# Patient Record
Sex: MALE | Race: BLACK OR AFRICAN AMERICAN | NOT HISPANIC OR LATINO | Employment: OTHER | URBAN - METROPOLITAN AREA
[De-identification: names, ages, dates, MRNs, and addresses within clinical notes are randomized per-mention and may not be internally consistent; named-entity substitution may affect disease eponyms.]

---

## 2021-03-13 ENCOUNTER — IMMUNIZATION (OUTPATIENT)
Dept: VACCINE CLINIC | Facility: HOSPITAL | Age: 51
End: 2021-03-13

## 2021-03-13 PROCEDURE — 91301 HC SARSCO02 VAC 100MCG/0.5ML IM: CPT | Performed by: INTERNAL MEDICINE

## 2021-03-13 PROCEDURE — 0011A: CPT | Performed by: INTERNAL MEDICINE

## 2021-04-10 ENCOUNTER — IMMUNIZATION (OUTPATIENT)
Dept: VACCINE CLINIC | Facility: HOSPITAL | Age: 51
End: 2021-04-10

## 2021-04-10 PROCEDURE — 0012A: CPT | Performed by: NURSE PRACTITIONER

## 2021-04-10 PROCEDURE — 91301 HC SARSCO02 VAC 100MCG/0.5ML IM: CPT | Performed by: NURSE PRACTITIONER

## 2022-09-27 ENCOUNTER — APPOINTMENT (OUTPATIENT)
Dept: CT IMAGING | Facility: HOSPITAL | Age: 52
End: 2022-09-27

## 2022-09-27 ENCOUNTER — HOSPITAL ENCOUNTER (EMERGENCY)
Facility: HOSPITAL | Age: 52
Discharge: HOME | End: 2022-09-27
Attending: EMERGENCY MEDICINE | Admitting: EMERGENCY MEDICINE

## 2022-09-27 VITALS
RESPIRATION RATE: 16 BRPM | HEART RATE: 70 BPM | WEIGHT: 215.39 LBS | HEIGHT: 72 IN | DIASTOLIC BLOOD PRESSURE: 104 MMHG | OXYGEN SATURATION: 98 % | SYSTOLIC BLOOD PRESSURE: 185 MMHG | BODY MASS INDEX: 29.17 KG/M2 | TEMPERATURE: 97.5 F

## 2022-09-27 DIAGNOSIS — I16.0 HYPERTENSIVE URGENCY: ICD-10-CM

## 2022-09-27 DIAGNOSIS — G45.9 TIA (TRANSIENT ISCHEMIC ATTACK): Primary | ICD-10-CM

## 2022-09-27 DIAGNOSIS — Z91.14 NONCOMPLIANCE WITH MEDICATIONS: ICD-10-CM

## 2022-09-27 LAB
ALBUMIN SERPL-MCNC: 4 G/DL (ref 3.5–5.2)
ALBUMIN/GLOB SERPL: 1.5 G/DL
ALP SERPL-CCNC: 68 U/L (ref 39–117)
ALT SERPL W P-5'-P-CCNC: 27 U/L (ref 1–41)
ANION GAP SERPL CALCULATED.3IONS-SCNC: 8.6 MMOL/L (ref 5–15)
APTT PPP: 28.6 SECONDS (ref 22.7–35.4)
AST SERPL-CCNC: 14 U/L (ref 1–40)
BASOPHILS # BLD AUTO: 0.04 10*3/MM3 (ref 0–0.2)
BASOPHILS NFR BLD AUTO: 0.6 % (ref 0–1.5)
BILIRUB SERPL-MCNC: <0.2 MG/DL (ref 0–1.2)
BUN SERPL-MCNC: 11 MG/DL (ref 6–20)
BUN/CREAT SERPL: 9.3 (ref 7–25)
CALCIUM SPEC-SCNC: 9.2 MG/DL (ref 8.6–10.5)
CHLORIDE SERPL-SCNC: 103 MMOL/L (ref 98–107)
CO2 SERPL-SCNC: 23.4 MMOL/L (ref 22–29)
CREAT SERPL-MCNC: 1.18 MG/DL (ref 0.76–1.27)
DEPRECATED RDW RBC AUTO: 47.9 FL (ref 37–54)
EGFRCR SERPLBLD CKD-EPI 2021: 74.7 ML/MIN/1.73
EOSINOPHIL # BLD AUTO: 0.14 10*3/MM3 (ref 0–0.4)
EOSINOPHIL NFR BLD AUTO: 2.1 % (ref 0.3–6.2)
ERYTHROCYTE [DISTWIDTH] IN BLOOD BY AUTOMATED COUNT: 14.4 % (ref 12.3–15.4)
GLOBULIN UR ELPH-MCNC: 2.7 GM/DL
GLUCOSE BLDC GLUCOMTR-MCNC: 210 MG/DL (ref 70–130)
GLUCOSE SERPL-MCNC: 231 MG/DL (ref 65–99)
HCT VFR BLD AUTO: 47.3 % (ref 37.5–51)
HGB BLD-MCNC: 15.4 G/DL (ref 13–17.7)
IMM GRANULOCYTES # BLD AUTO: 0.02 10*3/MM3 (ref 0–0.05)
IMM GRANULOCYTES NFR BLD AUTO: 0.3 % (ref 0–0.5)
INR PPP: 0.99 (ref 0.9–1.1)
LYMPHOCYTES # BLD AUTO: 3.38 10*3/MM3 (ref 0.7–3.1)
LYMPHOCYTES NFR BLD AUTO: 51.2 % (ref 19.6–45.3)
MCH RBC QN AUTO: 29.5 PG (ref 26.6–33)
MCHC RBC AUTO-ENTMCNC: 32.6 G/DL (ref 31.5–35.7)
MCV RBC AUTO: 90.6 FL (ref 79–97)
MONOCYTES # BLD AUTO: 0.56 10*3/MM3 (ref 0.1–0.9)
MONOCYTES NFR BLD AUTO: 8.5 % (ref 5–12)
NEUTROPHILS NFR BLD AUTO: 2.46 10*3/MM3 (ref 1.7–7)
NEUTROPHILS NFR BLD AUTO: 37.3 % (ref 42.7–76)
NRBC BLD AUTO-RTO: 0 /100 WBC (ref 0–0.2)
PLATELET # BLD AUTO: 144 10*3/MM3 (ref 140–450)
PMV BLD AUTO: 11.9 FL (ref 6–12)
POTASSIUM SERPL-SCNC: 4 MMOL/L (ref 3.5–5.2)
PROT SERPL-MCNC: 6.7 G/DL (ref 6–8.5)
PROTHROMBIN TIME: 13 SECONDS (ref 11.7–14.2)
RBC # BLD AUTO: 5.22 10*6/MM3 (ref 4.14–5.8)
SODIUM SERPL-SCNC: 135 MMOL/L (ref 136–145)
TROPONIN T SERPL-MCNC: <0.01 NG/ML (ref 0–0.03)
WBC NRBC COR # BLD: 6.6 10*3/MM3 (ref 3.4–10.8)

## 2022-09-27 PROCEDURE — 96376 TX/PRO/DX INJ SAME DRUG ADON: CPT

## 2022-09-27 PROCEDURE — 84484 ASSAY OF TROPONIN QUANT: CPT | Performed by: EMERGENCY MEDICINE

## 2022-09-27 PROCEDURE — 85025 COMPLETE CBC W/AUTO DIFF WBC: CPT | Performed by: EMERGENCY MEDICINE

## 2022-09-27 PROCEDURE — 93010 ELECTROCARDIOGRAM REPORT: CPT | Performed by: INTERNAL MEDICINE

## 2022-09-27 PROCEDURE — 99284 EMERGENCY DEPT VISIT MOD MDM: CPT

## 2022-09-27 PROCEDURE — 70450 CT HEAD/BRAIN W/O DYE: CPT

## 2022-09-27 PROCEDURE — 82962 GLUCOSE BLOOD TEST: CPT

## 2022-09-27 PROCEDURE — 70498 CT ANGIOGRAPHY NECK: CPT

## 2022-09-27 PROCEDURE — 0 IOPAMIDOL PER 1 ML: Performed by: EMERGENCY MEDICINE

## 2022-09-27 PROCEDURE — 99284 EMERGENCY DEPT VISIT MOD MDM: CPT | Performed by: PSYCHIATRY & NEUROLOGY

## 2022-09-27 PROCEDURE — 85730 THROMBOPLASTIN TIME PARTIAL: CPT | Performed by: EMERGENCY MEDICINE

## 2022-09-27 PROCEDURE — 93005 ELECTROCARDIOGRAM TRACING: CPT | Performed by: EMERGENCY MEDICINE

## 2022-09-27 PROCEDURE — 80053 COMPREHEN METABOLIC PANEL: CPT | Performed by: EMERGENCY MEDICINE

## 2022-09-27 PROCEDURE — 96374 THER/PROPH/DIAG INJ IV PUSH: CPT

## 2022-09-27 PROCEDURE — 85610 PROTHROMBIN TIME: CPT | Performed by: EMERGENCY MEDICINE

## 2022-09-27 PROCEDURE — 70496 CT ANGIOGRAPHY HEAD: CPT

## 2022-09-27 RX ORDER — LABETALOL HYDROCHLORIDE 5 MG/ML
20 INJECTION, SOLUTION INTRAVENOUS ONCE
Status: COMPLETED | OUTPATIENT
Start: 2022-09-27 | End: 2022-09-27

## 2022-09-27 RX ORDER — SODIUM CHLORIDE 0.9 % (FLUSH) 0.9 %
10 SYRINGE (ML) INJECTION AS NEEDED
Status: DISCONTINUED | OUTPATIENT
Start: 2022-09-27 | End: 2022-09-27 | Stop reason: HOSPADM

## 2022-09-27 RX ADMIN — LABETALOL HYDROCHLORIDE 20 MG: 5 INJECTION, SOLUTION INTRAVENOUS at 15:57

## 2022-09-27 RX ADMIN — LABETALOL HYDROCHLORIDE 20 MG: 5 INJECTION, SOLUTION INTRAVENOUS at 14:21

## 2022-09-27 RX ADMIN — IOPAMIDOL 95 ML: 755 INJECTION, SOLUTION INTRAVENOUS at 16:25

## 2022-09-28 LAB — QT INTERVAL: 388 MS

## 2022-10-10 ENCOUNTER — HOSPITAL ENCOUNTER (OUTPATIENT)
Facility: HOSPITAL | Age: 52
LOS: 1 days | Discharge: HOME | End: 2022-10-11
Attending: EMERGENCY MEDICINE | Admitting: HOSPITALIST

## 2022-10-10 ENCOUNTER — APPOINTMENT (OUTPATIENT)
Dept: MRI IMAGING | Facility: HOSPITAL | Age: 52
End: 2022-10-10

## 2022-10-10 ENCOUNTER — APPOINTMENT (OUTPATIENT)
Dept: CT IMAGING | Facility: HOSPITAL | Age: 52
End: 2022-10-10

## 2022-10-10 DIAGNOSIS — I63.81 LACUNAR INFARCTION: ICD-10-CM

## 2022-10-10 DIAGNOSIS — Z79.01 ANTICOAGULATED BY ANTICOAGULATION TREATMENT: ICD-10-CM

## 2022-10-10 DIAGNOSIS — R55 SYNCOPE, UNSPECIFIED SYNCOPE TYPE: Primary | ICD-10-CM

## 2022-10-10 LAB
ALBUMIN SERPL-MCNC: 3.8 G/DL (ref 3.5–5.2)
ALBUMIN/GLOB SERPL: 1.5 G/DL
ALP SERPL-CCNC: 60 U/L (ref 39–117)
ALT SERPL W P-5'-P-CCNC: 41 U/L (ref 1–41)
ANION GAP SERPL CALCULATED.3IONS-SCNC: 9.3 MMOL/L (ref 5–15)
AST SERPL-CCNC: 22 U/L (ref 1–40)
BASOPHILS # BLD AUTO: 0.04 10*3/MM3 (ref 0–0.2)
BASOPHILS NFR BLD AUTO: 0.6 % (ref 0–1.5)
BILIRUB SERPL-MCNC: 0.2 MG/DL (ref 0–1.2)
BUN SERPL-MCNC: 11 MG/DL (ref 6–20)
BUN/CREAT SERPL: 11.2 (ref 7–25)
CALCIUM SPEC-SCNC: 8.9 MG/DL (ref 8.6–10.5)
CHLORIDE SERPL-SCNC: 105 MMOL/L (ref 98–107)
CO2 SERPL-SCNC: 25.7 MMOL/L (ref 22–29)
CREAT SERPL-MCNC: 0.98 MG/DL (ref 0.76–1.27)
DEPRECATED RDW RBC AUTO: 43.4 FL (ref 37–54)
EGFRCR SERPLBLD CKD-EPI 2021: 93.4 ML/MIN/1.73
EOSINOPHIL # BLD AUTO: 0.12 10*3/MM3 (ref 0–0.4)
EOSINOPHIL NFR BLD AUTO: 1.8 % (ref 0.3–6.2)
ERYTHROCYTE [DISTWIDTH] IN BLOOD BY AUTOMATED COUNT: 14.1 % (ref 12.3–15.4)
GLOBULIN UR ELPH-MCNC: 2.6 GM/DL
GLUCOSE BLDC GLUCOMTR-MCNC: 108 MG/DL (ref 70–130)
GLUCOSE BLDC GLUCOMTR-MCNC: 203 MG/DL (ref 70–130)
GLUCOSE SERPL-MCNC: 96 MG/DL (ref 65–99)
HCT VFR BLD AUTO: 41 % (ref 37.5–51)
HGB BLD-MCNC: 14.2 G/DL (ref 13–17.7)
IMM GRANULOCYTES # BLD AUTO: 0.01 10*3/MM3 (ref 0–0.05)
IMM GRANULOCYTES NFR BLD AUTO: 0.2 % (ref 0–0.5)
INR PPP: 1.94 (ref 0.9–1.1)
INR PPP: 1.95 (ref 0.9–1.1)
LYMPHOCYTES # BLD AUTO: 3.22 10*3/MM3 (ref 0.7–3.1)
LYMPHOCYTES NFR BLD AUTO: 49 % (ref 19.6–45.3)
MCH RBC QN AUTO: 29.5 PG (ref 26.6–33)
MCHC RBC AUTO-ENTMCNC: 34.6 G/DL (ref 31.5–35.7)
MCV RBC AUTO: 85.1 FL (ref 79–97)
MONOCYTES # BLD AUTO: 0.6 10*3/MM3 (ref 0.1–0.9)
MONOCYTES NFR BLD AUTO: 9.1 % (ref 5–12)
NEUTROPHILS NFR BLD AUTO: 2.58 10*3/MM3 (ref 1.7–7)
NEUTROPHILS NFR BLD AUTO: 39.3 % (ref 42.7–76)
NRBC BLD AUTO-RTO: 0 /100 WBC (ref 0–0.2)
PLATELET # BLD AUTO: 167 10*3/MM3 (ref 140–450)
PMV BLD AUTO: 10.8 FL (ref 6–12)
POTASSIUM SERPL-SCNC: 3.6 MMOL/L (ref 3.5–5.2)
PROT SERPL-MCNC: 6.4 G/DL (ref 6–8.5)
PROTHROMBIN TIME: 22.4 SECONDS (ref 11.7–14.2)
PROTHROMBIN TIME: 22.5 SECONDS (ref 11.7–14.2)
QT INTERVAL: 377 MS
RBC # BLD AUTO: 4.82 10*6/MM3 (ref 4.14–5.8)
SODIUM SERPL-SCNC: 140 MMOL/L (ref 136–145)
TROPONIN T SERPL-MCNC: <0.01 NG/ML (ref 0–0.03)
WBC NRBC COR # BLD: 6.57 10*3/MM3 (ref 3.4–10.8)

## 2022-10-10 PROCEDURE — G0378 HOSPITAL OBSERVATION PER HR: HCPCS

## 2022-10-10 PROCEDURE — 70450 CT HEAD/BRAIN W/O DYE: CPT

## 2022-10-10 PROCEDURE — 85610 PROTHROMBIN TIME: CPT | Performed by: EMERGENCY MEDICINE

## 2022-10-10 PROCEDURE — 93010 ELECTROCARDIOGRAM REPORT: CPT | Performed by: INTERNAL MEDICINE

## 2022-10-10 PROCEDURE — 70553 MRI BRAIN STEM W/O & W/DYE: CPT

## 2022-10-10 PROCEDURE — 84484 ASSAY OF TROPONIN QUANT: CPT | Performed by: EMERGENCY MEDICINE

## 2022-10-10 PROCEDURE — 99284 EMERGENCY DEPT VISIT MOD MDM: CPT

## 2022-10-10 PROCEDURE — 0 GADOBENATE DIMEGLUMINE 529 MG/ML SOLUTION: Performed by: HOSPITALIST

## 2022-10-10 PROCEDURE — 82962 GLUCOSE BLOOD TEST: CPT

## 2022-10-10 PROCEDURE — 63710000001 INSULIN LISPRO (HUMAN) PER 5 UNITS: Performed by: HOSPITALIST

## 2022-10-10 PROCEDURE — 85610 PROTHROMBIN TIME: CPT | Performed by: HOSPITALIST

## 2022-10-10 PROCEDURE — 25010000002 SODIUM CHLORIDE 0.9 % WITH KCL 20 MEQ 20-0.9 MEQ/L-% SOLUTION: Performed by: HOSPITALIST

## 2022-10-10 PROCEDURE — A9577 INJ MULTIHANCE: HCPCS | Performed by: HOSPITALIST

## 2022-10-10 PROCEDURE — 80053 COMPREHEN METABOLIC PANEL: CPT | Performed by: EMERGENCY MEDICINE

## 2022-10-10 PROCEDURE — 93005 ELECTROCARDIOGRAM TRACING: CPT | Performed by: EMERGENCY MEDICINE

## 2022-10-10 PROCEDURE — 85025 COMPLETE CBC W/AUTO DIFF WBC: CPT | Performed by: EMERGENCY MEDICINE

## 2022-10-10 RX ORDER — ASPIRIN 81 MG/1
81 TABLET, CHEWABLE ORAL DAILY
Status: DISCONTINUED | OUTPATIENT
Start: 2022-10-11 | End: 2022-10-11 | Stop reason: HOSPADM

## 2022-10-10 RX ORDER — SODIUM CHLORIDE AND POTASSIUM CHLORIDE 150; 900 MG/100ML; MG/100ML
75 INJECTION, SOLUTION INTRAVENOUS CONTINUOUS
Status: DISCONTINUED | OUTPATIENT
Start: 2022-10-10 | End: 2022-10-11

## 2022-10-10 RX ORDER — FUROSEMIDE 20 MG/1
TABLET ORAL
COMMUNITY
Start: 2022-06-29 | End: 2022-10-11 | Stop reason: HOSPADM

## 2022-10-10 RX ORDER — ERGOCALCIFEROL 1.25 MG/1
1 CAPSULE ORAL WEEKLY
COMMUNITY
Start: 2022-05-25

## 2022-10-10 RX ORDER — HYDROCODONE BITARTRATE AND ACETAMINOPHEN 10; 325 MG/1; MG/1
1 TABLET ORAL
COMMUNITY
Start: 2022-08-19

## 2022-10-10 RX ORDER — AMLODIPINE BESYLATE 5 MG/1
5 TABLET ORAL
Status: DISCONTINUED | OUTPATIENT
Start: 2022-10-10 | End: 2022-10-10

## 2022-10-10 RX ORDER — ROSUVASTATIN CALCIUM 20 MG/1
20 TABLET, COATED ORAL DAILY
COMMUNITY
Start: 2022-09-07 | End: 2022-10-11 | Stop reason: HOSPADM

## 2022-10-10 RX ORDER — PANTOPRAZOLE SODIUM 40 MG/1
TABLET, DELAYED RELEASE ORAL
COMMUNITY
Start: 2022-06-19

## 2022-10-10 RX ORDER — WARFARIN SODIUM 10 MG/1
TABLET ORAL
COMMUNITY
Start: 2022-08-31

## 2022-10-10 RX ORDER — INSULIN LISPRO 100 [IU]/ML
5 INJECTION, SOLUTION INTRAVENOUS; SUBCUTANEOUS
Status: DISCONTINUED | OUTPATIENT
Start: 2022-10-11 | End: 2022-10-11

## 2022-10-10 RX ORDER — PANTOPRAZOLE SODIUM 40 MG/1
40 TABLET, DELAYED RELEASE ORAL
Status: DISCONTINUED | OUTPATIENT
Start: 2022-10-11 | End: 2022-10-11 | Stop reason: HOSPADM

## 2022-10-10 RX ORDER — INSULIN LISPRO 100 [IU]/ML
0-7 INJECTION, SOLUTION INTRAVENOUS; SUBCUTANEOUS
Status: DISCONTINUED | OUTPATIENT
Start: 2022-10-10 | End: 2022-10-10

## 2022-10-10 RX ORDER — AMLODIPINE BESYLATE 10 MG/1
10 TABLET ORAL
Status: DISCONTINUED | OUTPATIENT
Start: 2022-10-11 | End: 2022-10-11 | Stop reason: HOSPADM

## 2022-10-10 RX ORDER — CARVEDILOL 6.25 MG/1
6.25 TABLET ORAL 2 TIMES DAILY WITH MEALS
Status: DISCONTINUED | OUTPATIENT
Start: 2022-10-10 | End: 2022-10-11 | Stop reason: HOSPADM

## 2022-10-10 RX ORDER — ATORVASTATIN CALCIUM 20 MG/1
40 TABLET, FILM COATED ORAL NIGHTLY
Status: DISCONTINUED | OUTPATIENT
Start: 2022-10-10 | End: 2022-10-10

## 2022-10-10 RX ORDER — WARFARIN SODIUM 5 MG/1
15 TABLET ORAL ONCE
Status: COMPLETED | OUTPATIENT
Start: 2022-10-10 | End: 2022-10-10

## 2022-10-10 RX ORDER — SODIUM CHLORIDE 0.9 % (FLUSH) 0.9 %
10 SYRINGE (ML) INJECTION AS NEEDED
Status: DISCONTINUED | OUTPATIENT
Start: 2022-10-10 | End: 2022-10-11 | Stop reason: HOSPADM

## 2022-10-10 RX ORDER — ASPIRIN 81 MG/1
81 TABLET, CHEWABLE ORAL DAILY
COMMUNITY
Start: 2022-06-20

## 2022-10-10 RX ORDER — AMLODIPINE BESYLATE 2.5 MG/1
TABLET ORAL
COMMUNITY
Start: 2022-06-19 | End: 2022-10-11 | Stop reason: HOSPADM

## 2022-10-10 RX ORDER — INSULIN LISPRO 100 [IU]/ML
0-7 INJECTION, SOLUTION INTRAVENOUS; SUBCUTANEOUS
Status: DISCONTINUED | OUTPATIENT
Start: 2022-10-10 | End: 2022-10-11 | Stop reason: HOSPADM

## 2022-10-10 RX ORDER — DAPAGLIFLOZIN 10 MG/1
TABLET, FILM COATED ORAL
COMMUNITY
Start: 2022-06-29

## 2022-10-10 RX ORDER — LOSARTAN POTASSIUM 25 MG/1
25 TABLET ORAL DAILY
Status: DISCONTINUED | OUTPATIENT
Start: 2022-10-11 | End: 2022-10-11 | Stop reason: HOSPADM

## 2022-10-10 RX ORDER — ATORVASTATIN CALCIUM 80 MG/1
80 TABLET, FILM COATED ORAL NIGHTLY
Status: DISCONTINUED | OUTPATIENT
Start: 2022-10-10 | End: 2022-10-11 | Stop reason: HOSPADM

## 2022-10-10 RX ORDER — ROSUVASTATIN CALCIUM 20 MG/1
20 TABLET, COATED ORAL DAILY
Status: DISCONTINUED | OUTPATIENT
Start: 2022-10-10 | End: 2022-10-10

## 2022-10-10 RX ORDER — LOSARTAN POTASSIUM 25 MG/1
25 TABLET ORAL DAILY
COMMUNITY
Start: 2022-07-29

## 2022-10-10 RX ORDER — CARVEDILOL 6.25 MG/1
6.25 TABLET ORAL
COMMUNITY
Start: 2022-09-08

## 2022-10-10 RX ADMIN — ATORVASTATIN CALCIUM 80 MG: 80 TABLET, FILM COATED ORAL at 23:33

## 2022-10-10 RX ADMIN — POTASSIUM CHLORIDE AND SODIUM CHLORIDE 75 ML/HR: 900; 150 INJECTION, SOLUTION INTRAVENOUS at 18:54

## 2022-10-10 RX ADMIN — CARVEDILOL 6.25 MG: 6.25 TABLET, FILM COATED ORAL at 23:33

## 2022-10-10 RX ADMIN — INSULIN LISPRO 3 UNITS: 100 INJECTION, SOLUTION INTRAVENOUS; SUBCUTANEOUS at 21:16

## 2022-10-10 RX ADMIN — GADOBENATE DIMEGLUMINE 20 ML: 529 INJECTION, SOLUTION INTRAVENOUS at 22:31

## 2022-10-10 RX ADMIN — WARFARIN SODIUM 15 MG: 5 TABLET ORAL at 23:33

## 2022-10-10 NOTE — PROGRESS NOTES
[facility] Clinical Pharmacy Services: Warfarin Dosing/Monitoring Consult    Cecilio Puckett is a 52 y.o. male, estimated creatinine clearance is 108 mL/min (by C-G formula based on SCr of 0.98 mg/dL). weighing 97.7 kg (215 lb 6.2 oz).    Results from last 7 days   Lab Units 10/10/22  1444   INR  1.95*   HEMOGLOBIN g/dL 14.2   HEMATOCRIT % 41.0   PLATELETS 10*3/mm3 167     Prior to admission anticoagulation: warfarin 15mg Mon, Wed, Fri; 10mg all other days    Hospital Anticoagulation:  Consulting provider: Dr. Levi  Start date: 10/10  Indication: A Fib - requiring full anticoagulation  Target INR: 2 - 3  Expected duration: tbd   Bridge Therapy: No      Potential food or drug interactions: none at this time    Education complete?/Date: No; plan for follow up TBD    Assessment/Plan:  Dose: 15mg x once tonight; will reassess after morning labs tomorrow  Monitor for any signs or symptoms of bleeding  Follow up daily INRs and dose adjustments    Pharmacy will continue to follow until discharge or discontinuation of warfarin.     Cindy Ott [facility]  Clinical Pharmacist

## 2022-10-10 NOTE — ED NOTES
Nursing report ED to floor  Cecilio Puckett  52 y.o.  male    HPI :   Chief Complaint   Patient presents with    Syncope       Admitting doctor:   Osbaldo Levi MD    Admitting diagnosis:   The primary encounter diagnosis was Syncope, unspecified syncope type. Diagnoses of Lacunar infarction (HCC) and Anticoagulated by anticoagulation treatment were also pertinent to this visit.    Code status:   Current Code Status       Date Active Code Status Order ID Comments User Context       Not on file            Allergies:   Patient has no known allergies.    Isolation:   No active isolations    Intake and Output  No intake or output data in the 24 hours ending 10/10/22 1603    Weight:   There were no vitals filed for this visit.    Most recent vitals:   Vitals:    10/10/22 1334 10/10/22 1358 10/10/22 1446 10/10/22 1501   BP:  133/85 128/82 114/89   Pulse: 96 72 68 73   Resp: 16  16 16   Temp: 97.8 °F (36.6 °C)      TempSrc: Tympanic      SpO2: 97% 98% 98% 99%       Active LDAs/IV Access:   Lines, Drains & Airways       Active LDAs       Name Placement date Placement time Site Days    Peripheral IV 10/10/22 1444 Right Antecubital 10/10/22  1444  Antecubital  less than 1                    Labs (abnormal labs have a star):   Labs Reviewed   PROTIME-INR - Abnormal; Notable for the following components:       Result Value    Protime 22.5 (*)     INR 1.95 (*)     All other components within normal limits   CBC WITH AUTO DIFFERENTIAL - Abnormal; Notable for the following components:    Neutrophil % 39.3 (*)     Lymphocyte % 49.0 (*)     Lymphocytes, Absolute 3.22 (*)     All other components within normal limits   TROPONIN (IN-HOUSE) - Normal    Narrative:     Troponin T Reference Range:  <= 0.03 ng/mL-   Negative for AMI  >0.03 ng/mL-     Abnormal for myocardial necrosis.  Clinicians would have to utilize clinical acumen, EKG, Troponin and serial changes to determine if it is an Acute Myocardial Infarction or myocardial injury due to  an underlying chronic condition.       Results may be falsely decreased if patient taking Biotin.     COMPREHENSIVE METABOLIC PANEL    Narrative:     GFR Normal >60  Chronic Kidney Disease <60  Kidney Failure <15     CBC AND DIFFERENTIAL    Narrative:     The following orders were created for panel order CBC & Differential.  Procedure                               Abnormality         Status                     ---------                               -----------         ------                     CBC Auto Differential[[id number]]        Abnormal            Final result                 Please view results for these tests on the individual orders.       EKG:   ECG 12 Lead   Preliminary Result   HEART RATE= 70  bpm   RR Interval= 857  ms   NM Interval= 169  ms   P Horizontal Axis= -10  deg   P Front Axis= 18  deg   QRSD Interval= 98  ms   QT Interval= 377  ms   QRS Axis= -16  deg   T Wave Axis= -38  deg   - ABNORMAL ECG -   Sinus rhythm   Probable left atrial enlargement   Left ventricular hypertrophy   Lateral infarct, age indeterminate   Anterior ST elevation, probably due to LVH   Electronically Signed By:    Date and Time of Study: 2022-10-10 14:00:26          Meds given in ED:   Medications   sodium chloride 0.9 % flush 10 mL (has no administration in time range)       Imaging results:  CT Head Without Contrast    Result Date: 10/10/2022  There is no evidence for acute traumatic intracranial pathology. Old lacunar disease is appreciated within the right caudate body, right lateral thalamus, and left lentiform nucleus which was seen on the prior head CT dated 09/27/2022. Additionally, there is a 9 mm hypodensity within the anterior aspect of the posterior limb of the right internal capsule which was not seen on the prior head CT and where there may be an acute to subacute lacune. Further characterization with MR imaging could be obtained, as clinically indicated.  These findings and recommendations were discussed  with Dr. Blanchard on 10/10/2022 at approximately 2:57 PM.  Radiation dose reduction techniques were utilized, including automated exposure control and exposure modulation based on body size.         Ambulatory status:   Ad yash    Social issues:   Social History     Socioeconomic History    Marital status: Single       NIH Stroke Scale:         Lara Rausch RN  10/10/22 16:03 EDT

## 2022-10-10 NOTE — ED TRIAGE NOTES
Pt passed out in bathroom last night - he hit his head.  He is on warfarin    Patient was placed in face mask during first look triage.  Patient was wearing a face mask throughout encounter.  I wore personal protective equipment throughout the encounter.  Hand hygiene was performed before and after patient encounter.

## 2022-10-10 NOTE — H&P
"History and physical    Primary care physician  Dr. CADENA    Chief complaint  Syncopal episode    History of present illness  52-year-old -American male with history of diabetes hypertension hyperlipidemia coronary artery disease and peripheral artery disease presented to [facility] emergency room after the syncopal episode when he was heading towards the bathroom and try to urinate.  Patient fell down and he does not recall after that.  Patient denies any chest pain shortness of breath dizziness lightheadedness prior to the fall.  Patient denies any loss of control of bowel bladder or tongue biting.  Patient evaluated in ER admitted for management.  Patient has no fever chills cough congestion night sweats weight loss or weight gain.    PAST MEDICAL HISTORY   Diabetes mellitus  Hypertension  Hyperlipidemia  Depression  Coronary artery disease  Peripheral artery disease      PAST SURGICAL HISTORY     History reviewed. No pertinent surgical history.     FAMILY HISTORY  History reviewed. No pertinent family history.     SOCIAL HISTORY                Socioeconomic History   • Marital status: Single         ALLERGIES  Patient has no known allergies.  Home medications reviewed    REVIEW OF SYSTEMS  Constitutional: Negative for fever.   HENT: Negative for sore throat.    Eyes: Negative.    Respiratory: Negative.  Negative for cough.    Cardiovascular: Negative.  Negative for chest pain.   Gastrointestinal: Negative.    Genitourinary: Negative.  Negative for dysuria.   Musculoskeletal: Negative.  Negative for back pain.   Skin: Negative.  Negative for rash.   Neurological: Positive for syncope, weakness and headaches.   All other systems reviewed and are negative.    PHYSICAL EXAM   Blood pressure 148/97, pulse 79, temperature 97.9 °F (36.6 °C), temperature source Oral, resp. rate 16, height 182.9 cm (72.01\"), weight 97.7 kg (215 lb 6.2 oz), SpO2 97 %.    GENERAL: Alert male no obvious distress.    HEENT:  " Unremarkable  NECK:  Supple  CV: regular rhythm, regular rate-no murmur  RESPIRATORY: normal effort, clear to auscultation bilaterally  ABDOMEN: soft, nontender nondistended bowel sounds positive  MUSCULOSKELETAL: Spine-atraumatic  NEURO:  No focal deficit  SKIN: warm, dry     LAB RESULTS  Lab Results (last 24 hours)     Procedure Component Value Units Date/Time    POC Glucose Once [[id number]]  (Normal) Collected: 10/10/22 1720    Specimen: Blood Updated: 10/10/22 1721     Glucose 108 mg/dL      Comment: Meter: [id number] : [id number] Giadyllan FINN       Protime-INR [[id number]]  (Abnormal) Collected: 10/10/22 1444    Specimen: Blood from Arm, Right Updated: 10/10/22 1519     Protime 22.5 Seconds      INR 1.95    Comprehensive Metabolic Panel [[id number]] Collected: 10/10/22 1444    Specimen: Blood from Arm, Right Updated: 10/10/22 1518     Glucose 96 mg/dL      BUN 11 mg/dL      Creatinine 0.98 mg/dL      Sodium 140 mmol/L      Potassium 3.6 mmol/L      Chloride 105 mmol/L      CO2 25.7 mmol/L      Calcium 8.9 mg/dL      Total Protein 6.4 g/dL      Albumin 3.80 g/dL      ALT (SGPT) 41 U/L      AST (SGOT) 22 U/L      Alkaline Phosphatase 60 U/L      Total Bilirubin 0.2 mg/dL      Globulin 2.6 gm/dL      A/G Ratio 1.5 g/dL      BUN/Creatinine Ratio 11.2     Anion Gap 9.3 mmol/L      eGFR 93.4 mL/min/1.73      Comment: National Kidney Foundation and American Society of Nephrology (ASN) Task Force recommended calculation based on the Chronic Kidney Disease Epidemiology Collaboration (CKD-EPI) equation refit without adjustment for race.       Narrative:      GFR Normal >60  Chronic Kidney Disease <60  Kidney Failure <15      Troponin [[id number]]  (Normal) Collected: 10/10/22 1444    Specimen: Blood from Arm, Right Updated: 10/10/22 1518     Troponin T <0.010 ng/mL     Narrative:      Troponin T Reference Range:  <= 0.03 ng/mL-   Negative for AMI  >0.03 ng/mL-     Abnormal for myocardial necrosis.  Clinicians  would have to utilize clinical acumen, EKG, Troponin and serial changes to determine if it is an Acute Myocardial Infarction or myocardial injury due to an underlying chronic condition.       Results may be falsely decreased if patient taking Biotin.      CBC & Differential [[id number]]  (Abnormal) Collected: 10/10/22 1444    Specimen: Blood from Arm, Right Updated: 10/10/22 1457    Narrative:      The following orders were created for panel order CBC & Differential.  Procedure                               Abnormality         Status                     ---------                               -----------         ------                     CBC Auto Differential[[id number]]        Abnormal            Final result                 Please view results for these tests on the individual orders.    CBC Auto Differential [[id number]]  (Abnormal) Collected: 10/10/22 1444    Specimen: Blood from Arm, Right Updated: 10/10/22 1457     WBC 6.57 10*3/mm3      RBC 4.82 10*6/mm3      Hemoglobin 14.2 g/dL      Hematocrit 41.0 %      MCV 85.1 fL      MCH 29.5 pg      MCHC 34.6 g/dL      RDW 14.1 %      RDW-SD 43.4 fl      MPV 10.8 fL      Platelets 167 10*3/mm3      Neutrophil % 39.3 %      Lymphocyte % 49.0 %      Monocyte % 9.1 %      Eosinophil % 1.8 %      Basophil % 0.6 %      Immature Grans % 0.2 %      Neutrophils, Absolute 2.58 10*3/mm3      Lymphocytes, Absolute 3.22 10*3/mm3      Monocytes, Absolute 0.60 10*3/mm3      Eosinophils, Absolute 0.12 10*3/mm3      Basophils, Absolute 0.04 10*3/mm3      Immature Grans, Absolute 0.01 10*3/mm3      nRBC 0.0 /100 WBC         Imaging Results (Last 24 Hours)     Procedure Component Value Units Date/Time    CT Head Without Contrast [[id number]] Collected: 10/10/22 1512     Updated: 10/10/22 1621    Narrative:      CT HEAD WITHOUT CONTRAST     CLINICAL HISTORY: Head trauma with headache.     TECHNIQUE: CT scan of the head was obtained with 3 mm axial soft tissue  and 2 mm axial bone algorithm  algorithm images. No intravenous contrast  was administered. Sagittal and coronal reconstructions were obtained.     COMPARISON: Head CT dated 09/27/2022.     FINDINGS:       There is no evidence for a calvarial fracture or an acute extra-axial  hemorrhage. Old lacunar disease is seen within the lateral aspect of the  right thalamus, the left lentiform nucleus, and the body of the right  caudate. Additionally, there is a 9 mm hypodensity within the posterior  limb of the right internal capsule which was not seen on the prior head  CT and could represent an acute to subacute lacune. This could be  further characterized with MR imaging, if clinically indicated. The  ventricles, sulci, and cisterns are age appropriate. The gray-white  matter differentiation is within normal limits. The posterior fossa  structures are unremarkable.     Incidental note is made of moderate size mucous retention cyst within  the left maxillary sinus. Mucosal thickening is incidentally appreciated  within the ethmoid air cells.       Impression:         There is no evidence for acute traumatic intracranial pathology. Old  lacunar disease is appreciated within the right caudate body, right  lateral thalamus, and left lentiform nucleus which was seen on the prior  head CT dated 09/27/2022. Additionally, there is a 9 mm hypodensity  within the anterior aspect of the posterior limb of the right internal  capsule which was not seen on the prior head CT and where there may be  an acute to subacute lacune. Further characterization with MR imaging  could be obtained, as clinically indicated.     These findings and recommendations were discussed with Dr. Blanchard on  10/10/2022 at approximately 2:57 PM.     Radiation dose reduction techniques were utilized, including automated  exposure control and exposure modulation based on body size.     This report was finalized on 10/10/2022 4:18 PM by Dr. Sergio Mcwilliams M.D.              ECG 12 Lead  Component    Ref Range & Units 14:00 13 d ago   QT Interval   ms 377 P  388    Resulting Agency  ECG  ECG             HEART RATE= 70  bpm  RR Interval= 857  ms  UT Interval= 169  ms  P Horizontal Axis= -10  deg  P Front Axis= 18  deg  QRSD Interval= 98  ms  QT Interval= 377  ms  QRS Axis= -16  deg  T Wave Axis= -38  deg  - ABNORMAL ECG -  Sinus rhythm  Probable left atrial enlargement  Left ventricular hypertrophy  Lateral infarct, age indeterminate  Anterior ST elevation, probably due to LVH             Current Facility-Administered Medications:   •  amLODIPine (NORVASC) tablet 10 mg, 10 mg, Oral, Q24H, Osbaldo Levi MD  •  aspirin chewable tablet 81 mg, 81 mg, Oral, Daily, Osbaldo Levi MD  •  carvedilol (COREG) tablet 6.25 mg, 6.25 mg, Oral, BID With Meals, Osbadlo Levi MD  •  empagliflozin (JARDIANCE) tablet 25 mg, 25 mg, Oral, Daily, Osbaldo Levi MD  •  influenza vac split quad (FLUZONE,FLUARIX,AFLURIA,FLULAVAL) injection 0.5 mL, 0.5 mL, Intramuscular, During Hospitalization, Osbaldo Levi MD  •  Insulin Glargine (LANTUS SOLOSTAR) injection pen solution pen-injector 45 Units, 45 Units, Subcutaneous, Daily, Osbaldo Levi MD  •  losartan (COZAAR) tablet 25 mg, 25 mg, Oral, Daily, Osbaldo Levi MD  •  pantoprazole (PROTONIX) EC tablet 40 mg, 40 mg, Oral, BID AC, Osbaldo Levi MD  •  Pharmacy to dose warfarin, , Does not apply, Continuous PRN, Osbaldo Levi MD  •  rosuvastatin (CRESTOR) tablet 20 mg, 20 mg, Oral, Daily, Osbaldo Levi MD  •  sertraline (ZOLOFT) tablet 50 mg, 50 mg, Oral, Daily, Osbaldo Levi MD  •  [COMPLETED] Insert peripheral IV, , , Once **AND** sodium chloride 0.9 % flush 10 mL, 10 mL, Intravenous, PRN, [place], Bear MONTE MD  •  warfarin (COUMADIN) tablet 5 mg, 5 mg, Oral, Daily, Osbaldo Levi MD     ASSESSMENT  Syncopal episode questionable etiology  Old lacunar infarct on anticoagulation  Diabetes mellitus  Hypertension  Hyperlipidemia  Coronary artery disease  Peripheral artery disease  Tobacco  abuse  Gastroesophageal reflux disease    PLAN  Admit  Aspirin Lipitor  IVF  Stroke work-up with MRI of brain 2D echo and bilateral carotid Doppler  Neurology consult  Adjust home medications  Stress ulcer DVT prophylaxis  Supportive care  Patient is full code  Discussed with nursing staff and family  Follow closely further recommendation according to hospital course    CHYNA NOLAN MD

## 2022-10-10 NOTE — ED PROVIDER NOTES
" EMERGENCY DEPARTMENT ENCOUNTER    Room Number:  33/33  Date of encounter:  10/10/2022  PCP: Arias Lehman APRN  Historian: Patient, wife at bedside    I used full protective equipment while examining this patient.  This includes face mask, gloves and protective eyewear.  I washed my hands before entering the room and immediately upon leaving the room      HPI:  Chief Complaint: Syncope  A complete HPI/ROS/PMH/PSH/SH/FH are unobtainable due to: None    Context: Cecilio Puckett is a 52 y.o. male who presents to the ED c/o syncope.  Patient states that he was heading towards the bathroom last night to urinate when he became lightheaded and passed out.  He is unsure how long he was out.  He states he did hit his head.  He complains of mild headache.  He states that he has been feeling generalized weakness ongoing for several weeks.  He thinks he might be \"dehydrated\".  Patient states he has been compliant with his medications including diabetes medicines and Coumadin.  Denies chest pain or trouble breathing.  Patient does continue to smoke less than 1 pack/day.  Primary care provider is Dr. Ojeda.    MEDICAL RECORD REVIEW  I reviewed prior medical records note the patient was seen in our ED just less than 2 weeks ago.  He has a history of hypertension diabetes, coronary artery disease and peripheral vascular disease.  Patient was seen with possible TIA and ultimately refused admission despite advice to do otherwise.    PAST MEDICAL HISTORY  Active Ambulatory Problems     Diagnosis Date Noted   • No Active Ambulatory Problems     Resolved Ambulatory Problems     Diagnosis Date Noted   • No Resolved Ambulatory Problems     No Additional Past Medical History         PAST SURGICAL HISTORY  History reviewed. No pertinent surgical history.      FAMILY HISTORY  History reviewed. No pertinent family history.      SOCIAL HISTORY  Social History     Socioeconomic History   • Marital status: Single         ALLERGIES  Patient " has no known allergies.       REVIEW OF SYSTEMS  Review of Systems   Constitutional: Negative for fever.   HENT: Negative for sore throat.    Eyes: Negative.    Respiratory: Negative.  Negative for cough.    Cardiovascular: Negative.  Negative for chest pain.   Gastrointestinal: Negative.    Genitourinary: Negative.  Negative for dysuria.   Musculoskeletal: Negative.  Negative for back pain.   Skin: Negative.  Negative for rash.   Neurological: Positive for syncope, weakness (Mild generalized weakness) and headaches.   All other systems reviewed and are negative.          PHYSICAL EXAM    I have reviewed the triage vital signs and nursing notes.    ED Triage Vitals [10/10/22 1334]   Temp Heart Rate Resp BP SpO2   97.8 °F (36.6 °C) 96 16 -- 97 %      Temp src Heart Rate Source Patient Position BP Location FiO2 (%)   Tympanic Monitor -- -- --       Physical Exam  GENERAL: Alert male no obvious distress.  Triage vitals reviewed and are fairly unremarkable  HENT: nares patent, mild left occipital tenderness to palpation without noted swelling  EYES: no scleral icterus  CV: regular rhythm, regular rate-no murmur  RESPIRATORY: normal effort, clear to auscultation bilaterally-O2 sats upper 90s on room air  ABDOMEN: soft, nontender to palpation-no masses  MUSCULOSKELETAL: Spine-atraumatic  Upper extremities-atraumatic  Lower mdbzmqtgyyq-qdgrzfdegd-xz segment swelling or tenderness to palpation  NEURO: Strength sensation and coordination are grossly intact.  Speech and mentation are unremarkable  SKIN: warm, dry      LAB RESULTS  Recent Results (from the past 24 hour(s))   ECG 12 Lead    Collection Time: 10/10/22  2:00 PM   Result Value Ref Range    QT Interval 377 ms   Comprehensive Metabolic Panel    Collection Time: 10/10/22  2:44 PM    Specimen: Arm, Right; Blood   Result Value Ref Range    Glucose 96 65 - 99 mg/dL    BUN 11 6 - 20 mg/dL    Creatinine 0.98 0.76 - 1.27 mg/dL    Sodium 140 136 - 145 mmol/L    Potassium 3.6  3.5 - 5.2 mmol/L    Chloride 105 98 - 107 mmol/L    CO2 25.7 22.0 - 29.0 mmol/L    Calcium 8.9 8.6 - 10.5 mg/dL    Total Protein 6.4 6.0 - 8.5 g/dL    Albumin 3.80 3.50 - 5.20 g/dL    ALT (SGPT) 41 1 - 41 U/L    AST (SGOT) 22 1 - 40 U/L    Alkaline Phosphatase 60 39 - 117 U/L    Total Bilirubin 0.2 0.0 - 1.2 mg/dL    Globulin 2.6 gm/dL    A/G Ratio 1.5 g/dL    BUN/Creatinine Ratio 11.2 7.0 - 25.0    Anion Gap 9.3 5.0 - 15.0 mmol/L    eGFR 93.4 >60.0 mL/min/1.73   Protime-INR    Collection Time: 10/10/22  2:44 PM    Specimen: Arm, Right; Blood   Result Value Ref Range    Protime 22.5 (H) 11.7 - 14.2 Seconds    INR 1.95 (H) 0.90 - 1.10   Troponin    Collection Time: 10/10/22  2:44 PM    Specimen: Arm, Right; Blood   Result Value Ref Range    Troponin T <0.010 0.000 - 0.030 ng/mL   CBC Auto Differential    Collection Time: 10/10/22  2:44 PM    Specimen: Arm, Right; Blood   Result Value Ref Range    WBC 6.57 3.40 - 10.80 10*3/mm3    RBC 4.82 4.14 - 5.80 10*6/mm3    Hemoglobin 14.2 13.0 - 17.7 g/dL    Hematocrit 41.0 37.5 - 51.0 %    MCV 85.1 79.0 - 97.0 fL    MCH 29.5 26.6 - 33.0 pg    MCHC 34.6 31.5 - 35.7 g/dL    RDW 14.1 12.3 - 15.4 %    RDW-SD 43.4 37.0 - 54.0 fl    MPV 10.8 6.0 - 12.0 fL    Platelets 167 140 - 450 10*3/mm3    Neutrophil % 39.3 (L) 42.7 - 76.0 %    Lymphocyte % 49.0 (H) 19.6 - 45.3 %    Monocyte % 9.1 5.0 - 12.0 %    Eosinophil % 1.8 0.3 - 6.2 %    Basophil % 0.6 0.0 - 1.5 %    Immature Grans % 0.2 0.0 - 0.5 %    Neutrophils, Absolute 2.58 1.70 - 7.00 10*3/mm3    Lymphocytes, Absolute 3.22 (H) 0.70 - 3.10 10*3/mm3    Monocytes, Absolute 0.60 0.10 - 0.90 10*3/mm3    Eosinophils, Absolute 0.12 0.00 - 0.40 10*3/mm3    Basophils, Absolute 0.04 0.00 - 0.20 10*3/mm3    Immature Grans, Absolute 0.01 0.00 - 0.05 10*3/mm3    nRBC 0.0 0.0 - 0.2 /100 WBC       Ordered the above labs and independently reviewed the results.      RADIOLOGY  CT Head Without Contrast    Result Date: 10/10/2022  CT HEAD WITHOUT  CONTRAST  CLINICAL HISTORY: Head trauma with headache.  TECHNIQUE: CT scan of the head was obtained with 3 mm axial soft tissue and 2 mm axial bone algorithm algorithm images. No intravenous contrast was administered. Sagittal and coronal reconstructions were obtained.  COMPARISON: Head CT dated 09/27/2022.  FINDINGS:  There is no evidence for a calvarial fracture or an acute extra-axial hemorrhage. Old lacunar disease is seen within the lateral aspect of the right thalamus, the left lentiform nucleus, and the body of the right caudate. Additionally, there is a 9 mm hypodensity within the posterior limb of the right internal capsule which was not seen on the prior head CT and could represent an acute to subacute lacune. This could be further characterized with MR imaging, if clinically indicated. The ventricles, sulci, and cisterns are age appropriate. The gray-white matter differentiation is within normal limits. The posterior fossa structures are unremarkable.  Incidental note is made of moderate size mucous retention cyst within the left maxillary sinus. Mucosal thickening is incidentally appreciated within the ethmoid air cells.      There is no evidence for acute traumatic intracranial pathology. Old lacunar disease is appreciated within the right caudate body, right lateral thalamus, and left lentiform nucleus which was seen on the prior head CT dated 09/27/2022. Additionally, there is a 9 mm hypodensity within the anterior aspect of the posterior limb of the right internal capsule which was not seen on the prior head CT and where there may be an acute to subacute lacune. Further characterization with MR imaging could be obtained, as clinically indicated.  These findings and recommendations were discussed with Dr. Blanchard on 10/10/2022 at approximately 2:57 PM.  Radiation dose reduction techniques were utilized, including automated exposure control and exposure modulation based on body size.          I vickie  the above noted radiological studies. Reviewed by me and discussed with radiologist.  See dictation for official radiology interpretation.      PROCEDURES  Procedures      MEDICATIONS GIVEN IN ER    Medications   sodium chloride 0.9 % flush 10 mL (has no administration in time range)         PROGRESS, DATA ANALYSIS, CONSULTS, AND MEDICAL DECISION MAKING    All labs have been independently reviewed by me.  All radiology studies have been reviewed by me and discussed with radiologist dictating the report.   EKG's independently viewed and interpreted by me.  Discussion below represents my analysis of pertinent findings related to patient's condition, differential diagnosis, treatment plan and final disposition.      ED Course as of 10/10/22 1529   Mon Oct 10, 2022   [age] [id number] male with history of diabetes, coronary artery disease and peripheral vascular disease presents after syncopal spell last night.  He states that he hit his head and has mild headache.  Exam and vitals are fairly unremarkable.  Will obtain CT scan of the head rule out acute traumatic injury in a patient unknown Coumadin.  Will work-up because of syncope.  Could include the following:  Dehydration  Anemia  Cardiac dysrhythmia  Vasovagal spell [DB]   1438 EKG          EKG time: 1400  Rhythm/Rate: Sinus 70  P waves and NE: Left atrial enlargement, normal NE interval  QRS, axis: Left axis deviation, LVH  ST and T waves: Anterior ST elevation and T wave inversion    Interpreted Contemporaneously by me, independently viewed  Not significantly changed compared to prior 9/2022   [DB]   1501 I discussed CT scan of the head with Dr. Mcwilliams.  He reports that there is a new hypodensity that could represent right lacunar infarct that was not noticed on scan from last ED visit in late September.  Given that he has had a likely subacute infarct and syncopal episode we will go ahead and admit for further work-up of subacute infarct and syncope.   Patient's primary care provider is Dr. Devi will contact Dr. Levi of the [facility] for admission. [DB]   3861 I discussed treatment and evaluation this patient with Dr. Osbaldo Levi who will admit on behalf of [facility]. [DB]      ED Course User Index  [DB] Bear Blanchard MD       AS OF 15:29 EDT VITALS:    BP - 114/89  HR - 73  TEMP - 97.8 °F (36.6 °C) (Tympanic)  O2 SATS - 99%      DIAGNOSIS  Final diagnoses:   Syncope, unspecified syncope type   Lacunar infarction (HCC)   Anticoagulated by anticoagulation treatment         DISPOSITION  Admission         Bear Blanchard MD  10/10/22 3545

## 2022-10-11 ENCOUNTER — APPOINTMENT (OUTPATIENT)
Dept: CARDIOLOGY | Facility: HOSPITAL | Age: 52
End: 2022-10-11

## 2022-10-11 VITALS
WEIGHT: 214.95 LBS | HEART RATE: 66 BPM | BODY MASS INDEX: 29.11 KG/M2 | SYSTOLIC BLOOD PRESSURE: 117 MMHG | OXYGEN SATURATION: 100 % | TEMPERATURE: 98.1 F | RESPIRATION RATE: 16 BRPM | DIASTOLIC BLOOD PRESSURE: 80 MMHG | HEIGHT: 72 IN

## 2022-10-11 LAB
ALBUMIN SERPL-MCNC: 4 G/DL (ref 3.5–5.2)
ALBUMIN/GLOB SERPL: 1.4 G/DL
ALP SERPL-CCNC: 69 U/L (ref 39–117)
ALT SERPL W P-5'-P-CCNC: 35 U/L (ref 1–41)
ANION GAP SERPL CALCULATED.3IONS-SCNC: 9.1 MMOL/L (ref 5–15)
AORTIC ARCH: 2.8 CM
ASCENDING AORTA: 2.9 CM
AST SERPL-CCNC: 22 U/L (ref 1–40)
BASOPHILS # BLD AUTO: 0.08 10*3/MM3 (ref 0–0.2)
BASOPHILS NFR BLD AUTO: 1.1 % (ref 0–1.5)
BH CV ECHO LEFT VENTRICLE GLOBAL LONGITUDINAL STRAIN: -10.3 %
BH CV ECHO MEAS - ACS: 2.13 CM
BH CV ECHO MEAS - AO MAX PG: 3.9 MMHG
BH CV ECHO MEAS - AO MEAN PG: 2.3 MMHG
BH CV ECHO MEAS - AO ROOT DIAM: 3.3 CM
BH CV ECHO MEAS - AO V2 MAX: 98.4 CM/SEC
BH CV ECHO MEAS - AO V2 VTI: 22.1 CM
BH CV ECHO MEAS - AVA(I,D): 2.7 CM2
BH CV ECHO MEAS - EDV(CUBED): 154.5 ML
BH CV ECHO MEAS - EDV(MOD-SP2): 177 ML
BH CV ECHO MEAS - EDV(MOD-SP4): 192 ML
BH CV ECHO MEAS - EF(MOD-BP): 49.8 %
BH CV ECHO MEAS - EF(MOD-SP2): 50.8 %
BH CV ECHO MEAS - EF(MOD-SP4): 44.8 %
BH CV ECHO MEAS - ESV(CUBED): 76.3 ML
BH CV ECHO MEAS - ESV(MOD-SP2): 87 ML
BH CV ECHO MEAS - ESV(MOD-SP4): 106 ML
BH CV ECHO MEAS - FS: 21 %
BH CV ECHO MEAS - IVS/LVPW: 1.46 CM
BH CV ECHO MEAS - IVSD: 1.53 CM
BH CV ECHO MEAS - LAT PEAK E' VEL: 5.8 CM/SEC
BH CV ECHO MEAS - LV DIASTOLIC VOL/BSA (35-75): 87.3 CM2
BH CV ECHO MEAS - LV MASS(C)D: 288.6 GRAMS
BH CV ECHO MEAS - LV MAX PG: 2.22 MMHG
BH CV ECHO MEAS - LV MEAN PG: 1.33 MMHG
BH CV ECHO MEAS - LV SYSTOLIC VOL/BSA (12-30): 48.2 CM2
BH CV ECHO MEAS - LV V1 MAX: 74.4 CM/SEC
BH CV ECHO MEAS - LV V1 VTI: 16.7 CM
BH CV ECHO MEAS - LVIDD: 5.4 CM
BH CV ECHO MEAS - LVIDS: 4.2 CM
BH CV ECHO MEAS - LVOT AREA: 3.6 CM2
BH CV ECHO MEAS - LVOT DIAM: 2.13 CM
BH CV ECHO MEAS - LVPWD: 1.05 CM
BH CV ECHO MEAS - MED PEAK E' VEL: 5.8 CM/SEC
BH CV ECHO MEAS - MV A DUR: 0.13 SEC
BH CV ECHO MEAS - MV A MAX VEL: 30 CM/SEC
BH CV ECHO MEAS - MV DEC SLOPE: 413 CM/SEC2
BH CV ECHO MEAS - MV DEC TIME: 0.22 MSEC
BH CV ECHO MEAS - MV E MAX VEL: 95.5 CM/SEC
BH CV ECHO MEAS - MV E/A: 3.2
BH CV ECHO MEAS - MV MAX PG: 4.9 MMHG
BH CV ECHO MEAS - MV MEAN PG: 2.02 MMHG
BH CV ECHO MEAS - MV P1/2T: 80.4 MSEC
BH CV ECHO MEAS - MV V2 VTI: 36.9 CM
BH CV ECHO MEAS - MVA(P1/2T): 2.7 CM2
BH CV ECHO MEAS - MVA(VTI): 1.61 CM2
BH CV ECHO MEAS - PA ACC TIME: 0.15 SEC
BH CV ECHO MEAS - PA PR(ACCEL): 9.3 MMHG
BH CV ECHO MEAS - PA V2 MAX: 83.3 CM/SEC
BH CV ECHO MEAS - PULM A REVS DUR: 0.18 SEC
BH CV ECHO MEAS - PULM A REVS VEL: 26.6 CM/SEC
BH CV ECHO MEAS - PULM DIAS VEL: 53.6 CM/SEC
BH CV ECHO MEAS - PULM S/D: 0.84
BH CV ECHO MEAS - PULM SYS VEL: 45 CM/SEC
BH CV ECHO MEAS - QP/QS: 0.66
BH CV ECHO MEAS - RAP SYSTOLE: 3 MMHG
BH CV ECHO MEAS - RV MAX PG: 1.42 MMHG
BH CV ECHO MEAS - RV V1 MAX: 59.6 CM/SEC
BH CV ECHO MEAS - RV V1 VTI: 12.3 CM
BH CV ECHO MEAS - RVOT DIAM: 2.02 CM
BH CV ECHO MEAS - SI(MOD-SP2): 40.9 ML/M2
BH CV ECHO MEAS - SI(MOD-SP4): 39.1 ML/M2
BH CV ECHO MEAS - SUP REN AO DIAM: 2.7 CM
BH CV ECHO MEAS - SV(LVOT): 59.4 ML
BH CV ECHO MEAS - SV(MOD-SP2): 90 ML
BH CV ECHO MEAS - SV(MOD-SP4): 86 ML
BH CV ECHO MEAS - SV(RVOT): 39.5 ML
BH CV ECHO MEAS - TAPSE (>1.6): 2.19 CM
BH CV ECHO MEASUREMENTS AVERAGE E/E' RATIO: 16.47
BH CV XLRA - RV BASE: 3.4 CM
BH CV XLRA - RV LENGTH: 8.4 CM
BH CV XLRA - RV MID: 2.8 CM
BH CV XLRA - TDI S': 9.8 CM/SEC
BH CV XLRA MEAS LEFT DIST CCA EDV: -17.7 CM/SEC
BH CV XLRA MEAS LEFT DIST CCA PSV: -47.5 CM/SEC
BH CV XLRA MEAS LEFT DIST ICA EDV: -18.7 CM/SEC
BH CV XLRA MEAS LEFT DIST ICA PSV: -52.8 CM/SEC
BH CV XLRA MEAS LEFT ICA/CCA RATIO: 1.15
BH CV XLRA MEAS LEFT MID ICA EDV: -19.4 CM/SEC
BH CV XLRA MEAS LEFT MID ICA PSV: -48.8 CM/SEC
BH CV XLRA MEAS LEFT PROX CCA EDV: 19.9 CM/SEC
BH CV XLRA MEAS LEFT PROX CCA PSV: 74.5 CM/SEC
BH CV XLRA MEAS LEFT PROX ECA EDV: -22.3 CM/SEC
BH CV XLRA MEAS LEFT PROX ECA PSV: -92.6 CM/SEC
BH CV XLRA MEAS LEFT PROX ICA EDV: -20.8 CM/SEC
BH CV XLRA MEAS LEFT PROX ICA PSV: -54.6 CM/SEC
BH CV XLRA MEAS LEFT PROX SCLA PSV: 94.4 CM/SEC
BH CV XLRA MEAS LEFT VERTEBRAL A EDV: -14.9 CM/SEC
BH CV XLRA MEAS LEFT VERTEBRAL A PSV: -47.1 CM/SEC
BH CV XLRA MEAS RIGHT DIST CCA EDV: -18.5 CM/SEC
BH CV XLRA MEAS RIGHT DIST CCA PSV: -79 CM/SEC
BH CV XLRA MEAS RIGHT DIST ICA EDV: -19.7 CM/SEC
BH CV XLRA MEAS RIGHT DIST ICA PSV: -46.7 CM/SEC
BH CV XLRA MEAS RIGHT ICA/CCA RATIO: 0.59
BH CV XLRA MEAS RIGHT MID ICA EDV: -21.6 CM/SEC
BH CV XLRA MEAS RIGHT MID ICA PSV: -44 CM/SEC
BH CV XLRA MEAS RIGHT PROX CCA EDV: -13.8 CM/SEC
BH CV XLRA MEAS RIGHT PROX CCA PSV: -62.9 CM/SEC
BH CV XLRA MEAS RIGHT PROX ECA EDV: -11 CM/SEC
BH CV XLRA MEAS RIGHT PROX ECA PSV: -55.8 CM/SEC
BH CV XLRA MEAS RIGHT PROX ICA EDV: -12.6 CM/SEC
BH CV XLRA MEAS RIGHT PROX ICA PSV: -44.5 CM/SEC
BH CV XLRA MEAS RIGHT PROX SCLA PSV: 56.6 CM/SEC
BH CV XLRA MEAS RIGHT VERTEBRAL A EDV: 11.9 CM/SEC
BH CV XLRA MEAS RIGHT VERTEBRAL A PSV: 36.4 CM/SEC
BILIRUB SERPL-MCNC: 0.2 MG/DL (ref 0–1.2)
BUN SERPL-MCNC: 9 MG/DL (ref 6–20)
BUN/CREAT SERPL: 10.2 (ref 7–25)
CALCIUM SPEC-SCNC: 8.9 MG/DL (ref 8.6–10.5)
CHLORIDE SERPL-SCNC: 105 MMOL/L (ref 98–107)
CHOLEST SERPL-MCNC: 150 MG/DL (ref 0–200)
CO2 SERPL-SCNC: 21.9 MMOL/L (ref 22–29)
CREAT SERPL-MCNC: 0.88 MG/DL (ref 0.76–1.27)
DEPRECATED RDW RBC AUTO: 44.8 FL (ref 37–54)
EGFRCR SERPLBLD CKD-EPI 2021: 104.1 ML/MIN/1.73
EOSINOPHIL # BLD AUTO: 0.17 10*3/MM3 (ref 0–0.4)
EOSINOPHIL NFR BLD AUTO: 2.3 % (ref 0.3–6.2)
ERYTHROCYTE [DISTWIDTH] IN BLOOD BY AUTOMATED COUNT: 14 % (ref 12.3–15.4)
GLOBULIN UR ELPH-MCNC: 2.9 GM/DL
GLUCOSE BLDC GLUCOMTR-MCNC: 295 MG/DL (ref 70–130)
GLUCOSE SERPL-MCNC: 189 MG/DL (ref 65–99)
HBA1C MFR BLD: 10.1 % (ref 4.8–5.6)
HCT VFR BLD AUTO: 45.4 % (ref 37.5–51)
HDLC SERPL-MCNC: 36 MG/DL (ref 40–60)
HGB BLD-MCNC: 15.4 G/DL (ref 13–17.7)
IMM GRANULOCYTES # BLD AUTO: 0.02 10*3/MM3 (ref 0–0.05)
IMM GRANULOCYTES NFR BLD AUTO: 0.3 % (ref 0–0.5)
INR PPP: 2.31 (ref 0.9–1.1)
LDLC SERPL CALC-MCNC: 93 MG/DL (ref 0–100)
LDLC/HDLC SERPL: 2.51 {RATIO}
LEFT ARM BP: NORMAL MMHG
LEFT ATRIUM VOLUME INDEX: 30.5 ML/M2
LV EF 2D ECHO EST: 48 %
LYMPHOCYTES # BLD AUTO: 3.84 10*3/MM3 (ref 0.7–3.1)
LYMPHOCYTES NFR BLD AUTO: 52.3 % (ref 19.6–45.3)
MAXIMAL PREDICTED HEART RATE: 169 BPM
MAXIMAL PREDICTED HEART RATE: 169 BPM
MCH RBC QN AUTO: 29.4 PG (ref 26.6–33)
MCHC RBC AUTO-ENTMCNC: 33.9 G/DL (ref 31.5–35.7)
MCV RBC AUTO: 86.8 FL (ref 79–97)
MONOCYTES # BLD AUTO: 0.67 10*3/MM3 (ref 0.1–0.9)
MONOCYTES NFR BLD AUTO: 9.1 % (ref 5–12)
NEUTROPHILS NFR BLD AUTO: 2.56 10*3/MM3 (ref 1.7–7)
NEUTROPHILS NFR BLD AUTO: 34.9 % (ref 42.7–76)
NRBC BLD AUTO-RTO: 0 /100 WBC (ref 0–0.2)
PLATELET # BLD AUTO: 172 10*3/MM3 (ref 140–450)
PMV BLD AUTO: 11.3 FL (ref 6–12)
POTASSIUM SERPL-SCNC: 4.2 MMOL/L (ref 3.5–5.2)
PROT SERPL-MCNC: 6.9 G/DL (ref 6–8.5)
PROTHROMBIN TIME: 25.7 SECONDS (ref 11.7–14.2)
RBC # BLD AUTO: 5.23 10*6/MM3 (ref 4.14–5.8)
RIGHT ARM BP: NORMAL MMHG
SINUS: 2.9 CM
SODIUM SERPL-SCNC: 136 MMOL/L (ref 136–145)
STJ: 2.6 CM
STRESS TARGET HR: 144 BPM
STRESS TARGET HR: 144 BPM
TRIGL SERPL-MCNC: 118 MG/DL (ref 0–150)
TSH SERPL DL<=0.05 MIU/L-ACNC: 1.97 UIU/ML (ref 0.27–4.2)
VLDLC SERPL-MCNC: 21 MG/DL (ref 5–40)
WBC NRBC COR # BLD: 7.34 10*3/MM3 (ref 3.4–10.8)

## 2022-10-11 PROCEDURE — 82962 GLUCOSE BLOOD TEST: CPT

## 2022-10-11 PROCEDURE — G0378 HOSPITAL OBSERVATION PER HR: HCPCS

## 2022-10-11 PROCEDURE — 97165 OT EVAL LOW COMPLEX 30 MIN: CPT

## 2022-10-11 PROCEDURE — 85610 PROTHROMBIN TIME: CPT | Performed by: HOSPITALIST

## 2022-10-11 PROCEDURE — 97162 PT EVAL MOD COMPLEX 30 MIN: CPT

## 2022-10-11 PROCEDURE — 93306 TTE W/DOPPLER COMPLETE: CPT

## 2022-10-11 PROCEDURE — 93356 MYOCRD STRAIN IMG SPCKL TRCK: CPT

## 2022-10-11 PROCEDURE — 25010000002 PERFLUTREN (DEFINITY) 8.476 MG IN SODIUM CHLORIDE (PF) 0.9 % 10 ML INJECTION: Performed by: HOSPITALIST

## 2022-10-11 PROCEDURE — 80053 COMPREHEN METABOLIC PANEL: CPT | Performed by: HOSPITALIST

## 2022-10-11 PROCEDURE — 83036 HEMOGLOBIN GLYCOSYLATED A1C: CPT | Performed by: HOSPITALIST

## 2022-10-11 PROCEDURE — 93356 MYOCRD STRAIN IMG SPCKL TRCK: CPT | Performed by: INTERNAL MEDICINE

## 2022-10-11 PROCEDURE — 93880 EXTRACRANIAL BILAT STUDY: CPT

## 2022-10-11 PROCEDURE — 84443 ASSAY THYROID STIM HORMONE: CPT | Performed by: HOSPITALIST

## 2022-10-11 PROCEDURE — 97530 THERAPEUTIC ACTIVITIES: CPT

## 2022-10-11 PROCEDURE — 80061 LIPID PANEL: CPT | Performed by: HOSPITALIST

## 2022-10-11 PROCEDURE — 99214 OFFICE O/P EST MOD 30 MIN: CPT | Performed by: PSYCHIATRY & NEUROLOGY

## 2022-10-11 PROCEDURE — 63710000001 INSULIN LISPRO (HUMAN) PER 5 UNITS: Performed by: HOSPITALIST

## 2022-10-11 PROCEDURE — 93306 TTE W/DOPPLER COMPLETE: CPT | Performed by: INTERNAL MEDICINE

## 2022-10-11 PROCEDURE — 85025 COMPLETE CBC W/AUTO DIFF WBC: CPT | Performed by: HOSPITALIST

## 2022-10-11 RX ORDER — AMLODIPINE BESYLATE 10 MG/1
10 TABLET ORAL
Qty: 30 TABLET | Refills: 0 | Status: SHIPPED | OUTPATIENT
Start: 2022-10-12 | End: 2022-11-11

## 2022-10-11 RX ORDER — INSULIN LISPRO 100 [IU]/ML
8 INJECTION, SOLUTION INTRAVENOUS; SUBCUTANEOUS
Qty: 100 ML | Refills: 0 | Status: SHIPPED | OUTPATIENT
Start: 2022-10-11 | End: 2022-11-10

## 2022-10-11 RX ORDER — ATORVASTATIN CALCIUM 80 MG/1
80 TABLET, FILM COATED ORAL NIGHTLY
Qty: 30 TABLET | Refills: 0 | Status: SHIPPED | OUTPATIENT
Start: 2022-10-11 | End: 2022-11-10

## 2022-10-11 RX ORDER — INSULIN LISPRO 100 [IU]/ML
8 INJECTION, SOLUTION INTRAVENOUS; SUBCUTANEOUS
Status: DISCONTINUED | OUTPATIENT
Start: 2022-10-11 | End: 2022-10-11 | Stop reason: HOSPADM

## 2022-10-11 RX ORDER — WARFARIN SODIUM 10 MG/1
10 TABLET ORAL ONCE
Status: DISCONTINUED | OUTPATIENT
Start: 2022-10-11 | End: 2022-10-11 | Stop reason: HOSPADM

## 2022-10-11 RX ADMIN — ASPIRIN 81 MG: 81 TABLET, CHEWABLE ORAL at 09:57

## 2022-10-11 RX ADMIN — PERFLUTREN 2 ML: 6.52 INJECTION, SUSPENSION INTRAVENOUS at 09:11

## 2022-10-11 RX ADMIN — CARVEDILOL 6.25 MG: 6.25 TABLET, FILM COATED ORAL at 09:57

## 2022-10-11 RX ADMIN — INSULIN LISPRO 3 UNITS: 100 INJECTION, SOLUTION INTRAVENOUS; SUBCUTANEOUS at 09:58

## 2022-10-11 RX ADMIN — PANTOPRAZOLE SODIUM 40 MG: 40 TABLET, DELAYED RELEASE ORAL at 06:39

## 2022-10-11 RX ADMIN — INSULIN LISPRO 4 UNITS: 100 INJECTION, SOLUTION INTRAVENOUS; SUBCUTANEOUS at 13:28

## 2022-10-11 RX ADMIN — INSULIN LISPRO 5 UNITS: 100 INJECTION, SOLUTION INTRAVENOUS; SUBCUTANEOUS at 13:28

## 2022-10-11 RX ADMIN — INSULIN LISPRO 5 UNITS: 100 INJECTION, SOLUTION INTRAVENOUS; SUBCUTANEOUS at 09:58

## 2022-10-11 RX ADMIN — EMPAGLIFLOZIN 25 MG: 25 TABLET, FILM COATED ORAL at 09:58

## 2022-10-11 RX ADMIN — INSULIN GLARGINE-YFGN 25 UNITS: 100 INJECTION, SOLUTION SUBCUTANEOUS at 06:29

## 2022-10-11 RX ADMIN — LOSARTAN POTASSIUM 25 MG: 25 TABLET, FILM COATED ORAL at 09:57

## 2022-10-11 RX ADMIN — AMLODIPINE BESYLATE 10 MG: 10 TABLET ORAL at 09:57

## 2022-10-11 RX ADMIN — SERTRALINE HYDROCHLORIDE 50 MG: 50 TABLET, FILM COATED ORAL at 09:57

## 2022-10-11 RX ADMIN — Medication 10 ML: at 09:58

## 2022-10-11 NOTE — PROGRESS NOTES
Discharge Planning Assessment  [facility]     Patient Name: Cecilio Puckett  MRN: [id number]  Today's Date: 10/11/2022    Admit Date: 10/10/2022    Plan: Home with HH vs outpatient therapy   Discharge Needs Assessment     Row Name 10/11/22 1607       Living Environment    People in Home alone    Current Living Arrangements home    Primary Care Provided by self    Provides Primary Care For no one    Family Caregiver if Needed parent(s)    Family Caregiver Names mother Linda    Quality of Family Relationships helpful;involved;supportive    Able to Return to Prior Arrangements yes       Resource/Environmental Concerns    Resource/Environmental Concerns none       Transition Planning    Patient/Family Anticipates Transition to home with help/services    Patient/Family Anticipated Services at Transition home health care;outpatient care    Transportation Anticipated family or friend will provide       Discharge Needs Assessment    Equipment Currently Used at Home cane, straight    Concerns to be Addressed discharge planning    Outpatient/Agency/Support Group Needs homecare agency;outpatient therapy    Discharge Facility/Level of Care Needs home with home health;outpatient therapy    Provided Post Acute Provider List? Yes    Post Acute Provider List Home Health;Outpatient Therapy    Discharge Coordination/Progress HH vs outpatient therapy               Discharge Plan     Row Name 10/11/22 1608       Plan    Plan Home with HH vs outpatient therapy    Patient/Family in Agreement with Plan yes    Plan Comments CCP met with pt at bedside. [facility] notice signed. Pt resides alone, uses cane as needed, and denies past HH/SNF. Pt has been to outpatient PT at an agency in [place] (cannot recall name). Pt agreeable to HH referrals to evaluate eligibility (pending in EPIC). Pt can also pursue outpatient PT if ineligible for HH. Pt denies additional needs. Lucila Bennett LCSW              Continued Care and Services - Admitted Since  10/10/2022     Home Medical Care     Service Provider Request Status Selected Services Address Phone Fax Patient Preferred     [facility] Pending - Request Sent N/A [address] [address] [phone] --              Expected Discharge Date and Time     Expected Discharge Date Expected Discharge Time    Oct 11, 2022          Demographic Summary     Row Name 10/11/22 1606       General Information    Admission Type observation    Arrived From home    Required Notices Provided Observation Status Notice    Referral Source admission list    Reason for Consult discharge planning    Preferred Language English               Functional Status     Row Name 10/11/22 1606       Functional Status    Usual Activity Tolerance excellent    Current Activity Tolerance good       Functional Status, IADL    Medications independent    Meal Preparation independent    Housekeeping independent    Laundry independent    Shopping independent               Psychosocial    No documentation.                Abuse/Neglect    No documentation.                Legal    No documentation.                Substance Abuse    No documentation.                Patient Forms    No documentation.                   Esther Bennett LCSW

## 2022-10-11 NOTE — THERAPY EVALUATION
"Patient Name: Cecilio Puckett  : 1970    MRN: [id number]                              Today's Date: 10/11/2022       Admit Date: 10/10/2022    Visit Dx:     ICD-10-CM ICD-9-CM   1. Syncope, unspecified syncope type  R55 780.2   2. Lacunar infarction (HCC)  I63.81 434.91   3. Anticoagulated by anticoagulation treatment  Z79.01 V58.61     Patient Active Problem List   Diagnosis   • Syncope, unspecified syncope type     History reviewed. No pertinent past medical history.  History reviewed. No pertinent surgical history.   General Information     Row Name 10/11/22 1120          Physical Therapy Time and Intention    Document Type discharge evaluation/summary  -MG     Mode of Treatment physical therapy  -MG     Row Name 10/11/22 [facility]          General Information    Patient Profile Reviewed yes  -MG     Prior Level of Function independent:;all household mobility;community mobility;ADL's;transfer;gait;bed mobility  Independent at baseline. Uses STC \"sometimes\". No additional AD/DME.  -MG     Existing Precautions/Restrictions no known precautions/restrictions  -MG     Barriers to Rehab none identified  -MG     Row Name 10/11/22 1120          Living Environment    People in Home alone  -MG     Row Name 10/11/22 1120          Home Main Entrance    Number of Stairs, Main Entrance four;five  4-5  -MG     Stair Railings, Main Entrance railings safe and in good condition;railings on both sides of stairs  -MG     Row Name 10/11/22 1120          Stairs Within Home, Primary    Stairs, Within Home, Primary Full flight to upstairs.  -MG     Number of Stairs, Within Home, Primary twelve  -MG     Row Name 10/11/22 112          Cognition    Orientation Status (Cognition) oriented x 4  -MG     Row Name 10/11/22 112          Safety Issues, Functional Mobility    Impairments Affecting Function (Mobility) strength  -MG     Comment, Safety Issues/Impairments (Mobility) Non-skid socks and gait belt donned.  -MG           User Key  (r) = " "Recorded By, (t) = Taken By, (c) = Cosigned By    Initials Name Provider Type    MG Yudy Ngo PT Physical Therapist               Mobility     Row Name 10/11/22 1122          Bed Mobility    Bed Mobility bed mobility (all) activities  -MG     All Activities, [place] (Bed Mobility) modified independence  -MG     Assistive Device (Bed Mobility) head of bed elevated  -MG     Comment, (Bed Mobility) HOB slighlty elevated. Good static/dynamic sitting balance.  -MG     Row Name 10/11/22 1122          Bed-Chair Transfer    Bed-Chair [place] (Transfers) not tested  -MG     Row Name 10/11/22 1122          Sit-Stand Transfer    Sit-Stand [place] (Transfers) standby assist  -MG     Comment, (Sit-Stand Transfer) No AD. Good static standing balance. Denies dizziness.  -MG     Row Name 10/11/22 1122          Gait/Stairs (Locomotion)    [place] Level (Gait) contact guard;standby assist  -MG     Assistive Device (Gait) other (see comments)  No AD  -MG     Distance in Feet (Gait) 200  -MG     Left Sided Gait Deviations heel strike decreased;decreased knee extension  -MG     [place] Level (Stairs) not tested  -MG     Comment, (Gait/Stairs) Pt amb ~200' without use of AD and with CGA progressing to SBA. Pt speaking with MD while walking, good dual tasking. Pt stated prior to amb that his LLE is his \"bad leg\" and has been \"forever\". Noted pt developing increased limp in LLE as he began to fatigue. No overt LOB, unsteadiness or dizziness. He stated his gait pattern is his normal.  -MG           User Key  (r) = Recorded By, (t) = Taken By, (c) = Cosigned By    Initials Name Provider Type    MG Yudy Ngo PT Physical Therapist               Obj/Interventions     Row Name 10/11/22 1125          Range of Motion Comprehensive    General Range of Motion bilateral lower extremity ROM WFL  -MG     Row Name 10/11/22 1125          Strength Comprehensive (MMT)    Comment, General Manual Muscle Testing (MMT) " "Assessment RLE grossly 4+/5, LLE grossly 4/5  -MG     Row Name 10/11/22 1125          Balance    Static Sitting Balance independent  -MG     Dynamic Sitting Balance independent  -MG     Position, Sitting Balance unsupported;sitting edge of bed  -MG     Static Standing Balance standby assist  -MG     Dynamic Standing Balance standby assist  -MG     Position/Device Used, Standing Balance unsupported  -MG     Comment, Balance No unsteadiness noted with ambulation. No overt LOB.  -MG     Row Name 10/11/22 1125          Sensory Assessment (Somatosensory)    Sensory Assessment (Somatosensory) LE sensation intact  -MG           User Key  (r) = Recorded By, (t) = Taken By, (c) = Cosigned By    Initials Name Provider Type    MG Yudy Ngo, PT Physical Therapist               Goals/Plan    No documentation.                Clinical Impression     Row Name 10/11/22 1126          Pain    Pretreatment Pain Rating 0/10 - no pain  -MG     Posttreatment Pain Rating 0/10 - no pain  -MG     Pain Intervention(s) Ambulation/increased activity;Repositioned;Rest  -MG     Row Name 10/11/22 1126          Plan of Care Review    Plan of Care Reviewed With patient  -MG     Outcome Evaluation Pt is a 51 y/o M who presented to [facility] after syncopal episode at home with hitting his head on the bathtub. Pt reporting he was here two weeks ago for CVA, but was able to return home at [place]. Pt reports being independent at home only needing to use STC \"sometimes\". Pt currently Mod-I for bed mobility, SBA for transfers and ambulation. Pt was able to ambulate 200' without use of AD and demoing good dual tasking by speaking with MD during the walk. Noted pt had increased limp in LLE as he began to fatigue. Pt stating at end of session the gait pattern noted is his \"normal\" and his LLE \"has been his bad leg forever\". Denied dizziness throughout session. As pt is at his baseline with plans to return home at [place], PT will sign-off. Rec either OP or HH PT to " further address LLE deficits; pt was agreeable to  PT.  -MG     Row Name 10/11/22 1126          Therapy Assessment/Plan (PT)    Criteria for Skilled Interventions Met (PT) no;no problems identified which require skilled intervention  -MG     Therapy Frequency (PT) evaluation only  -MG     Row Name 10/11/22 1126          Vital Signs    O2 Delivery Pre Treatment room air  -MG     O2 Delivery Intra Treatment room air  -MG     O2 Delivery Post Treatment room air  -MG     Pre Patient Position Supine  -MG     Intra Patient Position Standing  -MG     Post Patient Position Sitting  -MG     Row Name 10/11/22 1126          Positioning and Restraints    Pre-Treatment Position in bed  -MG     Post Treatment Position bed  -MG     In Bed sitting EOB;call light within reach;encouraged to call for assist;with other staff  MD present in room.  -MG           User Key  (r) = Recorded By, (t) = Taken By, (c) = Cosigned By    Initials Name Provider Type    MG Yudy Ngo, PT Physical Therapist               Outcome Measures     Row Name 10/11/22 1133          How much help from another person do you currently need...    Turning from your back to your side while in flat bed without using bedrails? 4  -MG     Moving from lying on back to sitting on the side of a flat bed without bedrails? 4  -MG     Moving to and from a bed to a chair (including a wheelchair)? 4  -MG     Standing up from a chair using your arms (e.g., wheelchair, bedside chair)? 4  -MG     Climbing 3-5 steps with a railing? 4  -MG     To walk in hospital room? 4  -MG     AM-PAC 6 Clicks Score (PT) 24  -MG     Highest level of mobility 8 --> Walked 250 feet or more  -MG     Row Name 10/11/22 1116          Modified [place] Scale    Modified [place] Scale 0 - No Symptoms at all.  -MW     Row Name 10/11/22 1133 10/11/22 1116       Functional Assessment    Outcome Measure Options AM-PAC 6 Clicks Basic Mobility (PT)  -MG AM-PAC 6 Clicks Daily Activity (OT);Modified [place]   "-          User Key  (r) = Recorded By, (t) = Taken By, (c) = Cosigned By    Initials Name Provider Type    MG Yudy Ngo, PT Physical Therapist    Rosaura Shipley OT Occupational Therapist                             Physical Therapy Education     Title: PT OT SLP Therapies (Done)     Topic: Physical Therapy (Done)     Point: Mobility training (Done)     Learning Progress Summary           Patient Acceptance, E, VU by  at 10/11/2022 1134                   Point: Home exercise program (Done)     Learning Progress Summary           Patient Acceptance, E, VU by  at 10/11/2022 1134                   Point: Body mechanics (Done)     Learning Progress Summary           Patient Acceptance, E, VU by  at 10/11/2022 1134                   Point: Precautions (Done)     Learning Progress Summary           Patient Acceptance, E, VU by  at 10/11/2022 1134                               User Key     Initials Effective Dates Name Provider Type [facility] 05/24/22 -  Yudy Ngo, PT Physical Therapist PT              PT Recommendation and Plan     Plan of Care Reviewed With: patient  Outcome Evaluation: Pt is a 51 y/o M who presented to [facility] after syncopal episode at home with hitting his head on the bathtub. Pt reporting he was here two weeks ago for CVA, but was able to return home at [place]. Pt reports being independent at home only needing to use STC \"sometimes\". Pt currently Mod-I for bed mobility, SBA for transfers and ambulation. Pt was able to ambulate 200' without use of AD and demoing good dual tasking by speaking with MD during the walk. Noted pt had increased limp in LLE as he began to fatigue. Pt stating at end of session the gait pattern noted is his \"normal\" and his LLE \"has been his bad leg forever\". Denied dizziness throughout session. As pt is at his baseline with plans to return home at [place], PT will sign-off. Rec either OP or HH PT to further address LLE deficits; pt was agreeable to  " PT.     Time Calculation:    PT Charges     Row Name 10/11/22 1134             Time Calculation    Start Time 0937  -MG      Stop Time 0951  -MG      Time Calculation (min) 14 min  -MG      PT Received On 10/11/22  -MG         Time Calculation- PT    Total Timed Code Minutes- PT 13 minute(s)  -MG            User Key  (r) = Recorded By, (t) = Taken By, (c) = Cosigned By    Initials Name Provider Type    MG Yudy Ngo, PT Physical Therapist              Therapy Charges for Today     Code Description Service Date Service Provider Modifiers Qty    43225116294 HC PT EVAL MOD COMPLEXITY 2 10/11/2022 Yudy Ngo, PT GP 1    02427462211 HC PT THERAPEUTIC ACT EA 15 MIN 10/11/2022 Yudy Ngo, PT GP 1          PT G-Codes  Outcome Measure Options: AM-PAC 6 Clicks Basic Mobility (PT)  AM-PAC 6 Clicks Score (PT): 24  AM-PAC 6 Clicks Score (OT): 24  Modified [place] Scale: 0 - No Symptoms at all.    Yudy Ngo, CLAUDE  10/11/2022

## 2022-10-11 NOTE — PROGRESS NOTES
"Nutrition Services    Patient Name:  Cecilio Puckett  YOB: 1970  MRN: [id number]  Admit Date:  10/10/2022      Comment: Visited pt at bedside who had barely eaten breakfast (25%). He reported to the ED provider that he has been having generalized weakness for several weeks and feels like he may be dehydrated. He is compliant with his diabetes meds and Coumadin. Pt has stable wt. Will continue to follow       CLINICAL NUTRITION ASSESSMENT      Reason for Assessment MST score 2+     Diagnosis/Problem   Syncope, unspecified type while walking to the bathroom and fell and hit his head. Regular smoker (1 pack/day)   Hx of DM2 and on Coumadin    Medical/Surgical History History reviewed. No pertinent past medical history.    History reviewed. No pertinent surgical history.     Encounter Information        Nutrition/Diet History:  Visited pt at bedside who had barely eaten breakfast (25%). He reported to the ED provider that he has been having generalized weakness for several weeks and feels like he may be dehydrated. He is compliant with his diabetes meds and Coumadin. Pt has stable wt. Will continue to follow    Food Preferences:    Supplements:    Factors Affecting Intake: Other: JYOTI      Anthropometrics        Current Height  Current Weight  BMI kg/m2 Height: 183 cm (72.05\")  Weight: 97.5 kg (214 lb 15.2 oz) (10/11/22 0908)  Body mass index is 29.11 kg/m².       Admission Weight 215# (97.7 kg)    Ideal Body Weight (IBW) 171# (77.7 kg)    Usual Body Weight (UBW) 215-220#    Weight Change/Trend        Weight History Wt Readings from Last 30 Encounters:   10/11/22 0908 97.5 kg (214 lb 15.2 oz)   10/10/22 1720 97.7 kg (215 lb 6.2 oz)   10/10/22 1623 97.7 kg (215 lb 6.2 oz)   10/10/22 1920 97.5 kg (215 lb)   09/27/22 1405 97.7 kg (215 lb 6.2 oz)                 Tests/Procedures        Tests/Procedures CT scan, MRI     Labs       Pertinent Labs Reviewed    Results from last 7 days   Lab Units 10/11/22  1032 " 10/10/22  1444   SODIUM mmol/L 136 140   POTASSIUM mmol/L 4.2 3.6   CHLORIDE mmol/L 105 105   CO2 mmol/L 21.9* 25.7   BUN mg/dL 9 11   CREATININE mg/dL 0.88 0.98   CALCIUM mg/dL 8.9 8.9   BILIRUBIN mg/dL 0.2 0.2   ALK PHOS U/L 69 60   ALT (SGPT) U/L 35 41   AST (SGOT) U/L 22 22   GLUCOSE mg/dL 189* 96     Results from last 7 days   Lab Units 10/11/22  1032   HEMOGLOBIN g/dL 15.4   HEMATOCRIT % 45.4   WBC 10*3/mm3 7.34   TRIGLYCERIDES mg/dL 118     Results from last 7 days   Lab Units 10/11/22  1032 10/10/22  1914 10/10/22  1444   INR  2.31* 1.94* 1.95*   PLATELETS 10*3/mm3 172  --  167     No results found for: COVID19  Lab Results   Component Value Date    HGBA1C 10.3 (H) 10/20/2021          Medications           Scheduled Medications amLODIPine, 10 mg, Oral, Q24H  aspirin, 81 mg, Oral, Daily  atorvastatin, 80 mg, Oral, Nightly  carvedilol, 6.25 mg, Oral, BID With Meals  Qqrugeb-Bssmg-Szqhvtmi-TenofAF, 1 tablet, Oral, Daily  empagliflozin, 25 mg, Oral, Daily  insulin glargine, 25 Units, Subcutaneous, QAM  insulin lispro, 0-7 Units, Subcutaneous, 4x Daily With Meals & Nightly  insulin lispro, 5 Units, Subcutaneous, TID With Meals  losartan, 25 mg, Oral, Daily  pantoprazole, 40 mg, Oral, BID AC  sertraline, 50 mg, Oral, Daily       Infusions Pharmacy to dose warfarin,   sodium chloride 0.9 % with KCl 20 mEq, 75 mL/hr, Last Rate: 75 mL/hr (10/10/22 1854)       PRN Medications •  influenza vaccine  •  Pharmacy to dose warfarin  •  [COMPLETED] Insert peripheral IV **AND** sodium chloride     Physical Findings        Physical Appearance overweight, somnolent     NFPE Not applicable   --  Edema  no edema   Gastrointestinal last bowel movement:10/9   Tubes/Drains none   Oral/Mouth Cavity other: JYOTI    Skin skin intact   --  Current Nutrition Orders & Evaluation of Intake       Oral Nutrition     Food Allergies NKFA   Current PO Diet Diet Regular   Supplement n/a   PO Evaluation     Trending % PO Intake 25% breakfast         --  PES STATEMENT / NUTRITION DIAGNOSIS      Nutrition Dx Problem  Problem: Predicted Suboptimal Intake  Etiology: Factors Affecting Nutrition  Signs/Symptoms: PO intake    Comment:    --  NUTRITION INTERVENTION      Intervention Goal(s) Nutrition support treatment, Reduce/improve symptoms, Increase intake and PO intake goal %: 75         RD Intervention/Action Follow Tx Progress and Care plan reviewed         Prescription/Orders:       PO Diet Regular       Supplements       Enteral Nutrition       Parenteral Nutrition    New Prescription Ordered?    --      Monitor/Evaluation Per protocol   Education Will instruct as appropriate   --    RD to follow per protocol.      Electronically signed by:  Alice De La Garza RD  10/11/22 12:34 EDT

## 2022-10-11 NOTE — DISCHARGE SUMMARY
Discharge summary    Date of admission 10/10/2022  Date of discharge 10/11/2022    Final diagnosis  Acute versus subacute right internal capsule infarct  Old right caudate body and thalamic lacunar infarct  Insulin-dependent diabetes mellitus  Hypertension  Hyperlipidemia  Coronary artery disease  Peripheral artery disease  Tobacco abuse  Gastroesophageal reflux disease    Discharge medications    Current Facility-Administered Medications:   •  amLODIPine (NORVASC) tablet 10 mg, 10 mg, Oral, Q24H, Osbaldo Levi MD, 10 mg at 10/11/22 0957  •  aspirin chewable tablet 81 mg, 81 mg, Oral, Daily, Osbaldo Levi MD, 81 mg at 10/11/22 0957  •  atorvastatin (LIPITOR) tablet 80 mg, 80 mg, Oral, Nightly, Osbaldo Levi MD, 80 mg at 10/10/22 2333  •  carvedilol (COREG) tablet 6.25 mg, 6.25 mg, Oral, BID With Meals, Osbaldo Levi MD, 6.25 mg at 10/11/22 0957  •  Opkgpbq-Zzjkq-Tdulabrq-TenofAF (GENVOYA) 410-169-676-10 MG per tablet 1 tablet, 1 tablet, Oral, Daily, Osbaldo Levi MD  •  empagliflozin (JARDIANCE) tablet 25 mg, 25 mg, Oral, Daily, Osbaldo Levi MD, 25 mg at 10/11/22 0958  •  influenza vac split quad (FLUZONE,FLUARIX,AFLURIA,FLULAVAL) injection 0.5 mL, 0.5 mL, Intramuscular, During Hospitalization, Osbaldo Levi MD  •  insulin glargine (LANTUS, SEMGLEE) injection 25 Units, 25 Units, Subcutaneous, QAM, Osbaldo Levi MD, 25 Units at 10/11/22 0629  •  insulin lispro (ADMELOG) injection 0-7 Units, 0-7 Units, Subcutaneous, 4x Daily With Meals & Nightly, Osbaldo Levi MD, 4 Units at 10/11/22 1328  •  insulin lispro (ADMELOG) injection 8 Units, 8 Units, Subcutaneous, TID With Meals, Osbaldo Levi MD  •  losartan (COZAAR) tablet 25 mg, 25 mg, Oral, Daily, Osbaldo Levi MD, 25 mg at 10/11/22 0957  •  pantoprazole (PROTONIX) EC tablet 40 mg, 40 mg, Oral, BID AC, Osbaldo Levi MD, 40 mg at 10/11/22 0639  •  Pharmacy to dose warfarin, , Does not apply, Continuous PRN, Osbaldo Levi MD  •  sertraline (ZOLOFT) tablet 50 mg, 50 mg,  Oral, Daily, Chyna Levi MD, 50 mg at 10/11/22 0957  •  [COMPLETED] Insert peripheral IV, , , Once **AND** sodium chloride 0.9 % flush 10 mL, 10 mL, Intravenous, PRN, LoBear MD, 10 mL at 10/11/22 0958  •  warfarin (COUMADIN) tablet 10 mg, 10 mg, Oral, Once, hCyna Levi MD     Consults obtained  Neurology    Procedures  None    Hospital course  32-year-old -American male with history of insulin-dependent diabetes mellitus hypertension hyperlipidemia coronary artery disease and peripheral artery disease on anticoagulation admitted through emergency room with syncopal episode.  Patient evaluated in ER admitted for management.  Patient remain on aspirin Coumadin and Lipitor and received IV fluid and further evaluated with neurology as he found to have old lacunar infarct in right thalamus and MRI of the brain confirmed old right thalamic lacunar infarct and he also found to have acute versus subacute right internal capsule infarct.  Patient remained fully alert oriented with no focal weakness and wants to go home and neurology recommend aspirin Coumadin with INR between 2 and 3 and Lipitor 80 mg daily and advised to stop smoking.  Patient tolerating diet walking all over and clear for discharge.    Discharge diet regular    Activity as tolerated    Medication as above    Follow-up with prime doctor in 1 week and follow with neurology per their instruction and take medication as directed.    CHYNA LEVI MD

## 2022-10-11 NOTE — THERAPY DISCHARGE NOTE
Acute Care - Occupational Therapy Discharge  [facility]    Patient Name: Cecilio Puckett  : 1970    MRN: [id number]                              Today's Date: 10/11/2022       Admit Date: 10/10/2022    Visit Dx:     ICD-10-CM ICD-9-CM   1. Syncope, unspecified syncope type  R55 780.2   2. Lacunar infarction (HCC)  I63.81 434.91   3. Anticoagulated by anticoagulation treatment  Z79.01 V58.61     Patient Active Problem List   Diagnosis   • Syncope, unspecified syncope type     History reviewed. No pertinent past medical history.  History reviewed. No pertinent surgical history.   General Information     Row Name 10/11/22 1110          OT Time and Intention    Document Type discharge evaluation/summary  -     Mode of Treatment occupational therapy  -     Row Name 10/11/22 1110          General Information    Patient Profile Reviewed yes  -MW     Prior Level of Function independent:  reports occassional cane use for mobility  -MW     Existing Precautions/Restrictions no known precautions/restrictions  -MW     Barriers to Rehab none identified  -MW     Row Name 10/11/22 1110          Living Environment    People in Home alone  -MW     Row Name 10/11/22 1110          Home Main Entrance    Number of Stairs, Main Entrance four  -MW     Row Name 10/11/22 1110          Stairs Within Home, Primary    Stairs, Within Home, Primary full flight upstairs to  bed/bath  -MW     Row Name 10/11/22 1110          Cognition    Orientation Status (Cognition) oriented x 4  -MW           User Key  (r) = Recorded By, (t) = Taken By, (c) = Cosigned By    Initials Name Provider Type    MW Rosaura Sierra OT Occupational Therapist               Mobility/ADL's     Row Name 10/11/22 1111          Bed Mobility    Bed Mobility bed mobility (all) activities  -     All Activities, [place] (Bed Mobility) modified independence  -     Row Name 10/11/22 1111          Transfers    Transfers sit-stand transfer;stand-sit transfer  -      Row Name 10/11/22 1111          Sit-Stand Transfer    Sit-Stand [place] (Transfers) standby assist  -MW     Comment, (Sit-Stand Transfer) no AD  -MW     Row Name 10/11/22 1111          Stand-Sit Transfer    Stand-Sit [place] (Transfers) standby assist  -MW     Comment, (Stand-Sit Transfer) no AD  -MW     Row Name 10/11/22 1111          Functional Mobility    Functional Mobility- Ind. Level standby assist;contact guard assist  -MW     Functional Mobility- Device --  no AD  -MW     Functional Mobility- Comment pt demo func mob ~200 feet with SBA/CGA with no Ad and no LOBs noted  -MW     [facility] Name 10/11/22 1111          Activities of Daily Living    BADL Assessment/Intervention lower body dressing  -[facility] Name 10/11/22 1111          Lower Body Dressing Assessment/Training    [place] Level (Lower Body Dressing) lower body dressing skills;set up  -MW     Position (Lower Body Dressing) long sitting  -MW           User Key  (r) = Recorded By, (t) = Taken By, (c) = Cosigned By    Initials Name Provider Type    Rosaura Shipley OT Occupational Therapist               Obj/Interventions     [facility] Name 10/11/22 1112          Sensory Assessment (Somatosensory)    Sensory Assessment (Somatosensory) UE sensation intact  -[facility] Name 10/11/22 1112          Vision Assessment/Intervention    Visual Impairment/Limitations WFL  -[facility] Name 10/11/22 1112          Range of Motion Comprehensive    General Range of Motion bilateral upper extremity ROM WNL  -[facility] Name 10/11/22 1112          Strength Comprehensive (MMT)    General Manual Muscle Testing (MMT) Assessment no strength deficits identified  -MW     Row Name 10/11/22 1112          Balance    Balance Assessment sitting static balance;sitting dynamic balance;sit to stand dynamic balance;standing dynamic balance;standing static balance  -MW     Static Sitting Balance independent  -MW     Dynamic Sitting Balance independent  -MW     Position,  "Sitting Balance sitting edge of bed  -MW     Static Standing Balance standby assist  -MW     Dynamic Standing Balance contact guard  -MW     Position/Device Used, Standing Balance unsupported  -MW     Comment, Balance no overt LOBs  -MW           User Key  (r) = Recorded By, (t) = Taken By, (c) = Cosigned By    Initials Name Provider Type    MW Rosaura Sierra, OT Occupational Therapist               Goals/Plan     Row Name 10/11/22 1116          Transfer Goal 1 (OT)    Activity/Assistive Device (Transfer Goal 1, OT) sit-to-stand/stand-to-sit  -MW     [place] Level/Cues Needed (Transfer Goal 1, OT) standby assist  -MW     Time Frame (Transfer Goal 1, OT) 1 day;short term goal (STG)  -MW     Progress/Outcome (Transfer Goal 1, OT) goal met  -MW           User Key  (r) = Recorded By, (t) = Taken By, (c) = Cosigned By    Initials Name Provider Type     Rosaura Sirera OT Occupational Therapist               Clinical Impression     Row Name 10/11/22 1112          Pain Assessment    Pretreatment Pain Rating 0/10 - no pain  -MW     Posttreatment Pain Rating 0/10 - no pain  -MW     Row Name 10/11/22 1112          Plan of Care Review    Plan of Care Reviewed With patient  -MW     Progress no change  -MW     Outcome Evaluation Pt is a 53 yo male admitted for syncope episode at home, hitting his head with the fall. Pt reports he was here 2 weeks ago for CVA and returned home following hospital stay. Pt seen this date for OT eval, A&O4, reports (I0 at baseline with ADLs and use of cane occassionally. Pt reports he feels weak however at his baseline with mobility and ADL task completion. Pt completed LBD with s/up, bed mob with mod (I), STS with SBa and SBA/CGA with no AD for mobility with no LOBs noted. Pt reports his LLE \"has been his bad leg forever.\" Pt at his baseline and plans home at d/c. OT to s/o and pt agreeable.  -MW     Row Name 10/11/22 1112          Therapy Assessment/Plan (OT)    Criteria for Skilled " Therapeutic Interventions Met (OT) no problems identified which require skilled intervention  -MW     Row Name 10/11/22 1112          Therapy Plan Review/Discharge Plan (OT)    Anticipated Discharge Disposition (OT) home  -     Row Name 10/11/22 1112          Vital Signs    O2 Delivery Pre Treatment room air  -MW     Pre Patient Position Supine  -MW     Intra Patient Position Standing  -MW     Post Patient Position Sitting  -MW     Row Name 10/11/22 1112          Positioning and Restraints    Pre-Treatment Position in bed  -MW     Post Treatment Position bed  -MW     In Bed sitting EOB;with other staff  -           User Key  (r) = Recorded By, (t) = Taken By, (c) = Cosigned By    Initials Name Provider Type    Rosaura Shipley OT Occupational Therapist               Outcome Measures     Row Name 10/11/22 1116          How much help from another is currently needed...    Putting on and taking off regular lower body clothing? 4  -MW     Bathing (including washing, rinsing, and drying) 4  -MW     Toileting (which includes using toilet bed pan or urinal) 4  -MW     Putting on and taking off regular upper body clothing 4  -MW     Taking care of personal grooming (such as brushing teeth) 4  -MW     Eating meals 4  -MW     AM-PAC 6 Clicks Score (OT) 24  -MW     Row Name 10/11/22 1116          Modified [place] Scale    Modified [place] Scale 0 - No Symptoms at all.  -MW     Row Name 10/11/22 1116          Functional Assessment    Outcome Measure Options AM-PAC 6 Clicks Daily Activity (OT);Modified [place]  -MW           User Key  (r) = Recorded By, (t) = Taken By, (c) = Cosigned By    Initials Name Provider Type    Rosaura Shipley OT Occupational Therapist              Occupational Therapy Education     Title: PT OT SLP Therapies (Done)     Topic: Occupational Therapy (Done)     Point: ADL training (Done)     Description:   Instruct learner(s) on proper safety adaptation and remediation techniques during self  "care or transfers.   Instruct in proper use of assistive devices.              Learning Progress Summary           Patient Acceptance, E, VU by  at 10/11/2022 1116    Comment: role of OT                   Point: Home exercise program (Done)     Description:   Instruct learner(s) on appropriate technique for monitoring, assisting and/or progressing therapeutic exercises/activities.              Learning Progress Summary           Patient Acceptance, E, VU by  at 10/11/2022 1116    Comment: role of OT                   Point: Precautions (Done)     Description:   Instruct learner(s) on prescribed precautions during self-care and functional transfers.              Learning Progress Summary           Patient Acceptance, E, VU by  at 10/11/2022 1116    Comment: role of OT                   Point: Body mechanics (Done)     Description:   Instruct learner(s) on proper positioning and spine alignment during self-care, functional mobility activities and/or exercises.              Learning Progress Summary           Patient Acceptance, E, VU by  at 10/11/2022 1116    Comment: role of OT                               User Key     Initials Effective Dates Name Provider Type Discipline     08/20/21 -  Rosaura Sierra OT Occupational Therapist OT              OT Recommendation and Plan     Plan of Care Review  Plan of Care Reviewed With: patient  Progress: no change  Outcome Evaluation: Pt is a 53 yo male admitted for syncope episode at home, hitting his head with the fall. Pt reports he was here 2 weeks ago for CVA and returned home following hospital stay. Pt seen this date for OT eval, A&O4, reports (I0 at baseline with ADLs and use of cane occassionally. Pt reports he feels weak however at his baseline with mobility and ADL task completion. Pt completed LBD with s/up, bed mob with mod (I), STS with SBa and SBA/CGA with no AD for mobility with no LOBs noted. Pt reports his LLE \"has been his bad leg forever.\" Pt at " "his baseline and plans home at d/c. OT to s/o and pt agreeable.  Plan of Care Reviewed With: patient  Outcome Evaluation: Pt is a 53 yo male admitted for syncope episode at home, hitting his head with the fall. Pt reports he was here 2 weeks ago for CVA and returned home following hospital stay. Pt seen this date for OT eval, A&O4, reports (I0 at baseline with ADLs and use of cane occassionally. Pt reports he feels weak however at his baseline with mobility and ADL task completion. Pt completed LBD with s/up, bed mob with mod (I), STS with SBa and SBA/CGA with no AD for mobility with no LOBs noted. Pt reports his LLE \"has been his bad leg forever.\" Pt at his baseline and plans home at d/c. OT to s/o and pt agreeable.     Time Calculation:    Time Calculation- OT     Row Name 10/11/22 1116             Time Calculation- OT    OT Start Time 0937  -MW      OT Stop Time 0949  -MW      OT Time Calculation (min) 12 min  -MW      OT Received On 10/11/22  -MW         Untimed Charges    OT Eval/Re-eval Minutes 12  -MW         Total Minutes    Untimed Charges Total Minutes 12  -MW       Total Minutes 12  -MW            User Key  (r) = Recorded By, (t) = Taken By, (c) = Cosigned By    Initials Name Provider Type    Rosaura Shipley OT Occupational Therapist              Therapy Charges for Today     Code Description Service Date Service Provider Modifiers Qty    90982776708 HC OT EVAL LOW COMPLEXITY 2 10/11/2022 Rosaura Sierra OT GO 1             OT Discharge Summary  Anticipated Discharge Disposition (OT): home    Rosaura Sierra OT  10/11/2022    "

## 2022-10-11 NOTE — DISCHARGE PLACEMENT REQUEST
"Anaid Oewns (52 y.o. Male)     Date of Birth   1970    Social Security Number       Address   [address] [place] [address]    Home Phone   [phone]    MRN   [id number]       [facility]    Marital Status   Single                            Admission Date   10/10/22    Admission Type   Emergency    Admitting Provider   Osbaldo Levi MD    Attending Provider   Osbaldo Levi MD    Department, Room/Bed   [address], P588/1       Discharge Date       Discharge Disposition   Home or Self Care    Discharge Destination                               Attending Provider: Osbaldo Levi MD    Allergies: No Known Allergies    Isolation: None   Infection: None   Code Status: CPR    Ht: 183 cm (72.05\")   Wt: 97.5 kg (214 lb 15.2 oz)    Admission Cmt: None   Principal Problem: Syncope, unspecified syncope type [R55]                 Active Insurance as of 10/10/2022     Primary Coverage     Payor Plan Insurance Group Employer/Plan Group    AETNA MEDICARE REPLACEMENT AETNA MEDICARE REPLACEMENT [id number]     Payor Plan Address Payor Plan Phone Number Payor Plan Fax Number Effective Dates    [address] [phone]  1/1/2022 - None Entered    [facility] [id number]       Subscriber Name Subscriber Birth Date Member ID       ANAID OWENS 1970 [id number]                 Emergency Contacts      (Rel.) Home Phone Work Phone Mobile Phone    Linda Owens (Mother) -- -- [phone]          "

## 2022-10-11 NOTE — PLAN OF CARE
"Goal Outcome Evaluation:  Plan of Care Reviewed With: patient           Outcome Evaluation: Pt is a 51 y/o M who presented to [facility] after syncopal episode at home with hitting his head on the bathtub. Pt reporting he was here two weeks ago for CVA, but was able to return home at [place]. Pt reports being independent at home only needing to use STC \"sometimes\". Pt currently Mod-I for bed mobility, SBA for transfers and ambulation. Pt was able to ambulate 200' without use of AD and demoing good dual tasking by speaking with MD during the walk. Noted pt had increased limp in LLE as he began to fatigue. Pt stating at end of session the gait pattern noted is his \"normal\" and his LLE \"has been his bad leg forever\". Denied dizziness throughout session. As pt is at his baseline with plans to return home at [place], PT will sign-off. Rec either OP or HH PT to further address LLE deficits; pt was agreeable to  PT.    Patient was intermittently wearing a face mask during this therapy encounter. Therapist used appropriate personal protective equipment including eye protection, mask, and gloves.  Mask used was standard procedure mask. Appropriate PPE was worn during the entire therapy session. Hand hygiene was completed before and after therapy session. Patient is not in enhanced droplet precautions.    "

## 2022-10-11 NOTE — PROGRESS NOTES
"Daily progress note    Primary care physician  Dr. CADENA    Chief complaint  Doing much better with no new complaints and wants to go home.    History of present illness  52-year-old -American male with history of diabetes hypertension hyperlipidemia coronary artery disease and peripheral artery disease presented to [facility] emergency room after the syncopal episode when he was heading towards the bathroom and try to urinate.  Patient fell down and he does not recall after that.  Patient denies any chest pain shortness of breath dizziness lightheadedness prior to the fall.  Patient denies any loss of control of bowel bladder or tongue biting.  Patient evaluated in ER admitted for management.  Patient has no fever chills cough congestion night sweats weight loss or weight gain.    REVIEW OF SYSTEMS  Unremarkable    PHYSICAL EXAM   Blood pressure 117/80, pulse 66, temperature 98.1 °F (36.7 °C), temperature source Oral, resp. rate 16, height 183 cm (72.05\"), weight 97.5 kg (214 lb 15.2 oz), SpO2 100 %.    GENERAL: Alert male no obvious distress.    HEENT:  Unremarkable  NECK:  Supple  CV: regular rhythm, regular rate-no murmur  RESPIRATORY: normal effort, clear to auscultation bilaterally  ABDOMEN: soft, nontender nondistended bowel sounds positive  MUSCULOSKELETAL: Spine-atraumatic  NEURO:  No focal deficit  SKIN: warm, dry     LAB RESULTS  Lab Results (last 24 hours)     Procedure Component Value Units Date/Time    TSH [[id number]]  (Normal) Collected: 10/11/22 1032    Specimen: Blood Updated: 10/11/22 1121     TSH 1.970 uIU/mL     Comprehensive Metabolic Panel [[id number]]  (Abnormal) Collected: 10/11/22 1032    Specimen: Blood Updated: 10/11/22 1121     Glucose 189 mg/dL      BUN 9 mg/dL      Creatinine 0.88 mg/dL      Sodium 136 mmol/L      Potassium 4.2 mmol/L      Chloride 105 mmol/L      CO2 21.9 mmol/L      Calcium 8.9 mg/dL      Total Protein 6.9 g/dL      Albumin 4.00 g/dL      ALT (SGPT) 35 " U/L      AST (SGOT) 22 U/L      Alkaline Phosphatase 69 U/L      Total Bilirubin 0.2 mg/dL      Globulin 2.9 gm/dL      A/G Ratio 1.4 g/dL      BUN/Creatinine Ratio 10.2     Anion Gap 9.1 mmol/L      eGFR 104.1 mL/min/1.73      Comment: National Kidney Foundation and American Society of Nephrology (ASN) Task Force recommended calculation based on the Chronic Kidney Disease Epidemiology Collaboration (CKD-EPI) equation refit without adjustment for race.       Narrative:      GFR Normal >60  Chronic Kidney Disease <60  Kidney Failure <15      Lipid Panel [[id number]]  (Abnormal) Collected: 10/11/22 1032    Specimen: Blood Updated: 10/11/22 1116     Total Cholesterol 150 mg/dL      Triglycerides 118 mg/dL      HDL Cholesterol 36 mg/dL      LDL Cholesterol  93 mg/dL      VLDL Cholesterol 21 mg/dL      LDL/HDL Ratio 2.51    Narrative:      Cholesterol Reference Ranges  (U.S. Department of Health and Human Services ATP III Classifications)    Desirable          <200 mg/dL  Borderline High    200-239 mg/dL  High Risk          >240 mg/dL      Triglyceride Reference Ranges  (U.S. Department of Health and Human Services ATP III Classifications)    Normal           <150 mg/dL  Borderline High  150-199 mg/dL  High             200-499 mg/dL  Very High        >500 mg/dL    HDL Reference Ranges  (U.S. Department of Health and Human Services ATP III Classifications)    Low     <40 mg/dl (major risk factor for CHD)  High    >60 mg/dl ('negative' risk factor for CHD)        LDL Reference Ranges  (U.S. Department of Health and Human Services ATP III Classifications)    Optimal          <100 mg/dL  Near Optimal     100-129 mg/dL  Borderline High  130-159 mg/dL  High             160-189 mg/dL  Very High        >189 mg/dL    POC Glucose Once [[id number]]  (Abnormal) Collected: 10/11/22 1114    Specimen: Blood Updated: 10/11/22 1116     Glucose 295 mg/dL      Comment: Meter: [id number] : [id number] Becca VO       Protime-INR  [[id number]]  (Abnormal) Collected: 10/11/22 1032    Specimen: Blood Updated: 10/11/22 1107     Protime 25.7 Seconds      INR 2.31    CBC & Differential [[id number]]  (Abnormal) Collected: 10/11/22 1032    Specimen: Blood Updated: 10/11/22 1055    Narrative:      The following orders were created for panel order CBC & Differential.  Procedure                               Abnormality         Status                     ---------                               -----------         ------                     CBC Auto Differential[[id number]]        Abnormal            Final result                 Please view results for these tests on the individual orders.    CBC Auto Differential [[id number]]  (Abnormal) Collected: 10/11/22 1032    Specimen: Blood Updated: 10/11/22 1055     WBC 7.34 10*3/mm3      RBC 5.23 10*6/mm3      Hemoglobin 15.4 g/dL      Hematocrit 45.4 %      MCV 86.8 fL      MCH 29.4 pg      MCHC 33.9 g/dL      RDW 14.0 %      RDW-SD 44.8 fl      MPV 11.3 fL      Platelets 172 10*3/mm3      Neutrophil % 34.9 %      Lymphocyte % 52.3 %      Monocyte % 9.1 %      Eosinophil % 2.3 %      Basophil % 1.1 %      Immature Grans % 0.3 %      Neutrophils, Absolute 2.56 10*3/mm3      Lymphocytes, Absolute 3.84 10*3/mm3      Monocytes, Absolute 0.67 10*3/mm3      Eosinophils, Absolute 0.17 10*3/mm3      Basophils, Absolute 0.08 10*3/mm3      Immature Grans, Absolute 0.02 10*3/mm3      nRBC 0.0 /100 WBC     Hemoglobin A1c [[id number]] Collected: 10/11/22 1032    Specimen: Blood Updated: 10/11/22 1049    POC Glucose Once [[id number]]  (Abnormal) Collected: 10/10/22 2028    Specimen: Blood Updated: 10/10/22 2028     Glucose 203 mg/dL      Comment: Meter: [id number] : [id number] Gerber Escobar CNA       Protime-INR [[id number]]  (Abnormal) Collected: 10/10/22 1914    Specimen: Blood Updated: 10/10/22 1950     Protime 22.4 Seconds      INR 1.94    POC Glucose Once [[id number]]  (Normal) Collected: 10/10/22 1703    Specimen: Blood  Updated: 10/10/22 1721     Glucose 108 mg/dL      Comment: Meter: [id number] : [id number] Trina FINN           Imaging Results (Last 24 Hours)     Procedure Component Value Units Date/Time    MRI Brain With & Without Contrast [[id number]] Collected: 10/10/22 2334     Updated: 10/11/22 0139    Addenda:        ADDENDUM:  10 11 22 01:38 Call Doctor Regarding Stroke, called Nurse Mary on   10 11 01:37 (-04:00)      Signed: 10/10/22 2333 by [place] MD    Narrative:      CR  Patient: ANAID OWENS  Time Out: 23:33  Exam(s): MRI HEAD W WO Contrast     EXAM:    MR Head Without and With Intravenous Contrast    CLINICAL HISTORY:     Reason for exam: Transient ischemic attack (TIA).    TECHNIQUE:    Magnetic resonance images of the head brain without and with   intravenous contrast in multiple planes.    COMPARISON:    CT brain 10 10 2022.    FINDINGS:    Brain: Small acute-subacute nonhemorrhagic infarct of the posterior   limb of the right internal capsule.  Surrounding edema and ring   enhancement postcontrast.   Old right caudate body infarct.  Small old   right thalamic lacunar infarct.  No acute hemorrhage or abnormal extra-  axial fluid collection.    Ventricles:  No midline shift.  No ventriculomegaly.    Bones joints:  Unremarkable.    Sinuses:  Unremarkable as visualized.  No acute sinusitis.    Mastoid air cells:  Unremarkable as visualized.  No mastoid effusion.    Orbits:  Unremarkable as visualized.    IMPRESSION:         Small acute versus subacute nonhemorrhagic infarct of the posterior   limb of the right internal capsule.  Surrounding edema and ring   enhancement suggesting subacute age.    Small old right caudate body and thalamic lacunar infarcts.      Impression:            Communications:     Call Doctor Stroke    Electronically signed by [place] M.D. on 10-10-22 at 2333    CT Head Without Contrast [[id number]] Collected: 10/10/22 1512     Updated: 10/10/22 1621     Narrative:      CT HEAD WITHOUT CONTRAST     CLINICAL HISTORY: Head trauma with headache.     TECHNIQUE: CT scan of the head was obtained with 3 mm axial soft tissue  and 2 mm axial bone algorithm algorithm images. No intravenous contrast  was administered. Sagittal and coronal reconstructions were obtained.     COMPARISON: Head CT dated 09/27/2022.     FINDINGS:       There is no evidence for a calvarial fracture or an acute extra-axial  hemorrhage. Old lacunar disease is seen within the lateral aspect of the  right thalamus, the left lentiform nucleus, and the body of the right  caudate. Additionally, there is a 9 mm hypodensity within the posterior  limb of the right internal capsule which was not seen on the prior head  CT and could represent an acute to subacute lacune. This could be  further characterized with MR imaging, if clinically indicated. The  ventricles, sulci, and cisterns are age appropriate. The gray-white  matter differentiation is within normal limits. The posterior fossa  structures are unremarkable.     Incidental note is made of moderate size mucous retention cyst within  the left maxillary sinus. Mucosal thickening is incidentally appreciated  within the ethmoid air cells.       Impression:         There is no evidence for acute traumatic intracranial pathology. Old  lacunar disease is appreciated within the right caudate body, right  lateral thalamus, and left lentiform nucleus which was seen on the prior  head CT dated 09/27/2022. Additionally, there is a 9 mm hypodensity  within the anterior aspect of the posterior limb of the right internal  capsule which was not seen on the prior head CT and where there may be  an acute to subacute lacune. Further characterization with MR imaging  could be obtained, as clinically indicated.     These findings and recommendations were discussed with Dr. Blanchard on  10/10/2022 at approximately 2:57 PM.     Radiation dose reduction techniques were utilized,  including automated  exposure control and exposure modulation based on body size.     This report was finalized on 10/10/2022 4:18 PM by Dr. Sergio Mcwilliams M.D.              ECG 12 Lead  Component   Ref Range & Units 14:00 13 d ago   QT Interval   ms 377 P  388    Resulting Agency  ECG  ECG             HEART RATE= 70  bpm  RR Interval= 857  ms  ME Interval= 169  ms  P Horizontal Axis= -10  deg  P Front Axis= 18  deg  QRSD Interval= 98  ms  QT Interval= 377  ms  QRS Axis= -16  deg  T Wave Axis= -38  deg  - ABNORMAL ECG -  Sinus rhythm  Probable left atrial enlargement  Left ventricular hypertrophy  Lateral infarct, age indeterminate  Anterior ST elevation, probably due to LVH             Current Facility-Administered Medications:   •  amLODIPine (NORVASC) tablet 10 mg, 10 mg, Oral, Q24H, Osbaldo Levi MD, 10 mg at 10/11/22 0957  •  aspirin chewable tablet 81 mg, 81 mg, Oral, Daily, Osbaldo Levi MD, 81 mg at 10/11/22 0957  •  atorvastatin (LIPITOR) tablet 80 mg, 80 mg, Oral, Nightly, Osbaldo Levi MD, 80 mg at 10/10/22 2333  •  carvedilol (COREG) tablet 6.25 mg, 6.25 mg, Oral, BID With Meals, Osbaldo Levi MD, 6.25 mg at 10/11/22 0957  •  Wjgkkzg-Ltvjl-Bojatdhh-TenofAF (GENVOYA) 010-651-379-10 MG per tablet 1 tablet, 1 tablet, Oral, Daily, Osbaldo Levi MD  •  empagliflozin (JARDIANCE) tablet 25 mg, 25 mg, Oral, Daily, Osbaldo Levi MD, 25 mg at 10/11/22 0958  •  influenza vac split quad (FLUZONE,FLUARIX,AFLURIA,FLULAVAL) injection 0.5 mL, 0.5 mL, Intramuscular, During Hospitalization, Osbaldo Levi MD  •  insulin glargine (LANTUS, SEMGLEE) injection 25 Units, 25 Units, Subcutaneous, QAM, Osbaldo Levi MD, 25 Units at 10/11/22 0629  •  insulin lispro (ADMELOG) injection 0-7 Units, 0-7 Units, Subcutaneous, 4x Daily With Meals & Nightly, Osbaldo Levi MD, 4 Units at 10/11/22 1328  •  insulin lispro (ADMELOG) injection 5 Units, 5 Units, Subcutaneous, TID With Meals, Osbaldo Levi MD, 5 Units at 10/11/22 1328  •   losartan (COZAAR) tablet 25 mg, 25 mg, Oral, Daily, Chyna Levi MD, 25 mg at 10/11/22 0957  •  pantoprazole (PROTONIX) EC tablet 40 mg, 40 mg, Oral, BID AC, Chyna Levi MD, 40 mg at 10/11/22 0639  •  Pharmacy to dose warfarin, , Does not apply, Continuous PRN, Chyna Levi MD  •  sertraline (ZOLOFT) tablet 50 mg, 50 mg, Oral, Daily, Chyna Levi MD, 50 mg at 10/11/22 0957  •  [COMPLETED] Insert peripheral IV, , , Once **AND** sodium chloride 0.9 % flush 10 mL, 10 mL, Intravenous, PRN, LoBear MD, 10 mL at 10/11/22 0958  •  sodium chloride 0.9 % with KCl 20 mEq/L infusion, 75 mL/hr, Intravenous, Continuous, Chyna Levi MD, Last Rate: 75 mL/hr at 10/10/22 1854, 75 mL/hr at 10/10/22 1854  •  warfarin (COUMADIN) tablet 10 mg, 10 mg, Oral, Once, Chyna Levi MD     ASSESSMENT  Acute versus subacute right internal capsule infarct  Old right caudate body and thalamic lacunar infarct  Diabetes mellitus  Hypertension  Hyperlipidemia  Coronary artery disease  Peripheral artery disease  Tobacco abuse  Gastroesophageal reflux disease    PLAN  Discharge home  Discharge summary dictated    CHYNA LEVI MD

## 2022-10-11 NOTE — CONSULTS
Stroke Consult Note    Patient Name: Cecilio Puckett   MRN: [id number]  Age: 52 y.o.  Sex: male  : 1970    Primary Care Physician: Arias Lehman APRN  Referring Physician:  Osbaldo Levi MD    Handedness: Right  Race: Black    Chief Complaint/Reason for Consultation: Syncope    Subjective .  HPI: 52-year-old right-handed black male with known diagnosis of hypertension, diabetes, hyperlipidemia, coronary artery disease status post 2 stents, peripheral arterial disease status post bypass surgery for the lower extremities, multiple DVTs and PE, on Coumadin, noncompliant with medications, smoking who comes in with episode of syncope, unsure how long, and generalized weakness.  He was recently here in the hospital few weeks ago, when he had slurred speech and significantly elevated blood pressures.  Patient accepts that he does not take his medications regularly, and misses his medications several times.  He wants to go home.    Last Known Normal Date/Time: Unknown    Review of Systems   Constitutional: Positive for fatigue.   Neurological:        Episode of syncope   All other systems reviewed and are negative.     History reviewed. No pertinent past medical history.  History reviewed. No pertinent surgical history.  History reviewed. No pertinent family history.  Social History     Socioeconomic History   • Marital status: Single   Tobacco Use   • Smoking status: Every Day     Packs/day: 0.25     Types: Cigarettes     Start date: 1994     No Known Allergies  Prior to Admission medications    Not on File             Objective     Temp:  [97.6 °F (36.4 °C)-98.1 °F (36.7 °C)] 98.1 °F (36.7 °C)  Heart Rate:  [61-96] 66  Resp:  [16] 16  BP: (114-156)/(76-97) 117/80     Neurological Exam  Mental Status  Awake, alert and oriented to person, place and time.  No dysarthria.  Language is fluent with no aphasia. Attention and concentration are normal. Fund of knowledge is appropriate for level of  education.    Cranial Nerves  CN II: Visual fields full to confrontation.  CN III, IV, VI: Extraocular movements intact bilaterally. Normal lids and orbits bilaterally. Pupils equal round and reactive to light bilaterally.  CN V: Facial sensation is normal.  CN VII: Full and symmetric facial movement.  CN VIII: Equal hearing bilaterally.  CN IX, X: Palate elevates symmetrically  CN XI: Shoulder shrug strength is normal.  CN XII: Tongue midline without atrophy or fasciculations.    Motor  Normal muscle bulk throughout. No fasciculations present. Strength is 5/5 throughout all four extremities.    Sensory  Light touch is normal in upper and lower extremities.     Reflexes  Deep tendon reflexes are 2+ and symmetric in all four extremities with downgoing toes bilaterally.    Coordination  No dysmetria.    Gait  Normal      Physical Exam  Vitals and nursing note reviewed.   Constitutional:       Appearance: Normal appearance.   HENT:      Head: Normocephalic and atraumatic.   Eyes:      Conjunctiva/sclera: Conjunctivae normal.      Pupils: Pupils are equal, round, and reactive to light.   Cardiovascular:      Rate and Rhythm: Normal rate and regular rhythm.   Pulmonary:      Effort: Pulmonary effort is normal. No respiratory distress.   Musculoskeletal:      Cervical back: Normal range of motion and neck supple.   Neurological:      Mental Status: He is alert.   Psychiatric:         Mood and Affect: Mood normal.         Behavior: Behavior normal.         Acute Stroke Data    IV Thrombolytic (TPA/Tenecteplase) Inclusion / Exclusion Criteria    Time: 13:21 EDT  Person Administering Scale: Jurgen Jose MD    Inclusion Criteria  [x]   18 years of age or greater   []   Onset of symptoms < 4.5 hours before beginning treatment (stroke onset = time patient was last seen well or without symptoms).   []   Diagnosis of acute ischemic stroke causing measurable disabling deficit (Complete Hemianopia, Any Aphasia, Visual or Sensory  Extinction, Any weakness limiting sustained effort against gravity)   []   Any remaining deficit considered potentially disabling in view of patient and practitioner   Exclusion criteria (Do not proceed with Alteplase if any are checked under exclusion criteria)  [x]   Onset unknown or GREATER than 4.5 hours   []   ICH on CT/MRI   []   CT demonstrates hypodensity representing acute or subacute infarct   []   Significant head trauma or prior stroke in the previous 3 months   []   Symptoms suggestive of subarachnoid hemorrhage   []   History of un-ruptured intracranial aneurysm GREATER than 10 mm   []   Recent intracranial or intraspinal surgery within the last 3 months   []   Arterial puncture at a non-compressible site in the previous 7 days   []   Active internal bleeding   []   Acute bleeding tendency   []   Platelet count LESS than 100,000 for known hematological diseases such as leukemia, thrombocytopenia or chronic cirrhosis   []   Current use of anticoagulant with INR GREATER than 1.7 or PT GREATER than 15 seconds, aPTT GREATER than 40 seconds   []   Heparin received within 48 hours, resulting in abnormally elevated aPTT GREATER than upper limit of normal   []   Current use of direct thrombin inhibitors or direct factor Xa inhibitors in the past 48 hours   []   Elevated blood pressure refractory to treatment (systolic GREATER than 185 mm/Hg or diastolic  GREATER than 110 mm/Hg   []   Suspected infective endocarditis and aortic arch dissection   []   Current use of therapeutic treatment dose of low-molecular-weight heparin (LMWH) within the previous 24 hours   []   Structural GI malignancy or bleed   Relative exclusion for all patients  [x]   Only minor nondisabling symptoms   []   Pregnancy   []   Seizure at onset with postictal residual neurological impairments   []   Major surgery or previous trauma within past 14 days   []   History of previous spontaneous ICH, intracranial neoplasm, or AV malformation   []    Postpartum (within previous 14 days)   []   Recent GI or urinary tract hemorrhage (within previous 21 days)   []   Recent acute MI (within previous 3 months)   []   History of unruptured intracranial aneurysm LESS than 10 mm   []   History of ruptured intracranial aneurysm   []   Blood glucose LESS than 50 mg/dL (2.7 mmol/L)   []   Dural puncture within the last 7 days   []   Known GREATER than 10 cerebral microbleeds   Additional exclusions for patients with symptoms onset between 3 and 4.5 hours.  []   Age > 80.   []   On any anticoagulants regardless of INR  >>> Warfarin (Coumadin), Heparin, Enoxaparin (Lovenox), fondaparinux (Arixtra), bivalirudin (Angiomax), Argatroban, dabigatran (Pradaxa), rivaroxaban (Xarelto), or apixaban (Eliquis)   []   Severe stroke (NIHSS > 25).   []   History of BOTH diabetes and previous ischemic stroke.   []   The risks and benefits have been discussed with the patient or family related to the administration of IV alteplase for stroke symptoms.   []   I have discussed and reviewed the patient's case and imaging with the attending prior to IV Thrombolytic (TPA/Tenecteplase).    Time Thrombolytic administered       Hospital Meds:  Scheduled- amLODIPine, 10 mg, Oral, Q24H  aspirin, 81 mg, Oral, Daily  atorvastatin, 80 mg, Oral, Nightly  carvedilol, 6.25 mg, Oral, BID With Meals  Rbewbmj-Twxqo-Khwfpokf-TenofAF, 1 tablet, Oral, Daily  empagliflozin, 25 mg, Oral, Daily  insulin glargine, 25 Units, Subcutaneous, QAM  insulin lispro, 0-7 Units, Subcutaneous, 4x Daily With Meals & Nightly  insulin lispro, 5 Units, Subcutaneous, TID With Meals  losartan, 25 mg, Oral, Daily  pantoprazole, 40 mg, Oral, BID AC  sertraline, 50 mg, Oral, Daily  warfarin, 10 mg, Oral, Once      Infusions- Pharmacy to dose warfarin,   sodium chloride 0.9 % with KCl 20 mEq, 75 mL/hr, Last Rate: 75 mL/hr (10/10/22 4356)       PRNs- •  influenza vaccine  •  Pharmacy to dose warfarin  •  [COMPLETED] Insert peripheral  IV **AND** sodium chloride    Functional Status Prior to Current Stroke/[place] Score: 0    NIH Stroke Scale  Time: 13:21 EDT  Person Administering Scale: Jurgen Jose MD    1a  Level of consciousness: 0=alert; keenly responsive   1b. LOC questions:  0=Performs both tasks correctly   1c. LOC commands: 0=Performs both tasks correctly   2.  Best Gaze: 0=normal   3.  Visual: 0=No visual loss   4. Facial Palsy: 0=Normal symmetric movement   5a.  Motor left arm: 0=No drift, limb holds 90 (or 45) degrees for full 10 seconds   5b.  Motor right arm: 0=No drift, limb holds 90 (or 45) degrees for full 10 seconds   6a. motor left le=No drift, limb holds 90 (or 45) degrees for full 10 seconds   6b  Motor right le=No drift, limb holds 90 (or 45) degrees for full 10 seconds   7. Limb Ataxia: 0=Absent   8.  Sensory: 0=Normal; no sensory loss   9. Best Language:  0=No aphasia, normal   10. Dysarthria: 0=Normal   11. Extinction and Inattention: 0=No abnormality    Total:   0       Results Reviewed:  I have personally reviewed current lab, radiology, and data   CT head shows no acute to subacute right internal capsular stroke, no hemorrhage  MRI brain shows a subacute right internal capsular stroke, minimal white matter disease, no hemorrhage  CT angiogram of head and neck from last month shows no significant stenosis or occlusion  A1c 10.3, LDL 93, total cholesterol 150 and triglyceride 118            Assessment/Plan:      1. Subacute lacunar stroke in the right internal capsular region.  The stroke would likely happen last month, rather than a new event.  Recommend continuing him on warfarin and aspirin 81 mg, along with full dose of statins.  Encourage compliance with medications.  Follow-up on 2D echocardiogram.  2. Essential hypertension.  Normal blood pressure goals for him.  No need for permissive hypertension.  Recommend him to check blood pressure on a daily basis.  3. Diabetes mellitus type 2 in obese.  His A1c  is 10.3.  Goal of less than 7.  Maintain normoglycemia.  Encourage compliance with medications.  4. Mixed hyperlipidemia.  Relatively well controlled.  Continue full dose of statins.  5. History of CAD and peripheral arterial disease.  Continue aggressive vascular risk factor control.  6. Chronic anticoagulation.  Patient is on warfarin for his history of DVT and PE.  He wants to continue warfarin rather than NOACs.  Close follow-up with PCP.  7. Smoking.  Patient smokes excessively, and I have encouraged him to quit smoking.  8. Noncompliance with medications.  Encouraged compliance.    Case was discussed with patient,nursing and the primary physician. Thank you for the consult.  Okay to discharge him home with outpatient PCP follow-up closely, to control his vascular risk factors aggressively.    Part of the note was copied and pasted, although reviewed thoroughly for any changes and correction.        Jurgen Jose MD  October 11, 2022  14:32 EDT

## 2022-10-11 NOTE — PROGRESS NOTES
[facility] Clinical Pharmacy Services: Warfarin Dosing/Monitoring Consult    Cecilio Puckett is a 52 y.o. male, estimated creatinine clearance is 120.2 mL/min (by C-G formula based on SCr of 0.88 mg/dL). weighing 97.5 kg (214 lb 15.2 oz).    Results from last 7 days   Lab Units 10/11/22  1032 10/10/22  1914 10/10/22  1444   INR  2.31* 1.94* 1.95*   HEMOGLOBIN g/dL 15.4  --  14.2   HEMATOCRIT % 45.4  --  41.0   PLATELETS 10*3/mm3 172  --  167     Prior to admission anticoagulation: warfarin 15mg Mon, Wed, Fri; 10mg all other days    Hospital Anticoagulation:  Consulting provider: Dr. Levi  Start date: 10/10  Indication: A Fib - requiring full anticoagulation  Target INR: 2 - 3  Expected duration: tbd   Bridge Therapy: No      Potential food or drug interactions: none at this time    Education complete?/Date: No; plan for follow up TBD    Assessment/Plan:  Dose: INR therapeutic at 2.3 (Goal 2-3). Plan to continue patient's home regimen with warfarin 10 mg this evening.  Monitor for any signs or symptoms of bleeding  Follow up daily INRs and dose adjustments    Pharmacy will continue to follow until discharge or discontinuation of warfarin.     Liane Marina [facility]  Clinical Pharmacist

## 2022-10-11 NOTE — PLAN OF CARE
"Goal Outcome Evaluation:  Plan of Care Reviewed With: patient        Progress: no change  Outcome Evaluation: Pt is a 51 yo male admitted for syncope episode at home, hitting his head with the fall. Pt reports he was here 2 weeks ago for CVA and returned home following hospital stay. Pt seen this date for OT eval, A&O4, reports (I0 at baseline with ADLs and use of cane occassionally. Pt reports he feels weak however at his baseline with mobility and ADL task completion. Pt completed LBD with s/up, bed mob with mod (I), STS with SBa and SBA/CGA with no AD for mobility with no LOBs noted. Pt reports his LLE \"has been his bad leg forever.\" Pt at his baseline and plans home at d/c. OT to s/o and pt agreeable.  "

## 2022-10-14 NOTE — PROGRESS NOTES
Case Management Discharge Note      Final Note: Pt discharged home, no accepting HH agency, pt to pursue outpatient therapies if desired.    Provided Post Acute Provider List?: Yes  Post Acute Provider List: Home Health, Outpatient Therapy    Selected Continued Care - Discharged on 10/11/2022 Admission date: 10/10/2022 - Discharge disposition: Home or Self Care    Destination    No services have been selected for the patient.              Durable Medical Equipment    No services have been selected for the patient.              Dialysis/Infusion    No services have been selected for the patient.              Home Medical Care    No services have been selected for the patient.              Therapy    No services have been selected for the patient.              Community Resources    No services have been selected for the patient.              Community & DME    No services have been selected for the patient.                  Transportation Services  Private: Car    Final Discharge Disposition Code: 01 - home or self-care

## 2023-09-26 ENCOUNTER — APPOINTMENT (OUTPATIENT)
Dept: CARDIOLOGY | Facility: HOSPITAL | Age: 53
DRG: 064 | End: 2023-09-26
Payer: MEDICARE

## 2023-09-26 ENCOUNTER — HOSPITAL ENCOUNTER (OUTPATIENT)
Facility: HOSPITAL | Age: 53
Setting detail: OBSERVATION
LOS: 1 days | Discharge: HOME | DRG: 064 | End: 2023-09-27
Attending: EMERGENCY MEDICINE | Admitting: INTERNAL MEDICINE
Payer: MEDICARE

## 2023-09-26 DIAGNOSIS — I63.9 ACUTE CVA (CEREBROVASCULAR ACCIDENT): ICD-10-CM

## 2023-09-26 DIAGNOSIS — Z78.9 FAILURE OF OUTPATIENT TREATMENT: ICD-10-CM

## 2023-09-26 DIAGNOSIS — G45.9 TIA (TRANSIENT ISCHEMIC ATTACK): ICD-10-CM

## 2023-09-26 DIAGNOSIS — I82.402 LEG DVT (DEEP VENOUS THROMBOEMBOLISM), ACUTE, LEFT: Primary | ICD-10-CM

## 2023-09-26 PROBLEM — I82.409 ACUTE DVT (DEEP VENOUS THROMBOSIS): Status: ACTIVE | Noted: 2023-09-26

## 2023-09-26 LAB
ALBUMIN SERPL-MCNC: 3.9 G/DL (ref 3.5–5.2)
ALBUMIN/GLOB SERPL: 1.2 G/DL
ALP SERPL-CCNC: 76 U/L (ref 39–117)
ALT SERPL W P-5'-P-CCNC: 15 U/L (ref 1–41)
ANION GAP SERPL CALCULATED.3IONS-SCNC: 11.8 MMOL/L (ref 5–15)
APTT PPP: 37.2 SECONDS (ref 22.7–35.4)
AST SERPL-CCNC: 12 U/L (ref 1–40)
BASOPHILS # BLD AUTO: 0.05 10*3/MM3 (ref 0–0.2)
BASOPHILS NFR BLD AUTO: 0.8 % (ref 0–1.5)
BH CV LOW VAS LEFT PERONEAL VESSEL: 1
BH CV LOW VAS LEFT POPLITEAL SPONT: 1
BH CV LOW VAS LEFT POSTERIOR TIBIAL VESSEL: 1
BH CV LOWER VASCULAR LEFT COMMON FEMORAL AUGMENT: NORMAL
BH CV LOWER VASCULAR LEFT COMMON FEMORAL COMPETENT: NORMAL
BH CV LOWER VASCULAR LEFT COMMON FEMORAL COMPRESS: NORMAL
BH CV LOWER VASCULAR LEFT COMMON FEMORAL PHASIC: NORMAL
BH CV LOWER VASCULAR LEFT COMMON FEMORAL SPONT: NORMAL
BH CV LOWER VASCULAR LEFT DISTAL FEMORAL COMPRESS: NORMAL
BH CV LOWER VASCULAR LEFT GASTRONEMIUS COMPRESS: NORMAL
BH CV LOWER VASCULAR LEFT GREATER SAPH AK COMPRESS: NORMAL
BH CV LOWER VASCULAR LEFT GREATER SAPH BK COMPRESS: NORMAL
BH CV LOWER VASCULAR LEFT LESSER SAPH COMPRESS: NORMAL
BH CV LOWER VASCULAR LEFT MID FEMORAL AUGMENT: NORMAL
BH CV LOWER VASCULAR LEFT MID FEMORAL COMPETENT: NORMAL
BH CV LOWER VASCULAR LEFT MID FEMORAL COMPRESS: NORMAL
BH CV LOWER VASCULAR LEFT MID FEMORAL PHASIC: NORMAL
BH CV LOWER VASCULAR LEFT MID FEMORAL SPONT: NORMAL
BH CV LOWER VASCULAR LEFT PERONEAL COMPRESS: NORMAL
BH CV LOWER VASCULAR LEFT PERONEAL THROMBUS: NORMAL
BH CV LOWER VASCULAR LEFT POPLITEAL AUGMENT: NORMAL
BH CV LOWER VASCULAR LEFT POPLITEAL COMPRESS: NORMAL
BH CV LOWER VASCULAR LEFT POPLITEAL PHASIC: NORMAL
BH CV LOWER VASCULAR LEFT POPLITEAL SPONT: NORMAL
BH CV LOWER VASCULAR LEFT POPLITEAL THROMBUS: NORMAL
BH CV LOWER VASCULAR LEFT POSTERIOR TIBIAL COMPRESS: NORMAL
BH CV LOWER VASCULAR LEFT POSTERIOR TIBIAL THROMBUS: NORMAL
BH CV LOWER VASCULAR LEFT PROFUNDA FEMORAL COMPRESS: NORMAL
BH CV LOWER VASCULAR LEFT PROXIMAL FEMORAL COMPRESS: NORMAL
BH CV LOWER VASCULAR LEFT SAPHENOFEMORAL JUNCTION COMPRESS: NORMAL
BH CV LOWER VASCULAR RIGHT COMMON FEMORAL AUGMENT: NORMAL
BH CV LOWER VASCULAR RIGHT COMMON FEMORAL COMPETENT: NORMAL
BH CV LOWER VASCULAR RIGHT COMMON FEMORAL COMPRESS: NORMAL
BH CV LOWER VASCULAR RIGHT COMMON FEMORAL PHASIC: NORMAL
BH CV LOWER VASCULAR RIGHT COMMON FEMORAL SPONT: NORMAL
BH CV VAS PRELIMINARY FINDINGS SCRIPTING: 1
BILIRUB SERPL-MCNC: <0.2 MG/DL (ref 0–1.2)
BUN SERPL-MCNC: 10 MG/DL (ref 6–20)
BUN/CREAT SERPL: 10.8 (ref 7–25)
CALCIUM SPEC-SCNC: 9.3 MG/DL (ref 8.6–10.5)
CHLORIDE SERPL-SCNC: 102 MMOL/L (ref 98–107)
CO2 SERPL-SCNC: 25.2 MMOL/L (ref 22–29)
CREAT SERPL-MCNC: 0.93 MG/DL (ref 0.76–1.27)
DEPRECATED RDW RBC AUTO: 43.1 FL (ref 37–54)
EGFRCR SERPLBLD CKD-EPI 2021: 98.8 ML/MIN/1.73
EOSINOPHIL # BLD AUTO: 0.09 10*3/MM3 (ref 0–0.4)
EOSINOPHIL NFR BLD AUTO: 1.4 % (ref 0.3–6.2)
ERYTHROCYTE [DISTWIDTH] IN BLOOD BY AUTOMATED COUNT: 13.7 % (ref 12.3–15.4)
GLOBULIN UR ELPH-MCNC: 3.3 GM/DL
GLUCOSE BLDC GLUCOMTR-MCNC: 287 MG/DL (ref 70–130)
GLUCOSE SERPL-MCNC: 288 MG/DL (ref 65–99)
HCT VFR BLD AUTO: 44.8 % (ref 37.5–51)
HGB BLD-MCNC: 14.9 G/DL (ref 13–17.7)
IMM GRANULOCYTES # BLD AUTO: 0.01 10*3/MM3 (ref 0–0.05)
IMM GRANULOCYTES NFR BLD AUTO: 0.2 % (ref 0–0.5)
INR PPP: 2.73 (ref 0.9–1.1)
LYMPHOCYTES # BLD AUTO: 3.32 10*3/MM3 (ref 0.7–3.1)
LYMPHOCYTES NFR BLD AUTO: 50.7 % (ref 19.6–45.3)
MCH RBC QN AUTO: 28.8 PG (ref 26.6–33)
MCHC RBC AUTO-ENTMCNC: 33.3 G/DL (ref 31.5–35.7)
MCV RBC AUTO: 86.5 FL (ref 79–97)
MONOCYTES # BLD AUTO: 0.51 10*3/MM3 (ref 0.1–0.9)
MONOCYTES NFR BLD AUTO: 7.8 % (ref 5–12)
NEUTROPHILS NFR BLD AUTO: 2.57 10*3/MM3 (ref 1.7–7)
NEUTROPHILS NFR BLD AUTO: 39.1 % (ref 42.7–76)
NRBC BLD AUTO-RTO: 0.2 /100 WBC (ref 0–0.2)
PLATELET # BLD AUTO: 167 10*3/MM3 (ref 140–450)
PMV BLD AUTO: 11.2 FL (ref 6–12)
POTASSIUM SERPL-SCNC: 3.6 MMOL/L (ref 3.5–5.2)
PROT SERPL-MCNC: 7.2 G/DL (ref 6–8.5)
PROTHROMBIN TIME: 29.5 SECONDS (ref 11.7–14.2)
RBC # BLD AUTO: 5.18 10*6/MM3 (ref 4.14–5.8)
SODIUM SERPL-SCNC: 139 MMOL/L (ref 136–145)
WBC NRBC COR # BLD: 6.55 10*3/MM3 (ref 3.4–10.8)

## 2023-09-26 PROCEDURE — 25010000002 KETOROLAC TROMETHAMINE PER 15 MG: Performed by: PHYSICIAN ASSISTANT

## 2023-09-26 PROCEDURE — 85730 THROMBOPLASTIN TIME PARTIAL: CPT | Performed by: PHYSICIAN ASSISTANT

## 2023-09-26 PROCEDURE — 93971 EXTREMITY STUDY: CPT

## 2023-09-26 PROCEDURE — 85610 PROTHROMBIN TIME: CPT | Performed by: PHYSICIAN ASSISTANT

## 2023-09-26 PROCEDURE — 25010000002 HEPARIN (PORCINE) 25000-0.45 UT/250ML-% SOLUTION: Performed by: PHYSICIAN ASSISTANT

## 2023-09-26 PROCEDURE — G0378 HOSPITAL OBSERVATION PER HR: HCPCS

## 2023-09-26 PROCEDURE — 82948 REAGENT STRIP/BLOOD GLUCOSE: CPT

## 2023-09-26 PROCEDURE — 96365 THER/PROPH/DIAG IV INF INIT: CPT

## 2023-09-26 PROCEDURE — 85025 COMPLETE CBC W/AUTO DIFF WBC: CPT | Performed by: PHYSICIAN ASSISTANT

## 2023-09-26 PROCEDURE — 99284 EMERGENCY DEPT VISIT MOD MDM: CPT

## 2023-09-26 PROCEDURE — 80053 COMPREHEN METABOLIC PANEL: CPT | Performed by: PHYSICIAN ASSISTANT

## 2023-09-26 PROCEDURE — 63710000001 INSULIN LISPRO (HUMAN) PER 5 UNITS: Performed by: INTERNAL MEDICINE

## 2023-09-26 PROCEDURE — 96375 TX/PRO/DX INJ NEW DRUG ADDON: CPT

## 2023-09-26 PROCEDURE — 96366 THER/PROPH/DIAG IV INF ADDON: CPT

## 2023-09-26 PROCEDURE — 96376 TX/PRO/DX INJ SAME DRUG ADON: CPT

## 2023-09-26 PROCEDURE — 25010000002 HEPARIN (PORCINE) PER 1000 UNITS: Performed by: PHYSICIAN ASSISTANT

## 2023-09-26 RX ORDER — BISACODYL 10 MG
10 SUPPOSITORY, RECTAL RECTAL DAILY PRN
Status: DISCONTINUED | OUTPATIENT
Start: 2023-09-26 | End: 2023-09-27 | Stop reason: HOSPADM

## 2023-09-26 RX ORDER — ONDANSETRON 2 MG/ML
4 INJECTION INTRAMUSCULAR; INTRAVENOUS EVERY 6 HOURS PRN
Status: DISCONTINUED | OUTPATIENT
Start: 2023-09-26 | End: 2023-09-27 | Stop reason: HOSPADM

## 2023-09-26 RX ORDER — HEPARIN SODIUM 5000 [USP'U]/ML
80 INJECTION, SOLUTION INTRAVENOUS; SUBCUTANEOUS ONCE
Status: COMPLETED | OUTPATIENT
Start: 2023-09-26 | End: 2023-09-26

## 2023-09-26 RX ORDER — ACETAMINOPHEN 160 MG/5ML
650 SOLUTION ORAL EVERY 4 HOURS PRN
Status: DISCONTINUED | OUTPATIENT
Start: 2023-09-26 | End: 2023-09-27 | Stop reason: HOSPADM

## 2023-09-26 RX ORDER — POLYETHYLENE GLYCOL 3350 17 G/17G
17 POWDER, FOR SOLUTION ORAL DAILY PRN
Status: DISCONTINUED | OUTPATIENT
Start: 2023-09-26 | End: 2023-09-27 | Stop reason: HOSPADM

## 2023-09-26 RX ORDER — DEXTROSE MONOHYDRATE 25 G/50ML
25 INJECTION, SOLUTION INTRAVENOUS
Status: DISCONTINUED | OUTPATIENT
Start: 2023-09-26 | End: 2023-09-27 | Stop reason: HOSPADM

## 2023-09-26 RX ORDER — AMOXICILLIN 250 MG
2 CAPSULE ORAL 2 TIMES DAILY
Status: DISCONTINUED | OUTPATIENT
Start: 2023-09-26 | End: 2023-09-27 | Stop reason: HOSPADM

## 2023-09-26 RX ORDER — CARVEDILOL 6.25 MG/1
6.25 TABLET ORAL ONCE
Status: DISCONTINUED | OUTPATIENT
Start: 2023-09-26 | End: 2023-09-26

## 2023-09-26 RX ORDER — SODIUM CHLORIDE 0.9 % (FLUSH) 0.9 %
10 SYRINGE (ML) INJECTION AS NEEDED
Status: DISCONTINUED | OUTPATIENT
Start: 2023-09-26 | End: 2023-09-27 | Stop reason: HOSPADM

## 2023-09-26 RX ORDER — KETOROLAC TROMETHAMINE 15 MG/ML
15 INJECTION, SOLUTION INTRAMUSCULAR; INTRAVENOUS ONCE
Status: COMPLETED | OUTPATIENT
Start: 2023-09-26 | End: 2023-09-26

## 2023-09-26 RX ORDER — AMLODIPINE BESYLATE 5 MG/1
10 TABLET ORAL ONCE
Status: COMPLETED | OUTPATIENT
Start: 2023-09-26 | End: 2023-09-26

## 2023-09-26 RX ORDER — ACETAMINOPHEN 325 MG/1
650 TABLET ORAL EVERY 4 HOURS PRN
Status: DISCONTINUED | OUTPATIENT
Start: 2023-09-26 | End: 2023-09-27 | Stop reason: HOSPADM

## 2023-09-26 RX ORDER — BISACODYL 5 MG/1
5 TABLET, DELAYED RELEASE ORAL DAILY PRN
Status: DISCONTINUED | OUTPATIENT
Start: 2023-09-26 | End: 2023-09-27 | Stop reason: HOSPADM

## 2023-09-26 RX ORDER — LOSARTAN POTASSIUM 25 MG/1
25 TABLET ORAL ONCE
Status: COMPLETED | OUTPATIENT
Start: 2023-09-26 | End: 2023-09-26

## 2023-09-26 RX ORDER — HEPARIN SODIUM 5000 [USP'U]/ML
40-80 INJECTION, SOLUTION INTRAVENOUS; SUBCUTANEOUS EVERY 6 HOURS PRN
Status: DISCONTINUED | OUTPATIENT
Start: 2023-09-26 | End: 2023-09-27

## 2023-09-26 RX ORDER — AMLODIPINE BESYLATE 10 MG/1
10 TABLET ORAL DAILY
COMMUNITY

## 2023-09-26 RX ORDER — IBUPROFEN 600 MG/1
1 TABLET ORAL
Status: DISCONTINUED | OUTPATIENT
Start: 2023-09-26 | End: 2023-09-27 | Stop reason: HOSPADM

## 2023-09-26 RX ORDER — HEPARIN SODIUM 10000 [USP'U]/100ML
18 INJECTION, SOLUTION INTRAVENOUS
Status: DISCONTINUED | OUTPATIENT
Start: 2023-09-26 | End: 2023-09-27

## 2023-09-26 RX ORDER — INSULIN LISPRO 100 [IU]/ML
2-9 INJECTION, SOLUTION INTRAVENOUS; SUBCUTANEOUS
Status: DISCONTINUED | OUTPATIENT
Start: 2023-09-26 | End: 2023-09-27 | Stop reason: HOSPADM

## 2023-09-26 RX ORDER — NICOTINE POLACRILEX 4 MG
15 LOZENGE BUCCAL
Status: DISCONTINUED | OUTPATIENT
Start: 2023-09-26 | End: 2023-09-27 | Stop reason: HOSPADM

## 2023-09-26 RX ORDER — ACETAMINOPHEN 650 MG/1
650 SUPPOSITORY RECTAL EVERY 4 HOURS PRN
Status: DISCONTINUED | OUTPATIENT
Start: 2023-09-26 | End: 2023-09-27 | Stop reason: HOSPADM

## 2023-09-26 RX ADMIN — KETOROLAC TROMETHAMINE 15 MG: 15 INJECTION, SOLUTION INTRAMUSCULAR; INTRAVENOUS at 16:29

## 2023-09-26 RX ADMIN — INSULIN LISPRO 6 UNITS: 100 INJECTION, SOLUTION INTRAVENOUS; SUBCUTANEOUS at 22:04

## 2023-09-26 RX ADMIN — HEPARIN SODIUM 7900 UNITS: 5000 INJECTION, SOLUTION INTRAVENOUS; SUBCUTANEOUS at 17:56

## 2023-09-26 RX ADMIN — LOSARTAN POTASSIUM 25 MG: 25 TABLET, FILM COATED ORAL at 20:11

## 2023-09-26 RX ADMIN — HEPARIN SODIUM 18 UNITS/KG/HR: 10000 INJECTION, SOLUTION INTRAVENOUS at 17:59

## 2023-09-26 RX ADMIN — AMLODIPINE BESYLATE 10 MG: 5 TABLET ORAL at 20:11

## 2023-09-26 NOTE — Clinical Note
Level of Care: Med/Surg [1]   Diagnosis: Acute DVT (deep venous thrombosis) [227140]   Admitting Physician: NURIS LUND [[id number]]   Attending Physician: NURIS LUND [[id number]]   Certification: I Certify That Inpatient Hospital Services Are Medically Necessary For Greater Than 2 Midnights

## 2023-09-26 NOTE — ED PROVIDER NOTES
EMERGENCY DEPARTMENT ENCOUNTER    Room Number:  S01/01  Date of encounter:  9/26/2023  PCP: Arias Lehman APRN  Historian: Patient  Full history not obtainable due to: None    HPI:  Chief Complaint: Leg swelling    Context: Cecilio Puckett is a 53 y.o. male with a PMH significant for insulin dependent diabetes, HTN, HIV, lymphoma, CAD who presents to the ED c/o atraumatic left leg swelling below the knee.  He started noticing swelling yesterday morning with some associated discomfort that have been constant since onset.  Denies chest pain, shortness of breath.  Similar symptoms in the past with previous blood clots.  Reports poor compliance with his Coumadin.      MEDICAL RECORD REVIEW:    Upon review of the medical record it appears the patient was evaluated in the office with infectious disease for HIV infection.  The patient had a normal troponin on 10/10/2022 and POC glucose on that date.    PAST MEDICAL HISTORY    Active Ambulatory Problems     Diagnosis Date Noted    Syncope, unspecified syncope type 10/10/2022     Resolved Ambulatory Problems     Diagnosis Date Noted    No Resolved Ambulatory Problems     No Additional Past Medical History         PAST SURGICAL HISTORY  No past surgical history on file.      FAMILY HISTORY  No family history on file.      SOCIAL HISTORY  Social History     Socioeconomic History    Marital status: Single   Tobacco Use    Smoking status: Every Day     Packs/day: 0.25     Types: Cigarettes     Start date: 01/1994         ALLERGIES  Patient has no known allergies.        REVIEW OF SYSTEMS    All systems reviewed and marked as negative except as listed in HPI     PHYSICAL EXAM    I have reviewed the triage vital signs and nursing notes.    ED Triage Vitals [09/26/23 1408]   Temp Heart Rate Resp BP SpO2   98.1 °F (36.7 °C) 113 16 -- 96 %      Temp src Heart Rate Source Patient Position BP Location FiO2 (%)   Tympanic Monitor -- -- --       Physical Exam  Constitutional:        General: He is not in acute distress.     Appearance: He is well-developed.   HENT:      Head: Normocephalic and atraumatic.   Eyes:      General: No scleral icterus.     Conjunctiva/sclera: Conjunctivae normal.   Neck:      Trachea: No tracheal deviation.   Cardiovascular:      Rate and Rhythm: Normal rate and regular rhythm.      Pulses:           Dorsalis pedis pulses are 2+ on the right side and 2+ on the left side.   Pulmonary:      Effort: Pulmonary effort is normal.      Breath sounds: Normal breath sounds.   Abdominal:      Palpations: Abdomen is soft.      Tenderness: There is no abdominal tenderness. There is no guarding.   Musculoskeletal:         General: No deformity.      Cervical back: Normal range of motion.      Left lower le+   Lymphadenopathy:      Cervical: No cervical adenopathy.   Skin:     General: Skin is warm and dry.   Neurological:      Mental Status: He is alert and oriented to person, place, and time.   Psychiatric:         Behavior: Behavior normal.       Vital signs and nursing notes reviewed.            LAB RESULTS  Recent Results (from the past 24 hour(s))   Comprehensive Metabolic Panel    Collection Time: 23  3:30 PM    Specimen: Blood   Result Value Ref Range    Glucose 288 (H) 65 - 99 mg/dL    BUN 10 6 - 20 mg/dL    Creatinine 0.93 0.76 - 1.27 mg/dL    Sodium 139 136 - 145 mmol/L    Potassium 3.6 3.5 - 5.2 mmol/L    Chloride 102 98 - 107 mmol/L    CO2 25.2 22.0 - 29.0 mmol/L    Calcium 9.3 8.6 - 10.5 mg/dL    Total Protein 7.2 6.0 - 8.5 g/dL    Albumin 3.9 3.5 - 5.2 g/dL    ALT (SGPT) 15 1 - 41 U/L    AST (SGOT) 12 1 - 40 U/L    Alkaline Phosphatase 76 39 - 117 U/L    Total Bilirubin <0.2 0.0 - 1.2 mg/dL    Globulin 3.3 gm/dL    A/G Ratio 1.2 g/dL    BUN/Creatinine Ratio 10.8 7.0 - 25.0    Anion Gap 11.8 5.0 - 15.0 mmol/L    eGFR 98.8 >60.0 mL/min/1.73   Protime-INR    Collection Time: 23  3:30 PM    Specimen: Blood   Result Value Ref Range    Protime 29.5 (H)  11.7 - 14.2 Seconds    INR 2.73 (H) 0.90 - 1.10   aPTT    Collection Time: 09/26/23  3:30 PM    Specimen: Blood   Result Value Ref Range    PTT 37.2 (H) 22.7 - 35.4 seconds   CBC Auto Differential    Collection Time: 09/26/23  3:30 PM    Specimen: Blood   Result Value Ref Range    WBC 6.55 3.40 - 10.80 10*3/mm3    RBC 5.18 4.14 - 5.80 10*6/mm3    Hemoglobin 14.9 13.0 - 17.7 g/dL    Hematocrit 44.8 37.5 - 51.0 %    MCV 86.5 79.0 - 97.0 fL    MCH 28.8 26.6 - 33.0 pg    MCHC 33.3 31.5 - 35.7 g/dL    RDW 13.7 12.3 - 15.4 %    RDW-SD 43.1 37.0 - 54.0 fl    MPV 11.2 6.0 - 12.0 fL    Platelets 167 140 - 450 10*3/mm3    Neutrophil % 39.1 (L) 42.7 - 76.0 %    Lymphocyte % 50.7 (H) 19.6 - 45.3 %    Monocyte % 7.8 5.0 - 12.0 %    Eosinophil % 1.4 0.3 - 6.2 %    Basophil % 0.8 0.0 - 1.5 %    Immature Grans % 0.2 0.0 - 0.5 %    Neutrophils, Absolute 2.57 1.70 - 7.00 10*3/mm3    Lymphocytes, Absolute 3.32 (H) 0.70 - 3.10 10*3/mm3    Monocytes, Absolute 0.51 0.10 - 0.90 10*3/mm3    Eosinophils, Absolute 0.09 0.00 - 0.40 10*3/mm3    Basophils, Absolute 0.05 0.00 - 0.20 10*3/mm3    Immature Grans, Absolute 0.01 0.00 - 0.05 10*3/mm3    nRBC 0.2 0.0 - 0.2 /100 WBC   Duplex Venous Lower Extremity - LEFT    Collection Time: 09/26/23  5:02 PM   Result Value Ref Range    BH CV VAS PRELIMINARY FINDINGS SCRIPTING 1.0        Ordered the above labs and independently reviewed the results.        RADIOLOGY  No Radiology Exams Resulted Within Past 24 Hours    I ordered the above noted radiological studies. Independently reviewed by me and discussed with radiologist.  See dictation above for official radiology interpretation.      PROCEDURES    Procedures        MEDICATIONS GIVEN IN ER    Medications   sodium chloride 0.9 % flush 10 mL (has no administration in time range)   heparin (porcine) 5000 UNIT/ML injection 7,900 Units (has no administration in time range)   heparin 48235 units/250 mL (100 units/mL) in 0.45 % NaCl infusion (has no  administration in time range)   heparin (porcine) 5000 UNIT/ML injection 4,000-7,900 Units (has no administration in time range)   ketorolac (TORADOL) injection 15 mg (15 mg Intravenous Given 9/26/23 1629)         PROGRESS, DATA ANALYSIS, CONSULTS, AND MEDICAL DECISION MAKING    All labs have been independently interpreted by me.  All radiology studies have been interpreted by me.  Discussion below represents my analysis of pertinent findings related to patient's condition, differential diagnosis, treatment plan and final disposition.    Patient presentation and evaluation consistent with acute DVT to the left leg with failure of outpatient therapy.  The recommendation from hematology is to admit to the hospital for heparinization and further management.  All questions answered.      - Chronic or social conditions impacting care: None      DIFFERENTIAL DIAGNOSIS INCLUDE BUT NOT LIMITED TO:     DVT, SVT, cellulitis      Orders placed during this visit:  Orders Placed This Encounter   Procedures    Comprehensive Metabolic Panel    Protime-INR    aPTT    CBC Auto Differential    aPTT    aPTT    Adjust Heparin Rate Based on aPTT Using Nomogram    Verify All Anticoagulant Orders Are Discontinued Upon Initiation of Heparin Protocol (eg Enoxaparin, Fondaparinux, Apixaban, Dabigatran, Edoxaban, or Rivaroxaban)    RN To Release aPTT Order 6 Hours After Heparin Bolus & 6 Hours After Any Heparin Rate Change    Hematology and Oncology (on-call MD unless specified)    Insert Peripheral IV    Inpatient Admission    CBC & Differential    CBC & Differential         ED Course as of 09/26/23 1742   Tue Sep 26, 2023   1619 INR(!): 2.73 [DC]   1705 Preliminary result from the ultrasound technologist is positive for acute DVT to the the left popliteal vein and no involvement of clot more proximally. [DC]   8527 I discussed the case with MD Brooke with Hem/Onc at this time regarding the patient.  I discussed work-up, results,  concerns.  I discussed the consulting provider's desire for initiating heparin therapy and admitted to the hospital for further evaluation.   [DC]      ED Course User Index  [DC] Bear Rojas PA       AS OF 17:42 EDT VITALS:    BP - (!) 162/105  HR - 79  TEMP - 98.1 °F (36.7 °C) (Tympanic)  02 SATS - 97%      1741 I rechecked the patient.  I discussed the patient's labs, radiology findings (including all incidental findings), diagnosis, and plan for admission. The patient understands and agrees with the plan.      DIAGNOSIS  Final diagnoses:   Leg DVT (deep venous thromboembolism), acute, left   Failure of outpatient treatment         DISPOSITION  Admit    Pt masked in first look. I wore a surgical mask throughout my encounters with the pt. I performed hand hygiene on entry into the pt room and upon exit.     Dictated utilizing Dragon dictation     Note Disclaimer: At [facility], we believe that sharing information builds trust and better relationships. You are receiving this note because you recently visited [facility]. It is possible you will see health information before a provider has talked with you about it. This kind of information can be easy to misunderstand. To help you fully understand what it means for your health, we urge you to discuss this note with your provider.      Bear Rojas PA  09/26/23 3619

## 2023-09-26 NOTE — H&P
HISTORY AND PHYSICAL   [facility]        Date of Admission: 2023  Patient Identification:  Name: Cecilio Puckett  Age: 53 y.o.  Sex: male  :  1970  MRN: [id number]                     Primary Care Physician: Arias Lehman APRN    Chief Complaint:  53 year old gentleman who presented to the emergency room with pain and swelling of his left leg which started yesterday; he denies fever or chills; no injury; he has a history of dvt and is on coumadin    History of Present Illness:   As above    Past Medical History:  Hiv  Hypertension  Diabetes  Nhl  cad  Past Surgical History:  No past surgical history on file.   Home Meds:  reviewed      Allergies:  No Known Allergies  Immunizations:  Immunization History   Administered Date(s) Administered    COVID-19 (MODERNA) 1st,2nd,3rd Dose Monovalent 2021, 04/10/2021, 2022    COVID-19 (PFIZER) BIVALENT 12+YRS 2022     Social History:   Social History     Social History Narrative    Not on file     Social History     Socioeconomic History    Marital status: Single   Tobacco Use    Smoking status: Every Day     Packs/day: 0.25     Types: Cigarettes     Start date: 1994       Family History:  No family history on file.     Review of Systems  See history of present illness and past medical history.  Patient denies headache, dizziness, syncope, falls, trauma, change in vision, change in hearing, change in taste, changes in weight, changes in appetite, focal weakness, numbness, or paresthesia.  Patient denies chest pain, palpitations, dyspnea, orthopnea, PND, cough, sinus pressure, rhinorrhea, epistaxis, hemoptysis, nausea, vomiting,hematemesis, diarrhea, constipation or hematochezia.  Denies cold or heat intolerance, polydipsia, polyuria, polyphagia. Denies hematuria, pyuria, dysuria, hesitancy, frequency or urgency. Denies consumption of raw and under cooked meats foods or change in water source.  Denies fever, chills, sweats,  "night sweats.   e.    Objective:  T Max 24 hrs: Temp (24hrs), Av.1 °F (36.7 °C), Min:98.1 °F (36.7 °C), Max:98.1 °F (36.7 °C)    Vitals Ranges:   Temp:  [98.1 °F (36.7 °C)] 98.1 °F (36.7 °C)  Heart Rate:  [] 74  Resp:  [16] 16  BP: (162-190)/(105-113) 183/113      Exam:  BP (!) 183/113   Pulse 74   Temp 98.1 °F (36.7 °C) (Tympanic)   Resp 16   Ht 185.4 cm (73\")   Wt 98.9 kg (218 lb)   SpO2 97%   BMI 28.76 kg/m²     General Appearance:    Alert, cooperative, no distress, appears stated age   Head:    Normocephalic, without obvious abnormality, atraumatic   Eyes:    PERRL, conjunctivae/corneas clear, EOM's intact, both eyes   Ears:    Normal external ear canals, both ears   Nose:   Nares normal, septum midline, mucosa normal, no drainage    or sinus tenderness   Throat:   Lips, mucosa, and tongue normal   Neck:   Supple, symmetrical, trachea midline, no adenopathy;     thyroid:  no enlargement/tenderness/nodules; no carotid    bruit or JVD   Back:     Symmetric, no curvature, ROM normal, no CVA tenderness   Lungs:     Decreased breath sounds bilaterally, respirations unlabored   Chest Wall:    No tenderness or deformity    Heart:    Regular rate and rhythm, S1 and S2 normal, no murmur, rub   or gallop   Abdomen:     Soft, nontender, bowel sounds active all four quadrants,     no masses, no hepatomegaly, no splenomegaly   Extremities:   Extremities normal, atraumatic,  edema left leg                       .    Data Review:  Labs in chart were reviewed.  WBC   Date Value Ref Range Status   2023 6.55 3.40 - 10.80 10*3/mm3 Final     Hemoglobin   Date Value Ref Range Status   2023 14.9 13.0 - 17.7 g/dL Final     Hematocrit   Date Value Ref Range Status   2023 44.8 37.5 - 51.0 % Final     Platelets   Date Value Ref Range Status   2023 167 140 - 450 10*3/mm3 Final     Sodium   Date Value Ref Range Status   2023 139 136 - 145 mmol/L Final     Potassium   Date Value Ref Range " Status   09/26/2023 3.6 3.5 - 5.2 mmol/L Final     Chloride   Date Value Ref Range Status   09/26/2023 102 98 - 107 mmol/L Final     CO2   Date Value Ref Range Status   09/26/2023 25.2 22.0 - 29.0 mmol/L Final     BUN   Date Value Ref Range Status   09/26/2023 10 6 - 20 mg/dL Final     Creatinine   Date Value Ref Range Status   09/26/2023 0.93 0.76 - 1.27 mg/dL Final     Glucose   Date Value Ref Range Status   09/26/2023 288 (H) 65 - 99 mg/dL Final     Calcium   Date Value Ref Range Status   09/26/2023 9.3 8.6 - 10.5 mg/dL Final                Imaging Results (All)       None              Assessment:  Active Hospital Problems    Diagnosis  POA    **Acute DVT (deep venous thrombosis) [I82.409]  Yes      Resolved Hospital Problems   No resolved problems to display.   Hiv  Cad  Diabetes  Nhl      Plan:  Heparin has been started  Hematology to see  His inr was 2.73  Accu checks, insulin  Jasiel murphy and ed provider    Sade Prasad MD  9/26/2023  19:29 EDT

## 2023-09-26 NOTE — ED PROVIDER NOTES
MD ATTESTATION NOTE    The LAURA and I have discussed this patient's history, physical exam, and treatment plan.  I have reviewed the documentation and personally had a face to face interaction with the patient. I affirm the documentation and agree with the treatment and plan.  The attached note describes my personal findings.      I provided a substantive portion of the care of the patient.  I personally performed the physical exam in its entirety, and below are my findings.  For this patient encounter, the patient wore surgical mask, I wore full protective PPE including N95 and eye protection.      Brief HPI: Patient presents for evaluation of left leg pain.  Patient has had DVT in the past.  Patient is on warfarin.  States he has not been completely compliant with his warfarin.  Patient states has had no chest pain pressure tightness.    PHYSICAL EXAM  ED Triage Vitals   Temp Heart Rate Resp BP SpO2   09/26/23 1408 09/26/23 1408 09/26/23 1408 09/26/23 1446 09/26/23 1408   98.1 °F (36.7 °C) 113 16 (!) 169/110 96 %      Temp src Heart Rate Source Patient Position BP Location FiO2 (%)   09/26/23 1408 09/26/23 1408 -- -- --   Tympanic Monitor            GENERAL: no acute distress  HENT: nares patent  EYES: no scleral icterus  CV: regular rhythm, normal rate  RESPIRATORY: normal effort  ABDOMEN: soft  MUSCULOSKELETAL: no deformity.  Mild tenderness left leg  NEURO: alert, moves all extremities, follows commands  PSYCH:  calm, cooperative  SKIN: warm, dry    Vital signs and nursing notes reviewed.        Plan: Doppler left leg       Adama Mckeon MD  09/26/23 2120

## 2023-09-26 NOTE — ED NOTES
"Nursing report ED to floor  Cecilio Puckett  53 y.o.  male    HPI :   Chief Complaint   Patient presents with    Leg Pain     Left leg pain and some swelling        Admitting doctor:   Sade Prasad MD    Admitting diagnosis:   The primary encounter diagnosis was Leg DVT (deep venous thromboembolism), acute, left. A diagnosis of Failure of outpatient treatment was also pertinent to this visit.    Code status:   Current Code Status       Date Active Code Status Order ID Comments User Context       9/26/2023 1806 CPR (Attempt to Resuscitate) [id number]  Sade Prasad MD ED        Question Answer    Code Status (Patient has no pulse and is not breathing) CPR (Attempt to Resuscitate)    Medical Interventions (Patient has pulse or is breathing) Full Support                    Allergies:   Patient has no known allergies.    Isolation:   No active isolations    Intake and Output  No intake or output data in the 24 hours ending 09/26/23 1904    Weight:       09/26/23  1445   Weight: 98.9 kg (218 lb)       Most recent vitals:   Vitals:    09/26/23 1445 09/26/23 1446 09/26/23 1633 09/26/23 1830   BP:  (!) 169/110 (!) 162/105 (!) 190/113   Pulse:   79 74   Resp:   16 16   Temp:       TempSrc:       SpO2:   97% 97%   Weight: 98.9 kg (218 lb)      Height: 185.4 cm (73\")          Active LDAs/IV Access:   Lines, Drains & Airways       Active LDAs       Name Placement date Placement time Site Days    Peripheral IV 09/26/23 1529 Right Antecubital 09/26/23  1529  Antecubital  less than 1                    Labs (abnormal labs have a star):   Labs Reviewed   COMPREHENSIVE METABOLIC PANEL - Abnormal; Notable for the following components:       Result Value    Glucose 288 (*)     All other components within normal limits    Narrative:     GFR Normal >60  Chronic Kidney Disease <60  Kidney Failure <15     PROTIME-INR - Abnormal; Notable for the following components:    Protime 29.5 (*)     INR 2.73 (*)     All other components " within normal limits   APTT - Abnormal; Notable for the following components:    PTT 37.2 (*)     All other components within normal limits   CBC WITH AUTO DIFFERENTIAL - Abnormal; Notable for the following components:    Neutrophil % 39.1 (*)     Lymphocyte % 50.7 (*)     Lymphocytes, Absolute 3.32 (*)     All other components within normal limits   CBC AND DIFFERENTIAL    Narrative:     The following orders were created for panel order CBC & Differential.  Procedure                               Abnormality         Status                     ---------                               -----------         ------                     CBC Auto Differential[[id number]]        Abnormal            Final result                 Please view results for these tests on the individual orders.       EKG:   No orders to display       Meds given in ED:   Medications   sodium chloride 0.9 % flush 10 mL (has no administration in time range)   heparin 33291 units/250 mL (100 units/mL) in 0.45 % NaCl infusion (18 Units/kg/hr × 98.9 kg Intravenous New Bag 9/26/23 1299)   heparin (porcine) 5000 UNIT/ML injection 4,000-7,900 Units (has no administration in time range)   acetaminophen (TYLENOL) tablet 650 mg (has no administration in time range)     Or   acetaminophen (TYLENOL) 160 MG/5ML oral solution 650 mg (has no administration in time range)     Or   acetaminophen (TYLENOL) suppository 650 mg (has no administration in time range)   sennosides-docusate (PERICOLACE) 8.6-50 MG per tablet 2 tablet (has no administration in time range)     And   polyethylene glycol (MIRALAX) packet 17 g (has no administration in time range)     And   bisacodyl (DULCOLAX) EC tablet 5 mg (has no administration in time range)     And   bisacodyl (DULCOLAX) suppository 10 mg (has no administration in time range)   ondansetron (ZOFRAN) injection 4 mg (has no administration in time range)   ketorolac (TORADOL) injection 15 mg (15 mg Intravenous Given 9/26/23 8479)    heparin (porcine) 5000 UNIT/ML injection 7,900 Units (7,900 Units Intravenous Given 9/26/23 3115)       Imaging results:  No radiology results for the last day    Ambulatory status:   - up at [facility]     Social issues:   Social History     Socioeconomic History    Marital status: Single   Tobacco Use    Smoking status: Every Day     Packs/day: 0.25     Types: Cigarettes     Start date: 01/1994       NIH Stroke Scale:       Octavia Collado RN  09/26/23 19:04 EDT

## 2023-09-27 VITALS
DIASTOLIC BLOOD PRESSURE: 80 MMHG | WEIGHT: 217.37 LBS | RESPIRATION RATE: 18 BRPM | HEART RATE: 75 BPM | OXYGEN SATURATION: 90 % | BODY MASS INDEX: 28.81 KG/M2 | SYSTOLIC BLOOD PRESSURE: 126 MMHG | HEIGHT: 73 IN | TEMPERATURE: 97.8 F

## 2023-09-27 DIAGNOSIS — I82.4Y9 ACUTE DEEP VEIN THROMBOSIS (DVT) OF PROXIMAL VEIN OF LOWER EXTREMITY, UNSPECIFIED LATERALITY: Primary | ICD-10-CM

## 2023-09-27 PROBLEM — I82.402 LEG DVT (DEEP VENOUS THROMBOEMBOLISM), ACUTE, LEFT: Status: ACTIVE | Noted: 2023-09-27

## 2023-09-27 PROBLEM — C85.90 NHL (NON-HODGKIN'S LYMPHOMA): Status: ACTIVE | Noted: 2023-09-27

## 2023-09-27 PROBLEM — B20 HIV (HUMAN IMMUNODEFICIENCY VIRUS INFECTION): Status: ACTIVE | Noted: 2023-09-27

## 2023-09-27 PROBLEM — E11.9 DM (DIABETES MELLITUS), TYPE 2: Status: ACTIVE | Noted: 2023-09-27

## 2023-09-27 PROBLEM — I10 HTN (HYPERTENSION): Status: ACTIVE | Noted: 2023-09-27

## 2023-09-27 PROBLEM — Z21 HIV (HUMAN IMMUNODEFICIENCY VIRUS INFECTION): Status: ACTIVE | Noted: 2023-09-27

## 2023-09-27 LAB
ANION GAP SERPL CALCULATED.3IONS-SCNC: 11.8 MMOL/L (ref 5–15)
APTT PPP: 108.6 SECONDS (ref 22.7–35.4)
APTT PPP: 39.4 SECONDS (ref 22.7–35.4)
APTT PPP: >200 SECONDS (ref 22.7–35.4)
BUN SERPL-MCNC: 10 MG/DL (ref 6–20)
BUN/CREAT SERPL: 12.5 (ref 7–25)
CALCIUM SPEC-SCNC: 8.7 MG/DL (ref 8.6–10.5)
CHLORIDE SERPL-SCNC: 103 MMOL/L (ref 98–107)
CO2 SERPL-SCNC: 24.2 MMOL/L (ref 22–29)
CREAT SERPL-MCNC: 0.8 MG/DL (ref 0.76–1.27)
DEPRECATED RDW RBC AUTO: 45.2 FL (ref 37–54)
EGFRCR SERPLBLD CKD-EPI 2021: 106.5 ML/MIN/1.73
ERYTHROCYTE [DISTWIDTH] IN BLOOD BY AUTOMATED COUNT: 14.1 % (ref 12.3–15.4)
GLUCOSE BLDC GLUCOMTR-MCNC: 193 MG/DL (ref 70–130)
GLUCOSE BLDC GLUCOMTR-MCNC: 220 MG/DL (ref 70–130)
GLUCOSE BLDC GLUCOMTR-MCNC: 98 MG/DL (ref 70–130)
GLUCOSE SERPL-MCNC: 228 MG/DL (ref 65–99)
HCT VFR BLD AUTO: 43.6 % (ref 37.5–51)
HGB BLD-MCNC: 14.7 G/DL (ref 13–17.7)
LYMPHOCYTES # BLD MANUAL: 2.79 10*3/MM3 (ref 0.7–3.1)
LYMPHOCYTES NFR BLD MANUAL: 6.7 % (ref 5–12)
MAGNESIUM SERPL-MCNC: 1.7 MG/DL (ref 1.6–2.6)
MCH RBC QN AUTO: 29.3 PG (ref 26.6–33)
MCHC RBC AUTO-ENTMCNC: 33.7 G/DL (ref 31.5–35.7)
MCV RBC AUTO: 86.9 FL (ref 79–97)
MONOCYTES # BLD: 0.45 10*3/MM3 (ref 0.1–0.9)
NEUTROPHILS # BLD AUTO: 3.47 10*3/MM3 (ref 1.7–7)
NEUTROPHILS NFR BLD MANUAL: 51.7 % (ref 42.7–76)
NRBC BLD AUTO-RTO: 0 /100 WBC (ref 0–0.2)
PLAT MORPH BLD: NORMAL
PLATELET # BLD AUTO: 162 10*3/MM3 (ref 140–450)
PMV BLD AUTO: 11.6 FL (ref 6–12)
POTASSIUM SERPL-SCNC: 3.2 MMOL/L (ref 3.5–5.2)
RBC # BLD AUTO: 5.02 10*6/MM3 (ref 4.14–5.8)
RBC MORPH BLD: NORMAL
SMUDGE CELLS BLD QL SMEAR: NORMAL
SODIUM SERPL-SCNC: 139 MMOL/L (ref 136–145)
VARIANT LYMPHS NFR BLD MANUAL: 1.1 % (ref 0–5)
VARIANT LYMPHS NFR BLD MANUAL: 40.4 % (ref 19.6–45.3)
WBC NRBC COR # BLD: 6.72 10*3/MM3 (ref 3.4–10.8)

## 2023-09-27 PROCEDURE — 99204 OFFICE O/P NEW MOD 45 MIN: CPT | Performed by: INTERNAL MEDICINE

## 2023-09-27 PROCEDURE — 63710000001 INSULIN LISPRO (HUMAN) PER 5 UNITS: Performed by: INTERNAL MEDICINE

## 2023-09-27 PROCEDURE — 85730 THROMBOPLASTIN TIME PARTIAL: CPT | Performed by: INTERNAL MEDICINE

## 2023-09-27 PROCEDURE — 83735 ASSAY OF MAGNESIUM: CPT | Performed by: HOSPITALIST

## 2023-09-27 PROCEDURE — 82948 REAGENT STRIP/BLOOD GLUCOSE: CPT

## 2023-09-27 PROCEDURE — G0378 HOSPITAL OBSERVATION PER HR: HCPCS

## 2023-09-27 PROCEDURE — 85025 COMPLETE CBC W/AUTO DIFF WBC: CPT | Performed by: PHYSICIAN ASSISTANT

## 2023-09-27 PROCEDURE — 96366 THER/PROPH/DIAG IV INF ADDON: CPT

## 2023-09-27 PROCEDURE — 25010000002 HEPARIN (PORCINE) 25000-0.45 UT/250ML-% SOLUTION: Performed by: PHYSICIAN ASSISTANT

## 2023-09-27 PROCEDURE — 85007 BL SMEAR W/DIFF WBC COUNT: CPT | Performed by: PHYSICIAN ASSISTANT

## 2023-09-27 PROCEDURE — 80048 BASIC METABOLIC PNL TOTAL CA: CPT | Performed by: INTERNAL MEDICINE

## 2023-09-27 PROCEDURE — 85730 THROMBOPLASTIN TIME PARTIAL: CPT | Performed by: HOSPITALIST

## 2023-09-27 RX ORDER — LANCETS 30 GAUGE
EACH MISCELLANEOUS
Qty: 100 EACH | Refills: 0 | Status: SHIPPED | OUTPATIENT
Start: 2023-09-27

## 2023-09-27 RX ORDER — CARVEDILOL 6.25 MG/1
6.25 TABLET ORAL 2 TIMES DAILY WITH MEALS
Status: DISCONTINUED | OUTPATIENT
Start: 2023-09-27 | End: 2023-09-27 | Stop reason: HOSPADM

## 2023-09-27 RX ORDER — INSULIN GLARGINE 100 [IU]/ML
25 INJECTION, SOLUTION SUBCUTANEOUS NIGHTLY
Status: DISCONTINUED | OUTPATIENT
Start: 2023-09-27 | End: 2023-09-27 | Stop reason: HOSPADM

## 2023-09-27 RX ORDER — HYDROCODONE BITARTRATE AND ACETAMINOPHEN 10; 325 MG/1; MG/1
1 TABLET ORAL EVERY 6 HOURS PRN
Status: DISCONTINUED | OUTPATIENT
Start: 2023-09-27 | End: 2023-09-27 | Stop reason: HOSPADM

## 2023-09-27 RX ORDER — ASPIRIN 81 MG/1
81 TABLET, CHEWABLE ORAL DAILY
Status: DISCONTINUED | OUTPATIENT
Start: 2023-09-27 | End: 2023-09-27 | Stop reason: HOSPADM

## 2023-09-27 RX ORDER — INSULIN LISPRO 100 [IU]/ML
8 INJECTION, SOLUTION INTRAVENOUS; SUBCUTANEOUS
Status: DISCONTINUED | OUTPATIENT
Start: 2023-09-27 | End: 2023-09-27 | Stop reason: HOSPADM

## 2023-09-27 RX ORDER — AMLODIPINE BESYLATE 10 MG/1
10 TABLET ORAL DAILY
Status: DISCONTINUED | OUTPATIENT
Start: 2023-09-27 | End: 2023-09-27 | Stop reason: HOSPADM

## 2023-09-27 RX ORDER — BLOOD-GLUCOSE METER
KIT MISCELLANEOUS
Qty: 1 EACH | Refills: 0 | Status: SHIPPED | OUTPATIENT
Start: 2023-09-27

## 2023-09-27 RX ORDER — LOSARTAN POTASSIUM 25 MG/1
25 TABLET ORAL DAILY
Status: DISCONTINUED | OUTPATIENT
Start: 2023-09-27 | End: 2023-09-27 | Stop reason: HOSPADM

## 2023-09-27 RX ORDER — PANTOPRAZOLE SODIUM 40 MG/1
40 TABLET, DELAYED RELEASE ORAL
Status: DISCONTINUED | OUTPATIENT
Start: 2023-09-27 | End: 2023-09-27 | Stop reason: HOSPADM

## 2023-09-27 RX ORDER — POTASSIUM CHLORIDE 750 MG/1
40 TABLET, FILM COATED, EXTENDED RELEASE ORAL EVERY 4 HOURS PRN
Status: DISCONTINUED | OUTPATIENT
Start: 2023-09-27 | End: 2023-09-27 | Stop reason: HOSPADM

## 2023-09-27 RX ADMIN — POTASSIUM CHLORIDE 40 MEQ: 750 TABLET, EXTENDED RELEASE ORAL at 18:09

## 2023-09-27 RX ADMIN — INSULIN LISPRO 8 UNITS: 100 INJECTION, SOLUTION INTRAVENOUS; SUBCUTANEOUS at 09:56

## 2023-09-27 RX ADMIN — CARVEDILOL 6.25 MG: 6.25 TABLET, FILM COATED ORAL at 09:55

## 2023-09-27 RX ADMIN — INSULIN LISPRO 4 UNITS: 100 INJECTION, SOLUTION INTRAVENOUS; SUBCUTANEOUS at 09:56

## 2023-09-27 RX ADMIN — CARVEDILOL 6.25 MG: 6.25 TABLET, FILM COATED ORAL at 17:18

## 2023-09-27 RX ADMIN — SENNOSIDES AND DOCUSATE SODIUM 2 TABLET: 50; 8.6 TABLET ORAL at 09:55

## 2023-09-27 RX ADMIN — ELVITEGRAVIR, COBICISTAT, EMTRICITABINE, AND TENOFOVIR ALAFENAMIDE 1 TABLET: 150; 150; 200; 10 TABLET ORAL at 09:55

## 2023-09-27 RX ADMIN — INSULIN LISPRO 2 UNITS: 100 INJECTION, SOLUTION INTRAVENOUS; SUBCUTANEOUS at 12:27

## 2023-09-27 RX ADMIN — INSULIN LISPRO 8 UNITS: 100 INJECTION, SOLUTION INTRAVENOUS; SUBCUTANEOUS at 17:18

## 2023-09-27 RX ADMIN — HEPARIN SODIUM 13 UNITS/KG/HR: 10000 INJECTION, SOLUTION INTRAVENOUS at 10:17

## 2023-09-27 RX ADMIN — POTASSIUM CHLORIDE 40 MEQ: 750 TABLET, EXTENDED RELEASE ORAL at 14:49

## 2023-09-27 RX ADMIN — SERTRALINE 50 MG: 50 TABLET, FILM COATED ORAL at 09:56

## 2023-09-27 RX ADMIN — AMLODIPINE BESYLATE 10 MG: 10 TABLET ORAL at 09:55

## 2023-09-27 RX ADMIN — INSULIN LISPRO 8 UNITS: 100 INJECTION, SOLUTION INTRAVENOUS; SUBCUTANEOUS at 12:28

## 2023-09-27 RX ADMIN — LOSARTAN POTASSIUM 25 MG: 25 TABLET, FILM COATED ORAL at 09:55

## 2023-09-27 RX ADMIN — PANTOPRAZOLE SODIUM 40 MG: 40 TABLET, DELAYED RELEASE ORAL at 09:55

## 2023-09-27 RX ADMIN — ASPIRIN 81 MG: 81 TABLET, CHEWABLE ORAL at 09:56

## 2023-09-27 NOTE — NURSING NOTE
Consult for hematology/oncology recalled, office had no record of consult being called yesterday.

## 2023-09-27 NOTE — CONSULTS
[facility] INITIAL INPATIENT CONSULTATION NOTE    REASON FOR CONSULTATION:    DVT with a therapeutic INR    HISTORY OF PRESENT ILLNESS:  Cecilio Puckett is a 53 y.o. male who we are asked to see today in consultation for DVT    The patient has a past medical history of HIV managed by Dr. Laith Robison, stroke, coronary artery disease, pulmonary embolism, left lower extremity DVT, left lower extremity peripheral vascular disease status post arterial stenting.  It appears he had an aortobifemoral bypass on 3/14/2014.    He is on chronic warfarin therapy as managed by primary care.  He states he has never seen hematology.  He states that his INR values have been fluctuating widely recently due to changes in his diet.  He states that his INR recently within the past 1 to 2 weeks was low at 1.0.  We do not have this data.  He states his warfarin dosing was adjusted.    The patient presented with worsening left lower extremity pain over the past 1 to 2 weeks.   Left lower extremity venous duplex on 7/26/2023 showed acute left lower extremity DVT in the popliteal, posterior tibial, and peroneal veins.  He was started on a heparin drip.  His INR was 2.73 yesterday.    Today he states that his left leg is feeling much better.  He denies any shortness of breath or chest pain.    Past Medical History:   Diagnosis Date    COPD (chronic obstructive pulmonary disease)     Coronary artery disease     Diabetes mellitus     DVT (deep venous thrombosis)     Elevated cholesterol     GERD (gastroesophageal reflux disease)     History of transfusion     HIV (human immunodeficiency virus infection)     Hypertension     Non Hodgkin's lymphoma     Pulmonary embolism     Stroke        Past Surgical History:   Procedure Laterality Date    CARDIAC CATHETERIZATION      CORONARY ANGIOPLASTY WITH STENT PLACEMENT      FEMORAL ARTERY - FEMORAL ARTERY BYPASS GRAFT         SOCIAL HISTORY:   reports that he has been smoking cigarettes. He  "started smoking about 29 years ago. He has been smoking an average of .25 packs per day. He does not have any smokeless tobacco history on file. He reports current alcohol use of about 4.0 standard drinks per week. He reports that he does not currently use drugs.    FAMILY HISTORY:  family history is not on file.    ALLERGIES:  No Known Allergies    MEDICATIONS:  As listed in the electronic medical record.    Review of Systems   Musculoskeletal:  Positive for myalgias (Left leg pain, improved).   All other systems reviewed and are negative.    Vitals:    09/26/23 1924 09/26/23 2128 09/27/23 0519 09/27/23 0932   BP: (!) 183/113 152/86 165/97 150/91   BP Location:  Left arm Left arm Left arm   Patient Position:  Lying Lying Lying   Pulse:  82 74 81   Resp:  16 16 18   Temp:  98.4 °F (36.9 °C) 98.5 °F (36.9 °C) 98 °F (36.7 °C)   TempSrc:  Oral Oral Oral   SpO2:  98% 96% 92%   Weight:  98.6 kg (217 lb 6 oz)     Height:  185.4 cm (73\")         Physical Exam  Vitals and nursing note reviewed.   Constitutional:       General: He is not in acute distress.     Appearance: He is well-developed.   HENT:      Head: Normocephalic and atraumatic. Hair is normal.   Eyes:      General: Lids are normal.      Conjunctiva/sclera: Conjunctivae normal.      Pupils: Pupils are equal.   Neck:      Thyroid: No thyroid mass or thyromegaly.      Vascular: No JVD.   Cardiovascular:      Rate and Rhythm: Normal rate and regular rhythm.      Heart sounds: No murmur heard.    No friction rub. No gallop.   Pulmonary:      Effort: Pulmonary effort is normal. No respiratory distress.      Breath sounds: Normal breath sounds. No wheezing or rales.   Abdominal:      General: There is no distension.      Palpations: Abdomen is soft. There is no hepatomegaly, splenomegaly or mass.      Tenderness: There is no abdominal tenderness. There is no guarding.   Musculoskeletal:         General: No tenderness or deformity. Normal range of motion.      Cervical " back: Neck supple.   Lymphadenopathy:      Cervical: No cervical adenopathy.      Upper Body:      Right upper body: No supraclavicular adenopathy.      Left upper body: No supraclavicular adenopathy.   Skin:     General: Skin is warm and dry.      Findings: No rash.   Neurological:      Mental Status: He is alert and oriented to person, place, and time.   Psychiatric:         Behavior: Behavior normal.         Thought Content: Thought content normal.         Judgment: Judgment normal.       DIAGNOSTIC DATA:  Results from last 7 days   Lab Units 09/27/23  0448   WBC 10*3/mm3 6.72   HEMOGLOBIN g/dL 14.7   HEMATOCRIT % 43.6   PLATELETS 10*3/mm3 162     Lab Results   Component Value Date    NEUTROABS 3.47 09/27/2023     Results from last 7 days   Lab Units 09/27/23  0448   SODIUM mmol/L 139   POTASSIUM mmol/L 3.2*   CHLORIDE mmol/L 103   CO2 mmol/L 24.2   BUN mg/dL 10   CREATININE mg/dL 0.80   GLUCOSE mg/dL 228*   CALCIUM mg/dL 8.7     Results from last 7 days   Lab Units 09/27/23  0853 09/27/23  0006 09/26/23  1530   INR   --   --  2.73*   APTT seconds 108.6* >200.0* 37.2*           IMAGING:  Duplex Venous Lower Extremity - LEFT (09/26/2023 17:02)     ASSESSMENT:  This is a 53 y.o. male with:    *HIV  Managed at [place] by Dr. Laith Robison    *The patient has a past medical history of HIV managed by Dr. Laith Robison, stroke, coronary artery disease, pulmonary embolism, left lower extremity DVT, left lower extremity peripheral vascular disease status post arterial stenting.  It appears he had an aortobifemoral bypass on 3/14/2014.  On chronic therapy with warfarin  He states INR values recently have been fluctuating widely and his INR was very low over the past 1 to 2 weeks at close to 1  It is unclear if he ever had a hypercoagulable evaluation    *Acute left lower extremity DVT by venous duplex on 9/26/2023  Left lower extremity venous duplex on 9/26/2023 with acute DVT in the popliteal, posterior tibial, and peroneal  veins  INR therapeutic at 2.73 but he states that his INR was recently subtherapeutic  Started on a heparin drip with improvement in left lower extremity pain    RECOMMENDATIONS/PLAN:   I suspect that his DVT occurred when his INR was subtherapeutic within the past couple of weeks.  His INR is now therapeutic.  Symptoms are better on a heparin drip overnight.  He desires discharge which is fine.  I will arrange follow-up with my anticoagulation clinic next week and we will start managing his anticoagulation with warfarin.    Given his history, I do not believe that a DOAC is appropriate.  I will see him back in a few weeks for follow-up as well    Discussed with the patient.  Discussed with Dr. Hayes.     Arthur Salazar MD

## 2023-09-27 NOTE — NURSING NOTE
"Diabetes Education  Assessment/Teaching    Patient Name:  Cecilio Puckett  YOB: 1970  MRN: [id number]  Admit Date:  9/26/2023      Assessment Date:  9/27/2023  Flowsheet Row Most Recent Value   General Information     Referral From: Other -order set. Meet with 54 y/o at bedside to assess needs for DM ed/DM supplies for dc. Explain role of DM educator to pt.    Height 185.4 cm (73\")   Weight 98.6 kg (217 lb 6 oz)   Weight Method Bed scale   Diabetes History    What type of diabetes do you have? Type 2   Education Preferences    Barriers to Learning -- no learning barriers evident at this time.   Assessment Topics    Healthy Coping - Assessment Competent   DM Goals    Contact Plan Follow-up medical care            Flowsheet Row Most Recent Value   DM Education Needs    Meter Needs meter. (Pt reports he is using his mother's BG meter.)   Healthy Coping Appropriate   Discharge Plan Follow-up with PCP   Teaching Method Discussion   Patient Response Verbalized understanding        Other Comments: Addendum. DM educator entered order set for pt to get own meter/supply rx at dc. Provider needs to sign. Thank you.  Electronically signed by:  Maria Ines Gaytan RN, BSN, [facility]  09/27/23 10:52 EDT  "

## 2023-09-27 NOTE — DISCHARGE SUMMARY
[facility]    Date of Discharge:  9/27/2023    PCP: Arias Lehman APRN    Discharge Diagnosis:   Active Hospital Problems    Diagnosis  POA    **Acute DVT (deep venous thrombosis) [I82.409]  Yes    NHL (non-Hodgkin's lymphoma) [C85.90]  Unknown    HIV (human immunodeficiency virus infection) [B20]  Unknown    DM (diabetes mellitus), type 2 [E11.9]  Unknown    HTN (hypertension) [I10]  Unknown      Resolved Hospital Problems   No resolved problems to display.          Consults       Date and Time Order Name Status Description    9/26/2023  5:16 PM Hematology and Oncology (on-call MD unless specified)            Hospital Course  53 y.o. male with a history of hypertension, diabetes, NHL, CAD, HIV, previous DVT on warfarin admitted with pain and swelling in his left leg. Ultrasound showed an acute left lower extremity DVT popliteal, posterior tibial, and peroneal. He was initially put on heparin. He has been compliant with his warfarin but he states INR has not been consistent with some lows (as low as 1). Hematology felt it was from some dietary indiscretions. They felt like he could do better being followed in their anticoagulation clinic and then he could continue warfarin. INR today is therapeutic. Patient very much would like to go home today.    Results from last 7 days   Lab Units 09/26/23  1530   INR  2.73*        I discussed the patient's findings and my recommendations with patient and nursing staff and Dr. Salazar.    Temp:  [98 °F (36.7 °C)-98.5 °F (36.9 °C)] 98 °F (36.7 °C)  Heart Rate:  [] 81  Resp:  [16-18] 18  BP: (150-190)/() 150/91  Body mass index is 28.68 kg/m².    Physical Exam  Constitutional:       General: He is not in acute distress.     Appearance: He is not toxic-appearing.   Cardiovascular:      Rate and Rhythm: Normal rate and regular rhythm.   Pulmonary:      Effort: Pulmonary effort is normal. No respiratory  distress.   Abdominal:      Palpations: Abdomen is soft.      Tenderness: There is no abdominal tenderness.   Musculoskeletal:      Left lower leg: Edema (mild) present.   Skin:     General: Skin is warm and dry.   Neurological:      General: No focal deficit present.      Mental Status: He is oriented to person, place, and time.     Disposition: Home or Self Care       Discharge Medications        New Medications        Instructions Start Date   glucose blood test strip   Use to test blood glucose up to four times daily as needed. Formulary Compliance Approval. Diagnosis: Type 2 Diabetes - Insulin Dependent      glucose monitor monitoring kit   Use to test blood glucose up to four times daily as needed. Formulary Compliance Approval. Diagnosis: Type 2 Diabetes - Insulin Dependent      Lancets misc   Use to test blood glucose up to four times daily as needed. Formulary Compliance Approval. Diagnosis: Type 2 Diabetes - Insulin Dependent             Continue These Medications        Instructions Start Date   amLODIPine 10 MG tablet  Commonly known as: NORVASC   10 mg, Oral, Daily      aspirin 81 MG chewable tablet   81 mg, Oral, Daily      carvedilol 6.25 MG tablet  Commonly known as: COREG   6.25 mg, Oral      Kayccpl-Raemk-Auyvqeuw-TenofAF [id number] MG per tablet  Commonly known as: GENVOYA   1 tablet, Oral, Daily      ergocalciferol 1.25 MG (23931 UT) capsule  Commonly known as: ERGOCALCIFEROL   1 capsule, Oral, Weekly      Farxiga 10 MG tablet  Generic drug: dapagliflozin Propanediol   No dose, route, or frequency recorded.      HYDROcodone-acetaminophen  MG per tablet  Commonly known as: NORCO   1 tablet, Oral      Insulin Glargine 100 UNIT/ML injection pen  Commonly known as: LANTUS SOLOSTAR   25 Units, Subcutaneous, Nightly      insulin lispro 100 UNIT/ML injection  Commonly known as: HUMALOG/ADMELOG   8 Units, Subcutaneous, 3 Times Daily With Meals      losartan 25 MG tablet  Commonly known as:  "COZAAR   25 mg, Oral, Daily      pantoprazole 40 MG EC tablet  Commonly known as: PROTONIX   No dose, route, or frequency recorded.      sertraline 50 MG tablet  Commonly known as: ZOLOFT   50 mg, Oral, Daily      warfarin 10 MG tablet  Commonly known as: COUMADIN   TAKE one AND one half TABLET BY MOUTH ON MONDAY, WEDNESDAY, FRIDAY. TAKE ONE TABLET BY MOUTH ALL other DAYS.              Diet Instructions       Diet: Regular/House Diet, Diabetic Diets; Consistent Carbohydrate      Discharge Diet:  Regular/House Diet  Diabetic Diets       Diabetic Diet: Consistent Carbohydrate    Texture: Regular Texture (IDDSI 7)    Fluid Consistency: Thin (IDDSI 0)           Activity Instructions       Activity as Tolerated             Additional Instructions for the Follow-ups that You Need to Schedule       Ambulatory Referral to Anti Coag Clinic   As directed       Select To DEPT SPEC to filter TO DEPT       Send INR reminders to the \"clinical pool\" of the TO DEPT     (ie:  [facility] DSM CLINIC  or [facility] CLINICAL POOL)     Call MD for problems / concerns.   As directed             Follow-up Information       Arias Lehman APRN Follow up in 1 week(s).    Specialty: Family Medicine  Why: post hospital follow up  Contact information:  [address]  [place] [id number] [phone]               [facility] Follow up.    Specialty: Pharmacy  Contact information:  [address] [place] [address]  [phone]             Arthur Salazar MD Follow up.    Specialties: Hematology and Oncology, Oncology, Hematology  Why: already has appt  Contact information:  [address]  [place] [id number] [phone]                            Future Appointments   Date Time Provider Department Center   10/25/2023  1:50 PM LAB CHAIR [address] [facility]  [facility]   10/25/2023  2:00 PM COAG CHAIR [facility]   10/25/2023  2:20 PM " Arthur Salazar MD [facility] SUMAN Hayes MD  [facility]  09/27/23    Discharge time spent greater than 30 minutes.

## 2023-09-27 NOTE — CASE MANAGEMENT/SOCIAL WORK
Discharge Planning Assessment  [facility]     Patient Name: Cecilio Puckett  MRN: [id number]  Today's Date: 9/27/2023    Admit Date: 9/26/2023    Plan: Home with family support.   Discharge Needs Assessment       Row Name 09/27/23 1214       Living Environment    People in Home alone    Current Living Arrangements home    Primary Care Provided by self    Provides Primary Care For no one    Family Caregiver if Needed parent(s)    Quality of Family Relationships helpful    Able to Return to Prior Arrangements yes       Resource/Environmental Concerns    Transportation Concerns none       Transition Planning    Patient/Family Anticipates Transition to home    Patient/Family Anticipated Services at Transition     Transportation Anticipated car, drives self       Discharge Needs Assessment    Readmission Within the Last 30 Days no previous admission in last 30 days    Equipment Currently Used at Home cane, straight    Provided Post Acute Provider List? N/A    N/A Provider List Comment declined.                   Discharge Plan       Row Name 09/27/23 1215       Plan    Plan Home with family support.    Patient/Family in Agreement with Plan yes    Plan Comments Spoke with the patient, verified current information and explained the role of the CCP. Concha said he lives alone and has family support. He's IADL and has a cane that he uses as needed. He has no history with  and cannot remember the  agency he's worked with in the past. Patient plans to d/c home with family support. CCP will follow.                  Continued Care and Services - Admitted Since 9/26/2023    Coordination has not been started for this encounter.          Demographic Summary       Row Name 09/27/23 1132       General Information    Admission Type observation    Reason for Consult discharge planning    Preferred Language English       Contact Information    Permission Granted to Share Info With ;family/designee                    Functional Status       Row Name 09/27/23 1213       Functional Status    Usual Activity Tolerance good       Functional Status, IADL    Medications independent    Meal Preparation assistive equipment    Housekeeping assistive equipment    Laundry assistive equipment    Shopping assistive equipment       Mental Status Summary    Recent Changes in Mental Status/Cognitive Functioning no changes                   Psychosocial       Row Name 09/27/23 1214       Intellectual Performance WDL    Level of Consciousness Alert       Coping/Stress    Patient Personal Strengths able to adapt    Sources of Support parent(s)    Reaction to Health Status accepting    Understanding of Condition and Treatment adequate understanding of medical condition;adequate understanding of treatment       Developmental Stage (Eriksson's)    Developmental Stage Stage 7 (35-65 years/Middle Adulthood) Generativity vs. Stagnation                    Gillian CRAWFORD, RN

## 2023-09-28 NOTE — CASE MANAGEMENT/SOCIAL WORK
Case Management Discharge Note      Final Note: dc'd home    Provided Post Acute Provider List?: N/A  N/A Provider List Comment: declined.    Selected Continued Care - Discharged on 9/27/2023 Admission date: 9/26/2023 - Discharge disposition: Home or Self Care      Destination    No services have been selected for the patient.                Durable Medical Equipment    No services have been selected for the patient.                Dialysis/Infusion    No services have been selected for the patient.                Home Medical Care    No services have been selected for the patient.                Therapy    No services have been selected for the patient.                Community Resources    No services have been selected for the patient.                Community & DME    No services have been selected for the patient.                    Transportation Services  Private: Car    Final Discharge Disposition Code: 01 - home or self-care

## 2023-09-29 ENCOUNTER — APPOINTMENT (OUTPATIENT)
Dept: MRI IMAGING | Facility: HOSPITAL | Age: 53
DRG: 064 | End: 2023-09-29
Payer: MEDICARE

## 2023-09-29 ENCOUNTER — HOSPITAL ENCOUNTER (INPATIENT)
Facility: HOSPITAL | Age: 53
LOS: 3 days | Discharge: HOME | DRG: 064 | End: 2023-10-03
Attending: EMERGENCY MEDICINE | Admitting: HOSPITALIST
Payer: MEDICARE

## 2023-09-29 ENCOUNTER — APPOINTMENT (OUTPATIENT)
Dept: GENERAL RADIOLOGY | Facility: HOSPITAL | Age: 53
DRG: 064 | End: 2023-09-29
Payer: MEDICARE

## 2023-09-29 ENCOUNTER — APPOINTMENT (OUTPATIENT)
Dept: CT IMAGING | Facility: HOSPITAL | Age: 53
DRG: 064 | End: 2023-09-29
Payer: MEDICARE

## 2023-09-29 DIAGNOSIS — R29.810 FACIAL DROOP: Primary | ICD-10-CM

## 2023-09-29 DIAGNOSIS — R47.81 SLURRED SPEECH: ICD-10-CM

## 2023-09-29 PROBLEM — G45.9 TIA (TRANSIENT ISCHEMIC ATTACK): Status: ACTIVE | Noted: 2023-09-29

## 2023-09-29 LAB
ALBUMIN SERPL-MCNC: 3.7 G/DL (ref 3.5–5.2)
ALBUMIN/GLOB SERPL: 1.1 G/DL
ALP SERPL-CCNC: 74 U/L (ref 39–117)
ALT SERPL W P-5'-P-CCNC: 32 U/L (ref 1–41)
ANION GAP SERPL CALCULATED.3IONS-SCNC: 11.1 MMOL/L (ref 5–15)
APTT PPP: 35.1 SECONDS (ref 22.7–35.4)
AST SERPL-CCNC: 28 U/L (ref 1–40)
BACTERIA UR QL AUTO: NORMAL /HPF
BASOPHILS # BLD AUTO: 0.07 10*3/MM3 (ref 0–0.2)
BASOPHILS NFR BLD AUTO: 0.9 % (ref 0–1.5)
BILIRUB SERPL-MCNC: <0.2 MG/DL (ref 0–1.2)
BILIRUB UR QL STRIP: NEGATIVE
BUN SERPL-MCNC: 8 MG/DL (ref 6–20)
BUN/CREAT SERPL: 8.2 (ref 7–25)
CALCIUM SPEC-SCNC: 8.9 MG/DL (ref 8.6–10.5)
CHLORIDE SERPL-SCNC: 99 MMOL/L (ref 98–107)
CHOLEST SERPL-MCNC: 192 MG/DL (ref 0–200)
CLARITY UR: CLEAR
CO2 SERPL-SCNC: 24.9 MMOL/L (ref 22–29)
COLOR UR: YELLOW
CREAT SERPL-MCNC: 0.97 MG/DL (ref 0.76–1.27)
DEPRECATED RDW RBC AUTO: 44.1 FL (ref 37–54)
EGFRCR SERPLBLD CKD-EPI 2021: 93.9 ML/MIN/1.73
EOSINOPHIL # BLD AUTO: 0.06 10*3/MM3 (ref 0–0.4)
EOSINOPHIL NFR BLD AUTO: 0.8 % (ref 0.3–6.2)
ERYTHROCYTE [DISTWIDTH] IN BLOOD BY AUTOMATED COUNT: 13.7 % (ref 12.3–15.4)
GLOBULIN UR ELPH-MCNC: 3.4 GM/DL
GLUCOSE BLDC GLUCOMTR-MCNC: 236 MG/DL (ref 70–130)
GLUCOSE SERPL-MCNC: 338 MG/DL (ref 65–99)
GLUCOSE UR STRIP-MCNC: ABNORMAL MG/DL
HBA1C MFR BLD: 11.4 % (ref 4.8–5.6)
HCT VFR BLD AUTO: 45.1 % (ref 37.5–51)
HDLC SERPL-MCNC: 39 MG/DL (ref 40–60)
HGB BLD-MCNC: 14.9 G/DL (ref 13–17.7)
HGB UR QL STRIP.AUTO: ABNORMAL
HYALINE CASTS UR QL AUTO: NORMAL /LPF
IMM GRANULOCYTES # BLD AUTO: 0.02 10*3/MM3 (ref 0–0.05)
IMM GRANULOCYTES NFR BLD AUTO: 0.3 % (ref 0–0.5)
INR PPP: 2.14 (ref 0.9–1.1)
INR PPP: 2.65 (ref 0.9–1.1)
KETONES UR QL STRIP: NEGATIVE
LDLC SERPL CALC-MCNC: 106 MG/DL (ref 0–100)
LDLC/HDLC SERPL: 2.52 {RATIO}
LEUKOCYTE ESTERASE UR QL STRIP.AUTO: NEGATIVE
LYMPHOCYTES # BLD AUTO: 2.79 10*3/MM3 (ref 0.7–3.1)
LYMPHOCYTES NFR BLD AUTO: 35.6 % (ref 19.6–45.3)
MCH RBC QN AUTO: 29.1 PG (ref 26.6–33)
MCHC RBC AUTO-ENTMCNC: 33 G/DL (ref 31.5–35.7)
MCV RBC AUTO: 88.1 FL (ref 79–97)
MONOCYTES # BLD AUTO: 0.54 10*3/MM3 (ref 0.1–0.9)
MONOCYTES NFR BLD AUTO: 6.9 % (ref 5–12)
NEUTROPHILS NFR BLD AUTO: 4.36 10*3/MM3 (ref 1.7–7)
NEUTROPHILS NFR BLD AUTO: 55.5 % (ref 42.7–76)
NITRITE UR QL STRIP: NEGATIVE
NRBC BLD AUTO-RTO: 0 /100 WBC (ref 0–0.2)
PH UR STRIP.AUTO: <=5 [PH] (ref 5–8)
PLATELET # BLD AUTO: 210 10*3/MM3 (ref 140–450)
PMV BLD AUTO: 11 FL (ref 6–12)
POTASSIUM SERPL-SCNC: 3.8 MMOL/L (ref 3.5–5.2)
PROT SERPL-MCNC: 7.1 G/DL (ref 6–8.5)
PROT UR QL STRIP: ABNORMAL
PROTHROMBIN TIME: 24.3 SECONDS (ref 11.7–14.2)
PROTHROMBIN TIME: 28.8 SECONDS (ref 11.7–14.2)
RBC # BLD AUTO: 5.12 10*6/MM3 (ref 4.14–5.8)
RBC # UR STRIP: NORMAL /HPF
REF LAB TEST METHOD: NORMAL
SODIUM SERPL-SCNC: 135 MMOL/L (ref 136–145)
SP GR UR STRIP: >=1.03 (ref 1–1.03)
SQUAMOUS #/AREA URNS HPF: NORMAL /HPF
TRIGL SERPL-MCNC: 273 MG/DL (ref 0–150)
UROBILINOGEN UR QL STRIP: ABNORMAL
VLDLC SERPL-MCNC: 47 MG/DL (ref 5–40)
WBC # UR STRIP: NORMAL /HPF
WBC NRBC COR # BLD: 7.84 10*3/MM3 (ref 3.4–10.8)

## 2023-09-29 PROCEDURE — 85025 COMPLETE CBC W/AUTO DIFF WBC: CPT | Performed by: EMERGENCY MEDICINE

## 2023-09-29 PROCEDURE — G0378 HOSPITAL OBSERVATION PER HR: HCPCS

## 2023-09-29 PROCEDURE — 80053 COMPREHEN METABOLIC PANEL: CPT | Performed by: EMERGENCY MEDICINE

## 2023-09-29 PROCEDURE — 63710000001 INSULIN GLARGINE PER 5 UNITS

## 2023-09-29 PROCEDURE — 85730 THROMBOPLASTIN TIME PARTIAL: CPT | Performed by: NURSE PRACTITIONER

## 2023-09-29 PROCEDURE — 83036 HEMOGLOBIN GLYCOSYLATED A1C: CPT | Performed by: NURSE PRACTITIONER

## 2023-09-29 PROCEDURE — 70551 MRI BRAIN STEM W/O DYE: CPT

## 2023-09-29 PROCEDURE — 85610 PROTHROMBIN TIME: CPT | Performed by: NURSE PRACTITIONER

## 2023-09-29 PROCEDURE — 85610 PROTHROMBIN TIME: CPT | Performed by: EMERGENCY MEDICINE

## 2023-09-29 PROCEDURE — 71045 X-RAY EXAM CHEST 1 VIEW: CPT

## 2023-09-29 PROCEDURE — 81001 URINALYSIS AUTO W/SCOPE: CPT | Performed by: EMERGENCY MEDICINE

## 2023-09-29 PROCEDURE — 80061 LIPID PANEL: CPT | Performed by: NURSE PRACTITIONER

## 2023-09-29 PROCEDURE — 70450 CT HEAD/BRAIN W/O DYE: CPT

## 2023-09-29 PROCEDURE — 36415 COLL VENOUS BLD VENIPUNCTURE: CPT

## 2023-09-29 PROCEDURE — 93010 ELECTROCARDIOGRAM REPORT: CPT | Performed by: INTERNAL MEDICINE

## 2023-09-29 PROCEDURE — 99285 EMERGENCY DEPT VISIT HI MDM: CPT

## 2023-09-29 PROCEDURE — 82948 REAGENT STRIP/BLOOD GLUCOSE: CPT

## 2023-09-29 PROCEDURE — 63710000001 INSULIN LISPRO (HUMAN) PER 5 UNITS

## 2023-09-29 PROCEDURE — 93005 ELECTROCARDIOGRAM TRACING: CPT | Performed by: EMERGENCY MEDICINE

## 2023-09-29 RX ORDER — AMLODIPINE BESYLATE 10 MG/1
10 TABLET ORAL DAILY
Status: DISCONTINUED | OUTPATIENT
Start: 2023-09-30 | End: 2023-10-03 | Stop reason: HOSPADM

## 2023-09-29 RX ORDER — SODIUM CHLORIDE 9 MG/ML
40 INJECTION, SOLUTION INTRAVENOUS AS NEEDED
Status: DISCONTINUED | OUTPATIENT
Start: 2023-09-29 | End: 2023-10-03 | Stop reason: HOSPADM

## 2023-09-29 RX ORDER — SODIUM CHLORIDE 0.9 % (FLUSH) 0.9 %
10 SYRINGE (ML) INJECTION EVERY 12 HOURS SCHEDULED
Status: DISCONTINUED | OUTPATIENT
Start: 2023-09-29 | End: 2023-09-30

## 2023-09-29 RX ORDER — DEXTROSE MONOHYDRATE 25 G/50ML
25 INJECTION, SOLUTION INTRAVENOUS
Status: DISCONTINUED | OUTPATIENT
Start: 2023-09-29 | End: 2023-09-30

## 2023-09-29 RX ORDER — ATORVASTATIN CALCIUM 20 MG/1
40 TABLET, FILM COATED ORAL NIGHTLY
Status: DISCONTINUED | OUTPATIENT
Start: 2023-09-29 | End: 2023-09-30

## 2023-09-29 RX ORDER — SODIUM CHLORIDE 0.9 % (FLUSH) 0.9 %
10 SYRINGE (ML) INJECTION AS NEEDED
Status: DISCONTINUED | OUTPATIENT
Start: 2023-09-29 | End: 2023-10-03 | Stop reason: HOSPADM

## 2023-09-29 RX ORDER — ASPIRIN 325 MG
325 TABLET ORAL DAILY
Status: DISCONTINUED | OUTPATIENT
Start: 2023-09-29 | End: 2023-10-03 | Stop reason: HOSPADM

## 2023-09-29 RX ORDER — LOSARTAN POTASSIUM 25 MG/1
25 TABLET ORAL DAILY
Status: DISCONTINUED | OUTPATIENT
Start: 2023-09-30 | End: 2023-09-30

## 2023-09-29 RX ORDER — WARFARIN SODIUM 10 MG/1
10 TABLET ORAL
Status: DISCONTINUED | OUTPATIENT
Start: 2023-09-30 | End: 2023-09-30

## 2023-09-29 RX ORDER — NICOTINE POLACRILEX 4 MG
15 LOZENGE BUCCAL
Status: DISCONTINUED | OUTPATIENT
Start: 2023-09-29 | End: 2023-09-30

## 2023-09-29 RX ORDER — INSULIN LISPRO 100 [IU]/ML
2-9 INJECTION, SOLUTION INTRAVENOUS; SUBCUTANEOUS
Status: DISCONTINUED | OUTPATIENT
Start: 2023-09-29 | End: 2023-10-03 | Stop reason: HOSPADM

## 2023-09-29 RX ORDER — CARVEDILOL 6.25 MG/1
6.25 TABLET ORAL EVERY 12 HOURS SCHEDULED
Status: DISCONTINUED | OUTPATIENT
Start: 2023-09-29 | End: 2023-10-03 | Stop reason: HOSPADM

## 2023-09-29 RX ORDER — WARFARIN SODIUM 7.5 MG/1
15 TABLET ORAL
Status: DISCONTINUED | OUTPATIENT
Start: 2023-10-02 | End: 2023-09-30

## 2023-09-29 RX ORDER — ONDANSETRON 2 MG/ML
4 INJECTION INTRAMUSCULAR; INTRAVENOUS EVERY 6 HOURS PRN
Status: DISCONTINUED | OUTPATIENT
Start: 2023-09-29 | End: 2023-10-03 | Stop reason: HOSPADM

## 2023-09-29 RX ORDER — ASPIRIN 300 MG/1
300 SUPPOSITORY RECTAL DAILY
Status: DISCONTINUED | OUTPATIENT
Start: 2023-09-29 | End: 2023-09-30

## 2023-09-29 RX ORDER — PANTOPRAZOLE SODIUM 40 MG/1
40 TABLET, DELAYED RELEASE ORAL
Status: DISCONTINUED | OUTPATIENT
Start: 2023-09-30 | End: 2023-10-03 | Stop reason: HOSPADM

## 2023-09-29 RX ORDER — IBUPROFEN 600 MG/1
1 TABLET ORAL
Status: DISCONTINUED | OUTPATIENT
Start: 2023-09-29 | End: 2023-09-30

## 2023-09-29 RX ADMIN — Medication 10 ML: at 23:00

## 2023-09-29 RX ADMIN — ATORVASTATIN CALCIUM 40 MG: 20 TABLET, FILM COATED ORAL at 21:56

## 2023-09-29 RX ADMIN — INSULIN LISPRO 4 UNITS: 100 INJECTION, SOLUTION INTRAVENOUS; SUBCUTANEOUS at 21:56

## 2023-09-29 RX ADMIN — INSULIN GLARGINE 25 UNITS: 100 INJECTION, SOLUTION SUBCUTANEOUS at 21:57

## 2023-09-29 RX ADMIN — ASPIRIN 325 MG: 325 TABLET ORAL at 21:56

## 2023-09-29 RX ADMIN — CARVEDILOL 6.25 MG: 6.25 TABLET, FILM COATED ORAL at 21:56

## 2023-09-29 NOTE — ED PROVIDER NOTES
EMERGENCY DEPARTMENT ENCOUNTER    Room Number:  14/14  PCP: Arias Lehman APRN      HPI:  Chief Complaint: Slurred speech  A complete HPI/ROS/PMH/PSH/SH/FH are unobtainable due to: None  Context: Cecilio Puckett is a 53 y.o. male who presents to the ED c/o slurred speech.  He states that he developed slurred speech yesterday around 4 PM.  He states that if still feels slurred.  States he has had a prior stroke that has caused him to have baseline left arm weakness where he has difficulty using his left hand to open up a can, for example.  He denies having any pain.        PAST MEDICAL HISTORY  Active Ambulatory Problems     Diagnosis Date Noted    Syncope, unspecified syncope type 10/10/2022    Acute DVT (deep venous thrombosis) 09/26/2023    Leg DVT (deep venous thromboembolism), acute, left 09/27/2023    NHL (non-Hodgkin's lymphoma) 09/27/2023    HIV (human immunodeficiency virus infection) 09/27/2023    DM (diabetes mellitus), type 2 09/27/2023    HTN (hypertension) 09/27/2023     Resolved Ambulatory Problems     Diagnosis Date Noted    No Resolved Ambulatory Problems     Past Medical History:   Diagnosis Date    COPD (chronic obstructive pulmonary disease)     Coronary artery disease     Diabetes mellitus     DVT (deep venous thrombosis)     Elevated cholesterol     GERD (gastroesophageal reflux disease)     H/O Cancer     H/O Ischemia of extremity     History of MI (myocardial infarction) 2019    History of snoring     History of transfusion     Hypertension     Non Hodgkin's lymphoma     PAD (peripheral artery disease)     Pulmonary embolism     Stroke          PAST SURGICAL HISTORY  Past Surgical History:   Procedure Laterality Date    CARDIAC CATHETERIZATION      CORONARY ANGIOPLASTY WITH STENT PLACEMENT      FEMORAL ARTERY - FEMORAL ARTERY BYPASS GRAFT      PORTACATH PLACEMENT           FAMILY HISTORY  Family History   Problem Relation Age of Onset    Hypertension Mother     Diabetes Mother     Stroke  Mother     Hypertension Maternal Grandmother          SOCIAL HISTORY  Social History     Socioeconomic History    Marital status: Single   Tobacco Use    Smoking status: Every Day     Packs/day: 0.25     Types: Cigarettes     Start date: 01/1994   Vaping Use    Vaping Use: Never used   Substance and Sexual Activity    Alcohol use: Yes     Alcohol/week: 4.0 standard drinks     Types: 4 Cans of beer per week    Drug use: Not Currently    Sexual activity: Defer         ALLERGIES  Patient has no known allergies.        REVIEW OF SYSTEMS  Review of Systems     All systems reviewed and negative except for those discussed in HPI.       PHYSICAL EXAM  ED Triage Vitals   Temp Heart Rate Resp BP SpO2   09/29/23 1515 09/29/23 1515 09/29/23 1515 09/29/23 1535 09/29/23 1515   97.8 °F (36.6 °C) 116 16 136/88 96 %      Temp src Heart Rate Source Patient Position BP Location FiO2 (%)   -- 09/29/23 1515 -- -- --    Monitor          Physical Exam      GENERAL: no acute distress  HENT: nares patent  EYES: no scleral icterus  CV: regular rhythm, normal rate  RESPIRATORY: normal effort, clear to auscultation bilaterally  ABDOMEN: soft  MUSCULOSKELETAL: no deformity  NEURO:   Recent and remote memory functions are normal. The patient is attentive with normal concentration. Language is fluent. Speech is clear. The speech is non-dysarthric. Fund of knowledge is normal.   Left facial droop (he states he normally doesn't have a facial droop)  Eyes close shut strongly bilaterally.  Symmetric eyebrow raise bilaterally.  EOMI, PERRL  CN II-XII grossly normal otherwise.  5/5 strength to extremities.  No pronator drift.  Intact FNF.  No meningismus.  NIHSS 2  PSYCH:  calm, cooperative  SKIN: warm, dry    Vital signs and nursing notes reviewed.          LAB RESULTS  Recent Results (from the past 24 hour(s))   ECG 12 Lead Other; fatigue    Collection Time: 09/29/23  4:33 PM   Result Value Ref Range    QT Interval 366 ms    QTC Interval 438 ms    Protime-INR    Collection Time: 09/29/23  4:40 PM    Specimen: Blood   Result Value Ref Range    Protime 24.3 (H) 11.7 - 14.2 Seconds    INR 2.14 (H) 0.90 - 1.10   CBC Auto Differential    Collection Time: 09/29/23  4:40 PM    Specimen: Blood   Result Value Ref Range    WBC 7.84 3.40 - 10.80 10*3/mm3    RBC 5.12 4.14 - 5.80 10*6/mm3    Hemoglobin 14.9 13.0 - 17.7 g/dL    Hematocrit 45.1 37.5 - 51.0 %    MCV 88.1 79.0 - 97.0 fL    MCH 29.1 26.6 - 33.0 pg    MCHC 33.0 31.5 - 35.7 g/dL    RDW 13.7 12.3 - 15.4 %    RDW-SD 44.1 37.0 - 54.0 fl    MPV 11.0 6.0 - 12.0 fL    Platelets 210 140 - 450 10*3/mm3    Neutrophil % 55.5 42.7 - 76.0 %    Lymphocyte % 35.6 19.6 - 45.3 %    Monocyte % 6.9 5.0 - 12.0 %    Eosinophil % 0.8 0.3 - 6.2 %    Basophil % 0.9 0.0 - 1.5 %    Immature Grans % 0.3 0.0 - 0.5 %    Neutrophils, Absolute 4.36 1.70 - 7.00 10*3/mm3    Lymphocytes, Absolute 2.79 0.70 - 3.10 10*3/mm3    Monocytes, Absolute 0.54 0.10 - 0.90 10*3/mm3    Eosinophils, Absolute 0.06 0.00 - 0.40 10*3/mm3    Basophils, Absolute 0.07 0.00 - 0.20 10*3/mm3    Immature Grans, Absolute 0.02 0.00 - 0.05 10*3/mm3    nRBC 0.0 0.0 - 0.2 /100 WBC   Comprehensive Metabolic Panel    Collection Time: 09/29/23  5:28 PM    Specimen: Blood   Result Value Ref Range    Glucose 338 (H) 65 - 99 mg/dL    BUN 8 6 - 20 mg/dL    Creatinine 0.97 0.76 - 1.27 mg/dL    Sodium 135 (L) 136 - 145 mmol/L    Potassium 3.8 3.5 - 5.2 mmol/L    Chloride 99 98 - 107 mmol/L    CO2 24.9 22.0 - 29.0 mmol/L    Calcium 8.9 8.6 - 10.5 mg/dL    Total Protein 7.1 6.0 - 8.5 g/dL    Albumin 3.7 3.5 - 5.2 g/dL    ALT (SGPT) 32 1 - 41 U/L    AST (SGOT) 28 1 - 40 U/L    Alkaline Phosphatase 74 39 - 117 U/L    Total Bilirubin <0.2 0.0 - 1.2 mg/dL    Globulin 3.4 gm/dL    A/G Ratio 1.1 g/dL    BUN/Creatinine Ratio 8.2 7.0 - 25.0    Anion Gap 11.1 5.0 - 15.0 mmol/L    eGFR 93.9 >60.0 mL/min/1.73   Urinalysis With Microscopic If Indicated (No Culture) - Urine, Clean Catch     Collection Time: 09/29/23  5:29 PM    Specimen: Urine, Clean Catch   Result Value Ref Range    Color, UA Yellow Yellow, Straw    Appearance, UA Clear Clear    pH, UA <=5.0 5.0 - 8.0    Specific Gravity, UA >=1.030 1.005 - 1.030    Glucose, UA >=1000 mg/dL (3+) (A) Negative    Ketones, UA Negative Negative    Bilirubin, UA Negative Negative    Blood, UA Trace (A) Negative    Protein,  mg/dL (2+) (A) Negative    Leuk Esterase, UA Negative Negative    Nitrite, UA Negative Negative    Urobilinogen, UA 0.2 E.U./dL 0.2 - 1.0 E.U./dL   Urinalysis, Microscopic Only - Urine, Clean Catch    Collection Time: 09/29/23  5:29 PM    Specimen: Urine, Clean Catch   Result Value Ref Range    RBC, UA 0-2 None Seen, 0-2 /HPF    WBC, UA 0-2 None Seen, 0-2 /HPF    Bacteria, UA None Seen None Seen /HPF    Squamous Epithelial Cells, UA 0-2 None Seen, 0-2 /HPF    Hyaline Casts, UA 0-2 None Seen /LPF    Methodology Automated Microscopy        Ordered the above labs and reviewed the results.        RADIOLOGY  CT Head Without Contrast    Result Date: 9/29/2023  CT HEAD WITHOUT CONTRAST  CLINICAL HISTORY: Slurred speech and left facial droop  TECHNIQUE: CT scan of the head was obtained with 3 mm axial soft tissue algorithm images. No intravenous contrast was administered. Sagittal and coronal reconstructions were obtained.  COMPARISON: CT head 10/10/2022 and 9/27/2022  FINDINGS:  No intracranial hemorrhage.  No midline shift or mass effect. Unchanged bilateral chronic lacunar infarcts. No CT evidence of acute territorial infarction.  No hydrocephalus.  Clear visualized portions of the paranasal sinuses and mastoid air cells.        No acute intracranial abnormality.     Radiation dose reduction techniques were utilized, including automated exposure control and exposure modulation based on body size.  This report was finalized on 9/29/2023 5:20 PM by Dr. Reginald Diaz M.D.      XR Chest 1 View    Result Date: 9/29/2023  XR CHEST 1 VW-   HISTORY:    Fatigue, high blood pressure, history of diabetes.  COMPARISON: None.  FINDINGS:  2 views of the chest were obtained.  Support Devices:  None. Cardiac Silhouette/Mediastinum/Kelley:  The cardiac, mediastinal, and hilar contours are within normal limits. There is calcific atherosclerosis. Lungs/Pleural Spaces:  The lungs and pleural spaces are clear. Chest Wall/Diaphragm/Upper Abdomen: The visualized osseous structures are age-appropriate.   CONCLUSION(S):   1.  No focal consolidation or effusion.  This report was finalized on 9/29/2023 5:23 PM by Dr. Susanna Torre M.D.       Ordered the above noted radiological studies. Reviewed by me in PACS.          PROCEDURES  Critical Care  Performed by: Severo Nice II, MD  Authorized by: Severo Nice II, MD     Critical care provider statement:     Critical care time (minutes):  32    Critical care was necessary to treat or prevent imminent or life-threatening deterioration of the following conditions:  CNS failure or compromise    Critical care was time spent personally by me on the following activities:  Ordering and review of laboratory studies, ordering and review of radiographic studies, pulse oximetry, re-evaluation of patient's condition, discussions with consultants, examination of patient, obtaining history from patient or surrogate and review of old charts          MEDICATIONS GIVEN IN ER  Medications   sodium chloride 0.9 % flush 10 mL (has no administration in time range)         MEDICAL DECISION MAKING, PROGRESS, and CONSULTS    Discussion below represents my analysis of pertinent findings related to patient's condition, differential diagnosis, treatment plan and final disposition.      Orders placed during this visit:  Orders Placed This Encounter   Procedures    Critical Care    XR Chest 1 View    CT Head Without Contrast    Comprehensive Metabolic Panel    Protime-INR    Urinalysis With Microscopic If Indicated (No Culture) - Urine,  Clean Catch    CBC Auto Differential    Urinalysis, Microscopic Only - Urine, Clean Catch    Monitor Blood Pressure    Pulse Oximetry, Continuous    ECG 12 Lead Other; fatigue    Insert Peripheral IV    CBC & Differential             Differential diagnosis:    Stroke, TIA        Independent interpretation of labs, radiology studies, and discussions with consultants:  ED Course as of 09/29/23 1906   Fri Sep 29, 2023   1627 On medical chart review, reviewed most recent discharge summary from 2 days ago.  Ultrasound at that time showed a left lower extremity DVT of the popliteal, posterior tibial, and peroneal veins.  Hematology felt it was due to some dietary indiscretions.  He was therapeutic at 2.7 at the time of discharge. [TD]   1632 I discussed the case with Dr. Floyd Washburn, stroke neurology.  He recommends obtaining a routine head CT.  Patient is not a candidate for TNK as his symptoms occurred 24 hours ago and is likely therapeutic on his INR already given that it was 2.7 just 2 days ago. [TD]   1729 CT head is acutely negative. [TD]   1729 INR(!): 2.14 [TD]   1729 EKG independently interpreted by myself.  Time 4:33 PM.  Sinus rhythm.  Heart rate 86.  Normal axis.  Prolonged R wave progression.  LVH. [TD]   1836 I discussed the case with MARIANNE Marie with observation medicine.  She will admit. [TD]      ED Course User Index  [TD] Severo Nice II, MD             DIAGNOSIS  Final diagnoses:   Facial droop   Slurred speech         DISPOSITION  Admit      Latest Documented Vital Signs:  As of 19:06 EDT  BP- (!) 165/108 HR- 80 Temp- 97.8 °F (36.6 °C) O2 sat- 98%      --    Please note that portions of this were completed with a voice recognition program.       Note Disclaimer: At [facility], we believe that sharing information builds trust and better relationships. You are receiving this note because you are receiving care at [facility] or recently visited. It is possible you will see [facility]  information before a provider has talked with you about it. This kind of information can be easy to misunderstand. To help you fully understand what it means for your health, we urge you to discuss this note with your provider.         Severo Nice II, MD  09/29/23 4791

## 2023-09-29 NOTE — H&P
[facility]   HISTORY AND PHYSICAL    Patient Name: Cecilio Puckett  : 1970  MRN: [id number]  Primary Care Physician:  Arias Lehman APRN  Date of admission: 2023    Subjective   Subjective     Chief Complaint:   Chief Complaint   Patient presents with    Weakness - Generalized         HPI:    Cecilio Puckett is a pleasant afebrile ambulatory 53 y.o. black male with a past medical history of HIV, CVA, diabetes, and hypertension.     He presents to the Emergency Department at [facility] with complaint of slurred speech. He has been admitted to the ED Observation Unit for further testing and evaluation.     He states last evening he developed slurred speech and attributed it to feeling tired. He denies any vision changes. States in  he had similar episode and was diagnosed with a stroke. He reports compliance with his blood pressure medications. No dizziness or focal weakness.     Review of Systems   All systems were reviewed and negative except for: slurred speech    Personal History     Past Medical History:   Diagnosis Date    COPD (chronic obstructive pulmonary disease)     Coronary artery disease     Diabetes mellitus     DVT (deep venous thrombosis)     Elevated cholesterol     GERD (gastroesophageal reflux disease)     H/O Cancer     right arm, in throat, chest and stomach, in remission    H/O Ischemia of extremity     History of MI (myocardial infarction) 2019    History of snoring     History of transfusion     HIV (human immunodeficiency virus infection)     Hypertension     Non Hodgkin's lymphoma     PAD (peripheral artery disease)     Pulmonary embolism     Stroke        Past Surgical History:   Procedure Laterality Date    CARDIAC CATHETERIZATION      CORONARY ANGIOPLASTY WITH STENT PLACEMENT      FEMORAL ARTERY - FEMORAL ARTERY BYPASS GRAFT      PORTACATH PLACEMENT         Family History: family history includes Diabetes in his mother; Hypertension in his maternal grandmother  and mother; Stroke in his mother. Otherwise pertinent FHx was reviewed and not pertinent to current issue.    Social History:  reports that he has been smoking cigarettes. He started smoking about 29 years ago. He has been smoking an average of .25 packs per day. He does not have any smokeless tobacco history on file. He reports current alcohol use of about 4.0 standard drinks per week. He reports that he does not currently use drugs.    Home Medications:  Qhajufb-Vqsqv-Byrnwarl-TenofAF, HYDROcodone-acetaminophen, Insulin Glargine, Lancets, amLODIPine, aspirin, carvedilol, dapagliflozin Propanediol, ergocalciferol, glucose blood, glucose monitor, insulin lispro, losartan, pantoprazole, sertraline, and warfarin    Allergies:  No Known Allergies    Objective   Objective     Vitals:   Temp:  [97.8 °F (36.6 °C)] 97.8 °F (36.6 °C)  Heart Rate:  [] 80  Resp:  [16] 16  BP: (136-154)/(88-99) 154/99  Physical Exam    Constitutional: Awake, alert   Eyes: PERRLA, sclerae anicteric, no conjunctival injection   HENT: NCAT, mucous membranes moist   Neck: Supple, no thyromegaly, no lymphadenopathy, trachea midline   Respiratory: Clear to auscultation bilaterally, nonlabored respirations    Cardiovascular: RRR, no murmurs, rubs, or gallops, palpable pedal pulses bilaterally   Gastrointestinal: Positive bowel sounds, soft, nontender, nondistended   Musculoskeletal: No bilateral ankle edema, no clubbing or cyanosis to extremities   Psychiatric: Appropriate affect, cooperative   Neurologic: Oriented x 3, strength symmetric in all extremities, slight left sided facial droop present otherwise cranial nerves grossly intact, speech clear   Skin: No rashes     Result Review    Result Review:  I have personally reviewed the results from the time of this admission to 9/29/2023 17:49 EDT and agree with these findings:  [x]  Laboratory list / accordion  []  Microbiology  [x]  Radiology  [x]  EKG/Telemetry   []  Cardiology/Vascular   []   Pathology  [x]  Old records  []  Other:      Assessment & Plan   Assessment / Plan     Brief Patient Summary:  Cecilio Puckett is a 53 y.o. male who was admitted to the observation unit for further evaluation and treatment of his slurred speech.    Active Hospital Problems:  There are no active hospital problems to display for this patient.    Plan:     Slurred speech  TIA workup  Aspirin/statin  Lipid panel/A1c  Consult to neurology   MRI brain-Area of acute infarction involving the right corona radiata and basal  ganglia. No evidence of hemorrhagic transformation.    Diabetes  -Accu-Cheks AC  -Adult subcutaneous insulin management-moderate dose  -Hemoglobin A1c -11.4    Hypertension  -Monitor blood pressure  -Continue losartan, Coreg, and amlodipine    DVT prophylaxis:  Medical and mechanical DVT prophylaxis orders are present.    CODE STATUS:       Admission Status:  I believe this patient meets observation status.    Electronically signed by MARIANNE Marie, 09/29/23, 5:49 PM EDT.        75 minutes has been spent by [facility] providers in the care of this patient while under observation status      I have worn appropriate PPE during this patient encounter, sanitized my hands both with entering and exiting patient's room.

## 2023-09-30 ENCOUNTER — APPOINTMENT (OUTPATIENT)
Dept: CARDIOLOGY | Facility: HOSPITAL | Age: 53
DRG: 064 | End: 2023-09-30
Payer: MEDICARE

## 2023-09-30 ENCOUNTER — APPOINTMENT (OUTPATIENT)
Dept: CT IMAGING | Facility: HOSPITAL | Age: 53
DRG: 064 | End: 2023-09-30
Payer: MEDICARE

## 2023-09-30 PROBLEM — I63.9 ACUTE CVA (CEREBROVASCULAR ACCIDENT): Status: ACTIVE | Noted: 2023-09-30

## 2023-09-30 LAB
ALBUMIN SERPL-MCNC: 3.4 G/DL (ref 3.5–5.2)
ALBUMIN/GLOB SERPL: 1 G/DL
ALP SERPL-CCNC: 63 U/L (ref 39–117)
ALT SERPL W P-5'-P-CCNC: 28 U/L (ref 1–41)
ANION GAP SERPL CALCULATED.3IONS-SCNC: 7.4 MMOL/L (ref 5–15)
AORTIC DIMENSIONLESS INDEX: 0.9 (DI)
APTT PPP: 36.7 SECONDS (ref 22.7–35.4)
ASCENDING AORTA: 3.3 CM
AST SERPL-CCNC: 19 U/L (ref 1–40)
BH CV ECHO LEFT VENTRICLE GLOBAL LONGITUDINAL STRAIN: -11 %
BH CV ECHO MEAS - ACS: 1.95 CM
BH CV ECHO MEAS - AO MAX PG: 4.7 MMHG
BH CV ECHO MEAS - AO MEAN PG: 2.5 MMHG
BH CV ECHO MEAS - AO ROOT DIAM: 3.3 CM
BH CV ECHO MEAS - AO V2 MAX: 108.6 CM/SEC
BH CV ECHO MEAS - AO V2 VTI: 21.4 CM
BH CV ECHO MEAS - AVA(I,D): 2.9 CM2
BH CV ECHO MEAS - EDV(CUBED): 143 ML
BH CV ECHO MEAS - EDV(MOD-SP2): 120 ML
BH CV ECHO MEAS - EDV(MOD-SP4): 122 ML
BH CV ECHO MEAS - EF(MOD-BP): 48.1 %
BH CV ECHO MEAS - EF(MOD-SP2): 50 %
BH CV ECHO MEAS - EF(MOD-SP4): 47.5 %
BH CV ECHO MEAS - ESV(CUBED): 67.4 ML
BH CV ECHO MEAS - ESV(MOD-SP2): 60 ML
BH CV ECHO MEAS - ESV(MOD-SP4): 64 ML
BH CV ECHO MEAS - FS: 22.2 %
BH CV ECHO MEAS - IVS/LVPW: 1.12 CM
BH CV ECHO MEAS - IVSD: 1.39 CM
BH CV ECHO MEAS - LAT PEAK E' VEL: 6 CM/SEC
BH CV ECHO MEAS - LV MASS(C)D: 286.7 GRAMS
BH CV ECHO MEAS - LV MAX PG: 3.5 MMHG
BH CV ECHO MEAS - LV MEAN PG: 1.45 MMHG
BH CV ECHO MEAS - LV V1 MAX: 93.8 CM/SEC
BH CV ECHO MEAS - LV V1 VTI: 18.9 CM
BH CV ECHO MEAS - LVIDD: 5.2 CM
BH CV ECHO MEAS - LVIDS: 4.1 CM
BH CV ECHO MEAS - LVOT AREA: 3.3 CM2
BH CV ECHO MEAS - LVOT DIAM: 2.04 CM
BH CV ECHO MEAS - LVPWD: 1.24 CM
BH CV ECHO MEAS - MED PEAK E' VEL: 6.3 CM/SEC
BH CV ECHO MEAS - MR MAX PG: 23.3 MMHG
BH CV ECHO MEAS - MR MAX VEL: 241.3 CM/SEC
BH CV ECHO MEAS - MV A DUR: 0.15 SEC
BH CV ECHO MEAS - MV A MAX VEL: 99.4 CM/SEC
BH CV ECHO MEAS - MV DEC SLOPE: 599.7 CM/SEC2
BH CV ECHO MEAS - MV DEC TIME: 0.15 SEC
BH CV ECHO MEAS - MV E MAX VEL: 76.7 CM/SEC
BH CV ECHO MEAS - MV E/A: 0.77
BH CV ECHO MEAS - MV MAX PG: 5.1 MMHG
BH CV ECHO MEAS - MV MEAN PG: 1.6 MMHG
BH CV ECHO MEAS - MV P1/2T: 56.2 MSEC
BH CV ECHO MEAS - MV V2 VTI: 25.8 CM
BH CV ECHO MEAS - MVA(P1/2T): 3.9 CM2
BH CV ECHO MEAS - MVA(VTI): 2.39 CM2
BH CV ECHO MEAS - PA ACC TIME: 0.11 SEC
BH CV ECHO MEAS - PA V2 MAX: 73.7 CM/SEC
BH CV ECHO MEAS - PULM A REVS DUR: 0.11 SEC
BH CV ECHO MEAS - PULM A REVS VEL: 25.7 CM/SEC
BH CV ECHO MEAS - PULM DIAS VEL: 32.6 CM/SEC
BH CV ECHO MEAS - PULM S/D: 0.66
BH CV ECHO MEAS - PULM SYS VEL: 21.6 CM/SEC
BH CV ECHO MEAS - QP/QS: 1.09
BH CV ECHO MEAS - RAP SYSTOLE: 8 MMHG
BH CV ECHO MEAS - RV MAX PG: 1.34 MMHG
BH CV ECHO MEAS - RV V1 MAX: 57.8 CM/SEC
BH CV ECHO MEAS - RV V1 VTI: 14.2 CM
BH CV ECHO MEAS - RVOT DIAM: 2.45 CM
BH CV ECHO MEAS - RVSP: 13 MMHG
BH CV ECHO MEAS - SV(LVOT): 61.7 ML
BH CV ECHO MEAS - SV(MOD-SP2): 60 ML
BH CV ECHO MEAS - SV(MOD-SP4): 58 ML
BH CV ECHO MEAS - SV(RVOT): 67 ML
BH CV ECHO MEAS - TR MAX PG: 5.4 MMHG
BH CV ECHO MEAS - TR MAX VEL: 116.2 CM/SEC
BH CV ECHO MEASUREMENTS AVERAGE E/E' RATIO: 12.47
BH CV ECHO SHUNT ASSESSMENT PERFORMED (HIDDEN SCRIPTING): 1
BH CV XLRA - RV BASE: 3.5 CM
BH CV XLRA - RV LENGTH: 7.8 CM
BH CV XLRA - RV MID: 3.6 CM
BH CV XLRA - TDI S': 11.1 CM/SEC
BH CV XLRA MEAS LEFT DIST CCA EDV: -12.5 CM/SEC
BH CV XLRA MEAS LEFT DIST CCA PSV: -47.8 CM/SEC
BH CV XLRA MEAS LEFT DIST ICA EDV: -20.3 CM/SEC
BH CV XLRA MEAS LEFT DIST ICA PSV: -59.3 CM/SEC
BH CV XLRA MEAS LEFT ICA/CCA RATIO: 1.53
BH CV XLRA MEAS LEFT MID ICA EDV: -29.2 CM/SEC
BH CV XLRA MEAS LEFT MID ICA PSV: -73.3 CM/SEC
BH CV XLRA MEAS LEFT PROX CCA EDV: 14.3 CM/SEC
BH CV XLRA MEAS LEFT PROX CCA PSV: 66.5 CM/SEC
BH CV XLRA MEAS LEFT PROX ECA EDV: -36.1 CM/SEC
BH CV XLRA MEAS LEFT PROX ECA PSV: -117.6 CM/SEC
BH CV XLRA MEAS LEFT PROX ICA EDV: -24.2 CM/SEC
BH CV XLRA MEAS LEFT PROX ICA PSV: -56.5 CM/SEC
BH CV XLRA MEAS LEFT PROX SCLA PSV: 117 CM/SEC
BH CV XLRA MEAS LEFT VERTEBRAL A EDV: -17.6 CM/SEC
BH CV XLRA MEAS LEFT VERTEBRAL A PSV: -64.8 CM/SEC
BH CV XLRA MEAS RIGHT DIST CCA EDV: -17.7 CM/SEC
BH CV XLRA MEAS RIGHT DIST CCA PSV: -60.9 CM/SEC
BH CV XLRA MEAS RIGHT DIST ICA EDV: -21.2 CM/SEC
BH CV XLRA MEAS RIGHT DIST ICA PSV: -58.3 CM/SEC
BH CV XLRA MEAS RIGHT ICA/CCA RATIO: 0.96
BH CV XLRA MEAS RIGHT MID ICA EDV: -18 CM/SEC
BH CV XLRA MEAS RIGHT MID ICA PSV: -56.7 CM/SEC
BH CV XLRA MEAS RIGHT PROX CCA EDV: 20.3 CM/SEC
BH CV XLRA MEAS RIGHT PROX CCA PSV: 81.8 CM/SEC
BH CV XLRA MEAS RIGHT PROX ECA EDV: -10.8 CM/SEC
BH CV XLRA MEAS RIGHT PROX ECA PSV: -59.5 CM/SEC
BH CV XLRA MEAS RIGHT PROX ICA EDV: -20.4 CM/SEC
BH CV XLRA MEAS RIGHT PROX ICA PSV: -49.4 CM/SEC
BH CV XLRA MEAS RIGHT PROX SCLA PSV: 86.2 CM/SEC
BH CV XLRA MEAS RIGHT VERTEBRAL A EDV: -9.7 CM/SEC
BH CV XLRA MEAS RIGHT VERTEBRAL A PSV: -22.3 CM/SEC
BILIRUB SERPL-MCNC: <0.2 MG/DL (ref 0–1.2)
BUN SERPL-MCNC: 9 MG/DL (ref 6–20)
BUN/CREAT SERPL: 13 (ref 7–25)
CALCIUM SPEC-SCNC: 9 MG/DL (ref 8.6–10.5)
CHLORIDE SERPL-SCNC: 104 MMOL/L (ref 98–107)
CO2 SERPL-SCNC: 25.6 MMOL/L (ref 22–29)
CREAT SERPL-MCNC: 0.69 MG/DL (ref 0.76–1.27)
DEPRECATED RDW RBC AUTO: 44.6 FL (ref 37–54)
EGFRCR SERPLBLD CKD-EPI 2021: 111.3 ML/MIN/1.73
ERYTHROCYTE [DISTWIDTH] IN BLOOD BY AUTOMATED COUNT: 14 % (ref 12.3–15.4)
GLOBULIN UR ELPH-MCNC: 3.3 GM/DL
GLUCOSE BLDC GLUCOMTR-MCNC: 201 MG/DL (ref 70–130)
GLUCOSE BLDC GLUCOMTR-MCNC: 206 MG/DL (ref 70–130)
GLUCOSE BLDC GLUCOMTR-MCNC: 209 MG/DL (ref 70–130)
GLUCOSE BLDC GLUCOMTR-MCNC: 212 MG/DL (ref 70–130)
GLUCOSE BLDC GLUCOMTR-MCNC: 223 MG/DL (ref 70–130)
GLUCOSE BLDC GLUCOMTR-MCNC: 281 MG/DL (ref 70–130)
GLUCOSE SERPL-MCNC: 228 MG/DL (ref 65–99)
HCT VFR BLD AUTO: 41.8 % (ref 37.5–51)
HGB BLD-MCNC: 14.2 G/DL (ref 13–17.7)
LEFT ATRIUM VOLUME INDEX: 35.6 ML/M2
MCH RBC QN AUTO: 29.4 PG (ref 26.6–33)
MCHC RBC AUTO-ENTMCNC: 34 G/DL (ref 31.5–35.7)
MCV RBC AUTO: 86.5 FL (ref 79–97)
NT-PROBNP SERPL-MCNC: 393 PG/ML (ref 0–900)
PLATELET # BLD AUTO: 208 10*3/MM3 (ref 140–450)
PMV BLD AUTO: 11.4 FL (ref 6–12)
POTASSIUM SERPL-SCNC: 3.6 MMOL/L (ref 3.5–5.2)
PROT SERPL-MCNC: 6.7 G/DL (ref 6–8.5)
RBC # BLD AUTO: 4.83 10*6/MM3 (ref 4.14–5.8)
SINUS: 2.9 CM
SODIUM SERPL-SCNC: 137 MMOL/L (ref 136–145)
STJ: 2.24 CM
WBC NRBC COR # BLD: 7.27 10*3/MM3 (ref 3.4–10.8)

## 2023-09-30 PROCEDURE — 83880 ASSAY OF NATRIURETIC PEPTIDE: CPT | Performed by: HOSPITALIST

## 2023-09-30 PROCEDURE — 70496 CT ANGIOGRAPHY HEAD: CPT

## 2023-09-30 PROCEDURE — 63710000001 INSULIN LISPRO (HUMAN) PER 5 UNITS

## 2023-09-30 PROCEDURE — 92610 EVALUATE SWALLOWING FUNCTION: CPT

## 2023-09-30 PROCEDURE — 25510000001 PERFLUTREN (DEFINITY) 8.476 MG IN SODIUM CHLORIDE (PF) 0.9 % 10 ML INJECTION: Performed by: NURSE PRACTITIONER

## 2023-09-30 PROCEDURE — 92523 SPEECH SOUND LANG COMPREHEN: CPT

## 2023-09-30 PROCEDURE — 93356 MYOCRD STRAIN IMG SPCKL TRCK: CPT | Performed by: INTERNAL MEDICINE

## 2023-09-30 PROCEDURE — 71275 CT ANGIOGRAPHY CHEST: CPT

## 2023-09-30 PROCEDURE — 99223 1ST HOSP IP/OBS HIGH 75: CPT | Performed by: PSYCHIATRY & NEUROLOGY

## 2023-09-30 PROCEDURE — 63710000001 INSULIN GLARGINE PER 5 UNITS: Performed by: HOSPITALIST

## 2023-09-30 PROCEDURE — 80053 COMPREHEN METABOLIC PANEL: CPT | Performed by: NURSE PRACTITIONER

## 2023-09-30 PROCEDURE — 93356 MYOCRD STRAIN IMG SPCKL TRCK: CPT

## 2023-09-30 PROCEDURE — 63710000001 INSULIN LISPRO (HUMAN) PER 5 UNITS: Performed by: HOSPITALIST

## 2023-09-30 PROCEDURE — 82948 REAGENT STRIP/BLOOD GLUCOSE: CPT

## 2023-09-30 PROCEDURE — 25010000002 HEPARIN (PORCINE) 25000-0.45 UT/250ML-% SOLUTION: Performed by: HOSPITALIST

## 2023-09-30 PROCEDURE — 25510000001 IOPAMIDOL PER 1 ML: Performed by: EMERGENCY MEDICINE

## 2023-09-30 PROCEDURE — 70498 CT ANGIOGRAPHY NECK: CPT

## 2023-09-30 PROCEDURE — 93306 TTE W/DOPPLER COMPLETE: CPT

## 2023-09-30 PROCEDURE — 25510000001 IOPAMIDOL PER 1 ML: Performed by: HOSPITALIST

## 2023-09-30 PROCEDURE — 85027 COMPLETE CBC AUTOMATED: CPT | Performed by: NURSE PRACTITIONER

## 2023-09-30 PROCEDURE — 85730 THROMBOPLASTIN TIME PARTIAL: CPT | Performed by: HOSPITALIST

## 2023-09-30 PROCEDURE — 93306 TTE W/DOPPLER COMPLETE: CPT | Performed by: INTERNAL MEDICINE

## 2023-09-30 PROCEDURE — 25010000002 HEPARIN (PORCINE) PER 1000 UNITS: Performed by: HOSPITALIST

## 2023-09-30 PROCEDURE — 93880 EXTRACRANIAL BILAT STUDY: CPT

## 2023-09-30 RX ORDER — LOSARTAN POTASSIUM 50 MG/1
50 TABLET ORAL DAILY
Status: DISCONTINUED | OUTPATIENT
Start: 2023-10-01 | End: 2023-10-02

## 2023-09-30 RX ORDER — ATORVASTATIN CALCIUM 80 MG/1
80 TABLET, FILM COATED ORAL NIGHTLY
Status: DISCONTINUED | OUTPATIENT
Start: 2023-09-30 | End: 2023-09-30 | Stop reason: DRUGHIGH

## 2023-09-30 RX ORDER — HYDRALAZINE HYDROCHLORIDE 20 MG/ML
10 INJECTION INTRAMUSCULAR; INTRAVENOUS EVERY 6 HOURS PRN
Status: DISCONTINUED | OUTPATIENT
Start: 2023-09-30 | End: 2023-10-01

## 2023-09-30 RX ORDER — WARFARIN SODIUM 10 MG/1
10 TABLET ORAL
Status: DISCONTINUED | OUTPATIENT
Start: 2023-10-01 | End: 2023-09-30

## 2023-09-30 RX ORDER — WARFARIN SODIUM 10 MG/1
10 TABLET ORAL
Status: DISCONTINUED | OUTPATIENT
Start: 2023-09-30 | End: 2023-09-30

## 2023-09-30 RX ORDER — ATORVASTATIN CALCIUM 20 MG/1
40 TABLET, FILM COATED ORAL NIGHTLY
Status: DISCONTINUED | OUTPATIENT
Start: 2023-09-30 | End: 2023-10-03 | Stop reason: HOSPADM

## 2023-09-30 RX ORDER — HEPARIN SODIUM 10000 [USP'U]/100ML
12 INJECTION, SOLUTION INTRAVENOUS
Status: DISCONTINUED | OUTPATIENT
Start: 2023-09-30 | End: 2023-10-01

## 2023-09-30 RX ORDER — NICOTINE 21 MG/24HR
1 PATCH, TRANSDERMAL 24 HOURS TRANSDERMAL
Status: DISCONTINUED | OUTPATIENT
Start: 2023-09-30 | End: 2023-10-03 | Stop reason: HOSPADM

## 2023-09-30 RX ORDER — INSULIN LISPRO 100 [IU]/ML
5 INJECTION, SOLUTION INTRAVENOUS; SUBCUTANEOUS
Status: DISCONTINUED | OUTPATIENT
Start: 2023-09-30 | End: 2023-10-03 | Stop reason: HOSPADM

## 2023-09-30 RX ORDER — LOSARTAN POTASSIUM 25 MG/1
25 TABLET ORAL ONCE
Status: DISCONTINUED | OUTPATIENT
Start: 2023-09-30 | End: 2023-09-30

## 2023-09-30 RX ORDER — HEPARIN SODIUM 5000 [USP'U]/ML
30-60 INJECTION, SOLUTION INTRAVENOUS; SUBCUTANEOUS EVERY 6 HOURS PRN
Status: DISCONTINUED | OUTPATIENT
Start: 2023-09-30 | End: 2023-10-01

## 2023-09-30 RX ADMIN — INSULIN LISPRO 4 UNITS: 100 INJECTION, SOLUTION INTRAVENOUS; SUBCUTANEOUS at 18:28

## 2023-09-30 RX ADMIN — HEPARIN SODIUM 12 UNITS/KG/HR: 10000 INJECTION, SOLUTION INTRAVENOUS at 16:36

## 2023-09-30 RX ADMIN — SERTRALINE 50 MG: 50 TABLET, FILM COATED ORAL at 20:32

## 2023-09-30 RX ADMIN — ELVITEGRAVIR, COBICISTAT, EMTRICITABINE, AND TENOFOVIR ALAFENAMIDE 1 TABLET: 150; 150; 200; 10 TABLET ORAL at 18:28

## 2023-09-30 RX ADMIN — CARVEDILOL 6.25 MG: 6.25 TABLET, FILM COATED ORAL at 20:32

## 2023-09-30 RX ADMIN — EMPAGLIFLOZIN 25 MG: 25 TABLET, FILM COATED ORAL at 18:28

## 2023-09-30 RX ADMIN — INSULIN LISPRO 4 UNITS: 100 INJECTION, SOLUTION INTRAVENOUS; SUBCUTANEOUS at 14:26

## 2023-09-30 RX ADMIN — NICOTINE 1 PATCH: 21 PATCH, EXTENDED RELEASE TRANSDERMAL at 20:32

## 2023-09-30 RX ADMIN — SODIUM CHLORIDE 500 ML: 9 INJECTION, SOLUTION INTRAVENOUS at 12:33

## 2023-09-30 RX ADMIN — ASPIRIN 325 MG: 325 TABLET ORAL at 15:38

## 2023-09-30 RX ADMIN — PERFLUTREN 3 ML: 6.52 INJECTION, SUSPENSION INTRAVENOUS at 08:26

## 2023-09-30 RX ADMIN — PANTOPRAZOLE SODIUM 40 MG: 40 TABLET, DELAYED RELEASE ORAL at 05:11

## 2023-09-30 RX ADMIN — AMLODIPINE BESYLATE 10 MG: 10 TABLET ORAL at 15:38

## 2023-09-30 RX ADMIN — ATORVASTATIN CALCIUM 40 MG: 20 TABLET, FILM COATED ORAL at 20:32

## 2023-09-30 RX ADMIN — HEPARIN SODIUM 5880 UNITS: 5000 INJECTION, SOLUTION INTRAVENOUS; SUBCUTANEOUS at 23:47

## 2023-09-30 RX ADMIN — INSULIN LISPRO 4 UNITS: 100 INJECTION, SOLUTION INTRAVENOUS; SUBCUTANEOUS at 20:39

## 2023-09-30 RX ADMIN — IOPAMIDOL 85 ML: 755 INJECTION, SOLUTION INTRAVENOUS at 13:12

## 2023-09-30 RX ADMIN — IOPAMIDOL 95 ML: 755 INJECTION, SOLUTION INTRAVENOUS at 11:08

## 2023-09-30 RX ADMIN — CARVEDILOL 6.25 MG: 6.25 TABLET, FILM COATED ORAL at 14:26

## 2023-09-30 RX ADMIN — HEPARIN SODIUM 15 UNITS/KG/HR: 10000 INJECTION, SOLUTION INTRAVENOUS at 23:43

## 2023-09-30 RX ADMIN — Medication 10 ML: at 09:57

## 2023-09-30 RX ADMIN — INSULIN LISPRO 5 UNITS: 100 INJECTION, SOLUTION INTRAVENOUS; SUBCUTANEOUS at 18:28

## 2023-09-30 RX ADMIN — INSULIN GLARGINE 15 UNITS: 100 INJECTION, SOLUTION SUBCUTANEOUS at 20:32

## 2023-09-30 NOTE — PROGRESS NOTES
MD Attestation Note    I supervised care provided by the midlevel provider.    The LAURA and I have discussed this patient's history, physical exam, and treatment plan. I have reviewed the documentation and personally had a face to face interaction with the patient  I affirm the documentation and agree with the treatment and plan.     I provided a substantive portion of the care of the patient.  I personally performed the physical exam in its entirety, and below are my findings.        Subjective  Pt is a 53 y.o. male admitted from Emergency Department for evaluation and treatment of episode of slurred speech that began yesterday around 4 PM.  Patient with history of prior stroke and is anticoagulated on Coumadin.  Unfortunately does continue to smoke.  Other chronic conditions include diabetes, hypertension and HIV.    Physical Exam  GENERAL: Alert and in NAD, Vitals reviewed-patient mildly hypertensive with diastolics averaging in the 90s  HENT: nares patent  EYES: no scleral icterus  CV: regular rhythm, regular rate-no murmur  RESPIRATORY: normal effort, clear to auscultation bilaterally  ABDOMEN: soft  MUSCULOSKELETAL: no deformity  NEURO: Strength-mild left-sided weakness which is chronic per patient.  Sensation and coordination are grossly intact.  Speech-mild dysarthria without obvious aphasia.  Mentation intact and normal  SKIN: warm, dry    Assessment/Plan  I discussed tx and evaluation of this patient with BAYLEE Bran.  Unfortunately MRI brain does show small acute infarct.  Patient is therapeutic on Coumadin with INR of 2.6.  He does remain mildly hypertensive.  Awaiting neurology consultation and recommendations.

## 2023-09-30 NOTE — CONSULTS
"Neurology Consult Note    Consult Date: 9/30/2023    Referring MD: Dr. Blanchard    Reason for Consult I have been asked to see the patient in neurological consultation to render advice and opinion regarding stroke    Cecilio Puckett is a 53 y.o. male with history of HIV on Salazar with last CD4 count greater than 1000, hyperlipidemia, hypertension, prior PE on long-term anticoagulation with warfarin.  We saw him about a year ago for a right subcortical stroke.  CTA at that time was negative.  He was started on antiplatelets.  He reports that intermittently he will have some left-sided weakness and he typically ambulates with a cane.  3 days ago he developed worsening dysarthria and he presented to the hospital for the symptoms.  MRI revealed a new small right subcortical stroke in a very similar area.  Patient feels his symptoms have been stable since admission.    Of note he has untreated sleep apnea and smokes Black and milds.    Past Medical History:   Diagnosis Date    COPD (chronic obstructive pulmonary disease)     Coronary artery disease     Diabetes mellitus     DVT (deep venous thrombosis)     Elevated cholesterol     GERD (gastroesophageal reflux disease)     H/O Cancer     right arm, in throat, chest and stomach, in remission    H/O Ischemia of extremity     History of MI (myocardial infarction) 2019    History of snoring     History of transfusion     HIV (human immunodeficiency virus infection)     Hypertension     Non Hodgkin's lymphoma     PAD (peripheral artery disease)     Pulmonary embolism     Stroke        Exam  BP (!) 184/98 (BP Location: Left arm, Patient Position: Lying)   Pulse 72   Temp 98.2 °F (36.8 °C) (Oral)   Resp 17   Ht 185 cm (72.84\")   Wt 98 kg (216 lb 0.8 oz)   SpO2 100%   BMI 28.63 kg/m²   Gen: NAD, vitals reviewed  MS: oriented x3, recent/remote memory intact, normal attention/concentration, language intact, no neglect.  CN: visual acuity grossly normal, PERRL, EOMI, left facial " droop, moderate dysarthria  Motor: 4+/5 left upper and lower extremity    DATA:    Lab Results   Component Value Date    GLUCOSE 228 (H) 09/30/2023    CALCIUM 9.0 09/30/2023     09/30/2023    K 3.6 09/30/2023    CO2 25.6 09/30/2023     09/30/2023    BUN 9 09/30/2023    CREATININE 0.69 (L) 09/30/2023    BCR 13.0 09/30/2023    ANIONGAP 7.4 09/30/2023     Lab Results   Component Value Date    WBC 7.27 09/30/2023    HGB 14.2 09/30/2023    HCT 41.8 09/30/2023    MCV 86.5 09/30/2023     09/30/2023       Lab review: CBC, BMP unremarkable, most recent CD4 count reviewed through care everywhere    Imaging review: I personally reviewed his MRI of the brain performed on admission which shows a subacute right subcortical infarct with a larger area of T2 hyperintensity in that area.  I reviewed his prior MRI of the brain with without contrast from 1 year ago which showed a stroke in a very similar area with associated scalloped contrast-enhancement suggestive of subacute stroke.  Follow-up CTA head and neck ordered    Diagnoses:  Stroke, right subcortical due to small vessel disease, recurrent  HIV on Salazar, compliant  Essential hypertension  Tobacco use  Obstructive sleep apnea, untreated    Pre-stroke MRS: 1  NIHSS: 4    Comment: 53-year-old man with prior right subcortical stroke now with recurrent stroke essentially in the same area.  This raises the question of underlying M1 atherosclerosis or a more localized area of atherosclerosis in these particular lenticulostriate vessels.  Most likely this is related to overall poor risk factor control    PLAN:   Continue aspirin, warfarin  Check CTA head and neck  Stop smoking  Needs to wear CPAP  Cautious normalization of blood pressure    Potential discharge later today depending on CTA results.

## 2023-09-30 NOTE — PROGRESS NOTES
ED OBSERVATION PROGRESS/DISCHARGE SUMMARY    Date of Admission: 9/29/2023   LOS: 0 days   PCP: Arias Lehman APRN    Final Diagnosis acute CVA      Subjective     Hospital Outcome:     Pleasant afebrile ambulatory 53-year-old black male admitted to the ED observation unit for slurred speech.  Had similar presentation in September 2022 when he was diagnosed with acute stroke.    Patient's antihypertensives were held this morning as he had a coughing spell, he failed his swallow study yesterday and is awaiting speech therapy today.    He was seen and evaluated by Dr. Machado with the neurology service who thinks the patient could potentially go home following CTA head neck.     I have discussed CTA neck findings with Dr. Mayfield he reports concern for acute pulmonary embolism and recommends a CTA of patient's chest.  Patient recently diagnosed with a DVT and is therapeutic on warfarin.  CTA chest pending.    Given that patient failed his swallow screen, speech therapy has not been able to see him today, he will need further blood pressure management and further evaluation of this possible pulmonary embolism I have discussed case with Dr. Levi on-call for [facility] team who has graciously accepted to admit patient to a telemetry inpatient bed.    ROS:  General: no fevers, chills  Respiratory: no cough, dyspnea  Cardiovascular: no chest pain, palpitations  Abdomen: No abdominal pain, nausea, vomiting, or diarrhea  Neurologic: No focal weakness    Objective   Physical Exam:  I have reviewed the vital signs.  Temp:  [97.8 °F (36.6 °C)-98.5 °F (36.9 °C)] 98.4 °F (36.9 °C)  Heart Rate:  [] 69  Resp:  [16] 16  BP: (136-166)/() 161/95  General Appearance:    Alert, cooperative, no distress  Head:    Normocephalic, atraumatic  Eyes:    Sclerae anicteric  Neck:   Supple, no mass  Lungs: Clear to auscultation bilaterally, respirations unlabored  Heart: Regular rate and rhythm, S1 and S2 normal, no murmur, rub or  gallop  Abdomen:  Soft, non-tender, bowel sounds active, nondistended  Extremities: No clubbing, cyanosis, or edema to lower extremities  Pulses:  2+ and symmetric in distal lower extremities  Skin: No rashes   Neurologic: Oriented x3, Normal strength to extremities    Results Review:    I have reviewed the labs, radiology results and diagnostic studies.    Results from last 7 days   Lab Units 09/30/23  0440   WBC 10*3/mm3 7.27   HEMOGLOBIN g/dL 14.2   HEMATOCRIT % 41.8   PLATELETS 10*3/mm3 208     Results from last 7 days   Lab Units 09/30/23  0557 09/29/23  1728 09/27/23  0448 09/26/23  1530   SODIUM mmol/L 137 135* 139 139   POTASSIUM mmol/L 3.6 3.8 3.2* 3.6   CHLORIDE mmol/L 104 99 103 102   CO2 mmol/L 25.6 24.9 24.2 25.2   BUN mg/dL 9 8 10 10   CREATININE mg/dL 0.69* 0.97 0.80 0.93   CALCIUM mg/dL 9.0 8.9 8.7 9.3   BILIRUBIN mg/dL <0.2 <0.2  --  <0.2   ALK PHOS U/L 63 74  --  76   ALT (SGPT) U/L 28 32  --  15   AST (SGOT) U/L 19 28  --  12   GLUCOSE mg/dL 228* 338* 228* 288*     Imaging Results (Last 24 Hours)       Procedure Component Value Units Date/Time    MRI Brain Without Contrast [[id number]] Collected: 09/29/23 2254     Updated: 09/29/23 2303    Narrative:      BRAIN MRI WITHOUT CONTRAST     HISTORY: Stroke, follow up; R29.810-Facial weakness; R47.81-Slurred  speech     COMPARISON: September 29, 2023.     FINDINGS:  Multiplanar images of the head were obtained without  gadolinium. There is an area of restricted diffusion which is noted  within the right corona radiata, and extending into the right basal  ganglia. Maximum size is 1.3 x 0.8 cm. No additional areas of restricted  diffusion are noted. There is no midline shift or mass effect.  Ventricles are normal in size. There is no evidence of hemorrhagic  transformation. There is some mild periventricular and deep white matter  microangiopathic change. Intracranial flow voids appear intact. Old  lacunar infarct is suspected within the right thalamus,  with another  suspected within the left basal ganglia. There is a left mastoid  effusion. Mucous retention cysts are noted within the left maxillary  sinus. There is some mucosal thickening within the ethmoid sinuses.       Impression:      1. Area of acute infarction involving the right corona radiata and basal  ganglia. No evidence of hemorrhagic transformation.        This report was finalized on 9/29/2023 11:00 PM by Dr. Isa Armendariz M.D.       XR Chest 1 View [[id number]] Collected: 09/29/23 1722     Updated: 09/29/23 1726    Narrative:      XR CHEST 1 VW-     HISTORY:    Fatigue, high blood pressure, history of diabetes.      COMPARISON: None.     FINDINGS:    2 views of the chest were obtained.     Support Devices:  None.  Cardiac Silhouette/Mediastinum/Kelley:  The cardiac, mediastinal, and  hilar contours are within normal limits. There is calcific  atherosclerosis.  Lungs/Pleural Spaces:  The lungs and pleural spaces are clear.  Chest Wall/Diaphragm/Upper Abdomen: The visualized osseous structures  are age-appropriate.        CONCLUSION(S):       1.  No focal consolidation or effusion.     This report was finalized on 9/29/2023 5:23 PM by Dr. Susanna Torre M.D.       CT Head Without Contrast [[id number]] Collected: 09/29/23 1711     Updated: 09/29/23 1723    Narrative:      CT HEAD WITHOUT CONTRAST     CLINICAL HISTORY: Slurred speech and left facial droop     TECHNIQUE: CT scan of the head was obtained with 3 mm axial soft tissue  algorithm images. No intravenous contrast was administered. Sagittal and  coronal reconstructions were obtained.     COMPARISON: CT head 10/10/2022 and 9/27/2022     FINDINGS:  No intracranial hemorrhage.  No midline shift or mass effect.  Unchanged bilateral chronic lacunar infarcts. No CT evidence of acute  territorial infarction.  No hydrocephalus.  Clear visualized portions of  the paranasal sinuses and mastoid air cells.             Impression:      No acute intracranial  abnormality.              Radiation dose reduction techniques were utilized, including automated  exposure control and exposure modulation based on body size.     This report was finalized on 9/29/2023 5:20 PM by Dr. Reginald Diaz M.D.               I have reviewed the medications.  ---------------------------------------------------------------------------------------------  Assessment & Plan   Assessment/Problem List    TIA (transient ischemic attack)      Plan:    Acute CVA  CTA h/n negative for acute thrombosis or dissection great vessels  Consult to neurology   MRI brain-Area of acute infarction involving the right corona radiata and basal  ganglia. No evidence of hemorrhagic transformation.  Patient has failed swallow screen, speech therapy consult pending     Diabetes  Accu-Cheks   Adult subcutaneous insulin management-moderate dose  Hemoglobin A1c -11.4     Hypertension  Monitor blood pressure  Continue losartan, Coreg, and amlodipine    History of DVT  Continue warfarin  Pharmacy to dose    Incidental imaging findings  CTA neck radiologist appreciates concern for acute pulmonary embolism  CTA chest pending      Disposition: Admitted to Dr. Gurmeet cobb to a telemetry inpatient bed      This note will serve as a transfer and progress note.    Marycarmen Bran, APRN 09/30/23 08:38 EDT    I have worn appropriate PPE during this patient encounter, sanitized my hands both with entering and exiting patient's room.      55 minutes has been spent by [facility] providers in the care of this patient while under observation status

## 2023-09-30 NOTE — PLAN OF CARE
Goal Outcome Evaluation:  Plan of Care Reviewed With: patient           Outcome Evaluation: functional expressie/receptive language abilities. However, Pt presents with moderate dysarthria marked by imprecise consonants and increased rate of speech.  Pt would benefit from speech treatment for dysarthria at the next level of care. Speech to follow as appropriate to ensure diet tolerance and speech tx

## 2023-09-30 NOTE — PROGRESS NOTES
[facility] Clinical Pharmacy Services: Warfarin Dosing/Monitoring Consult    Cecilio Puckett is a 53 y.o. male, estimated creatinine clearance is 153.9 mL/min (A) (by C-G formula based on SCr of 0.69 mg/dL (L)). weighing 98 kg (216 lb 0.8 oz).    Results from last 7 days   Lab Units 09/30/23  0440 09/29/23  2257 09/29/23  1640 09/27/23  1602 09/27/23  0853 09/27/23  0448 09/27/23  0006 09/26/23  1530   INR   --  2.65* 2.14*  --   --   --   --  2.73*   APTT seconds  --   --  35.1 39.4* 108.6*  --  >200.0* 37.2*   HEMOGLOBIN g/dL 14.2  --  14.9  --   --  14.7  --  14.9   HEMATOCRIT % 41.8  --  45.1  --   --  43.6  --  44.8   PLATELETS 10*3/mm3 208  --  210  --   --  162  --  167     Prior to admission anticoagulation: Per medication reconciliation, patient's most recent warfarin regimen is 15 mg on Mon/Wed/Fri and 10 mg all other days of the week (85 mg/week).    Hospital Anticoagulation:  Consulting provider: MARIANNE Cee  Start date: 9/29/23  Indication: Acute DVT in popliteal, posterial tibial and peroneal veins, recurrent stroke  Target INR: 2 - 3  Expected duration: TBD  Bridge Therapy: No      Potential food or drug interactions: No new interactions in the hospital    Education complete?/Date: No; plan for follow up TBD    INR Assessment:  Date INR Dose   9/29 2.14 15 mg PTA   9/30 2.65      Assessment/Plan:  INR is currently therapeutic at 2.65. There are no new interactions in the hospital. Will continue home dose at this time.   Monitor for any signs or symptoms of bleeding.  Follow up daily INRs and dose adjustments.    Pharmacy will continue to follow until discharge or discontinuation of warfarin.     Stephany Parsons, Pharm.D., [facility]   Clinical Pharmacist     ADDENDUM: Dr. Levi is stopping warfarin due to failure and starting a heparin drip.

## 2023-09-30 NOTE — PROGRESS NOTES
[facility] Clinical Pharmacy Services: Warfarin Dosing/Monitoring Consult    Cecilio Puckett is a 53 y.o. male, estimated creatinine clearance is 110.3 mL/min (by C-G formula based on SCr of 0.97 mg/dL). weighing 98.9 kg (218 lb).    Results from last 7 days   Lab Units 09/29/23  1640 09/27/23  1602 09/27/23  0853 09/27/23  0448 09/27/23  0006 09/26/23  1530   INR  2.14*  --   --   --   --  2.73*   APTT seconds 35.1 39.4* 108.6*  --  >200.0* 37.2*   HEMOGLOBIN g/dL 14.9  --   --  14.7  --  14.9   HEMATOCRIT % 45.1  --   --  43.6  --  44.8   PLATELETS 10*3/mm3 210  --   --  162  --  167     Prior to admission anticoagulation: Warfarin 10 mg nightly except 15 mg on Mon, Wed and Fri    Hospital Anticoagulation:  Consulting provider: MARIANNE Cee  Start date: 9/29  Indication: DVT/PE (active thrombosis)  Target INR: 2 - 3  Expected duration:    Bridge Therapy: No      Potential food or drug interactions:     Education complete?/Date: No; plan for follow up TBD    Assessment/Plan:  Dose: INR at goal.  Patient already took warfarin today prior to admission.  Will continue home regimen starting tomorrow.  Monitor for any signs or symptoms of bleeding  Follow up daily INRs and dose adjustments    Pharmacy will continue to follow until discharge or discontinuation of warfarin.     Caleb Polo III, [facility]  Clinical Pharmacist

## 2023-09-30 NOTE — PLAN OF CARE
Goal Outcome Evaluation:    Outcome Evaluation: Attempted to see pt for BSE this date. Per RN, pt is CECIL[place] in [place]. Speech to follow up later this date as time permits.

## 2023-09-30 NOTE — PLAN OF CARE
Problem: Adult Inpatient Plan of Care  Goal: Plan of Care Review  Outcome: Ongoing, Progressing  Flowsheets (Taken 9/29/2023 2051)  Progress: improving  Plan of Care Reviewed With: patient  Outcome Evaluation: pt. a&Ox4, pt is here for stroke rule out. MRI to be done tonight. NIH at a 1. for left arm weakness. will continue to monitor.  Goal: Patient-Specific Goal (Individualized)  Outcome: Ongoing, Progressing  Goal: Absence of Hospital-Acquired Illness or Injury  Outcome: Ongoing, Progressing  Goal: Optimal Comfort and Wellbeing  Outcome: Ongoing, Progressing  Goal: Readiness for Transition of Care  Outcome: Ongoing, Progressing  Intervention: Mutually Develop Transition Plan  Recent Flowsheet Documentation  Taken 9/29/2023 2016 by Cassidy Osuna, RN  Transportation Anticipated: family or friend will provide  Patient/Family Anticipated Services at Transition: none  Patient/Family Anticipates Transition to: home  Taken 9/29/2023 2010 by Cassidy Osuna, RN  Equipment Currently Used at Home: cane, straight     Problem: Adjustment to Illness (Stroke, Ischemic/Transient Ischemic Attack)  Goal: Optimal Coping  Outcome: Ongoing, Progressing     Problem: Bowel Elimination Impaired (Stroke, Ischemic/Transient Ischemic Attack)  Goal: Effective Bowel Elimination  Outcome: Ongoing, Progressing     Problem: Cerebral Tissue Perfusion (Stroke, Ischemic/Transient Ischemic Attack)  Goal: Optimal Cerebral Tissue Perfusion  Outcome: Ongoing, Progressing     Problem: Cognitive Impairment (Stroke, Ischemic/Transient Ischemic Attack)  Goal: Optimal Cognitive Function  Outcome: Ongoing, Progressing     Problem: Communication Impairment (Stroke, Ischemic/Transient Ischemic Attack)  Goal: Improved Communication Skills  Outcome: Ongoing, Progressing     Problem: Functional Ability Impaired (Stroke, Ischemic/Transient Ischemic Attack)  Goal: Optimal Functional Ability  Outcome: Ongoing, Progressing     Problem: Respiratory Compromise (Stroke,  Ischemic/Transient Ischemic Attack)  Goal: Effective Oxygenation and Ventilation  Outcome: Ongoing, Progressing     Problem: Sensorimotor Impairment (Stroke, Ischemic/Transient Ischemic Attack)  Goal: Improved Sensorimotor Function  Outcome: Ongoing, Progressing     Problem: Swallowing Impairment (Stroke, Ischemic/Transient Ischemic Attack)  Goal: Optimal Eating and Swallowing without Aspiration  Outcome: Ongoing, Progressing     Problem: Urinary Elimination Impaired (Stroke, Ischemic/Transient Ischemic Attack)  Goal: Effective Urinary Elimination  Outcome: Ongoing, Progressing   Goal Outcome Evaluation:  Plan of Care Reviewed With: patient        Progress: improving  Outcome Evaluation: pt. a&Ox4, pt is here for stroke rule out. MRI to be done tonight. NIH at a 1. for left arm weakness. will continue to monitor.

## 2023-09-30 NOTE — CONSULTS
Internal medicine consult    Referring physician  Dr. Lisbet Chavez    Chief complaint  Slurred speech    Reason for consult  Follow medical problems and take over the care    History of present illness  53-year-old -American male with history of diabetes mellitus hypertension HIV non-Hodgkin lymphoma coronary artery disease and DVT on Coumadin and was recently discharged from the hospital with acute on chronic DVT admitted to the observation unit with slurred speech and left arm weakness.  Patient remained in the observation unit and further evaluated by neurology and found to have right CVA and also found to have acute pulm embolism admitted to the hospital as he has therapeutic INR.  I am asked to follow patient for medical problems and take over the care.  At the time of interview he is fully alert oriented and give me a detailed history.    PAST MEDICAL HISTORY   Chronic obstructive pulmonary disease      Coronary artery disease      Diabetes mellitus      DVT (deep venous thrombosis)      Elevated cholesterol      Gastroesophageal reflux disease      H/O Cancer      H/O Ischemia of extremity      History of MI (myocardial infarction) 2019    History of snoring      History of transfusion      Hypertension      Non Hodgkin's lymphoma      Peripheral artery disease      Pulmonary embolism      Stroke        PAST SURGICAL HISTORY              Procedure Laterality Date    CARDIAC CATHETERIZATION        CORONARY ANGIOPLASTY WITH STENT PLACEMENT        FEMORAL ARTERY - FEMORAL ARTERY BYPASS GRAFT        PORTACATH PLACEMENT             FAMILY HISTORY           Problem Relation Age of Onset    Hypertension Mother      Diabetes Mother      Stroke Mother      Hypertension Maternal Grandmother        SOCIAL HISTORY                 Socioeconomic History    Marital status: Single   Tobacco Use    Smoking status: Every Day       Packs/day: 0.25       Types: Cigarettes       Start date: 01/1994   Vaping Use    Vaping  "Use: Never used   Substance and Sexual Activity    Alcohol use: Yes       Alcohol/week: 4.0 standard drinks       Types: 4 Cans of beer per week    Drug use: Not Currently    Sexual activity: Defer         ALLERGIES  Patient has no known allergies.  Home medications reviewed     REVIEW OF SYSTEMS  All systems reviewed and negative except for those discussed in HPI.      PHYSICAL EXAM  Blood pressure 169/98, pulse 76, temperature 98.2 °F (36.8 °C), temperature source Oral, resp. rate 16, height 185 cm (72.84\"), weight 98 kg (216 lb 0.8 oz), SpO2 99 %.    GENERAL: Awake and alert no acute distress  HEENT: Unremarkable  NECK: Supple  CV: regular rhythm, normal rate  RESPIRATORY: normal effort, moving air bilaterally  ABDOMEN: soft nontender nondistended bowel sounds positive  MUSCULOSKELETAL: no deformity  NEURO: Awake and alert follow commands with no speech problem and moves all 4 extremities  SKIN: warm, dry     LAB RESULTS  Lab Results (last 24 hours)       Procedure Component Value Units Date/Time    POC Glucose Once [[id number]]  (Abnormal) Collected: 09/30/23 1133    Specimen: Blood Updated: 09/30/23 1135     Glucose 201 mg/dL     POC Glucose Once [[id number]]  (Abnormal) Collected: 09/30/23 0922    Specimen: Blood Updated: 09/30/23 0924     Glucose 206 mg/dL     POC Glucose Once [[id number]]  (Abnormal) Collected: 09/30/23 0720    Specimen: Blood Updated: 09/30/23 0723     Glucose 212 mg/dL     Comprehensive Metabolic Panel [[id number]]  (Abnormal) Collected: 09/30/23 0557    Specimen: Blood from Arm, Right Updated: 09/30/23 0628     Glucose 228 mg/dL      BUN 9 mg/dL      Creatinine 0.69 mg/dL      Sodium 137 mmol/L      Potassium 3.6 mmol/L      Chloride 104 mmol/L      CO2 25.6 mmol/L      Calcium 9.0 mg/dL      Total Protein 6.7 g/dL      Albumin 3.4 g/dL      ALT (SGPT) 28 U/L      AST (SGOT) 19 U/L      Alkaline Phosphatase 63 U/L      Total Bilirubin <0.2 mg/dL      Globulin 3.3 gm/dL      A/G Ratio 1.0 g/dL "      BUN/Creatinine Ratio 13.0     Anion Gap 7.4 mmol/L      eGFR 111.3 mL/min/1.73     Narrative:      GFR Normal >60  Chronic Kidney Disease <60  Kidney Failure <15      CBC (No Diff) [[id number]]  (Normal) Collected: 09/30/23 0440    Specimen: Blood from Hand, Right Updated: 09/30/23 0515     WBC 7.27 10*3/mm3      RBC 4.83 10*6/mm3      Hemoglobin 14.2 g/dL      Hematocrit 41.8 %      MCV 86.5 fL      MCH 29.4 pg      MCHC 34.0 g/dL      RDW 14.0 %      RDW-SD 44.6 fl      MPV 11.4 fL      Platelets 208 10*3/mm3     POC Glucose Once [[id number]]  (Abnormal) Collected: 09/30/23 0025    Specimen: Blood Updated: 09/30/23 0031     Glucose 281 mg/dL     Protime-INR [[id number]]  (Abnormal) Collected: 09/29/23 2257    Specimen: Blood Updated: 09/29/23 2328     Protime 28.8 Seconds      INR 2.65    POC Glucose Once [[id number]]  (Abnormal) Collected: 09/29/23 2104    Specimen: Blood Updated: 09/29/23 2106     Glucose 236 mg/dL     Lipid Panel [[id number]]  (Abnormal) Collected: 09/29/23 1728    Specimen: Blood Updated: 09/29/23 1950     Total Cholesterol 192 mg/dL      Triglycerides 273 mg/dL      HDL Cholesterol 39 mg/dL      LDL Cholesterol  106 mg/dL      VLDL Cholesterol 47 mg/dL      LDL/HDL Ratio 2.52    Narrative:      Cholesterol Reference Ranges  (U.S. Department of Health and Human Services ATP III Classifications)    Desirable          <200 mg/dL  Borderline High    200-239 mg/dL  High Risk          >240 mg/dL      Triglyceride Reference Ranges  (U.S. Department of Health and Human Services ATP III Classifications)    Normal           <150 mg/dL  Borderline High  150-199 mg/dL  High             200-499 mg/dL  Very High        >500 mg/dL    HDL Reference Ranges  (U.S. Department of Health and Human Services ATP III Classifications)    Low     <40 mg/dl (major risk factor for CHD)  High    >60 mg/dl ('negative' risk factor for CHD)        LDL Reference Ranges  (U.S. Department of Health and Human Services ATP III  Classifications)    Optimal          <100 mg/dL  Near Optimal     100-129 mg/dL  Borderline High  130-159 mg/dL  High             160-189 mg/dL  Very High        >189 mg/dL    Hemoglobin A1c [[id number]]  (Abnormal) Collected: 09/29/23 1640    Specimen: Blood Updated: 09/29/23 1937     Hemoglobin A1C 11.40 %     Narrative:      Hemoglobin A1C Ranges:    Increased Risk for Diabetes  5.7% to 6.4%  Diabetes                     >= 6.5%  Diabetic Goal                < 7.0%    aPTT [[id number]]  (Normal) Collected: 09/29/23 1640    Specimen: Blood Updated: 09/29/23 1923     PTT 35.1 seconds     Comprehensive Metabolic Panel [[id number]]  (Abnormal) Collected: 09/29/23 1728    Specimen: Blood Updated: 09/29/23 1858     Glucose 338 mg/dL      BUN 8 mg/dL      Creatinine 0.97 mg/dL      Sodium 135 mmol/L      Potassium 3.8 mmol/L      Comment: Slight hemolysis detected by analyzer. Results may be affected.        Chloride 99 mmol/L      CO2 24.9 mmol/L      Calcium 8.9 mg/dL      Total Protein 7.1 g/dL      Albumin 3.7 g/dL      ALT (SGPT) 32 U/L      AST (SGOT) 28 U/L      Comment: Slight hemolysis detected by analyzer. Results may be affected.        Alkaline Phosphatase 74 U/L      Total Bilirubin <0.2 mg/dL      Globulin 3.4 gm/dL      A/G Ratio 1.1 g/dL      BUN/Creatinine Ratio 8.2     Anion Gap 11.1 mmol/L      eGFR 93.9 mL/min/1.73     Narrative:      GFR Normal >60  Chronic Kidney Disease <60  Kidney Failure <15      Urinalysis, Microscopic Only - Urine, Clean Catch [[id number]] Collected: 09/29/23 1729    Specimen: Urine, Clean Catch Updated: 09/29/23 1820     RBC, UA 0-2 /HPF      WBC, UA 0-2 /HPF      Bacteria, UA None Seen /HPF      Squamous Epithelial Cells, UA 0-2 /HPF      Hyaline Casts, UA 0-2 /LPF      Methodology Automated Microscopy    Urinalysis With Microscopic If Indicated (No Culture) - Urine, Clean Catch [[id number]]  (Abnormal) Collected: 09/29/23 1729    Specimen: Urine, Clean Catch Updated: 09/29/23  1820     Color, UA Yellow     Appearance, UA Clear     pH, UA <=5.0     Specific Gravity, UA >=1.030     Glucose, UA >=1000 mg/dL (3+)     Ketones, UA Negative     Bilirubin, UA Negative     Blood, UA Trace     Protein,  mg/dL (2+)     Leuk Esterase, UA Negative     Nitrite, UA Negative     Urobilinogen, UA 0.2 E.U./dL    Protime-INR [[id number]]  (Abnormal) Collected: 09/29/23 1640    Specimen: Blood Updated: 09/29/23 1659     Protime 24.3 Seconds      INR 2.14    CBC & Differential [[id number]]  (Normal) Collected: 09/29/23 1640    Specimen: Blood Updated: 09/29/23 1650    Narrative:      The following orders were created for panel order CBC & Differential.  Procedure                               Abnormality         Status                     ---------                               -----------         ------                     CBC Auto Differential[[id number]]        Normal              Final result                 Please view results for these tests on the individual orders.    CBC Auto Differential [[id number]]  (Normal) Collected: 09/29/23 1640    Specimen: Blood Updated: 09/29/23 1650     WBC 7.84 10*3/mm3      RBC 5.12 10*6/mm3      Hemoglobin 14.9 g/dL      Hematocrit 45.1 %      MCV 88.1 fL      MCH 29.1 pg      MCHC 33.0 g/dL      RDW 13.7 %      RDW-SD 44.1 fl      MPV 11.0 fL      Platelets 210 10*3/mm3      Neutrophil % 55.5 %      Lymphocyte % 35.6 %      Monocyte % 6.9 %      Eosinophil % 0.8 %      Basophil % 0.9 %      Immature Grans % 0.3 %      Neutrophils, Absolute 4.36 10*3/mm3      Lymphocytes, Absolute 2.79 10*3/mm3      Monocytes, Absolute 0.54 10*3/mm3      Eosinophils, Absolute 0.06 10*3/mm3      Basophils, Absolute 0.07 10*3/mm3      Immature Grans, Absolute 0.02 10*3/mm3      nRBC 0.0 /100 WBC           Imaging Results (Last 24 Hours)       Procedure Component Value Units Date/Time    CT Angiogram Carotids [[id number]] Collected: 09/30/23 1211     Updated: 09/30/23 1512    Narrative:       CT ANGIOGRAM HEAD AND NECK WITH IV CONTRAST     HISTORY: 53-year-old male with acute infarction involving the right  corona radiata and basal ganglia demonstrated on MRI brain yesterday.      TECHNIQUE: CT angiogram head and neck includes axial imaging with  intravenous contrast and data reconstructed in coronal and sagittal  planes. AI analysis of LVO was utilized. 3-dimensional volume rendering  performed.      COMPARISON: CT angiogram head and neck 09/27/2022, MRI brain 10/10/2022,  CT head 09/29/2023, MRI brain 09/29/2023.     FINDINGS: On the most superior images there appear to be filling defects  within the distal right main pulmonary artery as well as within the  right upper lobe and left upper lobe segmental pulmonary arteries.  Recommend further evaluation with CT angiogram chest.     Atherosclerotic calcifications are present involving the aortic arch and  great vessel origins. Both common carotid arteries are patent though  there is mural thrombus involving both common carotid arteries and  internal carotid arteries. There is calcified plaque at the origins of  both internal carotid arteries which exhibit wall irregularity. There is  no evidence for NASCET stenosis. The distal cervical, petrous, internal  carotid arteries are patent. There is partially calcified plaque  involving cavernous segments of both internal carotid arteries with  moderate stenoses. Supracavernous internal carotid arteries, both middle  cerebral arteries, anterior cerebral arteries are patent.     The left vertebral artery arises from the left subclavian artery just  distal to its origin. Evaluation of the proximal right vertebral artery  is limited by small size as well as streak artifact related to contrast  within collateral veins along the right neck base. Left vertebral artery  is larger than the right. Both mid and distal cervical arteries exhibit  flow. There is flow within the basilar artery and both posterior  cerebral  arteries.     Noncontrasted head CT demonstrates low density within the right corona  radiata extending to the superior right lentiform nucleus similar to  areas of restricted diffusion demonstrated on yesterday's brain MRI.  There are also chronic infarctions involving the right caudate body and  right thalamus that were initially demonstrated on previous brain MRI  10/10/2022. There is no evidence for hemorrhagic transformation. Post  contrast-enhanced head CT demonstrates no abnormal intracranial  enhancement.       Impression:      1. There appear to be bilateral pulmonary thromboembolic disease with  embolus within the distal right main pulmonary artery and upper lobe  segmental branches. Recommend further evaluation with CT angiogram  chest.  2. Atherosclerotic disease with noncalcified plaques involving both  common carotid and internal carotid arteries with mild narrowing though  without NASCET stenosis.  3. Small acute infarctions involving the right corona radiata and right  basal ganglia without evidence for hemorrhagic transformation.     Findings discussed with MARIANNE Marie in the emergency department on  09/30/2023 at 11:55 a.m.      Radiation dose reduction techniques were utilized, including automated  exposure control and exposure modulation based on body size.        This report was finalized on 9/30/2023 3:09 PM by Dr. Jesus Mayfield M.D.       CT Angiogram Head [[id number]] Collected: 09/30/23 1211     Updated: 09/30/23 1512    Narrative:      CT ANGIOGRAM HEAD AND NECK WITH IV CONTRAST     HISTORY: 53-year-old male with acute infarction involving the right  corona radiata and basal ganglia demonstrated on MRI brain yesterday.      TECHNIQUE: CT angiogram head and neck includes axial imaging with  intravenous contrast and data reconstructed in coronal and sagittal  planes. AI analysis of LVO was utilized. 3-dimensional volume rendering  performed.      COMPARISON: CT angiogram head and  neck 09/27/2022, MRI brain 10/10/2022,  CT head 09/29/2023, MRI brain 09/29/2023.     FINDINGS: On the most superior images there appear to be filling defects  within the distal right main pulmonary artery as well as within the  right upper lobe and left upper lobe segmental pulmonary arteries.  Recommend further evaluation with CT angiogram chest.     Atherosclerotic calcifications are present involving the aortic arch and  great vessel origins. Both common carotid arteries are patent though  there is mural thrombus involving both common carotid arteries and  internal carotid arteries. There is calcified plaque at the origins of  both internal carotid arteries which exhibit wall irregularity. There is  no evidence for NASCET stenosis. The distal cervical, petrous, internal  carotid arteries are patent. There is partially calcified plaque  involving cavernous segments of both internal carotid arteries with  moderate stenoses. Supracavernous internal carotid arteries, both middle  cerebral arteries, anterior cerebral arteries are patent.     The left vertebral artery arises from the left subclavian artery just  distal to its origin. Evaluation of the proximal right vertebral artery  is limited by small size as well as streak artifact related to contrast  within collateral veins along the right neck base. Left vertebral artery  is larger than the right. Both mid and distal cervical arteries exhibit  flow. There is flow within the basilar artery and both posterior  cerebral arteries.     Noncontrasted head CT demonstrates low density within the right corona  radiata extending to the superior right lentiform nucleus similar to  areas of restricted diffusion demonstrated on yesterday's brain MRI.  There are also chronic infarctions involving the right caudate body and  right thalamus that were initially demonstrated on previous brain MRI  10/10/2022. There is no evidence for hemorrhagic transformation.  Post  contrast-enhanced head CT demonstrates no abnormal intracranial  enhancement.       Impression:      1. There appear to be bilateral pulmonary thromboembolic disease with  embolus within the distal right main pulmonary artery and upper lobe  segmental branches. Recommend further evaluation with CT angiogram  chest.  2. Atherosclerotic disease with noncalcified plaques involving both  common carotid and internal carotid arteries with mild narrowing though  without NASCET stenosis.  3. Small acute infarctions involving the right corona radiata and right  basal ganglia without evidence for hemorrhagic transformation.     Findings discussed with MARIANNE Marie in the emergency department on  09/30/2023 at 11:55 a.m.      Radiation dose reduction techniques were utilized, including automated  exposure control and exposure modulation based on body size.        This report was finalized on 9/30/2023 3:09 PM by Dr. Jesus Mayfield M.D.       CT Angiogram Chest [[id number]] Collected: 09/30/23 1336     Updated: 09/30/23 1342    Narrative:      EXAM: CT ANGIOGRAM CHEST-     HISTORY: Incidental pulmonary embolism on neck angiogram.     TECHNIQUE: Radiation dose reduction techniques were utilized, including  automated exposure control and exposure modulation based on body size.   3 mm images were obtained through the chest after the administration of  IV contrast.     COMPARISON: CT head neck angiogram 9/30/2023        FINDINGS: Mildly dilated main pulmonary artery (33 mm). Acute pulmonary  emboli throughout all 5 lobes of the lungs, right greater than left.  Most proximal embolus is within the interlobar artery. No CT evidence of  right heart strain.     Heart is normal in size. No pericardial fluid. Nondilated thoracic  aorta. No mediastinal/hilar lymphadenopathy. Nondilated esophagus.     Trachea and main bronchi are patent. Diffuse mild bronchial wall  thickening. No bronchiectasis. No pleural effusion.              Impression:      1. Acute pulmonary emboli throughout all 5 lobes of the lungs, right  greater than left. Most proximal embolus is within the interlobar  artery.  2. Mildly dilated main pulmonary artery (33 mm). No CT evidence of right  heart strain.  3. Diffuse mild bronchitis.     Above findings were discussed with Marycarmen LOPES at 1:38 p.m. on  9/30/2023.     This report was finalized on 9/30/2023 1:38 PM by Dr. Reginald Diaz M.D.       MRI Brain Without Contrast [[id number]] Collected: 09/29/23 2254     Updated: 09/29/23 2303    Narrative:      BRAIN MRI WITHOUT CONTRAST     HISTORY: Stroke, follow up; R29.810-Facial weakness; R47.81-Slurred  speech     COMPARISON: September 29, 2023.     FINDINGS:  Multiplanar images of the head were obtained without  gadolinium. There is an area of restricted diffusion which is noted  within the right corona radiata, and extending into the right basal  ganglia. Maximum size is 1.3 x 0.8 cm. No additional areas of restricted  diffusion are noted. There is no midline shift or mass effect.  Ventricles are normal in size. There is no evidence of hemorrhagic  transformation. There is some mild periventricular and deep white matter  microangiopathic change. Intracranial flow voids appear intact. Old  lacunar infarct is suspected within the right thalamus, with another  suspected within the left basal ganglia. There is a left mastoid  effusion. Mucous retention cysts are noted within the left maxillary  sinus. There is some mucosal thickening within the ethmoid sinuses.       Impression:      1. Area of acute infarction involving the right corona radiata and basal  ganglia. No evidence of hemorrhagic transformation.        This report was finalized on 9/29/2023 11:00 PM by Dr. Isa Armendariz M.D.       XR Chest 1 View [[id number]] Collected: 09/29/23 1722     Updated: 09/29/23 1726    Narrative:      XR CHEST 1 VW-     HISTORY:    Fatigue, high blood pressure, history of  diabetes.      COMPARISON: None.     FINDINGS:    2 views of the chest were obtained.     Support Devices:  None.  Cardiac Silhouette/Mediastinum/Kelley:  The cardiac, mediastinal, and  hilar contours are within normal limits. There is calcific  atherosclerosis.  Lungs/Pleural Spaces:  The lungs and pleural spaces are clear.  Chest Wall/Diaphragm/Upper Abdomen: The visualized osseous structures  are age-appropriate.        CONCLUSION(S):       1.  No focal consolidation or effusion.     This report was finalized on 9/29/2023 5:23 PM by Dr. Susanna Torre M.D.       CT Head Without Contrast [[id number]] Collected: 09/29/23 1711     Updated: 09/29/23 1723    Narrative:      CT HEAD WITHOUT CONTRAST     CLINICAL HISTORY: Slurred speech and left facial droop     TECHNIQUE: CT scan of the head was obtained with 3 mm axial soft tissue  algorithm images. No intravenous contrast was administered. Sagittal and  coronal reconstructions were obtained.     COMPARISON: CT head 10/10/2022 and 9/27/2022     FINDINGS:  No intracranial hemorrhage.  No midline shift or mass effect.  Unchanged bilateral chronic lacunar infarcts. No CT evidence of acute  territorial infarction.  No hydrocephalus.  Clear visualized portions of  the paranasal sinuses and mastoid air cells.             Impression:      No acute intracranial abnormality.              Radiation dose reduction techniques were utilized, including automated  exposure control and exposure modulation based on body size.     This report was finalized on 9/29/2023 5:20 PM by Dr. Reginald Diaz M.D.             Results  Scan on 9/29/2023 1633 by [facility], [place]: ECG 12-LEAD         Author: -- Service: -- Author Type: --   Filed: Date of Service: Creation Time:   Status: (Other)   HEART RATE= 86  bpm  RR Interval= 698  ms  ME Interval= 159  ms  P Horizontal Axis= -10  deg  P Front Axis= 18  deg  QRSD Interval= 98  ms  QT Interval= 366  ms  QTcB= 438  ms  QRS Axis= -26  deg  T  Wave Axis= 9  deg  - ABNORMAL ECG -  Sinus rhythm  Left ventricular hypertrophy  Lateral infarct, age indeterminate  Anterior ST elevation, probably due to LVH          Current Facility-Administered Medications:     amLODIPine (NORVASC) tablet 10 mg, 10 mg, Oral, Daily, Lisbet Chavez APRN, 10 mg at 09/30/23 1538    aspirin tablet 325 mg, 325 mg, Oral, Daily, 325 mg at 09/30/23 1538 **OR** [DISCONTINUED] aspirin suppository 300 mg, 300 mg, Rectal, Daily, Marycarmen Bran APRN    atorvastatin (LIPITOR) tablet 80 mg, 80 mg, Oral, Nightly, Osbaldo Levi MD    carvedilol (COREG) tablet 6.25 mg, 6.25 mg, Oral, Q12H, Lisbet Chavez APRN, 6.25 mg at 09/30/23 1426    Vgdchao-Ftafb-Ybssbzcx-TenofAF (GENVOYA) [phone] MG per tablet 1 tablet, 1 tablet, Oral, Daily, Lisbet Chavez APRN    empagliflozin (JARDIANCE) tablet 25 mg, 25 mg, Oral, Daily, Lisbet Chavez APRN    heparin (porcine) 5000 UNIT/ML injection 2,900-5,900 Units, 30-60 Units/kg, Intravenous, Q6H PRN, Osbaldo Levi MD    heparin 62236 units/250 mL (100 units/mL) in 0.45 % NaCl infusion, 12 Units/kg/hr, Intravenous, Titrated, Osbaldo Levi MD    hydrALAZINE (APRESOLINE) injection 10 mg, 10 mg, Intravenous, Q6H PRN, Marycarmen Bran APRN    insulin glargine (LANTUS, SEMGLEE) injection 15 Units, 15 Units, Subcutaneous, Nightly, Osbaldo Levi MD    insulin lispro (HUMALOG/ADMELOG) injection 2-9 Units, 2-9 Units, Subcutaneous, 4x Daily AC & at Bedtime, Lisbet Chavez APRN, 4 Units at 09/30/23 1426    insulin lispro (HUMALOG/ADMELOG) injection 5 Units, 5 Units, Subcutaneous, TID With Meals, Osbaldo Levi MD    [START ON 10/1/2023] losartan (COZAAR) tablet 50 mg, 50 mg, Oral, Daily, Osbaldo Levi MD    ondansetron (ZOFRAN) injection 4 mg, 4 mg, Intravenous, Q6H PRN, Marycarmen Bran, MARIANNE    pantoprazole (PROTONIX) EC tablet 40 mg, 40 mg, Oral, Q AM, Lisbet Chavez, APRN, 40 mg at 09/30/23 0511    sertraline (ZOLOFT) tablet 50 mg, 50 mg, Oral, Nightly, Osbaldo Levi,  MD    [COMPLETED] Insert Peripheral IV, , , Once **AND** sodium chloride 0.9 % flush 10 mL, 10 mL, Intravenous, PRN, Severo Nice II, MD    sodium chloride 0.9 % flush 10 mL, 10 mL, Intravenous, PRN, Shant, Marycarmen, APRN    sodium chloride 0.9 % infusion 40 mL, 40 mL, Intravenous, PRN, Hertiffanie, Marycarmen, APRN       ASSESSMENT  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  Acute right pulmonary embolism  History of left leg DVT  Diabetes mellitus  Hypertension  Hyperlipidemia  Non-Hodgkin lymphoma  HIV  Tobacco abuse  Depression  Coronary artery disease    PLAN  Agree with current care  Aspirin Lipitor  Start heparin and hold Coumadin  Hematology consult  Neurology to follow patient  Adjust home medications  Stress ulcer DVT prophylaxis  Supportive care  PT OT  Patient is full code  Discussed with nursing staff  Follow closely and further recommendation current hospital course    CHYNA NOLAN MD

## 2023-09-30 NOTE — CONSULTS
"Nutrition Services    Patient Name:  Cecilio Puckett  YOB: 1970  MRN: [id number]  Admit Date:  9/29/2023    Assessment Date:  09/30/23    Summary: Nutrition consult per nurse admission screen.  Admitted with weakness and slurred speech.  TIA.      Weight stable per chart weight history.  No PO intake available at this time per chart review.      RD to follow along as needed.    CLINICAL NUTRITION ASSESSMENT      Reason for Assessment Nurse Admission Screen, Nurse or Nurse Practitioner Consult     Diagnosis/Problem   TIA   Medical/Surgical History Past Medical History:   Diagnosis Date    COPD (chronic obstructive pulmonary disease)     Coronary artery disease     Diabetes mellitus     DVT (deep venous thrombosis)     Elevated cholesterol     GERD (gastroesophageal reflux disease)     H/O Cancer     right arm, in throat, chest and stomach, in remission    H/O Ischemia of extremity     History of MI (myocardial infarction) 2019    History of snoring     History of transfusion     HIV (human immunodeficiency virus infection)     Hypertension     Non Hodgkin's lymphoma     PAD (peripheral artery disease)     Pulmonary embolism     Stroke        Past Surgical History:   Procedure Laterality Date    CARDIAC CATHETERIZATION      CORONARY ANGIOPLASTY WITH STENT PLACEMENT      FEMORAL ARTERY - FEMORAL ARTERY BYPASS GRAFT      PORTACATH PLACEMENT          Anthropometrics        Current Height  Current Weight  BMI kg/m2 Height: 185 cm (72.84\")  Weight: 98 kg (216 lb 0.8 oz) (09/30/23 0826)  Body mass index is 28.63 kg/m².   Adjusted BMI (if applicable)    BMI Category Overweight (25 - 29.9)   Ideal Body Weight (IBW) 183 lb (83 kg)   Usual Body Weight (UBW) 215 lb (9/2022)   Weight Trend Stable   Weight History Wt Readings from Last 30 Encounters:   09/30/23 0826 98 kg (216 lb 0.8 oz)   09/29/23 2008 98.9 kg (218 lb)   09/29/23 1939 96.6 kg (213 lb)   09/29/23 1536 98.9 kg (218 lb)   09/26/23 2128 98.6 kg (217 lb 6 " oz)   09/26/23 1445 98.9 kg (218 lb)   10/11/22 0908 97.5 kg (214 lb 15.2 oz)   10/10/22 1720 97.7 kg (215 lb 6.2 oz)   10/10/22 1623 97.7 kg (215 lb 6.2 oz)   10/10/22 1920 97.5 kg (215 lb)   09/27/22 1405 97.7 kg (215 lb 6.2 oz)      --  Labs       Pertinent Labs    Results from last 7 days   Lab Units 09/30/23  0557 09/29/23  1728 09/27/23  0448 09/26/23  1530   SODIUM mmol/L 137 135* 139 139   POTASSIUM mmol/L 3.6 3.8 3.2* 3.6   CHLORIDE mmol/L 104 99 103 102   CO2 mmol/L 25.6 24.9 24.2 25.2   BUN mg/dL 9 8 10 10   CREATININE mg/dL 0.69* 0.97 0.80 0.93   CALCIUM mg/dL 9.0 8.9 8.7 9.3   BILIRUBIN mg/dL <0.2 <0.2  --  <0.2   ALK PHOS U/L 63 74  --  76   ALT (SGPT) U/L 28 32  --  15   AST (SGOT) U/L 19 28  --  12   GLUCOSE mg/dL 228* 338* 228* 288*     Results from last 7 days   Lab Units 09/30/23  0557 09/30/23  0440 09/29/23 1728 09/29/23  1640 09/27/23 0448   MAGNESIUM mg/dL  --   --   --   --  1.7   HEMOGLOBIN g/dL  --  14.2  --    < > 14.7   HEMATOCRIT %  --  41.8  --    < > 43.6   WBC 10*3/mm3  --  7.27  --    < > 6.72   TRIGLYCERIDES mg/dL  --   --  273*  --   --    ALBUMIN g/dL 3.4*  --  3.7  --   --     < > = values in this interval not displayed.     Results from last 7 days   Lab Units 09/30/23  0440 09/29/23  2257 09/29/23  1640 09/27/23  1602 09/27/23  0853 09/27/23  0448 09/27/23  0006 09/26/23  1530   INR   --  2.65* 2.14*  --   --   --   --  2.73*   APTT seconds  --   --  35.1 39.4* 108.6*  --  >200.0* 37.2*   PLATELETS 10*3/mm3 208  --  210  --   --  162  --  167     No results found for: COVID19  Lab Results   Component Value Date    HGBA1C 11.40 (H) 09/29/2023          Medications           Scheduled Medications amLODIPine, 10 mg, Oral, Daily  aspirin, 325 mg, Oral, Daily   Or  aspirin, 300 mg, Rectal, Daily  atorvastatin, 40 mg, Oral, Nightly  carvedilol, 6.25 mg, Oral, Q12H  Ysfycdw-Fvdbm-Asiapvqj-TenofAF, 1 tablet, Oral, Daily  empagliflozin, 25 mg, Oral, Daily  insulin glargine, 25 Units,  Subcutaneous, Nightly  insulin lispro, 2-9 Units, Subcutaneous, 4x Daily AC & at Bedtime  losartan, 25 mg, Oral, Daily  pantoprazole, 40 mg, Oral, Q AM  sertraline, 50 mg, Oral, Daily  sodium chloride, 10 mL, Intravenous, Q12H  warfarin, 10 mg, Oral, Once per day on Sun Tue Thu Sat  [START ON 10/2/2023] warfarin, 15 mg, Oral, Once per day on Mon Wed Fri       Infusions Pharmacy to dose warfarin,        PRN Medications   dextrose    dextrose    glucagon (human recombinant)    ondansetron    Pharmacy to dose warfarin    [COMPLETED] Insert Peripheral IV **AND** sodium chloride    sodium chloride    sodium chloride     Physical Findings          General Findings alert, oriented, overweight, room air   Oral/Mouth Cavity tooth or teeth missing   Edema  no edema   Gastrointestinal normoactive, last bowel movement: 9/29   Skin  bruising   Tubes/Drains/Lines none   NFPE Not indicated at this time   --  Current Nutrition Orders & Evaluation of Intake       Oral Nutrition     Food Allergies NKFA   Current PO Diet Diet: Diabetic Diets; Consistent Carbohydrate; Texture: Regular Texture (IDDSI 7); Fluid Consistency: Thin (IDDSI 0)   Supplement n/a   PO Evaluation     % PO Intake No intake available     Factors Affecting Intake: No factors at this time   --  PES STATEMENT / NUTRITION DIAGNOSIS      Nutrition Dx Problem  Problem: Nutrition Appropriate for Condition at this Time  Etiology: Medical Diagnosis - TIA    Signs/Symptoms: Report/Observation     NUTRITION INTERVENTION / PLAN OF CARE      Intervention Goal(s) Maintain nutrition status, Reduce/improve symptoms, Disease management/therapy, Establish PO intake, Tolerate PO , and No significant weight loss         RD Intervention/Action Continue to monitor and Care plan reviewed   --      Prescription/Orders:       PO Diet       Supplements       Enteral Nutrition       Parenteral Nutrition    New Prescription Ordered? No changes at this time   --      Monitor/Evaluation Per  protocol, PO intake, Pertinent labs, Weight, Symptoms   Discharge Plan/Needs Pending clinical course   --    RD to follow per protocol.      Electronically signed by:  Yamileth Fontaine RD  09/30/23 09:36 EDT

## 2023-09-30 NOTE — NURSING NOTE
Attending has not seen patient yet, neuro notified about ct angio results, will defer to attending at this time

## 2023-09-30 NOTE — THERAPY EVALUATION
Acute Care - Speech Language Pathology Initial Evaluation  [facility]     Patient Name: Cecilio Puckett  : 1970  MRN: [id number]  Today's Date: 2023               Admit Date: 2023     Visit Dx:    ICD-10-CM ICD-9-CM   1. Facial droop  R29.810 781.94   2. Slurred speech  R47.81 784.59     Patient Active Problem List   Diagnosis    Syncope, unspecified syncope type    Acute DVT (deep venous thrombosis)    Leg DVT (deep venous thromboembolism), acute, left    NHL (non-Hodgkin's lymphoma)    HIV (human immunodeficiency virus infection)    DM (diabetes mellitus), type 2    HTN (hypertension)    TIA (transient ischemic attack)    Acute CVA (cerebrovascular accident)     Past Medical History:   Diagnosis Date    COPD (chronic obstructive pulmonary disease)     Coronary artery disease     Diabetes mellitus     DVT (deep venous thrombosis)     Elevated cholesterol     GERD (gastroesophageal reflux disease)     H/O Cancer     right arm, in throat, chest and stomach, in remission    H/O Ischemia of extremity     History of MI (myocardial infarction) 2019    History of snoring     History of transfusion     HIV (human immunodeficiency virus infection)     Hypertension     Non Hodgkin's lymphoma     PAD (peripheral artery disease)     Pulmonary embolism     Stroke      Past Surgical History:   Procedure Laterality Date    CARDIAC CATHETERIZATION      CORONARY ANGIOPLASTY WITH STENT PLACEMENT      FEMORAL ARTERY - FEMORAL ARTERY BYPASS GRAFT      PORTACATH PLACEMENT         SLP Recommendation and Plan              Swallow Criteria for Skilled Therapeutic Interventions Met: demonstrates skilled criteria (23)  Anticipated Discharge Disposition (SLP): unknown (23)     Therapy Frequency (Swallow): PRN (23)  Predicted Duration Therapy Intervention (Days): until discharge (23)  Oral Care Recommendations: Oral Care BID/PRN (23)     Daily Summary of Progress (SLP):  progress toward functional goals as expected (09/30/23 1400)           Treatment Assessment (SLP): continued (09/30/23 1400)     Plan for Continued Treatment (SLP): continue treatment per plan of care (09/30/23 1400)  Plan of Care Reviewed With: patient (09/30/23 1437)  Outcome Evaluation: functional expressie/receptive language abilities. However, Pt presents with moderate dysarthria marked by imprecise consonants and increased rate of speech.  Pt would benefit from speech treatment for dysarthria at the next level of care (09/30/23 1437)      SLP EVALUATION (last 72 hours)       SLP SLC Evaluation       Row Name 09/30/23 1400       General Information    Pertinent History Of Current Problem Cecilio Puckett is a pleasant afebrile ambulatory 53 y.o. black male with a past medical history of HIV, CVA, diabetes, and hypertension.  -HF    Precautions/Limitations, Vision WFL;for purposes of eval  -HF    Precautions/Limitations, Hearing WFL;for purposes of eval  -HF    Plans/Goals Discussed with patient  RN  -HF    Barriers to Rehab none identified  -HF       Comprehension Assessment/Intervention    Comprehension Assessment/Intervention Auditory Comprehension  -HF       Auditory Comprehension Assessment/Intervention    Auditory Comprehension (Communication) WFL  -HF    Able to Identify Objects/Pictures (Communication) WFL;pictures of common objects;familiar objects  -HF    Answers Questions (Communication) yes/no;wh questions;personal;simple;complex;WFL  -HF    Able to Follow Commands (Communication) WFL  -HF    Auditory Comprehension Communication, Comment Pt follows all commands and answers all open ended questions appropriately.  -HF       Expression Assessment/Intervention    Expression Assessment/Intervention verbal expression  -HF       Verbal Expression Assessment/Intervention    Verbal Expression WFL  -HF    Automatic Speech (Communication) response to greeting;WFL  -HF    Repetition words;phrases;sentences;WFL  -HF  "   Phrase Completion WFL  -HF    Responsive Naming simple;WFL  -HF    Confrontational Naming WFL  -HF    Spontaneous/Functional Words WFL  -HF    Sentence Formulation WFL  -HF    Conversational Discourse/Fluency WFL  -HF    Verbal Expression, Comment No instances of anomia or paraphasias consistent with aphasia.  -HF       Motor Speech Assessment/Intervention    Motor Speech Function moderate impairment;unfamiliar listener  -HF    Characteristics Consistent with Dysarthria increased rate;decreased articulation  -HF    Characteristics Consistent with Apraxia distortions  -HF    Initiation of Phonation (Communication) WFL  -HF    Automatic Speech (Communication) WFL  -HF    Verbal Repetition (Communication) words of increasing length;phrases;sentences;WFL  -HF    Phase Completion WFL  -HF    Speech intelligibility 70%  approx 70% intelligible in conversation  -HF    Motor Speech, Comment Pt presents with moderate dysarthria marked by imprecise consonants and increased rate of speech. Abnormal AMR and SMRs. Adequate breath support as evidenced by good sustained \"ah\". Pt able to repeat sentences up to 10 words in length with distortions. Pt would benefit from speech treatment for dysarthria at the next level of care  -       Communication Treatment Objective and Progress Goals (SLP)    SLC LTGs Patient will demonstrate functional speech skills for return to discharge environment  -HF    Motor Speech/Dysarthria Treatment Objectives Motor Speech/Dysarthria Treatment Objectives (Group)  -HF       Motor Speech/Dysarthria Treatment Objectives    Articulation Selection articulation, SLP goal 1  -       Articulation Goal 1 (SLP)    Improve Articulation Goal 1 (SLP) of specific sounds in phrases;by over-articulating at phrase level;90%;with minimal cues (75-90%)  -    Time Frame (Articulation Goal 1, SLP) by discharge  -              User Key  (r) = Recorded By, (t) = Taken By, (c) = Cosigned By      Initials Name " Effective Dates    Nela Chambers SLP 08/01/23 -                        EDUCATION  The patient has been educated in the following areas:     Communication Impairment Dysphagia (Swallowing Impairment).           SLP GOALS       Row Name 09/30/23 1400       (LTG) Patient will demonstrate functional swallow for    Diet Texture (Demonstrate functional swallow) regular textures  -HF    Liquid viscosity (Demonstrate functional swallow) thin liquids  -HF    [place] (Demonstrate functional swallow) independently (over 90% accuracy)  -HF    Time Frame (Demonstrate functional swallow) by discharge  -HF       Articulation Goal 1 (SLP)    Improve Articulation Goal 1 (SLP) of specific sounds in phrases;by over-articulating at phrase level;90%;with minimal cues (75-90%)  -HF    Time Frame (Articulation Goal 1, SLP) by discharge  -HF              User Key  (r) = Recorded By, (t) = Taken By, (c) = Cosigned By      Initials Name Provider Type    Nela Chambers SLP Speech and Language Pathologist                            Time Calculation:      Time Calculation- SLP       Row Name 09/30/23 1438 09/30/23 1325          Time Calculation- SLP    SLP Start Time 1300  -HF --     SLP Received On 09/30/23  -HF 09/30/23  -HF        Untimed Charges    SLP Eval/Re-eval  ST Eval Speech Sound Production - 11298;ST Eval Oral Pharyng Swallow - 50205  -HF --               User Key  (r) = Recorded By, (t) = Taken By, (c) = Cosigned By      Initials Name Provider Type    Nela Chambers SLP Speech and Language Pathologist                    Therapy Charges for Today       Code Description Service Date Service Provider Modifiers Qty    51284118407 HC ST EVAL ORAL PHARYNG SWALLOW 3 9/30/2023 Nela Meadows SLP GN 1    92150430088 HC ST EVAL SPEECH AND PROD W LANG  4 9/30/2023 Nela Meadows SLP GN 1                       SYLVIE Simon  9/30/2023   and Acute Care - Speech Language Pathology   Swallow Initial Evaluation [facility]      Patient Name: Cecilio Puckett  : 1970  MRN: [id number]  Today's Date: 2023               Admit Date: 2023    Visit Dx:     ICD-10-CM ICD-9-CM   1. Facial droop  R29.810 781.94   2. Slurred speech  R47.81 784.59     Patient Active Problem List   Diagnosis    Syncope, unspecified syncope type    Acute DVT (deep venous thrombosis)    Leg DVT (deep venous thromboembolism), acute, left    NHL (non-Hodgkin's lymphoma)    HIV (human immunodeficiency virus infection)    DM (diabetes mellitus), type 2    HTN (hypertension)    TIA (transient ischemic attack)    Acute CVA (cerebrovascular accident)     Past Medical History:   Diagnosis Date    COPD (chronic obstructive pulmonary disease)     Coronary artery disease     Diabetes mellitus     DVT (deep venous thrombosis)     Elevated cholesterol     GERD (gastroesophageal reflux disease)     H/O Cancer     right arm, in throat, chest and stomach, in remission    H/O Ischemia of extremity     History of MI (myocardial infarction) 2019    History of snoring     History of transfusion     HIV (human immunodeficiency virus infection)     Hypertension     Non Hodgkin's lymphoma     PAD (peripheral artery disease)     Pulmonary embolism     Stroke      Past Surgical History:   Procedure Laterality Date    CARDIAC CATHETERIZATION      CORONARY ANGIOPLASTY WITH STENT PLACEMENT      FEMORAL ARTERY - FEMORAL ARTERY BYPASS GRAFT      PORTACATH PLACEMENT         SLP Recommendation and Plan  SLP Swallowing Diagnosis: swallow WFL/no suspected pharyngeal impairment (23 1400)  SLP Diet Recommendation: regular textures, thin liquids (23)  Recommended Precautions and Strategies: upright posture during/after eating, small bites of food and sips of liquid, general aspiration precautions (23)  SLP Rec. for Method of Medication Administration: meds whole, as tolerated (23)     Monitor for Signs of Aspiration: yes, notify SLP if any concerns  (09/30/23 1400)     Swallow Criteria for Skilled Therapeutic Interventions Met: demonstrates skilled criteria (09/30/23 1400)  Anticipated Discharge Disposition (SLP): unknown (09/30/23 1400)  Rehab Potential/Prognosis, Swallowing: good, to achieve stated therapy goals (09/30/23 1400)  Therapy Frequency (Swallow): PRN (09/30/23 1400)  Predicted Duration Therapy Intervention (Days): until discharge (09/30/23 1400)  Oral Care Recommendations: Oral Care BID/PRN (09/30/23 1400)        Daily Summary of Progress (SLP): progress toward functional goals as expected (09/30/23 1400)               Treatment Assessment (SLP): continued (09/30/23 1400)     Plan for Continued Treatment (SLP): continue treatment per plan of care (09/30/23 1400)       Oral Care Recommendations: Oral Care BID/PRN (09/30/23 1400)    Plan of Care Reviewed With: patient  Outcome Evaluation: functional expressie/receptive language abilities. However, Pt presents with moderate dysarthria marked by imprecise consonants and increased rate of speech.  Pt would benefit from speech treatment for dysarthria at the next level of care      SWALLOW EVALUATION (last 72 hours)       SLP Adult Swallow Evaluation       Row Name 09/30/23 1400       Rehab Evaluation    Document Type evaluation  -HF    Subjective Information no complaints  -HF    Patient Observations alert;cooperative;agree to therapy  -HF    Patient Effort good  -HF       General Information    Patient Profile Reviewed yes  -HF    Current Method of Nutrition NPO  -HF    Prior Level of Function-Swallowing no diet consistency restrictions  -HF       Pain    Additional Documentation Pain Scale: FACES Pre/Post-Treatment (Group)  -HF       Pain Scale: FACES Pre/Post-Treatment    Pain: FACES Scale, Pretreatment 0-->no hurt  -HF       Oral Motor Structure and Function    Dentition Assessment natural, present and adequate  -HF    Secretion Management WNL/WFL  -HF       Oral Musculature and Cranial Nerve  Assessment    Oral Motor General Assessment oral labial or buccal impairment  reduced L labial and facial ROM  -HF       General Eating/Swallowing Observations    Respiratory Support Currently in Use room air  -HF    Eating/Swallowing Skills self-fed  -HF    Positioning During Eating upright 90 degree;upright in bed  -HF       Clinical Swallow Eval    Clinical Swallow Evaluation Summary Pt seen at bedside this date He self fed without difficulty approx 4 oz of water by straw, 1 whole container of applesauce and peaches, and 2 amador crackers. Timel and functional mastication, no oral residue. Oral transit and swallow initiation suspect WFL. Subjectively functional laryngeal elevation per palpation. No overt s/s of aspiration at any time. Recommend regular diet, thin liquids, meds whole as able. general aspiration precautions. RN and pt verbalized understanding.  -HF       SLP Evaluation Clinical Impression    SLP Swallowing Diagnosis swallow WFL/no suspected pharyngeal impairment  -HF    Functional Impact no impact on function  -HF    Rehab Potential/Prognosis, Swallowing good, to achieve stated therapy goals  -HF    Swallow Criteria for Skilled Therapeutic Interventions Met demonstrates skilled criteria  -HF       SLP Treatment Clinical Impressions    Treatment Assessment (SLP) continued  -HF    Daily Summary of Progress (SLP) progress toward functional goals as expected  -HF    Plan for Continued Treatment (SLP) continue treatment per plan of care  -HF    Care Plan Review evaluation/treatment results reviewed  -HF       Recommendations    Therapy Frequency (Swallow) PRN  -HF    Predicted Duration Therapy Intervention (Days) until discharge  -HF    SLP Diet Recommendation regular textures;thin liquids  -HF    Recommended Precautions and Strategies upright posture during/after eating;small bites of food and sips of liquid;general aspiration precautions  -HF    Oral Care Recommendations Oral Care BID/PRN  -HF    SLP  Rec. for Method of Medication Administration meds whole;as tolerated  -HF    Monitor for Signs of Aspiration yes;notify SLP if any concerns  -HF    Anticipated Discharge Disposition (SLP) unknown  -HF       Swallow Goals (SLP)    Swallow LTGs Patient will demonstrate functional swallow for  -HF    Swallow STGs diet tolerance goal selection (SLP)  -HF    Diet Tolerance Goal Selection (SLP) Patient will tolerate trials of  -HF       (LTG) Patient will demonstrate functional swallow for    Diet Texture (Demonstrate functional swallow) regular textures  -HF    Liquid viscosity (Demonstrate functional swallow) thin liquids  -HF    [place] (Demonstrate functional swallow) independently (over 90% accuracy)  -HF    Time Frame (Demonstrate functional swallow) by discharge  -HF              User Key  (r) = Recorded By, (t) = Taken By, (c) = Cosigned By      Initials Name Effective Dates    Nela Chambers SLP 08/01/23 -                     EDUCATION  The patient has been educated in the following areas:   Communication Impairment Dysphagia (Swallowing Impairment).        SLP GOALS       Row Name 09/30/23 1400       (LTG) Patient will demonstrate functional swallow for    Diet Texture (Demonstrate functional swallow) regular textures  -HF    Liquid viscosity (Demonstrate functional swallow) thin liquids  -HF    [place] (Demonstrate functional swallow) independently (over 90% accuracy)  -HF    Time Frame (Demonstrate functional swallow) by discharge  -HF       Articulation Goal 1 (SLP)    Improve Articulation Goal 1 (SLP) of specific sounds in phrases;by over-articulating at phrase level;90%;with minimal cues (75-90%)  -HF    Time Frame (Articulation Goal 1, SLP) by discharge  -HF              User Key  (r) = Recorded By, (t) = Taken By, (c) = Cosigned By      Initials Name Provider Type    Nela Chambers SLP Speech and Language Pathologist                       Time Calculation:    Time Calculation- SLP        Row Name 09/30/23 1438 09/30/23 1325          Time Calculation- SLP    SLP Start Time 1300  -HF --     SLP Received On 09/30/23  -HF 09/30/23  -HF        Untimed Charges    SLP Eval/Re-eval  ST Eval Speech Sound Production - 15089;ST Eval Oral Pharyng Swallow - 65123  -HF --               User Key  (r) = Recorded By, (t) = Taken By, (c) = Cosigned By      Initials Name Provider Type     Nela Meadows SLP Speech and Language Pathologist                    Therapy Charges for Today       Code Description Service Date Service Provider Modifiers Qty    32773678876 HC ST EVAL ORAL PHARYNG SWALLOW 3 9/30/2023 Nela Meadows SLP GN 1    [id number] HC ST EVAL SPEECH AND PROD W LANG  4 9/30/2023 Nela Meadows SLP GN 1                 YSLVIE Simon  9/30/2023

## 2023-10-01 LAB
ALBUMIN SERPL-MCNC: 3.4 G/DL (ref 3.5–5.2)
ALBUMIN/GLOB SERPL: 1.1 G/DL
ALP SERPL-CCNC: 62 U/L (ref 39–117)
ALT SERPL W P-5'-P-CCNC: 28 U/L (ref 1–41)
ANION GAP SERPL CALCULATED.3IONS-SCNC: 11.9 MMOL/L (ref 5–15)
APTT PPP: 37.9 SECONDS (ref 22.7–35.4)
APTT PPP: >200 SECONDS (ref 22.7–35.4)
AST SERPL-CCNC: 20 U/L (ref 1–40)
BASOPHILS # BLD AUTO: 0.05 10*3/MM3 (ref 0–0.2)
BASOPHILS NFR BLD AUTO: 0.7 % (ref 0–1.5)
BILIRUB SERPL-MCNC: <0.2 MG/DL (ref 0–1.2)
BUN SERPL-MCNC: 11 MG/DL (ref 6–20)
BUN/CREAT SERPL: 13.4 (ref 7–25)
CALCIUM SPEC-SCNC: 9.1 MG/DL (ref 8.6–10.5)
CHLORIDE SERPL-SCNC: 101 MMOL/L (ref 98–107)
CHOLEST SERPL-MCNC: 182 MG/DL (ref 0–200)
CO2 SERPL-SCNC: 23.1 MMOL/L (ref 22–29)
CREAT SERPL-MCNC: 0.82 MG/DL (ref 0.76–1.27)
DEPRECATED RDW RBC AUTO: 44.1 FL (ref 37–54)
EGFRCR SERPLBLD CKD-EPI 2021: 105.7 ML/MIN/1.73
EOSINOPHIL # BLD AUTO: 0.08 10*3/MM3 (ref 0–0.4)
EOSINOPHIL NFR BLD AUTO: 1.1 % (ref 0.3–6.2)
ERYTHROCYTE [DISTWIDTH] IN BLOOD BY AUTOMATED COUNT: 14.1 % (ref 12.3–15.4)
GLOBULIN UR ELPH-MCNC: 3.1 GM/DL
GLUCOSE BLDC GLUCOMTR-MCNC: 121 MG/DL (ref 70–130)
GLUCOSE BLDC GLUCOMTR-MCNC: 126 MG/DL (ref 70–130)
GLUCOSE BLDC GLUCOMTR-MCNC: 133 MG/DL (ref 70–130)
GLUCOSE BLDC GLUCOMTR-MCNC: 95 MG/DL (ref 70–130)
GLUCOSE SERPL-MCNC: 135 MG/DL (ref 65–99)
HCT VFR BLD AUTO: 41.3 % (ref 37.5–51)
HDLC SERPL-MCNC: 43 MG/DL (ref 40–60)
HGB BLD-MCNC: 14 G/DL (ref 13–17.7)
LDLC SERPL CALC-MCNC: 124 MG/DL (ref 0–100)
LDLC/HDLC SERPL: 2.85 {RATIO}
LYMPHOCYTES # BLD AUTO: 3.93 10*3/MM3 (ref 0.7–3.1)
LYMPHOCYTES NFR BLD AUTO: 55.7 % (ref 19.6–45.3)
MCH RBC QN AUTO: 29.1 PG (ref 26.6–33)
MCHC RBC AUTO-ENTMCNC: 33.9 G/DL (ref 31.5–35.7)
MCV RBC AUTO: 85.9 FL (ref 79–97)
MONOCYTES # BLD AUTO: 0.57 10*3/MM3 (ref 0.1–0.9)
MONOCYTES NFR BLD AUTO: 8.1 % (ref 5–12)
NEUTROPHILS NFR BLD AUTO: 2.41 10*3/MM3 (ref 1.7–7)
NEUTROPHILS NFR BLD AUTO: 34.3 % (ref 42.7–76)
PLATELET # BLD AUTO: 208 10*3/MM3 (ref 140–450)
PMV BLD AUTO: 11.5 FL (ref 6–12)
POTASSIUM SERPL-SCNC: 3.5 MMOL/L (ref 3.5–5.2)
PROT SERPL-MCNC: 6.5 G/DL (ref 6–8.5)
QT INTERVAL: 366 MS
QTC INTERVAL: 438 MS
RBC # BLD AUTO: 4.81 10*6/MM3 (ref 4.14–5.8)
SODIUM SERPL-SCNC: 136 MMOL/L (ref 136–145)
TRIGL SERPL-MCNC: 83 MG/DL (ref 0–150)
TSH SERPL DL<=0.05 MIU/L-ACNC: 3.29 UIU/ML (ref 0.27–4.2)
VLDLC SERPL-MCNC: 15 MG/DL (ref 5–40)
WBC NRBC COR # BLD: 7.05 10*3/MM3 (ref 3.4–10.8)

## 2023-10-01 PROCEDURE — 80053 COMPREHEN METABOLIC PANEL: CPT | Performed by: HOSPITALIST

## 2023-10-01 PROCEDURE — 97166 OT EVAL MOD COMPLEX 45 MIN: CPT

## 2023-10-01 PROCEDURE — 80061 LIPID PANEL: CPT | Performed by: HOSPITALIST

## 2023-10-01 PROCEDURE — 85730 THROMBOPLASTIN TIME PARTIAL: CPT | Performed by: HOSPITALIST

## 2023-10-01 PROCEDURE — 63710000001 INSULIN LISPRO (HUMAN) PER 5 UNITS: Performed by: HOSPITALIST

## 2023-10-01 PROCEDURE — 82948 REAGENT STRIP/BLOOD GLUCOSE: CPT

## 2023-10-01 PROCEDURE — 85025 COMPLETE CBC W/AUTO DIFF WBC: CPT | Performed by: HOSPITALIST

## 2023-10-01 PROCEDURE — 63710000001 INSULIN GLARGINE PER 5 UNITS: Performed by: HOSPITALIST

## 2023-10-01 PROCEDURE — 25010000002 ENOXAPARIN PER 10 MG: Performed by: INTERNAL MEDICINE

## 2023-10-01 PROCEDURE — 99223 1ST HOSP IP/OBS HIGH 75: CPT | Performed by: INTERNAL MEDICINE

## 2023-10-01 PROCEDURE — 84443 ASSAY THYROID STIM HORMONE: CPT | Performed by: HOSPITALIST

## 2023-10-01 PROCEDURE — 97535 SELF CARE MNGMENT TRAINING: CPT

## 2023-10-01 RX ORDER — ENOXAPARIN SODIUM 100 MG/ML
100 INJECTION SUBCUTANEOUS EVERY 12 HOURS
Status: DISCONTINUED | OUTPATIENT
Start: 2023-10-01 | End: 2023-10-03 | Stop reason: HOSPADM

## 2023-10-01 RX ADMIN — ATORVASTATIN CALCIUM 40 MG: 20 TABLET, FILM COATED ORAL at 21:27

## 2023-10-01 RX ADMIN — ELVITEGRAVIR, COBICISTAT, EMTRICITABINE, AND TENOFOVIR ALAFENAMIDE 1 TABLET: 150; 150; 200; 10 TABLET ORAL at 08:14

## 2023-10-01 RX ADMIN — ENOXAPARIN SODIUM 100 MG: 100 INJECTION SUBCUTANEOUS at 13:20

## 2023-10-01 RX ADMIN — INSULIN GLARGINE 15 UNITS: 100 INJECTION, SOLUTION SUBCUTANEOUS at 21:27

## 2023-10-01 RX ADMIN — LOSARTAN POTASSIUM 50 MG: 50 TABLET, FILM COATED ORAL at 08:15

## 2023-10-01 RX ADMIN — INSULIN LISPRO 5 UNITS: 100 INJECTION, SOLUTION INTRAVENOUS; SUBCUTANEOUS at 17:35

## 2023-10-01 RX ADMIN — NICOTINE 1 PATCH: 21 PATCH, EXTENDED RELEASE TRANSDERMAL at 21:27

## 2023-10-01 RX ADMIN — PANTOPRAZOLE SODIUM 40 MG: 40 TABLET, DELAYED RELEASE ORAL at 06:49

## 2023-10-01 RX ADMIN — SERTRALINE 50 MG: 50 TABLET, FILM COATED ORAL at 21:28

## 2023-10-01 RX ADMIN — AMLODIPINE BESYLATE 10 MG: 10 TABLET ORAL at 08:14

## 2023-10-01 RX ADMIN — INSULIN LISPRO 5 UNITS: 100 INJECTION, SOLUTION INTRAVENOUS; SUBCUTANEOUS at 08:14

## 2023-10-01 RX ADMIN — EMPAGLIFLOZIN 25 MG: 25 TABLET, FILM COATED ORAL at 08:15

## 2023-10-01 RX ADMIN — CARVEDILOL 6.25 MG: 6.25 TABLET, FILM COATED ORAL at 21:27

## 2023-10-01 RX ADMIN — ASPIRIN 325 MG: 325 TABLET ORAL at 08:14

## 2023-10-01 RX ADMIN — CARVEDILOL 6.25 MG: 6.25 TABLET, FILM COATED ORAL at 08:14

## 2023-10-01 NOTE — THERAPY EVALUATION
Acute Care - Occupational Therapy Initial Evaluation  [facility]     Patient Name: Cecilio Puckett  : 1970  MRN: [id number]  Today's Date: 10/1/2023  Onset of Illness/Injury or Date of Surgery: 23  Date of Referral to OT: 23       Admit Date: 2023       ICD-10-CM ICD-9-CM   1. Facial droop  R29.810 781.94   2. Slurred speech  R47.81 784.59     Patient Active Problem List   Diagnosis    Syncope, unspecified syncope type    Acute DVT (deep venous thrombosis)    Leg DVT (deep venous thromboembolism), acute, left    NHL (non-Hodgkin's lymphoma)    HIV (human immunodeficiency virus infection)    DM (diabetes mellitus), type 2    HTN (hypertension)    TIA (transient ischemic attack)    Acute CVA (cerebrovascular accident)     Past Medical History:   Diagnosis Date    COPD (chronic obstructive pulmonary disease)     Coronary artery disease     Diabetes mellitus     DVT (deep venous thrombosis)     Elevated cholesterol     GERD (gastroesophageal reflux disease)     H/O Cancer     right arm, in throat, chest and stomach, in remission    H/O Ischemia of extremity     History of MI (myocardial infarction) 2019    History of snoring     History of transfusion     HIV (human immunodeficiency virus infection)     Hypertension     Non Hodgkin's lymphoma     PAD (peripheral artery disease)     Pulmonary embolism     Stroke      Past Surgical History:   Procedure Laterality Date    CARDIAC CATHETERIZATION      CORONARY ANGIOPLASTY WITH STENT PLACEMENT      FEMORAL ARTERY - FEMORAL ARTERY BYPASS GRAFT      PORTACATH PLACEMENT           OT ASSESSMENT FLOWSHEET (last 12 hours)       OT Evaluation and Treatment       Row Name 10/01/23 0929                   OT Time and Intention    Subjective Information complains of;pain  -CC        Document Type evaluation  -CC        Mode of Treatment occupational therapy  -CC        Total Minutes, Occupational Therapy 38  -CC        Patient Effort good  -CC           General  Information    Patient Profile Reviewed yes  -CC        Onset of Illness/Injury or Date of Surgery 09/29/23  -CC        Patient/Family/Caregiver Comments/Observations Pt presented to ER w slurred speech and increased :L sided weakness  -CC        Prior Level of Function independent:  -CC        Equipment Currently Used at Home cane, straight  -CC           Previous Level of Function/Home Environm    Bathing/Grooming, Premorbid Functional Level independent  -CC        Dressing, Premorbid Functional Level other (see comments)  had difficulty w buttons and clothing fastners  -CC        Eating/Feeding, Premorbid Functional Level independent  -CC        Toileting, Premorbid Functional Level independent  -CC        BADLs, Premorbid Functional Level independent  -CC        IADLs, Premorbid Functional Level independent  -CC        Transfers, Premorbid Functional Level independent  -CC        Household Ambulation, Premorbid Functional Level independent  -CC           Living Environment    Current Living Arrangements home  -CC        People in Home alone  -CC           Home Use of Assistive/Adaptive Equipment    Equipment Currently Used at Home cane, straight  -CC           Pain Assessment    Pretreatment Pain Rating 6/10  -CC        Posttreatment Pain Rating 6/10  -CC        Pain Location - Side/Orientation Left  -CC        Pain Location - hip  -CC           Cognition    Affect/Mental Status (Cognition) WFL  -CC        Orientation Status (Cognition) oriented x 3  -CC        Follows Commands (Cognition) follows one-step commands  -CC           Range of Motion Comprehensive    General Range of Motion bilateral upper extremity ROM WFL  -CC           Strength (Manual Muscle Testing)    Strength (Manual Muscle Testing) left upper extremity  -CC           Strength Comprehensive (MMT)    General Manual Muscle Testing (MMT) Assessment upper extremity strength deficits identified  -CC           Upper Extremity (Manual Muscle Testing)     Upper Extremity: Manual Muscle Testing (MMT) right UE strength is WNL;left shoulder strength deficit;left elbow/forearm strength deficit;left wrist strength deficit  -           Left Shoulder (Manual Muscle Testing)    Left Shoulder Manual Muscle Testing (MMT) flexion  -CC        MMT: Flexion, Left Shoulder flexion  -CC        MMT, Gross Movement: Left Shoulder Flexion (4-/5) good minus  -CC           Left Elbow/Forearm (Manual Muscle Testing)    Left Elbow/Forearm Manual Muscle Testing (MMT) flexion  -CC        MMT: Flexion, Left Elbow flexion  -CC        MMT, Gross Movement: Left Elbow Flexion (4-/5) good minus  -CC           Left Wrist (Manual Muscle Testing)    Left Wrist Manual Muscle Testing (MMT) flexion  -CC        MMT, Gross Movement: Left Wrist Flexion (4-/5) good minus  -CC           Sensory    Additional Documentation Hearing Assessment (Group);Sensory Assessment (Somatosensory) (Group);Vision Assessment/Intervention (Group)  -           Hearing Assessment    Hearing Status WFL  -           Vision Assessment/Intervention    Visual Impairment/Limitations peripheral vision impaired left  -           Sensory Assessment (Somatosensory)    Sensory Assessment (Somatosensory) UE sensation intact  light touch  -           Activities of Daily Living    BADL Assessment/Intervention upper body dressing;grooming;clothing fastener management;lower body dressing;toileting;feeding  -           Upper Body Dressing Assessment/Training    [place] Level (Upper Body Dressing) upper body dressing skills;don;doff;standby assist;minimum assist (75% patient effort)  -        Position (Upper Body Dressing) edge of bed sitting  -        Comment, (Upper Body Dressing) assist w tying  -           Lower Body Dressing Assessment/Training    [place] Level (Lower Body Dressing) lower body dressing skills;doff;don;socks;standby assist  -        Position (Lower Body Dressing) edge of bed sitting  -            Clothes Fastener Management    [place] Level (Clothes Fastener Management) shoelaces;snaps;dependent (less than 25% patient effort)  -        Position (Clothes Fastener Management) edge of bed sitting  -           Grooming Assessment/Training    [place] Level (Grooming) grooming skills;hair care, combing/brushing;oral care regimen;wash face, hands;modified independence  -        Position (Grooming) sink side;supported sitting;supported standing  -           Self-Feeding Assessment/Training    [place] Level (Feeding) feeding skills;prepare tray/open items  -        Position (Self-Feeding) edge of bed sitting  -        Comment, (Feeding) assist to open juice and packages  -           Toileting Assessment/Training    [place] Level (Toileting) adjust/manage clothing;toileting skills  -        Position (Toileting) supported standing  -        Comment, (Toileting) cane  -           Bed Mobility    Bed Mobility rolling left;rolling right;scooting/bridging;supine-sit;sit-supine  -        Rolling Left [place] (Bed Mobility) modified independence  -        Rolling Right [place] (Bed Mobility) modified independence  -        Scooting/Bridging [place] (Bed Mobility) modified independence  -CC        Supine-Sit [place] (Bed Mobility) modified independence  -        Sit-Supine [place] (Bed Mobility) modified independence  -        Assistive Device (Bed Mobility) bed rails  -           Functional Mobility    Functional Mobility- Ind. Level supervision required;contact guard assist  -        Functional Mobility- Device cane, straight  -        Functional Mobility-Distance (Feet) --  to and from   -           Transfer Assessment/Treatment    Transfers sit-stand transfer;stand-sit transfer;toilet transfer;bathtub transfer  -           Sit-Stand Transfer    Sit-Stand [place] (Transfers) modified independence;supervision  -         Assistive Device (Sit-Stand Transfers) cane, straight  -CC           Stand-Sit Transfer    Stand-Sit [place] (Transfers) modified independence;standby assist  -        Assistive Device (Stand-Sit Transfers) cane, straight  -CC           Toilet Transfer    Type (Toilet Transfer) sit-stand;stand-sit  -        [place] Level (Toilet Transfer) standby assist;modified independence  -        Assistive Device (Toilet Transfer) cane, straight  -CC           Bathtub Transfer    Type (Bathtub Transfer) --  simulated  -        [place] Level (Bathtub Transfer) contact guard;standby assist  -        Assistive Device (Bathtub Transfer) shower chair  -        Comment, (Bathtub Transfer) pt provided information on where to purchase shower seat w back., Pt has limited income and needs assist . Recommended Supplies [employer] or [employer].  -           Motor Skills    Motor Skills coordination;functional endurance  -        Coordination fine motor deficit;left;gross motor deficit;minimal impairment  -        Functional Endurance fair+  -           Balance    Balance Assessment sitting static balance;sitting dynamic balance;sit to stand dynamic balance;standing static balance;standing dynamic balance  -        Static Sitting Balance independent  -        Dynamic Sitting Balance modified independence  -        Static Standing Balance standby assist;modified independence  -CC        Dynamic Standing Balance contact guard  -        Balance Interventions standing;supported;minimal challenge;UE activity with balance activity  -           Coping    Observed Emotional State cooperative  -CC           Plan of Care Review    Plan of Care Reviewed With patient  -CC        Outcome Evaluation Pt is 53 year old presenting to ER w increased weakness L side and slurred speech. Pt has hx of previous CVA affecting L side. Pt resides alone in  home with tub.Pt used [facility] in home setting. Pt drives and does own  cooking and cleaning.  Pt indicates he has hip pain that limits him at times at home. Pt states he could not afford outpt therapy after previous stroke and continues to have limitations w coordination affecting ADLs. Pt would benefit from skilled OT services for coordination, education, balance, self care and AE training. Pt agreeable to services and highly motivated. Plan is to D/C home. Would recommend continued servises as appropraite at D/C.  -CC           Positioning and Restraints    Pre-Treatment Position in bed  -CC        Post Treatment Position bed  -CC        In Bed sitting EOB;call light within reach;encouraged to call for assist  -CC           Therapy Assessment/Plan (OT)    Date of Referral to OT 09/29/23  -CC        Patient/Family Therapy Goal Statement (OT) Go home and do better  -CC        Functional Level at Time of Evaluation (OT) Pt is at a Mod I level w functional mobility. Pt has higher level balalnce deficits, impaired coordination and strength on L side.  -CC        OT Diagnosis Small R subcortical stroke  -CC        Rehab Potential (OT) good, to achieve stated therapy goals  -CC        Therapy Frequency (OT) 5 times/wk  -CC        Planned Therapy Interventions (OT) activity tolerance training;BADL retraining;adaptive equipment training;functional balance retraining;ROM/therapeutic exercise;strengthening exercise;patient/caregiver education/training;transfer/mobility retraining  -CC           Evaluation Complexity (OT)    Review Occupational Profile/Medical/Therapy History Complexity expanded/moderate complexity  -CC        Assessment, Occupational Performance/Identification of Deficit Complexity 3-5 performance deficits  -CC        Clinical Decision Making Complexity (OT) detailed assessment/moderate complexity  -CC        Overall Complexity of Evaluation (OT) moderate complexity  -CC           Therapy Plan Review/Discharge Plan (OT)    Therapy Plan Review (OT) evaluation/treatment results  reviewed;care plan/treatment goals reviewed;participants voiced agreement with care plan;participants included;patient  -CC        Equipment Needs Upon Discharge (OT) shower chair  -CC        Anticipated Discharge Disposition (OT) home;home with outpatient therapy services  -CC           OT Goals    Transfer Goal Selection (OT) transfer, OT goal 1  -CC        Dressing Goal Selection (OT) dressing, OT goal 1  -CC        Balance Goal Selection (OT) balance, OT goal 1  -CC        Coordination Goal Selection (OT) coordination, OT goal 1  -CC           Transfer Goal 1 (OT)    Activity/Assistive Device (Transfer Goal 1, OT) tub;toilet  -CC        [place] Level/Cues Needed (Transfer Goal 1, OT) modified independence  -CC        Time Frame (Transfer Goal 1, OT) long term goal (LTG);by discharge  -CC        Progress/Outcome (Transfer Goal 1, OT) new goal  -CC           Dressing Goal 1 (OT)    Activity/Device (Dressing Goal 1, OT) dressing skills, all;button aid  -CC        [place]/Cues Needed (Dressing Goal 1, OT) modified independence  -CC        Time Frame (Dressing Goal 1, OT) long term goal (LTG);by discharge  -CC        Progress/Outcome (Dressing Goal 1, OT) new goal  -CC           Balance Goal 1 (OT)    Activity/Assistive Device (Balance Goal 1, OT) standing, dynamic  -CC        [place] Level/Cues Needed (Balance Goal 1, OT) conditional independence  -CC        Time Frame (Balance Goal 1, OT) long term goal (LTG);by discharge  -CC        Progress/Outcomes (Balance Goal 1, OT) new goal  -CC           Coordination Goal 1 (OT)    Activity/Assistive Device (Coordination Goal 1, OT) FM written ex program  -CC        [place] Level/Cues Needed (Coordination Goal 1, OT) independent  -CC        Time Frame (Coordination Goal 1, OT) long term goal (LTG);by discharge  -CC        Progress/Outcomes (Coordination Goal 1, OT) new goal  -CC           Patient Education Goal (OT)    Activity (Patient Education Goal,  OT) Pt I with HEP and home safety  -CC        [place]/Cues/Accuracy (Memory Goal 2, OT) demonstrates adequately;verbalizes understanding  -CC        Time Frame (Patient Education Goal, OT) long term goal (LTG);by discharge  -CC        Progress/Outcome (Patient Education Goal, OT) new goal  -CC                  User Key  (r) = Recorded By, (t) = Taken By, (c) = Cosigned By      Initials Name Effective Dates    CC Hoda Martinez, OTR 06/16/21 -                            OT Recommendation and Plan  Planned Therapy Interventions (OT): activity tolerance training, BADL retraining, adaptive equipment training, functional balance retraining, ROM/therapeutic exercise, strengthening exercise, patient/caregiver education/training, transfer/mobility retraining  Therapy Frequency (OT): 5 times/wk  Plan of Care Review  Plan of Care Reviewed With: patient  Outcome Evaluation: Pt is 53 year old presenting to ER w increased weakness L side and slurred speech. Pt has hx of previous CVA affecting L side. Pt resides alone in ss home with tub.Pt used STC in home setting. Pt drives and does own cooking and cleaning.  Pt indicates he has hip pain that limits him at times at home. Pt states he could not afford outpt therapy after previous stroke and continues to have limitations w coordination affecting ADLs. Pt would benefit from skilled OT services for coordination, education, balance, self care and AE training. Pt agreeable to services and highly motivated. Plan is to D/C home. Would recommend continued servises as appropraite at D/C.  Plan of Care Reviewed With: patient  Outcome Evaluation: Pt is 53 year old presenting to ER w increased weakness L side and slurred speech. Pt has hx of previous CVA affecting L side. Pt resides alone in ss home with tub.Pt used [employer] in home setting. Pt drives and does own cooking and cleaning.  Pt indicates he has hip pain that limits him at times at home. Pt states he could not afford outpt  therapy after previous stroke and continues to have limitations w coordination affecting ADLs. Pt would benefit from skilled OT services for coordination, education, balance, self care and AE training. Pt agreeable to services and highly motivated. Plan is to D/C home. Would recommend continued servises as appropraite at D/C.        Time Calculation:    Time Calculation- OT       Row Name 10/01/23 0748             Time Calculation- OT    OT Start Time 0748  -CC      OT Stop Time 0826  -CC      OT Time Calculation (min) 38 min  -CC      Total Timed Code Minutes- OT 10 minute(s)  -CC      OT Received On 09/29/23  -CC      OT - Next Appointment 10/02/23  -CC      OT Goal Re-Cert Due Date 10/13/23  -CC         Timed Charges    93061 - OT Self Care/Mgmt Minutes 28  -CC         Total Minutes    Timed Charges Total Minutes 28  -CC       Total Minutes 28  -CC                User Key  (r) = Recorded By, (t) = Taken By, (c) = Cosigned By      Initials Name Provider Type    CC Hoda Martinez OTR Occupational Therapist                  Therapy Charges for Today       Code Description Service Date Service Provider Modifiers Qty    [id number]  OT SELF CARE/MGMT/TRAIN EA 15 MIN 10/1/2023 Hoda Martinez OTR GO 2    [id number]  OT EVAL MOD COMPLEXITY 3 10/1/2023 Hoda Martinez OTR GO 1                 VALDEZ Farmer  10/1/2023

## 2023-10-01 NOTE — CONSULTS
"[facility] GROUP INITIAL INPATIENT CONSULTATION NOTE    REASON FOR CONSULTATION: \"Coumadin failure\"    HISTORY OF PRESENT ILLNESS:  Cecilio Puckett is a 53 y.o. male who we are asked to see today in consultation for \"Coumadin failure.\"    The patient has a past medical history of HIV managed by Dr. Laith Mckeon, stroke, coronary artery disease, pulmonary embolism, left lower extremity DVT, peripheral vascular disease status post aortobifemoral bypass on 3/14/2014.  He has been on chronic warfarin therapy for many many years.  His INR values have been fluctuating widely recently due to an inconsistent diet.    Recent admission with left lower extremity DVT.  His INR was subtherapeutic prior to that according to the patient.  He had a therapeutic INR at presentation.  He was started on heparin at that time with improvement in symptoms and he was discharged with a therapeutic INR on 9/27.    The patient presented with slurred speech for a couple of episodes on 9/29 that lasted into 9/30.  This has resolved.  He has chronic left upper extremity weakness and he has been having some left lower extremity pain as well.    He denies any worsening shortness of breath recently but has been more short of breath with dyspnea on exertion for about 3 to 4 weeks.  Again, this has not changed over the past few days.    MRI brain showed an area of acute infarction involving the right corona radiata and basal ganglia.    Carotid duplex with less than 50% stenosis in the right ICA and left ICA.    CT angiogram of the carotids appeared to show bilateral pulmonary thromboembolic disease with embolism in the distal right pulmonary artery and upper lobe segmental branches with atherosclerotic disease with noncalcified plaques involving both common carotid and internal carotid arteries.  Small acute infarctions involving the right corona radiata and right basal ganglia noted.    CT angiogram of the chest showed acute pulmonary emboli " throughout all 5 lobes of the lungs, right greater than left with a mildly dilated main pulmonary artery and no evidence of right heart strain.  Diffuse mild bronchitis noted.    INR 2.14 and then 2.65    Past Medical History:   Diagnosis Date    COPD (chronic obstructive pulmonary disease)     Coronary artery disease     Diabetes mellitus     DVT (deep venous thrombosis)     Elevated cholesterol     GERD (gastroesophageal reflux disease)     H/O Cancer     right arm, in throat, chest and stomach, in remission    H/O Ischemia of extremity     History of MI (myocardial infarction) 2019    History of snoring     History of transfusion     HIV (human immunodeficiency virus infection)     Hypertension     Non Hodgkin's lymphoma     PAD (peripheral artery disease)     Pulmonary embolism     Stroke        Past Surgical History:   Procedure Laterality Date    CARDIAC CATHETERIZATION      CORONARY ANGIOPLASTY WITH STENT PLACEMENT      FEMORAL ARTERY - FEMORAL ARTERY BYPASS GRAFT      PORTACATH PLACEMENT         SOCIAL HISTORY:   reports that he has been smoking cigars and cigarettes. He started smoking about 29 years ago. He has been smoking an average of 1 pack per day. He does not have any smokeless tobacco history on file. He reports current alcohol use of about 4.0 standard drinks per week. He reports that he does not currently use drugs.    FAMILY HISTORY:  family history includes Diabetes in his mother; Hypertension in his maternal grandmother and mother; Stroke in his mother.    ALLERGIES:  No Known Allergies    MEDICATIONS:  As listed in the electronic medical record.    Review of Systems   Constitutional:  Positive for fatigue.   Musculoskeletal:  Positive for arthralgias and myalgias.   Neurological:  Positive for speech difficulty and weakness.   All other systems reviewed and are negative.    Vitals:    09/30/23 1427 09/30/23 1709 09/30/23 2032 10/01/23 0105   BP: 169/98 140/87 133/83 148/90   BP Location: Left  arm Left arm Left arm Left arm   Patient Position: Sitting Sitting Lying Lying   Pulse: 76 69 73 71   Resp: 16 16 18 18   Temp: 98.2 °F (36.8 °C) 98.4 °F (36.9 °C) 98.2 °F (36.8 °C) 98.4 °F (36.9 °C)   TempSrc: Oral Oral Oral Oral   SpO2: 99% 99% 95% 95%   Weight:       Height:           Physical Exam  Vitals and nursing note reviewed.   Constitutional:       General: He is not in acute distress.     Appearance: He is well-developed.   HENT:      Head: Normocephalic and atraumatic. Hair is normal.   Eyes:      General: Lids are normal.      Conjunctiva/sclera: Conjunctivae normal.      Pupils: Pupils are equal.   Neck:      Thyroid: No thyroid mass or thyromegaly.      Vascular: No JVD.   Cardiovascular:      Rate and Rhythm: Normal rate and regular rhythm.      Heart sounds: No murmur heard.    No friction rub. No gallop.   Pulmonary:      Effort: Pulmonary effort is normal. No respiratory distress.      Breath sounds: Normal breath sounds. No wheezing or rales.   Abdominal:      General: There is no distension.      Palpations: Abdomen is soft. There is no hepatomegaly, splenomegaly or mass.      Tenderness: There is no abdominal tenderness. There is no guarding.   Musculoskeletal:         General: No tenderness or deformity. Normal range of motion.      Cervical back: Neck supple.   Lymphadenopathy:      Cervical: No cervical adenopathy.      Upper Body:      Right upper body: No supraclavicular adenopathy.      Left upper body: No supraclavicular adenopathy.   Skin:     General: Skin is warm and dry.      Findings: No rash.   Neurological:      Mental Status: He is alert and oriented to person, place, and time.   Psychiatric:         Behavior: Behavior normal.         Thought Content: Thought content normal.         Judgment: Judgment normal.       DIAGNOSTIC DATA:  Results from last 7 days   Lab Units 10/01/23  0619   WBC 10*3/mm3 7.05   HEMOGLOBIN g/dL 14.0   HEMATOCRIT % 41.3   PLATELETS 10*3/mm3 208     Lab  Results   Component Value Date    NEUTROABS 2.41 10/01/2023     Results from last 7 days   Lab Units 10/01/23  0619   SODIUM mmol/L 136   POTASSIUM mmol/L 3.5   CHLORIDE mmol/L 101   CO2 mmol/L 23.1   BUN mg/dL 11   CREATININE mg/dL 0.82   GLUCOSE mg/dL 135*   CALCIUM mg/dL 9.1     Results from last 7 days   Lab Units 10/01/23  0619 09/30/23  2250 09/29/23  2257 09/29/23  1640 09/27/23  0006 09/26/23  1530   INR   --   --  2.65* 2.14*  --  2.73*   APTT seconds >200.0* 36.7*  --  35.1   < > 37.2*    < > = values in this interval not displayed.     Results from last 7 days   Lab Units 09/27/23  0448   MAGNESIUM mg/dL 1.7       IMAGING:    MRI Brain Without Contrast (09/29/2023 22:38)  Duplex Carotid Ultrasound CAR (09/30/2023 08:54)  CT Angiogram Carotids (09/30/2023 11:07)  CT Angiogram Head (09/30/2023 11:07)  CT Angiogram Chest (09/30/2023 13:15)    CT angiogram of the carotids appeared to show bilateral pulmonary thromboembolic disease with embolism in the distal right pulmonary artery and upper lobe segmental branches with atherosclerotic disease with noncalcified plaques involving both common carotid and internal carotid arteries.  Small acute infarctions involving the right corona radiata and right basal ganglia noted.    CT angiogram of the chest showed acute pulmonary emboli throughout all 5 lobes of the lungs, right greater than left with a mildly dilated main pulmonary artery and no evidence of right heart strain.  Diffuse mild bronchitis noted.    Images personally reviewed    ASSESSMENT:  This is a 53 y.o. male with:    *HIV  Managed at [facility] by Dr. Laith Robison     *History of pulmonary embolism, left lower extremity DVT, left lower extremity peripheral vascular disease status post arterial stenting.    It appears he had an aortobifemoral bypass on 3/14/2014.  On chronic therapy with warfarin  He states INR values recently have been fluctuating widely and his INR was very low over the past 1 to 2 weeks  at close to 1  It is unclear if he ever had a hypercoagulable evaluation     *Acute left lower extremity DVT by venous duplex on 9/26/2023  Left lower extremity venous duplex on 9/26/2023 with acute DVT in the popliteal, posterior tibial, and peroneal veins  Started on a heparin drip with improvement in left lower extremity pain  Discharged on warfarin with a therapeutic INR    *Newly discovered acute pulmonary embolism  9/30/2023 CT angiogram of the carotids appeared to show bilateral pulmonary thromboembolic disease with embolism in the distal right pulmonary artery and upper lobe segmental branches with atherosclerotic disease with noncalcified plaques involving both common carotid and internal carotid arteries.  Small acute infarctions involving the right corona radiata and right basal ganglia noted.  9/30/2023 CT angiogram of the chest showed acute pulmonary emboli throughout all 5 lobes of the lungs, right greater than left with a mildly dilated main pulmonary artery and no evidence of right heart strain.  Diffuse mild bronchitis noted.       RECOMMENDATIONS/PLAN:   It is unclear if venous thromboembolism occurred while his INR was subtherapeutic or not.  I suspect it did.  Warfarin for him has been difficult to manage given dietary indiscretion.  Overall, I think warfarin is probably a better anticoagulant for him given his history.  Options therefore are continuing warfarin with a goal INR of closer to 3 or switching to apixaban which would be easier and not require so much monitoring.  However, there appears to be a drug interaction with apixaban and the Genvoya that he is on for his HIV.  There also appears to be an interaction with rivaroxaban and Genvoya.  Pradaxa might be an option but this would be less preferred from my standpoint.  Therefore, he may need to stay on warfarin.  For now, transition heparin to Lovenox  He has been started on aspirin per neurology  I will discuss with pharmacy staff  tomorrow and we will continue to follow.    Arthur Salazar MD

## 2023-10-01 NOTE — PLAN OF CARE
Goal Outcome Evaluation:  Plan of Care Reviewed With: patient           Outcome Evaluation: Pt is 53 year old presenting to ER w increased weakness L side and slurred speech. Pt has hx of previous CVA affecting L side. Pt resides alone in ss home with tub.Pt used [facility] in home setting. Pt drives and does own cooking and cleaning.  Pt indicates he has hip pain that limits him at times at home. Pt states he could not afford outpt therapy after previous stroke and continues to have limitations w coordination affecting ADLs. Pt would benefit from skilled OT services for coordination, education, balance, self care and AE training. Pt agreeable to services and highly motivated. Plan is to D/C home. Would recommend continued servises as appropraite at D/C.      Anticipated Discharge Disposition (OT): home, home with outpatient therapy services

## 2023-10-01 NOTE — PROGRESS NOTES
"Daily progress note    Referring physician  Dr. Lisbet Chavez    Subjective  Doing better with no new complaints    History of present illness  53-year-old -American male with history of diabetes mellitus hypertension HIV non-Hodgkin lymphoma coronary artery disease and DVT on Coumadin and was recently discharged from the hospital with acute on chronic DVT admitted to the observation unit with slurred speech and left arm weakness.  Patient remained in the observation unit and further evaluated by neurology and found to have right CVA and also found to have acute pulm embolism admitted to the hospital as he has therapeutic INR.  I am asked to follow patient for medical problems and take over the care.  At the time of interview he is fully alert oriented and give me a detailed history.     REVIEW OF SYSTEMS  All systems reviewed and negative except for those discussed in HPI.      PHYSICAL EXAM  Blood pressure 144/89, pulse 66, temperature 97.7 °F (36.5 °C), temperature source Oral, resp. rate 18, height 185 cm (72.84\"), weight 98 kg (216 lb 0.8 oz), SpO2 98 %.    GENERAL: Awake and alert no acute distress  HEENT: Unremarkable  NECK: Supple  CV: regular rhythm, normal rate  RESPIRATORY: normal effort, moving air bilaterally  ABDOMEN: soft nontender nondistended bowel sounds positive  MUSCULOSKELETAL: no deformity  NEURO: Awake and alert follow commands with no speech problem and moves all 4 extremities  SKIN: warm, dry     LAB RESULTS  Lab Results (last 24 hours)       Procedure Component Value Units Date/Time    POC Glucose Once [[id number]]  (Normal) Collected: 10/01/23 1229    Specimen: Blood Updated: 10/01/23 1230     Glucose 95 mg/dL     POC Glucose Once [[id number]]  (Normal) Collected: 10/01/23 0802    Specimen: Blood Updated: 10/01/23 0826     Glucose 126 mg/dL     aPTT [[id number]]  (Abnormal) Collected: 10/01/23 0619    Specimen: Blood Updated: 10/01/23 0755     PTT >200.0 seconds     TSH [[id number]]  " (Normal) Collected: 10/01/23 0619    Specimen: Blood Updated: 10/01/23 0711     TSH 3.290 uIU/mL     Comprehensive Metabolic Panel [[id number]]  (Abnormal) Collected: 10/01/23 0619    Specimen: Blood Updated: 10/01/23 0704     Glucose 135 mg/dL      BUN 11 mg/dL      Creatinine 0.82 mg/dL      Sodium 136 mmol/L      Potassium 3.5 mmol/L      Chloride 101 mmol/L      CO2 23.1 mmol/L      Calcium 9.1 mg/dL      Total Protein 6.5 g/dL      Albumin 3.4 g/dL      ALT (SGPT) 28 U/L      AST (SGOT) 20 U/L      Alkaline Phosphatase 62 U/L      Total Bilirubin <0.2 mg/dL      Globulin 3.1 gm/dL      A/G Ratio 1.1 g/dL      BUN/Creatinine Ratio 13.4     Anion Gap 11.9 mmol/L      eGFR 105.7 mL/min/1.73     Narrative:      GFR Normal >60  Chronic Kidney Disease <60  Kidney Failure <15      Lipid Panel [[id number]]  (Abnormal) Collected: 10/01/23 0619    Specimen: Blood Updated: 10/01/23 0704     Total Cholesterol 182 mg/dL      Triglycerides 83 mg/dL      HDL Cholesterol 43 mg/dL      LDL Cholesterol  124 mg/dL      VLDL Cholesterol 15 mg/dL      LDL/HDL Ratio 2.85    Narrative:      Cholesterol Reference Ranges  (U.S. Department of Health and Human Services ATP III Classifications)    Desirable          <200 mg/dL  Borderline High    200-239 mg/dL  High Risk          >240 mg/dL      Triglyceride Reference Ranges  (U.S. Department of Health and Human Services ATP III Classifications)    Normal           <150 mg/dL  Borderline High  150-199 mg/dL  High             200-499 mg/dL  Very High        >500 mg/dL    HDL Reference Ranges  (U.S. Department of Health and Human Services ATP III Classifications)    Low     <40 mg/dl (major risk factor for CHD)  High    >60 mg/dl ('negative' risk factor for CHD)        LDL Reference Ranges  (U.S. Department of Health and Human Services ATP III Classifications)    Optimal          <100 mg/dL  Near Optimal     100-129 mg/dL  Borderline High  130-159 mg/dL  High             160-189 mg/dL  Very  High        >189 mg/dL    CBC & Differential [[id number]]  (Abnormal) Collected: 10/01/23 0619    Specimen: Blood Updated: 10/01/23 0649    Narrative:      The following orders were created for panel order CBC & Differential.  Procedure                               Abnormality         Status                     ---------                               -----------         ------                     CBC Auto Differential[[id number]]        Abnormal            Final result                 Please view results for these tests on the individual orders.    CBC Auto Differential [[id number]]  (Abnormal) Collected: 10/01/23 0619    Specimen: Blood Updated: 10/01/23 0649     WBC 7.05 10*3/mm3      RBC 4.81 10*6/mm3      Hemoglobin 14.0 g/dL      Hematocrit 41.3 %      MCV 85.9 fL      MCH 29.1 pg      MCHC 33.9 g/dL      RDW 14.1 %      RDW-SD 44.1 fl      MPV 11.5 fL      Platelets 208 10*3/mm3      Neutrophil % 34.3 %      Lymphocyte % 55.7 %      Monocyte % 8.1 %      Eosinophil % 1.1 %      Basophil % 0.7 %      Neutrophils, Absolute 2.41 10*3/mm3      Lymphocytes, Absolute 3.93 10*3/mm3      Monocytes, Absolute 0.57 10*3/mm3      Eosinophils, Absolute 0.08 10*3/mm3      Basophils, Absolute 0.05 10*3/mm3     aPTT [[id number]]  (Abnormal) Collected: 09/30/23 2250    Specimen: Blood Updated: 09/30/23 2315     PTT 36.7 seconds     POC Glucose Once [[id number]]  (Abnormal) Collected: 09/30/23 2030    Specimen: Blood Updated: 09/30/23 2033     Glucose 223 mg/dL     BNP [[id number]]  (Normal) Collected: 09/30/23 1814    Specimen: Blood from Arm, Right Updated: 09/30/23 1904     proBNP 393.0 pg/mL     Narrative:      This assay is used as an aid in the diagnosis of individuals suspected of having heart failure. It can be used as an aid in the diagnosis of acute decompensated heart failure (ADHF) in patients presenting with signs and symptoms of ADHF to the emergency department (ED). In addition, NT-proBNP of <300 pg/mL indicates ADHF  is not likely.    POC Glucose Once [[id number]]  (Abnormal) Collected: 09/30/23 1824    Specimen: Blood Updated: 09/30/23 1826     Glucose 209 mg/dL           Imaging Results (Last 24 Hours)       Procedure Component Value Units Date/Time    CT Angiogram Carotids [[id number]] Collected: 09/30/23 1211     Updated: 09/30/23 1512    Narrative:      CT ANGIOGRAM HEAD AND NECK WITH IV CONTRAST     HISTORY: 53-year-old male with acute infarction involving the right  corona radiata and basal ganglia demonstrated on MRI brain yesterday.      TECHNIQUE: CT angiogram head and neck includes axial imaging with  intravenous contrast and data reconstructed in coronal and sagittal  planes. AI analysis of LVO was utilized. 3-dimensional volume rendering  performed.      COMPARISON: CT angiogram head and neck 09/27/2022, MRI brain 10/10/2022,  CT head 09/29/2023, MRI brain 09/29/2023.     FINDINGS: On the most superior images there appear to be filling defects  within the distal right main pulmonary artery as well as within the  right upper lobe and left upper lobe segmental pulmonary arteries.  Recommend further evaluation with CT angiogram chest.     Atherosclerotic calcifications are present involving the aortic arch and  great vessel origins. Both common carotid arteries are patent though  there is mural thrombus involving both common carotid arteries and  internal carotid arteries. There is calcified plaque at the origins of  both internal carotid arteries which exhibit wall irregularity. There is  no evidence for NASCET stenosis. The distal cervical, petrous, internal  carotid arteries are patent. There is partially calcified plaque  involving cavernous segments of both internal carotid arteries with  moderate stenoses. Supracavernous internal carotid arteries, both middle  cerebral arteries, anterior cerebral arteries are patent.     The left vertebral artery arises from the left subclavian artery just  distal to its origin.  Evaluation of the proximal right vertebral artery  is limited by small size as well as streak artifact related to contrast  within collateral veins along the right neck base. Left vertebral artery  is larger than the right. Both mid and distal cervical arteries exhibit  flow. There is flow within the basilar artery and both posterior  cerebral arteries.     Noncontrasted head CT demonstrates low density within the right corona  radiata extending to the superior right lentiform nucleus similar to  areas of restricted diffusion demonstrated on yesterday's brain MRI.  There are also chronic infarctions involving the right caudate body and  right thalamus that were initially demonstrated on previous brain MRI  10/10/2022. There is no evidence for hemorrhagic transformation. Post  contrast-enhanced head CT demonstrates no abnormal intracranial  enhancement.       Impression:      1. There appear to be bilateral pulmonary thromboembolic disease with  embolus within the distal right main pulmonary artery and upper lobe  segmental branches. Recommend further evaluation with CT angiogram  chest.  2. Atherosclerotic disease with noncalcified plaques involving both  common carotid and internal carotid arteries with mild narrowing though  without NASCET stenosis.  3. Small acute infarctions involving the right corona radiata and right  basal ganglia without evidence for hemorrhagic transformation.     Findings discussed with MARIANNE Marie in the emergency department on  09/30/2023 at 11:55 a.m.      Radiation dose reduction techniques were utilized, including automated  exposure control and exposure modulation based on body size.        This report was finalized on 9/30/2023 3:09 PM by Dr. Jesus Mayfield M.D.       CT Angiogram Head [[id number]] Collected: 09/30/23 1211     Updated: 09/30/23 1512    Narrative:      CT ANGIOGRAM HEAD AND NECK WITH IV CONTRAST     HISTORY: 53-year-old male with acute infarction involving  the right  corona radiata and basal ganglia demonstrated on MRI brain yesterday.      TECHNIQUE: CT angiogram head and neck includes axial imaging with  intravenous contrast and data reconstructed in coronal and sagittal  planes. AI analysis of LVO was utilized. 3-dimensional volume rendering  performed.      COMPARISON: CT angiogram head and neck 09/27/2022, MRI brain 10/10/2022,  CT head 09/29/2023, MRI brain 09/29/2023.     FINDINGS: On the most superior images there appear to be filling defects  within the distal right main pulmonary artery as well as within the  right upper lobe and left upper lobe segmental pulmonary arteries.  Recommend further evaluation with CT angiogram chest.     Atherosclerotic calcifications are present involving the aortic arch and  great vessel origins. Both common carotid arteries are patent though  there is mural thrombus involving both common carotid arteries and  internal carotid arteries. There is calcified plaque at the origins of  both internal carotid arteries which exhibit wall irregularity. There is  no evidence for NASCET stenosis. The distal cervical, petrous, internal  carotid arteries are patent. There is partially calcified plaque  involving cavernous segments of both internal carotid arteries with  moderate stenoses. Supracavernous internal carotid arteries, both middle  cerebral arteries, anterior cerebral arteries are patent.     The left vertebral artery arises from the left subclavian artery just  distal to its origin. Evaluation of the proximal right vertebral artery  is limited by small size as well as streak artifact related to contrast  within collateral veins along the right neck base. Left vertebral artery  is larger than the right. Both mid and distal cervical arteries exhibit  flow. There is flow within the basilar artery and both posterior  cerebral arteries.     Noncontrasted head CT demonstrates low density within the right corona  radiata extending to  the superior right lentiform nucleus similar to  areas of restricted diffusion demonstrated on yesterday's brain MRI.  There are also chronic infarctions involving the right caudate body and  right thalamus that were initially demonstrated on previous brain MRI  10/10/2022. There is no evidence for hemorrhagic transformation. Post  contrast-enhanced head CT demonstrates no abnormal intracranial  enhancement.       Impression:      1. There appear to be bilateral pulmonary thromboembolic disease with  embolus within the distal right main pulmonary artery and upper lobe  segmental branches. Recommend further evaluation with CT angiogram  chest.  2. Atherosclerotic disease with noncalcified plaques involving both  common carotid and internal carotid arteries with mild narrowing though  without NASCET stenosis.  3. Small acute infarctions involving the right corona radiata and right  basal ganglia without evidence for hemorrhagic transformation.     Findings discussed with MARIANNE Marie in the emergency department on  09/30/2023 at 11:55 a.m.      Radiation dose reduction techniques were utilized, including automated  exposure control and exposure modulation based on body size.        This report was finalized on 9/30/2023 3:09 PM by Dr. Jesus Mayfield M.D.             Results  Scan on 9/29/2023 1633 by [facility] [employer], [place]: ECG 12-LEAD         Author: -- Service: -- Author Type: --   Filed: Date of Service: Creation Time:   Status: (Other)   HEART RATE= 86  bpm  RR Interval= 698  ms  OR Interval= 159  ms  P Horizontal Axis= -10  deg  P Front Axis= 18  deg  QRSD Interval= 98  ms  QT Interval= 366  ms  QTcB= 438  ms  QRS Axis= -26  deg  T Wave Axis= 9  deg  - ABNORMAL ECG -  Sinus rhythm  Left ventricular hypertrophy  Lateral infarct, age indeterminate  Anterior ST elevation, probably due to LVH          Current Facility-Administered Medications:     amLODIPine (NORVASC) tablet 10 mg, 10 mg, Oral, Daily, Scott,  MARIANNE Conner, 10 mg at 10/01/23 0814    aspirin tablet 325 mg, 325 mg, Oral, Daily, 325 mg at 10/01/23 0814 **OR** [DISCONTINUED] aspirin suppository 300 mg, 300 mg, Rectal, Daily, Marycarmen Bran APRN    atorvastatin (LIPITOR) tablet 40 mg, 40 mg, Oral, Nightly, Osbaldo Levi MD, 40 mg at 09/30/23 2032    carvedilol (COREG) tablet 6.25 mg, 6.25 mg, Oral, Q12H, Lisbet Chavez APRN, 6.25 mg at 10/01/23 0814    Sampmbj-Wjtkh-Okgpajtm-TenofAF (GENVOYA) [id number] MG per tablet 1 tablet, 1 tablet, Oral, Daily, Lisbet Chavez APRN, 1 tablet at 10/01/23 0814    empagliflozin (JARDIANCE) tablet 25 mg, 25 mg, Oral, Daily, Lisbet Chavez APRN, 25 mg at 10/01/23 0815    Enoxaparin Sodium (LOVENOX) syringe 100 mg, 100 mg, Subcutaneous, Q12H, Arthur Salazar MD, 100 mg at 10/01/23 1320    hydrALAZINE (APRESOLINE) injection 10 mg, 10 mg, Intravenous, Q6H PRN, Marycarmen Bran APRN    insulin glargine (LANTUS, SEMGLEE) injection 15 Units, 15 Units, Subcutaneous, Nightly, Osbaldo Levi MD, 15 Units at 09/30/23 2032    insulin lispro (HUMALOG/ADMELOG) injection 2-9 Units, 2-9 Units, Subcutaneous, 4x Daily AC & at Bedtime, Lisbet Chavez APRN, 4 Units at 09/30/23 2039    insulin lispro (HUMALOG/ADMELOG) injection 5 Units, 5 Units, Subcutaneous, TID With Meals, Osbaldo Levi MD, 5 Units at 10/01/23 0814    losartan (COZAAR) tablet 50 mg, 50 mg, Oral, Daily, Osbaldo Levi MD, 50 mg at 10/01/23 0815    nicotine (NICODERM CQ) 21 MG/24HR patch 1 patch, 1 patch, Transdermal, Q24H, Osbaldo Levi MD, 1 patch at 09/30/23 2032    ondansetron (ZOFRAN) injection 4 mg, 4 mg, Intravenous, Q6H PRN, Marycarmen Bran APRN    pantoprazole (PROTONIX) EC tablet 40 mg, 40 mg, Oral, Q AM, Lisbet Chavez, APRN, 40 mg at 10/01/23 0649    sertraline (ZOLOFT) tablet 50 mg, 50 mg, Oral, Nightly, Osbaldo Levi MD, 50 mg at 09/30/23 2032    [COMPLETED] Insert Peripheral IV, , , Once **AND** sodium chloride 0.9 % flush 10 mL, 10 mL, Intravenous, PRN, Tex  Severo Manzo II, MD    sodium chloride 0.9 % flush 10 mL, 10 mL, Intravenous, PRN, Marycarmen Bran, APRN    sodium chloride 0.9 % infusion 40 mL, 40 mL, Intravenous, PRN, Marycarmen Bran APRN       ASSESSMENT  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  Acute right pulmonary embolism  History of left leg DVT  Diabetes mellitus  Hypertension  Hyperlipidemia  Non-Hodgkin lymphoma  HIV  Tobacco abuse  Depression  Coronary artery disease    PLAN  CPM  Aspirin Lipitor  Anticoagulation  Hematology consult appreciated  Neurology to follow patient  Adjust home medications  Stress ulcer DVT prophylaxis  Supportive care  PT OT  Discussed with nursing staff  Follow closely and further recommendation current hospital course    CHYNA NOLAN MD    Copied text in this note has been reviewed and is accurate as of 10/01/23

## 2023-10-01 NOTE — PLAN OF CARE
Problem: Adult Inpatient Plan of Care  Goal: Absence of Hospital-Acquired Illness or Injury  Intervention: Prevent and Manage VTE (Venous Thromboembolism) Risk  Recent Flowsheet Documentation  Taken 10/1/2023 0212 by Cristy Lawson RN  Activity Management: activity encouraged  Taken 9/30/2023 2230 by Cristy Lawson RN  Activity Management: activity encouraged  Taken 9/30/2023 2040 by Cristy Lawson RN  Activity Management: up ad yash  VTE Prevention/Management:   patient refused intervention   bilateral   sequential compression devices off     Problem: Adjustment to Illness (Stroke, Ischemic/Transient Ischemic Attack)  Goal: Optimal Coping  Outcome: Ongoing, Progressing     Problem: Adjustment to Illness (Stroke, Ischemic/Transient Ischemic Attack)  Goal: Optimal Coping  Outcome: Ongoing, Progressing     Problem: Adult Inpatient Plan of Care  Goal: Absence of Hospital-Acquired Illness or Injury  Intervention: Prevent and Manage VTE (Venous Thromboembolism) Risk  Recent Flowsheet Documentation  Taken 10/1/2023 0212 by Cristy Lawson RN  Activity Management: activity encouraged  Taken 9/30/2023 2230 by Cristy Lawson RN  Activity Management: activity encouraged  Taken 9/30/2023 2040 by Cristy Lawson RN  Activity Management: up ad yash  VTE Prevention/Management:   patient refused intervention   bilateral   sequential compression devices off   Goal Outcome Evaluation:

## 2023-10-01 NOTE — SIGNIFICANT NOTE
10/01/23 1158   OTHER   Discipline physical therapist   Rehab Time/Intention   Session Not Performed other (see comments)  (Pt up ad yash in room. Reports speech deficits, however no changes to mobility compared to baseline. PT to sign off at this time.)

## 2023-10-02 LAB
ANION GAP SERPL CALCULATED.3IONS-SCNC: -0.3 MMOL/L (ref 5–15)
BASOPHILS # BLD MANUAL: 0.13 10*3/MM3 (ref 0–0.2)
BASOPHILS NFR BLD MANUAL: 2 % (ref 0–1.5)
BUN SERPL-MCNC: 11 MG/DL (ref 6–20)
BUN/CREAT SERPL: 9.7 (ref 7–25)
CALCIUM SPEC-SCNC: 9.5 MG/DL (ref 8.6–10.5)
CHLORIDE SERPL-SCNC: 110 MMOL/L (ref 98–107)
CO2 SERPL-SCNC: 27.3 MMOL/L (ref 22–29)
CREAT SERPL-MCNC: 1.13 MG/DL (ref 0.76–1.27)
DEPRECATED RDW RBC AUTO: 44.6 FL (ref 37–54)
EGFRCR SERPLBLD CKD-EPI 2021: 78.2 ML/MIN/1.73
EOSINOPHIL # BLD MANUAL: 0.07 10*3/MM3 (ref 0–0.4)
EOSINOPHIL NFR BLD MANUAL: 1 % (ref 0.3–6.2)
ERYTHROCYTE [DISTWIDTH] IN BLOOD BY AUTOMATED COUNT: 14.1 % (ref 12.3–15.4)
GLUCOSE BLDC GLUCOMTR-MCNC: 163 MG/DL (ref 70–130)
GLUCOSE BLDC GLUCOMTR-MCNC: 186 MG/DL (ref 70–130)
GLUCOSE BLDC GLUCOMTR-MCNC: 98 MG/DL (ref 70–130)
GLUCOSE SERPL-MCNC: 113 MG/DL (ref 65–99)
HCT VFR BLD AUTO: 44.9 % (ref 37.5–51)
HGB BLD-MCNC: 15.1 G/DL (ref 13–17.7)
INR PPP: 1.25 (ref 0.9–1.1)
INR PPP: 1.45 (ref 0.9–1.1)
LYMPHOCYTES # BLD MANUAL: 3.21 10*3/MM3 (ref 0.7–3.1)
LYMPHOCYTES NFR BLD MANUAL: 10 % (ref 5–12)
MCH RBC QN AUTO: 29.2 PG (ref 26.6–33)
MCHC RBC AUTO-ENTMCNC: 33.6 G/DL (ref 31.5–35.7)
MCV RBC AUTO: 86.7 FL (ref 79–97)
MONOCYTES # BLD: 0.66 10*3/MM3 (ref 0.1–0.9)
NEUTROPHILS # BLD AUTO: 2.49 10*3/MM3 (ref 1.7–7)
NEUTROPHILS NFR BLD MANUAL: 38 % (ref 42.7–76)
NRBC BLD AUTO-RTO: 0 /100 WBC (ref 0–0.2)
PLAT MORPH BLD: NORMAL
PLATELET # BLD AUTO: 241 10*3/MM3 (ref 140–450)
PMV BLD AUTO: 11.5 FL (ref 6–12)
POTASSIUM SERPL-SCNC: 3.5 MMOL/L (ref 3.5–5.2)
PROTHROMBIN TIME: 15.9 SECONDS (ref 11.7–14.2)
PROTHROMBIN TIME: 17.9 SECONDS (ref 11.7–14.2)
RBC # BLD AUTO: 5.18 10*6/MM3 (ref 4.14–5.8)
RBC MORPH BLD: NORMAL
SODIUM SERPL-SCNC: 137 MMOL/L (ref 136–145)
VARIANT LYMPHS NFR BLD MANUAL: 2 % (ref 0–5)
VARIANT LYMPHS NFR BLD MANUAL: 47 % (ref 19.6–45.3)
WBC MORPH BLD: NORMAL
WBC NRBC COR # BLD: 6.55 10*3/MM3 (ref 3.4–10.8)

## 2023-10-02 PROCEDURE — 63710000001 INSULIN GLARGINE PER 5 UNITS: Performed by: HOSPITALIST

## 2023-10-02 PROCEDURE — 99232 SBSQ HOSP IP/OBS MODERATE 35: CPT | Performed by: INTERNAL MEDICINE

## 2023-10-02 PROCEDURE — 25010000002 ENOXAPARIN PER 10 MG: Performed by: INTERNAL MEDICINE

## 2023-10-02 PROCEDURE — 80048 BASIC METABOLIC PNL TOTAL CA: CPT | Performed by: HOSPITALIST

## 2023-10-02 PROCEDURE — 82948 REAGENT STRIP/BLOOD GLUCOSE: CPT

## 2023-10-02 PROCEDURE — 85007 BL SMEAR W/DIFF WBC COUNT: CPT | Performed by: HOSPITALIST

## 2023-10-02 PROCEDURE — 85610 PROTHROMBIN TIME: CPT | Performed by: INTERNAL MEDICINE

## 2023-10-02 PROCEDURE — 85610 PROTHROMBIN TIME: CPT | Performed by: HOSPITALIST

## 2023-10-02 PROCEDURE — 85025 COMPLETE CBC W/AUTO DIFF WBC: CPT | Performed by: HOSPITALIST

## 2023-10-02 PROCEDURE — 63710000001 INSULIN LISPRO (HUMAN) PER 5 UNITS: Performed by: HOSPITALIST

## 2023-10-02 PROCEDURE — 63710000001 INSULIN LISPRO (HUMAN) PER 5 UNITS

## 2023-10-02 RX ORDER — LOSARTAN POTASSIUM 25 MG/1
25 TABLET ORAL DAILY
Status: DISCONTINUED | OUTPATIENT
Start: 2023-10-03 | End: 2023-10-03 | Stop reason: HOSPADM

## 2023-10-02 RX ORDER — WARFARIN SODIUM 10 MG/1
20 TABLET ORAL
Status: DISCONTINUED | OUTPATIENT
Start: 2023-10-02 | End: 2023-10-03 | Stop reason: HOSPADM

## 2023-10-02 RX ADMIN — ENOXAPARIN SODIUM 100 MG: 100 INJECTION SUBCUTANEOUS at 01:35

## 2023-10-02 RX ADMIN — NICOTINE 1 PATCH: 21 PATCH, EXTENDED RELEASE TRANSDERMAL at 20:59

## 2023-10-02 RX ADMIN — INSULIN GLARGINE 15 UNITS: 100 INJECTION, SOLUTION SUBCUTANEOUS at 22:00

## 2023-10-02 RX ADMIN — ATORVASTATIN CALCIUM 40 MG: 20 TABLET, FILM COATED ORAL at 21:00

## 2023-10-02 RX ADMIN — ASPIRIN 325 MG: 325 TABLET ORAL at 08:46

## 2023-10-02 RX ADMIN — INSULIN LISPRO 2 UNITS: 100 INJECTION, SOLUTION INTRAVENOUS; SUBCUTANEOUS at 11:51

## 2023-10-02 RX ADMIN — PANTOPRAZOLE SODIUM 40 MG: 40 TABLET, DELAYED RELEASE ORAL at 06:21

## 2023-10-02 RX ADMIN — AMLODIPINE BESYLATE 10 MG: 10 TABLET ORAL at 08:46

## 2023-10-02 RX ADMIN — CARVEDILOL 6.25 MG: 6.25 TABLET, FILM COATED ORAL at 21:00

## 2023-10-02 RX ADMIN — CARVEDILOL 6.25 MG: 6.25 TABLET, FILM COATED ORAL at 08:46

## 2023-10-02 RX ADMIN — SERTRALINE 50 MG: 50 TABLET, FILM COATED ORAL at 21:00

## 2023-10-02 RX ADMIN — INSULIN LISPRO 5 UNITS: 100 INJECTION, SOLUTION INTRAVENOUS; SUBCUTANEOUS at 11:51

## 2023-10-02 RX ADMIN — ELVITEGRAVIR, COBICISTAT, EMTRICITABINE, AND TENOFOVIR ALAFENAMIDE 1 TABLET: 150; 150; 200; 10 TABLET ORAL at 08:46

## 2023-10-02 RX ADMIN — WARFARIN 20 MG: 10 TABLET ORAL at 19:00

## 2023-10-02 RX ADMIN — LOSARTAN POTASSIUM 50 MG: 50 TABLET, FILM COATED ORAL at 08:46

## 2023-10-02 RX ADMIN — EMPAGLIFLOZIN 25 MG: 25 TABLET, FILM COATED ORAL at 08:46

## 2023-10-02 RX ADMIN — INSULIN LISPRO 5 UNITS: 100 INJECTION, SOLUTION INTRAVENOUS; SUBCUTANEOUS at 17:17

## 2023-10-02 RX ADMIN — INSULIN LISPRO 2 UNITS: 100 INJECTION, SOLUTION INTRAVENOUS; SUBCUTANEOUS at 17:16

## 2023-10-02 RX ADMIN — ENOXAPARIN SODIUM 100 MG: 100 INJECTION SUBCUTANEOUS at 13:59

## 2023-10-02 NOTE — CASE MANAGEMENT/SOCIAL WORK
Discharge Planning Assessment  [facility]     Patient Name: Cecilio Puckett  MRN: [id number]  Today's Date: 10/2/2023    Admit Date: 9/29/2023    Plan: Home   Discharge Needs Assessment       Row Name 10/02/23 1229       Living Environment    People in Home alone    Current Living Arrangements home    Potentially Unsafe Housing Conditions none    Primary Care Provided by self    Provides Primary Care For no one    Family Caregiver if Needed none    Quality of Family Relationships involved;helpful;supportive    Able to Return to Prior Arrangements yes       Resource/Environmental Concerns    Resource/Environmental Concerns none       Transition Planning    Patient/Family Anticipates Transition to home    Patient/Family Anticipated Services at Transition none    Transportation Anticipated family or friend will provide       Discharge Needs Assessment    Equipment Currently Used at Home cane, straight    Concerns to be Addressed denies needs/concerns at this time;no discharge needs identified    Anticipated Changes Related to Illness none    Equipment Needed After Discharge none    Provided Post Acute Provider List? Refused    Refused Provider List Comment Home    Provided Post Acute Provider Quality & Resource List? Refused    Refused Quality and Resource List Comment Home                   Discharge Plan       Row Name 10/02/23 1229       Plan    Plan Home    Patient/Family in Agreement with Plan yes    Plan Comments CCP met with patients at bedside. Introduced self and explained role of CCP. Patient confirmed his information on his face sheet is accurate. Patient is enrolled into [facility]. Patients PCP is Arias Lehman. Patient lives at home alone. Patient has a cane. Patient is independent at home. Patient stated that he has never had HH or been to a SNF. Patient stated that his mother is able to transport him at discharge.                  Continued Care and Services - Admitted Since 9/29/2023    Coordination has not been  started for this encounter.       Expected Discharge Date and Time       Expected Discharge Date Expected Discharge Time    Oct 4, 2023            Demographic Summary       Row Name 10/02/23 1228       General Information    Admission Type inpatient    Arrived From emergency department    Referral Source admission list    Preferred Language English                   Functional Status       Row Name 10/02/23 1228       Functional Status    Usual Activity Tolerance good    Current Activity Tolerance good       Functional Status, IADL    Medications independent    Meal Preparation independent    Housekeeping independent    Laundry independent    Shopping independent       Mental Status Summary    Recent Changes in Mental Status/Cognitive Functioning no changes       Employment/    Employment Status retired                   Psychosocial    No documentation.                  Abuse/Neglect    No documentation.                  Legal    No documentation.                  Substance Abuse    No documentation.                  Patient Forms    No documentation.

## 2023-10-02 NOTE — PAYOR COMM NOTE
"Cecilio Owens (53 y.o. Male)     PLEASE SEE ATTACHED FOR INPT AUTH     REF # [id number]    PLEASE CALL SHANIQUE JOHNSON RN/ DEPT @ [phone]  OR FAX  DEPARTMENT @  [phone]    THANK YOU   SHANIQUE JOHNSON RN  [facility]          Date of Birth   1970    Social Security Number       Address   [address] [place] [address]    Home Phone   [phone]    MRN   [id number]       Orthodox   Congregational    Marital Status   Single                            Admission Date   9/29/23    Admission Type   Emergency    Admitting Provider   Osbaldo Levi MD    Attending Provider   Osbaldo Levi MD    Department, Room/Bed   [facility] [address] [place], P595/1       Discharge Date       Discharge Disposition       Discharge Destination                                 Attending Provider: Osbaldo Levi MD    Allergies: No Known Allergies    Isolation: None   Infection: None   Code Status: CPR    Ht: 185 cm (72.84\")   Wt: 98 kg (216 lb 0.8 oz)    Admission Cmt: None   Principal Problem: TIA (transient ischemic attack) [G45.9]                   Active Insurance as of 9/29/2023       Primary Coverage       Payor Plan Insurance Group Employer/Plan Group    AETNA MEDICARE REPLACEMENT AETNA MEDICARE REPLACEMENT [id number]       Payor Plan Address Payor Plan Phone Number Payor Plan Fax Number Effective Dates    [address] [phone]  1/1/2022 - None Entered    [facility] [id number]         Subscriber Name Subscriber Birth Date Member ID       CECILIO OWENS 1970 [id number]                     Emergency Contacts        (Rel.) Home Phone Work Phone Mobile Phone    Linda Owens (Mother) -- -- [phone]              [place]: NPI [id number]  Tax ID [id number]     History & Physical        Lisbet Chavez APRN at 09/29/23 1749       Attestation signed by Osmel Moore MD at 09/30/23 0921    I have reviewed this documentation and agree.                   [facility]   HISTORY AND " PHYSICAL    Patient Name: Cecilio Puckett  : 1970  MRN: [id number]  Primary Care Physician:  Arias Lehman APRN  Date of admission: 2023    Subjective   Subjective     Chief Complaint:   Chief Complaint   Patient presents with    Weakness - Generalized         HPI:    Cecilio Puckett is a pleasant afebrile ambulatory 53 y.o. black male with a past medical history of HIV, CVA, diabetes, and hypertension.     He presents to the Emergency Department at [facility] with complaint of slurred speech. He has been admitted to the ED Observation Unit for further testing and evaluation.     He states last evening he developed slurred speech and attributed it to feeling tired. He denies any vision changes. States in  he had similar episode and was diagnosed with a stroke. He reports compliance with his blood pressure medications. No dizziness or focal weakness.     Review of Systems   All systems were reviewed and negative except for: slurred speech    Personal History     Past Medical History:   Diagnosis Date    COPD (chronic obstructive pulmonary disease)     Coronary artery disease     Diabetes mellitus     DVT (deep venous thrombosis)     Elevated cholesterol     GERD (gastroesophageal reflux disease)     H/O Cancer     right arm, in throat, chest and stomach, in remission    H/O Ischemia of extremity     History of MI (myocardial infarction) 2019    History of snoring     History of transfusion     HIV (human immunodeficiency virus infection)     Hypertension     Non Hodgkin's lymphoma     PAD (peripheral artery disease)     Pulmonary embolism     Stroke        Past Surgical History:   Procedure Laterality Date    CARDIAC CATHETERIZATION      CORONARY ANGIOPLASTY WITH STENT PLACEMENT      FEMORAL ARTERY - FEMORAL ARTERY BYPASS GRAFT      PORTACATH PLACEMENT         Family History: family history includes Diabetes in his mother; Hypertension in his maternal grandmother and mother; Stroke in his  mother. Otherwise pertinent FHx was reviewed and not pertinent to current issue.    Social History:  reports that he has been smoking cigarettes. He started smoking about 29 years ago. He has been smoking an average of .25 packs per day. He does not have any smokeless tobacco history on file. He reports current alcohol use of about 4.0 standard drinks per week. He reports that he does not currently use drugs.    Home Medications:  Zjcpibh-Gckrz-Kotoxjfz-TenofAF, HYDROcodone-acetaminophen, Insulin Glargine, Lancets, amLODIPine, aspirin, carvedilol, dapagliflozin Propanediol, ergocalciferol, glucose blood, glucose monitor, insulin lispro, losartan, pantoprazole, sertraline, and warfarin    Allergies:  No Known Allergies    Objective   Objective     Vitals:   Temp:  [97.8 °F (36.6 °C)] 97.8 °F (36.6 °C)  Heart Rate:  [] 80  Resp:  [16] 16  BP: (136-154)/(88-99) 154/99  Physical Exam    Constitutional: Awake, alert   Eyes: PERRLA, sclerae anicteric, no conjunctival injection   HENT: NCAT, mucous membranes moist   Neck: Supple, no thyromegaly, no lymphadenopathy, trachea midline   Respiratory: Clear to auscultation bilaterally, nonlabored respirations    Cardiovascular: RRR, no murmurs, rubs, or gallops, palpable pedal pulses bilaterally   Gastrointestinal: Positive bowel sounds, soft, nontender, nondistended   Musculoskeletal: No bilateral ankle edema, no clubbing or cyanosis to extremities   Psychiatric: Appropriate affect, cooperative   Neurologic: Oriented x 3, strength symmetric in all extremities, slight left sided facial droop present otherwise cranial nerves grossly intact, speech clear   Skin: No rashes     Result Review    Result Review:  I have personally reviewed the results from the time of this admission to 9/29/2023 17:49 EDT and agree with these findings:  [x]  Laboratory list / accordion  []  Microbiology  [x]  Radiology  [x]  EKG/Telemetry   []  Cardiology/Vascular   []  Pathology  [x]  Old  records  []  Other:      Assessment & Plan   Assessment / Plan     Brief Patient Summary:  Cecilio Puckett is a 53 y.o. male who was admitted to the observation unit for further evaluation and treatment of his slurred speech.    Active Hospital Problems:  There are no active hospital problems to display for this patient.    Plan:     Slurred speech  TIA workup  Aspirin/statin  Lipid panel/A1c  Consult to neurology   MRI brain-Area of acute infarction involving the right corona radiata and basal  ganglia. No evidence of hemorrhagic transformation.    Diabetes  -Accu-Cheks AC  -Adult subcutaneous insulin management-moderate dose  -Hemoglobin A1c -11.4    Hypertension  -Monitor blood pressure  -Continue losartan, Coreg, and amlodipine    DVT prophylaxis:  Medical and mechanical DVT prophylaxis orders are present.    CODE STATUS:       Admission Status:  I believe this patient meets observation status.    Electronically signed by MARIANNE Marie, 09/29/23, 5:49 PM EDT.        75 minutes has been spent by [facility] providers in the care of this patient while under observation status      I have worn appropriate PPE during this patient encounter, sanitized my hands both with entering and exiting patient's room.             Electronically signed by Osmel Moore MD at 09/30/23 0921          Emergency Department Notes        Severo Nice II, MD at 09/29/23 1805        Procedure Orders    1. Critical Care [[id number]] ordered by Severo Nice II, MD                  EMERGENCY DEPARTMENT ENCOUNTER    Room Number:  14/14  PCP: Arias Lehman APRN      HPI:  Chief Complaint: Slurred speech  A complete HPI/ROS/PMH/PSH/SH/FH are unobtainable due to: None  Context: Cecilio Puckett is a 53 y.o. male who presents to the ED c/o slurred speech.  He states that he developed slurred speech yesterday around 4 PM.  He states that if still feels slurred.  States he has had a prior stroke  that has caused him to have baseline left arm weakness where he has difficulty using his left hand to open up a can, for example.  He denies having any pain.        PAST MEDICAL HISTORY  Active Ambulatory Problems     Diagnosis Date Noted    Syncope, unspecified syncope type 10/10/2022    Acute DVT (deep venous thrombosis) 09/26/2023    Leg DVT (deep venous thromboembolism), acute, left 09/27/2023    NHL (non-Hodgkin's lymphoma) 09/27/2023    HIV (human immunodeficiency virus infection) 09/27/2023    DM (diabetes mellitus), type 2 09/27/2023    HTN (hypertension) 09/27/2023     Resolved Ambulatory Problems     Diagnosis Date Noted    No Resolved Ambulatory Problems     Past Medical History:   Diagnosis Date    COPD (chronic obstructive pulmonary disease)     Coronary artery disease     Diabetes mellitus     DVT (deep venous thrombosis)     Elevated cholesterol     GERD (gastroesophageal reflux disease)     H/O Cancer     H/O Ischemia of extremity     History of MI (myocardial infarction) 2019    History of snoring     History of transfusion     Hypertension     Non Hodgkin's lymphoma     PAD (peripheral artery disease)     Pulmonary embolism     Stroke          PAST SURGICAL HISTORY  Past Surgical History:   Procedure Laterality Date    CARDIAC CATHETERIZATION      CORONARY ANGIOPLASTY WITH STENT PLACEMENT      FEMORAL ARTERY - FEMORAL ARTERY BYPASS GRAFT      PORTACATH PLACEMENT           FAMILY HISTORY  Family History   Problem Relation Age of Onset    Hypertension Mother     Diabetes Mother     Stroke Mother     Hypertension Maternal Grandmother          SOCIAL HISTORY  Social History     Socioeconomic History    Marital status: Single   Tobacco Use    Smoking status: Every Day     Packs/day: 0.25     Types: Cigarettes     Start date: 01/1994   Vaping Use    Vaping Use: Never used   Substance and Sexual Activity    Alcohol use: Yes     Alcohol/week: 4.0 standard drinks     Types: 4 Cans of beer per week    Drug  use: Not Currently    Sexual activity: Defer         ALLERGIES  Patient has no known allergies.        REVIEW OF SYSTEMS  Review of Systems     All systems reviewed and negative except for those discussed in HPI.       PHYSICAL EXAM  ED Triage Vitals   Temp Heart Rate Resp BP SpO2   09/29/23 1515 09/29/23 1515 09/29/23 1515 09/29/23 1535 09/29/23 1515   97.8 °F (36.6 °C) 116 16 136/88 96 %      Temp src Heart Rate Source Patient Position BP Location FiO2 (%)   -- 09/29/23 1515 -- -- --    Monitor          Physical Exam      GENERAL: no acute distress  HENT: nares patent  EYES: no scleral icterus  CV: regular rhythm, normal rate  RESPIRATORY: normal effort, clear to auscultation bilaterally  ABDOMEN: soft  MUSCULOSKELETAL: no deformity  NEURO:   Recent and remote memory functions are normal. The patient is attentive with normal concentration. Language is fluent. Speech is clear. The speech is non-dysarthric. Fund of knowledge is normal.   Left facial droop (he states he normally doesn't have a facial droop)  Eyes close shut strongly bilaterally.  Symmetric eyebrow raise bilaterally.  EOMI, PERRL  CN II-XII grossly normal otherwise.  5/5 strength to extremities.  No pronator drift.  Intact FNF.  No meningismus.  NIHSS 2  PSYCH:  calm, cooperative  SKIN: warm, dry    Vital signs and nursing notes reviewed.          LAB RESULTS  Recent Results (from the past 24 hour(s))   ECG 12 Lead Other; fatigue    Collection Time: 09/29/23  4:33 PM   Result Value Ref Range    QT Interval 366 ms    QTC Interval 438 ms   Protime-INR    Collection Time: 09/29/23  4:40 PM    Specimen: Blood   Result Value Ref Range    Protime 24.3 (H) 11.7 - 14.2 Seconds    INR 2.14 (H) 0.90 - 1.10   CBC Auto Differential    Collection Time: 09/29/23  4:40 PM    Specimen: Blood   Result Value Ref Range    WBC 7.84 3.40 - 10.80 10*3/mm3    RBC 5.12 4.14 - 5.80 10*6/mm3    Hemoglobin 14.9 13.0 - 17.7 g/dL    Hematocrit 45.1 37.5 - 51.0 %    MCV 88.1 79.0  - 97.0 fL    MCH 29.1 26.6 - 33.0 pg    MCHC 33.0 31.5 - 35.7 g/dL    RDW 13.7 12.3 - 15.4 %    RDW-SD 44.1 37.0 - 54.0 fl    MPV 11.0 6.0 - 12.0 fL    Platelets 210 140 - 450 10*3/mm3    Neutrophil % 55.5 42.7 - 76.0 %    Lymphocyte % 35.6 19.6 - 45.3 %    Monocyte % 6.9 5.0 - 12.0 %    Eosinophil % 0.8 0.3 - 6.2 %    Basophil % 0.9 0.0 - 1.5 %    Immature Grans % 0.3 0.0 - 0.5 %    Neutrophils, Absolute 4.36 1.70 - 7.00 10*3/mm3    Lymphocytes, Absolute 2.79 0.70 - 3.10 10*3/mm3    Monocytes, Absolute 0.54 0.10 - 0.90 10*3/mm3    Eosinophils, Absolute 0.06 0.00 - 0.40 10*3/mm3    Basophils, Absolute 0.07 0.00 - 0.20 10*3/mm3    Immature Grans, Absolute 0.02 0.00 - 0.05 10*3/mm3    nRBC 0.0 0.0 - 0.2 /100 WBC   Comprehensive Metabolic Panel    Collection Time: 09/29/23  5:28 PM    Specimen: Blood   Result Value Ref Range    Glucose 338 (H) 65 - 99 mg/dL    BUN 8 6 - 20 mg/dL    Creatinine 0.97 0.76 - 1.27 mg/dL    Sodium 135 (L) 136 - 145 mmol/L    Potassium 3.8 3.5 - 5.2 mmol/L    Chloride 99 98 - 107 mmol/L    CO2 24.9 22.0 - 29.0 mmol/L    Calcium 8.9 8.6 - 10.5 mg/dL    Total Protein 7.1 6.0 - 8.5 g/dL    Albumin 3.7 3.5 - 5.2 g/dL    ALT (SGPT) 32 1 - 41 U/L    AST (SGOT) 28 1 - 40 U/L    Alkaline Phosphatase 74 39 - 117 U/L    Total Bilirubin <0.2 0.0 - 1.2 mg/dL    Globulin 3.4 gm/dL    A/G Ratio 1.1 g/dL    BUN/Creatinine Ratio 8.2 7.0 - 25.0    Anion Gap 11.1 5.0 - 15.0 mmol/L    eGFR 93.9 >60.0 mL/min/1.73   Urinalysis With Microscopic If Indicated (No Culture) - Urine, Clean Catch    Collection Time: 09/29/23  5:29 PM    Specimen: Urine, Clean Catch   Result Value Ref Range    Color, UA Yellow Yellow, Straw    Appearance, UA Clear Clear    pH, UA <=5.0 5.0 - 8.0    Specific Gravity, UA >=1.030 1.005 - 1.030    Glucose, UA >=1000 mg/dL (3+) (A) Negative    Ketones, UA Negative Negative    Bilirubin, UA Negative Negative    Blood, UA Trace (A) Negative    Protein,  mg/dL (2+) (A) Negative    Leuk  Esterase, UA Negative Negative    Nitrite, UA Negative Negative    Urobilinogen, UA 0.2 E.U./dL 0.2 - 1.0 E.U./dL   Urinalysis, Microscopic Only - Urine, Clean Catch    Collection Time: 09/29/23  5:29 PM    Specimen: Urine, Clean Catch   Result Value Ref Range    RBC, UA 0-2 None Seen, 0-2 /HPF    WBC, UA 0-2 None Seen, 0-2 /HPF    Bacteria, UA None Seen None Seen /HPF    Squamous Epithelial Cells, UA 0-2 None Seen, 0-2 /HPF    Hyaline Casts, UA 0-2 None Seen /LPF    Methodology Automated Microscopy        Ordered the above labs and reviewed the results.        RADIOLOGY  CT Head Without Contrast    Result Date: 9/29/2023  CT HEAD WITHOUT CONTRAST  CLINICAL HISTORY: Slurred speech and left facial droop  TECHNIQUE: CT scan of the head was obtained with 3 mm axial soft tissue algorithm images. No intravenous contrast was administered. Sagittal and coronal reconstructions were obtained.  COMPARISON: CT head 10/10/2022 and 9/27/2022  FINDINGS:  No intracranial hemorrhage.  No midline shift or mass effect. Unchanged bilateral chronic lacunar infarcts. No CT evidence of acute territorial infarction.  No hydrocephalus.  Clear visualized portions of the paranasal sinuses and mastoid air cells.        No acute intracranial abnormality.     Radiation dose reduction techniques were utilized, including automated exposure control and exposure modulation based on body size.  This report was finalized on 9/29/2023 5:20 PM by Dr. Reginald Diaz M.D.      XR Chest 1 View    Result Date: 9/29/2023  XR CHEST 1 VW-  HISTORY:    Fatigue, high blood pressure, history of diabetes.  COMPARISON: None.  FINDINGS:  2 views of the chest were obtained.  Support Devices:  None. Cardiac Silhouette/Mediastinum/Kelley:  The cardiac, mediastinal, and hilar contours are within normal limits. There is calcific atherosclerosis. Lungs/Pleural Spaces:  The lungs and pleural spaces are clear. Chest Wall/Diaphragm/Upper Abdomen: The visualized osseous  structures are age-appropriate.   CONCLUSION(S):   1.  No focal consolidation or effusion.  This report was finalized on 9/29/2023 5:23 PM by Dr. Susanna Torre M.D.       Ordered the above noted radiological studies. Reviewed by me in PACS.          PROCEDURES  Critical Care  Performed by: Severo Nice II, MD  Authorized by: Severo Nice II, MD     Critical care provider statement:     Critical care time (minutes):  32    Critical care was necessary to treat or prevent imminent or life-threatening deterioration of the following conditions:  CNS failure or compromise    Critical care was time spent personally by me on the following activities:  Ordering and review of laboratory studies, ordering and review of radiographic studies, pulse oximetry, re-evaluation of patient's condition, discussions with consultants, examination of patient, obtaining history from patient or surrogate and review of old charts          MEDICATIONS GIVEN IN ER  Medications   sodium chloride 0.9 % flush 10 mL (has no administration in time range)         MEDICAL DECISION MAKING, PROGRESS, and CONSULTS    Discussion below represents my analysis of pertinent findings related to patient's condition, differential diagnosis, treatment plan and final disposition.      Orders placed during this visit:  Orders Placed This Encounter   Procedures    Critical Care    XR Chest 1 View    CT Head Without Contrast    Comprehensive Metabolic Panel    Protime-INR    Urinalysis With Microscopic If Indicated (No Culture) - Urine, Clean Catch    CBC Auto Differential    Urinalysis, Microscopic Only - Urine, Clean Catch    Monitor Blood Pressure    Pulse Oximetry, Continuous    ECG 12 Lead Other; fatigue    Insert Peripheral IV    CBC & Differential             Differential diagnosis:    Stroke, TIA        Independent interpretation of labs, radiology studies, and discussions with consultants:  ED Course as of 09/29/23 1906   Fri Sep 29, 2023    1627 On medical chart review, reviewed most recent discharge summary from 2 days ago.  Ultrasound at that time showed a left lower extremity DVT of the popliteal, posterior tibial, and peroneal veins.  Hematology felt it was due to some dietary indiscretions.  He was therapeutic at 2.7 at the time of discharge. [TD]   1632 I discussed the case with Dr. Floyd Washburn, stroke neurology.  He recommends obtaining a routine head CT.  Patient is not a candidate for TNK as his symptoms occurred 24 hours ago and is likely therapeutic on his INR already given that it was 2.7 just 2 days ago. [TD]   1729 CT head is acutely negative. [TD]   1729 INR(!): 2.14 [TD]   1729 EKG independently interpreted by myself.  Time 4:33 PM.  Sinus rhythm.  Heart rate 86.  Normal axis.  Prolonged R wave progression.  LVH. [TD]   1836 I discussed the case with MARIANNE Marie with observation medicine.  She will admit. [TD]      ED Course User Index  [TD] Severo Nice II, MD             DIAGNOSIS  Final diagnoses:   Facial droop   Slurred speech         DISPOSITION  Admit      Latest Documented Vital Signs:  As of 19:06 EDT  BP- (!) 165/108 HR- 80 Temp- 97.8 °F (36.6 °C) O2 sat- 98%      --    Please note that portions of this were completed with a voice recognition program.       Note Disclaimer: At [facility], we believe that sharing information builds trust and better relationships. You are receiving this note because you are receiving care at [facility] or recently visited. It is possible you will see health information before a provider has talked with you about it. This kind of information can be easy to misunderstand. To help you fully understand what it means for your health, we urge you to discuss this note with your provider.         Severo Nice II, MD  09/29/23 1907      Electronically signed by Severo Nice II, MD at 09/29/23 1907       Degonda, Janet, RN at 09/29/23 1514          Bp 167/100 today.   His speech is sluggish since yesterday and he is shaky    Electronically signed by Degonda, Janet, RN at 09/29/23 1514       Medication Administration Report for Cecilio Puckett as of 10/02/23 1125     Legend:    Given Hold Not Given Due Canceled Entry Other Actions    Time Time (Time) Time Time-Action         Discontinued     Completed     Future     MAR Hold     Linked             Medications 10/01/23 10/02/23      amLODIPine (NORVASC) tablet 10 mg  Dose: 10 mg  Freq: Daily Route: PO  Start: 09/30/23 0900   Admin Instructions:   Hold for SBP less than 100, DBP less than 60  Caution: Look alike/sound alike drug alert. Avoid grapefruit juice.    0814-Given            0846-Given               aspirin tablet 325 mg  Dose: 325 mg  Freq: Daily Route: PO  Start: 09/29/23 1924   Admin Instructions:   If patient fails dysphagia, MA option MUST be given.  Do not exceed 4 grams of aspirin in a 24 hr period.    If given for pain, use the following pain scale:   Mild Pain = Pain Score of 1-3, CPOT 1-2  Moderate Pain = Pain Score of 4-6, CPOT 3-4  Severe Pain = Pain Score of 7-10, CPOT 5-8    0814-Given            0846-Given              Or  aspirin suppository 300 mg  Dose: 300 mg  Freq: Daily Route: RE  Start: 09/29/23 1924   End: 09/30/23 1617   Admin Instructions:   If patient fails dysphagia, MA option MUST be given.  Do not exceed 4 grams of aspirin in a 24 hr period.    If given for pain, use the following pain scale:   Mild Pain = Pain Score of 1-3, CPOT 1-2  Moderate Pain = Pain Score of 4-6, CPOT 3-4  Severe Pain = Pain Score of 7-10, CPOT 5-8    0814-Not Given:  See Alt            0846-Not Given:  See Alt               atorvastatin (LIPITOR) tablet 40 mg  Dose: 40 mg  Freq: Nightly Route: PO  Start: 09/30/23 2100   Admin Instructions:   Avoid grapefruit juice.    2127-Given            2100               carvedilol (COREG) tablet 6.25 mg  Dose: 6.25 mg  Freq: Every 12 Hours Scheduled Route: PO  Start: 09/29/23 2115    Admin Instructions:   Hold for SBP less than 100, DBP less than 60, or heart rate less than 50  Give with food.    0814-Given     2127-Given           0846-Given     2100              Otssuab-Xfdme-Rxnduqjg-TenofAF (GENVOYA) [phone] MG per tablet 1 tablet  Dose: 1 tablet  Freq: Daily Route: PO  Start: 09/30/23 0900   Admin Instructions:   Give with food.    0814-Given            0846-Given               empagliflozin (JARDIANCE) tablet 25 mg  Dose: 25 mg  Freq: Daily Route: PO  Start: 09/30/23 0900    0815-Given            0846-Given               Enoxaparin Sodium (LOVENOX) syringe 100 mg  Dose: 100 mg  Freq: Every 12 Hours Route: SC  Indications of Use: DVT/PE (active thrombosis)  Start: 10/01/23 1315   Admin Instructions:   Give subcutaneous in abdomen only. Do not massage site after injection.    1320-Given            0135-Given     1315              insulin glargine (LANTUS, SEMGLEE) injection 15 Units  Dose: 15 Units  Freq: Nightly Route: SC  Start: 09/30/23 2100   Admin Instructions:   Do not hold basal insulin without an order. Consider requesting a dose edit, if needed.       2127-Given            2100               insulin lispro (HUMALOG/ADMELOG) injection 2-9 Units  Dose: 2-9 Units  Freq: 4 Times Daily Before Meals & Nightly Route: SC  Start: 09/29/23 2130   Admin Instructions:   Correction Insulin - Moderate Dose (Total Insulin Dose 40-60 units/day, Average Weight Patient, Patient Taking Oral Hypoglycemic)    Blood Glucose 150-199 mg/dL - 2 units  Blood Glucose 200-249 mg/dL - 4 units  Blood Glucose 250-299 mg/dL - 6 units  Blood Glucose 300-349 mg/dL - 7 units  Blood Glucose 350-400 mg/dL - 8 units  Blood Glucose greater than 400 mg/dL - 9 units & Call Provider   Caution: Look alike/sound alike drug alert    (0809)-Not Given     (1232)-Not Given     (1730)-Not Given     2132-Hold         (0848)-Not Given [C]     1130     1730     2100            insulin lispro (HUMALOG/ADMELOG) injection 5  "Units  Dose: 5 Units  Freq: 3 Times Daily With Meals Route: SC  Start: 09/30/23 1800   Admin Instructions:   \"Solution should be clear. If cloudy, contact pharmacy for a new vial.\"   Caution: Look alike/sound alike drug alert    0814-Given     (1233)-Not Given     1735-Given          (0800)-Not Given     1200     1800             losartan (COZAAR) tablet 50 mg  Dose: 50 mg  Freq: Daily Route: PO  Start: 10/01/23 0900   Admin Instructions:   Hold for SBP less than 100, DBP less than 60    0815-Given            0846-Given               nicotine (NICODERM CQ) 21 MG/24HR patch 1 patch  Dose: 1 patch  Freq: Every 24 Hours Scheduled Route: TD  Start: 09/30/23 2030   Admin Instructions:   Apply to clean, dry, nonhairy area of skin (typically upper arm or shoulder)  Dispose of nicotine replacement therapies and their wrappers in non-hazardous pharmaceutical waste or in regular trash.    0814-Medication Removed     2127-Medication Applied           2029 (Medication Removed)     2030              ondansetron (ZOFRAN) injection 4 mg  Dose: 4 mg  Freq: Every 6 Hours PRN Route: IV  PRN Reasons: Nausea,Vomiting  Start: 09/29/23 1907         pantoprazole (PROTONIX) EC tablet 40 mg  Dose: 40 mg  Freq: Every Early Morning Route: PO  Start: 09/30/23 0600   Admin Instructions:   Do not crush or chew the capsules or tablets. The drug may not work as designed if the capsule or tablet is crushed or chewed. Swallow whole.  Swallow whole; do not crush, split, or chew.    0649-Given            0621-Given               sertraline (ZOLOFT) tablet 50 mg  Dose: 50 mg  Freq: Nightly Route: PO  Start: 09/30/23 2100 2128-Given            2100               sodium chloride 0.9 % flush 10 mL  Dose: 10 mL  Freq: As Needed Route: IV  PRN Reason: Line Care  Start: 09/29/23 1907         sodium chloride 0.9 % flush 10 mL  Dose: 10 mL  Freq: As Needed Route: IV  PRN Reason: Line Care  Start: 09/29/23 1620         sodium chloride 0.9 % infusion 40 " mL  Dose: 40 mL  Freq: As Needed Route: IV  PRN Reason: Line Care  Start: 09/29/23 1907   Admin Instructions:   Following administration of an IV intermittent medication, flush line with 40mL NS at 100mL/hr.        Completed Medications  Medications 10/01/23 10/02/23       iopamidol (ISOVUE-370) 76 % injection 100 mL  Dose: 100 mL  Freq: Once in Imaging Route: IV  Start: 09/30/23 1400   End: 09/30/23 1312         iopamidol (ISOVUE-370) 76 % injection 100 mL  Dose: 100 mL  Freq: Once in Imaging Route: IV  Start: 09/30/23 1200   End: 09/30/23 1108         perflutren (DEFINITY) 8.476 mg in Sodium Chloride (PF) 0.9 % 10 mL injection  Dose: 3 mL  Freq: Once in Imaging Route: IV  Start: 09/30/23 0915   End: 09/30/23 0826   Admin Instructions:   Mix 1.3 mL of mechanically activated Definity with 8.7 mL of normal saline. A total of 3 mL of the resulting Definity solution was administered.         sodium chloride 0.9 % bolus 500 mL  Dose: 500 mL  Freq: Once Route: IV  Start: 09/30/23 1245   End: 09/30/23 1333        Discontinued Medications  Medications 10/01/23 10/02/23       atorvastatin (LIPITOR) tablet 40 mg  Dose: 40 mg  Freq: Nightly Route: PO  Start: 09/29/23 2100   End: 09/30/23 1617   Admin Instructions:   Avoid grapefruit juice.         atorvastatin (LIPITOR) tablet 80 mg  Dose: 80 mg  Freq: Nightly Route: PO  Start: 09/30/23 2100   End: 09/30/23 1654   Admin Instructions:   Avoid grapefruit juice.         dextrose (D50W) (25 g/50 mL) IV injection 25 g  Dose: 25 g  Freq: Every 15 Minutes PRN Route: IV  PRN Reason: Low Blood Sugar  PRN Comment: Blood Sugar Less Than 70  Start: 09/29/23 2037   End: 09/30/23 1618   Admin Instructions:   Blood sugar less than 70; patient has IV access - Unresponsive, NPO or Unable To Safely Swallow         dextrose (GLUTOSE) oral gel 15 g  Dose: 15 g  Freq: Every 15 Minutes PRN Route: PO  PRN Reason: Low Blood Sugar  PRN Comment: Blood sugar less than 70  Start: 09/29/23 2037   End:  09/30/23 1618   Admin Instructions:   BS<70, Patient Alert, Is not NPO, Can safely swallow.         glucagon (GLUCAGEN) injection 1 mg  Dose: 1 mg  Freq: Every 15 Minutes PRN Route: IM  PRN Reason: Low Blood Sugar  PRN Comment: Blood Glucose Less Than 70  Start: 09/29/23 2037   End: 09/30/23 1618   Admin Instructions:   Blood Glucose Less Than 70 - Patient Without IV Access - Unresponsive, NPO or Unable To Safely Swallow  Reconstitute powder for injection by adding 1 mL of -supplied sterile diluent or sterile water for injection to a vial containing 1 mg of the drug, to provide solutions containing 1 mg/mL. Shake vial gently to dissolve.         heparin (porcine) 5000 UNIT/ML injection 2,900-5,900 Units  Dose: 30-60 Units/kg  Weight Dosing Info: 98 kg  Freq: Every 6 Hours PRN Route: IV  PRN Comment: Per Heparin Nomogram  Indications of Use: DVT/PE (active thrombosis),history of DVT/PE  Start: 09/30/23 1529   End: 10/01/23 1216   Admin Instructions:   **Max Dose of 4,000 units** Bolus for Cardiac or Other Not VTE:   For PTT Less Than 54 Administer 60 units/kg Bolus.  For PTT 54 - 63 Administer 30 units/kg Bolus.         heparin 93686 units/250 mL (100 units/mL) in 0.45 % NaCl infusion  Rate: 11.76 mL/hr Dose: 12 Units/kg/hr  Weight Dosing Info: 98 kg  Freq: Titrated Route: IV  Indications of Use: DVT/PE (active thrombosis),history of DVT/PE  Start: 09/30/23 1630   End: 10/01/23 1216   Admin Instructions:   Weight Based Heparin Nomogram Cardiac or Other Not VTE: Heparin Infusion 12 units/kg/hr (initial max of 1,000 units/hr)    PTT Less Than 54 sec:      Bolus 60 units/kg & Increase Dose By 3 units/kg/hr.    PTT 54-63 sec:    Bolus 30 units/kg & Increase Dose By 1 units/kg/hr (If Less Than 50 kg Round Up to 100 Units).  PTT 64-85 sec:    No Bolus, No Dose Change.  PTT  sec:  No Bolus, Decrease Dose By 2 units/kg/hr.  PTT Greater Than 104 sec:    No Bolus. Hold Infusion 1 hour. Decrease Dose By 3  units/kg/hr. Repeat PTT in 6 hours.  If PTT Remains Greater Than 104 Stop Heparin Drip & Contact Provider    0710-Handoff     0813-Hold     0939-Rate Change (DUAL SIGN)     1137-Stopped             hydrALAZINE (APRESOLINE) injection 10 mg  Dose: 10 mg  Freq: Every 6 Hours PRN Route: IV  PRN Reason: High Blood Pressure  PRN Comment: systolic >180  Start: 09/30/23 1147   End: 10/01/23 1356   Admin Instructions:   Hold for SBP less than 100, DBP less than 60  Caution: Look alike/sound alike drug alert         insulin glargine (LANTUS, SEMGLEE) injection 25 Units  Dose: 25 Units  Freq: Nightly Route: SC  Start: 09/29/23 2115   End: 09/30/23 1620   Admin Instructions:   Do not hold basal insulin without an order. Consider requesting a dose edit, if needed.            iopamidol (ISOVUE-370) 76 % injection 100 mL  Dose: 100 mL  Freq: Once in Imaging Route: IV  Start: 09/30/23 1715   End: 09/30/23 1618         iopamidol (ISOVUE-370) 76 % injection 100 mL  Dose: 100 mL  Freq: Once in Imaging Route: IV  Start: 09/30/23 1715   End: 09/30/23 1618         losartan (COZAAR) tablet 25 mg  Dose: 25 mg  Freq: Once Route: PO  Start: 09/30/23 1245   End: 09/30/23 1617   Admin Instructions:   Hold for SBP less than 100, DBP less than 60         losartan (COZAAR) tablet 25 mg  Dose: 25 mg  Freq: Daily Route: PO  Start: 09/30/23 0900   End: 09/30/23 1620   Admin Instructions:   Hold for SBP less than 100, DBP less than 60         perflutren (DEFINITY) 8.476 mg in Sodium Chloride (PF) 0.9 % 10 mL injection  Dose: 3 mL  Freq: Once in Imaging Route: IV  Start: 09/30/23 1715   End: 09/30/23 1618   Admin Instructions:   Mix 1.3 mL of mechanically activated Definity with 8.7 mL of normal saline. A total of 3 mL of the resulting Definity solution was administered.         Pharmacy to dose warfarin  Freq: Continuous PRN Route: XX  PRN Reason: Consult  Indications of Use: DVT/PE (active thrombosis)  Start: 09/29/23 2020   End: 09/30/23 1535    Admin Instructions:     Group 2 ([place]) Hazardous Drug - Reproductive Risk Only - See Handling Guide   Order specific questions:   Target INR 2 - 3           sertraline (ZOLOFT) tablet 50 mg  Dose: 50 mg  Freq: Daily Route: PO  Start: 09/30/23 0900   End: 09/30/23 1620         sodium chloride 0.9 % bolus 500 mL  Dose: 500 mL  Freq: Once Route: IV  Start: 09/30/23 1715   End: 09/30/23 1618         sodium chloride 0.9 % flush 10 mL  Dose: 10 mL  Freq: Every 12 Hours Scheduled Route: IV  Start: 09/29/23 2100   End: 09/30/23 1618         warfarin (COUMADIN) tablet 10 mg  Dose: 10 mg  Freq: Once per day on Sun Tue Thu Sat Route: PO  Indications of Use: DVT/PE (active thrombosis)  Start: 09/30/23 1327   End: 09/30/23 1533   Admin Instructions:   Food-Drug Interaction. Empty wrappers can be disposed of in the trash.  Group 2 ([place]) Hazardous Drug - Reproductive Risk Only - See Handling Guide   Order specific questions:   Target INR 2 - 3           warfarin (COUMADIN) tablet 10 mg  Dose: 10 mg  Freq: Once per day on Sun Tue Thu Sat Route: PO  Indications of Use: DVT/PE (active thrombosis)  Start: 10/01/23 1800   End: 09/30/23 1327   Admin Instructions:   Food-Drug Interaction. Empty wrappers can be disposed of in the trash.  Group 2 ([place]) Hazardous Drug - Reproductive Risk Only - See Handling Guide   Order specific questions:   Target INR 2 - 3           warfarin (COUMADIN) tablet 10 mg  Dose: 10 mg  Freq: Once per day on Sun Tue Thu Sat Route: PO  Indications of Use: DVT/PE (active thrombosis)  Start: 09/30/23 0900   End: 09/30/23 1315   Admin Instructions:   Food-Drug Interaction. Empty wrappers can be disposed of in the trash.  Group 2 ([place]) Hazardous Drug - Reproductive Risk Only - See Handling Guide   Order specific questions:   Target INR 2.5 - 3.5           warfarin (COUMADIN) tablet 15 mg  Dose: 15 mg  Freq: Once per day on Mon Wed Fri Route: PO  Indications of Use: DVT/PE (active thrombosis)  Start: 10/02/23  "1800   End: 09/30/23 1533   Admin Instructions:   Food-Drug Interaction. Empty wrappers can be disposed of in the trash.  Group 2 ([place]) Hazardous Drug - Reproductive Risk Only - See Handling Guide   Order specific questions:   Target INR 2 - 3                         Physician Progress Notes (last 72 hours)        Osbaldo Levi MD at 10/01/23 8836          Daily progress note    Referring physician  Dr. Lisbet Chavez    Subjective  Doing better with no new complaints    History of present illness  53-year-old -American male with history of diabetes mellitus hypertension HIV non-Hodgkin lymphoma coronary artery disease and DVT on Coumadin and was recently discharged from the hospital with acute on chronic DVT admitted to the observation unit with slurred speech and left arm weakness.  Patient remained in the observation unit and further evaluated by neurology and found to have right CVA and also found to have acute pulm embolism admitted to the hospital as he has therapeutic INR.  I am asked to follow patient for medical problems and take over the care.  At the time of interview he is fully alert oriented and give me a detailed history.     REVIEW OF SYSTEMS  All systems reviewed and negative except for those discussed in HPI.      PHYSICAL EXAM  Blood pressure 144/89, pulse 66, temperature 97.7 °F (36.5 °C), temperature source Oral, resp. rate 18, height 185 cm (72.84\"), weight 98 kg (216 lb 0.8 oz), SpO2 98 %.    GENERAL: Awake and alert no acute distress  HEENT: Unremarkable  NECK: Supple  CV: regular rhythm, normal rate  RESPIRATORY: normal effort, moving air bilaterally  ABDOMEN: soft nontender nondistended bowel sounds positive  MUSCULOSKELETAL: no deformity  NEURO: Awake and alert follow commands with no speech problem and moves all 4 extremities  SKIN: warm, dry     LAB RESULTS  Lab Results (last 24 hours)       Procedure Component Value Units Date/Time    POC Glucose Once [[id number]]  (Normal) " Collected: 10/01/23 1229    Specimen: Blood Updated: 10/01/23 1230     Glucose 95 mg/dL     POC Glucose Once [[id number]]  (Normal) Collected: 10/01/23 0802    Specimen: Blood Updated: 10/01/23 0826     Glucose 126 mg/dL     aPTT [[id number]]  (Abnormal) Collected: 10/01/23 0619    Specimen: Blood Updated: 10/01/23 0755     PTT >200.0 seconds     TSH [[id number]]  (Normal) Collected: 10/01/23 0619    Specimen: Blood Updated: 10/01/23 0711     TSH 3.290 uIU/mL     Comprehensive Metabolic Panel [[id number]]  (Abnormal) Collected: 10/01/23 0619    Specimen: Blood Updated: 10/01/23 0704     Glucose 135 mg/dL      BUN 11 mg/dL      Creatinine 0.82 mg/dL      Sodium 136 mmol/L      Potassium 3.5 mmol/L      Chloride 101 mmol/L      CO2 23.1 mmol/L      Calcium 9.1 mg/dL      Total Protein 6.5 g/dL      Albumin 3.4 g/dL      ALT (SGPT) 28 U/L      AST (SGOT) 20 U/L      Alkaline Phosphatase 62 U/L      Total Bilirubin <0.2 mg/dL      Globulin 3.1 gm/dL      A/G Ratio 1.1 g/dL      BUN/Creatinine Ratio 13.4     Anion Gap 11.9 mmol/L      eGFR 105.7 mL/min/1.73     Narrative:      GFR Normal >60  Chronic Kidney Disease <60  Kidney Failure <15      Lipid Panel [[id number]]  (Abnormal) Collected: 10/01/23 0619    Specimen: Blood Updated: 10/01/23 0704     Total Cholesterol 182 mg/dL      Triglycerides 83 mg/dL      HDL Cholesterol 43 mg/dL      LDL Cholesterol  124 mg/dL      VLDL Cholesterol 15 mg/dL      LDL/HDL Ratio 2.85    Narrative:      Cholesterol Reference Ranges  (U.S. Department of Health and Human Services ATP III Classifications)    Desirable          <200 mg/dL  Borderline High    200-239 mg/dL  High Risk          >240 mg/dL      Triglyceride Reference Ranges  (U.S. Department of Health and Human Services ATP III Classifications)    Normal           <150 mg/dL  Borderline High  150-199 mg/dL  High             200-499 mg/dL  Very High        >500 mg/dL    HDL Reference Ranges  (U.S. Department of Health and Human  Services ATP III Classifications)    Low     <40 mg/dl (major risk factor for CHD)  High    >60 mg/dl ('negative' risk factor for CHD)        LDL Reference Ranges  (U.S. Department of Health and Human Services ATP III Classifications)    Optimal          <100 mg/dL  Near Optimal     100-129 mg/dL  Borderline High  130-159 mg/dL  High             160-189 mg/dL  Very High        >189 mg/dL    CBC & Differential [[id number]]  (Abnormal) Collected: 10/01/23 0619    Specimen: Blood Updated: 10/01/23 0649    Narrative:      The following orders were created for panel order CBC & Differential.  Procedure                               Abnormality         Status                     ---------                               -----------         ------                     CBC Auto Differential[[id number]]        Abnormal            Final result                 Please view results for these tests on the individual orders.    CBC Auto Differential [[id number]]  (Abnormal) Collected: 10/01/23 0619    Specimen: Blood Updated: 10/01/23 0649     WBC 7.05 10*3/mm3      RBC 4.81 10*6/mm3      Hemoglobin 14.0 g/dL      Hematocrit 41.3 %      MCV 85.9 fL      MCH 29.1 pg      MCHC 33.9 g/dL      RDW 14.1 %      RDW-SD 44.1 fl      MPV 11.5 fL      Platelets 208 10*3/mm3      Neutrophil % 34.3 %      Lymphocyte % 55.7 %      Monocyte % 8.1 %      Eosinophil % 1.1 %      Basophil % 0.7 %      Neutrophils, Absolute 2.41 10*3/mm3      Lymphocytes, Absolute 3.93 10*3/mm3      Monocytes, Absolute 0.57 10*3/mm3      Eosinophils, Absolute 0.08 10*3/mm3      Basophils, Absolute 0.05 10*3/mm3     aPTT [[id number]]  (Abnormal) Collected: 09/30/23 2250    Specimen: Blood Updated: 09/30/23 2315     PTT 36.7 seconds     POC Glucose Once [[id number]]  (Abnormal) Collected: 09/30/23 2030    Specimen: Blood Updated: 09/30/23 2033     Glucose 223 mg/dL     BNP [[id number]]  (Normal) Collected: 09/30/23 1814    Specimen: Blood from Arm, Right Updated: 09/30/23  1904     proBNP 393.0 pg/mL     Narrative:      This assay is used as an aid in the diagnosis of individuals suspected of having heart failure. It can be used as an aid in the diagnosis of acute decompensated heart failure (ADHF) in patients presenting with signs and symptoms of ADHF to the emergency department (ED). In addition, NT-proBNP of <300 pg/mL indicates ADHF is not likely.    POC Glucose Once [[id number]]  (Abnormal) Collected: 09/30/23 1824    Specimen: Blood Updated: 09/30/23 1826     Glucose 209 mg/dL           Imaging Results (Last 24 Hours)       Procedure Component Value Units Date/Time    CT Angiogram Carotids [[id number]] Collected: 09/30/23 1211     Updated: 09/30/23 1512    Narrative:      CT ANGIOGRAM HEAD AND NECK WITH IV CONTRAST     HISTORY: 53-year-old male with acute infarction involving the right  corona radiata and basal ganglia demonstrated on MRI brain yesterday.      TECHNIQUE: CT angiogram head and neck includes axial imaging with  intravenous contrast and data reconstructed in coronal and sagittal  planes. AI analysis of LVO was utilized. 3-dimensional volume rendering  performed.      COMPARISON: CT angiogram head and neck 09/27/2022, MRI brain 10/10/2022,  CT head 09/29/2023, MRI brain 09/29/2023.     FINDINGS: On the most superior images there appear to be filling defects  within the distal right main pulmonary artery as well as within the  right upper lobe and left upper lobe segmental pulmonary arteries.  Recommend further evaluation with CT angiogram chest.     Atherosclerotic calcifications are present involving the aortic arch and  great vessel origins. Both common carotid arteries are patent though  there is mural thrombus involving both common carotid arteries and  internal carotid arteries. There is calcified plaque at the origins of  both internal carotid arteries which exhibit wall irregularity. There is  no evidence for NASCET stenosis. The distal cervical, petrous,  internal  carotid arteries are patent. There is partially calcified plaque  involving cavernous segments of both internal carotid arteries with  moderate stenoses. Supracavernous internal carotid arteries, both middle  cerebral arteries, anterior cerebral arteries are patent.     The left vertebral artery arises from the left subclavian artery just  distal to its origin. Evaluation of the proximal right vertebral artery  is limited by small size as well as streak artifact related to contrast  within collateral veins along the right neck base. Left vertebral artery  is larger than the right. Both mid and distal cervical arteries exhibit  flow. There is flow within the basilar artery and both posterior  cerebral arteries.     Noncontrasted head CT demonstrates low density within the right corona  radiata extending to the superior right lentiform nucleus similar to  areas of restricted diffusion demonstrated on yesterday's brain MRI.  There are also chronic infarctions involving the right caudate body and  right thalamus that were initially demonstrated on previous brain MRI  10/10/2022. There is no evidence for hemorrhagic transformation. Post  contrast-enhanced head CT demonstrates no abnormal intracranial  enhancement.       Impression:      1. There appear to be bilateral pulmonary thromboembolic disease with  embolus within the distal right main pulmonary artery and upper lobe  segmental branches. Recommend further evaluation with CT angiogram  chest.  2. Atherosclerotic disease with noncalcified plaques involving both  common carotid and internal carotid arteries with mild narrowing though  without NASCET stenosis.  3. Small acute infarctions involving the right corona radiata and right  basal ganglia without evidence for hemorrhagic transformation.     Findings discussed with MARIANNE Marie in the emergency department on  09/30/2023 at 11:55 a.m.      Radiation dose reduction techniques were utilized,  including automated  exposure control and exposure modulation based on body size.        This report was finalized on 9/30/2023 3:09 PM by Dr. Jesus Mayfield M.D.       CT Angiogram Head [[id number]] Collected: 09/30/23 1211     Updated: 09/30/23 1512    Narrative:      CT ANGIOGRAM HEAD AND NECK WITH IV CONTRAST     HISTORY: 53-year-old male with acute infarction involving the right  corona radiata and basal ganglia demonstrated on MRI brain yesterday.      TECHNIQUE: CT angiogram head and neck includes axial imaging with  intravenous contrast and data reconstructed in coronal and sagittal  planes. AI analysis of LVO was utilized. 3-dimensional volume rendering  performed.      COMPARISON: CT angiogram head and neck 09/27/2022, MRI brain 10/10/2022,  CT head 09/29/2023, MRI brain 09/29/2023.     FINDINGS: On the most superior images there appear to be filling defects  within the distal right main pulmonary artery as well as within the  right upper lobe and left upper lobe segmental pulmonary arteries.  Recommend further evaluation with CT angiogram chest.     Atherosclerotic calcifications are present involving the aortic arch and  great vessel origins. Both common carotid arteries are patent though  there is mural thrombus involving both common carotid arteries and  internal carotid arteries. There is calcified plaque at the origins of  both internal carotid arteries which exhibit wall irregularity. There is  no evidence for NASCET stenosis. The distal cervical, petrous, internal  carotid arteries are patent. There is partially calcified plaque  involving cavernous segments of both internal carotid arteries with  moderate stenoses. Supracavernous internal carotid arteries, both middle  cerebral arteries, anterior cerebral arteries are patent.     The left vertebral artery arises from the left subclavian artery just  distal to its origin. Evaluation of the proximal right vertebral artery  is limited by small size  as well as streak artifact related to contrast  within collateral veins along the right neck base. Left vertebral artery  is larger than the right. Both mid and distal cervical arteries exhibit  flow. There is flow within the basilar artery and both posterior  cerebral arteries.     Noncontrasted head CT demonstrates low density within the right corona  radiata extending to the superior right lentiform nucleus similar to  areas of restricted diffusion demonstrated on yesterday's brain MRI.  There are also chronic infarctions involving the right caudate body and  right thalamus that were initially demonstrated on previous brain MRI  10/10/2022. There is no evidence for hemorrhagic transformation. Post  contrast-enhanced head CT demonstrates no abnormal intracranial  enhancement.       Impression:      1. There appear to be bilateral pulmonary thromboembolic disease with  embolus within the distal right main pulmonary artery and upper lobe  segmental branches. Recommend further evaluation with CT angiogram  chest.  2. Atherosclerotic disease with noncalcified plaques involving both  common carotid and internal carotid arteries with mild narrowing though  without NASCET stenosis.  3. Small acute infarctions involving the right corona radiata and right  basal ganglia without evidence for hemorrhagic transformation.     Findings discussed with MARIANNE Marie in the emergency department on  09/30/2023 at 11:55 a.m.      Radiation dose reduction techniques were utilized, including automated  exposure control and exposure modulation based on body size.        This report was finalized on 9/30/2023 3:09 PM by Dr. Jesus Mayfield M.D.             Results  Scan on 9/29/2023 1633 by [facility], [place]: ECG 12-LEAD         Author: -- Service: -- Author Type: --   Filed: Date of Service: Creation Time:   Status: (Other)   HEART RATE= 86  bpm  RR Interval= 698  ms  AK Interval= 159  ms  P Horizontal Axis= -10  deg  P Front  Axis= 18  deg  QRSD Interval= 98  ms  QT Interval= 366  ms  QTcB= 438  ms  QRS Axis= -26  deg  T Wave Axis= 9  deg  - ABNORMAL ECG -  Sinus rhythm  Left ventricular hypertrophy  Lateral infarct, age indeterminate  Anterior ST elevation, probably due to LVH          Current Facility-Administered Medications:     amLODIPine (NORVASC) tablet 10 mg, 10 mg, Oral, Daily, Lisbet Chavez APRN, 10 mg at 10/01/23 0814    aspirin tablet 325 mg, 325 mg, Oral, Daily, 325 mg at 10/01/23 0814 **OR** [DISCONTINUED] aspirin suppository 300 mg, 300 mg, Rectal, Daily, Marycarmen Bran APRN    atorvastatin (LIPITOR) tablet 40 mg, 40 mg, Oral, Nightly, Osbaldo Levi MD, 40 mg at 09/30/23 2032    carvedilol (COREG) tablet 6.25 mg, 6.25 mg, Oral, Q12H, Lisbet Chavez APRN, 6.25 mg at 10/01/23 0814    Uivfnmz-Rgrca-Dzhttxtp-TenofAF (GENVOYA) [phone] MG per tablet 1 tablet, 1 tablet, Oral, Daily, Lisbet Chavez APRN, 1 tablet at 10/01/23 0814    empagliflozin (JARDIANCE) tablet 25 mg, 25 mg, Oral, Daily, Lisbet Chavez APRN, 25 mg at 10/01/23 0815    Enoxaparin Sodium (LOVENOX) syringe 100 mg, 100 mg, Subcutaneous, Q12H, Arthur Salazar MD, 100 mg at 10/01/23 1320    hydrALAZINE (APRESOLINE) injection 10 mg, 10 mg, Intravenous, Q6H PRN, Marycarmen Bran APRN    insulin glargine (LANTUS, SEMGLEE) injection 15 Units, 15 Units, Subcutaneous, Nightly, Osbaldo Levi MD, 15 Units at 09/30/23 2032    insulin lispro (HUMALOG/ADMELOG) injection 2-9 Units, 2-9 Units, Subcutaneous, 4x Daily AC & at Bedtime, Lisbet Chavez APRN, 4 Units at 09/30/23 2039    insulin lispro (HUMALOG/ADMELOG) injection 5 Units, 5 Units, Subcutaneous, TID With Meals, Osbaldo Levi MD, 5 Units at 10/01/23 0814    losartan (COZAAR) tablet 50 mg, 50 mg, Oral, Daily, Osbaldo Levi MD, 50 mg at 10/01/23 0815    nicotine (NICODERM CQ) 21 MG/24HR patch 1 patch, 1 patch, Transdermal, Q24H, Osbaldo Levi MD, 1 patch at 09/30/23 2032    ondansetron (ZOFRAN) injection 4 mg, 4 mg,  Intravenous, Q6H PRN, Marycarmen Bran, APRN    pantoprazole (PROTONIX) EC tablet 40 mg, 40 mg, Oral, Q AM, Lisbet Chavez, APRN, 40 mg at 10/01/23 0649    sertraline (ZOLOFT) tablet 50 mg, 50 mg, Oral, Nightly, Chyna Levi MD, 50 mg at 09/30/23 2032    [COMPLETED] Insert Peripheral IV, , , Once **AND** sodium chloride 0.9 % flush 10 mL, 10 mL, Intravenous, PRN, Severo Nice II, MD    sodium chloride 0.9 % flush 10 mL, 10 mL, Intravenous, PRN, Marycarmen Bran, APRN    sodium chloride 0.9 % infusion 40 mL, 40 mL, Intravenous, PRN, Shant, Marycarmen, APRN       ASSESSMENT  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  Acute right pulmonary embolism  History of left leg DVT  Diabetes mellitus  Hypertension  Hyperlipidemia  Non-Hodgkin lymphoma  HIV  Tobacco abuse  Depression  Coronary artery disease    PLAN  CPM  Aspirin Lipitor  Anticoagulation  Hematology consult appreciated  Neurology to follow patient  Adjust home medications  Stress ulcer DVT prophylaxis  Supportive care  PT OT  Discussed with nursing staff  Follow closely and further recommendation current hospital course    CHYNA LEVI MD    Copied text in this note has been reviewed and is accurate as of 10/01/23             Electronically signed by Chyna Levi MD at 10/01/23 1355       Bear Blanchard MD at 09/30/23 1009          MD Attestation Note    I supervised care provided by the midlevel provider.    The LAURA and I have discussed this patient's history, physical exam, and treatment plan. I have reviewed the documentation and personally had a face to face interaction with the patient  I affirm the documentation and agree with the treatment and plan.     I provided a substantive portion of the care of the patient.  I personally performed the physical exam in its entirety, and below are my findings.        Subjective  Pt is a 53 y.o. male admitted from Emergency Department for evaluation and treatment of episode of slurred speech that began  yesterday around 4 PM.  Patient with history of prior stroke and is anticoagulated on Coumadin.  Unfortunately does continue to smoke.  Other chronic conditions include diabetes, hypertension and HIV.    Physical Exam  GENERAL: Alert and in NAD, Vitals reviewed-patient mildly hypertensive with diastolics averaging in the 90s  HENT: nares patent  EYES: no scleral icterus  CV: regular rhythm, regular rate-no murmur  RESPIRATORY: normal effort, clear to auscultation bilaterally  ABDOMEN: soft  MUSCULOSKELETAL: no deformity  NEURO: Strength-mild left-sided weakness which is chronic per patient.  Sensation and coordination are grossly intact.  Speech-mild dysarthria without obvious aphasia.  Mentation intact and normal  SKIN: warm, dry    Assessment/Plan  I discussed tx and evaluation of this patient with BAYLEE Bran.  Unfortunately MRI brain does show small acute infarct.  Patient is therapeutic on Coumadin with INR of 2.6.  He does remain mildly hypertensive.  Awaiting neurology consultation and recommendations.        Electronically signed by Bear Blanchard MD at 09/30/23 1011       Marycarmen Bran APRN at 09/30/23 0838       Attestation signed by Bear Blanchard MD at 09/30/23 1952    MD Attestation Note    I supervised care provided by the midlevel provider.    The LAURA and I have discussed this patient's history, physical exam, and treatment plan.   I provided a substantive portion of the care of this patient.  I have reviewed the documentation and personally had a face to face interaction with the patient  I affirm the documentation and agree with the treatment and plan.   My personal findings are documented in a separate note.                     ED OBSERVATION PROGRESS/DISCHARGE SUMMARY    Date of Admission: 9/29/2023   LOS: 0 days   PCP: Arias Lehman APRN    Final Diagnosis acute CVA      Subjective     Hospital Outcome:     Pleasant afebrile ambulatory 53-year-old black male admitted to the ED  observation unit for slurred speech.  Had similar presentation in September 2022 when he was diagnosed with acute stroke.    Patient's antihypertensives were held this morning as he had a coughing spell, he failed his swallow study yesterday and is awaiting speech therapy today.    He was seen and evaluated by Dr. Machado with the neurology service who thinks the patient could potentially go home following CTA head neck.     I have discussed CTA neck findings with Dr. Mayfield he reports concern for acute pulmonary embolism and recommends a CTA of patient's chest.  Patient recently diagnosed with a DVT and is therapeutic on warfarin.  CTA chest pending.    Given that patient failed his swallow screen, speech therapy has not been able to see him today, he will need further blood pressure management and further evaluation of this possible pulmonary embolism I have discussed case with Dr. Levi on-call for [facility] team who has graciously accepted to admit patient to a telemetry inpatient bed.    ROS:  General: no fevers, chills  Respiratory: no cough, dyspnea  Cardiovascular: no chest pain, palpitations  Abdomen: No abdominal pain, nausea, vomiting, or diarrhea  Neurologic: No focal weakness    Objective   Physical Exam:  I have reviewed the vital signs.  Temp:  [97.8 °F (36.6 °C)-98.5 °F (36.9 °C)] 98.4 °F (36.9 °C)  Heart Rate:  [] 69  Resp:  [16] 16  BP: (136-166)/() 161/95  General Appearance:    Alert, cooperative, no distress  Head:    Normocephalic, atraumatic  Eyes:    Sclerae anicteric  Neck:   Supple, no mass  Lungs: Clear to auscultation bilaterally, respirations unlabored  Heart: Regular rate and rhythm, S1 and S2 normal, no murmur, rub or gallop  Abdomen:  Soft, non-tender, bowel sounds active, nondistended  Extremities: No clubbing, cyanosis, or edema to lower extremities  Pulses:  2+ and symmetric in distal lower extremities  Skin: No rashes   Neurologic: Oriented x3, Normal strength to  extremities    Results Review:    I have reviewed the labs, radiology results and diagnostic studies.    Results from last 7 days   Lab Units 09/30/23  0440   WBC 10*3/mm3 7.27   HEMOGLOBIN g/dL 14.2   HEMATOCRIT % 41.8   PLATELETS 10*3/mm3 208     Results from last 7 days   Lab Units 09/30/23  0557 09/29/23  1728 09/27/23  0448 09/26/23  1530   SODIUM mmol/L 137 135* 139 139   POTASSIUM mmol/L 3.6 3.8 3.2* 3.6   CHLORIDE mmol/L 104 99 103 102   CO2 mmol/L 25.6 24.9 24.2 25.2   BUN mg/dL 9 8 10 10   CREATININE mg/dL 0.69* 0.97 0.80 0.93   CALCIUM mg/dL 9.0 8.9 8.7 9.3   BILIRUBIN mg/dL <0.2 <0.2  --  <0.2   ALK PHOS U/L 63 74  --  76   ALT (SGPT) U/L 28 32  --  15   AST (SGOT) U/L 19 28  --  12   GLUCOSE mg/dL 228* 338* 228* 288*     Imaging Results (Last 24 Hours)       Procedure Component Value Units Date/Time    MRI Brain Without Contrast [[id number]] Collected: 09/29/23 2254     Updated: 09/29/23 2303    Narrative:      BRAIN MRI WITHOUT CONTRAST     HISTORY: Stroke, follow up; R29.810-Facial weakness; R47.81-Slurred  speech     COMPARISON: September 29, 2023.     FINDINGS:  Multiplanar images of the head were obtained without  gadolinium. There is an area of restricted diffusion which is noted  within the right corona radiata, and extending into the right basal  ganglia. Maximum size is 1.3 x 0.8 cm. No additional areas of restricted  diffusion are noted. There is no midline shift or mass effect.  Ventricles are normal in size. There is no evidence of hemorrhagic  transformation. There is some mild periventricular and deep white matter  microangiopathic change. Intracranial flow voids appear intact. Old  lacunar infarct is suspected within the right thalamus, with another  suspected within the left basal ganglia. There is a left mastoid  effusion. Mucous retention cysts are noted within the left maxillary  sinus. There is some mucosal thickening within the ethmoid sinuses.       Impression:      1. Area of acute  infarction involving the right corona radiata and basal  ganglia. No evidence of hemorrhagic transformation.        This report was finalized on 9/29/2023 11:00 PM by Dr. Isa Armendariz M.D.       XR Chest 1 View [[id number]] Collected: 09/29/23 1722     Updated: 09/29/23 1726    Narrative:      XR CHEST 1 VW-     HISTORY:    Fatigue, high blood pressure, history of diabetes.      COMPARISON: None.     FINDINGS:    2 views of the chest were obtained.     Support Devices:  None.  Cardiac Silhouette/Mediastinum/Kelley:  The cardiac, mediastinal, and  hilar contours are within normal limits. There is calcific  atherosclerosis.  Lungs/Pleural Spaces:  The lungs and pleural spaces are clear.  Chest Wall/Diaphragm/Upper Abdomen: The visualized osseous structures  are age-appropriate.        CONCLUSION(S):       1.  No focal consolidation or effusion.     This report was finalized on 9/29/2023 5:23 PM by Dr. Susanna Torre M.D.       CT Head Without Contrast [[id number]] Collected: 09/29/23 1711     Updated: 09/29/23 1723    Narrative:      CT HEAD WITHOUT CONTRAST     CLINICAL HISTORY: Slurred speech and left facial droop     TECHNIQUE: CT scan of the head was obtained with 3 mm axial soft tissue  algorithm images. No intravenous contrast was administered. Sagittal and  coronal reconstructions were obtained.     COMPARISON: CT head 10/10/2022 and 9/27/2022     FINDINGS:  No intracranial hemorrhage.  No midline shift or mass effect.  Unchanged bilateral chronic lacunar infarcts. No CT evidence of acute  territorial infarction.  No hydrocephalus.  Clear visualized portions of  the paranasal sinuses and mastoid air cells.             Impression:      No acute intracranial abnormality.              Radiation dose reduction techniques were utilized, including automated  exposure control and exposure modulation based on body size.     This report was finalized on 9/29/2023 5:20 PM by Dr. Reginald Diaz M.D.               I  "have reviewed the medications.  ---------------------------------------------------------------------------------------------  Assessment & Plan   Assessment/Problem List    TIA (transient ischemic attack)      Plan:    Acute CVA  CTA h/n negative for acute thrombosis or dissection great vessels  Consult to neurology   MRI brain-Area of acute infarction involving the right corona radiata and basal  ganglia. No evidence of hemorrhagic transformation.  Patient has failed swallow screen, speech therapy consult pending     Diabetes  Accu-Cheks AC  Adult subcutaneous insulin management-moderate dose  Hemoglobin A1c -11.4     Hypertension  Monitor blood pressure  Continue losartan, Coreg, and amlodipine    History of DVT  Continue warfarin  Pharmacy to dose    Incidental imaging findings  CTA neck radiologist appreciates concern for acute pulmonary embolism  CTA chest pending      Disposition: Admitted to Dr. Gurmeet cobb to a telemetry inpatient bed      This note will serve as a transfer and progress note.    Marycarmen Bran, APRN 09/30/23 08:38 EDT    I have worn appropriate PPE during this patient encounter, sanitized my hands both with entering and exiting patient's room.      55 minutes has been spent by [facility] providers in the care of this patient while under observation status              Electronically signed by Bear Blanchard MD at 09/30/23 1952          Consult Notes (last 72 hours)        Arthur Salazar MD at 10/01/23 0831        Consult Orders    1. Hematology & Oncology Inpatient Consult [[id number]] ordered by Osbaldo Levi MD at 09/30/23 1620                 [facility] INITIAL INPATIENT CONSULTATION NOTE    REASON FOR CONSULTATION: \"Coumadin failure\"    HISTORY OF PRESENT ILLNESS:  Cecilio Puckett is a 53 y.o. male who we are asked to see today in consultation for \"Coumadin failure.\"    The patient has a past medical history of HIV managed by Dr. Laith Mckeon, " stroke, coronary artery disease, pulmonary embolism, left lower extremity DVT, peripheral vascular disease status post aortobifemoral bypass on 3/14/2014.  He has been on chronic warfarin therapy for many many years.  His INR values have been fluctuating widely recently due to an inconsistent diet.    Recent admission with left lower extremity DVT.  His INR was subtherapeutic prior to that according to the patient.  He had a therapeutic INR at presentation.  He was started on heparin at that time with improvement in symptoms and he was discharged with a therapeutic INR on 9/27.    The patient presented with slurred speech for a couple of episodes on 9/29 that lasted into 9/30.  This has resolved.  He has chronic left upper extremity weakness and he has been having some left lower extremity pain as well.    He denies any worsening shortness of breath recently but has been more short of breath with dyspnea on exertion for about 3 to 4 weeks.  Again, this has not changed over the past few days.    MRI brain showed an area of acute infarction involving the right corona radiata and basal ganglia.    Carotid duplex with less than 50% stenosis in the right ICA and left ICA.    CT angiogram of the carotids appeared to show bilateral pulmonary thromboembolic disease with embolism in the distal right pulmonary artery and upper lobe segmental branches with atherosclerotic disease with noncalcified plaques involving both common carotid and internal carotid arteries.  Small acute infarctions involving the right corona radiata and right basal ganglia noted.    CT angiogram of the chest showed acute pulmonary emboli throughout all 5 lobes of the lungs, right greater than left with a mildly dilated main pulmonary artery and no evidence of right heart strain.  Diffuse mild bronchitis noted.    INR 2.14 and then 2.65    Past Medical History:   Diagnosis Date    COPD (chronic obstructive pulmonary disease)     Coronary artery disease      Diabetes mellitus     DVT (deep venous thrombosis)     Elevated cholesterol     GERD (gastroesophageal reflux disease)     H/O Cancer     right arm, in throat, chest and stomach, in remission    H/O Ischemia of extremity     History of MI (myocardial infarction) 2019    History of snoring     History of transfusion     HIV (human immunodeficiency virus infection)     Hypertension     Non Hodgkin's lymphoma     PAD (peripheral artery disease)     Pulmonary embolism     Stroke        Past Surgical History:   Procedure Laterality Date    CARDIAC CATHETERIZATION      CORONARY ANGIOPLASTY WITH STENT PLACEMENT      FEMORAL ARTERY - FEMORAL ARTERY BYPASS GRAFT      PORTACATH PLACEMENT         SOCIAL HISTORY:   reports that he has been smoking cigars and cigarettes. He started smoking about 29 years ago. He has been smoking an average of 1 pack per day. He does not have any smokeless tobacco history on file. He reports current alcohol use of about 4.0 standard drinks per week. He reports that he does not currently use drugs.    FAMILY HISTORY:  family history includes Diabetes in his mother; Hypertension in his maternal grandmother and mother; Stroke in his mother.    ALLERGIES:  No Known Allergies    MEDICATIONS:  As listed in the electronic medical record.    Review of Systems   Constitutional:  Positive for fatigue.   Musculoskeletal:  Positive for arthralgias and myalgias.   Neurological:  Positive for speech difficulty and weakness.   All other systems reviewed and are negative.    Vitals:    09/30/23 1427 09/30/23 1709 09/30/23 2032 10/01/23 0105   BP: 169/98 140/87 133/83 148/90   BP Location: Left arm Left arm Left arm Left arm   Patient Position: Sitting Sitting Lying Lying   Pulse: 76 69 73 71   Resp: 16 16 18 18   Temp: 98.2 °F (36.8 °C) 98.4 °F (36.9 °C) 98.2 °F (36.8 °C) 98.4 °F (36.9 °C)   TempSrc: Oral Oral Oral Oral   SpO2: 99% 99% 95% 95%   Weight:       Height:           Physical Exam  Vitals and  nursing note reviewed.   Constitutional:       General: He is not in acute distress.     Appearance: He is well-developed.   HENT:      Head: Normocephalic and atraumatic. Hair is normal.   Eyes:      General: Lids are normal.      Conjunctiva/sclera: Conjunctivae normal.      Pupils: Pupils are equal.   Neck:      Thyroid: No thyroid mass or thyromegaly.      Vascular: No JVD.   Cardiovascular:      Rate and Rhythm: Normal rate and regular rhythm.      Heart sounds: No murmur heard.    No friction rub. No gallop.   Pulmonary:      Effort: Pulmonary effort is normal. No respiratory distress.      Breath sounds: Normal breath sounds. No wheezing or rales.   Abdominal:      General: There is no distension.      Palpations: Abdomen is soft. There is no hepatomegaly, splenomegaly or mass.      Tenderness: There is no abdominal tenderness. There is no guarding.   Musculoskeletal:         General: No tenderness or deformity. Normal range of motion.      Cervical back: Neck supple.   Lymphadenopathy:      Cervical: No cervical adenopathy.      Upper Body:      Right upper body: No supraclavicular adenopathy.      Left upper body: No supraclavicular adenopathy.   Skin:     General: Skin is warm and dry.      Findings: No rash.   Neurological:      Mental Status: He is alert and oriented to person, place, and time.   Psychiatric:         Behavior: Behavior normal.         Thought Content: Thought content normal.         Judgment: Judgment normal.       DIAGNOSTIC DATA:  Results from last 7 days   Lab Units 10/01/23  0619   WBC 10*3/mm3 7.05   HEMOGLOBIN g/dL 14.0   HEMATOCRIT % 41.3   PLATELETS 10*3/mm3 208     Lab Results   Component Value Date    NEUTROABS 2.41 10/01/2023     Results from last 7 days   Lab Units 10/01/23  0619   SODIUM mmol/L 136   POTASSIUM mmol/L 3.5   CHLORIDE mmol/L 101   CO2 mmol/L 23.1   BUN mg/dL 11   CREATININE mg/dL 0.82   GLUCOSE mg/dL 135*   CALCIUM mg/dL 9.1     Results from last 7 days   Lab  Units 10/01/23  0619 09/30/23  2250 09/29/23  2257 09/29/23  1640 09/27/23  0006 09/26/23  1530   INR   --   --  2.65* 2.14*  --  2.73*   APTT seconds >200.0* 36.7*  --  35.1   < > 37.2*    < > = values in this interval not displayed.     Results from last 7 days   Lab Units 09/27/23  0448   MAGNESIUM mg/dL 1.7       IMAGING:    MRI Brain Without Contrast (09/29/2023 22:38)  Duplex Carotid Ultrasound CAR (09/30/2023 08:54)  CT Angiogram Carotids (09/30/2023 11:07)  CT Angiogram Head (09/30/2023 11:07)  CT Angiogram Chest (09/30/2023 13:15)    CT angiogram of the carotids appeared to show bilateral pulmonary thromboembolic disease with embolism in the distal right pulmonary artery and upper lobe segmental branches with atherosclerotic disease with noncalcified plaques involving both common carotid and internal carotid arteries.  Small acute infarctions involving the right corona radiata and right basal ganglia noted.    CT angiogram of the chest showed acute pulmonary emboli throughout all 5 lobes of the lungs, right greater than left with a mildly dilated main pulmonary artery and no evidence of right heart strain.  Diffuse mild bronchitis noted.    Images personally reviewed    ASSESSMENT:  This is a 53 y.o. male with:    *HIV  Managed at [facility] by Dr. Laith Robison     *History of pulmonary embolism, left lower extremity DVT, left lower extremity peripheral vascular disease status post arterial stenting.    It appears he had an aortobifemoral bypass on 3/14/2014.  On chronic therapy with warfarin  He states INR values recently have been fluctuating widely and his INR was very low over the past 1 to 2 weeks at close to 1  It is unclear if he ever had a hypercoagulable evaluation     *Acute left lower extremity DVT by venous duplex on 9/26/2023  Left lower extremity venous duplex on 9/26/2023 with acute DVT in the popliteal, posterior tibial, and peroneal veins  Started on a heparin drip with improvement in left lower  extremity pain  Discharged on warfarin with a therapeutic INR    *Newly discovered acute pulmonary embolism  9/30/2023 CT angiogram of the carotids appeared to show bilateral pulmonary thromboembolic disease with embolism in the distal right pulmonary artery and upper lobe segmental branches with atherosclerotic disease with noncalcified plaques involving both common carotid and internal carotid arteries.  Small acute infarctions involving the right corona radiata and right basal ganglia noted.  9/30/2023 CT angiogram of the chest showed acute pulmonary emboli throughout all 5 lobes of the lungs, right greater than left with a mildly dilated main pulmonary artery and no evidence of right heart strain.  Diffuse mild bronchitis noted.       RECOMMENDATIONS/PLAN:   It is unclear if venous thromboembolism occurred while his INR was subtherapeutic or not.  I suspect it did.  Warfarin for him has been difficult to manage given dietary indiscretion.  Overall, I think warfarin is probably a better anticoagulant for him given his history.  Options therefore are continuing warfarin with a goal INR of closer to 3 or switching to apixaban which would be easier and not require so much monitoring.  However, there appears to be a drug interaction with apixaban and the Genvoya that he is on for his HIV.  There also appears to be an interaction with rivaroxaban and Genvoya.  Pradaxa might be an option but this would be less preferred from my standpoint.  Therefore, he may need to stay on warfarin.  For now, transition heparin to Lovenox  He has been started on aspirin per neurology  I will discuss with pharmacy staff tomorrow and we will continue to follow.    Arthur Salazar MD     Electronically signed by Arthur Salazar MD at 10/01/23 1216       Osbaldo Levi MD at 09/30/23 1607        Consult Orders    1. Inpatient Hospitalist Consult [[id number]] ordered by Marycarmen Bran APRN at 09/30/23 1150                 Internal medicine  consult    Referring physician  Dr. Lisbet Chavez    Chief complaint  Slurred speech    Reason for consult  Follow medical problems and take over the care    History of present illness  53-year-old -American male with history of diabetes mellitus hypertension HIV non-Hodgkin lymphoma coronary artery disease and DVT on Coumadin and was recently discharged from the hospital with acute on chronic DVT admitted to the observation unit with slurred speech and left arm weakness.  Patient remained in the observation unit and further evaluated by neurology and found to have right CVA and also found to have acute pulm embolism admitted to the hospital as he has therapeutic INR.  I am asked to follow patient for medical problems and take over the care.  At the time of interview he is fully alert oriented and give me a detailed history.    PAST MEDICAL HISTORY   Chronic obstructive pulmonary disease      Coronary artery disease      Diabetes mellitus      DVT (deep venous thrombosis)      Elevated cholesterol      Gastroesophageal reflux disease      H/O Cancer      H/O Ischemia of extremity      History of MI (myocardial infarction) 2019    History of snoring      History of transfusion      Hypertension      Non Hodgkin's lymphoma      Peripheral artery disease      Pulmonary embolism      Stroke        PAST SURGICAL HISTORY              Procedure Laterality Date    CARDIAC CATHETERIZATION        CORONARY ANGIOPLASTY WITH STENT PLACEMENT        FEMORAL ARTERY - FEMORAL ARTERY BYPASS GRAFT        PORTACATH PLACEMENT             FAMILY HISTORY           Problem Relation Age of Onset    Hypertension Mother      Diabetes Mother      Stroke Mother      Hypertension Maternal Grandmother        SOCIAL HISTORY                 Socioeconomic History    Marital status: Single   Tobacco Use    Smoking status: Every Day       Packs/day: 0.25       Types: Cigarettes       Start date: 01/1994   Vaping Use    Vaping Use: Never used  "  Substance and Sexual Activity    Alcohol use: Yes       Alcohol/week: 4.0 standard drinks       Types: 4 Cans of beer per week    Drug use: Not Currently    Sexual activity: Defer         ALLERGIES  Patient has no known allergies.  Home medications reviewed     REVIEW OF SYSTEMS  All systems reviewed and negative except for those discussed in HPI.      PHYSICAL EXAM  Blood pressure 169/98, pulse 76, temperature 98.2 °F (36.8 °C), temperature source Oral, resp. rate 16, height 185 cm (72.84\"), weight 98 kg (216 lb 0.8 oz), SpO2 99 %.    GENERAL: Awake and alert no acute distress  HEENT: Unremarkable  NECK: Supple  CV: regular rhythm, normal rate  RESPIRATORY: normal effort, moving air bilaterally  ABDOMEN: soft nontender nondistended bowel sounds positive  MUSCULOSKELETAL: no deformity  NEURO: Awake and alert follow commands with no speech problem and moves all 4 extremities  SKIN: warm, dry     LAB RESULTS  Lab Results (last 24 hours)       Procedure Component Value Units Date/Time    POC Glucose Once [[id number]]  (Abnormal) Collected: 09/30/23 1133    Specimen: Blood Updated: 09/30/23 1135     Glucose 201 mg/dL     POC Glucose Once [[id number]]  (Abnormal) Collected: 09/30/23 0922    Specimen: Blood Updated: 09/30/23 0924     Glucose 206 mg/dL     POC Glucose Once [[id number]]  (Abnormal) Collected: 09/30/23 0720    Specimen: Blood Updated: 09/30/23 0723     Glucose 212 mg/dL     Comprehensive Metabolic Panel [[id number]]  (Abnormal) Collected: 09/30/23 0557    Specimen: Blood from Arm, Right Updated: 09/30/23 0628     Glucose 228 mg/dL      BUN 9 mg/dL      Creatinine 0.69 mg/dL      Sodium 137 mmol/L      Potassium 3.6 mmol/L      Chloride 104 mmol/L      CO2 25.6 mmol/L      Calcium 9.0 mg/dL      Total Protein 6.7 g/dL      Albumin 3.4 g/dL      ALT (SGPT) 28 U/L      AST (SGOT) 19 U/L      Alkaline Phosphatase 63 U/L      Total Bilirubin <0.2 mg/dL      Globulin 3.3 gm/dL      A/G Ratio 1.0 g/dL      " BUN/Creatinine Ratio 13.0     Anion Gap 7.4 mmol/L      eGFR 111.3 mL/min/1.73     Narrative:      GFR Normal >60  Chronic Kidney Disease <60  Kidney Failure <15      CBC (No Diff) [[id number]]  (Normal) Collected: 09/30/23 0440    Specimen: Blood from Hand, Right Updated: 09/30/23 0515     WBC 7.27 10*3/mm3      RBC 4.83 10*6/mm3      Hemoglobin 14.2 g/dL      Hematocrit 41.8 %      MCV 86.5 fL      MCH 29.4 pg      MCHC 34.0 g/dL      RDW 14.0 %      RDW-SD 44.6 fl      MPV 11.4 fL      Platelets 208 10*3/mm3     POC Glucose Once [[id number]]  (Abnormal) Collected: 09/30/23 0025    Specimen: Blood Updated: 09/30/23 0031     Glucose 281 mg/dL     Protime-INR [[id number]]  (Abnormal) Collected: 09/29/23 2257    Specimen: Blood Updated: 09/29/23 2328     Protime 28.8 Seconds      INR 2.65    POC Glucose Once [[id number]]  (Abnormal) Collected: 09/29/23 2104    Specimen: Blood Updated: 09/29/23 2106     Glucose 236 mg/dL     Lipid Panel [[id number]]  (Abnormal) Collected: 09/29/23 1728    Specimen: Blood Updated: 09/29/23 1950     Total Cholesterol 192 mg/dL      Triglycerides 273 mg/dL      HDL Cholesterol 39 mg/dL      LDL Cholesterol  106 mg/dL      VLDL Cholesterol 47 mg/dL      LDL/HDL Ratio 2.52    Narrative:      Cholesterol Reference Ranges  (U.[place]. Department of Health and Human Services ATP III Classifications)    Desirable          <200 mg/dL  Borderline High    200-239 mg/dL  High Risk          >240 mg/dL      Triglyceride Reference Ranges  (U.S. Department of Health and Human Services ATP III Classifications)    Normal           <150 mg/dL  Borderline High  150-199 mg/dL  High             200-499 mg/dL  Very High        >500 mg/dL    HDL Reference Ranges  (U.S. Department of Health and Human Services ATP III Classifications)    Low     <40 mg/dl (major risk factor for CHD)  High    >60 mg/dl ('negative' risk factor for CHD)        LDL Reference Ranges  (U.S. Department of Health and Human Services ATP III  Classifications)    Optimal          <100 mg/dL  Near Optimal     100-129 mg/dL  Borderline High  130-159 mg/dL  High             160-189 mg/dL  Very High        >189 mg/dL    Hemoglobin A1c [[id number]]  (Abnormal) Collected: 09/29/23 1640    Specimen: Blood Updated: 09/29/23 1937     Hemoglobin A1C 11.40 %     Narrative:      Hemoglobin A1C Ranges:    Increased Risk for Diabetes  5.7% to 6.4%  Diabetes                     >= 6.5%  Diabetic Goal                < 7.0%    aPTT [[id number]]  (Normal) Collected: 09/29/23 1640    Specimen: Blood Updated: 09/29/23 1923     PTT 35.1 seconds     Comprehensive Metabolic Panel [[id number]]  (Abnormal) Collected: 09/29/23 1728    Specimen: Blood Updated: 09/29/23 1858     Glucose 338 mg/dL      BUN 8 mg/dL      Creatinine 0.97 mg/dL      Sodium 135 mmol/L      Potassium 3.8 mmol/L      Comment: Slight hemolysis detected by analyzer. Results may be affected.        Chloride 99 mmol/L      CO2 24.9 mmol/L      Calcium 8.9 mg/dL      Total Protein 7.1 g/dL      Albumin 3.7 g/dL      ALT (SGPT) 32 U/L      AST (SGOT) 28 U/L      Comment: Slight hemolysis detected by analyzer. Results may be affected.        Alkaline Phosphatase 74 U/L      Total Bilirubin <0.2 mg/dL      Globulin 3.4 gm/dL      A/G Ratio 1.1 g/dL      BUN/Creatinine Ratio 8.2     Anion Gap 11.1 mmol/L      eGFR 93.9 mL/min/1.73     Narrative:      GFR Normal >60  Chronic Kidney Disease <60  Kidney Failure <15      Urinalysis, Microscopic Only - Urine, Clean Catch [[id number]] Collected: 09/29/23 1729    Specimen: Urine, Clean Catch Updated: 09/29/23 1820     RBC, UA 0-2 /HPF      WBC, UA 0-2 /HPF      Bacteria, UA None Seen /HPF      Squamous Epithelial Cells, UA 0-2 /HPF      Hyaline Casts, UA 0-2 /LPF      Methodology Automated Microscopy    Urinalysis With Microscopic If Indicated (No Culture) - Urine, Clean Catch [[id number]]  (Abnormal) Collected: 09/29/23 1729    Specimen: Urine, Clean Catch Updated: 09/29/23  1820     Color, UA Yellow     Appearance, UA Clear     pH, UA <=5.0     Specific Gravity, UA >=1.030     Glucose, UA >=1000 mg/dL (3+)     Ketones, UA Negative     Bilirubin, UA Negative     Blood, UA Trace     Protein,  mg/dL (2+)     Leuk Esterase, UA Negative     Nitrite, UA Negative     Urobilinogen, UA 0.2 E.U./dL    Protime-INR [[id number]]  (Abnormal) Collected: 09/29/23 1640    Specimen: Blood Updated: 09/29/23 1659     Protime 24.3 Seconds      INR 2.14    CBC & Differential [[id number]]  (Normal) Collected: 09/29/23 1640    Specimen: Blood Updated: 09/29/23 1650    Narrative:      The following orders were created for panel order CBC & Differential.  Procedure                               Abnormality         Status                     ---------                               -----------         ------                     CBC Auto Differential[[id number]]        Normal              Final result                 Please view results for these tests on the individual orders.    CBC Auto Differential [[id number]]  (Normal) Collected: 09/29/23 1640    Specimen: Blood Updated: 09/29/23 1650     WBC 7.84 10*3/mm3      RBC 5.12 10*6/mm3      Hemoglobin 14.9 g/dL      Hematocrit 45.1 %      MCV 88.1 fL      MCH 29.1 pg      MCHC 33.0 g/dL      RDW 13.7 %      RDW-SD 44.1 fl      MPV 11.0 fL      Platelets 210 10*3/mm3      Neutrophil % 55.5 %      Lymphocyte % 35.6 %      Monocyte % 6.9 %      Eosinophil % 0.8 %      Basophil % 0.9 %      Immature Grans % 0.3 %      Neutrophils, Absolute 4.36 10*3/mm3      Lymphocytes, Absolute 2.79 10*3/mm3      Monocytes, Absolute 0.54 10*3/mm3      Eosinophils, Absolute 0.06 10*3/mm3      Basophils, Absolute 0.07 10*3/mm3      Immature Grans, Absolute 0.02 10*3/mm3      nRBC 0.0 /100 WBC           Imaging Results (Last 24 Hours)       Procedure Component Value Units Date/Time    CT Angiogram Carotids [[id number]] Collected: 09/30/23 1211     Updated: 09/30/23 1512    Narrative:       CT ANGIOGRAM HEAD AND NECK WITH IV CONTRAST     HISTORY: 53-year-old male with acute infarction involving the right  corona radiata and basal ganglia demonstrated on MRI brain yesterday.      TECHNIQUE: CT angiogram head and neck includes axial imaging with  intravenous contrast and data reconstructed in coronal and sagittal  planes. AI analysis of LVO was utilized. 3-dimensional volume rendering  performed.      COMPARISON: CT angiogram head and neck 09/27/2022, MRI brain 10/10/2022,  CT head 09/29/2023, MRI brain 09/29/2023.     FINDINGS: On the most superior images there appear to be filling defects  within the distal right main pulmonary artery as well as within the  right upper lobe and left upper lobe segmental pulmonary arteries.  Recommend further evaluation with CT angiogram chest.     Atherosclerotic calcifications are present involving the aortic arch and  great vessel origins. Both common carotid arteries are patent though  there is mural thrombus involving both common carotid arteries and  internal carotid arteries. There is calcified plaque at the origins of  both internal carotid arteries which exhibit wall irregularity. There is  no evidence for NASCET stenosis. The distal cervical, petrous, internal  carotid arteries are patent. There is partially calcified plaque  involving cavernous segments of both internal carotid arteries with  moderate stenoses. Supracavernous internal carotid arteries, both middle  cerebral arteries, anterior cerebral arteries are patent.     The left vertebral artery arises from the left subclavian artery just  distal to its origin. Evaluation of the proximal right vertebral artery  is limited by small size as well as streak artifact related to contrast  within collateral veins along the right neck base. Left vertebral artery  is larger than the right. Both mid and distal cervical arteries exhibit  flow. There is flow within the basilar artery and both posterior  cerebral  arteries.     Noncontrasted head CT demonstrates low density within the right corona  radiata extending to the superior right lentiform nucleus similar to  areas of restricted diffusion demonstrated on yesterday's brain MRI.  There are also chronic infarctions involving the right caudate body and  right thalamus that were initially demonstrated on previous brain MRI  10/10/2022. There is no evidence for hemorrhagic transformation. Post  contrast-enhanced head CT demonstrates no abnormal intracranial  enhancement.       Impression:      1. There appear to be bilateral pulmonary thromboembolic disease with  embolus within the distal right main pulmonary artery and upper lobe  segmental branches. Recommend further evaluation with CT angiogram  chest.  2. Atherosclerotic disease with noncalcified plaques involving both  common carotid and internal carotid arteries with mild narrowing though  without NASCET stenosis.  3. Small acute infarctions involving the right corona radiata and right  basal ganglia without evidence for hemorrhagic transformation.     Findings discussed with MARIANNE Marie in the emergency department on  09/30/2023 at 11:55 a.m.      Radiation dose reduction techniques were utilized, including automated  exposure control and exposure modulation based on body size.        This report was finalized on 9/30/2023 3:09 PM by Dr. Jesus Mayfield M.D.       CT Angiogram Head [[id number]] Collected: 09/30/23 1211     Updated: 09/30/23 1512    Narrative:      CT ANGIOGRAM HEAD AND NECK WITH IV CONTRAST     HISTORY: 53-year-old male with acute infarction involving the right  corona radiata and basal ganglia demonstrated on MRI brain yesterday.      TECHNIQUE: CT angiogram head and neck includes axial imaging with  intravenous contrast and data reconstructed in coronal and sagittal  planes. AI analysis of LVO was utilized. 3-dimensional volume rendering  performed.      COMPARISON: CT angiogram head and  neck 09/27/2022, MRI brain 10/10/2022,  CT head 09/29/2023, MRI brain 09/29/2023.     FINDINGS: On the most superior images there appear to be filling defects  within the distal right main pulmonary artery as well as within the  right upper lobe and left upper lobe segmental pulmonary arteries.  Recommend further evaluation with CT angiogram chest.     Atherosclerotic calcifications are present involving the aortic arch and  great vessel origins. Both common carotid arteries are patent though  there is mural thrombus involving both common carotid arteries and  internal carotid arteries. There is calcified plaque at the origins of  both internal carotid arteries which exhibit wall irregularity. There is  no evidence for NASCET stenosis. The distal cervical, petrous, internal  carotid arteries are patent. There is partially calcified plaque  involving cavernous segments of both internal carotid arteries with  moderate stenoses. Supracavernous internal carotid arteries, both middle  cerebral arteries, anterior cerebral arteries are patent.     The left vertebral artery arises from the left subclavian artery just  distal to its origin. Evaluation of the proximal right vertebral artery  is limited by small size as well as streak artifact related to contrast  within collateral veins along the right neck base. Left vertebral artery  is larger than the right. Both mid and distal cervical arteries exhibit  flow. There is flow within the basilar artery and both posterior  cerebral arteries.     Noncontrasted head CT demonstrates low density within the right corona  radiata extending to the superior right lentiform nucleus similar to  areas of restricted diffusion demonstrated on yesterday's brain MRI.  There are also chronic infarctions involving the right caudate body and  right thalamus that were initially demonstrated on previous brain MRI  10/10/2022. There is no evidence for hemorrhagic transformation.  Post  contrast-enhanced head CT demonstrates no abnormal intracranial  enhancement.       Impression:      1. There appear to be bilateral pulmonary thromboembolic disease with  embolus within the distal right main pulmonary artery and upper lobe  segmental branches. Recommend further evaluation with CT angiogram  chest.  2. Atherosclerotic disease with noncalcified plaques involving both  common carotid and internal carotid arteries with mild narrowing though  without NASCET stenosis.  3. Small acute infarctions involving the right corona radiata and right  basal ganglia without evidence for hemorrhagic transformation.     Findings discussed with MARIANNE Marie in the emergency department on  09/30/2023 at 11:55 a.m.      Radiation dose reduction techniques were utilized, including automated  exposure control and exposure modulation based on body size.        This report was finalized on 9/30/2023 3:09 PM by Dr. Jesus Mayfield M.D.       CT Angiogram Chest [[id number]] Collected: 09/30/23 1336     Updated: 09/30/23 1342    Narrative:      EXAM: CT ANGIOGRAM CHEST-     HISTORY: Incidental pulmonary embolism on neck angiogram.     TECHNIQUE: Radiation dose reduction techniques were utilized, including  automated exposure control and exposure modulation based on body size.   3 mm images were obtained through the chest after the administration of  IV contrast.     COMPARISON: CT head neck angiogram 9/30/2023        FINDINGS: Mildly dilated main pulmonary artery (33 mm). Acute pulmonary  emboli throughout all 5 lobes of the lungs, right greater than left.  Most proximal embolus is within the interlobar artery. No CT evidence of  right heart strain.     Heart is normal in size. No pericardial fluid. Nondilated thoracic  aorta. No mediastinal/hilar lymphadenopathy. Nondilated esophagus.     Trachea and main bronchi are patent. Diffuse mild bronchial wall  thickening. No bronchiectasis. No pleural effusion.              Impression:      1. Acute pulmonary emboli throughout all 5 lobes of the lungs, right  greater than left. Most proximal embolus is within the interlobar  artery.  2. Mildly dilated main pulmonary artery (33 mm). No CT evidence of right  heart strain.  3. Diffuse mild bronchitis.     Above findings were discussed with Marycarmen LOPES at 1:38 p.m. on  9/30/2023.     This report was finalized on 9/30/2023 1:38 PM by Dr. Reginald Diaz M.D.       MRI Brain Without Contrast [[id number]] Collected: 09/29/23 2254     Updated: 09/29/23 2303    Narrative:      BRAIN MRI WITHOUT CONTRAST     HISTORY: Stroke, follow up; R29.810-Facial weakness; R47.81-Slurred  speech     COMPARISON: September 29, 2023.     FINDINGS:  Multiplanar images of the head were obtained without  gadolinium. There is an area of restricted diffusion which is noted  within the right corona radiata, and extending into the right basal  ganglia. Maximum size is 1.3 x 0.8 cm. No additional areas of restricted  diffusion are noted. There is no midline shift or mass effect.  Ventricles are normal in size. There is no evidence of hemorrhagic  transformation. There is some mild periventricular and deep white matter  microangiopathic change. Intracranial flow voids appear intact. Old  lacunar infarct is suspected within the right thalamus, with another  suspected within the left basal ganglia. There is a left mastoid  effusion. Mucous retention cysts are noted within the left maxillary  sinus. There is some mucosal thickening within the ethmoid sinuses.       Impression:      1. Area of acute infarction involving the right corona radiata and basal  ganglia. No evidence of hemorrhagic transformation.        This report was finalized on 9/29/2023 11:00 PM by Dr. Isa Armendariz M.D.       XR Chest 1 View [[id number]] Collected: 09/29/23 1722     Updated: 09/29/23 1726    Narrative:      XR CHEST 1 VW-     HISTORY:    Fatigue, high blood pressure, history of  diabetes.      COMPARISON: None.     FINDINGS:    2 views of the chest were obtained.     Support Devices:  None.  Cardiac Silhouette/Mediastinum/Kelley:  The cardiac, mediastinal, and  hilar contours are within normal limits. There is calcific  atherosclerosis.  Lungs/Pleural Spaces:  The lungs and pleural spaces are clear.  Chest Wall/Diaphragm/Upper Abdomen: The visualized osseous structures  are age-appropriate.        CONCLUSION(S):       1.  No focal consolidation or effusion.     This report was finalized on 9/29/2023 5:23 PM by Dr. Susanna Torre M.D.       CT Head Without Contrast [[id number]] Collected: 09/29/23 1711     Updated: 09/29/23 1723    Narrative:      CT HEAD WITHOUT CONTRAST     CLINICAL HISTORY: Slurred speech and left facial droop     TECHNIQUE: CT scan of the head was obtained with 3 mm axial soft tissue  algorithm images. No intravenous contrast was administered. Sagittal and  coronal reconstructions were obtained.     COMPARISON: CT head 10/10/2022 and 9/27/2022     FINDINGS:  No intracranial hemorrhage.  No midline shift or mass effect.  Unchanged bilateral chronic lacunar infarcts. No CT evidence of acute  territorial infarction.  No hydrocephalus.  Clear visualized portions of  the paranasal sinuses and mastoid air cells.             Impression:      No acute intracranial abnormality.              Radiation dose reduction techniques were utilized, including automated  exposure control and exposure modulation based on body size.     This report was finalized on 9/29/2023 5:20 PM by Dr. Reginald Diaz M.D.             Results  Scan on 9/29/2023 1633 by [facility], [place]: ECG 12-LEAD         Author: -- Service: -- Author Type: --   Filed: Date of Service: Creation Time:   Status: (Other)   HEART RATE= 86  bpm  RR Interval= 698  ms  [place] Interval= 159  ms  P Horizontal Axis= -10  deg  P Front Axis= 18  deg  QRSD Interval= 98  ms  QT Interval= 366  ms  QTcB= 438  ms  QRS Axis= -26  deg  T  Wave Axis= 9  deg  - ABNORMAL ECG -  Sinus rhythm  Left ventricular hypertrophy  Lateral infarct, age indeterminate  Anterior ST elevation, probably due to LVH          Current Facility-Administered Medications:     amLODIPine (NORVASC) tablet 10 mg, 10 mg, Oral, Daily, Lisbet Chavez APRN, 10 mg at 09/30/23 1538    aspirin tablet 325 mg, 325 mg, Oral, Daily, 325 mg at 09/30/23 1538 **OR** [DISCONTINUED] aspirin suppository 300 mg, 300 mg, Rectal, Daily, Marycarmen Bran APRN    atorvastatin (LIPITOR) tablet 80 mg, 80 mg, Oral, Nightly, Osbaldo Levi MD    carvedilol (COREG) tablet 6.25 mg, 6.25 mg, Oral, Q12H, Lisbet Chavez APRN, 6.25 mg at 09/30/23 1426    Lbvmdez-Trnhz-Dfelfzuy-TenofAF (GENVOYA) [phone] MG per tablet 1 tablet, 1 tablet, Oral, Daily, Lisbet Chavez APRN    empagliflozin (JARDIANCE) tablet 25 mg, 25 mg, Oral, Daily, Lisbet Chavez APRN    heparin (porcine) 5000 UNIT/ML injection 2,900-5,900 Units, 30-60 Units/kg, Intravenous, Q6H PRN, Osbaldo Levi MD    heparin 13069 units/250 mL (100 units/mL) in 0.45 % NaCl infusion, 12 Units/kg/hr, Intravenous, Titrated, Osbaldo Levi MD    hydrALAZINE (APRESOLINE) injection 10 mg, 10 mg, Intravenous, Q6H PRN, Marycarmen Bran APRN    insulin glargine (LANTUS, SEMGLEE) injection 15 Units, 15 Units, Subcutaneous, Nightly, Osbaldo Levi MD    insulin lispro (HUMALOG/ADMELOG) injection 2-9 Units, 2-9 Units, Subcutaneous, 4x Daily AC & at Bedtime, Lisbet Chavez APRN, 4 Units at 09/30/23 1426    insulin lispro (HUMALOG/ADMELOG) injection 5 Units, 5 Units, Subcutaneous, TID With Meals, Osbaldo Levi MD    [START ON 10/1/2023] losartan (COZAAR) tablet 50 mg, 50 mg, Oral, Daily, Osbaldo Levi MD    ondansetron (ZOFRAN) injection 4 mg, 4 mg, Intravenous, Q6H PRN, Marycarmen Bran, MARIANNE    pantoprazole (PROTONIX) EC tablet 40 mg, 40 mg, Oral, Q AM, Lisbet Chavez, APRN, 40 mg at 09/30/23 0511    sertraline (ZOLOFT) tablet 50 mg, 50 mg, Oral, Nightly, Osbaldo Levi,  MD    [COMPLETED] Insert Peripheral IV, , , Once **AND** sodium chloride 0.9 % flush 10 mL, 10 mL, Intravenous, PRN, Severo Nice II, MD    sodium chloride 0.9 % flush 10 mL, 10 mL, Intravenous, PRN, Marycarmen Bran APRN    sodium chloride 0.9 % infusion 40 mL, 40 mL, Intravenous, PRN, Marycarmen Bran APRN       ASSESSMENT  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  Acute right pulmonary embolism  History of left leg DVT  Diabetes mellitus  Hypertension  Hyperlipidemia  Non-Hodgkin lymphoma  HIV  Tobacco abuse  Depression  Coronary artery disease    PLAN  Agree with current care  Aspirin Lipitor  Start heparin and hold Coumadin  Hematology consult  Neurology to follow patient  Adjust home medications  Stress ulcer DVT prophylaxis  Supportive care  PT OT  Patient is full code  Discussed with nursing staff  Follow closely and further recommendation current hospital course    CHYNA LEVI MD           Electronically signed by Chyna Levi MD at 09/30/23 1623       Isaac Heaton MD at 09/30/23 1202        Consult Orders    1. Inpatient Neurology Consult Stroke [[id number]] ordered by Marycarmen Bran APRN at 09/29/23 1908                 Neurology Consult Note    Consult Date: 9/30/2023    Referring MD: Dr. Blanchard    Reason for Consult I have been asked to see the patient in neurological consultation to render advice and opinion regarding stroke    Cecilio Puckett is a 53 y.o. male with history of HIV on [place] with last CD4 count greater than 1000, hyperlipidemia, hypertension, prior PE on long-term anticoagulation with warfarin.  We saw him about a year ago for a right subcortical stroke.  CTA at that time was negative.  He was started on antiplatelets.  He reports that intermittently he will have some left-sided weakness and he typically ambulates with a cane.  3 days ago he developed worsening dysarthria and he presented to the hospital for the symptoms.  MRI revealed a new small right subcortical  "stroke in a very similar area.  Patient feels his symptoms have been stable since admission.    Of note he has untreated sleep apnea and smokes Black and milds.    Past Medical History:   Diagnosis Date    COPD (chronic obstructive pulmonary disease)     Coronary artery disease     Diabetes mellitus     DVT (deep venous thrombosis)     Elevated cholesterol     GERD (gastroesophageal reflux disease)     H/O Cancer     right arm, in throat, chest and stomach, in remission    H/O Ischemia of extremity     History of MI (myocardial infarction) 2019    History of snoring     History of transfusion     HIV (human immunodeficiency virus infection)     Hypertension     Non Hodgkin's lymphoma     PAD (peripheral artery disease)     Pulmonary embolism     Stroke        Exam  BP (!) 184/98 (BP Location: Left arm, Patient Position: Lying)   Pulse 72   Temp 98.2 °F (36.8 °C) (Oral)   Resp 17   Ht 185 cm (72.84\")   Wt 98 kg (216 lb 0.8 oz)   SpO2 100%   BMI 28.63 kg/m²   Gen: NAD, vitals reviewed  MS: oriented x3, recent/remote memory intact, normal attention/concentration, language intact, no neglect.  CN: visual acuity grossly normal, PERRL, EOMI, left facial droop, moderate dysarthria  Motor: 4+/5 left upper and lower extremity    DATA:    Lab Results   Component Value Date    GLUCOSE 228 (H) 09/30/2023    CALCIUM 9.0 09/30/2023     09/30/2023    K 3.6 09/30/2023    CO2 25.6 09/30/2023     09/30/2023    BUN 9 09/30/2023    CREATININE 0.69 (L) 09/30/2023    BCR 13.0 09/30/2023    ANIONGAP 7.4 09/30/2023     Lab Results   Component Value Date    WBC 7.27 09/30/2023    HGB 14.2 09/30/2023    HCT 41.8 09/30/2023    MCV 86.5 09/30/2023     09/30/2023       Lab review: CBC, BMP unremarkable, most recent CD4 count reviewed through care everywhere    Imaging review: I personally reviewed his MRI of the brain performed on admission which shows a subacute right subcortical infarct with a larger area of T2 " hyperintensity in that area.  I reviewed his prior MRI of the brain with without contrast from 1 year ago which showed a stroke in a very similar area with associated scalloped contrast-enhancement suggestive of subacute stroke.  Follow-up CTA head and neck ordered    Diagnoses:  Stroke, right subcortical due to small vessel disease, recurrent  HIV on Salazar, compliant  Essential hypertension  Tobacco use  Obstructive sleep apnea, untreated    Pre-stroke MRS: 1  NIHSS: 4    Comment: 53-year-old man with prior right subcortical stroke now with recurrent stroke essentially in the same area.  This raises the question of underlying M1 atherosclerosis or a more localized area of atherosclerosis in these particular lenticulostriate vessels.  Most likely this is related to overall poor risk factor control    PLAN:   Continue aspirin, warfarin  Check CTA head and neck  Stop smoking  Needs to wear CPAP  Cautious normalization of blood pressure    Potential discharge later today depending on CTA results.        Electronically signed by Isaac Heaton MD at 09/30/23 0577

## 2023-10-02 NOTE — PROGRESS NOTES
[facility] Clinical Pharmacy Services: Warfarin Dosing/Monitoring Consult    Cecilio Puckett is a 53 y.o. male, estimated creatinine clearance is 94 mL/min (by C-G formula based on SCr of 1.13 mg/dL). weighing 98 kg (216 lb 0.8 oz).    Results from last 7 days   Lab Units 10/02/23  0554 10/01/23  1518 10/01/23  0619 09/30/23  2250 09/30/23  0440 09/29/23  2257 09/29/23  1640 09/27/23  1602 09/27/23  0853 09/27/23  0448 09/27/23  0006 09/26/23  1530   INR  1.45*  --   --   --   --  2.65* 2.14*  --   --   --   --  2.73*   APTT seconds  --  37.9* >200.0* 36.7*  --   --  35.1 39.4*   < >  --    < > 37.2*   HEMOGLOBIN g/dL 15.1  --  14.0  --  14.2  --  14.9  --   --  14.7  --  14.9   HEMATOCRIT % 44.9  --  41.3  --  41.8  --  45.1  --   --  43.6  --  44.8   PLATELETS 10*3/mm3 241  --  208  --  208  --  210  --   --  162  --  167    < > = values in this interval not displayed.     Prior to admission anticoagulation: warfarin 15 mg on MWF and 10 mg all other days    Hospital Anticoagulation:  Consulting provider: Dr. Arthur Salazar  Start date: 10/2/23  Indication: DVT/PE (active thrombosis)  Target INR: 2 - 3  Expected duration: TBD   Bridge Therapy: Yes  with enoxaparin    Potential food or drug interactions: cobicistat - can increase bleeding   Aspirin - can increase bleeding    Education complete?/Date: No; plan for follow up TBD    Assessment/Plan:  Dose: Patient normally on 15 mg on Monday; INR low at 1.45 today. WIll plan to load with 20 mg of warfarin and then resume home dose dependent on INR tomorrow.    Monitor for any signs or symptoms of bleeding  Follow up daily INRs and dose adjustments    Pharmacy will continue to follow until discharge or discontinuation of warfarin.     Roxana Joya Pharm.D., [facility]  Oncology Pharmacy Specialist  [phone]

## 2023-10-02 NOTE — PROGRESS NOTES
"[facility] INPATIENT PROGRESS NOTE    Length of Stay:  2 days    CHIEF COMPLAINT/REASON FOR VISIT:  \"Coumadin failure\"   DVT, PE, CVA    SUBJECTIVE:  Speech is better. Weakness at baseline. Afebrile. No bleeding on Lovenox.     ROS:  14 systems reviewed with pertinent positives and negatives in the HPI. Reviewed today.    OBJECTIVE:  Vitals:    10/01/23 2105 10/02/23 0605 10/02/23 0844 10/02/23 0846   BP: 151/90 161/92 (!) 168/105 157/88   BP Location: Left arm Left arm Left arm Right arm   Patient Position: Lying Lying Lying Lying   Pulse: 72 69 74 74   Resp: 16 16 16    Temp: 98.6 °F (37 °C) 98.4 °F (36.9 °C) 98.4 °F (36.9 °C)    TempSrc: Oral Oral Oral    SpO2: 97% 98% 97% 98%   Weight:       Height:             PHYSICAL EXAMINATION:   General:  No acute distress, awake, alert and oriented  Skin:  Warm and dry, no visible rash  HEENT:  Normocephalic/atraumatic.   Chest:  Normal respiratory effort  Extremities:  No visible clubbing, cyanosis, or edema  Neuro/psych:  very mild slurred speech at times.  Normal mood and affect.       DIAGNOSTIC DATA:  Results Review:     I reviewed the patient's new clinical results.    Results from last 7 days   Lab Units 10/02/23  0554   WBC 10*3/mm3 6.55   HEMOGLOBIN g/dL 15.1   HEMATOCRIT % 44.9   PLATELETS 10*3/mm3 241     Lab Results   Component Value Date    NEUTROABS 2.49 10/02/2023     Results from last 7 days   Lab Units 10/01/23  0619   SODIUM mmol/L 136   POTASSIUM mmol/L 3.5   CHLORIDE mmol/L 101   CO2 mmol/L 23.1   BUN mg/dL 11   CREATININE mg/dL 0.82   GLUCOSE mg/dL 135*   CALCIUM mg/dL 9.1     Results from last 7 days   Lab Units 10/02/23  0554 10/01/23  1518 10/01/23  0619 09/30/23  2250 09/29/23  2257 09/29/23  1640   INR  1.45*  --   --   --  2.65* 2.14*   APTT seconds  --  37.9* >200.0* 36.7*  --  35.1     Results from last 7 days   Lab Units 09/27/23  0448   MAGNESIUM mg/dL 1.7         IMAGING:    None reviewed    ASSESSMENT:  This is a 53 y.o. male " with:     *HIV  Managed at [facility] by Dr. Laith Robison     *History of pulmonary embolism, left lower extremity DVT, left lower extremity peripheral vascular disease status post arterial stenting.    It appears he had an aortobifemoral bypass on 3/14/2014.  On chronic therapy with warfarin  He states INR values recently have been fluctuating widely and his INR was very low over the past 1 to 2 weeks at close to 1  It is unclear if he ever had a hypercoagulable evaluation     *Acute left lower extremity DVT by venous duplex on 9/26/2023  Left lower extremity venous duplex on 9/26/2023 with acute DVT in the popliteal, posterior tibial, and peroneal veins  Started on a heparin drip with improvement in left lower extremity pain  Discharged on warfarin with a therapeutic INR     *Newly discovered acute pulmonary embolism  9/30/2023 CT angiogram of the carotids appeared to show bilateral pulmonary thromboembolic disease with embolism in the distal right pulmonary artery and upper lobe segmental branches with atherosclerotic disease with noncalcified plaques involving both common carotid and internal carotid arteries.  Small acute infarctions involving the right corona radiata and right basal ganglia noted.  9/30/2023 CT angiogram of the chest showed acute pulmonary emboli throughout all 5 lobes of the lungs, right greater than left with a mildly dilated main pulmonary artery and no evidence of right heart strain.  Diffuse mild bronchitis noted.        RECOMMENDATIONS/PLAN:   It is unclear if venous thromboembolism occurred while his INR was subtherapeutic or not.  I suspect it did.  Warfarin for him has been difficult to manage given dietary indiscretion.  Overall, I think warfarin is probably a better anticoagulant for him given his history. With DOACs, there appears to be a drug interaction with apixaban and rivaroxaban and the Genvoya that he is on for his HIV.  Pradaxa might be an option but this would be less preferred  from my standpoint.  Therefore, he may need to stay on warfarin.  For now, continue Lovenox 1 mg/kg q12h  He has been started on aspirin per neurology  I will discuss with pharmacy staff today and we will continue to follow.    Arthur Salazar MD

## 2023-10-02 NOTE — CONSULTS
"Diabetes Education  Assessment/Teaching    Patient Name:  Cecilio Puckett  YOB: 1970  MRN: [id number]  Admit Date:  9/29/2023      Assessment Date:  10/2/2023  Flowsheet Row Most Recent Value   General Information     Referral From: Other (comment)  [RN consult]   Height 185 cm (72.84\")   Height Method Stated   Weight 98 kg (216 lb 0.8 oz)   Weight Method Bed scale   Diabetes History    What type of diabetes do you have? Type 2   Current DM knowledge fair   Do you test your blood sugar at home? yes   Frequency of checks Once daily after lunch   Meter type Does not recall   Who performs the test? self   Typical readings elevated   Have you had low blood sugar? (<70mg/dl) no   Education Preferences    Barriers to Learning other (comment)  [None noted]   Assessment Topics    Taking Medication - Assessment Needs education   Reducing Risk - Assessment Needs education   Healthy Coping - Assessment Needs education   Monitoring - Assessment Needs education   DM Goals    Taking Medication - Goal 0-7 days from discharge   Reducing Risk - Goal 30-90 days from discharge   Monitoring - Goal 0-7 days from discharge   Contact Plan Follow-up medical care       Flowsheet Row Most Recent Value   DM Education Needs    Meter Needs meter   Frequency of Testing AC meals   Blood Glucose Target Range  mg/dL premeal   Medication Insulin, Actions, Side effects  [Basal bolus concept]   Problem Solving Hyperglycemia, Signs, Symptoms   Reducing Risks A1C testing  [A1c 11.4%]   Healthy Eating Other (comment)  [Encouraged three meals a day]   Healthy Coping Other (comment)  [Pt voiced stress and priority of caring for his adopted child.  Discussed pt needing to increase pt's own self-care.]   Discharge Plan Home Care, Follow-up with PCP   Motivation Engaged   Teaching Method Explanation, Discussion       Other Comments:  Discussed pt's home routine.  Pt only monitors his glucose once daily after eating lunch and taking insulin " once a day at that time.  Pt states takes 50 units of insulin and has been getting samples.   Pt also admits to omission of insulin at times due to caring for his adopted child.    Pt educated on basal bolus concept and when to take glucose readings.      Per [facility] pharmacist, Basaglar ($17.63/month) and Novolog ($30.18/month) would be formulary.  When discussed with pt, pt states he would be willing to pay those amounts.  Pt states he recently received a glucometer from [facility].      Encouraged pt to adhere to regimen prescribed and to increase DM self-care.  Pt verbalizes understanding.        Electronically signed by:  Elli Melendez RN, [facility]  10/02/23 11:19 EDT

## 2023-10-02 NOTE — PLAN OF CARE
Problem: Adult Inpatient Plan of Care  Goal: Absence of Hospital-Acquired Illness or Injury  Intervention: Prevent and Manage VTE (Venous Thromboembolism) Risk  Recent Flowsheet Documentation  Taken 10/2/2023 0232 by Cristy Lawson RN  Activity Management: up ad yash  Taken 10/2/2023 0020 by Cristy Lawson RN  Activity Management: up ad yash  Taken 10/1/2023 2130 by Cristy Lawson RN  VTE Prevention/Management: (adlib, lovenox subq) other (see comments)  Taken 10/1/2023 2010 by Cristy Lawson RN  Activity Management: up ad yash     Problem: Adult Inpatient Plan of Care  Goal: Absence of Hospital-Acquired Illness or Injury  Intervention: Prevent Skin Injury  Recent Flowsheet Documentation  Taken 10/2/2023 0020 by Cristy Lawson RN  Body Position: position changed independently  Taken 10/1/2023 2010 by Cristy Lawson RN  Body Position: position changed independently     Problem: Bowel Elimination Impaired (Stroke, Ischemic/Transient Ischemic Attack)  Goal: Effective Bowel Elimination  10/2/2023 0516 by Cristy Lawson RN  Outcome: Ongoing, Progressing  10/2/2023 0509 by Cristy Lawson RN  Outcome: Ongoing, Progressing     Problem: Urinary Elimination Impaired (Stroke, Ischemic/Transient Ischemic Attack)  Goal: Effective Urinary Elimination  10/2/2023 0516 by Cristy Lawson RN  Outcome: Ongoing, Progressing  10/2/2023 0509 by Cristy Lawson RN  Outcome: Ongoing, Progressing   Goal Outcome Evaluation:

## 2023-10-02 NOTE — PROGRESS NOTES
"Daily progress note    Referring physician  Dr. Lisbet Chavez    Subjective  Doing better with no new complaints and wants to go home and refusing acute or subacute rehab    History of present illness  53-year-old -American male with history of diabetes mellitus hypertension HIV non-Hodgkin lymphoma coronary artery disease and DVT on Coumadin and was recently discharged from the hospital with acute on chronic DVT admitted to the observation unit with slurred speech and left arm weakness.  Patient remained in the observation unit and further evaluated by neurology and found to have right CVA and also found to have acute pulm embolism admitted to the hospital as he has therapeutic INR.  I am asked to follow patient for medical problems and take over the care.  At the time of interview he is fully alert oriented and give me a detailed history.     REVIEW OF SYSTEMS  Unremarkable except weakness     PHYSICAL EXAM  Blood pressure 101/73, pulse 76, temperature 97.5 °F (36.4 °C), temperature source Oral, resp. rate 16, height 185 cm (72.84\"), weight 98 kg (216 lb 0.8 oz), SpO2 96 %.    GENERAL: Awake and alert no acute distress  HEENT: Unremarkable  NECK: Supple  CV: regular rhythm, normal rate  RESPIRATORY: normal effort, moving air bilaterally  ABDOMEN: soft nontender nondistended bowel sounds positive  MUSCULOSKELETAL: no deformity  NEURO: Awake and alert follow commands with no speech problem and moves all 4 extremities  SKIN: warm, dry     LAB RESULTS  Lab Results (last 24 hours)       Procedure Component Value Units Date/Time    Basic Metabolic Panel [[id number]]  (Abnormal) Collected: 10/02/23 0554    Specimen: Blood Updated: 10/02/23 1321     Glucose 113 mg/dL      BUN 11 mg/dL      Creatinine 1.13 mg/dL      Sodium 137 mmol/L      Potassium 3.5 mmol/L      Chloride 110 mmol/L      CO2 27.3 mmol/L      Calcium 9.5 mg/dL      BUN/Creatinine Ratio 9.7     Anion Gap -0.3 mmol/L      eGFR 78.2 mL/min/1.73     " Narrative:      GFR Normal >60  Chronic Kidney Disease <60  Kidney Failure <15      POC Glucose Once [[id number]]  (Abnormal) Collected: 10/02/23 1133    Specimen: Blood Updated: 10/02/23 1134     Glucose 163 mg/dL     Manual Differential [[id number]]  (Abnormal) Collected: 10/02/23 0554    Specimen: Blood Updated: 10/02/23 0853     Neutrophil % 38.0 %      Lymphocyte % 47.0 %      Monocyte % 10.0 %      Eosinophil % 1.0 %      Basophil % 2.0 %      Atypical Lymphocyte % 2.0 %      Neutrophils Absolute 2.49 10*3/mm3      Lymphocytes Absolute 3.21 10*3/mm3      Monocytes Absolute 0.66 10*3/mm3      Eosinophils Absolute 0.07 10*3/mm3      Basophils Absolute 0.13 10*3/mm3      RBC Morphology Normal     WBC Morphology Normal     Platelet Morphology Normal    CBC & Differential [[id number]]  (Normal) Collected: 10/02/23 0554    Specimen: Blood Updated: 10/02/23 0733    Narrative:      The following orders were created for panel order CBC & Differential.  Procedure                               Abnormality         Status                     ---------                               -----------         ------                     CBC Auto Differential[[id number]]        Normal              Final result                 Please view results for these tests on the individual orders.    CBC Auto Differential [[id number]]  (Normal) Collected: 10/02/23 0554    Specimen: Blood Updated: 10/02/23 0733     WBC 6.55 10*3/mm3      RBC 5.18 10*6/mm3      Hemoglobin 15.1 g/dL      Hematocrit 44.9 %      MCV 86.7 fL      MCH 29.2 pg      MCHC 33.6 g/dL      RDW 14.1 %      RDW-SD 44.6 fl      MPV 11.5 fL      Platelets 241 10*3/mm3      nRBC 0.0 /100 WBC     POC Glucose Once [[id number]]  (Normal) Collected: 10/02/23 0723    Specimen: Blood Updated: 10/02/23 0724     Glucose 98 mg/dL     Protime-INR [[id number]]  (Abnormal) Collected: 10/02/23 0554    Specimen: Blood Updated: 10/02/23 0655     Protime 17.9 Seconds      INR 1.45    POC Glucose Once  [[id number]]  (Normal) Collected: 10/01/23 2105    Specimen: Blood Updated: 10/01/23 2107     Glucose 121 mg/dL     POC Glucose Once [[id number]]  (Abnormal) Collected: 10/01/23 1730    Specimen: Blood Updated: 10/01/23 1732     Glucose 133 mg/dL     aPTT [[id number]]  (Abnormal) Collected: 10/01/23 1518    Specimen: Blood Updated: 10/01/23 1557     PTT 37.9 seconds           Imaging Results (Last 24 Hours)       ** No results found for the last 24 hours. **          Results  Scan on 9/29/2023 1633 by [facility], [place]: ECG 12-LEAD         Author: -- Service: -- Author Type: --   Filed: Date of Service: Creation Time:   Status: (Other)   HEART RATE= 86  bpm  RR Interval= 698  ms  OH Interval= 159  ms  P Horizontal Axis= -10  deg  P Front Axis= 18  deg  QRSD Interval= 98  ms  QT Interval= 366  ms  QTcB= 438  ms  QRS Axis= -26  deg  T Wave Axis= 9  deg  - ABNORMAL ECG -  Sinus rhythm  Left ventricular hypertrophy  Lateral infarct, age indeterminate  Anterior ST elevation, probably due to LVH          Current Facility-Administered Medications:     amLODIPine (NORVASC) tablet 10 mg, 10 mg, Oral, Daily, Lisbet Chavez APRN, 10 mg at 10/02/23 0846    aspirin tablet 325 mg, 325 mg, Oral, Daily, 325 mg at 10/02/23 0846 **OR** [DISCONTINUED] aspirin suppository 300 mg, 300 mg, Rectal, Daily, Marycarmen Bran APRN    atorvastatin (LIPITOR) tablet 40 mg, 40 mg, Oral, Nightly, Osbaldo Levi MD, 40 mg at 10/01/23 2127    carvedilol (COREG) tablet 6.25 mg, 6.25 mg, Oral, Q12H, Lisbet Chavez APRN, 6.25 mg at 10/02/23 0846    Jtepedz-Osnlp-Mvkujbuj-TenofAF (GENVOYA) [phone] MG per tablet 1 tablet, 1 tablet, Oral, Daily, Lisbet Chavez APRN, 1 tablet at 10/02/23 0846    empagliflozin (JARDIANCE) tablet 25 mg, 25 mg, Oral, Daily, Lisbet Chavez APRN, 25 mg at 10/02/23 0846    Enoxaparin Sodium (LOVENOX) syringe 100 mg, 100 mg, Subcutaneous, Q12H, Arthur Salazar MD, 100 mg at 10/02/23 1359    insulin glargine (LANTUS, SEMGLEE)  injection 15 Units, 15 Units, Subcutaneous, Nightly, Chyna Levi MD, 15 Units at 10/01/23 2127    insulin lispro (HUMALOG/ADMELOG) injection 2-9 Units, 2-9 Units, Subcutaneous, 4x Daily AC & at Bedtime, Lisbet Chavez APRN, 2 Units at 10/02/23 1151    insulin lispro (HUMALOG/ADMELOG) injection 5 Units, 5 Units, Subcutaneous, TID With Meals, Chyna Levi MD, 5 Units at 10/02/23 1151    losartan (COZAAR) tablet 50 mg, 50 mg, Oral, Daily, Chyna Levi MD, 50 mg at 10/02/23 0846    nicotine (NICODERM CQ) 21 MG/24HR patch 1 patch, 1 patch, Transdermal, Q24H, Chyna Levi MD, 1 patch at 10/01/23 2127    ondansetron (ZOFRAN) injection 4 mg, 4 mg, Intravenous, Q6H PRN, Marycarmen Bran APRN    pantoprazole (PROTONIX) EC tablet 40 mg, 40 mg, Oral, Q AM, Lisbet Chavez APRN, 40 mg at 10/02/23 0621    sertraline (ZOLOFT) tablet 50 mg, 50 mg, Oral, Nightly, Chyna Levi MD, 50 mg at 10/01/23 2128    [COMPLETED] Insert Peripheral IV, , , Once **AND** sodium chloride 0.9 % flush 10 mL, 10 mL, Intravenous, PRN, Severo Nice II, MD    sodium chloride 0.9 % flush 10 mL, 10 mL, Intravenous, PRN, Marycarmen Bran, MARIANNE    sodium chloride 0.9 % infusion 40 mL, 40 mL, Intravenous, PRN, Marycarmen Bran APRN       ASSESSMENT  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  Acute right pulmonary embolism  History of left leg DVT  Diabetes mellitus  Hypertension  Hyperlipidemia  Non-Hodgkin lymphoma  HIV  Tobacco abuse  Depression  Coronary artery disease    PLAN  CPM  Aspirin Lipitor  Continue Lovenox and start Coumadin once okay with hematology  Hematology consult appreciated  Neurology to follow patient  Adjust home medications  Stress ulcer DVT prophylaxis  Supportive care  PT OT  Discussed with nursing staff  Discharge home once INR more than 2 with family support and home health    CHYNA LEVI MD    Copied text in this note has been reviewed and is accurate as of 10/02/23

## 2023-10-03 ENCOUNTER — READMISSION MANAGEMENT (OUTPATIENT)
Dept: CALL CENTER | Facility: HOSPITAL | Age: 53
End: 2023-10-03
Payer: MEDICARE

## 2023-10-03 VITALS
HEIGHT: 73 IN | OXYGEN SATURATION: 95 % | TEMPERATURE: 99.1 F | DIASTOLIC BLOOD PRESSURE: 77 MMHG | SYSTOLIC BLOOD PRESSURE: 120 MMHG | RESPIRATION RATE: 16 BRPM | WEIGHT: 216.05 LBS | BODY MASS INDEX: 28.63 KG/M2 | HEART RATE: 76 BPM

## 2023-10-03 DIAGNOSIS — I82.4Y9 ACUTE DEEP VEIN THROMBOSIS (DVT) OF PROXIMAL VEIN OF LOWER EXTREMITY, UNSPECIFIED LATERALITY: Primary | ICD-10-CM

## 2023-10-03 LAB
GLUCOSE BLDC GLUCOMTR-MCNC: 111 MG/DL (ref 70–130)
GLUCOSE BLDC GLUCOMTR-MCNC: 119 MG/DL (ref 70–130)
GLUCOSE BLDC GLUCOMTR-MCNC: 196 MG/DL (ref 70–130)
GLUCOSE BLDC GLUCOMTR-MCNC: 91 MG/DL (ref 70–130)
INR PPP: 1.23 (ref 0.9–1.1)
PROTHROMBIN TIME: 15.7 SECONDS (ref 11.7–14.2)

## 2023-10-03 PROCEDURE — 63710000001 INSULIN LISPRO (HUMAN) PER 5 UNITS

## 2023-10-03 PROCEDURE — 99232 SBSQ HOSP IP/OBS MODERATE 35: CPT | Performed by: INTERNAL MEDICINE

## 2023-10-03 PROCEDURE — 63710000001 INSULIN LISPRO (HUMAN) PER 5 UNITS: Performed by: HOSPITALIST

## 2023-10-03 PROCEDURE — 85610 PROTHROMBIN TIME: CPT | Performed by: INTERNAL MEDICINE

## 2023-10-03 PROCEDURE — 82948 REAGENT STRIP/BLOOD GLUCOSE: CPT

## 2023-10-03 PROCEDURE — 25010000002 ENOXAPARIN PER 10 MG: Performed by: INTERNAL MEDICINE

## 2023-10-03 RX ORDER — ENOXAPARIN SODIUM 100 MG/ML
100 INJECTION SUBCUTANEOUS EVERY 12 HOURS
Qty: 28 ML | Refills: 0 | Status: SHIPPED | OUTPATIENT
Start: 2023-10-03

## 2023-10-03 RX ORDER — ASPIRIN 325 MG
325 TABLET ORAL DAILY
Qty: 30 TABLET | Refills: 0 | Status: SHIPPED | OUTPATIENT
Start: 2023-10-04 | End: 2023-11-03

## 2023-10-03 RX ORDER — CARVEDILOL 6.25 MG/1
6.25 TABLET ORAL EVERY 12 HOURS SCHEDULED
Qty: 60 TABLET | Refills: 0 | Status: SHIPPED | OUTPATIENT
Start: 2023-10-03 | End: 2023-11-02

## 2023-10-03 RX ORDER — ATORVASTATIN CALCIUM 40 MG/1
40 TABLET, FILM COATED ORAL NIGHTLY
Qty: 30 TABLET | Refills: 0 | Status: SHIPPED | OUTPATIENT
Start: 2023-10-03 | End: 2023-11-02

## 2023-10-03 RX ADMIN — INSULIN LISPRO 5 UNITS: 100 INJECTION, SOLUTION INTRAVENOUS; SUBCUTANEOUS at 16:49

## 2023-10-03 RX ADMIN — INSULIN LISPRO 5 UNITS: 100 INJECTION, SOLUTION INTRAVENOUS; SUBCUTANEOUS at 09:28

## 2023-10-03 RX ADMIN — INSULIN LISPRO 5 UNITS: 100 INJECTION, SOLUTION INTRAVENOUS; SUBCUTANEOUS at 12:12

## 2023-10-03 RX ADMIN — LOSARTAN POTASSIUM 25 MG: 25 TABLET, FILM COATED ORAL at 09:20

## 2023-10-03 RX ADMIN — ENOXAPARIN SODIUM 100 MG: 100 INJECTION SUBCUTANEOUS at 15:59

## 2023-10-03 RX ADMIN — AMLODIPINE BESYLATE 10 MG: 10 TABLET ORAL at 09:20

## 2023-10-03 RX ADMIN — ELVITEGRAVIR, COBICISTAT, EMTRICITABINE, AND TENOFOVIR ALAFENAMIDE 1 TABLET: 150; 150; 200; 10 TABLET ORAL at 09:20

## 2023-10-03 RX ADMIN — INSULIN LISPRO 2 UNITS: 100 INJECTION, SOLUTION INTRAVENOUS; SUBCUTANEOUS at 16:51

## 2023-10-03 RX ADMIN — ENOXAPARIN SODIUM 100 MG: 100 INJECTION SUBCUTANEOUS at 01:15

## 2023-10-03 RX ADMIN — PANTOPRAZOLE SODIUM 40 MG: 40 TABLET, DELAYED RELEASE ORAL at 06:10

## 2023-10-03 RX ADMIN — EMPAGLIFLOZIN 25 MG: 25 TABLET, FILM COATED ORAL at 09:20

## 2023-10-03 RX ADMIN — CARVEDILOL 6.25 MG: 6.25 TABLET, FILM COATED ORAL at 09:20

## 2023-10-03 RX ADMIN — WARFARIN 20 MG: 10 TABLET ORAL at 16:03

## 2023-10-03 RX ADMIN — ASPIRIN 325 MG: 325 TABLET ORAL at 09:20

## 2023-10-03 NOTE — PLAN OF CARE
Goal Outcome Evaluation:  Plan of Care Reviewed With: patient        Progress: improving     No new neuro deficits noted or reported. Patient see by Dr Levi and ok to d/c home. IV and tele pack d/c'd. Discharge meds delivered by [facility] to bed. Warfarin 20mg given prior to discharge per [facility] and pt instructed to resume home dose tomorrow. Patient will be going home with Linda (mother).

## 2023-10-03 NOTE — PROGRESS NOTES
[facility] Clinical Pharmacy Services: Warfarin Dosing/Monitoring Consult    Cecilio Puckett is a 53 y.o. male, estimated creatinine clearance is 94 mL/min (by C-G formula based on SCr of 1.13 mg/dL). weighing 98 kg (216 lb 0.8 oz).    Results from last 7 days   Lab Units 10/03/23  0613 10/02/23  1807 10/02/23  0554 10/01/23  1518 10/01/23  0619 09/30/23  2250 09/30/23  0440 09/29/23  2257 09/29/23  1640 09/27/23  1602 09/27/23  0853 09/27/23 0448   INR  1.23* 1.25* 1.45*  --   --   --   --  2.65* 2.14*  --   --   --    APTT seconds  --   --   --  37.9* >200.0* 36.7*  --   --  35.1 39.4*   < >  --    HEMOGLOBIN g/dL  --   --  15.1  --  14.0  --  14.2  --  14.9  --   --  14.7   HEMATOCRIT %  --   --  44.9  --  41.3  --  41.8  --  45.1  --   --  43.6   PLATELETS 10*3/mm3  --   --  241  --  208  --  208  --  210  --   --  162    < > = values in this interval not displayed.     Prior to admission anticoagulation: warfarin 15 mg on MWF and 10 mg all other days    Hospital Anticoagulation:  Consulting provider: Dr. Arthur Salazar  Start date: 10/2/23  Indication: DVT/PE (active thrombosis)  Target INR: 2 - 3  Expected duration: TBD   Bridge Therapy: Yes  with enoxaparin    Potential food or drug interactions: cobicistat - can increase bleeding   Aspirin - can increase bleeding    Education complete?/Date: Recommend f/u within the next week with outpatient anticoagulation clinic    Assessment/Plan:  Dose: We will give one more dose of 20 mg today for an increased front load pending discharge Patient will resume previous dosing of 15 mg on MWF and 10 mg all other days; INR low at 1.23 today.    Monitor for any signs or symptoms of bleeding  Follow up daily INRs and dose adjustments    Pharmacy will continue to follow until discharge or discontinuation of warfarin.     Ute Massey, Pharmacist Intern

## 2023-10-03 NOTE — DISCHARGE SUMMARY
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  Acute right pulmonary embolism with history of left leg DVT  Insulin-dependent diabetes mellitus  Hypertension  Hyperlipidemia  Non-Hodgkin lymphoma  HIV  Depression  Coronary artery disease    Discharge medications    Current Facility-Administered Medications:     amLODIPine (NORVASC) tablet 10 mg, 10 mg, Oral, Daily, Lisbet Chavez APRN, 10 mg at 10/03/23 0920    aspirin tablet 325 mg, 325 mg, Oral, Daily, 325 mg at 10/03/23 0920 **OR** [DISCONTINUED] aspirin suppository 300 mg, 300 mg, Rectal, Daily, Marycarmen Bran APRN    atorvastatin (LIPITOR) tablet 40 mg, 40 mg, Oral, Nightly, Osbaldo Leiv MD, 40 mg at 10/02/23 2100    carvedilol (COREG) tablet 6.25 mg, 6.25 mg, Oral, Q12H, Lisbet Chavez APRN, 6.25 mg at 10/03/23 0920    Kqvljwj-Npqfy-Ltizvfld-TenofAF (GENVOYA) [phone] MG per tablet 1 tablet, 1 tablet, Oral, Daily, Lisbet Chavez APRN, 1 tablet at 10/03/23 0920    empagliflozin (JARDIANCE) tablet 25 mg, 25 mg, Oral, Daily, Lisbet Chavez APRN, 25 mg at 10/03/23 0920    Enoxaparin Sodium (LOVENOX) syringe 100 mg, 100 mg, Subcutaneous, Q12H, Arthur Salazar MD, 100 mg at 10/03/23 0115    insulin glargine (LANTUS, SEMGLEE) injection 15 Units, 15 Units, Subcutaneous, Nightly, Osbaldo Levi MD, 15 Units at 10/02/23 2200    insulin lispro (HUMALOG/ADMELOG) injection 2-9 Units, 2-9 Units, Subcutaneous, 4x Daily AC & at Bedtime, Lisbet Chaevz APRN, 2 Units at 10/02/23 1716    insulin lispro (HUMALOG/ADMELOG) injection 5 Units, 5 Units, Subcutaneous, TID With Meals, Osbaldo Levi MD, 5 Units at 10/03/23 1212    losartan (COZAAR) tablet 25 mg, 25 mg, Oral, Daily, Osbaldo Levi MD, 25 mg at 10/03/23 0920    nicotine (NICODERM CQ) 21 MG/24HR patch 1 patch, 1 patch, Transdermal, Q24H, Osbaldo Levi MD, 1 patch at 10/02/23 2059    ondansetron (ZOFRAN) injection  4 mg, 4 mg, Intravenous, Q6H PRN, Marycarmen Bran APRN    pantoprazole (PROTONIX) EC tablet 40 mg, 40 mg, Oral, Q AM, Lisbet Chavez, APRN, 40 mg at 10/03/23 0610    Pharmacy to dose warfarin, , Does not apply, Continuous PRN, Arthur Salazar MD    sertraline (ZOLOFT) tablet 50 mg, 50 mg, Oral, Nightly, Osbaldo Levi MD, 50 mg at 10/02/23 2100    [COMPLETED] Insert Peripheral IV, , , Once **AND** sodium chloride 0.9 % flush 10 mL, 10 mL, Intravenous, PRN, Severo Nice II, MD    sodium chloride 0.9 % flush 10 mL, 10 mL, Intravenous, PRN, Marycarmen Bran APRN    sodium chloride 0.9 % infusion 40 mL, 40 mL, Intravenous, PRN, Marycarmen Bran APRCURT    warfarin (COUMADIN) tablet 20 mg, 20 mg, Oral, Daily, Arthur Salazar MD, 20 mg at 10/02/23 1900     Consults obtained  Neurology  Hematology  Diabetic education nurse  Nutrition    Procedures  None    Hospital course  54-year-old -American male with history of HIV insulin-dependent diabetes mellitus hypertension hyperlipidemia non-Hodgkin lymphoma coronary artery disease and DVT who was recently discharged from the hospital after treatment of acute on chronic left lower extremity DVT admitted to emergency room with left arm weakness slurred speech and was admitted to observation unit and found to have right CVA and further work-up revealed that he has acute pulm embolism.  Patient was on Coumadin with subtherapeutic INR and it was thought is probably Coumadin failure and hematology consult obtained and they recommend that Coumadin is the best option for this patient.  Patient remain on Lovenox and Coumadin restarted and he is feeling much better and wants to go home.  Patient refusing to go to subacute rehab and it is clear with all consultant that she can go on Lovenox and continue with Coumadin until INR is more than 2.  Patient will follow with hematology with daily INR.  Patient cleared for discharge.    Discharge diet regular    Activity as tolerated    Medication  as above    Follow-up with prime doctor in 1 week and follow-up with neurology and hematology per the instructions and take medication as directed.    CHYNA NOLAN MD

## 2023-10-03 NOTE — PROGRESS NOTES
"[facility] INPATIENT PROGRESS NOTE    Length of Stay:  3 days    CHIEF COMPLAINT/REASON FOR VISIT:  \"Coumadin failure\"   DVT, PE, CVA    SUBJECTIVE: Feels stable and at baseline.  No bleeding on Lovenox.    ROS:  14 systems reviewed with pertinent positives and negatives in the HPI. Reviewed today.    OBJECTIVE:  Vitals:    10/03/23 0015 10/03/23 0605 10/03/23 0852 10/03/23 1248   BP: 144/78 155/94 131/87 120/77   BP Location: Left arm Left arm Left arm Left arm   Patient Position: Lying Lying Lying Lying   Pulse: 73 85 84 76   Resp: 16 16 16 16   Temp: 97.9 °F (36.6 °C) 97.7 °F (36.5 °C) 97.9 °F (36.6 °C) 99.1 °F (37.3 °C)   TempSrc: Oral Oral Oral Oral   SpO2: 97% 96% 97% 95%   Weight:       Height:             PHYSICAL EXAMINATION:   General:  No acute distress, awake, alert and oriented  Skin:  Warm and dry, no visible rash  HEENT:  Normocephalic/atraumatic.   Chest:  Normal respiratory effort  Extremities:  No visible clubbing, cyanosis, or edema  Neuro/psych:  very mild slurred speech at times, stable.  Normal mood and affect.       DIAGNOSTIC DATA:  Results Review:     I reviewed the patient's new clinical results.    Results from last 7 days   Lab Units 10/02/23  0554   WBC 10*3/mm3 6.55   HEMOGLOBIN g/dL 15.1   HEMATOCRIT % 44.9   PLATELETS 10*3/mm3 241     Lab Results   Component Value Date    NEUTROABS 2.49 10/02/2023     Results from last 7 days   Lab Units 10/02/23  0554   SODIUM mmol/L 137   POTASSIUM mmol/L 3.5   CHLORIDE mmol/L 110*   CO2 mmol/L 27.3   BUN mg/dL 11   CREATININE mg/dL 1.13   GLUCOSE mg/dL 113*   CALCIUM mg/dL 9.5     Results from last 7 days   Lab Units 10/03/23  0613 10/02/23  1807 10/02/23  0554 10/01/23  1518 10/01/23  0619 09/30/23  2250   INR  1.23* 1.25* 1.45*  --   --   --    APTT seconds  --   --   --  37.9* >200.0* 36.7*     Results from last 7 days   Lab Units 09/27/23  0448   MAGNESIUM mg/dL 1.7         IMAGING:    None reviewed    ASSESSMENT:  This is a 53 " y.o. male with:     *HIV  Managed at [place] by Dr. Laith Robison     *History of pulmonary embolism, left lower extremity DVT, left lower extremity peripheral vascular disease status post arterial stenting.    It appears he had an aortobifemoral bypass on 3/14/2014.  On chronic therapy with warfarin  He states INR values recently have been fluctuating widely and his INR was very low over the past 1 to 2 weeks at close to 1  It is unclear if he ever had a hypercoagulable evaluation     *Acute left lower extremity DVT by venous duplex on 9/26/2023  Left lower extremity venous duplex on 9/26/2023 with acute DVT in the popliteal, posterior tibial, and peroneal veins  Started on a heparin drip with improvement in left lower extremity pain  Discharged on warfarin with a therapeutic INR     *Newly discovered acute pulmonary embolism  9/30/2023 CT angiogram of the carotids appeared to show bilateral pulmonary thromboembolic disease with embolism in the distal right pulmonary artery and upper lobe segmental branches with atherosclerotic disease with noncalcified plaques involving both common carotid and internal carotid arteries.  Small acute infarctions involving the right corona radiata and right basal ganglia noted.  9/30/2023 CT angiogram of the chest showed acute pulmonary emboli throughout all 5 lobes of the lungs, right greater than left with a mildly dilated main pulmonary artery and no evidence of right heart strain.  Diffuse mild bronchitis noted.        RECOMMENDATIONS/PLAN:   Continue Lovenox 1 mg/kg every 12 hours and warfarin.  Transition to a DOAC will not be possible due to drug interactions.    He has been started on aspirin per neurology  If we can get him some Lovenox and come up with a warfarin dosing schedule I would be comfortable with discharge with plans for close follow-up in the anticoagulation clinic in my office.    Discussed with Dr. Levi and the pharmacy staff.    Arthur Salazar MD

## 2023-10-03 NOTE — PLAN OF CARE
Goal Outcome Evaluation:      Problem: Adult Inpatient Plan of Care  Goal: Plan of Care Review  Outcome: Ongoing, Progressing  Goal: Patient-Specific Goal (Individualized)  Outcome: Ongoing, Progressing  Goal: Absence of Hospital-Acquired Illness or Injury  Outcome: Ongoing, Progressing  Intervention: Identify and Manage Fall Risk  Recent Flowsheet Documentation  Taken 10/3/2023 0603 by Dilan Bojorquez RN  Safety Promotion/Fall Prevention:   activity supervised   assistive device/personal items within reach   clutter free environment maintained   fall prevention program maintained   lighting adjusted   room organization consistent   safety round/check completed  Taken 10/3/2023 0408 by Dilan Bojorquez RN  Safety Promotion/Fall Prevention:   activity supervised   assistive device/personal items within reach   clutter free environment maintained   fall prevention program maintained   lighting adjusted   room organization consistent   safety round/check completed  Taken 10/3/2023 0210 by Dilan Bojorquez RN  Safety Promotion/Fall Prevention:   activity supervised   assistive device/personal items within reach   clutter free environment maintained   fall prevention program maintained   lighting adjusted   room organization consistent   safety round/check completed  Taken 10/3/2023 0010 by Dilan Bojorquez RN  Safety Promotion/Fall Prevention:   activity supervised   assistive device/personal items within reach   clutter free environment maintained   fall prevention program maintained   lighting adjusted   room organization consistent   safety round/check completed  Taken 10/2/2023 2210 by Dilan Bojorquez RN  Safety Promotion/Fall Prevention:   activity supervised   assistive device/personal items within reach   clutter free environment maintained   fall prevention program maintained   lighting adjusted   room organization consistent   safety round/check completed  Taken 10/2/2023 2005 by Dilan Bojorquez  RN  Safety Promotion/Fall Prevention:   activity supervised   assistive device/personal items within reach   clutter free environment maintained   fall prevention program maintained   lighting adjusted   room organization consistent   safety round/check completed  Intervention: Prevent Skin Injury  Recent Flowsheet Documentation  Taken 10/3/2023 0603 by Dilan Bojorquez RN  Body Position: position changed independently  Taken 10/3/2023 0408 by Dilan Bojorquez RN  Body Position: position changed independently  Taken 10/3/2023 0210 by Dilan Bojorquez RN  Body Position: position changed independently  Taken 10/3/2023 0010 by Dilan Bojorquez RN  Body Position: position changed independently  Taken 10/2/2023 2210 by Dilan Bojorquez RN  Body Position: position changed independently  Taken 10/2/2023 2005 by Dilan Bojorquez RN  Body Position: position changed independently  Skin Protection: adhesive use limited  Intervention: Prevent and Manage VTE (Venous Thromboembolism) Risk  Recent Flowsheet Documentation  Taken 10/2/2023 2005 by Dilan Bojorquez RN  VTE Prevention/Management: (on lovenox SQ) other (see comments)  Range of Motion: active ROM (range of motion) encouraged  Goal: Optimal Comfort and Wellbeing  Outcome: Ongoing, Progressing  Intervention: Provide Person-Centered Care  Recent Flowsheet Documentation  Taken 10/2/2023 2005 by Dilan Bojorquez RN  Trust Relationship/Rapport:   care explained   choices provided   emotional support provided   empathic listening provided   questions answered   questions encouraged   thoughts/feelings acknowledged   reassurance provided  Goal: Readiness for Transition of Care  Outcome: Ongoing, Progressing

## 2023-10-03 NOTE — PROGRESS NOTES
"Daily progress note    Referring physician  Dr. Lisbet Chavez    Subjective  Doing better with no new complaints and wants to go home     History of present illness  53-year-old -American male with history of diabetes mellitus hypertension HIV non-Hodgkin lymphoma coronary artery disease and DVT on Coumadin and was recently discharged from the hospital with acute on chronic DVT admitted to the observation unit with slurred speech and left arm weakness.  Patient remained in the observation unit and further evaluated by neurology and found to have right CVA and also found to have acute pulm embolism admitted to the hospital as he has therapeutic INR.  I am asked to follow patient for medical problems and take over the care.  At the time of interview he is fully alert oriented and give me a detailed history.     REVIEW OF SYSTEMS  Unremarkable except weakness     PHYSICAL EXAM  Blood pressure 120/77, pulse 76, temperature 99.1 °F (37.3 °C), temperature source Oral, resp. rate 16, height 185 cm (72.84\"), weight 98 kg (216 lb 0.8 oz), SpO2 95 %.    GENERAL: Awake and alert no acute distress  HEENT: Unremarkable  NECK: Supple  CV: regular rhythm, normal rate  RESPIRATORY: normal effort, moving air bilaterally  ABDOMEN: soft nontender nondistended bowel sounds positive  MUSCULOSKELETAL: no deformity  NEURO: Awake and alert follow commands with no speech problem and moves all 4 extremities  SKIN: warm, dry     LAB RESULTS  Lab Results (last 24 hours)       Procedure Component Value Units Date/Time    POC Glucose Once [[id number]]  (Normal) Collected: 10/03/23 1128    Specimen: Blood Updated: 10/03/23 1130     Glucose 119 mg/dL     Protime-INR [[id number]]  (Abnormal) Collected: 10/03/23 0613    Specimen: Blood Updated: 10/03/23 0646     Protime 15.7 Seconds      INR 1.23    POC Glucose Once [[id number]]  (Normal) Collected: 10/03/23 0603    Specimen: Blood Updated: 10/03/23 0605     Glucose 91 mg/dL     POC Glucose Once " [[id number]]  (Normal) Collected: 10/03/23 0014    Specimen: Blood Updated: 10/03/23 0016     Glucose 111 mg/dL     Protime-INR [[id number]]  (Abnormal) Collected: 10/02/23 1807    Specimen: Blood Updated: 10/02/23 1843     Protime 15.9 Seconds      INR 1.25    POC Glucose Once [[id number]]  (Abnormal) Collected: 10/02/23 1659    Specimen: Blood Updated: 10/02/23 1701     Glucose 186 mg/dL           Imaging Results (Last 24 Hours)       ** No results found for the last 24 hours. **          Results  Scan on 9/29/2023 1633 by [employer], [place]: ECG 12-LEAD         Author: -- Service: -- Author Type: --   Filed: Date of Service: Creation Time:   Status: (Other)   HEART RATE= 86  bpm  RR Interval= 698  ms  TN Interval= 159  ms  P Horizontal Axis= -10  deg  P Front Axis= 18  deg  QRSD Interval= 98  ms  QT Interval= 366  ms  QTcB= 438  ms  QRS Axis= -26  deg  T Wave Axis= 9  deg  - ABNORMAL ECG -  Sinus rhythm  Left ventricular hypertrophy  Lateral infarct, age indeterminate  Anterior ST elevation, probably due to LVH          Current Facility-Administered Medications:     amLODIPine (NORVASC) tablet 10 mg, 10 mg, Oral, Daily, Lisbet Chavez APRN, 10 mg at 10/03/23 0920    aspirin tablet 325 mg, 325 mg, Oral, Daily, 325 mg at 10/03/23 0920 **OR** [DISCONTINUED] aspirin suppository 300 mg, 300 mg, Rectal, Daily, Marycarmen Bran APRN    atorvastatin (LIPITOR) tablet 40 mg, 40 mg, Oral, Nightly, Osbaldo Levi MD, 40 mg at 10/02/23 2100    carvedilol (COREG) tablet 6.25 mg, 6.25 mg, Oral, Q12H, Lisbet Chavez APRN, 6.25 mg at 10/03/23 0920    Mxllyaj-Urfvj-Zblytxdl-TenofAF (GENVOYA) [phone] MG per tablet 1 tablet, 1 tablet, Oral, Daily, Lisbet Chavez APRN, 1 tablet at 10/03/23 0920    empagliflozin (JARDIANCE) tablet 25 mg, 25 mg, Oral, Daily, Lisbet Chavez APRN, 25 mg at 10/03/23 0920    Enoxaparin Sodium (LOVENOX) syringe 100 mg, 100 mg, Subcutaneous, Q12H, Arthur Salazar MD, 100 mg at 10/03/23 0115    insulin  glargine (LANTUS, SEMGLEE) injection 15 Units, 15 Units, Subcutaneous, Nightly, Osbaldo Levi MD, 15 Units at 10/02/23 2200    insulin lispro (HUMALOG/ADMELOG) injection 2-9 Units, 2-9 Units, Subcutaneous, 4x Daily AC & at Bedtime, Lisbet Chavez APRN, 2 Units at 10/02/23 1716    insulin lispro (HUMALOG/ADMELOG) injection 5 Units, 5 Units, Subcutaneous, TID With Meals, Osbaldo Levi MD, 5 Units at 10/03/23 1212    losartan (COZAAR) tablet 25 mg, 25 mg, Oral, Daily, Osbaldo Levi MD, 25 mg at 10/03/23 0920    nicotine (NICODERM CQ) 21 MG/24HR patch 1 patch, 1 patch, Transdermal, Q24H, Osbaldo Levi MD, 1 patch at 10/02/23 2059    ondansetron (ZOFRAN) injection 4 mg, 4 mg, Intravenous, Q6H PRN, Marycarmen Bran APRN    pantoprazole (PROTONIX) EC tablet 40 mg, 40 mg, Oral, Q AM, Lisbet Chavez APRN, 40 mg at 10/03/23 0610    Pharmacy to dose warfarin, , Does not apply, Continuous PRN, Arthur Salazar MD    sertraline (ZOLOFT) tablet 50 mg, 50 mg, Oral, Nightly, Osbaldo Levi MD, 50 mg at 10/02/23 2100    [COMPLETED] Insert Peripheral IV, , , Once **AND** sodium chloride 0.9 % flush 10 mL, 10 mL, Intravenous, PRN, Severo Nice II, MD    sodium chloride 0.9 % flush 10 mL, 10 mL, Intravenous, PRN, Marycarmen Bran APRN    sodium chloride 0.9 % infusion 40 mL, 40 mL, Intravenous, PRN, Marycarmen Bran APRN    warfarin (COUMADIN) tablet 20 mg, 20 mg, Oral, Daily, Arthur Salazar MD, 20 mg at 10/02/23 1900       ASSESSMENT  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

## 2023-10-03 NOTE — PROGRESS NOTES
Case Management Discharge Note      Final Note: Pt discharging home. [facility] pharmacy confirms cost for Lovenox is $13.95 after insurance is applied. [facility] to deliver dc prescriptions to bedside. Pt's family to transport home. Lucila Bennett LCSW    Provided Post Acute Provider List?: Refused  Refused Provider List Comment: Home  Provided Post Acute Provider Quality & Resource List?: Refused  Refused Quality and Resource List Comment: Home    Selected Continued Care - Admitted Since 9/29/2023       Destination    No services have been selected for the patient.                Durable Medical Equipment    No services have been selected for the patient.                Dialysis/Infusion    No services have been selected for the patient.                Home Medical Care    No services have been selected for the patient.                Therapy    No services have been selected for the patient.                Community Resources    No services have been selected for the patient.                Community & DME    No services have been selected for the patient.                    Transportation Services  Private: Car    Final Discharge Disposition Code: 01 - home or self-care

## 2023-10-04 ENCOUNTER — TELEPHONE (OUTPATIENT)
Dept: PHARMACY | Facility: HOSPITAL | Age: 53
End: 2023-10-04
Payer: MEDICARE

## 2023-10-04 LAB — INR PPP: 2.1

## 2023-10-04 NOTE — TELEPHONE ENCOUNTER
Patient discharged yesteday on enoxaparin bridge with warfarin.  States he is starting enoxaparin tonight (skipped this am as received midday yesterday at discharge) and will continue at 7pm and 7am .  He takes warfarin at night and will take his previous home dosing of 15mg on MWF, 10mg on all other days for now.      He will come for clinic INR on Friday 10/6/23 with lab at 1250, anticoag appt at 1pm.  Instructed him to come to [address] [place] [address], on 5th floor.    He has our phone number for any questions.

## 2023-10-04 NOTE — OUTREACH NOTE
Prep Survey      Flowsheet Row Responses   Congregational facility patient discharged from? [facility]   Is LACE score < 7 ? No   Eligibility Readm Mgmt   Discharge diagnosis TIA   Does the patient have one of the following disease processes/diagnoses(primary or secondary)? Stroke   Does the patient have Home health ordered? No   Is there a DME ordered? No   Prep survey completed? Yes            DAGO NAIDU - Registered Nurse

## 2023-10-04 NOTE — PAYOR COMM NOTE
"Cecilio Owens (53 y.o. Male)     PLEASE SEE ATTACHED FOR DC NOTICE    REF #  [id number]     THANK YOU  SHANIQUE JOHNSON RN/UM DEPT  [facility]   [phone]  [phone]        Date of Birth   1970    Social Security Number       Address   [address] [place] [address]    Home Phone   [phone]    MRN   [id number]       [facility]    Marital Status   Single                            Admission Date   9/29/23    Admission Type   Emergency    Admitting Provider   Osbaldo Levi MD    Attending Provider       Department, Room/Bed   [facility] [address] [facility], P595/1       Discharge Date   10/3/2023    Discharge Disposition   Home or Self Care    Discharge Destination                                 Attending Provider: (none)   Allergies: No Known Allergies    Isolation: None   Infection: None   Code Status: Prior    Ht: 185 cm (72.84\")   Wt: 98 kg (216 lb 0.8 oz)    Admission Cmt: None   Principal Problem: TIA (transient ischemic attack) [G45.9]                   Active Insurance as of 9/29/2023       Primary Coverage       Payor Plan Insurance Group Employer/Plan Group    AETNA MEDICARE REPLACEMENT AETNA MEDICARE REPLACEMENT [id number]       Payor Plan Address Payor Plan Phone Number Payor Plan Fax Number Effective Dates    [address] [phone]  1/1/2022 - None Entered    [facility] [id number]         Subscriber Name Subscriber Birth Date Member ID       CECILIO OWENS 1970 [id number]                     Emergency Contacts        (Rel.) Home Phone Work Phone Mobile Phone    Linda Owens (Mother) -- -- [phone]              [place]: [facility] [id number]  Tax ID [id number]     Discharge Summary        Osbaldo Levi MD at 10/03/23 1519          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  Acute right pulmonary embolism with history of left leg " DVT  Insulin-dependent diabetes mellitus  Hypertension  Hyperlipidemia  Non-Hodgkin lymphoma  HIV  Depression  Coronary artery disease    Discharge medications    Current Facility-Administered Medications:     amLODIPine (NORVASC) tablet 10 mg, 10 mg, Oral, Daily, Lisbet Chavez APRN, 10 mg at 10/03/23 0920    aspirin tablet 325 mg, 325 mg, Oral, Daily, 325 mg at 10/03/23 0920 **OR** [DISCONTINUED] aspirin suppository 300 mg, 300 mg, Rectal, Daily, Marycarmen Bran APRN    atorvastatin (LIPITOR) tablet 40 mg, 40 mg, Oral, Nightly, Osbaldo Levi MD, 40 mg at 10/02/23 2100    carvedilol (COREG) tablet 6.25 mg, 6.25 mg, Oral, Q12H, Lisbet Chavez APRN, 6.25 mg at 10/03/23 0920    Jxsoytr-Cqqvk-Xeolzbqt-TenofAF (GENVOYA) [phone] MG per tablet 1 tablet, 1 tablet, Oral, Daily, Lisbet Chavez APRN, 1 tablet at 10/03/23 0920    empagliflozin (JARDIANCE) tablet 25 mg, 25 mg, Oral, Daily, Lisbet Chavez APRN, 25 mg at 10/03/23 0920    Enoxaparin Sodium (LOVENOX) syringe 100 mg, 100 mg, Subcutaneous, Q12H, Arthur Salazar MD, 100 mg at 10/03/23 0115    insulin glargine (LANTUS, SEMGLEE) injection 15 Units, 15 Units, Subcutaneous, Nightly, Osbaldo Levi MD, 15 Units at 10/02/23 2200    insulin lispro (HUMALOG/ADMELOG) injection 2-9 Units, 2-9 Units, Subcutaneous, 4x Daily AC & at Bedtime, Lisbet Chavez APRN, 2 Units at 10/02/23 1716    insulin lispro (HUMALOG/ADMELOG) injection 5 Units, 5 Units, Subcutaneous, TID With Meals, Osbaldo Levi MD, 5 Units at 10/03/23 1212    losartan (COZAAR) tablet 25 mg, 25 mg, Oral, Daily, Osbaldo Levi MD, 25 mg at 10/03/23 0920    nicotine (NICODERM CQ) 21 MG/24HR patch 1 patch, 1 patch, Transdermal, Q24H, Osbaldo Levi MD, 1 patch at 10/02/23 2059    ondansetron (ZOFRAN) injection 4 mg, 4 mg, Intravenous, Q6H PRN, Marycarmen Bran APRN    pantoprazole (PROTONIX) EC tablet 40 mg, 40 mg, Oral, Q AM, Lisbet Chavez APRN, 40 mg at 10/03/23 0610    Pharmacy to dose warfarin, , Does not apply,  Continuous PRN, Arthur Salazar MD    sertraline (ZOLOFT) tablet 50 mg, 50 mg, Oral, Nightly, Chyna Levi MD, 50 mg at 10/02/23 2100    [COMPLETED] Insert Peripheral IV, , , Once **AND** sodium chloride 0.9 % flush 10 mL, 10 mL, Intravenous, PRN, Severo Nice II, MD    sodium chloride 0.9 % flush 10 mL, 10 mL, Intravenous, PRN, Marycarmen Bran, MARIANNE    sodium chloride 0.9 % infusion 40 mL, 40 mL, Intravenous, PRN, Marycarmen Bran, APRN    warfarin (COUMADIN) tablet 20 mg, 20 mg, Oral, Daily, Arthur Salazar MD, 20 mg at 10/02/23 1900     Consults obtained  Neurology  Hematology  Diabetic education nurse  Nutrition    Procedures  None    Hospital course  54-year-old -American male with history of HIV insulin-dependent diabetes mellitus hypertension hyperlipidemia non-Hodgkin lymphoma coronary artery disease and DVT who was recently discharged from the hospital after treatment of acute on chronic left lower extremity DVT admitted to emergency room with left arm weakness slurred speech and was admitted to observation unit and found to have right CVA and further work-up revealed that he has acute pulm embolism.  Patient was on Coumadin with subtherapeutic INR and it was thought is probably Coumadin failure and hematology consult obtained and they recommend that Coumadin is the best option for this patient.  Patient remain on Lovenox and Coumadin restarted and he is feeling much better and wants to go home.  Patient refusing to go to subacute rehab and it is clear with all consultant that she can go on Lovenox and continue with Coumadin until INR is more than 2.  Patient will follow with hematology with daily INR.  Patient cleared for discharge.    Discharge diet regular    Activity as tolerated    Medication as above    Follow-up with prime doctor in 1 week and follow-up with neurology and hematology per the instructions and take medication as directed.    CHYNA LEVI MD    Electronically signed by Chyna Levi,  MD at 10/03/23 1528

## 2023-10-06 ENCOUNTER — ANTICOAGULATION VISIT (OUTPATIENT)
Dept: ONCOLOGY | Facility: HOSPITAL | Age: 53
End: 2023-10-06
Payer: MEDICARE

## 2023-10-06 ENCOUNTER — LAB (OUTPATIENT)
Dept: LAB | Facility: HOSPITAL | Age: 53
End: 2023-10-06
Payer: MEDICARE

## 2023-10-06 DIAGNOSIS — G45.9 TIA (TRANSIENT ISCHEMIC ATTACK): ICD-10-CM

## 2023-10-06 DIAGNOSIS — I63.9 ACUTE CVA (CEREBROVASCULAR ACCIDENT): ICD-10-CM

## 2023-10-06 DIAGNOSIS — I82.402 LEG DVT (DEEP VENOUS THROMBOEMBOLISM), ACUTE, LEFT: Primary | ICD-10-CM

## 2023-10-06 DIAGNOSIS — I82.4Y9 ACUTE DEEP VEIN THROMBOSIS (DVT) OF PROXIMAL VEIN OF LOWER EXTREMITY, UNSPECIFIED LATERALITY: ICD-10-CM

## 2023-10-06 LAB
BASOPHILS # BLD AUTO: 0.12 10*3/MM3 (ref 0–0.2)
BASOPHILS NFR BLD AUTO: 1.4 % (ref 0–1.5)
DEPRECATED RDW RBC AUTO: 43.1 FL (ref 37–54)
EOSINOPHIL # BLD AUTO: 0.12 10*3/MM3 (ref 0–0.4)
EOSINOPHIL NFR BLD AUTO: 1.4 % (ref 0.3–6.2)
ERYTHROCYTE [DISTWIDTH] IN BLOOD BY AUTOMATED COUNT: 13.6 % (ref 12.3–15.4)
HCT VFR BLD AUTO: 44.3 % (ref 37.5–51)
HGB BLD-MCNC: 15.5 G/DL (ref 13–17.7)
IMM GRANULOCYTES # BLD AUTO: 0.36 10*3/MM3 (ref 0–0.05)
IMM GRANULOCYTES NFR BLD AUTO: 4.1 % (ref 0–0.5)
INR PPP: 3.6 (ref 0.9–1.1)
LYMPHOCYTES # BLD AUTO: 4.65 10*3/MM3 (ref 0.7–3.1)
LYMPHOCYTES NFR BLD AUTO: 52.7 % (ref 19.6–45.3)
MCH RBC QN AUTO: 30.2 PG (ref 26.6–33)
MCHC RBC AUTO-ENTMCNC: 35 G/DL (ref 31.5–35.7)
MCV RBC AUTO: 86.4 FL (ref 79–97)
MONOCYTES # BLD AUTO: 0.49 10*3/MM3 (ref 0.1–0.9)
MONOCYTES NFR BLD AUTO: 5.6 % (ref 5–12)
NEUTROPHILS NFR BLD AUTO: 3.08 10*3/MM3 (ref 1.7–7)
NEUTROPHILS NFR BLD AUTO: 34.8 % (ref 42.7–76)
NRBC BLD AUTO-RTO: 0 /100 WBC (ref 0–0.2)
PLATELET # BLD AUTO: 231 10*3/MM3 (ref 140–450)
PMV BLD AUTO: 11.4 FL (ref 6–12)
PROTHROMBIN TIME: 43.3 SECONDS (ref 11–13.5)
RBC # BLD AUTO: 5.13 10*6/MM3 (ref 4.14–5.8)
WBC NRBC COR # BLD: 8.82 10*3/MM3 (ref 3.4–10.8)

## 2023-10-06 PROCEDURE — 85610 PROTHROMBIN TIME: CPT

## 2023-10-06 PROCEDURE — 36415 COLL VENOUS BLD VENIPUNCTURE: CPT

## 2023-10-06 PROCEDURE — G0463 HOSPITAL OUTPT CLINIC VISIT: HCPCS

## 2023-10-06 PROCEDURE — 85025 COMPLETE CBC W/AUTO DIFF WBC: CPT

## 2023-10-06 NOTE — PROGRESS NOTES
Anticoagulation Clinic Progress Note    Patient's visit was held in office today.    Anticoagulation Summary  As of 10/6/2023      INR goal:  2.0-3.0   TTR:  --   INR used for dosin.10 (10/4/2023)   Warfarin maintenance plan:  15 mg (10 mg x 1.5) every Mon, Wed, Fri; 10 mg (10 mg x 1) all other days   Weekly warfarin total:  85 mg   Plan last modified:  Charissa Kent RPH (10/6/2023)   Next INR check:  10/10/2023   Target end date:  Indefinite    Indications    Leg DVT (deep venous thromboembolism)  acute  left [I82.402]  TIA (transient ischemic attack) [G45.9]  Acute CVA (cerebrovascular accident) [I63.9]                 Anticoagulation Episode Summary       INR check location:      Preferred lab:      Send INR reminders to:  [facility] CBC ANTICOAG POOL    Comments:            Anticoagulation Care Providers       Provider Role Specialty Phone number    Arthur Salazar MD  Hematology and Oncology [phone]            Clinic Interview:  Patient Findings     Positives:  Change in diet/appetite, Hospital admission (DVT, CVA, PE)    Negatives:  Signs/symptoms of thrombosis, Signs/symptoms of bleeding,   Laboratory test error suspected, Change in health, Change in alcohol use,   Change in activity, Upcoming invasive procedure, Emergency department   visit, Upcoming dental procedure, Missed doses, Extra doses, Change in   medications, Bruising, Other complaints    Comments:  Has not been eating usual diet, has lost weight in past few   weeks. Acute DVT, PE, CVA past week with 2 hospital admissions      Clinical Outcomes     Negatives:  Major bleeding event, Thromboembolic event,   Anticoagulation-related hospital admission, Anticoagulation-related ED   visit, Anticoagulation-related fatality    Comments:  Has not been eating usual diet, has lost weight in past few   weeks. Acute DVT, PE, CVA past week with 2 hospital admissions        INR History:      Latest Ref Rng & Units 2023    10:57 PM 10/2/2023     5:54  AM 10/2/2023     6:07 PM 10/3/2023     6:13 AM 10/4/2023    12:00 AM 10/6/2023     1:00 PM 10/6/2023     1:18 PM   Anticoagulation Monitoring   INR       2.10    INR Date       10/4/2023    INR Goal       2.0-3.0    Last Week Total       65 mg    Next Week Total       80 mg    Sun       10 mg    Mon       15 mg    Tue - Wed       -    Thu       -    Fri       15 mg    Sat       5 mg (10/7)    Historical INR 0.90 - 1.10 2.65  1.45  1.25  1.23  2.10      3.60        This result is from an external source.       Plan:  1. INR is Supratherapeutic today- see above in Anticoagulation Summary.  Will instruct Cecilio Puckett to take warfarin 5mg only tomorrow, then Continue their usual warfarin regimen of 15mg on MWF, 10mg all other days as he was therapeutic last week.  He will stop enoxaprin injections today-- see above in Anticoagulation Summary.  2. Follow up in 4 days  3. Verbal and written information provided. Patient expresses understanding and has no further questions at this time.    Charissa Kent [facility]

## 2023-10-09 ENCOUNTER — READMISSION MANAGEMENT (OUTPATIENT)
Dept: CALL CENTER | Facility: HOSPITAL | Age: 53
End: 2023-10-09
Payer: MEDICARE

## 2023-10-09 NOTE — OUTREACH NOTE
Stroke Week 1 Survey      Flowsheet Row Responses   [facility] patient discharged from? [place]   Does the patient have one of the following disease processes/diagnoses(primary or secondary)? Stroke   Week 1 attempt successful? Yes   Call start time 1220   Call end time 1233   Discharge diagnosis TIA   Person spoke with today (if not patient) and relationship Patient   Meds reviewed with patient/caregiver? Yes   Is the patient having any side effects they believe may be caused by any medication additions or changes? No   Does the patient have all medications ordered at discharge? Yes   Is the patient taking all medications as directed (includes completed medication regime)? Yes   Medication comments pt states he did not get a new insulin prescription at time of discharge. pt states the Diabetes educator at the hospital told him she would. advised patient to contact PCP to discuss new insulin and obtain a rx from PCP, if she advised.   Comments regarding appointments had blood drawn at Dr. Salazar's office on Friday, 10/6/23   Does the patient have a primary care provider?  Yes   Does the patient have an appointment with their PCP within 7 days of discharge? Yes   Comments regarding PCP saw PCP for f/u on 10/4/23   Has the patient kept scheduled appointments due by today? Yes   Has home health visited the patient within 72 hours of discharge? N/A   Home health comments pt states he would like OT and ST in the home. advised pt to call PCP and get order from PCP. pt states he will call PCP.   Psychosocial issues? No   Does the patient require any assistance with activities of daily living such as eating, bathing, dressing, walking, etc.? Yes   ADL comments pt states he has trouble buttoning his shirt. it took him 30 minutes to button his shirt yesterday.   Does the patient have any residual symptoms from stroke/TIA? Yes   Residual symptoms comments some left arm weakness, he states his speech is not back to  normal for him.   Does the patient understand the diet ordered at discharge? Yes   Did the patient receive a copy of their discharge instructions? Yes   Nursing interventions Reviewed instructions with patient   What is the patient's perception of their health status since discharge? Improving   Nursing interventions Nurse provided patient education   Is the patient/caregiver able to teach back the risk factors for a stroke? High blood pressure-goal below 120/80, Diabetes, Physical inactivity and obesity, History of TIAs, Smoking, High Cholesterol, Carotid or other artery disease, Excessive alcohol intake   Is the patient/caregiver able to teach back signs and symptoms related to disease process for when to call PCP? Yes   Is the patient/caregiver able to teach back signs and symptoms related to disease process for when to call 911? Yes   If the patient is a current smoker, are they able to teach back resources for cessation? Smoking cessation support groups, Smoking cessation classes   Is the patient/caregiver able to teach back the hierarchy of who to call/visit for symptoms/problems? PCP, Specialist, Home health nurse, Urgent Care, ED, 911 Yes   Is the patient able to teach back FAST for Stroke? B alance: Watch for sudden loss of balance, E yes: Check for vision loss, F ace: Look for an uneven smile, A rm: Check if one arm is weak, S peech: Listen for slurred speech, T jennifer: Call 9-1-1 right away   Week 1 call completed? Yes   Is the patient interested in additional calls from an ambulatory ? No   Would this patient benefit from a Referral to [facility] Social Work? No   Wrap up additional comments Pt is checking his blood sugars and blood pressures daily. No concerns at this time. Pt will reach out to PCP for insulin rx and Home health requests.   Call end time 1233            Liz COX - Registered Nurse

## 2023-10-10 ENCOUNTER — ANTICOAGULATION VISIT (OUTPATIENT)
Dept: ONCOLOGY | Facility: HOSPITAL | Age: 53
End: 2023-10-10
Payer: MEDICARE

## 2023-10-10 ENCOUNTER — LAB (OUTPATIENT)
Dept: LAB | Facility: HOSPITAL | Age: 53
End: 2023-10-10
Payer: MEDICARE

## 2023-10-10 DIAGNOSIS — I82.402 LEG DVT (DEEP VENOUS THROMBOEMBOLISM), ACUTE, LEFT: Primary | ICD-10-CM

## 2023-10-10 DIAGNOSIS — I82.402 LEG DVT (DEEP VENOUS THROMBOEMBOLISM), ACUTE, LEFT: ICD-10-CM

## 2023-10-10 DIAGNOSIS — G45.9 TIA (TRANSIENT ISCHEMIC ATTACK): ICD-10-CM

## 2023-10-10 DIAGNOSIS — I63.9 ACUTE CVA (CEREBROVASCULAR ACCIDENT): ICD-10-CM

## 2023-10-10 LAB
INR PPP: 2.1 (ref 0.9–1.1)
PROTHROMBIN TIME: 25.2 SECONDS (ref 11–13.5)

## 2023-10-10 PROCEDURE — 36416 COLLJ CAPILLARY BLOOD SPEC: CPT

## 2023-10-10 PROCEDURE — 85610 PROTHROMBIN TIME: CPT

## 2023-10-10 PROCEDURE — G0463 HOSPITAL OUTPT CLINIC VISIT: HCPCS

## 2023-10-10 NOTE — PROGRESS NOTES
Anticoagulation Clinic Progress Note    Patient's visit was held in office today.    Anticoagulation Summary  As of 10/10/2023      INR goal:  2.0-3.0   TTR:  67.4% (3 d)   INR used for dosin.10 (10/10/2023)   Warfarin maintenance plan:  15 mg (10 mg x 1.5) every Mon, Wed, Fri; 10 mg (10 mg x 1) all other days   Weekly warfarin total:  85 mg   Plan last modified:  Charissa Kent RPH (10/6/2023)   Next INR check:  10/17/2023   Target end date:  Indefinite    Indications    Leg DVT (deep venous thromboembolism)  acute  left [I82.402]  TIA (transient ischemic attack) [G45.9]  Acute CVA (cerebrovascular accident) [I63.9]                 Anticoagulation Episode Summary       INR check location:      Preferred lab:      Send INR reminders to:   [facility] ON[facility] CBC ANTICOAG POOL    Comments:            Anticoagulation Care Providers       Provider Role Specialty Phone number    Arthur Salazar MD  Hematology and Oncology [phone]            Clinic Interview:  Patient Findings     Positives:  Change in health, Bruising    Comments:  Abdominal bruising from enoxaparin injections--look like they   are healing ok.  Has had some hand shaking, dizziness since out of   hospital and will review these symptoms with PCP tomorrow.        INR History:      Latest Ref Rng & Units 10/2/2023     6:07 PM 10/3/2023     6:13 AM 10/4/2023    12:00 AM 10/6/2023     1:00 PM 10/6/2023     1:18 PM 10/10/2023     2:00 PM 10/10/2023     2:13 PM   Anticoagulation Monitoring   INR     2.10  2.10    INR Date     10/4/2023  10/10/2023    INR Goal     2.0-3.0  2.0-3.0    Trend       Same    Last Week Total     65 mg  90 mg    Next Week Total     80 mg  90 mg    Sun     10 mg  10 mg    Mon     15 mg  15 mg    Tue     -  15 mg (10/10)    Wed     -  15 mg    Thu     -  10 mg    Fri     15 mg  15 mg    Sat     5 mg (10/7)  10 mg    Historical INR 0.90 - 1.10 1.25  1.23  2.10      3.60   2.10        This result is from an external source.       Plan:  1.  INR is Therapeutic today- see above in Anticoagulation Summary.  Will instruct Cecilio Puckett to boost warfarin dose today only to 15mg, then Continue their warfarin regimen of 15mg on MWF, 10mg on all other days-- see above in Anticoagulation Summary.  2. Follow up in 1 week  3. Patient declines warfarin refills.  4. Verbal and written information provided. Patient expresses understanding and has no further questions at this time.      Charissa Kent RP

## 2023-10-17 DIAGNOSIS — I82.402 LEG DVT (DEEP VENOUS THROMBOEMBOLISM), ACUTE, LEFT: Primary | ICD-10-CM

## 2023-10-18 ENCOUNTER — READMISSION MANAGEMENT (OUTPATIENT)
Dept: CALL CENTER | Facility: HOSPITAL | Age: 53
End: 2023-10-18
Payer: MEDICARE

## 2023-10-18 NOTE — OUTREACH NOTE
Stroke Week 2 Survey      Flowsheet Row Responses   [facility] patient discharged from? [place]   Does the patient have one of the following disease processes/diagnoses(primary or secondary)? Stroke   Week 2 attempt successful? Yes   Call start time 1242   Call end time 1246   Discharge diagnosis TIA   Meds reviewed with patient/caregiver? Yes   Is the patient taking all medications as directed (includes completed medication regime)? Yes   Does the patient have a primary care provider?  Yes   Does the patient have an appointment with their PCP within 7 days of discharge? Yes   Has the patient kept scheduled appointments due by today? Yes   Has home health visited the patient within 72 hours of discharge? N/A   Psychosocial issues? No   Does the patient require any assistance with activities of daily living such as eating, bathing, dressing, walking, etc.? Yes   ADL comments Uses cane for walking and issues buttoning shirt   Does the patient have any residual symptoms from stroke/TIA? Yes   Does the patient understand the diet ordered at discharge? No   Did the patient receive a copy of their discharge instructions? Yes   What is the patient's perception of their health status since discharge? Improving   Nursing interventions Nurse provided patient education   Is the patient able to teach back FAST for Stroke? B alance: Watch for sudden loss of balance, E yes: Check for vision loss, F ace: Look for an uneven smile, A rm: Check if one arm is weak, S peech: Listen for slurred speech, T jennifer: Call 9-1-1 right away   Is the patient/caregiver able to teach back the hierarchy of who to call/visit for symptoms/problems? PCP, Specialist, Home health nurse, Urgent Care, ED, 911 Yes   Week 2 call completed? Yes   Wrap up additional comments Pt reports he still has on going issues with some tasks. Pt reports PCP is going to order therapy.   Call end time 1246            TAN ROJAS - Registered Nurse

## 2023-10-25 ENCOUNTER — READMISSION MANAGEMENT (OUTPATIENT)
Dept: CALL CENTER | Facility: HOSPITAL | Age: 53
End: 2023-10-25
Payer: MEDICARE

## 2023-10-25 NOTE — OUTREACH NOTE
Stroke Week 3 Survey      Flowsheet Row Responses   [facility] facility patient discharged from? [place]   Does the patient have one of the following disease processes/diagnoses(primary or secondary)? Stroke   Week 3 attempt successful? No   Unsuccessful attempts Attempt 1            Thi Zuniga Registered Nurse

## 2023-10-31 ENCOUNTER — READMISSION MANAGEMENT (OUTPATIENT)
Dept: CALL CENTER | Facility: HOSPITAL | Age: 53
End: 2023-10-31
Payer: MEDICARE

## 2023-10-31 NOTE — OUTREACH NOTE
Stroke Week 3 Survey      Flowsheet Row Responses   [facility] patient discharged from? [place]   Does the patient have one of the following disease processes/diagnoses(primary or secondary)? Stroke   Week 3 attempt successful? Yes   Call start time 0858   Call end time 0902   Discharge diagnosis TIA   Meds reviewed with patient/caregiver? Yes   Is the patient taking all medications as directed (includes completed medication regime)? Yes   Does the patient have a primary care provider?  Yes   Does the patient have an appointment with their PCP within 7 days of discharge? Yes   Has the patient kept scheduled appointments due by today? Yes   Has home health visited the patient within 72 hours of discharge? N/A   Psychosocial issues? No   Does the patient require any assistance with activities of daily living such as eating, bathing, dressing, walking, etc.? Yes   ADL comments hard to walk at times. uses   Does the patient have any residual symptoms from stroke/TIA? Yes   Does the patient understand the diet ordered at discharge? No   Did the patient receive a copy of their discharge instructions? Yes   Nursing interventions Reviewed instructions with patient   What is the patient's perception of their health status since discharge? Improving   Nursing interventions Nurse provided patient education   Is the patient able to teach back FAST for Stroke? B alance: Watch for sudden loss of balance, E yes: Check for vision loss, F ace: Look for an uneven smile, A rm: Check if one arm is weak, S peech: Listen for slurred speech, T jennifer: Call 9-1-1 right away   Is the patient/caregiver able to teach back the risk factors for a stroke? High blood pressure-goal below 120/80, High Cholesterol, History of TIAs, History of Afib   Is the patient/caregiver able to teach back the hierarchy of who to call/visit for symptoms/problems? PCP, Specialist, Home health nurse, Urgent Care, ED, 911 Yes            TAN Giordano  Nurse

## 2023-11-02 ENCOUNTER — TELEPHONE (OUTPATIENT)
Dept: CALL CENTER | Facility: HOSPITAL | Age: 53
End: 2023-11-02
Payer: MEDICARE
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
## 2023-12-05 ENCOUNTER — TELEPHONE (OUTPATIENT)
Dept: PHARMACY | Facility: HOSPITAL | Age: 53
End: 2023-12-05
Payer: MEDICARE

## 2023-12-05 NOTE — TELEPHONE ENCOUNTER
Contacted patient regarding overdue INR monitoring and warfarin regimen, as patient was due to have INR checked on 10/17/23, 10/25/23, 11/3/23, and 11/28/23, however has canceled or not-shown for these appointments. Most recent INR was checked on 10/10/23.    Patient was scheduled at preferred time for INR check and anticoagulation visit on 12/8/23. Patient was provided with Anticoagulation Clinic contact information [phone], should he have any questions or concerns.

## 2023-12-15 ENCOUNTER — TELEPHONE (OUTPATIENT)
Dept: PHARMACY | Facility: HOSPITAL | Age: 53
End: 2023-12-15
Payer: MEDICARE

## 2023-12-15 NOTE — TELEPHONE ENCOUNTER
Attempted to contact patient regarding overdue INR monitoring and warfarin regimen, as patient was due to have INR checked on 10/17/23, 10/25/23, 11/3/23, 11/28/23, and 12/5/23. Patient was scheduled for INR check and anticoagulation visit on 12/19/23 per patient preference.

## 2023-12-22 ENCOUNTER — LAB (OUTPATIENT)
Dept: LAB | Facility: HOSPITAL | Age: 53
End: 2023-12-22
Payer: MEDICARE

## 2023-12-22 ENCOUNTER — ANTICOAGULATION VISIT (OUTPATIENT)
Dept: ONCOLOGY | Facility: HOSPITAL | Age: 53
End: 2023-12-22
Payer: MEDICARE

## 2023-12-22 DIAGNOSIS — I82.402 LEG DVT (DEEP VENOUS THROMBOEMBOLISM), ACUTE, LEFT: Primary | ICD-10-CM

## 2023-12-22 DIAGNOSIS — G45.9 TIA (TRANSIENT ISCHEMIC ATTACK): ICD-10-CM

## 2023-12-22 DIAGNOSIS — I63.9 ACUTE CVA (CEREBROVASCULAR ACCIDENT): ICD-10-CM

## 2023-12-22 DIAGNOSIS — I82.402 LEG DVT (DEEP VENOUS THROMBOEMBOLISM), ACUTE, LEFT: ICD-10-CM

## 2023-12-22 LAB
INR PPP: 1.1 (ref 0.9–1.1)
PROTHROMBIN TIME: 13.8 SECONDS (ref 11–13.5)

## 2023-12-22 PROCEDURE — 85610 PROTHROMBIN TIME: CPT

## 2023-12-22 PROCEDURE — 36416 COLLJ CAPILLARY BLOOD SPEC: CPT

## 2023-12-22 NOTE — PATIENT INSTRUCTIONS
Please contact the Anticoagulation Clinic if you have any questions or concerns; [phone].?

## 2023-12-22 NOTE — PROGRESS NOTES
Anticoagulation Clinic Progress Note    Patient's visit was held in office today.    Anticoagulation Summary  As of 2023      INR goal:  2.0-3.0   TTR:  12.7% (2.5 mo)   INR used for dosin.10 (2023)   Warfarin maintenance plan:  15 mg (10 mg x 1.5) every Mon, Wed, Fri; 10 mg (10 mg x 1) all other days   Weekly warfarin total:  85 mg   No change documented:  Susanna Clement RPH   Plan last modified:  Charissa Kent RPH (10/6/2023)   Next INR check:  2023   Target end date:  Indefinite    Indications    Leg DVT (deep venous thromboembolism)  acute  left [I82.402]  TIA (transient ischemic attack) [G45.9]  Acute CVA (cerebrovascular accident) [I63.9]                 Anticoagulation Episode Summary       INR check location:      Preferred lab:      Send INR reminders to:   [facility] CBC ANTICOAG POOL    Comments:            Anticoagulation Care Providers       Provider Role Specialty Phone number    Arthur Salazar MD  Hematology and Oncology [phone]            Clinic Interview:  Patient Findings     Positives:  Missed doses (Patient reports he hasn't been taking his   warfarin d/t increased stress.)    Negatives:  Signs/symptoms of thrombosis, Signs/symptoms of bleeding,   Laboratory test error suspected, Change in health, Change in alcohol use,   Change in activity, Upcoming invasive procedure, Emergency department   visit, Upcoming dental procedure, Extra doses, Change in medications,   Change in diet/appetite, Hospital admission, Bruising, Other complaints      Clinical Outcomes     Negatives:  Major bleeding event, Thromboembolic event,   Anticoagulation-related hospital admission, Anticoagulation-related ED   visit, Anticoagulation-related fatality        INR History:      Latest Ref Rng & Units 10/4/2023    12:00 AM 10/6/2023     1:00 PM 10/6/2023     1:18 PM 10/10/2023     2:00 PM 10/10/2023     2:13 PM 2023     1:15 PM 2023     1:30 PM   Anticoagulation Monitoring   INR   2.10   2.10  1.10    INR Date   10/4/2023  10/10/2023  12/22/2023    INR Goal   2.0-3.0  2.0-3.0  2.0-3.0    Trend     Same  Same    Last Week Total   65 mg  90 mg  15 mg    Next Week Total   80 mg  90 mg  85 mg    Sun   10 mg  10 mg  10 mg    Mon   15 mg  15 mg  15 mg    Tue   -  15 mg (10/10)  10 mg    Wed   -  15 mg  -    Thu   -  10 mg  -    Fri   15 mg  15 mg  15 mg    Sat   5 mg (10/7)  10 mg  10 mg    Historical INR 0.90 - 1.10 2.10      3.60   2.10   1.10        This result is from an external source.       Plan:  1. INR is Subtherapeutic today as patient reports he has not taken warfarin in over a week d/t increased stress. Educated patient on importance of adherence to warfarin and INR monitoring. Counseled patient on signs/symptoms of bleeding/thromboembolic events, appropriate monitoring, and when to seek medical attention.   2. Will instruct Cecilio Puckett to adhere to their warfarin regimen of 15 mg Monday/Wednesday/Friday and 10 mg all other days - see above in Anticoagulation Summary.  3. Follow up on Wednesday for close monitoring in setting of suboptimal adherence and subtherapeutic INR.   4. Patient declines warfarin refills.  5. Verbal and written information provided. Patient expresses understanding and has no further questions at this time.    Susanna Clement [facility]

## 2024-01-22 ENCOUNTER — ANTICOAGULATION VISIT (OUTPATIENT)
Dept: ONCOLOGY | Facility: HOSPITAL | Age: 54
End: 2024-01-22
Payer: MEDICARE

## 2024-01-22 ENCOUNTER — APPOINTMENT (OUTPATIENT)
Dept: OTHER | Facility: HOSPITAL | Age: 54
End: 2024-01-22
Payer: MEDICARE

## 2024-01-22 ENCOUNTER — LAB (OUTPATIENT)
Dept: LAB | Facility: HOSPITAL | Age: 54
End: 2024-01-22
Payer: MEDICARE

## 2024-01-22 DIAGNOSIS — G45.9 TIA (TRANSIENT ISCHEMIC ATTACK): ICD-10-CM

## 2024-01-22 DIAGNOSIS — I82.402 LEG DVT (DEEP VENOUS THROMBOEMBOLISM), ACUTE, LEFT: Primary | ICD-10-CM

## 2024-01-22 DIAGNOSIS — I63.9 ACUTE CVA (CEREBROVASCULAR ACCIDENT): ICD-10-CM

## 2024-01-22 DIAGNOSIS — I82.402 LEG DVT (DEEP VENOUS THROMBOEMBOLISM), ACUTE, LEFT: ICD-10-CM

## 2024-01-22 LAB
INR PPP: 1.2 (ref 0.9–1.1)
PROTHROMBIN TIME: 14.4 SECONDS (ref 11–13.5)

## 2024-01-22 PROCEDURE — G0463 HOSPITAL OUTPT CLINIC VISIT: HCPCS

## 2024-01-22 PROCEDURE — 85610 PROTHROMBIN TIME: CPT

## 2024-01-22 PROCEDURE — 36416 COLLJ CAPILLARY BLOOD SPEC: CPT

## 2024-01-22 NOTE — PROGRESS NOTES
Anticoagulation Clinic Progress Note    Patient's visit was held in office today.    Anticoagulation Summary  As of 2024      INR goal:  2.0-3.0   TTR:  9.0% (3.6 mo)   INR used for dosin.20 (2024)   Warfarin maintenance plan:  10 mg (10 mg x 1) every day   Weekly warfarin total:  70 mg   Plan last modified:  Charissa Kent RPH (2024)   Next INR check:  2024   Target end date:  Indefinite    Indications    Leg DVT (deep venous thromboembolism)  acute  left [I82.402]  TIA (transient ischemic attack) [G45.9]  Acute CVA (cerebrovascular accident) [I63.9]                 Anticoagulation Episode Summary       INR check location:      Preferred lab:      Send INR reminders to:   [facility] CBC ANTICOAG POOL    Comments:            Anticoagulation Care Providers       Provider Role Specialty Phone number    Arthur Salazar MD  Hematology and Oncology [phone]            Clinic Interview:  Patient Findings     Positives:  Change in health, Missed doses    Negatives:  Signs/symptoms of thrombosis, Signs/symptoms of bleeding,   Laboratory test error suspected, Change in alcohol use, Change in   activity, Upcoming invasive procedure, Emergency department visit,   Upcoming dental procedure, Extra doses, Change in medications, Change in   diet/appetite, Hospital admission, Bruising, Other complaints    Comments:  States INR over 5 at PCP last week---so held doses and ate   [facility]. Restarted warfarin at 10mg daily yesterday.  Amoxicillin for sinus   infection this week.      Clinical Outcomes     Negatives:  Major bleeding event, Thromboembolic event,   Anticoagulation-related hospital admission, Anticoagulation-related ED   visit, Anticoagulation-related fatality    Comments:  States INR over 5 at PCP last week---so held doses and ate   [facility]. Restarted warfarin at 10mg daily yesterday.  Amoxicillin for sinus   infection this week.        INR History:      Latest Ref Rng & Units 10/6/2023     1:18 PM  10/10/2023     2:00 PM 10/10/2023     2:13 PM 12/22/2023     1:15 PM 12/22/2023     1:30 PM 1/22/2024    12:43 PM 1/22/2024    12:45 PM   Anticoagulation Monitoring   INR   2.10  1.10   1.20   INR Date   10/10/2023  12/22/2023   1/22/2024   INR Goal   2.0-3.0  2.0-3.0   2.0-3.0   Trend   Same  Same   Down   Last Week Total   90 mg  15 mg   25 mg   Next Week Total   90 mg  85 mg   70 mg   Sun   10 mg  10 mg   10 mg   Mon   15 mg  15 mg   10 mg   Tue   15 mg (10/10)  10 mg   10 mg   Wed   15 mg  -   10 mg   Thu   10 mg  -   10 mg   Fri   15 mg  15 mg   10 mg   Sat   10 mg  10 mg   10 mg   Historical INR 0.90 - 1.10 3.60   2.10   1.10  1.20         Plan:  1. INR is Subtherapeutic today but he had been holding warfarin so just restarted at lower dose yesterday=- see above in Anticoagulation Summary.  Will instruct Cecilio Puckett to continue the reduced warfarin regimen of 10mg daily-- see above in Anticoagulation Summary.  2. Follow up in 1 week  3. Patient declines warfarin refills.  4. Verbal and written information provided. Patient expresses understanding and has no further questions at this time.    Charissa Kent [facility]

## 2024-01-31 ENCOUNTER — TELEPHONE (OUTPATIENT)
Dept: PHARMACY | Facility: HOSPITAL | Age: 54
End: 2024-01-31
Payer: MEDICARE

## 2024-01-31 NOTE — TELEPHONE ENCOUNTER
Contacted patient with regards to missed anticoagulation clinic appointment on 1/29. Patient has been sick w/ flu and started on a codeine cough syrup and prednisone. Patient states that MARIANNE Johnson at [facility] has been managing his anticoagulation and that is is difficult for him to get to our location. I attempted to contacted [facility] for a return call to our clinic at [phone] to confirm that Mr. Puckett anticoagulation needs are being managed at their office

## 2024-03-06 ENCOUNTER — TELEPHONE (OUTPATIENT)
Dept: OTHER | Facility: HOSPITAL | Age: 54
End: 2024-03-06
Payer: MEDICARE

## 2024-03-06 NOTE — TELEPHONE ENCOUNTER
Attempted to contact patient regarding overdue INR monitoring and warfarin regimen. Unable to leave voicemail for patient to return call to Anticoagulation Clinic [phone] when available.     Per previous telephone encounter, patient stated MARIANNE Johnson at [facility] has been managing his anticoagulation. Attempted to contact Dr. Ojeda's office [phone] as this is patient's most recent provider visit in Care Everywhere. Left voicemail requesting a return call to our clinic to assess if Mr. Puckett anticoagulation is being monitored/managed at their office

## 2024-03-22 ENCOUNTER — TELEPHONE (OUTPATIENT)
Dept: PHARMACY | Facility: HOSPITAL | Age: 54
End: 2024-03-22
Payer: MEDICARE

## 2024-03-22 PROBLEM — I82.402 LEG DVT (DEEP VENOUS THROMBOEMBOLISM), ACUTE, LEFT: Status: RESOLVED | Noted: 2023-09-27 | Resolved: 2024-03-22

## 2024-03-22 PROBLEM — G45.9 TIA (TRANSIENT ISCHEMIC ATTACK): Status: RESOLVED | Noted: 2023-09-29 | Resolved: 2024-03-22

## 2024-03-22 PROBLEM — I63.9 ACUTE CVA (CEREBROVASCULAR ACCIDENT): Status: RESOLVED | Noted: 2023-09-30 | Resolved: 2024-03-22

## 2024-03-22 NOTE — TELEPHONE ENCOUNTER
Called [facility] today to confirm that this patient's warfarin dosing and INR monitoring is being managed by their clinic. This information was confirmed by Bere Lehman's extension. Since this information has been confirmed, we will close this patient's anticoagulation episode.

## 2024-05-14 ENCOUNTER — APPOINTMENT (OUTPATIENT)
Dept: CT IMAGING | Facility: HOSPITAL | Age: 54
End: 2024-05-14
Payer: MEDICARE

## 2024-05-14 ENCOUNTER — HOSPITAL ENCOUNTER (INPATIENT)
Facility: HOSPITAL | Age: 54
LOS: 7 days | Discharge: HOME | End: 2024-05-22
Attending: EMERGENCY MEDICINE | Admitting: INTERNAL MEDICINE
Payer: MEDICARE

## 2024-05-14 ENCOUNTER — APPOINTMENT (OUTPATIENT)
Dept: GENERAL RADIOLOGY | Facility: HOSPITAL | Age: 54
End: 2024-05-14
Payer: MEDICARE

## 2024-05-14 DIAGNOSIS — I63.9 ACUTE CVA (CEREBROVASCULAR ACCIDENT): ICD-10-CM

## 2024-05-14 DIAGNOSIS — R42 DIZZINESS: ICD-10-CM

## 2024-05-14 DIAGNOSIS — Z86.73 HISTORY OF CVA (CEREBROVASCULAR ACCIDENT): ICD-10-CM

## 2024-05-14 DIAGNOSIS — R47.9 DIFFICULTY WITH SPEECH: Primary | ICD-10-CM

## 2024-05-14 DIAGNOSIS — I10 HYPERTENSION NOT AT GOAL: ICD-10-CM

## 2024-05-14 PROBLEM — R47.81 SLURRED SPEECH: Status: ACTIVE | Noted: 2024-05-14

## 2024-05-14 LAB
ALBUMIN SERPL-MCNC: 3.6 G/DL (ref 3.5–5.2)
ALBUMIN/GLOB SERPL: 1.2 G/DL
ALP SERPL-CCNC: 67 U/L (ref 39–117)
ALT SERPL W P-5'-P-CCNC: 19 U/L (ref 1–41)
AMPHET+METHAMPHET UR QL: NEGATIVE
ANION GAP SERPL CALCULATED.3IONS-SCNC: 9.4 MMOL/L (ref 5–15)
APTT PPP: 25.3 SECONDS (ref 22.7–35.4)
AST SERPL-CCNC: 11 U/L (ref 1–40)
BACTERIA UR QL AUTO: ABNORMAL /HPF
BARBITURATES UR QL SCN: NEGATIVE
BASOPHILS # BLD MANUAL: 0.11 10*3/MM3 (ref 0–0.2)
BASOPHILS NFR BLD MANUAL: 2 % (ref 0–1.5)
BENZODIAZ UR QL SCN: NEGATIVE
BILIRUB SERPL-MCNC: 0.3 MG/DL (ref 0–1.2)
BILIRUB UR QL STRIP: NEGATIVE
BUN SERPL-MCNC: 8 MG/DL (ref 6–20)
BUN/CREAT SERPL: 7.3 (ref 7–25)
CALCIUM SPEC-SCNC: 9 MG/DL (ref 8.6–10.5)
CANNABINOIDS SERPL QL: NEGATIVE
CHLORIDE SERPL-SCNC: 106 MMOL/L (ref 98–107)
CHOLEST SERPL-MCNC: 193 MG/DL (ref 0–200)
CLARITY UR: ABNORMAL
CO2 SERPL-SCNC: 23.6 MMOL/L (ref 22–29)
COCAINE UR QL: NEGATIVE
COLOR UR: ABNORMAL
CREAT SERPL-MCNC: 1.1 MG/DL (ref 0.76–1.27)
DEPRECATED RDW RBC AUTO: 44.9 FL (ref 37–54)
EGFRCR SERPLBLD CKD-EPI 2021: 80.3 ML/MIN/1.73
EOSINOPHIL # BLD MANUAL: 0.06 10*3/MM3 (ref 0–0.4)
EOSINOPHIL NFR BLD MANUAL: 1 % (ref 0.3–6.2)
ERYTHROCYTE [DISTWIDTH] IN BLOOD BY AUTOMATED COUNT: 14.5 % (ref 12.3–15.4)
ETHANOL BLD-MCNC: <10 MG/DL (ref 0–10)
ETHANOL UR QL: <0.01 %
FENTANYL UR-MCNC: NEGATIVE NG/ML
GLOBULIN UR ELPH-MCNC: 3 GM/DL
GLUCOSE BLDC GLUCOMTR-MCNC: 129 MG/DL (ref 70–130)
GLUCOSE SERPL-MCNC: 182 MG/DL (ref 65–99)
GLUCOSE UR STRIP-MCNC: ABNORMAL MG/DL
HBA1C MFR BLD: 10 % (ref 4.8–5.6)
HCT VFR BLD AUTO: 43.7 % (ref 37.5–51)
HDLC SERPL-MCNC: 38 MG/DL (ref 40–60)
HGB BLD-MCNC: 14.4 G/DL (ref 13–17.7)
HGB UR QL STRIP.AUTO: ABNORMAL
HYALINE CASTS UR QL AUTO: ABNORMAL /LPF
INR PPP: 1.05 (ref 0.9–1.1)
KETONES UR QL STRIP: NEGATIVE
LDLC SERPL CALC-MCNC: 136 MG/DL (ref 0–100)
LDLC/HDLC SERPL: 3.53 {RATIO}
LEUKOCYTE ESTERASE UR QL STRIP.AUTO: NEGATIVE
LYMPHOCYTES # BLD MANUAL: 3.47 10*3/MM3 (ref 0.7–3.1)
LYMPHOCYTES NFR BLD MANUAL: 5.1 % (ref 5–12)
MAGNESIUM SERPL-MCNC: 1.7 MG/DL (ref 1.6–2.6)
MCH RBC QN AUTO: 28.1 PG (ref 26.6–33)
MCHC RBC AUTO-ENTMCNC: 33 G/DL (ref 31.5–35.7)
MCV RBC AUTO: 85.4 FL (ref 79–97)
METHADONE UR QL SCN: NEGATIVE
MONOCYTES # BLD: 0.29 10*3/MM3 (ref 0.1–0.9)
NEUTROPHILS # BLD AUTO: 1.74 10*3/MM3 (ref 1.7–7)
NEUTROPHILS NFR BLD MANUAL: 30.6 % (ref 42.7–76)
NITRITE UR QL STRIP: NEGATIVE
NT-PROBNP SERPL-MCNC: 916 PG/ML (ref 0–900)
OPIATES UR QL: NEGATIVE
OXYCODONE UR QL SCN: NEGATIVE
PH UR STRIP.AUTO: 5.5 [PH] (ref 5–8)
PLAT MORPH BLD: NORMAL
PLATELET # BLD AUTO: 176 10*3/MM3 (ref 140–450)
PMV BLD AUTO: 11.1 FL (ref 6–12)
POTASSIUM SERPL-SCNC: 4.1 MMOL/L (ref 3.5–5.2)
PROT SERPL-MCNC: 6.6 G/DL (ref 6–8.5)
PROT UR QL STRIP: ABNORMAL
PROTHROMBIN TIME: 14 SECONDS (ref 11.7–14.2)
RBC # BLD AUTO: 5.12 10*6/MM3 (ref 4.14–5.8)
RBC # UR STRIP: ABNORMAL /HPF
RBC MORPH BLD: NORMAL
REF LAB TEST METHOD: ABNORMAL
SODIUM SERPL-SCNC: 139 MMOL/L (ref 136–145)
SP GR UR STRIP: >1.03 (ref 1–1.03)
SQUAMOUS #/AREA URNS HPF: ABNORMAL /HPF
TRIGL SERPL-MCNC: 104 MG/DL (ref 0–150)
TROPONIN T SERPL HS-MCNC: 26 NG/L
TSH SERPL DL<=0.05 MIU/L-ACNC: 1.28 UIU/ML (ref 0.27–4.2)
UROBILINOGEN UR QL STRIP: ABNORMAL
VARIANT LYMPHS NFR BLD MANUAL: 61.2 % (ref 19.6–45.3)
VLDLC SERPL-MCNC: 19 MG/DL (ref 5–40)
WBC # UR STRIP: ABNORMAL /HPF
WBC MORPH BLD: NORMAL
WBC NRBC COR # BLD AUTO: 5.67 10*3/MM3 (ref 3.4–10.8)

## 2024-05-14 PROCEDURE — 84443 ASSAY THYROID STIM HORMONE: CPT | Performed by: PHYSICIAN ASSISTANT

## 2024-05-14 PROCEDURE — 80307 DRUG TEST PRSMV CHEM ANLYZR: CPT | Performed by: PHYSICIAN ASSISTANT

## 2024-05-14 PROCEDURE — G0378 HOSPITAL OBSERVATION PER HR: HCPCS

## 2024-05-14 PROCEDURE — 85610 PROTHROMBIN TIME: CPT | Performed by: PHYSICIAN ASSISTANT

## 2024-05-14 PROCEDURE — 63710000001 INSULIN GLARGINE PER 5 UNITS

## 2024-05-14 PROCEDURE — 84484 ASSAY OF TROPONIN QUANT: CPT | Performed by: PHYSICIAN ASSISTANT

## 2024-05-14 PROCEDURE — 82077 ASSAY SPEC XCP UR&BREATH IA: CPT | Performed by: PHYSICIAN ASSISTANT

## 2024-05-14 PROCEDURE — 83880 ASSAY OF NATRIURETIC PEPTIDE: CPT | Performed by: PHYSICIAN ASSISTANT

## 2024-05-14 PROCEDURE — 85007 BL SMEAR W/DIFF WBC COUNT: CPT | Performed by: PHYSICIAN ASSISTANT

## 2024-05-14 PROCEDURE — 93010 ELECTROCARDIOGRAM REPORT: CPT | Performed by: INTERNAL MEDICINE

## 2024-05-14 PROCEDURE — 82948 REAGENT STRIP/BLOOD GLUCOSE: CPT

## 2024-05-14 PROCEDURE — 70450 CT HEAD/BRAIN W/O DYE: CPT

## 2024-05-14 PROCEDURE — 25010000002 ENOXAPARIN PER 10 MG

## 2024-05-14 PROCEDURE — 85025 COMPLETE CBC W/AUTO DIFF WBC: CPT | Performed by: PHYSICIAN ASSISTANT

## 2024-05-14 PROCEDURE — 83036 HEMOGLOBIN GLYCOSYLATED A1C: CPT

## 2024-05-14 PROCEDURE — 81001 URINALYSIS AUTO W/SCOPE: CPT | Performed by: PHYSICIAN ASSISTANT

## 2024-05-14 PROCEDURE — 85730 THROMBOPLASTIN TIME PARTIAL: CPT | Performed by: PHYSICIAN ASSISTANT

## 2024-05-14 PROCEDURE — 80061 LIPID PANEL: CPT

## 2024-05-14 PROCEDURE — 25810000003 SODIUM CHLORIDE 0.9 % SOLUTION

## 2024-05-14 PROCEDURE — 83735 ASSAY OF MAGNESIUM: CPT | Performed by: PHYSICIAN ASSISTANT

## 2024-05-14 PROCEDURE — 99285 EMERGENCY DEPT VISIT HI MDM: CPT

## 2024-05-14 PROCEDURE — 71045 X-RAY EXAM CHEST 1 VIEW: CPT

## 2024-05-14 PROCEDURE — 93005 ELECTROCARDIOGRAM TRACING: CPT | Performed by: PHYSICIAN ASSISTANT

## 2024-05-14 PROCEDURE — 80053 COMPREHEN METABOLIC PANEL: CPT | Performed by: PHYSICIAN ASSISTANT

## 2024-05-14 RX ORDER — SODIUM CHLORIDE 0.9 % (FLUSH) 0.9 %
10 SYRINGE (ML) INJECTION AS NEEDED
Status: DISCONTINUED | OUTPATIENT
Start: 2024-05-14 | End: 2024-05-22 | Stop reason: HOSPADM

## 2024-05-14 RX ORDER — ACETAMINOPHEN 325 MG/1
650 TABLET ORAL EVERY 4 HOURS PRN
Status: DISCONTINUED | OUTPATIENT
Start: 2024-05-14 | End: 2024-05-22 | Stop reason: HOSPADM

## 2024-05-14 RX ORDER — DEXTROSE MONOHYDRATE 25 G/50ML
25 INJECTION, SOLUTION INTRAVENOUS
Status: DISCONTINUED | OUTPATIENT
Start: 2024-05-14 | End: 2024-05-14

## 2024-05-14 RX ORDER — NITROGLYCERIN 0.4 MG/1
0.4 TABLET SUBLINGUAL
Status: DISCONTINUED | OUTPATIENT
Start: 2024-05-14 | End: 2024-05-22 | Stop reason: HOSPADM

## 2024-05-14 RX ORDER — DEXTROSE MONOHYDRATE 25 G/50ML
25 INJECTION, SOLUTION INTRAVENOUS
Status: DISCONTINUED | OUTPATIENT
Start: 2024-05-14 | End: 2024-05-22 | Stop reason: HOSPADM

## 2024-05-14 RX ORDER — BISACODYL 5 MG/1
5 TABLET, DELAYED RELEASE ORAL DAILY PRN
Status: DISCONTINUED | OUTPATIENT
Start: 2024-05-14 | End: 2024-05-22 | Stop reason: HOSPADM

## 2024-05-14 RX ORDER — FUROSEMIDE 20 MG/1
1 TABLET ORAL DAILY
COMMUNITY
Start: 2024-02-08

## 2024-05-14 RX ORDER — POLYETHYLENE GLYCOL 3350 17 G/17G
17 POWDER, FOR SOLUTION ORAL DAILY PRN
Status: DISCONTINUED | OUTPATIENT
Start: 2024-05-14 | End: 2024-05-22 | Stop reason: HOSPADM

## 2024-05-14 RX ORDER — ROSUVASTATIN CALCIUM 20 MG/1
1 TABLET, COATED ORAL DAILY
COMMUNITY
Start: 2024-05-03

## 2024-05-14 RX ORDER — SODIUM CHLORIDE 0.9 % (FLUSH) 0.9 %
10 SYRINGE (ML) INJECTION EVERY 12 HOURS SCHEDULED
Status: DISCONTINUED | OUTPATIENT
Start: 2024-05-14 | End: 2024-05-22 | Stop reason: HOSPADM

## 2024-05-14 RX ORDER — PANTOPRAZOLE SODIUM 40 MG/1
40 TABLET, DELAYED RELEASE ORAL
Status: DISCONTINUED | OUTPATIENT
Start: 2024-05-15 | End: 2024-05-22 | Stop reason: HOSPADM

## 2024-05-14 RX ORDER — WARFARIN SODIUM 10 MG/1
10 TABLET ORAL
Status: DISCONTINUED | OUTPATIENT
Start: 2024-05-14 | End: 2024-05-15 | Stop reason: DRUGHIGH

## 2024-05-14 RX ORDER — ASPIRIN 81 MG/1
81 TABLET ORAL DAILY
Status: DISCONTINUED | OUTPATIENT
Start: 2024-05-15 | End: 2024-05-22 | Stop reason: HOSPADM

## 2024-05-14 RX ORDER — NICOTINE POLACRILEX 4 MG
15 LOZENGE BUCCAL
Status: DISCONTINUED | OUTPATIENT
Start: 2024-05-14 | End: 2024-05-14

## 2024-05-14 RX ORDER — HYDROCHLOROTHIAZIDE 12.5 MG/1
12.5 TABLET ORAL DAILY
Status: DISCONTINUED | OUTPATIENT
Start: 2024-05-15 | End: 2024-05-16

## 2024-05-14 RX ORDER — IBUPROFEN 600 MG/1
1 TABLET ORAL
Status: DISCONTINUED | OUTPATIENT
Start: 2024-05-14 | End: 2024-05-22 | Stop reason: HOSPADM

## 2024-05-14 RX ORDER — ENOXAPARIN SODIUM 100 MG/ML
1 INJECTION SUBCUTANEOUS EVERY 12 HOURS
Status: DISCONTINUED | OUTPATIENT
Start: 2024-05-14 | End: 2024-05-18

## 2024-05-14 RX ORDER — FUROSEMIDE 20 MG/1
20 TABLET ORAL DAILY
Status: DISCONTINUED | OUTPATIENT
Start: 2024-05-15 | End: 2024-05-20

## 2024-05-14 RX ORDER — ONDANSETRON 2 MG/ML
4 INJECTION INTRAMUSCULAR; INTRAVENOUS EVERY 6 HOURS PRN
Status: DISCONTINUED | OUTPATIENT
Start: 2024-05-14 | End: 2024-05-22 | Stop reason: HOSPADM

## 2024-05-14 RX ORDER — ONDANSETRON 4 MG/1
4 TABLET, ORALLY DISINTEGRATING ORAL EVERY 6 HOURS PRN
Status: DISCONTINUED | OUTPATIENT
Start: 2024-05-14 | End: 2024-05-22 | Stop reason: HOSPADM

## 2024-05-14 RX ORDER — ASPIRIN 81 MG/1
1 TABLET, FILM COATED ORAL DAILY
COMMUNITY
Start: 2024-02-08

## 2024-05-14 RX ORDER — BISACODYL 10 MG
10 SUPPOSITORY, RECTAL RECTAL DAILY PRN
Status: DISCONTINUED | OUTPATIENT
Start: 2024-05-14 | End: 2024-05-22 | Stop reason: HOSPADM

## 2024-05-14 RX ORDER — ROSUVASTATIN CALCIUM 20 MG/1
20 TABLET, COATED ORAL DAILY
Status: DISCONTINUED | OUTPATIENT
Start: 2024-05-15 | End: 2024-05-22 | Stop reason: HOSPADM

## 2024-05-14 RX ORDER — SODIUM CHLORIDE 9 MG/ML
40 INJECTION, SOLUTION INTRAVENOUS AS NEEDED
Status: DISCONTINUED | OUTPATIENT
Start: 2024-05-14 | End: 2024-05-22 | Stop reason: HOSPADM

## 2024-05-14 RX ORDER — IBUPROFEN 600 MG/1
1 TABLET ORAL
Status: DISCONTINUED | OUTPATIENT
Start: 2024-05-14 | End: 2024-05-14

## 2024-05-14 RX ORDER — SODIUM CHLORIDE 9 MG/ML
50 INJECTION, SOLUTION INTRAVENOUS CONTINUOUS
Status: DISCONTINUED | OUTPATIENT
Start: 2024-05-14 | End: 2024-05-21

## 2024-05-14 RX ORDER — AMOXICILLIN 250 MG
2 CAPSULE ORAL 2 TIMES DAILY PRN
Status: DISCONTINUED | OUTPATIENT
Start: 2024-05-14 | End: 2024-05-22 | Stop reason: HOSPADM

## 2024-05-14 RX ORDER — LOSARTAN POTASSIUM 25 MG/1
25 TABLET ORAL DAILY
Status: DISCONTINUED | OUTPATIENT
Start: 2024-05-15 | End: 2024-05-16

## 2024-05-14 RX ORDER — HYDROCHLOROTHIAZIDE 12.5 MG/1
1 TABLET ORAL DAILY
COMMUNITY
Start: 2024-05-03 | End: 2024-05-22 | Stop reason: HOSPADM

## 2024-05-14 RX ORDER — GLIPIZIDE 10 MG/1
1 TABLET ORAL EVERY 12 HOURS SCHEDULED
COMMUNITY
Start: 2024-02-08

## 2024-05-14 RX ORDER — INSULIN LISPRO 100 [IU]/ML
3-14 INJECTION, SOLUTION INTRAVENOUS; SUBCUTANEOUS
Status: DISCONTINUED | OUTPATIENT
Start: 2024-05-14 | End: 2024-05-14

## 2024-05-14 RX ORDER — AMLODIPINE BESYLATE 10 MG/1
10 TABLET ORAL DAILY
Status: DISCONTINUED | OUTPATIENT
Start: 2024-05-15 | End: 2024-05-22 | Stop reason: HOSPADM

## 2024-05-14 RX ORDER — CARVEDILOL 6.25 MG/1
6.25 TABLET ORAL EVERY 12 HOURS SCHEDULED
Status: DISCONTINUED | OUTPATIENT
Start: 2024-05-14 | End: 2024-05-17

## 2024-05-14 RX ORDER — GLIPIZIDE 10 MG/1
10 TABLET ORAL EVERY 12 HOURS SCHEDULED
Status: DISCONTINUED | OUTPATIENT
Start: 2024-05-14 | End: 2024-05-20

## 2024-05-14 RX ORDER — NICOTINE POLACRILEX 4 MG
15 LOZENGE BUCCAL
Status: DISCONTINUED | OUTPATIENT
Start: 2024-05-14 | End: 2024-05-22 | Stop reason: HOSPADM

## 2024-05-14 RX ADMIN — ENOXAPARIN SODIUM 100 MG: 100 INJECTION SUBCUTANEOUS at 23:25

## 2024-05-14 RX ADMIN — Medication 10 ML: at 23:33

## 2024-05-14 RX ADMIN — INSULIN GLARGINE 25 UNITS: 100 INJECTION, SOLUTION SUBCUTANEOUS at 23:28

## 2024-05-14 RX ADMIN — SODIUM CHLORIDE 75 ML/HR: 9 INJECTION, SOLUTION INTRAVENOUS at 23:37

## 2024-05-14 NOTE — ED PROVIDER NOTES
MD ATTESTATION NOTE    SHARED VISIT: This visit was performed by BOTH a physician and an APC. The substantive portion of the medical decision making was performed by this attesting physician who made or approved the management plan and takes responsibility for patient management. All studies in the APC note (if performed) were independently interpreted by me.     The LAURA and I have discussed this patient's history, physical exam, and treatment plan.  I have reviewed the documentation and affirm the documentation and agree with the treatment and plan.  The attached note describes my personal findings.      Independent Historians: Patient    A complete HPI/ROS/PMH/PSH/SH/FH are unobtainable due to: None    Chronic or social conditions impacting patient care (social determinants of health): None    Cecilio Puckett is a 54 y.o. male with history of stroke and TIA as well as non-Hodgkin's lymphoma, diabetes, hypertension, and HIV who presents to the ED c/o acute episodes of dizziness and speech difficulty.  Patient is on warfarin for his history of DVT.  Patient's dizziness is not described as a lightheadedness but more of a unsteadiness or off-balance feeling.  Patient has not had any syncopal episodes.  Patient denies any chest pain or shortness of breath.  He also denies headache, falls, head injury.        On exam:  GENERAL: Cooperative and conversant male, alert, no acute distress  SKIN: Warm, dry  HENT: Normocephalic, atraumatic  RESPIRATORY: Relaxed breathing  MUSCULOSKELETAL: no deformity  NEURO: alert, face symmetric, moves all extremities, follows commands        Labs  Recent Results (from the past 24 hour(s))   Comprehensive Metabolic Panel    Collection Time: 05/14/24  6:32 PM    Specimen: Blood   Result Value Ref Range    Glucose 182 (H) 65 - 99 mg/dL    BUN 8 6 - 20 mg/dL    Creatinine 1.10 0.76 - 1.27 mg/dL    Sodium 139 136 - 145 mmol/L    Potassium 4.1 3.5 - 5.2 mmol/L    Chloride 106 98 - 107 mmol/L    CO2  23.6 22.0 - 29.0 mmol/L    Calcium 9.0 8.6 - 10.5 mg/dL    Total Protein 6.6 6.0 - 8.5 g/dL    Albumin 3.6 3.5 - 5.2 g/dL    ALT (SGPT) 19 1 - 41 U/L    AST (SGOT) 11 1 - 40 U/L    Alkaline Phosphatase 67 39 - 117 U/L    Total Bilirubin 0.3 0.0 - 1.2 mg/dL    Globulin 3.0 gm/dL    A/G Ratio 1.2 g/dL    BUN/Creatinine Ratio 7.3 7.0 - 25.0    Anion Gap 9.4 5.0 - 15.0 mmol/L    eGFR 80.3 >60.0 mL/min/1.73   Protime-INR    Collection Time: 05/14/24  6:32 PM    Specimen: Blood   Result Value Ref Range    Protime 14.0 11.7 - 14.2 Seconds    INR 1.05 0.90 - 1.10   aPTT    Collection Time: 05/14/24  6:32 PM    Specimen: Blood   Result Value Ref Range    PTT 25.3 22.7 - 35.4 seconds   Single High Sensitivity Troponin T    Collection Time: 05/14/24  6:32 PM    Specimen: Blood   Result Value Ref Range    HS Troponin T 26 (H) <22 ng/L   BNP    Collection Time: 05/14/24  6:32 PM    Specimen: Blood   Result Value Ref Range    proBNP 916.0 (H) 0.0 - 900.0 pg/mL   Ethanol    Collection Time: 05/14/24  6:32 PM    Specimen: Blood   Result Value Ref Range    Ethanol <10 0 - 10 mg/dL    Ethanol % <0.010 %   Magnesium    Collection Time: 05/14/24  6:32 PM    Specimen: Blood   Result Value Ref Range    Magnesium 1.7 1.6 - 2.6 mg/dL   TSH    Collection Time: 05/14/24  6:32 PM    Specimen: Blood   Result Value Ref Range    TSH 1.280 0.270 - 4.200 uIU/mL   CBC Auto Differential    Collection Time: 05/14/24  6:32 PM    Specimen: Blood   Result Value Ref Range    WBC 5.67 3.40 - 10.80 10*3/mm3    RBC 5.12 4.14 - 5.80 10*6/mm3    Hemoglobin 14.4 13.0 - 17.7 g/dL    Hematocrit 43.7 37.5 - 51.0 %    MCV 85.4 79.0 - 97.0 fL    MCH 28.1 26.6 - 33.0 pg    MCHC 33.0 31.5 - 35.7 g/dL    RDW 14.5 12.3 - 15.4 %    RDW-SD 44.9 37.0 - 54.0 fl    MPV 11.1 6.0 - 12.0 fL    Platelets 176 140 - 450 10*3/mm3   Manual Differential    Collection Time: 05/14/24  6:32 PM    Specimen: Blood   Result Value Ref Range    Neutrophil % 30.6 (L) 42.7 - 76.0 %     Lymphocyte % 61.2 (H) 19.6 - 45.3 %    Monocyte % 5.1 5.0 - 12.0 %    Eosinophil % 1.0 0.3 - 6.2 %    Basophil % 2.0 (H) 0.0 - 1.5 %    Neutrophils Absolute 1.74 1.70 - 7.00 10*3/mm3    Lymphocytes Absolute 3.47 (H) 0.70 - 3.10 10*3/mm3    Monocytes Absolute 0.29 0.10 - 0.90 10*3/mm3    Eosinophils Absolute 0.06 0.00 - 0.40 10*3/mm3    Basophils Absolute 0.11 0.00 - 0.20 10*3/mm3    RBC Morphology Normal Normal    WBC Morphology Normal Normal    Platelet Morphology Normal Normal   Lipid Panel    Collection Time: 05/14/24  6:32 PM    Specimen: Blood   Result Value Ref Range    Total Cholesterol 193 0 - 200 mg/dL    Triglycerides 104 0 - 150 mg/dL    HDL Cholesterol 38 (L) 40 - 60 mg/dL    LDL Cholesterol  136 (H) 0 - 100 mg/dL    VLDL Cholesterol 19 5 - 40 mg/dL    LDL/HDL Ratio 3.53    Hemoglobin A1c    Collection Time: 05/14/24  6:32 PM    Specimen: Blood   Result Value Ref Range    Hemoglobin A1C 10.00 (H) 4.80 - 5.60 %   ECG 12 Lead Stroke Evaluation    Collection Time: 05/14/24  6:32 PM   Result Value Ref Range    QT Interval 379 ms    QTC Interval 446 ms   Urinalysis With Microscopic If Indicated (No Culture) - Urine, Clean Catch    Collection Time: 05/14/24  8:01 PM    Specimen: Urine, Clean Catch   Result Value Ref Range    Color, UA Dark Yellow (A) Yellow, Straw    Appearance, UA Cloudy (A) Clear    pH, UA 5.5 5.0 - 8.0    Specific Gravity, UA >1.030 (H) 1.005 - 1.030    Glucose,  mg/dL (1+) (A) Negative    Ketones, UA Negative Negative    Bilirubin, UA Negative Negative    Blood, UA Small (1+) (A) Negative    Protein, UA >=300 mg/dL (3+) (A) Negative    Leuk Esterase, UA Negative Negative    Nitrite, UA Negative Negative    Urobilinogen, UA 1.0 E.U./dL 0.2 - 1.0 E.U./dL   Urine Drug Screen - Urine, Clean Catch    Collection Time: 05/14/24  8:01 PM    Specimen: Urine, Clean Catch   Result Value Ref Range    Amphet/Methamphet, Screen Negative Negative    Barbiturates Screen, Urine Negative Negative     Benzodiazepine Screen, Urine Negative Negative    Cocaine Screen, Urine Negative Negative    Opiate Screen Negative Negative    THC, Screen, Urine Negative Negative    Methadone Screen, Urine Negative Negative    Oxycodone Screen, Urine Negative Negative    Fentanyl, Urine Negative Negative   Urinalysis, Microscopic Only - Urine, Clean Catch    Collection Time: 05/14/24  8:01 PM    Specimen: Urine, Clean Catch   Result Value Ref Range    RBC, UA 3-5 (A) None Seen, 0-2 /HPF    WBC, UA 11-20 (A) None Seen, 0-2 /HPF    Bacteria, UA None Seen None Seen /HPF    Squamous Epithelial Cells, UA 3-6 (A) None Seen, 0-2 /HPF    Hyaline Casts, UA 21-30 None Seen /LPF    Methodology Automated Microscopy    POC Glucose Once    Collection Time: 05/14/24 11:31 PM    Specimen: Blood   Result Value Ref Range    Glucose 129 70 - 130 mg/dL       Radiology  CT Head Without Contrast    Result Date: 5/14/2024  EMERGENCY CT SCAN OF THE HEAD WITHOUT CONTRAST ON 05/14/2024  CLINICAL HISTORY: This is a 54-year-old male patient who feels like he has been slurring words for greater than a month  TECHNIQUE: Spiral CT images were obtained from the base of the skull to the vertex without intravenous contrast. The images were reformatted and are submitted in 3 mm thick axial, sagittal and coronal CT sections with brain algorithm.  COMPARISON: This is correlated to a prior MRI of the brain on 09/29/2023 and 10/10/2022..  FINDINGS: There is a 10 x 7 mm old lacunar infarct extending from the mid right corona radiata region into the superior right putamen that occurred back in September 2023. There is an additional 8 x 5 mm old lacunar infarct in the anterior right putamen, 4 mm old lacunar infarct in the posterior limb of the right internal capsule. There is a moderate size area of infarction involving the posterior inferior medial left parietal lobe extending in the superior left occipital lobe. It measures approximately 4.8 x 3 x 4 cm. It is in the  distribution of parieto-occipital branches left MCA territory and the precise age of this infarct is uncertain but may be subacute in nature. There is a 11 x 6 mm infarct in the posterior lateral left cerebellum and left PICA territory either subacute or chronic. The remainder of the brain parenchyma is normal in attenuation. The ventricles are normal in size. I see no mass effect and no midline shift and no extra-axial fluid collections are identified and there is no evidence of acute intracranial hemorrhage. The calvarium and the skull base are normal in appearance. Paranasal sinuses, mastoid air cells and middle ear cavities are clear.      1. There is a moderate size area of acute/subacute infarction extending from the posterior inferior left parietal lobe into the superior left occipital lobe measuring 4.8 x 3 x 4 cm in size in the distribution of parieto-occipital branch of the left MCA territory. Its precise age is uncertain and I recommend an MRI of the brain to better date it. There is an 11 x 6 mm infarct in the posterior lateral left cerebellum that is new when compared to the MRI of the brain on 09/29/2023 and I suspect this is either subacute or chronic. Otherwise, the head CT is unchanged when compared to MRI of the brain 09/29/2023. There is an 11 x 7 mm old lacunar infarct extending from the mid right corona radiata region into the superior right putamen that occurred back in September of 2023. There are small old lacunar infarcts in the anterior right putamen and posterior limb of the right internal capsule. The remainder of the head CT is within normal limits. The results and recommendations were communicated to Kely Luna by telephone on 05/14/2024 at 8 p.m.  Radiation dose reduction techniques were utilized, including automated exposure control and exposure modulation based on body size.   This report was finalized on 5/14/2024 10:31 PM by Dr. Sebastian Way M.D on Workstation: [id number]       XR Chest 1 View    Result Date: 5/14/2024  XR CHEST 1 VW-  HISTORY: Male who is 54 years-old, dizziness  TECHNIQUE: Frontal view of the chest  COMPARISON: 9/29/2023  FINDINGS: Heart, mediastinum and pulmonary vasculature are unremarkable. No focal pulmonary consolidation, pleural effusion, or pneumothorax. No acute osseous process.      No evidence for acute pulmonary process. Follow-up as clinical indications persist.  This report was finalized on 5/14/2024 7:18 PM by Dr. Junior Weiner M.D on Workstation: [id number]       Medical Decision Making:  ED Course as of 05/15/24 0021   Tue May 14, 2024   1835 EKG ER MD interpretation   Time: 18: 32  Rhythm and rate: Normal sinus rhythm at a rate of 83  Axis: Normal  P waves: Normal for probable left atrial enlargement  QRS complexes: LVH with repolarization changes especially anterior leads  ST segments: no depressions  T waves: Inversions in 2, 3, aVF, V5, V6  Q waves in 1, aVL  Comparison EKG is from September 29, 2023 where patient had similar findings with no significant change [AR]   1942 CT scan of head independently interpreted me as no gross hemorrhage [MP]   1956 I spoke with Lisbet in the observation unit.  Reviewed patient presentation and ED findings.  She agrees to admit patient to an ED observation bed. [MP]      ED Course User Index  [AR] Jenny Thomason MD  [MP] Kely Luna PA-C       MDM: This patient presents with symptoms concerning for CVA versus TIA. Other possibilities on the differential diagnosis list include: dissection, AMI, hypoglycemia or other metabolic derangement such as hepatic/uremic encephalopathy, medication side effect, complex migraine, or post-ictal Marcio´s paralysis. However, presentation most concerning for a TIA given that he is symptom-free currently but has significant history of CVA and TIA in the past.     Procedures:  Procedures        PPE: I followed hospital protocols for proper PPE based on patient  presentation including use of N95 mask for suspected infectious respiratory conditions.  Proper hand hygiene was performed both before and after the patient encounter.          Diagnosis  Final diagnoses:   Difficulty with speech   Dizziness   History of CVA (cerebrovascular accident)   Hypertension not at goal       Note Disclaimer: At [facility], we believe that sharing information builds trust and better relationships. You are receiving this note because you recently visited [facility]. It is possible you will see health information before a provider has talked with you about it. This kind of information can be easy to misunderstand. To help you fully understand what it means for your health, we urge you to discuss this note with your provider.       Jenny Thomason MD  05/16/24 1959

## 2024-05-14 NOTE — H&P
[facility]   HISTORY AND PHYSICAL    Patient Name: Cecilio Puckett  : 1970  MRN: [id number]  Primary Care Physician:  Arias Lehman APRN  Date of admission: 2024    Subjective   Subjective     Chief Complaint:   Chief Complaint   Patient presents with    speech issues    Dizziness         HPI:    Cecilio Puckett is a 54 y.o. male, with a past medical history including, but not limited to, DVT, diabetes, HIV, hypertension, non-Hodgkin's lymphoma, coronary artery disease, presented to the emergency department with a complaint of ongoing speech difficulty and dizziness.  Patient states that he has had worsening difficulty with speech since a stroke in September.  He states he has also had intermittent dizziness since that time, however, he feels that the dizziness is getting more frequent and lasting longer.  He states that he is having trouble walking and that that is the reason he came in.  He admits to being noncompliant with his medications and states that he does not take them most days.  He states that it was recommended for him to go to rehab after his stroke however he states that he went home and has not continued with any therapies.  MRI brain is pending at this time.  Physical therapy, Occupational Therapy, and speech therapy have been consulted to see the patient in the AM.    Review of Systems   All systems were reviewed and negative except for: What was mentioned above in the HPI.    Personal History     Past Medical History:   Diagnosis Date    COPD (chronic obstructive pulmonary disease)     Coronary artery disease     Diabetes mellitus     DVT (deep venous thrombosis)     Elevated cholesterol     GERD (gastroesophageal reflux disease)     H/O Cancer     right arm, in throat, chest and stomach, in remission    H/O Ischemia of extremity     History of MI (myocardial infarction) 2019    History of snoring     History of transfusion     HIV (human immunodeficiency virus infection)      Hypertension     Non Hodgkin's lymphoma     PAD (peripheral artery disease)     Pulmonary embolism     Stroke        Past Surgical History:   Procedure Laterality Date    CARDIAC CATHETERIZATION      CORONARY ANGIOPLASTY WITH STENT PLACEMENT      FEMORAL ARTERY - FEMORAL ARTERY BYPASS GRAFT      PORTACATH PLACEMENT         Family History: family history includes Diabetes in his mother; Hypertension in his maternal grandmother and mother; Stroke in his mother. Otherwise pertinent FHx was reviewed and not pertinent to current issue.    Social History:  reports that he has been smoking cigars and cigarettes. He started smoking about 30 years ago. He has a 30.4 pack-year smoking history. He does not have any smokeless tobacco history on file. He reports current alcohol use of about 4.0 standard drinks of alcohol per week. He reports that he does not currently use drugs.    Home Medications:  Pzrrpzo-Alksx-Wrlhwgma-TenofAF, Enoxaparin Sodium, HYDROcodone-acetaminophen, Insulin Glargine, Lancets, amLODIPine, carvedilol, dapagliflozin Propanediol, ergocalciferol, glucose blood, glucose monitor, insulin lispro, losartan, pantoprazole, sertraline, and warfarin    Allergies:  No Known Allergies    Objective   Objective     Vitals:   Temp:  [97.9 °F (36.6 °C)] 97.9 °F (36.6 °C)  Heart Rate:  [] 75  Resp:  [16] 16  BP: (174)/(112) 174/112  Physical Exam    Constitutional: Awake, alert   Eyes: PERRLA   HENT: NCAT, mucous membranes moist   Neck: Supple   Respiratory: Clear to auscultation bilaterally, nonlabored respirations    Cardiovascular: Regular rate, palpable pedal pulses bilaterally   Gastrointestinal: Positive bowel sounds, soft, nontender, nondistended   Musculoskeletal: No bilateral ankle edema   Psychiatric: Flat affect, cooperative   Neurologic: Oriented x 3, strength symmetric in all extremities, speech clear   Skin: No rashes       Result Review:  I have personally reviewed the results from the time of this  admission to 5/14/2024 19:59 EDT and agree with these findings:  x laboratory list / accordion  Microbiology  x radiology  x EKG/Telemetry   Cardiology/Vascular   Pathology  x old records  Other:    Initial workup in the emergency department showed high-sensitivity troponin of 26, proBNP 916, glucose 182, all of the lab work is at baseline for the patient.  Urine tox screen is negative, ethanol level is less than 10.  Chest x-ray shows no evidence for acute pulmonary process.  There is a moderate size area of acute/subacute infarction extending from the posterior inferior left parietal lobe into the superior left occipital lobe measuring 4.8 x 3 x 4 cm in size in the distribution of  parieto-occipital branch of the left MCA territory. Its precise age is uncertain and I recommend an MRI of the brain to further date it. There is an 11 x 6 mm infarct in the posterior lateral left cerebellum that is new when compared to the MRI of the brain 09/29/2023 and I suspect this is either subacute or chronic. Otherwise the head CT is unchanged when compared to MRI of the brain 09/29/2023. There is an 11 x 7 mm old  lacunar infarct extending from the mid right corona radiata region into the superior right putamen that occurred back in September of 2023. There are small old lacunar infarcts in the anterior right putamen and posterior limb of the right internal capsule.    Assessment & Plan   Assessment / Plan     Brief Patient Summary:  Cecilio Puckett is a 54 y.o. male who was admitted unit for further evaluation and treatment of his intermittent slurred speech.    Active Hospital Problems:  Active Hospital Problems    Diagnosis     **Slurred speech      Plan    Slurred speech  -Neurochecks every 4 hours   -Vital signs every 4 hours   -Cardiac monitoring   -MRI-brain without contrast pending  -CT Head -There is a moderate size area of acute/subacute infarction extending  from the posterior inferior left parietal lobe into the superior  left occipital lobe measuring 4.8 x 3 x 4 cm in size in the distribution of parieto-occipital branch of the left MCA territory. Its precise age is uncertain and I recommend an MRI of the brain to further date it.   -PT to eval and treat  -Speech therapy to eval secondary to failing dysphagia screen  -Lipid panel pending  -Neuro consult       Diabetes  -Accu-Cheks every 6 hours  -Adult subcutaneous insulin management-low dose  -Hemoglobin A1c -pending    -HIV  -Continue home medication for HIV-once cleared by speech therapy    History of CVA\DVT  -Pharmacy to dose Lovenox    DVT prophylaxis:  Mechanical DVT prophylaxis orders are present.        CODE STATUS:    Code Status (Patient has no pulse and is not breathing): CPR (Attempt to Resuscitate)  Medical Interventions (Patient has pulse or is breathing): Full Support    Admission Status:  I believe this patient meets observation status.    78 minutes have been spent by [facility] providers in the care of this patient while under observation status.      Appropriate PPE worn during patient encounter.  Hand hygeine performed before and after seeing the patient.      Electronically signed by MARIANNE Sanchez, 05/14/24, 7:59 PM EDT.

## 2024-05-14 NOTE — ED NOTES
Patient to ER via car from home for light headed and speech issues x a while   Patient reports started after first stroke and is getting worse

## 2024-05-14 NOTE — ED PROVIDER NOTES
EMERGENCY DEPARTMENT ENCOUNTER  Room Number:  28/28  PCP: Arias Lehman APRN  Independent Historians: Patient and Family      HPI:  Chief Complaint: had concerns including speech issues and Dizziness.     A complete HPI/ROS/PMH/PSH/SH/FH are unobtainable due to: None    Chronic or social conditions impacting patient care (Social Determinants of Health): None      Context: Cecilio Puckett is a 54 y.o. male with a medical history of non-Hodgkin's lymphoma, DVT, diabetes, hypertension, hyperlipidemia, GERD, CVA, and HIV who presents to the ED c/o acute speech difficulty and dizziness.  Patient reports he has noticed the symptoms over the last month.  Reports when he stands in the shower sometimes he feels like he is swaying.  Patient has history of TIA/CVA.  He is anticoagulated on warfarin for history of DVT/PE.  He reports there are times that he misses his medications.  Patient denies fall or head injury.  Patient denies vision loss, headache, facial droop, unilateral numbness/weakness, or any other systemic complaints.      Review of prior external notes (non-ED) -and- Review of prior external test results outside of this encounter:  Patient seen in office by infectious disease on 4/5/2024 for HIV.  Reviewed assessment and plan.  Patient to continue Genvoya and will follow-up in office in 4 months.  Reviewed labs collected on 4/5/2024.  CBC with hemoglobin 14.3, CMP with creatinine 0.88.    Prescription drug monitoring program review:     N/A    PAST MEDICAL HISTORY  Active Ambulatory Problems     Diagnosis Date Noted    Syncope, unspecified syncope type 10/10/2022    Acute DVT (deep venous thrombosis) 09/26/2023    NHL (non-Hodgkin's lymphoma) 09/27/2023    HIV (human immunodeficiency virus infection) 09/27/2023    DM (diabetes mellitus), type 2 09/27/2023    HTN (hypertension) 09/27/2023     Resolved Ambulatory Problems     Diagnosis Date Noted    Leg DVT (deep venous thromboembolism), acute, left 09/27/2023     TIA (transient ischemic attack) 09/29/2023    Acute CVA (cerebrovascular accident) 09/30/2023     Past Medical History:   Diagnosis Date    COPD (chronic obstructive pulmonary disease)     Coronary artery disease     Diabetes mellitus     DVT (deep venous thrombosis)     Elevated cholesterol     GERD (gastroesophageal reflux disease)     H/O Cancer     H/O Ischemia of extremity     History of MI (myocardial infarction) 2019    History of snoring     History of transfusion     Hypertension     Non Hodgkin's lymphoma     PAD (peripheral artery disease)     Pulmonary embolism     Stroke          PAST SURGICAL HISTORY  Past Surgical History:   Procedure Laterality Date    CARDIAC CATHETERIZATION      CORONARY ANGIOPLASTY WITH STENT PLACEMENT      FEMORAL ARTERY - FEMORAL ARTERY BYPASS GRAFT      PORTACATH PLACEMENT           FAMILY HISTORY  Family History   Problem Relation Age of Onset    Hypertension Mother     Diabetes Mother     Stroke Mother     Hypertension Maternal Grandmother          SOCIAL HISTORY  Social History     Socioeconomic History    Marital status: Single   Tobacco Use    Smoking status: Every Day     Current packs/day: 1.00     Average packs/day: 1 pack/day for 30.4 years (30.4 ttl pk-yrs)     Types: Cigars, Cigarettes     Start date: 01/1994   Vaping Use    Vaping status: Never Used   Substance and Sexual Activity    Alcohol use: Yes     Alcohol/week: 4.0 standard drinks of alcohol     Types: 4 Cans of beer per week    Drug use: Not Currently    Sexual activity: Defer         ALLERGIES  Patient has no known allergies.      REVIEW OF SYSTEMS  Review of Systems   Constitutional:  Negative for chills and fever.   HENT:  Negative for ear pain and sore throat.    Respiratory:  Negative for cough and shortness of breath.    Cardiovascular:  Negative for chest pain and palpitations.   Gastrointestinal:  Negative for abdominal pain and vomiting.   Genitourinary:  Negative for dysuria and hematuria.    Musculoskeletal:  Negative for arthralgias and joint swelling.   Skin:  Negative for pallor and rash.   Neurological:  Positive for dizziness. Negative for syncope and headaches.   Psychiatric/Behavioral:  Negative for confusion and hallucinations.      Included in HPI  All systems reviewed and negative except for those discussed in HPI.      PHYSICAL EXAM    I have reviewed the triage vital signs and nursing notes.    ED Triage Vitals   Temp Heart Rate Resp BP SpO2   05/14/24 1759 05/14/24 1759 05/14/24 1759 05/14/24 1808 05/14/24 1759   97.9 °F (36.6 °C) 108 16 (!) 174/112 97 %      Temp src Heart Rate Source Patient Position BP Location FiO2 (%)   -- -- -- -- --              Physical Exam  Constitutional:       General: He is not in acute distress.     Appearance: Normal appearance.   HENT:      Head: Normocephalic and atraumatic.      Nose: Nose normal.      Mouth/Throat:      Mouth: Mucous membranes are moist.   Eyes:      Conjunctiva/sclera: Conjunctivae normal.      Pupils: Pupils are equal, round, and reactive to light.   Cardiovascular:      Rate and Rhythm: Normal rate and regular rhythm.      Pulses: Normal pulses.      Heart sounds: Normal heart sounds.      Comments: Distal pulses intact  Pulmonary:      Effort: Pulmonary effort is normal.      Breath sounds: Normal breath sounds.   Abdominal:      General: There is no distension.   Musculoskeletal:         General: Normal range of motion.      Cervical back: Normal range of motion and neck supple.   Skin:     General: Skin is warm.      Capillary Refill: Capillary refill takes less than 2 seconds.   Neurological:      General: No focal deficit present.      Mental Status: He is alert and oriented to person, place, and time.      Comments: CN II through XII intact.  No facial asymmetry.  No aphasia or dysarthria.  No drift of the upper or lower extremities.  Sensation intact to light touch all 4 extremities.  Motor strength 5/5 all 4 extremities    Psychiatric:         Mood and Affect: Mood normal.           LAB RESULTS  Recent Results (from the past 24 hour(s))   Comprehensive Metabolic Panel    Collection Time: 05/14/24  6:32 PM    Specimen: Blood   Result Value Ref Range    Glucose 182 (H) 65 - 99 mg/dL    BUN 8 6 - 20 mg/dL    Creatinine 1.10 0.76 - 1.27 mg/dL    Sodium 139 136 - 145 mmol/L    Potassium 4.1 3.5 - 5.2 mmol/L    Chloride 106 98 - 107 mmol/L    CO2 23.6 22.0 - 29.0 mmol/L    Calcium 9.0 8.6 - 10.5 mg/dL    Total Protein 6.6 6.0 - 8.5 g/dL    Albumin 3.6 3.5 - 5.2 g/dL    ALT (SGPT) 19 1 - 41 U/L    AST (SGOT) 11 1 - 40 U/L    Alkaline Phosphatase 67 39 - 117 U/L    Total Bilirubin 0.3 0.0 - 1.2 mg/dL    Globulin 3.0 gm/dL    A/G Ratio 1.2 g/dL    BUN/Creatinine Ratio 7.3 7.0 - 25.0    Anion Gap 9.4 5.0 - 15.0 mmol/L    eGFR 80.3 >60.0 mL/min/1.73   Protime-INR    Collection Time: 05/14/24  6:32 PM    Specimen: Blood   Result Value Ref Range    Protime 14.0 11.7 - 14.2 Seconds    INR 1.05 0.90 - 1.10   aPTT    Collection Time: 05/14/24  6:32 PM    Specimen: Blood   Result Value Ref Range    PTT 25.3 22.7 - 35.4 seconds   Single High Sensitivity Troponin T    Collection Time: 05/14/24  6:32 PM    Specimen: Blood   Result Value Ref Range    HS Troponin T 26 (H) <22 ng/L   BNP    Collection Time: 05/14/24  6:32 PM    Specimen: Blood   Result Value Ref Range    proBNP 916.0 (H) 0.0 - 900.0 pg/mL   Ethanol    Collection Time: 05/14/24  6:32 PM    Specimen: Blood   Result Value Ref Range    Ethanol <10 0 - 10 mg/dL    Ethanol % <0.010 %   Magnesium    Collection Time: 05/14/24  6:32 PM    Specimen: Blood   Result Value Ref Range    Magnesium 1.7 1.6 - 2.6 mg/dL   TSH    Collection Time: 05/14/24  6:32 PM    Specimen: Blood   Result Value Ref Range    TSH 1.280 0.270 - 4.200 uIU/mL   CBC Auto Differential    Collection Time: 05/14/24  6:32 PM    Specimen: Blood   Result Value Ref Range    WBC 5.67 3.40 - 10.80 10*3/mm3    RBC 5.12 4.14 - 5.80  10*6/mm3    Hemoglobin 14.4 13.0 - 17.7 g/dL    Hematocrit 43.7 37.5 - 51.0 %    MCV 85.4 79.0 - 97.0 fL    MCH 28.1 26.6 - 33.0 pg    MCHC 33.0 31.5 - 35.7 g/dL    RDW 14.5 12.3 - 15.4 %    RDW-SD 44.9 37.0 - 54.0 fl    MPV 11.1 6.0 - 12.0 fL    Platelets 176 140 - 450 10*3/mm3   Manual Differential    Collection Time: 05/14/24  6:32 PM    Specimen: Blood   Result Value Ref Range    Neutrophil % 30.6 (L) 42.7 - 76.0 %    Lymphocyte % 61.2 (H) 19.6 - 45.3 %    Monocyte % 5.1 5.0 - 12.0 %    Eosinophil % 1.0 0.3 - 6.2 %    Basophil % 2.0 (H) 0.0 - 1.5 %    Neutrophils Absolute 1.74 1.70 - 7.00 10*3/mm3    Lymphocytes Absolute 3.47 (H) 0.70 - 3.10 10*3/mm3    Monocytes Absolute 0.29 0.10 - 0.90 10*3/mm3    Eosinophils Absolute 0.06 0.00 - 0.40 10*3/mm3    Basophils Absolute 0.11 0.00 - 0.20 10*3/mm3    RBC Morphology Normal Normal    WBC Morphology Normal Normal    Platelet Morphology Normal Normal   ECG 12 Lead Stroke Evaluation    Collection Time: 05/14/24  6:32 PM   Result Value Ref Range    QT Interval 379 ms    QTC Interval 446 ms         RADIOLOGY  XR Chest 1 View    Result Date: 5/14/2024  XR CHEST 1 VW-  HISTORY: Male who is 54 years-old, dizziness  TECHNIQUE: Frontal view of the chest  COMPARISON: 9/29/2023  FINDINGS: Heart, mediastinum and pulmonary vasculature are unremarkable. No focal pulmonary consolidation, pleural effusion, or pneumothorax. No acute osseous process.      No evidence for acute pulmonary process. Follow-up as clinical indications persist.  This report was finalized on 5/14/2024 7:18 PM by Dr. Junior Weiner M.D on Workstation: [id number]         MEDICATIONS GIVEN IN ER  Medications   sodium chloride 0.9 % flush 10 mL (has no administration in time range)         ORDERS PLACED DURING THIS VISIT:  Orders Placed This Encounter   Procedures    XR Chest 1 View    CT Head Without Contrast    Comprehensive Metabolic Panel    Protime-INR    aPTT    Single High Sensitivity Troponin T    BNP     Urinalysis With Microscopic If Indicated (No Culture) - Urine, Clean Catch    Urine Drug Screen - Urine, Clean Catch    Ethanol    Magnesium    TSH    CBC Auto Differential    Manual Differential    ECG 12 Lead Stroke Evaluation    Insert Peripheral IV    Initiate ED Observation Status    CBC & Differential         OUTPATIENT MEDICATION MANAGEMENT:  Current Facility-Administered Medications Ordered in Epic   Medication Dose Route Frequency Provider Last Rate Last Admin    sodium chloride 0.9 % flush 10 mL  10 mL Intravenous PRN Kely Luna PA-C         Current Outpatient Medications Ordered in Epic   Medication Sig Dispense Refill    amLODIPine (NORVASC) 10 MG tablet Take 1 tablet by mouth Daily.      carvedilol (COREG) 6.25 MG tablet Take 1 tablet by mouth Every 12 (Twelve) Hours for 30 days. 60 tablet 0    dapagliflozin Propanediol (Farxiga) 10 MG tablet       Babjgii-Npojz-Xvgfialh-TenofAF (GENVOYA) 960-741-638-10 MG per tablet Take 1 tablet by mouth Daily.      Enoxaparin Sodium (LOVENOX) 100 MG/ML solution prefilled syringe syringe Inject 1 mL under the skin into the appropriate area as directed Every 12 (Twelve) Hours. Indications: DVT/PE (active thrombosis) 28 mL 0    ergocalciferol (ERGOCALCIFEROL) 1.25 MG (22058 UT) capsule Take 1 capsule by mouth 1 (One) Time Per Week.      glucose blood test strip Use to test blood glucose up to four times daily as needed. Formulary Compliance Approval. Diagnosis: Type 2 Diabetes - Insulin Dependent 100 each 0    glucose monitor monitoring kit Use to test blood glucose up to four times daily as needed. Formulary Compliance Approval. Diagnosis: Type 2 Diabetes - Insulin Dependent 1 each 0    HYDROcodone-acetaminophen (NORCO)  MG per tablet Take 1 tablet by mouth.      Insulin Glargine (LANTUS SOLOSTAR) 100 UNIT/ML injection pen Inject 25 Units under the skin into the appropriate area as directed Every Night. 100 mL 0    insulin lispro (ADMELOG) 100 UNIT/ML  injection Inject 8 Units under the skin into the appropriate area as directed 3 (Three) Times a Day With Meals for 30 days. 100 mL 0    Lancets misc Use to test blood glucose up to four times daily as needed. Formulary Compliance Approval. Diagnosis: Type 2 Diabetes - Insulin Dependent 100 each 0    losartan (COZAAR) 25 MG tablet Take 1 tablet by mouth Daily.      pantoprazole (PROTONIX) 40 MG EC tablet       sertraline (ZOLOFT) 50 MG tablet Take 1 tablet by mouth Daily.      warfarin (COUMADIN) 10 MG tablet TAKE one AND one half TABLET BY MOUTH ON MONDAY, WEDNESDAY, FRIDAY. TAKE ONE TABLET BY MOUTH ALL other DAYS.             PROGRESS, DATA ANALYSIS, CONSULTS, AND MEDICAL DECISION MAKING  All labs have been independently interpreted by me.  All radiology studies have been reviewed by me. All EKG's have been independently viewed and interpreted by me.  Discussion below represents my analysis of pertinent findings related to patient's condition, differential diagnosis, treatment plan and final disposition.    Differential diagnosis includes but is not limited to peripheral vertigo, TIA, CVA, migraine headache.    Clinical Scores:            Total (NIH Stroke Scale): 1      ED Course as of 05/14/24 1957 Tue May 14, 2024   1835 EKG ER MD interpretation   Time: 18: 32  Rhythm and rate: Normal sinus rhythm at a rate of 83  Axis: Normal  P waves: Normal for probable left atrial enlargement  QRS complexes: LVH with repolarization changes especially anterior leads  ST segments: no depressions  T waves: Inversions in 2, 3, aVF, V5, V6  Q waves in 1, aVL  Comparison EKG is from September 29, 2023 where patient had similar findings with no significant change [AR]   1942 CT scan of head independently interpreted me as no gross hemorrhage [MP]   1956 I spoke with Lisbet in the observation unit.  Reviewed patient presentation and ED findings.  She agrees to admit patient to an ED observation bed. [MP]      ED Course User  Index  [AR] Jenny Thomason MD  [MP] Kely Luna PA-C             AS OF 19:57 EDT VITALS:    BP - (!) 174/112  HR - 75  TEMP - 97.9 °F (36.6 °C)  O2 SATS - 100%    COMPLEXITY OF CARE  The patient requires admission.      DIAGNOSIS  Final diagnoses:   Difficulty with speech   Dizziness   History of CVA (cerebrovascular accident)   Hypertension not at goal         DISPOSITION  ED Disposition       ED Disposition   Decision to Admit    Condition   --    Comment   --                Please note that portions of this document were completed with a voice recognition program.    Note Disclaimer: At [facility], we believe that sharing information builds trust and better relationships. You are receiving this note because you recently visited [facility]. It is possible you will see health information before a provider has talked with you about it. This kind of information can be easy to misunderstand. To help you fully understand what it means for your health, we urge you to discuss this note with your provider.         Kely Luna PA-C  05/14/24 1957

## 2024-05-15 ENCOUNTER — APPOINTMENT (OUTPATIENT)
Dept: CT IMAGING | Facility: HOSPITAL | Age: 54
End: 2024-05-15
Payer: MEDICARE

## 2024-05-15 ENCOUNTER — APPOINTMENT (OUTPATIENT)
Dept: MRI IMAGING | Facility: HOSPITAL | Age: 54
End: 2024-05-15
Payer: MEDICARE

## 2024-05-15 ENCOUNTER — APPOINTMENT (OUTPATIENT)
Dept: CARDIOLOGY | Facility: HOSPITAL | Age: 54
End: 2024-05-15
Payer: MEDICARE

## 2024-05-15 PROBLEM — I63.9 ACUTE CVA (CEREBROVASCULAR ACCIDENT): Status: ACTIVE | Noted: 2024-05-15

## 2024-05-15 LAB
ANION GAP SERPL CALCULATED.3IONS-SCNC: 9 MMOL/L (ref 5–15)
AORTIC ARCH: 3.2 CM
ASCENDING AORTA: 3.2 CM
BH CV ECHO LEFT VENTRICLE GLOBAL LONGITUDINAL STRAIN: -14.8 %
BH CV ECHO MEAS - ACS: 2.25 CM
BH CV ECHO MEAS - AO MAX PG: 4.7 MMHG
BH CV ECHO MEAS - AO MEAN PG: 2.3 MMHG
BH CV ECHO MEAS - AO ROOT DIAM: 3.3 CM
BH CV ECHO MEAS - AO V2 MAX: 108.8 CM/SEC
BH CV ECHO MEAS - AO V2 VTI: 22.8 CM
BH CV ECHO MEAS - AVA(I,D): 2.28 CM2
BH CV ECHO MEAS - EDV(CUBED): 139.8 ML
BH CV ECHO MEAS - EDV(MOD-SP2): 129 ML
BH CV ECHO MEAS - EDV(MOD-SP4): 133 ML
BH CV ECHO MEAS - EF(MOD-BP): 36.9 %
BH CV ECHO MEAS - EF(MOD-SP2): 41.1 %
BH CV ECHO MEAS - EF(MOD-SP4): 31.6 %
BH CV ECHO MEAS - ESV(CUBED): 84.1 ML
BH CV ECHO MEAS - ESV(MOD-SP2): 76 ML
BH CV ECHO MEAS - ESV(MOD-SP4): 91 ML
BH CV ECHO MEAS - FS: 15.6 %
BH CV ECHO MEAS - IVS/LVPW: 1.32 CM
BH CV ECHO MEAS - IVSD: 2.03 CM
BH CV ECHO MEAS - LAT PEAK E' VEL: 5.2 CM/SEC
BH CV ECHO MEAS - LV DIASTOLIC VOL/BSA (35-75): 63.1 CM2
BH CV ECHO MEAS - LV MASS(C)D: 444.3 GRAMS
BH CV ECHO MEAS - LV MAX PG: 1.8 MMHG
BH CV ECHO MEAS - LV MEAN PG: 0.9 MMHG
BH CV ECHO MEAS - LV SYSTOLIC VOL/BSA (12-30): 43.1 CM2
BH CV ECHO MEAS - LV V1 MAX: 67.1 CM/SEC
BH CV ECHO MEAS - LV V1 VTI: 13.6 CM
BH CV ECHO MEAS - LVIDD: 5.2 CM
BH CV ECHO MEAS - LVIDS: 4.4 CM
BH CV ECHO MEAS - LVOT AREA: 3.8 CM2
BH CV ECHO MEAS - LVOT DIAM: 2.21 CM
BH CV ECHO MEAS - LVPWD: 1.54 CM
BH CV ECHO MEAS - MED PEAK E' VEL: 4.8 CM/SEC
BH CV ECHO MEAS - MV A DUR: 0.11 SEC
BH CV ECHO MEAS - MV A MAX VEL: 72.8 CM/SEC
BH CV ECHO MEAS - MV DEC SLOPE: 305.8 CM/SEC2
BH CV ECHO MEAS - MV DEC TIME: 0.21 SEC
BH CV ECHO MEAS - MV E MAX VEL: 94.3 CM/SEC
BH CV ECHO MEAS - MV E/A: 1.29
BH CV ECHO MEAS - MV MAX PG: 3.8 MMHG
BH CV ECHO MEAS - MV MEAN PG: 1.56 MMHG
BH CV ECHO MEAS - MV P1/2T: 83.6 MSEC
BH CV ECHO MEAS - MV V2 VTI: 26.7 CM
BH CV ECHO MEAS - MVA(P1/2T): 2.6 CM2
BH CV ECHO MEAS - MVA(VTI): 1.95 CM2
BH CV ECHO MEAS - PA ACC TIME: 0.15 SEC
BH CV ECHO MEAS - PA V2 MAX: 63.9 CM/SEC
BH CV ECHO MEAS - PULM DIAS VEL: 35.6 CM/SEC
BH CV ECHO MEAS - PULM S/D: 1.24
BH CV ECHO MEAS - PULM SYS VEL: 44.2 CM/SEC
BH CV ECHO MEAS - RAP SYSTOLE: 3 MMHG
BH CV ECHO MEAS - RV MAX PG: 1.73 MMHG
BH CV ECHO MEAS - RV V1 MAX: 65.8 CM/SEC
BH CV ECHO MEAS - RV V1 VTI: 15.6 CM
BH CV ECHO MEAS - SV(LVOT): 52 ML
BH CV ECHO MEAS - SV(MOD-SP2): 53 ML
BH CV ECHO MEAS - SV(MOD-SP4): 42 ML
BH CV ECHO MEAS - SVI(LVOT): 24.6 ML/M2
BH CV ECHO MEAS - SVI(MOD-SP2): 25.1 ML/M2
BH CV ECHO MEAS - SVI(MOD-SP4): 19.9 ML/M2
BH CV ECHO MEAS - TAPSE (>1.6): 2.1 CM
BH CV ECHO MEASUREMENTS AVERAGE E/E' RATIO: 18.86
BH CV XLRA - RV BASE: 3.5 CM
BH CV XLRA - RV LENGTH: 8.9 CM
BH CV XLRA - RV MID: 2.9 CM
BH CV XLRA - TDI S': 10.9 CM/SEC
BUN SERPL-MCNC: 8 MG/DL (ref 6–20)
BUN/CREAT SERPL: 9.8 (ref 7–25)
CALCIUM SPEC-SCNC: 8.5 MG/DL (ref 8.6–10.5)
CHLORIDE SERPL-SCNC: 107 MMOL/L (ref 98–107)
CO2 SERPL-SCNC: 22 MMOL/L (ref 22–29)
CREAT SERPL-MCNC: 0.82 MG/DL (ref 0.76–1.27)
DEPRECATED RDW RBC AUTO: 43 FL (ref 37–54)
EGFRCR SERPLBLD CKD-EPI 2021: 105 ML/MIN/1.73
ERYTHROCYTE [DISTWIDTH] IN BLOOD BY AUTOMATED COUNT: 14.2 % (ref 12.3–15.4)
GLUCOSE BLDC GLUCOMTR-MCNC: 142 MG/DL (ref 70–130)
GLUCOSE BLDC GLUCOMTR-MCNC: 147 MG/DL (ref 70–130)
GLUCOSE BLDC GLUCOMTR-MCNC: 163 MG/DL (ref 70–130)
GLUCOSE BLDC GLUCOMTR-MCNC: 80 MG/DL (ref 70–130)
GLUCOSE BLDC GLUCOMTR-MCNC: 87 MG/DL (ref 70–130)
GLUCOSE SERPL-MCNC: 119 MG/DL (ref 65–99)
HCT VFR BLD AUTO: 43 % (ref 37.5–51)
HGB BLD-MCNC: 14.3 G/DL (ref 13–17.7)
INR PPP: 1.13 (ref 0.9–1.1)
LEFT ATRIUM VOLUME INDEX: 41.8 ML/M2
MCH RBC QN AUTO: 28.1 PG (ref 26.6–33)
MCHC RBC AUTO-ENTMCNC: 33.3 G/DL (ref 31.5–35.7)
MCV RBC AUTO: 84.6 FL (ref 79–97)
PLATELET # BLD AUTO: 169 10*3/MM3 (ref 140–450)
PMV BLD AUTO: 10.9 FL (ref 6–12)
POTASSIUM SERPL-SCNC: 3.4 MMOL/L (ref 3.5–5.2)
POTASSIUM SERPL-SCNC: 4.3 MMOL/L (ref 3.5–5.2)
PROTHROMBIN TIME: 14.7 SECONDS (ref 11.7–14.2)
RBC # BLD AUTO: 5.08 10*6/MM3 (ref 4.14–5.8)
SINUS: 2.8 CM
SODIUM SERPL-SCNC: 138 MMOL/L (ref 136–145)
STJ: 2.7 CM
WBC NRBC COR # BLD AUTO: 6.33 10*3/MM3 (ref 3.4–10.8)

## 2024-05-15 PROCEDURE — 85027 COMPLETE CBC AUTOMATED: CPT

## 2024-05-15 PROCEDURE — 25510000001 PERFLUTREN (DEFINITY) 8.476 MG IN SODIUM CHLORIDE (PF) 0.9 % 10 ML INJECTION: Performed by: NURSE PRACTITIONER

## 2024-05-15 PROCEDURE — 70498 CT ANGIOGRAPHY NECK: CPT

## 2024-05-15 PROCEDURE — 93356 MYOCRD STRAIN IMG SPCKL TRCK: CPT

## 2024-05-15 PROCEDURE — 85610 PROTHROMBIN TIME: CPT | Performed by: NURSE PRACTITIONER

## 2024-05-15 PROCEDURE — 80048 BASIC METABOLIC PNL TOTAL CA: CPT

## 2024-05-15 PROCEDURE — 70551 MRI BRAIN STEM W/O DYE: CPT

## 2024-05-15 PROCEDURE — 70496 CT ANGIOGRAPHY HEAD: CPT

## 2024-05-15 PROCEDURE — 92610 EVALUATE SWALLOWING FUNCTION: CPT

## 2024-05-15 PROCEDURE — 25010000002 ENOXAPARIN PER 10 MG

## 2024-05-15 PROCEDURE — 93306 TTE W/DOPPLER COMPLETE: CPT | Performed by: INTERNAL MEDICINE

## 2024-05-15 PROCEDURE — 25510000001 IOPAMIDOL PER 1 ML: Performed by: EMERGENCY MEDICINE

## 2024-05-15 PROCEDURE — 84132 ASSAY OF SERUM POTASSIUM: CPT

## 2024-05-15 PROCEDURE — 82948 REAGENT STRIP/BLOOD GLUCOSE: CPT

## 2024-05-15 PROCEDURE — 63710000001 INSULIN GLARGINE PER 5 UNITS

## 2024-05-15 PROCEDURE — 99222 1ST HOSP IP/OBS MODERATE 55: CPT

## 2024-05-15 PROCEDURE — 93306 TTE W/DOPPLER COMPLETE: CPT

## 2024-05-15 PROCEDURE — 93356 MYOCRD STRAIN IMG SPCKL TRCK: CPT | Performed by: INTERNAL MEDICINE

## 2024-05-15 PROCEDURE — 25010000002 POTASSIUM CHLORIDE 10 MEQ/100ML SOLUTION

## 2024-05-15 RX ORDER — POTASSIUM CHLORIDE 750 MG/1
40 TABLET, FILM COATED, EXTENDED RELEASE ORAL EVERY 4 HOURS
Status: DISCONTINUED | OUTPATIENT
Start: 2024-05-15 | End: 2024-05-15

## 2024-05-15 RX ORDER — POTASSIUM CHLORIDE 7.45 MG/ML
10 INJECTION INTRAVENOUS
Status: DISPENSED | OUTPATIENT
Start: 2024-05-15 | End: 2024-05-15

## 2024-05-15 RX ORDER — WARFARIN SODIUM 7.5 MG/1
15 TABLET ORAL
Status: COMPLETED | OUTPATIENT
Start: 2024-05-15 | End: 2024-05-15

## 2024-05-15 RX ORDER — POTASSIUM CHLORIDE 750 MG/1
20 TABLET, FILM COATED, EXTENDED RELEASE ORAL ONCE
Status: COMPLETED | OUTPATIENT
Start: 2024-05-15 | End: 2024-05-15

## 2024-05-15 RX ADMIN — CARVEDILOL 6.25 MG: 6.25 TABLET, FILM COATED ORAL at 20:42

## 2024-05-15 RX ADMIN — Medication 10 ML: at 08:36

## 2024-05-15 RX ADMIN — ENOXAPARIN SODIUM 100 MG: 100 INJECTION SUBCUTANEOUS at 08:36

## 2024-05-15 RX ADMIN — POTASSIUM CHLORIDE 10 MEQ: 7.46 INJECTION, SOLUTION INTRAVENOUS at 06:46

## 2024-05-15 RX ADMIN — WARFARIN 15 MG: 7.5 TABLET ORAL at 18:51

## 2024-05-15 RX ADMIN — LOSARTAN POTASSIUM 25 MG: 25 TABLET, FILM COATED ORAL at 15:20

## 2024-05-15 RX ADMIN — ROSUVASTATIN CALCIUM 20 MG: 20 TABLET, FILM COATED ORAL at 15:21

## 2024-05-15 RX ADMIN — POTASSIUM CHLORIDE 10 MEQ: 7.46 INJECTION, SOLUTION INTRAVENOUS at 08:35

## 2024-05-15 RX ADMIN — HYDROCHLOROTHIAZIDE 12.5 MG: 12.5 TABLET ORAL at 15:22

## 2024-05-15 RX ADMIN — AMLODIPINE BESYLATE 10 MG: 10 TABLET ORAL at 15:21

## 2024-05-15 RX ADMIN — INSULIN GLARGINE 25 UNITS: 100 INJECTION, SOLUTION SUBCUTANEOUS at 20:43

## 2024-05-15 RX ADMIN — POTASSIUM CHLORIDE 20 MEQ: 750 TABLET, EXTENDED RELEASE ORAL at 15:22

## 2024-05-15 RX ADMIN — FUROSEMIDE 20 MG: 20 TABLET ORAL at 15:20

## 2024-05-15 RX ADMIN — IOPAMIDOL 95 ML: 755 INJECTION, SOLUTION INTRAVENOUS at 08:15

## 2024-05-15 RX ADMIN — PERFLUTREN 3 ML: 6.52 INJECTION, SUSPENSION INTRAVENOUS at 09:49

## 2024-05-15 NOTE — PLAN OF CARE
Goal Outcome Evaluation:      Pt. Admitted for slurred speech. Asymmetry noted when smiling. NIH 1, failed dysphagia. Neuro, PT, and SLP consulted. Strict NPO. CT head and MRI completed. Potassium 3.4, IV potassium order placed. BP has been elevated. Pt. Resting comfortably.

## 2024-05-15 NOTE — CONSULTS
Neurology Consult Note    Consult Date: 5/15/2024    Referring MD: Jenny Thomason MD    Reason for Consult I have been asked to see the patient in neurological consultation to render advice and opinion regarding slurred speech.    Cecilio Puckett is a 54 y.o. male with PMH of COPD, diabetes, HIV, HTN, non-hodgkin's lymphoma, PAD, PE, TIA and stroke presents to the ED with slurred speech and dizziness. Patient states he noticed symptoms over the last month.  He states that his dizziness is more like a lightheadedness or feeling as though he is about to pass out. He denies a room spinning sensation or associated N/V. He admits to associated posterior headache that is pressure headache that is intermittent. He says sometimes ibuprofen helps the headache but not all the time. He denies any provoking factors. He admits to difficulty with vision that started about a month ago and describes it as wavy vision in his eyes and that he can see color. He denies pain with vision loss.  He also states that he has had trouble putting on jackets and with brushing his teeth for about 1 month as well. He is anticoagulated on warfarin for history of DVT/PE but admits to non-compliance wit his medications. He states it was recommended for him to go to rehab after previous stroke but he says he went home and did not continue therapies. Patient denies fall or head injury. Patient denies headache, and unilateral numbness/weakness. BP on arrival 174/112 mmHg, pulse 75, glucose 182. His last CD4 was 1060 on 4/5/2024.     Pertinent neurological history:  Patient seen 10/11/2022 with syncope, generalized weakness and was seen on 9/27/2022 for slurred speech and significantly elevated BP. Patient was non-compliant with coumadin then as well. Patient had subacute lacunar infarct of right internal capsule believed to be within the month. TTE demonstrated EF of 48% with negative saline tests and no LAE or thrombus. It was recommended for him to  continue coumadin and aspirin 81 mg plus statins. He was seen again on 9/30/2023 with worsening dysarthria and intermittent left-sided weakness. MRI revealed new small right subcortical stroke in very similar area. CTA head and neck revealed mild narrowing of bilateral common carotids and ICA's plus acute infarctions of right corona radiata and right basal ganglia without hemorrhagic transformation.    Past Medical/Surgical Hx:  Past Medical History:   Diagnosis Date    COPD (chronic obstructive pulmonary disease)     Coronary artery disease     Diabetes mellitus     DVT (deep venous thrombosis)     Elevated cholesterol     GERD (gastroesophageal reflux disease)     H/O Cancer     right arm, in throat, chest and stomach, in remission    H/O Ischemia of extremity     History of MI (myocardial infarction) 2019    History of snoring     History of transfusion     HIV (human immunodeficiency virus infection)     Hypertension     Non Hodgkin's lymphoma     PAD (peripheral artery disease)     Pulmonary embolism     Stroke      Past Surgical History:   Procedure Laterality Date    CARDIAC CATHETERIZATION      CORONARY ANGIOPLASTY WITH STENT PLACEMENT      FEMORAL ARTERY - FEMORAL ARTERY BYPASS GRAFT      PORTACATH PLACEMENT         Medications On Admission  Medications Prior to Admission   Medication Sig Dispense Refill Last Dose    Adult Aspirin Regimen 81 MG EC tablet Take 1 tablet by mouth Daily.   Past Week    amLODIPine (NORVASC) 10 MG tablet Take 1 tablet by mouth Daily.   Past Month    dapagliflozin Propanediol (Farxiga) 10 MG tablet    Past Month    Jgrbomt-Pidwe-Ewahkfvz-TenofAF (GENVOYA) [id number] MG per tablet Take 1 tablet by mouth Daily.   Past Month    Enoxaparin Sodium (LOVENOX) 100 MG/ML solution prefilled syringe syringe Inject 1 mL under the skin into the appropriate area as directed Every 12 (Twelve) Hours. Indications: DVT/PE (active thrombosis) 28 mL 0 Past Week    ergocalciferol (ERGOCALCIFEROL)  1.25 MG (02135 UT) capsule Take 1 capsule by mouth 1 (One) Time Per Week.   Past Month    glipizide (GLUCOTROL) 10 MG tablet Take 1 tablet by mouth Every 12 (Twelve) Hours.   Past Week    glucose blood test strip Use to test blood glucose up to four times daily as needed. Formulary Compliance Approval. Diagnosis: Type 2 Diabetes - Insulin Dependent 100 each 0 5/13/2024    glucose monitor monitoring kit Use to test blood glucose up to four times daily as needed. Formulary Compliance Approval. Diagnosis: Type 2 Diabetes - Insulin Dependent 1 each 0 5/13/2024    Insulin Glargine (LANTUS SOLOSTAR) 100 UNIT/ML injection pen Inject 25 Units under the skin into the appropriate area as directed Every Night. 100 mL 0 Past Month    Lancets misc Use to test blood glucose up to four times daily as needed. Formulary Compliance Approval. Diagnosis: Type 2 Diabetes - Insulin Dependent 100 each 0 Past Week    losartan (COZAAR) 25 MG tablet Take 1 tablet by mouth Daily.   Past Month    pantoprazole (PROTONIX) 40 MG EC tablet    Past Month    sertraline (ZOLOFT) 50 MG tablet Take 1 tablet by mouth Daily.   5/13/2024    warfarin (COUMADIN) 10 MG tablet TAKE one AND one half TABLET BY MOUTH ON MONDAY, WEDNESDAY, FRIDAY. TAKE ONE TABLET BY MOUTH ALL other DAYS.   5/14/2024    carvedilol (COREG) 6.25 MG tablet Take 1 tablet by mouth Every 12 (Twelve) Hours for 30 days. 60 tablet 0     furosemide (LASIX) 20 MG tablet Take 1 tablet by mouth Daily.   Unknown    hydroCHLOROthiazide 12.5 MG tablet Take 1 tablet by mouth Daily.   Unknown    HYDROcodone-acetaminophen (NORCO)  MG per tablet Take 1 tablet by mouth.   Unknown    insulin lispro (ADMELOG) 100 UNIT/ML injection Inject 8 Units under the skin into the appropriate area as directed 3 (Three) Times a Day With Meals for 30 days. 100 mL 0     rosuvastatin (CRESTOR) 20 MG tablet Take 1 tablet by mouth Daily.   Unknown       Allergies:  No Known Allergies    Social Hx:  Social History  "    Socioeconomic History    Marital status: Single   Tobacco Use    Smoking status: Every Day     Current packs/day: 1.00     Average packs/day: 1 pack/day for 30.4 years (30.4 ttl pk-yrs)     Types: Cigars, Cigarettes     Start date: 01/1994   Vaping Use    Vaping status: Never Used   Substance and Sexual Activity    Alcohol use: Yes     Alcohol/week: 2.0 standard drinks of alcohol     Types: 2 Cans of beer per week     Comment: occassionally    Drug use: Not Currently    Sexual activity: Defer       Family Hx:  Family History   Problem Relation Age of Onset    Hypertension Mother     Diabetes Mother     Stroke Mother     Hypertension Maternal Grandmother        Exam    BP (!) 168/101 (BP Location: Right arm, Patient Position: Lying)   Pulse 63   Temp 98.2 °F (36.8 °C) (Oral)   Resp 16   Ht 175.3 cm (69\")   Wt 95.7 kg (210 lb 14.4 oz)   SpO2 98%   BMI 31.14 kg/m²   gen: NAD, vitals reviewed  MS: oriented to person, year and place but guesses month as March, recent/remote memory intact, normal attention/concentration, language intact, no neglect, normal fund of knowledge  CN: visual acuity grossly normal, visual fields full, PERRL, EOMI, facial sensation equal, no facial droop, hearing symmetric, palate elevates symmetrically, shoulder shrug equal, tongue midline  Motor: 5/5 throughout upper and lower extremities, normal tone  Sensation: intact to vibration and temperature throughout  Reflexes: 2+ throughout upper and lower extremities, downgoing plantars  Coordination: no dysmetria with finger to nose bilaterally    DATA:    Lab Results   Component Value Date    GLUCOSE 119 (H) 05/15/2024    CALCIUM 8.5 (L) 05/15/2024     05/15/2024    K 3.4 (L) 05/15/2024    CO2 22.0 05/15/2024     05/15/2024    BUN 8 05/15/2024    CREATININE 0.82 05/15/2024    EGFRIFAFRI >60 11/29/2022    BCR 9.8 05/15/2024    ANIONGAP 9.0 05/15/2024     Lab Results   Component Value Date    WBC 6.33 05/15/2024    HGB 14.3 " 05/15/2024    HCT 43.0 05/15/2024    MCV 84.6 05/15/2024     05/15/2024     Lab Results   Component Value Date     (H) 05/14/2024     (H) 10/01/2023     (H) 09/29/2023     Lab Results   Component Value Date    HGBA1C 10.00 (H) 05/14/2024     Lab Results   Component Value Date    INR 1.05 05/14/2024    INR 1.20 (H) 01/22/2024    INR 1.10 12/22/2023    PROTIME 14.0 05/14/2024    PROTIME 14.4 (H) 01/22/2024    PROTIME 13.8 (H) 12/22/2023       Lab review:Hemoglobin A1c: 10, LDL: 136,  INR: 1.05, UDS: negative, ethanol: <0.10, CD4: 1060    Imaging review:   CTH head without:  FINDINGS: There is a 10 x 7 mm old lacunar infarct extending from the  mid right corona radiata region into the superior right putamen that  occurred back in September 2023. There is an additional 8 x 5 mm old  lacunar infarct in the anterior right putamen, 4 mm old lacunar infarct  in the posterior limb of the right internal capsule. There is a moderate  size area of infarction involving the posterior inferior medial left  parietal lobe extending in the superior left occipital lobe. It measures  approximately 4.8 x 3 x 4 cm. It is in the distribution of  parieto-occipital branches left MCA territory and the precise age of  this infarct is uncertain but may be subacute in nature. There is a 11 x  6 mm infarct in the posterior lateral left cerebellum and left PICA  territory either subacute or chronic. The remainder of the brain  parenchyma is normal in attenuation. The ventricles are normal in size.  I see no mass effect and no midline shift and no extra-axial fluid  collections are identified and there is no evidence of acute  intracranial hemorrhage. The calvarium and the skull base are normal in  appearance. Paranasal sinuses, mastoid air cells and middle ear cavities  are clear.  IMPRESSION:  1. There is a moderate size area of acute/subacute infarction extending  from the posterior inferior left parietal lobe into  the superior left  occipital lobe measuring 4.8 x 3 x 4 cm in size in the distribution of  parieto-occipital branch of the left MCA territory. Its precise age is  uncertain and I recommend an MRI of the brain to better date it. There  is an 11 x 6 mm infarct in the posterior lateral left cerebellum that is  new when compared to the MRI of the brain on 09/29/2023 and I suspect  this is either subacute or chronic. Otherwise, the head CT is unchanged  when compared to MRI of the brain 09/29/2023. There is an 11 x 7 mm old  lacunar infarct extending from the mid right corona radiata region into  the superior right putamen that occurred back in September of 2023.  There are small old lacunar infarcts in the anterior right putamen and  posterior limb of the right internal capsule. The remainder of the head  CT is within normal limits. The results and recommendations were  communicated to Kely Luna by telephone on 05/14/2024 at 8 p.m.    CTA head and neck:  FINDINGS: Initially, a noncontrasted CT examination of the brain was  performed. An area of decreased attenuation involving the left occipital  lobe posteriorly is appreciated corresponding to the largest of the  acute infarcts noted on the MRI examination from earlier the same day.  It measures approximately 4.5 cm in maximum transverse dimension. The  smaller acute infarcts noted on the MRI are largely occult on the  current CT examination. A small remote infarct involving the left  cerebellar hemisphere centrally is noted. No new area of decreased  attenuation to suggest a new infarction is identified.     A CT angiogram of the neck and head was performed. Multiplanar as well  as 3-dimensional reconstructions were generated.     The great vessels are arranged in a classic configuration. Noncalcified  plaque is appreciated involving the common carotid arteries bilaterally  as well as extending to and involving the internal carotid arteries  proximally where mild  vascular calcification is appreciated. There is 0%  stenosis of the internal carotid arteries using NASCET criteria.  Atherosclerotic disease is more severe involving the cavernous ICA on  the left where there is moderate stenosis and irregularity proximally.  The study is hampered by patient motion but the proximal aspects of the  anterior and middle cerebral arteries appear unremarkable.     The left vertebral artery is larger than that of the right. The left  vertebral artery is of relatively uniform caliber. The right vertebral  artery is small in caliber and there is a severe stenosis involving the  right vertebral artery proximally. The right vertebral artery was  obscured by beam-hardening artifact proximally on the prior examination.  Distally, the right vertebral artery is attenuated in caliber and is  nonopacified from a point just proximal to the dura to the  vertebrobasilar junction. The right vertebral artery was patent  distally. The posterior-inferior cerebellar artery on the right is not  opacified. The basilar artery and the proximal aspects of the posterior  cerebral arteries appear unremarkable. Moderate stenoses involving the  distal P2 segments are appreciated bilaterally.     A standard postcontrast CT examination of the brain showed no evidence  of abnormal enhancement. The right vertebral artery from the dura to the  vertebrobasilar junction is opacified on the postcontrast CT  examination. The V4 segment is irregular and smaller in caliber as  compared to the CT angiogram of 09/30/2023.     IMPRESSION:  The right vertebral artery is attenuated in caliber  distally, particularly the V4 segment and this is new versus 09/30/2023.  The right vertebral artery proximally was obscured by beam-hardening  artifact on the prior CT angiogram of 09/30/2023. On the current  examination there is severe stenosis involving the right vertebral  artery proximally.    MRI brain with and without:  FINDINGS:   Multiplanar images of the head were obtained without  gadolinium. There are multiple areas of restricted diffusion. They're  noted within the left midbrain, the left parietal occipital region, left  centrum semiovale and left frontal cortex. Further scattered areas of  restricted diffusion are noted within the right cerebral hemisphere.  Given bilateral distribution, these may be embolic. The ventricles are  normal in size. There is no midline shift. There is periventricular and  deep white matter microangiopathic change. There is an old left  cerebellar infarct. There are old lacunar infarcts noted within the  right corona radiata, as well as the bilateral basal ganglia. Further  old lacunar infarct is noted within the right thalamus. Intracranial  flow voids appear grossly intact. Mucosal thickening is noted within the  ethmoid sinuses, and mucous retention cysts are noted within the left  maxillary sinus. There is trace fluid within the mastoid air cells  bilaterally, more significant on the left. The old infarct within the  left cerebellar hemisphere is associated with some chronic hemosiderin  deposition. Patient also has some chronic hemosiderin deposition  associated with the old lacunar infarcts within the right corona radiata  and basal ganglia. There does appear to be some petechial hemorrhagic  transformation which is noted within the area of evolving infarction  within the left parieto-occipital region.  IMPRESSION:  1. The patient has multiple bilateral areas of acute infarction within  the cerebral hemispheres, as well as an additional acute infarct within  the left midbrain. This would suggest an embolic source. The single  largest area is noted within the left parieto-occipital region. This  area does appear to be associated with some mild petechial hemorrhagic  transformation.      Diagnoses:  Multifocal acute infarction within bilateral cerebral hemispheres, largest area in left parieto-occipital  lobe: patient with right homonymous hemianopia on exam and describes apraxia which clinically aligns with patients acute infarct. Unclear if his the timing of his stroke symptoms he states are accurate as he states they have been going on about 1 month. Patient did not know what month it was on orientation questions so may not be the most reliable historian.  HTN  Non-Hodgkin's Lymphoma   HIV     NIHSS: 3    PLAN:   Lovenox to coumadin bridge started by primary care team, INR goal per primary/cardiology  Statin  Cautious lowering of BP  Compliance with medications discussed  Stroke Education  VTE prophylaxis  Continuous telemetry monitoring   Ophthalmology outpatient for visual field testing    Recommendations discussed with Dr. Heaton and he is in agreement with plan.

## 2024-05-15 NOTE — PROGRESS NOTES
MD ATTESTATION NOTE    The LAURA and I have discussed this patient's history, physical exam, and treatment plan.  I have reviewed the documentation and personally had a face to face interaction with the patient. I affirm the documentation and agree with the treatment and plan.  The attached note describes my personal findings.      I provided a substantive portion of the care of the patient.  I personally performed the physical exam in its entirety, and below are my findings.       Brief HPI: Patient is observation with intermittent slurred speech for the past month or so.  Patient reports occasional unsteady on his feet.  Patient with prior history of stroke.  Patient anticoagulated on warfarin.    PHYSICAL EXAM  ED Triage Vitals   Temp Heart Rate Resp BP SpO2   05/14/24 1759 05/14/24 1759 05/14/24 1759 05/14/24 1808 05/14/24 1759   97.9 °F (36.6 °C) 108 16 (!) 174/112 97 %      Temp src Heart Rate Source Patient Position BP Location FiO2 (%)   05/14/24 2100 05/14/24 1914 05/14/24 2100 05/14/24 2100 --   Axillary Monitor Lying Right arm          GENERAL: no acute distress  HENT: nares patent  EYES: no scleral icterus  CV: regular rhythm, normal rate  RESPIRATORY: normal effort  ABDOMEN: soft  MUSCULOSKELETAL: no deformity  NEURO: alert, moves all extremities, follows commands  PSYCH:  calm, cooperative  SKIN: warm, dry    Vital signs and nursing notes reviewed.        Plan: Neurology consult.  CT negative.  MRI pending.

## 2024-05-15 NOTE — PLAN OF CARE
Goal Outcome Evaluation:patient is currently admitted to [facility]. He has ns running at 75/hr into a new iv in right hand,22. Pt/inr was sent at 1835 2nd to patient was received by this nurse at greater than 1700. Patient is waitning a room upstairs, imaging confirmed cva. He has a history of cva. Nih was 2 for previous nurse.potassium replacement per nurse given and redraw due at 1930.

## 2024-05-15 NOTE — PROGRESS NOTES
[facility]: Warfarin Dosing/Monitoring Consult    Cecilio Puckett is a 54 y.o. male, estimated creatinine clearance is 118.6 mL/min (by C-G formula based on SCr of 0.82 mg/dL). weighing 95.3 kg (210 lb).    Results from last 7 days   Lab Units 05/15/24  0301 05/14/24  1832   INR   --  1.05   APTT seconds  --  25.3   HEMOGLOBIN g/dL 14.3 14.4   HEMATOCRIT % 43.0 43.7   PLATELETS 10*3/mm3 169 176     Prior to admission anticoagulation: Warfarin 10mg - 1.5 tablets M/W/F and 1 tablet AOD per patient    Hospital Anticoagulation:  Consulting provider: MARIANNE Marie  Start date: 5/15  Indication: history of DVT/PE  Target INR: 2 - 3  Expected duration: indefinite   Bridge Therapy: Yes; with lovenox 100mg (1mg/kg x 95.7kg)    Potential food or drug interactions:   Patient is bridging with lovenox (increased risk for bleed)  No food intake reported on flowsheets, however, patient had half-eaten food on bedside table when I went in to verify dose    Education complete?/Date: No; plan for follow up TBD; Patient follows with [facility] for anticoagulation monitoring.    Assessment/Plan:  Dose: 15mg. INR was 1.05 yesterday. Will resume current home regimen dose tonight.  Nurse to monitor for any signs or symptoms of bleeding.  Follow up daily INRs and dose adjustments.    Pharmacy will continue to follow until discharge or discontinuation of warfarin.     Christy Prince, PharmD  Clinical Pharmacist

## 2024-05-15 NOTE — PROGRESS NOTES
[facility] Attestation Note    I supervised care provided by the midlevel provider.    The LAURA and I have discussed this patient's history, physical exam, and treatment plan. I have reviewed the documentation and personally had a face to face interaction with the patient  I affirm the documentation and agree with the treatment and plan. I provided a substantive portion of the care of this patient.  I personally performed the physical exam, in its entirety.  My personal findings are documented in below:    History:  Patient with history of diabetes, hypertension, HIV and CAD presented for evaluation of slurred speech and dizziness symptoms.  Patient reports noncompliant with medications on a regular basis.      Physical Exam:  General: No acute distress.  Lying in bed comfortably  HENT: NCAT, PERRL, Nares patent.  Eyes: no scleral icterus.  Neck: trachea midline, no ROM limitations.  CV: Pink warm and well-perfused throughout  Respiratory: No distress or increased work of breathing  Abdomen: soft, no focal tenderness or rigidity  Musculoskeletal: no deformity.  Neuro: alert, moves all extremities, follows commands.  Skin: warm, dry.    Assessment and Plan:  Slurred speech and dizziness: MRI brain indicates multiple bilateral areas of acute infarct.  Neurology consulted.  CTA head and neck performed this morning, report pending.    Diabetes: Accu-Cheks.  subcutaneous insulin management

## 2024-05-15 NOTE — PROGRESS NOTES
"[facility]: Enoxaparin Consult    Cecilio Puckett has a pharmacy consult to dose full-dose enoxaparin per Lisbet LOPES's request.     Indication: history of DVT/PE  Home Anticoagulation: Warfarin (currently on hold)     Relevant clinical data and objective history reviewed:  54 y.o. male 175.3 cm (69\") 95.7 kg (210 lb 14.4 oz)   Body mass index is 31.14 kg/m².   Results from last 7 days   Lab Units 05/14/24  1832   PLATELETS 10*3/mm3 176     Estimated Creatinine Clearance: 88.6 mL/min (by C-G formula based on SCr of 1.1 mg/dL).    Assessment/Plan    Will start patient on  100 mg (1mg/kg) subcutaneous every 12 hours, adjusted for renal function. Consult order will be discontinued but pharmacy will continue to follow.     Cindy Ott, formerly [facility]  Clinical Pharmacist    "

## 2024-05-15 NOTE — PLAN OF CARE
Goal Outcome Evaluation:patient originally failed previous nurse bedside dysphagia. Speech re-evaluated patient and allowed patient a diet. See orders. Nih of 2 for previous nurse from left facial droop and reported speech slur. This nurse noted speech has resolved and is clear at this time.

## 2024-05-15 NOTE — THERAPY EVALUATION
Acute Care - Speech Language Pathology   Swallow Initial Evaluation [facility]     Patient Name: Cecilio Puckett  : 1970  MRN: [id number]  Today's Date: 5/15/2024               Admit Date: 2024    Visit Dx:     ICD-10-CM ICD-9-CM   1. Difficulty with speech  R47.9 784.59   2. Dizziness  R42 780.4   3. History of CVA (cerebrovascular accident)  Z86.73 V12.54   4. Hypertension not at goal  I10 401.9     Patient Active Problem List   Diagnosis    Syncope, unspecified syncope type    Acute DVT (deep venous thrombosis)    NHL (non-Hodgkin's lymphoma)    HIV (human immunodeficiency virus infection)    DM (diabetes mellitus), type 2    HTN (hypertension)    Slurred speech     Past Medical History:   Diagnosis Date    COPD (chronic obstructive pulmonary disease)     Coronary artery disease     Diabetes mellitus     DVT (deep venous thrombosis)     Elevated cholesterol     GERD (gastroesophageal reflux disease)     H/O Cancer     right arm, in throat, chest and stomach, in remission    H/O Ischemia of extremity     History of MI (myocardial infarction) 2019    History of snoring     History of transfusion     HIV (human immunodeficiency virus infection)     Hypertension     Non Hodgkin's lymphoma     PAD (peripheral artery disease)     Pulmonary embolism     Stroke      Past Surgical History:   Procedure Laterality Date    CARDIAC CATHETERIZATION      CORONARY ANGIOPLASTY WITH STENT PLACEMENT      FEMORAL ARTERY - FEMORAL ARTERY BYPASS GRAFT      PORTACATH PLACEMENT         SLP Recommendation and Plan  SLP Swallowing Diagnosis: R/O pharyngeal dysphagia (05/15/24 1230)  SLP Diet Recommendation: regular textures, thin liquids (05/15/24 1230)  Recommended Precautions and Strategies: upright posture during/after eating, small bites of food and sips of liquid (05/15/24 1230)  SLP Rec. for Method of Medication Administration: meds whole, with thin liquids, with puree, as tolerated (05/15/24 1230)     Monitor for Signs  of Aspiration: yes, notify SLP if any concerns (05/15/24 1230)  Recommended Diagnostics: SLE/Cog/Motor Speech Evaluation, other (see comments) (pt noted some slowness in his thinking and feels his speech is not at baseline.) (05/15/24 1230)  Swallow Criteria for Skilled Therapeutic Interventions Met: demonstrates skilled criteria (05/15/24 1230)  Anticipated Discharge Disposition (SLP): unknown (05/15/24 1230)  Rehab Potential/Prognosis, Swallowing: good, to achieve stated therapy goals (05/15/24 1230)  Therapy Frequency (Swallow): PRN (05/15/24 1230)  Predicted Duration Therapy Intervention (Days): until discharge (05/15/24 1230)  Oral Care Recommendations: Oral Care BID/PRN (05/15/24 1230)                                        Plan of Care Reviewed With: patient  Outcome Evaluation: Swallow eval completed. Pt's speech was easily understood with no slurring noted. Pt reported he still felt like it was not at baseline. Pt took trials of ice, thin (spoon/cup/straw), pureed, soft, mixed, and regular. Swallow was audible initially, but this resolved as pt continued drinking. A throat clear was noted x1 toward the end of the eval, but pt appeared to clear and showed no other s/s. Laryngeal elevation was adequate with palpation. Mastication was functional. Recommend regular and thin; meds w/ thin or pureed; upright for meals and 30 min after; slow rate; small bites/sips. ST to follow for diet tolerance and need for Speech/Language/Cog eval. Pt is hoping to go home today. If pt is discharged, an outpatient eval can be ordered.      SWALLOW EVALUATION (Last 72 Hours)       SLP Adult Swallow Evaluation       Row Name 05/15/24 1230                   Rehab Evaluation    Document Type evaluation  -AW        Subjective Information no complaints  -AW        Patient Observations alert;cooperative;agree to therapy  -AW        Patient Effort good  -AW        Symptoms Noted During/After Treatment none  -AW           General  Information    Patient Profile Reviewed yes  -AW        Pertinent History Of Current Problem Pt admitted with slurred speech and dizziness. MRI showed multiple B acute infarcts, suspected to be embolic. PMH: CVA (9/23), HIV, non Hodgkin's lymphoma.  -AW        Current Method of Nutrition NPO  -AW        Precautions/Limitations, Vision WFL;for purposes of eval  -AW        Precautions/Limitations, Hearing WFL  -AW        Prior Level of Function-Communication WFL  -AW        Prior Level of Function-Swallowing no diet consistency restrictions  -AW        Plans/Goals Discussed with patient;agreed upon  -AW        Barriers to Rehab none identified  -AW        Patient's Goals for Discharge return home  -AW           Pain    Additional Documentation Pain Scale: Numbers Pre/Post-Treatment (Group)  -AW           Pain Scale: Numbers Pre/Post-Treatment    Pretreatment Pain Rating 0/10 - no pain  -AW        Posttreatment Pain Rating 0/10 - no pain  -AW           Oral Motor Structure and Function    Dentition Assessment natural, present and adequate  -AW        Secretion Management WNL/WFL  -AW        Mucosal Quality moist, healthy  -AW           Oral Musculature and Cranial Nerve Assessment    Oral Labial or Buccal Impairment, Detail, Cranial Nerve VII (Facial): left labial droop  -AW           General Eating/Swallowing Observations    Respiratory Support Currently in Use room air  -AW        Eating/Swallowing Skills self-fed  -AW        Positioning During Eating upright 90 degree  -AW        Utensils Used spoon;cup;straw  -AW        Consistencies Trialed regular textures;soft to chew textures;mixed consistency;pureed;thin liquids  -AW           Clinical Swallow Eval    Clinical Swallow Evaluation Summary Swallow eval completed. Pt's speech was easily understood with no slurring noted. Pt reported he still felt like it was not at baseline. Pt took trials of ice, thin (spoon/cup/straw), pureed, soft, mixed, and regular. Swallow  was audible initially, but this resolved as pt continued drinking. A throat clear was noted x1 toward the end of the eval, but pt appeared to clear and showed no other s/s. Laryngeal elevation was adequate with palpation. Mastication was functional. Recommend regular and thin; meds w/ thin or pureed; upright for meals and 30 min after; slow rate; small bites/sips. ST to follow for diet tolerance and need for Speech/Language/Cog eval. Pt is hoping to go home today. If pt is discharged, an outpatient eval can be ordered.  -AW           SLP Evaluation Clinical Impression    SLP Swallowing Diagnosis R/O pharyngeal dysphagia  -AW        Functional Impact risk of aspiration/pneumonia  -AW        Rehab Potential/Prognosis, Swallowing good, to achieve stated therapy goals  -AW        Swallow Criteria for Skilled Therapeutic Interventions Met demonstrates skilled criteria  -AW           Recommendations    Therapy Frequency (Swallow) PRN  -AW        Predicted Duration Therapy Intervention (Days) until discharge  -AW        SLP Diet Recommendation regular textures;thin liquids  -AW        Recommended Diagnostics SLE/Cog/Motor Speech Evaluation;other (see comments)  pt noted some slowness in his thinking and feels his speech is not at baseline.  -AW        Recommended Precautions and Strategies upright posture during/after eating;small bites of food and sips of liquid  -AW        Oral Care Recommendations Oral Care BID/PRN  -AW        SLP Rec. for Method of Medication Administration meds whole;with thin liquids;with puree;as tolerated  -AW        Monitor for Signs of Aspiration yes;notify SLP if any concerns  -AW        Anticipated Discharge Disposition (SLP) unknown  -AW           (STG) Patient will tolerate trials of    Consistencies Trialed (Tolerate trials) regular textures;thin liquids  -AW        Desired Outcome (Tolerate trials) without signs/symptoms of aspiration  -AW        [place] (Tolerate trials) independently  (over 90% accuracy)  -AW        Time Frame (Tolerate trials) by discharge  -AW                  User Key  (r) = Recorded By, (t) = Taken By, (c) = Cosigned By      Initials Name Effective Dates    Deena Duncan SLP 08/28/23 -                     EDUCATION  The patient has been educated in the following areas:   Dysphagia (Swallowing Impairment) Oral Care/Hydration.        SLP GOALS       Row Name 05/15/24 1230             (STG) Patient will tolerate trials of    Consistencies Trialed (Tolerate trials) regular textures;thin liquids  -AW      Desired Outcome (Tolerate trials) without signs/symptoms of aspiration  -AW      [place] (Tolerate trials) independently (over 90% accuracy)  -AW      Time Frame (Tolerate trials) by discharge  -AW                User Key  (r) = Recorded By, (t) = Taken By, (c) = Cosigned By      Initials Name Provider Type    Deena Duncan SLP Speech and Language Pathologist                         Time Calculation:    Time Calculation- SLP       Row Name 05/15/24 1524             Time Calculation- SLP    SLP Start Time 1230  -AW      SLP Received On 05/15/24  -AW                User Key  (r) = Recorded By, (t) = Taken By, (c) = Cosigned By      Initials Name Provider Type    Deena Duncan SLP Speech and Language Pathologist                    Therapy Charges for Today       Code Description Service Date Service Provider Modifiers Qty    [id number]  ST EVAL ORAL PHARYNG SWALLOW 4 5/15/2024 Deena Waddell SLP GN 1                 SYLVIE Gibson  5/15/2024

## 2024-05-15 NOTE — PROGRESS NOTES
ED OBSERVATION PROGRESS/DISCHARGE SUMMARY    Date of Admission: 5/14/2024   LOS: 0 days   PCP: Arias Lehman APRN    Final Diagnosis acute CVA      Subjective     Hospital Outcome:     Pleasant afebrile 54-year-old gentleman admitted to the ED observation unit for speech changes and ataxia.  MRI of patient's brain obtained overnight showed multiple bilateral areas of acute infarction.  Echocardiogram shows an ejection fraction of 36% with negative saline test.  His last echo performed 9/30/2023 showed EF of 48.1%.  I discussed this with Dr. Machado with the neurology service who recommends patient be admitted to the medicine service so that cardiology can be consulted for consideration of BRIAN.    I have discussed case with Dr. Prasad on-call for [facility] who has graciously accepted to admit patient to the medicine service.        ROS:  General: no fevers, chills  Respiratory: no cough, dyspnea  Cardiovascular: no chest pain, palpitations  Abdomen: No abdominal pain, nausea, vomiting, or diarrhea  Neurologic: No focal weakness    Objective   Physical Exam:  I have reviewed the vital signs.  Temp:  [97.7 °F (36.5 °C)-98.4 °F (36.9 °C)] 97.7 °F (36.5 °C)  Heart Rate:  [] 76  Resp:  [16-18] 16  BP: (168-195)/() 182/98  General Appearance:    Alert, cooperative, no distress  Head:    Normocephalic, atraumatic  Eyes:    Sclerae anicteric  Neck:   Supple, no mass  Lungs: Clear to auscultation bilaterally, respirations unlabored  Heart: Regular rate and rhythm, S1 and S2 normal, no murmur, rub or gallop  Abdomen:  Soft, non-tender, bowel sounds active, obese  Extremities: No clubbing, cyanosis, or edema to lower extremities  Pulses:  2+ and symmetric in distal lower extremities  Skin: No rashes   Neurologic: Oriented x3, Normal strength to extremities    Results Review:    I have reviewed the labs, radiology results and diagnostic studies.    Results from last 7 days   Lab Units 05/15/24  0301   WBC 10*3/mm3  6.33   HEMOGLOBIN g/dL 14.3   HEMATOCRIT % 43.0   PLATELETS 10*3/mm3 169     Results from last 7 days   Lab Units 05/15/24  0301 05/14/24  1832   SODIUM mmol/L 138 139   POTASSIUM mmol/L 3.4* 4.1   CHLORIDE mmol/L 107 106   CO2 mmol/L 22.0 23.6   BUN mg/dL 8 8   CREATININE mg/dL 0.82 1.10   CALCIUM mg/dL 8.5* 9.0   BILIRUBIN mg/dL  --  0.3   ALK PHOS U/L  --  67   ALT (SGPT) U/L  --  19   AST (SGOT) U/L  --  11   GLUCOSE mg/dL 119* 182*     Imaging Results (Last 24 Hours)       Procedure Component Value Units Date/Time    CT Angiogram Head [[id number]] Resulted: 05/15/24 0816     Updated: 05/15/24 0820    CT Angiogram Neck [[id number]] Resulted: 05/15/24 0816     Updated: 05/15/24 0820    MRI Brain Without Contrast [[id number]] Collected: 05/15/24 0142     Updated: 05/15/24 0153    Narrative:      BRAIN MRI WITHOUT CONTRAST     HISTORY: Slurred speech; R47.9-Unspecified speech disturbances;  R42-Dizziness and giddiness; Z86.73-Personal history of transient  ischemic attack (TIA), and cerebral infarction without residual  deficits; I10-Essential (primary) hypertension     COMPARISON: September 29, 2023.     FINDINGS:  Multiplanar images of the head were obtained without  gadolinium. There are multiple areas of restricted diffusion. They're  noted within the left midbrain, the left parietal occipital region, left  centrum semiovale and left frontal cortex. Further scattered areas of  restricted diffusion are noted within the right cerebral hemisphere.  Given bilateral distribution, these may be embolic. The ventricles are  normal in size. There is no midline shift. There is periventricular and  deep white matter microangiopathic change. There is an old left  cerebellar infarct. There are old lacunar infarcts noted within the  right corona radiata, as well as the bilateral basal ganglia. Further  old lacunar infarct is noted within the right thalamus. Intracranial  flow voids appear grossly intact. Mucosal thickening is  noted within the  ethmoid sinuses, and mucous retention cysts are noted within the left  maxillary sinus. There is trace fluid within the mastoid air cells  bilaterally, more significant on the left. The old infarct within the  left cerebellar hemisphere is associated with some chronic hemosiderin  deposition. Patient also has some chronic hemosiderin deposition  associated with the old lacunar infarcts within the right corona radiata  and basal ganglia. There does appear to be some petechial hemorrhagic  transformation which is noted within the area of evolving infarction  within the left parieto-occipital region.          Impression:      1. The patient has multiple bilateral areas of acute infarction within  the cerebral hemispheres, as well as an additional acute infarct within  the left midbrain. This would suggest an embolic source. The single  largest area is noted within the left parieto-occipital region. This  area does appear to be associated with some mild petechial hemorrhagic  transformation.        This report was finalized on 5/15/2024 1:50 AM by Dr. Isa Armendariz M.D on Workstation: [id number]       CT Head Without Contrast [[id number]] Collected: 05/14/24 2025     Updated: 05/14/24 2234    Narrative:      EMERGENCY CT SCAN OF THE HEAD WITHOUT CONTRAST ON 05/14/2024     CLINICAL HISTORY: This is a 54-year-old male patient who feels like he  has been slurring words for greater than a month     TECHNIQUE: Spiral CT images were obtained from the base of the skull to  the vertex without intravenous contrast. The images were reformatted and  are submitted in 3 mm thick axial, sagittal and coronal CT sections with  brain algorithm.     COMPARISON: This is correlated to a prior MRI of the brain on 09/29/2023  and 10/10/2022..     FINDINGS: There is a 10 x 7 mm old lacunar infarct extending from the  mid right corona radiata region into the superior right putamen that  occurred back in September 2023.  There is an additional 8 x 5 mm old  lacunar infarct in the anterior right putamen, 4 mm old lacunar infarct  in the posterior limb of the right internal capsule. There is a moderate  size area of infarction involving the posterior inferior medial left  parietal lobe extending in the superior left occipital lobe. It measures  approximately 4.8 x 3 x 4 cm. It is in the distribution of  parieto-occipital branches left MCA territory and the precise age of  this infarct is uncertain but may be subacute in nature. There is a 11 x  6 mm infarct in the posterior lateral left cerebellum and left PICA  territory either subacute or chronic. The remainder of the brain  parenchyma is normal in attenuation. The ventricles are normal in size.  I see no mass effect and no midline shift and no extra-axial fluid  collections are identified and there is no evidence of acute  intracranial hemorrhage. The calvarium and the skull base are normal in  appearance. Paranasal sinuses, mastoid air cells and middle ear cavities  are clear.       Impression:      1. There is a moderate size area of acute/subacute infarction extending  from the posterior inferior left parietal lobe into the superior left  occipital lobe measuring 4.8 x 3 x 4 cm in size in the distribution of  parieto-occipital branch of the left MCA territory. Its precise age is  uncertain and I recommend an MRI of the brain to better date it. There  is an 11 x 6 mm infarct in the posterior lateral left cerebellum that is  new when compared to the MRI of the brain on 09/29/2023 and I suspect  this is either subacute or chronic. Otherwise, the head CT is unchanged  when compared to MRI of the brain 09/29/2023. There is an 11 x 7 mm old  lacunar infarct extending from the mid right corona radiata region into  the superior right putamen that occurred back in September of 2023.  There are small old lacunar infarcts in the anterior right putamen and  posterior limb of the right  internal capsule. The remainder of the head  CT is within normal limits. The results and recommendations were  communicated to Kely Luna by telephone on 05/14/2024 at 8 p.m.     Radiation dose reduction techniques were utilized, including automated  exposure control and exposure modulation based on body size.        This report was finalized on 5/14/2024 10:31 PM by Dr. Sebastian Way M.D  on Workstation: [id number]       XR Chest 1 View [[id number]] Collected: 05/14/24 1918     Updated: 05/14/24 1921    Narrative:      XR CHEST 1 VW-     HISTORY: Male who is 54 years-old, dizziness     TECHNIQUE: Frontal view of the chest     COMPARISON: 9/29/2023     FINDINGS: Heart, mediastinum and pulmonary vasculature are unremarkable.  No focal pulmonary consolidation, pleural effusion, or pneumothorax. No  acute osseous process.       Impression:      No evidence for acute pulmonary process. Follow-up as  clinical indications persist.     This report was finalized on 5/14/2024 7:18 PM by Dr. Junior Weiner M.D on Workstation: [id number]               I have reviewed the medications.  ---------------------------------------------------------------------------------------------  Assessment & Plan   Assessment/Problem List    Slurred speech      Plan:    Slurred speech  -Neurochecks every 4 hours   -Vital signs every 4 hours   -Cardiac monitoring   -MRI-brain without contrast + acute MRI  -CT Head -There is a moderate size area of acute/subacute infarction extending  from the posterior inferior left parietal lobe into the superior left occipital lobe measuring 4.8 x 3 x 4 cm in size in the distribution of parieto-occipital branch of the left MCA territory. Its precise age is uncertain and I recommend an MRI of the brain to further date it.   -PT to eval and treat  -Speech therapy to eval secondary to failing dysphagia screen  -Lipid panel shows   -Neuro consult, see MD note        Diabetes  -Accu-Cheks every 6  hours  -Adult subcutaneous insulin management-low dose  -Hemoglobin A1c 10.00     -HIV  -Continue home medication for HIV     History of CVA\DVT  -Pharmacy to dose Lovenox    Disposition: admitted to [facility], Dr. Prasad inpatient    This note will serve as a progress and transfer note    Marycarmen Bran, APRN 05/15/24 08:39 EDT    I have worn appropriate PPE during this patient encounter, sanitized my hands both with entering and exiting patient's room.      55 minutes has been spent by [facility] providers in the care of this patient while under observation status

## 2024-05-15 NOTE — PLAN OF CARE
Goal Outcome Evaluation:  Plan of Care Reviewed With: patient           Outcome Evaluation: Swallow eval completed. Pt's speech was easily understood with no slurring noted. Pt reported he still felt like it was not at baseline. Pt took trials of ice, thin (spoon/cup/straw), pureed, soft, mixed, and regular. Swallow was audible initially, but this resolved as pt continued drinking. A throat clear was noted x1 toward the end of the eval, but pt appeared to clear and showed no other s/s. Laryngeal elevation was adequate with palpation. Mastication was functional. Recommend regular and thin; meds w/ thin or pureed; upright for meals and 30 min after; slow rate; small bites/sips. ST to follow for diet tolerance and need for Speech/Language/Cog eval. Pt is hoping to go home today. If pt is discharged, an outpatient eval can be ordered.      Anticipated Discharge Disposition (SLP): unknown          SLP Swallowing Diagnosis: R/O pharyngeal dysphagia (05/15/24 1230)

## 2024-05-16 PROBLEM — I25.5 ISCHEMIC CARDIOMYOPATHY: Status: ACTIVE | Noted: 2024-05-16

## 2024-05-16 PROBLEM — I25.119 CORONARY ARTERY DISEASE INVOLVING NATIVE CORONARY ARTERY OF NATIVE HEART WITH ANGINA PECTORIS: Status: ACTIVE | Noted: 2019-12-01

## 2024-05-16 PROBLEM — Z86.73 HISTORY OF CVA (CEREBROVASCULAR ACCIDENT): Status: ACTIVE | Noted: 2024-05-16

## 2024-05-16 PROBLEM — E11.59 TYPE 2 DIABETES MELLITUS WITH CIRCULATORY DISORDER, WITHOUT LONG-TERM CURRENT USE OF INSULIN: Status: ACTIVE | Noted: 2023-09-27

## 2024-05-16 LAB
GLUCOSE BLDC GLUCOMTR-MCNC: 137 MG/DL (ref 70–130)
GLUCOSE BLDC GLUCOMTR-MCNC: 153 MG/DL (ref 70–130)
GLUCOSE BLDC GLUCOMTR-MCNC: 279 MG/DL (ref 70–130)
GLUCOSE BLDC GLUCOMTR-MCNC: 380 MG/DL (ref 70–130)
GLUCOSE BLDC GLUCOMTR-MCNC: 64 MG/DL (ref 70–130)
GLUCOSE BLDC GLUCOMTR-MCNC: 96 MG/DL (ref 70–130)
INR PPP: 1.16 (ref 0.9–1.1)
PROTHROMBIN TIME: 15.1 SECONDS (ref 11.7–14.2)
QT INTERVAL: 379 MS
QTC INTERVAL: 446 MS

## 2024-05-16 PROCEDURE — 82948 REAGENT STRIP/BLOOD GLUCOSE: CPT

## 2024-05-16 PROCEDURE — 25010000002 ENOXAPARIN PER 10 MG

## 2024-05-16 PROCEDURE — 63710000001 INSULIN GLARGINE PER 5 UNITS

## 2024-05-16 PROCEDURE — 63710000001 INSULIN REGULAR HUMAN PER 5 UNITS

## 2024-05-16 PROCEDURE — 99232 SBSQ HOSP IP/OBS MODERATE 35: CPT

## 2024-05-16 PROCEDURE — 85610 PROTHROMBIN TIME: CPT | Performed by: NURSE PRACTITIONER

## 2024-05-16 PROCEDURE — 99222 1ST HOSP IP/OBS MODERATE 55: CPT | Performed by: INTERNAL MEDICINE

## 2024-05-16 RX ORDER — SPIRONOLACTONE 25 MG/1
25 TABLET ORAL DAILY
Status: DISCONTINUED | OUTPATIENT
Start: 2024-05-16 | End: 2024-05-20

## 2024-05-16 RX ORDER — WARFARIN SODIUM 6 MG/1
12 TABLET ORAL
Status: COMPLETED | OUTPATIENT
Start: 2024-05-16 | End: 2024-05-16

## 2024-05-16 RX ADMIN — CARVEDILOL 6.25 MG: 6.25 TABLET, FILM COATED ORAL at 08:08

## 2024-05-16 RX ADMIN — ENOXAPARIN SODIUM 100 MG: 100 INJECTION SUBCUTANEOUS at 10:41

## 2024-05-16 RX ADMIN — ENOXAPARIN SODIUM 100 MG: 100 INJECTION SUBCUTANEOUS at 01:18

## 2024-05-16 RX ADMIN — FUROSEMIDE 20 MG: 20 TABLET ORAL at 08:08

## 2024-05-16 RX ADMIN — Medication 10 ML: at 21:37

## 2024-05-16 RX ADMIN — INSULIN HUMAN 4 UNITS: 100 INJECTION, SOLUTION PARENTERAL at 23:02

## 2024-05-16 RX ADMIN — PANTOPRAZOLE SODIUM 40 MG: 40 TABLET, DELAYED RELEASE ORAL at 06:02

## 2024-05-16 RX ADMIN — INSULIN GLARGINE 25 UNITS: 100 INJECTION, SOLUTION SUBCUTANEOUS at 21:37

## 2024-05-16 RX ADMIN — ROSUVASTATIN CALCIUM 20 MG: 20 TABLET, FILM COATED ORAL at 08:08

## 2024-05-16 RX ADMIN — WARFARIN 12 MG: 6 TABLET ORAL at 17:27

## 2024-05-16 RX ADMIN — AMLODIPINE BESYLATE 10 MG: 10 TABLET ORAL at 08:08

## 2024-05-16 RX ADMIN — LOSARTAN POTASSIUM 25 MG: 25 TABLET, FILM COATED ORAL at 08:08

## 2024-05-16 RX ADMIN — SPIRONOLACTONE 25 MG: 25 TABLET ORAL at 14:43

## 2024-05-16 RX ADMIN — SACUBITRIL AND VALSARTAN 1 TABLET: 49; 51 TABLET, FILM COATED ORAL at 21:37

## 2024-05-16 RX ADMIN — CARVEDILOL 6.25 MG: 6.25 TABLET, FILM COATED ORAL at 21:37

## 2024-05-16 RX ADMIN — HYDROCHLOROTHIAZIDE 12.5 MG: 12.5 TABLET ORAL at 08:08

## 2024-05-16 RX ADMIN — ENOXAPARIN SODIUM 100 MG: 100 INJECTION SUBCUTANEOUS at 22:39

## 2024-05-16 NOTE — CASE MANAGEMENT/SOCIAL WORK
Discharge Planning Assessment  [facility]     Patient Name: Cecilio Puckett  MRN: [id number]  Today's Date: 5/16/2024    Admit Date: 5/14/2024    Plan: Home with OP speech therapy   Discharge Needs Assessment       Row Name 05/16/24 1650       Living Environment    People in Home child(elmer), dependent    Current Living Arrangements home    Potentially Unsafe Housing Conditions none    Primary Care Provided by self    Provides Primary Care For no one    Family Caregiver if Needed parent(s)    Quality of Family Relationships helpful;involved;supportive    Able to Return to Prior Arrangements yes       Resource/Environmental Concerns    Resource/Environmental Concerns none    Transportation Concerns none       Transition Planning    Patient/Family Anticipates Transition to home    Patient/Family Anticipated Services at Transition none    Transportation Anticipated family or friend will provide       Discharge Needs Assessment    Readmission Within the Last 30 Days no previous admission in last 30 days    Equipment Currently Used at Home cpap    Anticipated Changes Related to Illness none    Equipment Needed After Discharge none                   Discharge Plan       Row Name 05/16/24 0026       Plan    Plan Home with OP speech therapy    Patient/Family in Agreement with Plan yes    Provided Post Acute Provider List? N/A    Provided Post Acute Provider Quality & Resource List? N/A    Plan Comments CCP met with patient at bedside. Introduced self and explained role of CCP. Patient confirmed the information on his face sheet. Patients PCP is Arias Lehman. Patient is enrolled into [facility]. Patient lives at home with his son. Patient is up ad yash and independent at home. PT signed off. Patient plans home at discharge with OP speech therapy. Family possible to transport patient home. CCP following.                  Continued Care and Services - Admitted Since 5/14/2024    No active coordination exists for this encounter.        Expected Discharge Date and Time       Expected Discharge Date Expected Discharge Time    May 16, 2024            Demographic Summary       Row Name 05/16/24 1656       General Information    Admission Type inpatient    Arrived From emergency department    Referral Source admission list    Reason for Consult discharge planning    Preferred Language English                   Functional Status       Row Name 05/16/24 1656       Functional Status    Usual Activity Tolerance good    Current Activity Tolerance good       Functional Status, IADL    Medications independent    Meal Preparation independent    Housekeeping independent    Laundry independent    Shopping independent       Mental Status    General Appearance WDL WDL       Mental Status Summary    Recent Changes in Mental Status/Cognitive Functioning no changes       Employment/    Employment Status retired                   Psychosocial    No documentation.                  Abuse/Neglect    No documentation.                  Legal    No documentation.                  Substance Abuse    No documentation.                  Patient Forms    No documentation.

## 2024-05-16 NOTE — PROGRESS NOTES
"DOS: 2024  NAME: Cecilio Puckett   : 1970  PCP: Arias Lehman APRN  Chief Complaint   Patient presents with    speech issues    Dizziness     Stroke    Subjective: Patient resting in bed and seems a bit depressed today. He is upset about his vision loss and new strokes. He is concerned about help with transportation as he cannot drive with visual field deficit until cleared by ophthalmology. He has son at home he is concerned about and anxious to get back home. Patient with no acute events overnight.     Interval History  History taken from: patient    Objective:  Vital signs: /92 (BP Location: Right arm, Patient Position: Lying)   Pulse 63   Temp 98.1 °F (36.7 °C) (Oral)   Resp 18   Ht 175.3 cm (69\")   Wt 95.3 kg (210 lb)   SpO2 98%   BMI 31.01 kg/m²       Physical Exam:  GENERAL: NAD  MS: A&O to person, place, month and year today, recent/remote memory intact, normal attention/concentration, language intact, no neglect, normal fund of knowledge  CN: visual acuity grossly normal, right homonymous hemianopia, PERRL, EOMI, facial sensation equal, no facial droop, hearing symmetric, palate elevates symmetrically, shoulder shrug equal, tongue midline  Motor: 5/5 throughout upper and lower extremities, normal tone  Sensation: intact to vibration and temperature throughout  Reflexes: 2+ throughout upper and lower extremities, downgoing plantars  Coordination: no dysmetria with finger to nose bilaterally    Results Review:     I reviewed the patient's new clinical results.    Current Medications:  Scheduled Medications:amLODIPine, 10 mg, Oral, Daily  [Held by provider] aspirin, 81 mg, Oral, Daily  carvedilol, 6.25 mg, Oral, Q12H  [Held by provider] Dtgqtzd-Biikt-Uoljoodu-TenofAF, 1 tablet, Oral, Daily  [Held by provider] empagliflozin, 25 mg, Oral, Daily  enoxaparin, 1 mg/kg, Subcutaneous, Q12H  furosemide, 20 mg, Oral, Daily  [Held by provider] glipizide, 10 mg, Oral, " Q12H  hydroCHLOROthiazide, 12.5 mg, Oral, Daily  insulin glargine, 25 Units, Subcutaneous, Nightly  insulin regular, 2-7 Units, Subcutaneous, Q6H  losartan, 25 mg, Oral, Daily  pantoprazole, 40 mg, Oral, Q AM  rosuvastatin, 20 mg, Oral, Daily  [Held by provider] sertraline, 50 mg, Oral, Daily  sodium chloride, 10 mL, Intravenous, Q12H      Infusions: Pharmacy to dose warfarin,   sodium chloride, 75 mL/hr, Last Rate: 75 mL/hr (05/15/24 1530)      PRN Medications:    acetaminophen    senna-docusate sodium **AND** polyethylene glycol **AND** bisacodyl **AND** bisacodyl    Calcium Replacement - Follow Nurse / BPA Driven Protocol    dextrose    dextrose    glucagon (human recombinant)    Magnesium Standard Dose Replacement - Follow Nurse / BPA Driven Protocol    nitroglycerin    ondansetron ODT **OR** ondansetron    Pharmacy to dose warfarin    Phosphorus Replacement - Follow Nurse / BPA Driven Protocol    Potassium Replacement - Follow Nurse / BPA Driven Protocol    [COMPLETED] Insert Peripheral IV **AND** sodium chloride    sodium chloride    sodium chloride    Medications Reviewed:     Laboratory results:  Lab Results   Component Value Date    GLUCOSE 119 (H) 05/15/2024    CALCIUM 8.5 (L) 05/15/2024     05/15/2024    K 4.3 05/15/2024    CO2 22.0 05/15/2024     05/15/2024    BUN 8 05/15/2024    CREATININE 0.82 05/15/2024    EGFRIFAFRI >60 11/29/2022    BCR 9.8 05/15/2024    ANIONGAP 9.0 05/15/2024     Lab Results   Component Value Date    WBC 6.33 05/15/2024    HGB 14.3 05/15/2024    HCT 43.0 05/15/2024    MCV 84.6 05/15/2024     05/15/2024      Results from last 7 days   Lab Units 05/14/24  1832   CHOLESTEROL mg/dL 193     Lab Results   Component Value Date    INR 1.16 (H) 05/16/2024    INR 1.13 (H) 05/15/2024    INR 1.05 05/14/2024    PROTIME 15.1 (H) 05/16/2024    PROTIME 14.7 (H) 05/15/2024    PROTIME 14.0 05/14/2024     Lab Results   Component Value Date    TSH 1.280 05/14/2024     No components  "found for: \"LDLCALC\"  Lab Results   Component Value Date    HGBA1C 10.00 (H) 05/14/2024     No components found for: \"B12\"    Review and interpretation of imaging:  BMP: K: 3.4, Glucose: 119, Calcium 8.5, rest WNL  CBC: WNL  INR: 1.13,     TTE:    Left ventricular systolic function is moderately decreased. Calculated left ventricular EF = 36.9%    Left ventricular wall thickness is consistent with severe concentric hypertrophy.    The following left ventricular wall segments are hypokinetic: mid anterior. The following left ventricular wall segments are akinetic: apical anterior and apex.    Left ventricular diastolic function is consistent with (grade I) impaired relaxation.    The left atrial cavity is moderate to severely dilated.    Left atrial volume is severely increased.    Mild mitral valve regurgitation is present.    Saline test results are negative.    Diagnoses:  Multifocal acute infarction within bilateral cerebral hemispheres, largest area in left parieto-occipital lobe: patient with right homonymous hemianopia on exam and describes apraxia which clinically aligns with patients acute infarct. Unclear if his the timing of his stroke symptoms he states are accurate as he states they have been going on about 1 month. Patient did not know what month it was on orientation questions yesterday so may not be the most reliable historian. VF deficit remains the same today without improvement. TTE done demonstrating LVEF of 36.9%, left atrial cavitiy with moderate to severe dilation, negative saline test and no thrombus present.   HTN  Non-Hodgkin's Lymphoma   HIV   PAM continue compliance with CPAP    Plan:  Statin  Cautious lowering of BP  Compliance with medications discussed  Stroke Education  VTE prophylaxis, lovenox given   Continuous telemetry monitoring   Ophthalmology outpatient for visual field testing  PT/OT   Follow up with neurology outpatient in 3 months.  No further neuro work-up planned at this " time, will sign off, please call with questions.     I have discussed the above with the patient and family.  Paty Sommers PA-C  05/16/24  07:36 EDT        Slurred speech    Acute CVA (cerebrovascular accident)

## 2024-05-16 NOTE — SIGNIFICANT NOTE
05/16/24 0645   OTHER   Discipline physical therapist   Therapy Assessment/Plan (PT)   Criteria for Skilled Interventions Met (PT) no problems identified which require skilled intervention  (order from cva set with primary c/o slurred speech and dizziness.  Nsg doc pt is up adlib/indep and with ampac of 24.  No indication for acute PT.)

## 2024-05-16 NOTE — DISCHARGE INSTRUCTIONS
"-Control risk factors to prevent stroke which means keep BP at or below 130/80, take your statin if able and try to have LDL measurements < 70, stop smoking if you smoke, control your blood sugar, and get regular moderate exercise four times weekly, and avoid prolonged sitting.  Adopt a healthy diet such as the Mediterranean diet which includes vegetables, fruits, whole grains, low-fat dairy, poultry, fish, legumes, nontropical fish oils, and nuts.  Limit sweets, sugary drinks, and red meats.  Reduce or eliminate alcohol intake. Continued compliance with coumadin.  -Follow up with ophthalmology outpatient for visual field testing, not to drive until cleared by ophthalmology  -Stroke Signs and Symptoms: SUDDEN development of  one or more of the following symptoms \"BE FAST\": B (Balance-sudden dizziness or inability to walk), E (Eyes-sudden blurry vision, double vision, visual field cut, loss of vision), F (Facial droop), A(Arm weakness or weakness and/or numbness/tingling on one side of the body including the leg), S (Slurred speech or Speech difficutly), T (Time-time is brain the faster you get here the less likelihood of permanent brain tissue damage and more likely to treat stroke and prevent disability)        -Follow up with PCP in 1-2 weeks, PCP hotline [phone]  "

## 2024-05-16 NOTE — CONSULTS
Patient Name: Cecilio Puckett  :1970  54 y.o.    Date of Admission: 2024  Date of Consultation:  24  Encounter Provider: Shashi Lomas III, MD  Place of Service: [facility] CARDIOLOGY  Referring Provider: Jenny Thomason MD  Patient Care Team:  Arias Lehman APRN as PCP - General (Family Medicine)  Bear Rojas PA as Referring Physician (Emergency Medicine)  Arthur Salazar MD as Consulting Physician (Hematology and Oncology)      Chief complaint: speech issues and dizziness     History of Present Illness:     Cecilio Puckett is a 54 year old pt with a history of non Hodgkin's lymphoma, coronary artery disease, status post PCI, ischemic cardiomyopathy, PE/DVT on warfarin, diabetes, HTN, HLD, GERD, TIA/CVA and HIV.     Patient presented emergency room on May 14, 2024 with complaint of speech difficulty and dizziness.  He been having these intermittently and more noticeable over the last month.  When he would take a shower but sometimes feel like things were swaying.  Did not have a fall, no head injury, no syncope.  He denied any vision loss, facial droop, or unilateral numbness or weakness.    MRI demonstrated multiple bilateral areas of acute infarction consistent with an embolic source.    Patient had an echocardiogram performed that showed moderate LV dysfunction, worse when compared with prior studies.    Patient does note that he has been having some mid precordial chest discomfort at times.  Also been having shortness of breath.  Not much exercise habits at this point.  Does have some shortness of breath with ambulation.  No chest pain here.    ECHO 5/15/24      Left ventricular systolic function is moderately decreased. Calculated left ventricular EF = 36.9%    Left ventricular wall thickness is consistent with severe concentric hypertrophy.    The following left ventricular wall segments are hypokinetic: mid anterior. The following left ventricular  wall segments are akinetic: apical anterior and apex.    Left ventricular diastolic function is consistent with (grade I) impaired relaxation.    The left atrial cavity is moderate to severely dilated.    Left atrial volume is severely increased.    Mild mitral valve regurgitation is present.    Saline test results are negative.    Stress test 6/18/22    Summary    Abnormal pharmacological stress SPECT Myocardial Perfusion Imaging.    There is a medium-large sized moderate-severe severity predominantly fixed    perfusion defect(s) of the inferior wall consistent with infarction.    There is a medium sized moderate severity fixed perfusion defect(s) of the    lateral wall consistent with infarction. This improves a little bit with    prone imaging and tissue attenuation could be playing a role.    No evidence of significant stress induced myocardial ischemia.     CATH 12/1/19    Diagnostic Summary/Impression   1. High-grade stenosis of a large first diagonal is visualized (culprit for ST   segment elevation MI) .   2. Moderate CAD is visualized in the distal circumflex system distal to a   previous stent.   3. Nonobstructive RCA disease is identified.   4. Additional medical issues are noted in the record.   Diagnostic Recommendations   1. PCI to the diagonal is recommended.   2. Further recommendations will be dictated by the results of this procedure.   Interventional Summary   1. Successful percutaneous intervention to the first diagonal was completed   with a 3.5 x 24 mm Synergy drug-eluting stent (post without a 4.5 millimeter   noncompliant balloon).   2. No procedural complications were identified.   Interventional Recommendation   1. Dual antiplatelet therapy has been initiated.   2. Aggressive risk factor modification will be initiated including high   intensity statin therapy.   3. Further recommendations will be dictated by the patient's clinical course         Past Medical History:   Diagnosis Date     COPD (chronic obstructive pulmonary disease)     Coronary artery disease     Diabetes mellitus     DVT (deep venous thrombosis)     Elevated cholesterol     GERD (gastroesophageal reflux disease)     H/O Cancer     right arm, in throat, chest and stomach, in remission    H/O Ischemia of extremity     History of MI (myocardial infarction) 2019    History of snoring     History of transfusion     HIV (human immunodeficiency virus infection)     Hypertension     Non Hodgkin's lymphoma     PAD (peripheral artery disease)     Pulmonary embolism     Stroke        Past Surgical History:   Procedure Laterality Date    CARDIAC CATHETERIZATION      CORONARY ANGIOPLASTY WITH STENT PLACEMENT      FEMORAL ARTERY - FEMORAL ARTERY BYPASS GRAFT      PORTACATH PLACEMENT           Prior to Admission medications    Medication Sig Start Date End Date Taking? Authorizing Provider   Adult Aspirin Regimen 81 MG EC tablet Take 1 tablet by mouth Daily. 2/8/24  Yes Kaylin Mendez MD   amLODIPine (NORVASC) 10 MG tablet Take 1 tablet by mouth Daily.   Yes Kaylin Mendez MD   dapagliflozin Propanediol (Farxiga) 10 MG tablet  6/29/22  Yes Kaylin Mendez MD   Gydpltn-Eygnb-Jknojwim-TenofAF (GENVOYA) [phone] MG per tablet Take 1 tablet by mouth Daily.   Yes Kaylin Mendez MD   Enoxaparin Sodium (LOVENOX) 100 MG/ML solution prefilled syringe syringe Inject 1 mL under the skin into the appropriate area as directed Every 12 (Twelve) Hours. Indications: DVT/PE (active thrombosis) 10/3/23  Yes Arthur Salazar MD   ergocalciferol (ERGOCALCIFEROL) 1.25 MG (43201 UT) capsule Take 1 capsule by mouth 1 (One) Time Per Week. 5/25/22  Yes Kaylin Mendez MD   glipizide (GLUCOTROL) 10 MG tablet Take 1 tablet by mouth Every 12 (Twelve) Hours. 2/8/24  Yes Kaylin Mendez MD   glucose blood test strip Use to test blood glucose up to four times daily as needed. Formulary Compliance Approval. Diagnosis: Type 2 Diabetes -  Insulin Dependent 9/27/23  Yes Marc Hayes MD   glucose monitor monitoring kit Use to test blood glucose up to four times daily as needed. Formulary Compliance Approval. Diagnosis: Type 2 Diabetes - Insulin Dependent 9/27/23  Yes Marc Hayes MD   Insulin Glargine (LANTUS SOLOSTAR) 100 UNIT/ML injection pen Inject 25 Units under the skin into the appropriate area as directed Every Night. 10/11/22  Yes Osbaldo Levi MD   Lancets misc Use to test blood glucose up to four times daily as needed. Formulary Compliance Approval. Diagnosis: Type 2 Diabetes - Insulin Dependent 9/27/23  Yes Marc Hayes MD   losartan (COZAAR) 25 MG tablet Take 1 tablet by mouth Daily. 7/29/22  Yes Kaylin Mendez MD   pantoprazole (PROTONIX) 40 MG EC tablet  6/19/22  Yes Kaylin Mendez MD   sertraline (ZOLOFT) 50 MG tablet Take 1 tablet by mouth Daily. 9/7/22  Yes Kaylin Mendez MD   warfarin (COUMADIN) 10 MG tablet TAKE one AND one half TABLET BY MOUTH ON MONDAY, WEDNESDAY, FRIDAY. TAKE ONE TABLET BY MOUTH ALL other DAYS. 8/31/22  Yes Kaylin Mendez MD   carvedilol (COREG) 6.25 MG tablet Take 1 tablet by mouth Every 12 (Twelve) Hours for 30 days. 10/3/23 11/2/23  Osbaldo Levi MD   furosemide (LASIX) 20 MG tablet Take 1 tablet by mouth Daily. 2/8/24   Kaylin Mendez MD   hydroCHLOROthiazide 12.5 MG tablet Take 1 tablet by mouth Daily. 5/3/24   Kaylin Mendez MD   HYDROcodone-acetaminophen (NORCO)  MG per tablet Take 1 tablet by mouth. 8/19/22   Kaylin Mendez MD   insulin lispro (ADMELOG) 100 UNIT/ML injection Inject 8 Units under the skin into the appropriate area as directed 3 (Three) Times a Day With Meals for 30 days. 10/11/22 11/10/22  Osbaldo Levi MD   rosuvastatin (CRESTOR) 20 MG tablet Take 1 tablet by mouth Daily. 5/3/24   Kaylin Mendez MD       No Known Allergies    Social History     Socioeconomic History    Marital status: Single   Tobacco Use     Smoking status: Every Day     Current packs/day: 1.00     Average packs/day: 1 pack/day for 30.4 years (30.4 ttl pk-yrs)     Types: Cigars, Cigarettes     Start date: 01/1994   Vaping Use    Vaping status: Never Used   Substance and Sexual Activity    Alcohol use: Yes     Alcohol/week: 2.0 standard drinks of alcohol     Types: 2 Cans of beer per week     Comment: occassionally    Drug use: Not Currently    Sexual activity: Defer       Family History   Problem Relation Age of Onset    Hypertension Mother     Diabetes Mother     Stroke Mother     Hypertension Maternal Grandmother        REVIEW OF SYSTEMS:   All systems reviewed.  Pertinent positives identified in HPI.  All other systems are negative.      Objective:     Vitals:    05/15/24 2225 05/16/24 0421 05/16/24 0728 05/16/24 1147   BP: 172/99 177/92 163/92 121/90   BP Location: Left arm Right arm Right arm Right arm   Patient Position: Lying  Lying Sitting   Pulse: 74  63 65   Resp: 18 18 18 16   Temp: 98.2 °F (36.8 °C) 98.2 °F (36.8 °C) 98.1 °F (36.7 °C) 98.2 °F (36.8 °C)   TempSrc: Oral Oral Oral Oral   SpO2:   98% 100%   Weight:       Height:         Body mass index is 31.01 kg/m².    Physical Exam:  General Appearance:    Alert, cooperative, in no acute distress   Head:    Normocephalic, without obvious abnormality   Eyes:            Lids and lashes normal, conjunctivae and sclerae normal, no icterus, no pallor, corneas clear   Ears:    Ears appear intact with no abnormalities noted   Throat:   No oral lesions, oral mucosa moist   Neck:   No adenopathy, supple, trachea midline, no thyromegaly, no carotid bruit, no JVD   Back:     No kyphosis present, no erythema or scars, no tenderness to palpation    Lungs:     Clear to auscultation,respirations regular, even and unlabored    Heart:    Regular rhythm and normal rate, normal S1 and S2, no murmur, no gallop, no rub, no click   Chest Wall:    No abnormalities observed   Abdomen:     Normal bowel sounds, no  masses, no organomegaly, soft        non-tender, non-distended, no guarding   Extremities:   no edema, no cyanosis, no redness   Pulses:  Bilateral carotids brisk   Skin:  Psychiatric:   No bleeding or rash    Alert and oriented, normal mood and affect         Lab Review:     Results from last 7 days   Lab Units 05/15/24  2048 05/15/24  0301 05/14/24  1832   SODIUM mmol/L  --  138 139   POTASSIUM mmol/L 4.3 3.4* 4.1   CHLORIDE mmol/L  --  107 106   CO2 mmol/L  --  22.0 23.6   BUN mg/dL  --  8 8   CREATININE mg/dL  --  0.82 1.10   CALCIUM mg/dL  --  8.5* 9.0   BILIRUBIN mg/dL  --   --  0.3   ALK PHOS U/L  --   --  67   ALT (SGPT) U/L  --   --  19   AST (SGOT) U/L  --   --  11   GLUCOSE mg/dL  --  119* 182*     Results from last 7 days   Lab Units 05/14/24  1832   HSTROP T ng/L 26*     Results from last 7 days   Lab Units 05/15/24  0301   WBC 10*3/mm3 6.33   HEMOGLOBIN g/dL 14.3   HEMATOCRIT % 43.0   PLATELETS 10*3/mm3 169     Results from last 7 days   Lab Units 05/16/24  0607 05/15/24  1827 05/14/24  1832   INR  1.16* 1.13* 1.05   APTT seconds  --   --  25.3     Results from last 7 days   Lab Units 05/14/24  1832   MAGNESIUM mg/dL 1.7     Results from last 7 days   Lab Units 05/14/24  1832   CHOLESTEROL mg/dL 193   TRIGLYCERIDES mg/dL 104   HDL CHOL mg/dL 38*   LDL CHOL mg/dL 136*                               I personally viewed and interpreted the patient's EKG/Telemetry data.      Current Facility-Administered Medications:     acetaminophen (TYLENOL) tablet 650 mg, 650 mg, Oral, Q4H PRN, Lisbet Chavez APRN    amLODIPine (NORVASC) tablet 10 mg, 10 mg, Oral, Daily, Marycarmen Bran APRN, 10 mg at 05/16/24 0808    [Held by provider] aspirin EC tablet 81 mg, 81 mg, Oral, Daily, Lisbet Chavez APRN    sennosides-docusate (PERICOLACE) 8.6-50 MG per tablet 2 tablet, 2 tablet, Oral, BID PRN **AND** polyethylene glycol (MIRALAX) packet 17 g, 17 g, Oral, Daily PRN **AND** bisacodyl (DULCOLAX) EC tablet 5 mg, 5 mg, Oral,  Daily PRN **AND** bisacodyl (DULCOLAX) suppository 10 mg, 10 mg, Rectal, Daily PRN, Lisbet Chavez APRN    Calcium Replacement - Follow Nurse / BPA Driven Protocol, , Does not apply, PRN, Lisbet Chavez APRN    carvedilol (COREG) tablet 6.25 mg, 6.25 mg, Oral, Q12H, Marycarmen Bran APRN, 6.25 mg at 05/16/24 0808    dextrose (D50W) (25 g/50 mL) IV injection 25 g, 25 g, Intravenous, Q15 Min PRN, Lisbet Chavez APRN    dextrose (GLUTOSE) oral gel 15 g, 15 g, Oral, Q15 Min PRN, Lisbet Chavez APRN    [Held by provider] Fgquqlc-Czaut-Shnnaszf-TenofAF (GENVOYA) [phone] MG per tablet 1 tablet, 1 tablet, Oral, Daily, Lisbet Chavez APRN    [Held by provider] empagliflozin (JARDIANCE) tablet 25 mg, 25 mg, Oral, Daily, Lisbet Chavez APRN    Enoxaparin Sodium (LOVENOX) syringe 100 mg, 1 mg/kg, Subcutaneous, Q12H, Lisbet Chavez APRN, 100 mg at 05/16/24 1041    furosemide (LASIX) tablet 20 mg, 20 mg, Oral, Daily, Marycarmen Bran APRN, 20 mg at 05/16/24 0808    [Held by provider] glipizide (GLUCOTROL) tablet 10 mg, 10 mg, Oral, Q12H, Lisbet Chavez APRN    glucagon (GLUCAGEN) injection 1 mg, 1 mg, Intramuscular, Q15 Min PRN, Lisbet Chavez APRN    hydroCHLOROthiazide tablet 12.5 mg, 12.5 mg, Oral, Daily, Marycarmen Bran APRN, 12.5 mg at 05/16/24 0808    insulin glargine (LANTUS, SEMGLEE) injection 25 Units, 25 Units, Subcutaneous, Nightly, Lisbet Chavez APRN, 25 Units at 05/15/24 2043    insulin regular (humuLIN R,novoLIN R) injection 2-7 Units, 2-7 Units, Subcutaneous, Q6H, Lisbet Chavez APRN    losartan (COZAAR) tablet 25 mg, 25 mg, Oral, Daily, Marycarmen Bran APRN, 25 mg at 05/16/24 0808    Magnesium Standard Dose Replacement - Follow Nurse / BPA Driven Protocol, , Does not apply, PRN, Lisbet Chavez APRN    nitroglycerin (NITROSTAT) SL tablet 0.4 mg, 0.4 mg, Sublingual, Q5 Min PRN, Lisbet Chavez APRN    ondansetron ODT (ZOFRAN-ODT) disintegrating tablet 4 mg, 4 mg, Oral, Q6H PRN **OR** ondansetron (ZOFRAN)  injection 4 mg, 4 mg, Intravenous, Q6H PRN, Scott, Lisbet S, APRN    pantoprazole (PROTONIX) EC tablet 40 mg, 40 mg, Oral, Q AM, Herth, Marycarmen, APRN, 40 mg at 05/16/24 0602    Pharmacy to dose warfarin, , Does not apply, Continuous PRN, Herth, Marycarmen, APRN    Phosphorus Replacement - Follow Nurse / BPA Driven Protocol, , Does not apply, PRN, Scott, Lisbet S, APRN    Potassium Replacement - Follow Nurse / BPA Driven Protocol, , Does not apply, PRN, Scott, Lisbet S, APRN    rosuvastatin (CRESTOR) tablet 20 mg, 20 mg, Oral, Daily, Herth, Marycarmen, APRN, 20 mg at 05/16/24 0808    [Held by provider] sertraline (ZOLOFT) tablet 50 mg, 50 mg, Oral, Daily, Scott, Lisbet S, APRN    [COMPLETED] Insert Peripheral IV, , , Once **AND** sodium chloride 0.9 % flush 10 mL, 10 mL, Intravenous, PRN, Kely Luna PA-C    sodium chloride 0.9 % flush 10 mL, 10 mL, Intravenous, Q12H, Scott, Lisbet S, APRN, 10 mL at 05/15/24 0836    sodium chloride 0.9 % flush 10 mL, 10 mL, Intravenous, PRN, Scott, Lisbet S, APRN    sodium chloride 0.9 % infusion 40 mL, 40 mL, Intravenous, PRN, Scott, Lisbet S, APRN    sodium chloride 0.9 % infusion, 75 mL/hr, Intravenous, Continuous, Scott, Lisbet S, APRN, Last Rate: 75 mL/hr at 05/15/24 1530, 75 mL/hr at 05/15/24 1530    Assessment and Plan:       Active Hospital Problems    Diagnosis  POA    **Slurred speech [R47.81]  Yes    Ischemic cardiomyopathy [I25.5]  Yes    Acute CVA (cerebrovascular accident) [I63.9]  Yes    NHL (non-Hodgkin's lymphoma) [C85.90]  Yes    Type 2 diabetes mellitus with circulatory disorder, without long-term current use of insulin [E11.59]  Yes    Coronary artery disease involving native coronary artery of native heart with angina pectoris [I25.119]  Yes    Anticoagulated on Coumadin [Z79.01]  Not Applicable    Arteriosclerosis of coronary artery [I25.10]  Yes      Resolved Hospital Problems   No resolved problems to display.     1.  Acute CVA-multiple bilateral areas, appears to be  embolic.  Since he already has an indication for chronic anticoagulation I am not planning on a transesophageal echocardiogram as there is no evidence it would alter therapy.  2.  Anticoagulated on warfarin-noncompliant, has not been taking, INR 1 on arrival  3.  History of DVT PE  4.  Coronary disease, prior stent placement as outlined above, having episodes of chest discomfort and shortness of breath, we will arrange for pharmacologic nuclear perfusion study and we will arrange for this in the morning.  Aspirin being held at this point, we will want to restart aspirin 81 mg once daily once appropriate from a neurologic standpoint  5.  Ischemic cardiomyopathy, currently on carvedilol and losartan, we will want to uptitrate for guideline directed medical therapy.  Also on amlodipine which would be great to wean off.  Hypertensive on arrival so not can to change the amlodipine today, I will switch his losartan over to Entresto, discontinue HCTZ and replace with spironolactone.    Shashi Lomas III, MD  05/16/24  12:13 EDT

## 2024-05-16 NOTE — CONSULTS
CONSULT NOTE    INTERNAL MEDICINE   [facility]       Patient Identification:  Name: Cecilio Puckett  Age: 54 y.o.  Sex: male  :  1970  MRN: [id number]             Date of Consultation:  05/15/24          Primary Care Physician: Arias Lehman APRN                               Requesting Physician: dr marin  Reason for Consultation: admission/assuming care    Chief Complaint:  54 year old gentleman presented to the emergency room with slurred speech and dizziness; workup revealed an acute cva; echo done showed a decreased ef from prior and cardiology evaluation is planned; he has had a prior cva and has been noncompliant with taking his medications    History of Present Illness:   As above      Past Medical History:  Past Medical History:   Diagnosis Date    COPD (chronic obstructive pulmonary disease)     Coronary artery disease     Diabetes mellitus     DVT (deep venous thrombosis)     Elevated cholesterol     GERD (gastroesophageal reflux disease)     H/O Cancer     right arm, in throat, chest and stomach, in remission    H/O Ischemia of extremity     History of MI (myocardial infarction) 2019    History of snoring     History of transfusion     HIV (human immunodeficiency virus infection)     Hypertension     Non Hodgkin's lymphoma     PAD (peripheral artery disease)     Pulmonary embolism     Stroke      Past Surgical History:  Past Surgical History:   Procedure Laterality Date    CARDIAC CATHETERIZATION      CORONARY ANGIOPLASTY WITH STENT PLACEMENT      FEMORAL ARTERY - FEMORAL ARTERY BYPASS GRAFT      PORTACATH PLACEMENT        Home Meds:  Medications Prior to Admission   Medication Sig Dispense Refill Last Dose    Adult Aspirin Regimen 81 MG EC tablet Take 1 tablet by mouth Daily.   Past Week    amLODIPine (NORVASC) 10 MG tablet Take 1 tablet by mouth Daily.   Past Month    dapagliflozin Propanediol (Farxiga) 10 MG tablet    Past Month    Zxsiilk-Phlwa-Pohkwsps-TenofAF (GENVOYA)  [phone] MG per tablet Take 1 tablet by mouth Daily.   Past Month    Enoxaparin Sodium (LOVENOX) 100 MG/ML solution prefilled syringe syringe Inject 1 mL under the skin into the appropriate area as directed Every 12 (Twelve) Hours. Indications: DVT/PE (active thrombosis) 28 mL 0 Past Week    ergocalciferol (ERGOCALCIFEROL) 1.25 MG (00706 UT) capsule Take 1 capsule by mouth 1 (One) Time Per Week.   Past Month    glipizide (GLUCOTROL) 10 MG tablet Take 1 tablet by mouth Every 12 (Twelve) Hours.   Past Week    glucose blood test strip Use to test blood glucose up to four times daily as needed. Formulary Compliance Approval. Diagnosis: Type 2 Diabetes - Insulin Dependent 100 each 0 5/13/2024    glucose monitor monitoring kit Use to test blood glucose up to four times daily as needed. Formulary Compliance Approval. Diagnosis: Type 2 Diabetes - Insulin Dependent 1 each 0 5/13/2024    Insulin Glargine (LANTUS SOLOSTAR) 100 UNIT/ML injection pen Inject 25 Units under the skin into the appropriate area as directed Every Night. 100 mL 0 Past Month    Lancets misc Use to test blood glucose up to four times daily as needed. Formulary Compliance Approval. Diagnosis: Type 2 Diabetes - Insulin Dependent 100 each 0 Past Week    losartan (COZAAR) 25 MG tablet Take 1 tablet by mouth Daily.   Past Month    pantoprazole (PROTONIX) 40 MG EC tablet    Past Month    sertraline (ZOLOFT) 50 MG tablet Take 1 tablet by mouth Daily.   5/13/2024    warfarin (COUMADIN) 10 MG tablet TAKE one AND one half TABLET BY MOUTH ON MONDAY, WEDNESDAY, FRIDAY. TAKE ONE TABLET BY MOUTH ALL other DAYS.   5/14/2024    carvedilol (COREG) 6.25 MG tablet Take 1 tablet by mouth Every 12 (Twelve) Hours for 30 days. 60 tablet 0     furosemide (LASIX) 20 MG tablet Take 1 tablet by mouth Daily.   Unknown    hydroCHLOROthiazide 12.5 MG tablet Take 1 tablet by mouth Daily.   Unknown    HYDROcodone-acetaminophen (NORCO)  MG per tablet Take 1 tablet by mouth.    Unknown    insulin lispro (ADMELOG) 100 UNIT/ML injection Inject 8 Units under the skin into the appropriate area as directed 3 (Three) Times a Day With Meals for 30 days. 100 mL 0     rosuvastatin (CRESTOR) 20 MG tablet Take 1 tablet by mouth Daily.   Unknown     Current Meds:     Current Facility-Administered Medications:     acetaminophen (TYLENOL) tablet 650 mg, 650 mg, Oral, Q4H PRN, Lisbet Chavez APRN    amLODIPine (NORVASC) tablet 10 mg, 10 mg, Oral, Daily, HerMarycarmen moreno, APRN, 10 mg at 05/15/24 1521    [Held by provider] aspirin EC tablet 81 mg, 81 mg, Oral, Daily, Lisbet Chavez APRN    sennosides-docusate (PERICOLACE) 8.6-50 MG per tablet 2 tablet, 2 tablet, Oral, BID PRN **AND** polyethylene glycol (MIRALAX) packet 17 g, 17 g, Oral, Daily PRN **AND** bisacodyl (DULCOLAX) EC tablet 5 mg, 5 mg, Oral, Daily PRN **AND** bisacodyl (DULCOLAX) suppository 10 mg, 10 mg, Rectal, Daily PRN, Lisbet Chavez APRN    Calcium Replacement - Follow Nurse / BPA Driven Protocol, , Does not apply, PRN, Lisbet Chavez APRN    carvedilol (COREG) tablet 6.25 mg, 6.25 mg, Oral, Q12H, Marycarmen Bran APRN, 6.25 mg at 05/15/24 2042    dextrose (D50W) (25 g/50 mL) IV injection 25 g, 25 g, Intravenous, Q15 Min PRN, Lisbet Chavez APRN    dextrose (GLUTOSE) oral gel 15 g, 15 g, Oral, Q15 Min PRN, Lisbet Chavez APRN    [Held by provider] Oszpugw-Hkard-Cxutzkhw-TenofAF (GENVOYA) [phone] MG per tablet 1 tablet, 1 tablet, Oral, Daily, Lisbet Chavez APRN    [Held by provider] empagliflozin (JARDIANCE) tablet 25 mg, 25 mg, Oral, Daily, Lisbet Chavez APRN    Enoxaparin Sodium (LOVENOX) syringe 100 mg, 1 mg/kg, Subcutaneous, Q12H, Lisbet Chavez APRN, 100 mg at 05/15/24 0836    furosemide (LASIX) tablet 20 mg, 20 mg, Oral, Daily, Marycarmen Bran APRN, 20 mg at 05/15/24 1520    [Held by provider] glipizide (GLUCOTROL) tablet 10 mg, 10 mg, Oral, Q12H, Lisbet Chavez APRN    glucagon (GLUCAGEN) injection 1 mg, 1 mg,  Intramuscular, Q15 Min PRN, Lisbet Chavez APRN    hydroCHLOROthiazide tablet 12.5 mg, 12.5 mg, Oral, Daily, Marycarmen Bran APRN, 12.5 mg at 05/15/24 1522    insulin glargine (LANTUS, SEMGLEE) injection 25 Units, 25 Units, Subcutaneous, Nightly, Lisbet Chavez APRN, 25 Units at 05/15/24 2043    insulin regular (humuLIN R,novoLIN R) injection 2-7 Units, 2-7 Units, Subcutaneous, Q6H, Lisbet Chavez APRN    losartan (COZAAR) tablet 25 mg, 25 mg, Oral, Daily, Marycarmen Bran APRN, 25 mg at 05/15/24 1520    Magnesium Standard Dose Replacement - Follow Nurse / BPA Driven Protocol, , Does not apply, PRN, Lisbet Chavez APRN    nitroglycerin (NITROSTAT) SL tablet 0.4 mg, 0.4 mg, Sublingual, Q5 Min PRN, Lisbet Chavez APRN    ondansetron ODT (ZOFRAN-ODT) disintegrating tablet 4 mg, 4 mg, Oral, Q6H PRN **OR** ondansetron (ZOFRAN) injection 4 mg, 4 mg, Intravenous, Q6H PRN, Lisbet Chavez APRN    pantoprazole (PROTONIX) EC tablet 40 mg, 40 mg, Oral, Q AM, Marycarmen Bran APRN    Pharmacy to dose warfarin, , Does not apply, Continuous PRN, Marycarmen Bran APRN    Phosphorus Replacement - Follow Nurse / BPA Driven Protocol, , Does not apply, PRN, Lisbet Chavez APRN    Potassium Replacement - Follow Nurse / BPA Driven Protocol, , Does not apply, PRN, Lisbet Chavez APRN    rosuvastatin (CRESTOR) tablet 20 mg, 20 mg, Oral, Daily, Marycarmen Bran APRN, 20 mg at 05/15/24 1521    [Held by provider] sertraline (ZOLOFT) tablet 50 mg, 50 mg, Oral, Daily, Lisbet Chavez APRN    [COMPLETED] Insert Peripheral IV, , , Once **AND** sodium chloride 0.9 % flush 10 mL, 10 mL, Intravenous, PRN, Kely Luna PA-C    sodium chloride 0.9 % flush 10 mL, 10 mL, Intravenous, Q12H, Lisbet Chavez, APRN, 10 mL at 05/15/24 0836    sodium chloride 0.9 % flush 10 mL, 10 mL, Intravenous, PRN, Lisbet Chavez S, APRN    sodium chloride 0.9 % infusion 40 mL, 40 mL, Intravenous, PRN, Lisbet Chavez S, APRN    sodium chloride 0.9 % infusion, 75 mL/hr,  "Intravenous, Continuous, Lisbet Chavez S, APRN, Last Rate: 75 mL/hr at 05/15/24 1530, 75 mL/hr at 05/15/24 1530  Allergies:  No Known Allergies  Social History:   Social History     Socioeconomic History    Marital status: Single   Tobacco Use    Smoking status: Every Day     Current packs/day: 1.00     Average packs/day: 1 pack/day for 30.4 years (30.4 ttl pk-yrs)     Types: Cigars, Cigarettes     Start date: 01/1994   Vaping Use    Vaping status: Never Used   Substance and Sexual Activity    Alcohol use: Yes     Alcohol/week: 2.0 standard drinks of alcohol     Types: 2 Cans of beer per week     Comment: occassionally    Drug use: Not Currently    Sexual activity: Defer     Family History:  Family History   Problem Relation Age of Onset    Hypertension Mother     Diabetes Mother     Stroke Mother     Hypertension Maternal Grandmother           Review of Systems  See history of present illness and past medical history.  Patient denies headache, dizziness, syncope, falls, trauma, change in vision, change in hearing, change in taste, changes in weight, changes in appetite, focal weakness,   Patient denies chest pain, palpitations, dyspnea, orthopnea, PND, cough, sinus pressure, rhinorrhea, epistaxis, hemoptysis, nausea, vomiting,hematemesis, diarrhea, constipation or hematochezia. Denies cold or heat intolerance, polydipsia, polyuria, polyphagia. Denies hematuria, pyuria, dysuria, hesitancy, frequency or urgency. Denies consumption of raw and under cooked meats foods or change in water source.      Vitals:   /99 (BP Location: Left arm, Patient Position: Lying)   Pulse 74   Temp 98.2 °F (36.8 °C) (Oral)   Resp 18   Ht 175.3 cm (69\")   Wt 95.3 kg (210 lb)   SpO2 99%   BMI 31.01 kg/m²   I/O: No intake or output data in the 24 hours ending 05/15/24 5377  Exam:  General Appearance:    Alert, cooperative, no distress, appears stated age   Head:    Normocephalic, without obvious abnormality, atraumatic   Eyes:    " PERRL, conjunctivae/corneas clear, EOM's intact, both eyes   Ears:    Normal external ear canals, both ears   Nose:   Nares normal, septum midline, mucosa normal, no drainage    or sinus tenderness   Throat:   Lips, tongue, gums normal; oral mucosa pink and moist   Neck:   Supple, symmetrical, trachea midline, no adenopathy;     thyroid:  no enlargement/tenderness/nodules; no carotid    bruit or JVD   Back:     Symmetric, no curvature, ROM normal, no CVA tenderness   Lungs:     Clear to auscultation bilaterally, respirations unlabored   Chest Wall:    No tenderness or deformity    Heart:    Regular rate and rhythm, S1 and S2 normal, no murmur, rub   or gallop   Abdomen:     Soft, nontender, bowel sounds active all four quadrants,     no masses, no hepatomegaly, no splenomegaly   Extremities:   Extremities normal, atraumatic, no cyanosis or edema                Data Review:  Labs in chart were reviewed.  WBC   Date Value Ref Range Status   05/15/2024 6.33 3.40 - 10.80 10*3/mm3 Final     Hemoglobin   Date Value Ref Range Status   05/15/2024 14.3 13.0 - 17.7 g/dL Final     Hematocrit   Date Value Ref Range Status   05/15/2024 43.0 37.5 - 51.0 % Final     Platelets   Date Value Ref Range Status   05/15/2024 169 140 - 450 10*3/mm3 Final     Sodium   Date Value Ref Range Status   05/15/2024 138 136 - 145 mmol/L Final     Potassium   Date Value Ref Range Status   05/15/2024 4.3 3.5 - 5.2 mmol/L Final     Comment:     Specimen hemolyzed.  Result may be falsely elevated.     Chloride   Date Value Ref Range Status   05/15/2024 107 98 - 107 mmol/L Final     CO2   Date Value Ref Range Status   05/15/2024 22.0 22.0 - 29.0 mmol/L Final     BUN   Date Value Ref Range Status   05/15/2024 8 6 - 20 mg/dL Final     Creatinine   Date Value Ref Range Status   05/15/2024 0.82 0.76 - 1.27 mg/dL Final     Glucose   Date Value Ref Range Status   05/15/2024 119 (H) 65 - 99 mg/dL Final     Calcium   Date Value Ref Range Status   05/15/2024  8.5 (L) 8.6 - 10.5 mg/dL Final     Magnesium   Date Value Ref Range Status   05/14/2024 1.7 1.6 - 2.6 mg/dL Final     Results from last 7 days   Lab Units 05/14/24  1832   TSH uIU/mL 1.280     Results from last 7 days   Lab Units 05/14/24  1832   HEMOGLOBIN A1C % 10.00*       Imaging Results (Last 7 Days)       Procedure Component Value Units Date/Time    CT Angiogram Head [[id number]] Collected: 05/15/24 1047     Updated: 05/15/24 1047    Narrative:      CT ANGIOGRAM NECK AND HEAD WITH CONTRAST     HISTORY: Slurred speech, stroke.     COMPARISON: MRI brain 05/15/2024.     FINDINGS: Initially, a noncontrasted CT examination of the brain was  performed. An area of decreased attenuation involving the left occipital  lobe posteriorly is appreciated corresponding to the largest of the  acute infarcts noted on the MRI examination from earlier the same day.  It measures approximately 4.5 cm in maximum transverse dimension. The  smaller acute infarcts noted on the MRI are largely occult on the  current CT examination. A small remote infarct involving the left  cerebellar hemisphere centrally is noted. No new area of decreased  attenuation to suggest a new infarction is identified.     A CT angiogram of the neck and head was performed. Multiplanar as well  as 3-dimensional reconstructions were generated.     The great vessels are arranged in a classic configuration. Noncalcified  plaque is appreciated involving the common carotid arteries bilaterally  as well as extending to and involving the internal carotid arteries  proximally where mild vascular calcification is appreciated. There is 0%  stenosis of the internal carotid arteries using NASCET criteria.  Atherosclerotic disease is more severe involving the cavernous ICA on  the left where there is moderate stenosis and irregularity proximally.  The study is hampered by patient motion but the proximal aspects of the  anterior and middle cerebral arteries appear  unremarkable.     The left vertebral artery is larger than that of the right. The left  vertebral artery is of relatively uniform caliber. The right vertebral  artery is small in caliber and there is a severe stenosis involving the  right vertebral artery proximally. The right vertebral artery was  obscured by beam-hardening artifact proximally on the prior examination.  Distally, the right vertebral artery is attenuated in caliber and is  nonopacified from a point just proximal to the dura to the  vertebrobasilar junction. The right vertebral artery was patent  distally. The posterior-inferior cerebellar artery on the right is not  opacified. The basilar artery and the proximal aspects of the posterior  cerebral arteries appear unremarkable. Moderate stenoses involving the  distal P2 segments are appreciated bilaterally.     A standard postcontrast CT examination of the brain showed no evidence  of abnormal enhancement. The right vertebral artery from the dura to the  vertebrobasilar junction is opacified on the postcontrast CT  examination. The V4 segment is irregular and smaller in caliber as  compared to the CT angiogram of 09/30/2023.       Impression:      The right vertebral artery is attenuated in caliber  distally, particularly the V4 segment and this is new versus 09/30/2023.  The right vertebral artery proximally was obscured by beam-hardening  artifact on the prior CT angiogram of 09/30/2023. On the current  examination there is severe stenosis involving the right vertebral  artery proximally.     The above information was called to discussed with MARIANNE Marie.     NOTE: This is a preliminary report. The 3-dimensional reconstructions  are not yet available for review.           Radiation dose reduction techniques were utilized, including automated  exposure control and exposure modulation based on body size.       CT Angiogram Neck [[id number]] Collected: 05/15/24 1047     Updated: 05/15/24 1047     Narrative:      CT ANGIOGRAM NECK AND HEAD WITH CONTRAST     HISTORY: Slurred speech, stroke.     COMPARISON: MRI brain 05/15/2024.     FINDINGS: Initially, a noncontrasted CT examination of the brain was  performed. An area of decreased attenuation involving the left occipital  lobe posteriorly is appreciated corresponding to the largest of the  acute infarcts noted on the MRI examination from earlier the same day.  It measures approximately 4.5 cm in maximum transverse dimension. The  smaller acute infarcts noted on the MRI are largely occult on the  current CT examination. A small remote infarct involving the left  cerebellar hemisphere centrally is noted. No new area of decreased  attenuation to suggest a new infarction is identified.     A CT angiogram of the neck and head was performed. Multiplanar as well  as 3-dimensional reconstructions were generated.     The great vessels are arranged in a classic configuration. Noncalcified  plaque is appreciated involving the common carotid arteries bilaterally  as well as extending to and involving the internal carotid arteries  proximally where mild vascular calcification is appreciated. There is 0%  stenosis of the internal carotid arteries using NASCET criteria.  Atherosclerotic disease is more severe involving the cavernous ICA on  the left where there is moderate stenosis and irregularity proximally.  The study is hampered by patient motion but the proximal aspects of the  anterior and middle cerebral arteries appear unremarkable.     The left vertebral artery is larger than that of the right. The left  vertebral artery is of relatively uniform caliber. The right vertebral  artery is small in caliber and there is a severe stenosis involving the  right vertebral artery proximally. The right vertebral artery was  obscured by beam-hardening artifact proximally on the prior examination.  Distally, the right vertebral artery is attenuated in caliber and  is  nonopacified from a point just proximal to the dura to the  vertebrobasilar junction. The right vertebral artery was patent  distally. The posterior-inferior cerebellar artery on the right is not  opacified. The basilar artery and the proximal aspects of the posterior  cerebral arteries appear unremarkable. Moderate stenoses involving the  distal P2 segments are appreciated bilaterally.     A standard postcontrast CT examination of the brain showed no evidence  of abnormal enhancement. The right vertebral artery from the dura to the  vertebrobasilar junction is opacified on the postcontrast CT  examination. The V4 segment is irregular and smaller in caliber as  compared to the CT angiogram of 09/30/2023.       Impression:      The right vertebral artery is attenuated in caliber  distally, particularly the V4 segment and this is new versus 09/30/2023.  The right vertebral artery proximally was obscured by beam-hardening  artifact on the prior CT angiogram of 09/30/2023. On the current  examination there is severe stenosis involving the right vertebral  artery proximally.     The above information was called to discussed with MARIANNE Marie.     NOTE: This is a preliminary report. The 3-dimensional reconstructions  are not yet available for review.           Radiation dose reduction techniques were utilized, including automated  exposure control and exposure modulation based on body size.       MRI Brain Without Contrast [[id number]] Collected: 05/15/24 0142     Updated: 05/15/24 0153    Narrative:      BRAIN MRI WITHOUT CONTRAST     HISTORY: Slurred speech; R47.9-Unspecified speech disturbances;  R42-Dizziness and giddiness; Z86.73-Personal history of transient  ischemic attack (TIA), and cerebral infarction without residual  deficits; I10-Essential (primary) hypertension     COMPARISON: September 29, 2023.     FINDINGS:  Multiplanar images of the head were obtained without  gadolinium. There are multiple areas  of restricted diffusion. They're  noted within the left midbrain, the left parietal occipital region, left  centrum semiovale and left frontal cortex. Further scattered areas of  restricted diffusion are noted within the right cerebral hemisphere.  Given bilateral distribution, these may be embolic. The ventricles are  normal in size. There is no midline shift. There is periventricular and  deep white matter microangiopathic change. There is an old left  cerebellar infarct. There are old lacunar infarcts noted within the  right corona radiata, as well as the bilateral basal ganglia. Further  old lacunar infarct is noted within the right thalamus. Intracranial  flow voids appear grossly intact. Mucosal thickening is noted within the  ethmoid sinuses, and mucous retention cysts are noted within the left  maxillary sinus. There is trace fluid within the mastoid air cells  bilaterally, more significant on the left. The old infarct within the  left cerebellar hemisphere is associated with some chronic hemosiderin  deposition. Patient also has some chronic hemosiderin deposition  associated with the old lacunar infarcts within the right corona radiata  and basal ganglia. There does appear to be some petechial hemorrhagic  transformation which is noted within the area of evolving infarction  within the left parieto-occipital region.          Impression:      1. The patient has multiple bilateral areas of acute infarction within  the cerebral hemispheres, as well as an additional acute infarct within  the left midbrain. This would suggest an embolic source. The single  largest area is noted within the left parieto-occipital region. This  area does appear to be associated with some mild petechial hemorrhagic  transformation.        This report was finalized on 5/15/2024 1:50 AM by Dr. Isa Armendariz M.D on Workstation: [id number]       CT Head Without Contrast [[id number]] Collected: 05/14/24 2025     Updated: 05/14/24  2234    Narrative:      EMERGENCY CT SCAN OF THE HEAD WITHOUT CONTRAST ON 05/14/2024     CLINICAL HISTORY: This is a 54-year-old male patient who feels like he  has been slurring words for greater than a month     TECHNIQUE: Spiral CT images were obtained from the base of the skull to  the vertex without intravenous contrast. The images were reformatted and  are submitted in 3 mm thick axial, sagittal and coronal CT sections with  brain algorithm.     COMPARISON: This is correlated to a prior MRI of the brain on 09/29/2023  and 10/10/2022..     FINDINGS: There is a 10 x 7 mm old lacunar infarct extending from the  mid right corona radiata region into the superior right putamen that  occurred back in September 2023. There is an additional 8 x 5 mm old  lacunar infarct in the anterior right putamen, 4 mm old lacunar infarct  in the posterior limb of the right internal capsule. There is a moderate  size area of infarction involving the posterior inferior medial left  parietal lobe extending in the superior left occipital lobe. It measures  approximately 4.8 x 3 x 4 cm. It is in the distribution of  parieto-occipital branches left MCA territory and the precise age of  this infarct is uncertain but may be subacute in nature. There is a 11 x  6 mm infarct in the posterior lateral left cerebellum and left PICA  territory either subacute or chronic. The remainder of the brain  parenchyma is normal in attenuation. The ventricles are normal in size.  I see no mass effect and no midline shift and no extra-axial fluid  collections are identified and there is no evidence of acute  intracranial hemorrhage. The calvarium and the skull base are normal in  appearance. Paranasal sinuses, mastoid air cells and middle ear cavities  are clear.       Impression:      1. There is a moderate size area of acute/subacute infarction extending  from the posterior inferior left parietal lobe into the superior left  occipital lobe measuring 4.8  x 3 x 4 cm in size in the distribution of  parieto-occipital branch of the left MCA territory. Its precise age is  uncertain and I recommend an MRI of the brain to better date it. There  is an 11 x 6 mm infarct in the posterior lateral left cerebellum that is  new when compared to the MRI of the brain on 09/29/2023 and I suspect  this is either subacute or chronic. Otherwise, the head CT is unchanged  when compared to MRI of the brain 09/29/2023. There is an 11 x 7 mm old  lacunar infarct extending from the mid right corona radiata region into  the superior right putamen that occurred back in September of 2023.  There are small old lacunar infarcts in the anterior right putamen and  posterior limb of the right internal capsule. The remainder of the head  CT is within normal limits. The results and recommendations were  communicated to Ekly Jeremy by telephone on 05/14/2024 at 8 p.m.     Radiation dose reduction techniques were utilized, including automated  exposure control and exposure modulation based on body size.        This report was finalized on 5/14/2024 10:31 PM by Dr. Sebastian Way M.D  on Workstation: [id number]       XR Chest 1 View [[id number]] Collected: 05/14/24 1918     Updated: 05/14/24 1921    Narrative:      XR CHEST 1 VW-     HISTORY: Male who is 54 years-old, dizziness     TECHNIQUE: Frontal view of the chest     COMPARISON: 9/29/2023     FINDINGS: Heart, mediastinum and pulmonary vasculature are unremarkable.  No focal pulmonary consolidation, pleural effusion, or pneumothorax. No  acute osseous process.       Impression:      No evidence for acute pulmonary process. Follow-up as  clinical indications persist.     This report was finalized on 5/14/2024 7:18 PM by Dr. Junior Weiner M.D on Workstation: [id number]             Past Medical History:   Diagnosis Date    COPD (chronic obstructive pulmonary disease)     Coronary artery disease     Diabetes mellitus     DVT (deep venous  thrombosis)     Elevated cholesterol     GERD (gastroesophageal reflux disease)     H/O Cancer     right arm, in throat, chest and stomach, in remission    H/O Ischemia of extremity     History of MI (myocardial infarction) 2019    History of snoring     History of transfusion     HIV (human immunodeficiency virus infection)     Hypertension     Non Hodgkin's lymphoma     PAD (peripheral artery disease)     Pulmonary embolism     Stroke        Assessment:  Active Hospital Problems    Diagnosis  POA    **Slurred speech [R47.81]  Yes    Acute CVA (cerebrovascular accident) [I63.9]  Yes      Resolved Hospital Problems   No resolved problems to display.   Hypertension  Diabetes  Cva  Obesity  Copd  Hiv  Pad  Noncompliance  Tobacco use    Plan:  Cardiology eval planned  Stressed the importance of taking medications as recommended  Dw patient and provider from the observation unit  Trend labs  Notes reviewed    Sade Prasad MD   5/15/2024  23:18 EDT

## 2024-05-16 NOTE — PROGRESS NOTES
"DAILY PROGRESS NOTE  [facility]    Patient Identification:  Name: Cecilio Puckett  Age: 54 y.o.  Sex: male  :  1970  MRN: [id number]         Primary Care Physician: Arias Lehman APRN    Subjective:  Interval History: He still complains about some trouble with slurred speech but he is up ambulating independently without much difficulty and PT signed off.    Objective:    Scheduled Meds:amLODIPine, 10 mg, Oral, Daily  [Held by provider] aspirin, 81 mg, Oral, Daily  carvedilol, 6.25 mg, Oral, Q12H  [Held by provider] Budbdid-Kutbm-Jpkvilaq-TenofAF, 1 tablet, Oral, Daily  [Held by provider] empagliflozin, 25 mg, Oral, Daily  enoxaparin, 1 mg/kg, Subcutaneous, Q12H  furosemide, 20 mg, Oral, Daily  [Held by provider] glipizide, 10 mg, Oral, Q12H  insulin glargine, 25 Units, Subcutaneous, Nightly  insulin regular, 2-7 Units, Subcutaneous, Q6H  pantoprazole, 40 mg, Oral, Q AM  rosuvastatin, 20 mg, Oral, Daily  sacubitril-valsartan, 1 tablet, Oral, Q12H  [Held by provider] sertraline, 50 mg, Oral, Daily  sodium chloride, 10 mL, Intravenous, Q12H  spironolactone, 25 mg, Oral, Daily  warfarin, 12 mg, Oral, Once      Continuous Infusions:Pharmacy to dose warfarin,   sodium chloride, 75 mL/hr, Last Rate: 75 mL/hr (05/15/24 1530)        Vital signs in last 24 hours:  Temp:  [97.8 °F (36.6 °C)-98.2 °F (36.8 °C)] 98.2 °F (36.8 °C)  Heart Rate:  [63-77] 65  Resp:  [16-18] 16  BP: (121-177)/(88-99) 121/90    Intake/Output:  No intake or output data in the 24 hours ending 24 1508    Exam:  /90 (BP Location: Right arm, Patient Position: Sitting)   Pulse 65   Temp 98.2 °F (36.8 °C) (Oral)   Resp 16   Ht 175.3 cm (69\")   Wt 95.3 kg (210 lb)   SpO2 100%   BMI 31.01 kg/m²     General Appearance:    Alert, cooperative, no distress   Head:    Normocephalic, without obvious abnormality, atraumatic   Eyes:       Throat:   Lips, tongue, gums normal   Neck:   Supple, symmetrical, trachea midline, " no JVD   Lungs:     Clear to auscultation bilaterally, respirations unlabored   Chest Wall:    No tenderness or deformity    Heart:    Regular rate and rhythm, S1 and S2 normal, no murmur,no  Rub or gallop   Abdomen:     Soft, nontender, bowel sounds active, no masses, no organomegaly    Extremities:   Extremities normal, atraumatic, no cyanosis or edema   Pulses:      Skin:   Skin is warm and dry,  no rashes or palpable lesions   Neurologic: Some slurring of speech but generally is up ambulating well without any assistance      Lab Results (last 72 hours)       Procedure Component Value Units Date/Time    POC Glucose Once [[id number]]  (Normal) Collected: 05/16/24 1153    Specimen: Blood Updated: 05/16/24 1155     Glucose 96 mg/dL     POC Glucose Once [[id number]]  (Abnormal) Collected: 05/16/24 0735    Specimen: Blood Updated: 05/16/24 0737     Glucose 137 mg/dL     Protime-INR [[id number]]  (Abnormal) Collected: 05/16/24 0607    Specimen: Blood Updated: 05/16/24 0657     Protime 15.1 Seconds      INR 1.16    POC Glucose Once [[id number]]  (Abnormal) Collected: 05/16/24 0548    Specimen: Blood Updated: 05/16/24 0548     Glucose 64 mg/dL     POC Glucose Once [[id number]]  (Abnormal) Collected: 05/15/24 2349    Specimen: Blood Updated: 05/15/24 2350     Glucose 147 mg/dL     Potassium [[id number]]  (Normal) Collected: 05/15/24 2048    Specimen: Blood Updated: 05/15/24 2131     Potassium 4.3 mmol/L      Comment: Specimen hemolyzed.  Result may be falsely elevated.       POC Glucose Once [[id number]]  (Abnormal) Collected: 05/15/24 2024    Specimen: Blood Updated: 05/15/24 2027     Glucose 163 mg/dL     Protime-INR [[id number]]  (Abnormal) Collected: 05/15/24 1827    Specimen: Blood from Arm, Left Updated: 05/15/24 1859     Protime 14.7 Seconds      INR 1.13    POC Glucose Once [[id number]]  (Abnormal) Collected: 05/15/24 1806    Specimen: Blood Updated: 05/15/24 1807     Glucose 142 mg/dL     POC Glucose Once [[id number]]   (Normal) Collected: 05/15/24 1207    Specimen: Blood Updated: 05/15/24 1208     Glucose 80 mg/dL     POC Glucose Once [[id number]]  (Normal) Collected: 05/15/24 0650    Specimen: Blood Updated: 05/15/24 0651     Glucose 87 mg/dL     Basic Metabolic Panel [[id number]]  (Abnormal) Collected: 05/15/24 0301    Specimen: Blood from Arm, Left Updated: 05/15/24 0346     Glucose 119 mg/dL      BUN 8 mg/dL      Creatinine 0.82 mg/dL      Sodium 138 mmol/L      Potassium 3.4 mmol/L      Chloride 107 mmol/L      CO2 22.0 mmol/L      Calcium 8.5 mg/dL      BUN/Creatinine Ratio 9.8     Anion Gap 9.0 mmol/L      eGFR 105.0 mL/min/1.73     Narrative:      GFR Normal >60  Chronic Kidney Disease <60  Kidney Failure <15      CBC (No Diff) [[id number]]  (Normal) Collected: 05/15/24 0301    Specimen: Blood from Arm, Left Updated: 05/15/24 0326     WBC 6.33 10*3/mm3      RBC 5.08 10*6/mm3      Hemoglobin 14.3 g/dL      Hematocrit 43.0 %      MCV 84.6 fL      MCH 28.1 pg      MCHC 33.3 g/dL      RDW 14.2 %      RDW-SD 43.0 fl      MPV 10.9 fL      Platelets 169 10*3/mm3     POC Glucose Once [[id number]]  (Normal) Collected: 05/14/24 2331    Specimen: Blood Updated: 05/14/24 2332     Glucose 129 mg/dL     Hemoglobin A1c [[id number]]  (Abnormal) Collected: 05/14/24 1832    Specimen: Blood Updated: 05/14/24 2157     Hemoglobin A1C 10.00 %     Narrative:      Hemoglobin A1C Ranges:    Increased Risk for Diabetes  5.7% to 6.4%  Diabetes                     >= 6.5%  Diabetic Goal                < 7.0%    Lipid Panel [[id number]]  (Abnormal) Collected: 05/14/24 1832    Specimen: Blood Updated: 05/14/24 2147     Total Cholesterol 193 mg/dL      Triglycerides 104 mg/dL      HDL Cholesterol 38 mg/dL      LDL Cholesterol  136 mg/dL      VLDL Cholesterol 19 mg/dL      LDL/HDL Ratio 3.53    Narrative:      Cholesterol Reference Ranges  (U.S. Department of Health and Human Services ATP III Classifications)    Desirable          <200 mg/dL  Borderline High     200-239 mg/dL  High Risk          >240 mg/dL      Triglyceride Reference Ranges  (U.S. Department of Health and Human Services ATP III Classifications)    Normal           <150 mg/dL  Borderline High  150-199 mg/dL  High             200-499 mg/dL  Very High        >500 mg/dL    HDL Reference Ranges  (U.S. Department of Health and Human Services ATP III Classifications)    Low     <40 mg/dl (major risk factor for CHD)  High    >60 mg/dl ('negative' risk factor for CHD)        LDL Reference Ranges  (U.S. Department of Health and Human Services ATP III Classifications)    Optimal          <100 mg/dL  Near Optimal     100-129 mg/dL  Borderline High  130-159 mg/dL  High             160-189 mg/dL  Very High        >189 mg/dL    Urinalysis, Microscopic Only - Urine, Clean Catch [[id number]]  (Abnormal) Collected: 05/14/24 2001    Specimen: Urine, Clean Catch Updated: 05/14/24 2042     RBC, UA 3-5 /HPF      WBC, UA 11-20 /HPF      Bacteria, UA None Seen /HPF      Squamous Epithelial Cells, UA 3-6 /HPF      Hyaline Casts, UA 21-30 /LPF      Methodology Automated Microscopy    Urine Drug Screen - Urine, Clean Catch [[id number]]  (Normal) Collected: 05/14/24 2001    Specimen: Urine, Clean Catch Updated: 05/14/24 2040     Amphet/Methamphet, Screen Negative     Barbiturates Screen, Urine Negative     Benzodiazepine Screen, Urine Negative     Cocaine Screen, Urine Negative     Opiate Screen Negative     THC, Screen, Urine Negative     Methadone Screen, Urine Negative     Oxycodone Screen, Urine Negative     Fentanyl, Urine Negative    Narrative:      Negative Thresholds Per Drugs Screened:    Amphetamines                 500 ng/ml  Barbiturates                 200 ng/ml  Benzodiazepines              100 ng/ml  Cocaine                      300 ng/ml  Methadone                    300 ng/ml  Opiates                      300 ng/ml  Oxycodone                    100 ng/ml  THC                           50 ng/ml  Fentanyl                        5 ng/ml      The Normal Value for all drugs tested is negative. This report includes final unconfirmed screening results to be used for medical treatment purposes only. Unconfirmed results must not be used for non-medical purposes such as employment or legal testing. Clinical consideration should be applied to any drug of abuse test, particularly when unconfirmed results are used.            Urinalysis With Microscopic If Indicated (No Culture) - Urine, Clean Catch [[id number]]  (Abnormal) Collected: 05/14/24 2001    Specimen: Urine, Clean Catch Updated: 05/14/24 2020     Color, UA Dark Yellow     Appearance, UA Cloudy     pH, UA 5.5     Specific Gravity, UA >1.030     Glucose,  mg/dL (1+)     Ketones, UA Negative     Bilirubin, UA Negative     Blood, UA Small (1+)     Protein, UA >=300 mg/dL (3+)     Leuk Esterase, UA Negative     Nitrite, UA Negative     Urobilinogen, UA 1.0 E.U./dL    Manual Differential [[id number]]  (Abnormal) Collected: 05/14/24 1832    Specimen: Blood Updated: 05/14/24 1953     Neutrophil % 30.6 %      Lymphocyte % 61.2 %      Monocyte % 5.1 %      Eosinophil % 1.0 %      Basophil % 2.0 %      Neutrophils Absolute 1.74 10*3/mm3      Lymphocytes Absolute 3.47 10*3/mm3      Monocytes Absolute 0.29 10*3/mm3      Eosinophils Absolute 0.06 10*3/mm3      Basophils Absolute 0.11 10*3/mm3      RBC Morphology Normal     WBC Morphology Normal     Platelet Morphology Normal    CBC & Differential [[id number]]  (Normal) Collected: 05/14/24 1832    Specimen: Blood Updated: 05/14/24 1953    Narrative:      The following orders were created for panel order CBC & Differential.  Procedure                               Abnormality         Status                     ---------                               -----------         ------                     CBC Auto Differential[[id number]]        Normal              Final result                 Please view results for these tests on the individual orders.     CBC Auto Differential [[id number]]  (Normal) Collected: 05/14/24 1832    Specimen: Blood Updated: 05/14/24 1953     WBC 5.67 10*3/mm3      RBC 5.12 10*6/mm3      Hemoglobin 14.4 g/dL      Hematocrit 43.7 %      MCV 85.4 fL      MCH 28.1 pg      MCHC 33.0 g/dL      RDW 14.5 %      RDW-SD 44.9 fl      MPV 11.1 fL      Platelets 176 10*3/mm3     Protime-INR [[id number]]  (Normal) Collected: 05/14/24 1832    Specimen: Blood Updated: 05/14/24 1915     Protime 14.0 Seconds      INR 1.05    aPTT [[id number]]  (Normal) Collected: 05/14/24 1832    Specimen: Blood Updated: 05/14/24 1915     PTT 25.3 seconds     Single High Sensitivity Troponin T [[id number]]  (Abnormal) Collected: 05/14/24 1832    Specimen: Blood Updated: 05/14/24 1911     HS Troponin T 26 ng/L     Narrative:      High Sensitive Troponin T Reference Range:  <14.0 ng/L- Negative Female for AMI  <22.0 ng/L- Negative Male for AMI  >=14 - Abnormal Female indicating possible myocardial injury.  >=22 - Abnormal Male indicating possible myocardial injury.   Clinicians would have to utilize clinical acumen, EKG, Troponin, and serial changes to determine if it is an Acute Myocardial Infarction or myocardial injury due to an underlying chronic condition.         BNP [[id number]]  (Abnormal) Collected: 05/14/24 1832    Specimen: Blood Updated: 05/14/24 1911     proBNP 916.0 pg/mL     Narrative:      This assay is used as an aid in the diagnosis of individuals suspected of having heart failure. It can be used as an aid in the diagnosis of acute decompensated heart failure (ADHF) in patients presenting with signs and symptoms of ADHF to the emergency department (ED). In addition, NT-proBNP of <300 pg/mL indicates ADHF is not likely.    Age Range Result Interpretation  NT-proBNP Concentration (pg/mL:      <50             Positive            >450                   Gray                 300-450                    Negative             <300    50-75           Positive             >900                  Griffin                300-900                  Negative            <300      >75             Positive            >1800                  Gray                300-1800                  Negative            <300    TSH [[id number]]  (Normal) Collected: 05/14/24 1832    Specimen: Blood Updated: 05/14/24 1911     TSH 1.280 uIU/mL     Comprehensive Metabolic Panel [[id number]]  (Abnormal) Collected: 05/14/24 1832    Specimen: Blood Updated: 05/14/24 1908     Glucose 182 mg/dL      BUN 8 mg/dL      Creatinine 1.10 mg/dL      Sodium 139 mmol/L      Potassium 4.1 mmol/L      Chloride 106 mmol/L      CO2 23.6 mmol/L      Calcium 9.0 mg/dL      Total Protein 6.6 g/dL      Albumin 3.6 g/dL      ALT (SGPT) 19 U/L      AST (SGOT) 11 U/L      Alkaline Phosphatase 67 U/L      Total Bilirubin 0.3 mg/dL      Globulin 3.0 gm/dL      A/G Ratio 1.2 g/dL      BUN/Creatinine Ratio 7.3     Anion Gap 9.4 mmol/L      eGFR 80.3 mL/min/1.73     Narrative:      GFR Normal >60  Chronic Kidney Disease <60  Kidney Failure <15      Ethanol [[id number]] Collected: 05/14/24 1832    Specimen: Blood Updated: 05/14/24 1908     Ethanol <10 mg/dL      Ethanol % <0.010 %     Magnesium [[id number]]  (Normal) Collected: 05/14/24 1832    Specimen: Blood Updated: 05/14/24 1908     Magnesium 1.7 mg/dL           Data Review:  Results from last 7 days   Lab Units 05/15/24  2048 05/15/24  0301 05/14/24  1832   SODIUM mmol/L  --  138 139   POTASSIUM mmol/L 4.3 3.4* 4.1   CHLORIDE mmol/L  --  107 106   CO2 mmol/L  --  22.0 23.6   BUN mg/dL  --  8 8   CREATININE mg/dL  --  0.82 1.10   GLUCOSE mg/dL  --  119* 182*   CALCIUM mg/dL  --  8.5* 9.0     Results from last 7 days   Lab Units 05/15/24  0301 05/14/24 1832   WBC 10*3/mm3 6.33 5.67   HEMOGLOBIN g/dL 14.3 14.4   HEMATOCRIT % 43.0 43.7   PLATELETS 10*3/mm3 169 176     Results from last 7 days   Lab Units 05/14/24  1832   TSH uIU/mL 1.280     Results from last 7 days   Lab Units 05/14/24  1832    HEMOGLOBIN A1C % 10.00*     Lab Results   Lab Value Date/Time    TROPONINT 26 (H) 05/14/2024 1832    TROPONINT <0.010 10/10/2022 1444    TROPONINT <0.010 09/27/2022 1420     Results from last 7 days   Lab Units 05/14/24  1832   CHOLESTEROL mg/dL 193   TRIGLYCERIDES mg/dL 104   HDL CHOL mg/dL 38*   LDL CHOL mg/dL 136*     Results from last 7 days   Lab Units 05/14/24  1832   ALK PHOS U/L 67   BILIRUBIN mg/dL 0.3   ALT (SGPT) U/L 19   AST (SGOT) U/L 11     Results from last 7 days   Lab Units 05/14/24  1832   TSH uIU/mL 1.280     Results from last 7 days   Lab Units 05/14/24  1832   HEMOGLOBIN A1C % 10.00*     Glucose   Date/Time Value Ref Range Status   05/16/2024 1153 96 70 - 130 mg/dL Final   05/16/2024 0735 137 (H) 70 - 130 mg/dL Final   05/16/2024 0548 64 (L) 70 - 130 mg/dL Final   05/15/2024 2349 147 (H) 70 - 130 mg/dL Final   05/15/2024 2024 163 (H) 70 - 130 mg/dL Final   05/15/2024 1806 142 (H) 70 - 130 mg/dL Final   05/15/2024 1207 80 70 - 130 mg/dL Final   05/15/2024 0650 87 70 - 130 mg/dL Final     Results from last 7 days   Lab Units 05/16/24  0607 05/15/24  1827 05/14/24  1832   INR  1.16* 1.13* 1.05       Past Medical History:   Diagnosis Date    COPD (chronic obstructive pulmonary disease)     Coronary artery disease     Diabetes mellitus     DVT (deep venous thrombosis)     Elevated cholesterol     GERD (gastroesophageal reflux disease)     H/O Cancer     right arm, in throat, chest and stomach, in remission    H/O Ischemia of extremity     History of MI (myocardial infarction) 2019    History of snoring     History of transfusion     HIV (human immunodeficiency virus infection)     Hypertension     Non Hodgkin's lymphoma     PAD (peripheral artery disease)     Pulmonary embolism     Stroke        Assessment:  Active Hospital Problems    Diagnosis  POA    **Slurred speech [R47.81]  Yes    Ischemic cardiomyopathy [I25.5]  Yes    Acute CVA (cerebrovascular accident) [I63.9]  Yes    NHL (non-Hodgkin's  lymphoma) [C85.90]  Yes    Type 2 diabetes mellitus with circulatory disorder, without long-term current use of insulin [E11.59]  Yes    Coronary artery disease involving native coronary artery of native heart with angina pectoris [I25.119]  Yes    Anticoagulated on Coumadin [Z79.01]  Not Applicable    Arteriosclerosis of coronary artery [I25.10]  Yes      Resolved Hospital Problems   No resolved problems to display.       Plan:  Consults from cardiology noted.  Continue with anticoagulation for presumed embolic strokes.  Needs to have better control of anticoagulation.  Plans as per cardiology and neurology.  Speech is recommending some outpatient speech therapy when he is well enough to go home.  Cardiology adjusting meds and need INR therapeutic to get off Lovenox.    Rosendo Segal MD  5/16/2024  15:08 EDT

## 2024-05-16 NOTE — PROGRESS NOTES
[facility] Clinical Pharmacy Services: Warfarin Dosing/Monitoring Consult    Cecilio Puckett is a 54 y.o. male, estimated creatinine clearance is 118.6 mL/min (by C-G formula based on SCr of 0.82 mg/dL). weighing 95.3 kg (210 lb).    Results from last 7 days   Lab Units 05/16/24  0607 05/15/24  1827 05/15/24  0301 05/14/24  1832   INR  1.16* 1.13*  --  1.05   APTT seconds  --   --   --  25.3   HEMOGLOBIN g/dL  --   --  14.3 14.4   HEMATOCRIT %  --   --  43.0 43.7   PLATELETS 10*3/mm3  --   --  169 176     Prior to admission anticoagulation: Warfarin 10mg - 1.5 tablets M/W/F and 1 tablet AOD per patient    Hospital Anticoagulation:  Consulting provider: MARIANNE Marie  Start date: 5/15  Indication: history of DVT/PE  Target INR: 2 - 3  Expected duration: indefinite   Bridge Therapy: Yes; with lovenox 100mg (1mg/kg x 95.7kg)    Potential food or drug interactions:   Patient is bridging with lovenox (increased risk for bleed)      Education complete?/Date: No; plan for follow up TBD; Patient follows with [facility] for anticoagulation monitoring.    Assessment/Plan:  INR subtherapeutic at 1.16 (Goal 2-3). Patient typically requires large daily doses to maintain goal INR. Plan for warfarin 12 mg this evening.  Nurse to monitor for any signs or symptoms of bleeding.  Follow up daily INRs and dose adjustments.    Pharmacy will continue to follow until discharge or discontinuation of warfarin.     Cheyenne Gowers, [facility]  Clinical Pharmacist

## 2024-05-17 ENCOUNTER — APPOINTMENT (OUTPATIENT)
Dept: NUCLEAR MEDICINE | Facility: HOSPITAL | Age: 54
End: 2024-05-17
Payer: MEDICARE

## 2024-05-17 LAB
ANION GAP SERPL CALCULATED.3IONS-SCNC: 11.7 MMOL/L (ref 5–15)
ANISOCYTOSIS BLD QL: ABNORMAL
BASOPHILS # BLD MANUAL: 0 10*3/MM3 (ref 0–0.2)
BASOPHILS NFR BLD MANUAL: 0 % (ref 0–1.5)
BH CV REST NUCLEAR ISOTOPE DOSE: 10.3 MCI
BH CV STRESS COMMENTS STAGE 1: NORMAL
BH CV STRESS DOSE REGADENOSON STAGE 1: 0.4
BH CV STRESS DURATION MIN STAGE 1: 0
BH CV STRESS DURATION SEC STAGE 1: 10
BH CV STRESS NUCLEAR ISOTOPE DOSE: 29 MCI
BH CV STRESS PROTOCOL 1: NORMAL
BH CV STRESS RECOVERY BP: NORMAL MMHG
BH CV STRESS RECOVERY HR: 87 BPM
BH CV STRESS STAGE 1: 1
BUN SERPL-MCNC: 8 MG/DL (ref 6–20)
BUN/CREAT SERPL: 9 (ref 7–25)
CALCIUM SPEC-SCNC: 8.8 MG/DL (ref 8.6–10.5)
CHLORIDE SERPL-SCNC: 107 MMOL/L (ref 98–107)
CO2 SERPL-SCNC: 22.3 MMOL/L (ref 22–29)
CREAT SERPL-MCNC: 0.89 MG/DL (ref 0.76–1.27)
DEPRECATED RDW RBC AUTO: 47.1 FL (ref 37–54)
EGFRCR SERPLBLD CKD-EPI 2021: 102.5 ML/MIN/1.73
EOSINOPHIL # BLD MANUAL: 0.03 10*3/MM3 (ref 0–0.4)
EOSINOPHIL NFR BLD MANUAL: 1 % (ref 0.3–6.2)
ERYTHROCYTE [DISTWIDTH] IN BLOOD BY AUTOMATED COUNT: 14.8 % (ref 12.3–15.4)
GLUCOSE BLDC GLUCOMTR-MCNC: 103 MG/DL (ref 70–130)
GLUCOSE BLDC GLUCOMTR-MCNC: 163 MG/DL (ref 70–130)
GLUCOSE BLDC GLUCOMTR-MCNC: 202 MG/DL (ref 70–130)
GLUCOSE BLDC GLUCOMTR-MCNC: 73 MG/DL (ref 70–130)
GLUCOSE BLDC GLUCOMTR-MCNC: 76 MG/DL (ref 70–130)
GLUCOSE SERPL-MCNC: 68 MG/DL (ref 65–99)
HCT VFR BLD AUTO: 46.6 % (ref 37.5–51)
HGB BLD-MCNC: 15 G/DL (ref 13–17.7)
INR PPP: 1.45 (ref 0.9–1.1)
INR PPP: 3.76 (ref 0.9–1.1)
LV EF NUC BP: 34 %
LYMPHOCYTES # BLD MANUAL: 1.74 10*3/MM3 (ref 0.7–3.1)
LYMPHOCYTES NFR BLD MANUAL: 15.3 % (ref 5–12)
MAXIMAL PREDICTED HEART RATE: 167 BPM
MCH RBC QN AUTO: 27.9 PG (ref 26.6–33)
MCHC RBC AUTO-ENTMCNC: 32.2 G/DL (ref 31.5–35.7)
MCV RBC AUTO: 86.8 FL (ref 79–97)
MONOCYTES # BLD: 0.53 10*3/MM3 (ref 0.1–0.9)
NEUTROPHILS # BLD AUTO: 1.17 10*3/MM3 (ref 1.7–7)
NEUTROPHILS NFR BLD MANUAL: 33.7 % (ref 42.7–76)
NRBC BLD AUTO-RTO: 0 /100 WBC (ref 0–0.2)
PERCENT MAX PREDICTED HR: 64.67 %
PLAT MORPH BLD: NORMAL
PLATELET # BLD AUTO: 156 10*3/MM3 (ref 140–450)
PMV BLD AUTO: 10.6 FL (ref 6–12)
POTASSIUM SERPL-SCNC: 3.2 MMOL/L (ref 3.5–5.2)
POTASSIUM SERPL-SCNC: 4.5 MMOL/L (ref 3.5–5.2)
PROTHROMBIN TIME: 17.9 SECONDS (ref 11.7–14.2)
PROTHROMBIN TIME: 37.4 SECONDS (ref 11.7–14.2)
RBC # BLD AUTO: 5.37 10*6/MM3 (ref 4.14–5.8)
SODIUM SERPL-SCNC: 141 MMOL/L (ref 136–145)
STRESS BASELINE BP: NORMAL MMHG
STRESS BASELINE HR: 74 BPM
STRESS PERCENT HR: 76 %
STRESS POST PEAK BP: NORMAL MMHG
STRESS POST PEAK HR: 108 BPM
STRESS TARGET HR: 142 BPM
VARIANT LYMPHS NFR BLD MANUAL: 50 % (ref 19.6–45.3)
WBC MORPH BLD: NORMAL
WBC NRBC COR # BLD AUTO: 3.47 10*3/MM3 (ref 3.4–10.8)

## 2024-05-17 PROCEDURE — 25010000002 ENOXAPARIN PER 10 MG

## 2024-05-17 PROCEDURE — 78452 HT MUSCLE IMAGE SPECT MULT: CPT | Performed by: INTERNAL MEDICINE

## 2024-05-17 PROCEDURE — 84132 ASSAY OF SERUM POTASSIUM: CPT | Performed by: HOSPITALIST

## 2024-05-17 PROCEDURE — 93016 CV STRESS TEST SUPVJ ONLY: CPT | Performed by: INTERNAL MEDICINE

## 2024-05-17 PROCEDURE — 78452 HT MUSCLE IMAGE SPECT MULT: CPT

## 2024-05-17 PROCEDURE — 63710000001 INSULIN GLARGINE PER 5 UNITS

## 2024-05-17 PROCEDURE — 80048 BASIC METABOLIC PNL TOTAL CA: CPT | Performed by: HOSPITALIST

## 2024-05-17 PROCEDURE — 85025 COMPLETE CBC W/AUTO DIFF WBC: CPT | Performed by: HOSPITALIST

## 2024-05-17 PROCEDURE — 85610 PROTHROMBIN TIME: CPT | Performed by: NURSE PRACTITIONER

## 2024-05-17 PROCEDURE — 99232 SBSQ HOSP IP/OBS MODERATE 35: CPT | Performed by: NURSE PRACTITIONER

## 2024-05-17 PROCEDURE — 82948 REAGENT STRIP/BLOOD GLUCOSE: CPT

## 2024-05-17 PROCEDURE — 63710000001 INSULIN REGULAR HUMAN PER 5 UNITS

## 2024-05-17 PROCEDURE — 25010000002 REGADENOSON 0.4 MG/5ML SOLUTION: Performed by: HOSPITALIST

## 2024-05-17 PROCEDURE — 0 TECHNETIUM SESTAMIBI: Performed by: HOSPITALIST

## 2024-05-17 PROCEDURE — 85007 BL SMEAR W/DIFF WBC COUNT: CPT | Performed by: HOSPITALIST

## 2024-05-17 PROCEDURE — 93018 CV STRESS TEST I&R ONLY: CPT | Performed by: INTERNAL MEDICINE

## 2024-05-17 PROCEDURE — A9500 TC99M SESTAMIBI: HCPCS | Performed by: HOSPITALIST

## 2024-05-17 PROCEDURE — 93017 CV STRESS TEST TRACING ONLY: CPT

## 2024-05-17 RX ORDER — CARVEDILOL 12.5 MG/1
12.5 TABLET ORAL EVERY 12 HOURS SCHEDULED
Status: DISCONTINUED | OUTPATIENT
Start: 2024-05-17 | End: 2024-05-22 | Stop reason: HOSPADM

## 2024-05-17 RX ORDER — POTASSIUM CHLORIDE 750 MG/1
40 TABLET, FILM COATED, EXTENDED RELEASE ORAL EVERY 4 HOURS
Status: COMPLETED | OUTPATIENT
Start: 2024-05-17 | End: 2024-05-17

## 2024-05-17 RX ORDER — REGADENOSON 0.08 MG/ML
0.4 INJECTION, SOLUTION INTRAVENOUS
Status: COMPLETED | OUTPATIENT
Start: 2024-05-17 | End: 2024-05-17

## 2024-05-17 RX ADMIN — SACUBITRIL AND VALSARTAN 1 TABLET: 49; 51 TABLET, FILM COATED ORAL at 11:49

## 2024-05-17 RX ADMIN — POTASSIUM CHLORIDE 40 MEQ: 750 TABLET, EXTENDED RELEASE ORAL at 15:44

## 2024-05-17 RX ADMIN — SPIRONOLACTONE 25 MG: 25 TABLET ORAL at 11:49

## 2024-05-17 RX ADMIN — TECHNETIUM TC 99M SESTAMIBI 1 DOSE: 1 INJECTION INTRAVENOUS at 06:43

## 2024-05-17 RX ADMIN — SERTRALINE 50 MG: 50 TABLET, FILM COATED ORAL at 11:49

## 2024-05-17 RX ADMIN — PANTOPRAZOLE SODIUM 40 MG: 40 TABLET, DELAYED RELEASE ORAL at 05:17

## 2024-05-17 RX ADMIN — ELVITEGRAVIR, COBICISTAT, EMTRICITABINE, AND TENOFOVIR ALAFENAMIDE 1 TABLET: 150; 150; 200; 10 TABLET ORAL at 11:49

## 2024-05-17 RX ADMIN — INSULIN HUMAN 3 UNITS: 100 INJECTION, SOLUTION PARENTERAL at 18:48

## 2024-05-17 RX ADMIN — FUROSEMIDE 20 MG: 20 TABLET ORAL at 11:49

## 2024-05-17 RX ADMIN — CARVEDILOL 6.25 MG: 6.25 TABLET, FILM COATED ORAL at 11:49

## 2024-05-17 RX ADMIN — MAGNESIUM OXIDE 400 MG (241.3 MG MAGNESIUM) TABLET 400 MG: TABLET at 15:44

## 2024-05-17 RX ADMIN — ROSUVASTATIN CALCIUM 20 MG: 20 TABLET, FILM COATED ORAL at 11:49

## 2024-05-17 RX ADMIN — INSULIN GLARGINE 25 UNITS: 100 INJECTION, SOLUTION SUBCUTANEOUS at 20:41

## 2024-05-17 RX ADMIN — REGADENOSON 0.4 MG: 0.08 INJECTION, SOLUTION INTRAVENOUS at 10:53

## 2024-05-17 RX ADMIN — ENOXAPARIN SODIUM 100 MG: 100 INJECTION SUBCUTANEOUS at 11:56

## 2024-05-17 RX ADMIN — AMLODIPINE BESYLATE 10 MG: 10 TABLET ORAL at 11:49

## 2024-05-17 RX ADMIN — Medication 10 ML: at 20:42

## 2024-05-17 RX ADMIN — TECHNETIUM TC 99M SESTAMIBI 1 DOSE: 1 INJECTION INTRAVENOUS at 10:53

## 2024-05-17 RX ADMIN — ASPIRIN 81 MG: 81 TABLET, COATED ORAL at 11:49

## 2024-05-17 RX ADMIN — CARVEDILOL 12.5 MG: 12.5 TABLET, FILM COATED ORAL at 20:41

## 2024-05-17 RX ADMIN — POTASSIUM CHLORIDE 40 MEQ: 750 TABLET, EXTENDED RELEASE ORAL at 11:49

## 2024-05-17 RX ADMIN — Medication 10 ML: at 11:51

## 2024-05-17 RX ADMIN — SACUBITRIL AND VALSARTAN 1 TABLET: 49; 51 TABLET, FILM COATED ORAL at 20:41

## 2024-05-17 RX ADMIN — ENOXAPARIN SODIUM 100 MG: 100 INJECTION SUBCUTANEOUS at 22:06

## 2024-05-17 NOTE — PROGRESS NOTES
"DAILY PROGRESS NOTE  [facility]    Patient Identification:  Name: Cecilio Puckett  Age: 54 y.o.  Sex: male  :  1970  MRN: [id number]         Primary Care Physician: Arias Lehman APRN    Subjective:  Interval History: He still complains about some trouble with slurred speech but he is up ambulating independently without much difficulty and PT signed off.    Objective:    Scheduled Meds:amLODIPine, 10 mg, Oral, Daily  aspirin, 81 mg, Oral, Daily  carvedilol, 12.5 mg, Oral, Q12H  Neonndq-Mznlr-Ojoafufq-TenofAF, 1 tablet, Oral, Daily  [Held by provider] empagliflozin, 25 mg, Oral, Daily  enoxaparin, 1 mg/kg, Subcutaneous, Q12H  furosemide, 20 mg, Oral, Daily  [Held by provider] glipizide, 10 mg, Oral, Q12H  insulin glargine, 25 Units, Subcutaneous, Nightly  insulin regular, 2-7 Units, Subcutaneous, Q6H  magnesium oxide, 400 mg, Oral, Daily  pantoprazole, 40 mg, Oral, Q AM  potassium chloride ER, 40 mEq, Oral, Q4H  rosuvastatin, 20 mg, Oral, Daily  sacubitril-valsartan, 1 tablet, Oral, Q12H  sertraline, 50 mg, Oral, Daily  sodium chloride, 10 mL, Intravenous, Q12H  spironolactone, 25 mg, Oral, Daily      Continuous Infusions:Pharmacy to dose warfarin,   sodium chloride, 75 mL/hr, Last Rate: 75 mL/hr (05/15/24 1530)        Vital signs in last 24 hours:  Temp:  [98.1 °F (36.7 °C)-98.8 °F (37.1 °C)] 98.1 °F (36.7 °C)  Heart Rate:  [69-83] 78  Resp:  [18] 18  BP: (136-169)/(68-98) 145/96    Intake/Output:    Intake/Output Summary (Last 24 hours) at 2024 1504  Last data filed at 2024 1814  Gross per 24 hour   Intake 420 ml   Output --   Net 420 ml       Exam:  /96 (BP Location: Right arm, Patient Position: Sitting)   Pulse 78   Temp 98.1 °F (36.7 °C) (Oral)   Resp 18   Ht 175.3 cm (69\")   Wt 95.3 kg (210 lb)   SpO2 100%   BMI 31.01 kg/m²     General Appearance:    Alert, cooperative, no distress   Head:    Normocephalic, without obvious abnormality, atraumatic   Eyes:     "   Throat:   Lips, tongue, gums normal   Neck:   Supple, symmetrical, trachea midline, no JVD   Lungs:     Clear to auscultation bilaterally, respirations unlabored   Chest Wall:    No tenderness or deformity    Heart:    Regular rate and rhythm, S1 and S2 normal, no murmur,no  Rub or gallop   Abdomen:     Soft, nontender, bowel sounds active, no masses, no organomegaly    Extremities:   Extremities normal, atraumatic, no cyanosis or edema   Pulses:      Skin:   Skin is warm and dry,  no rashes or palpable lesions   Neurologic: Some slurring of speech but generally is up ambulating well without any assistance      Lab Results (last 72 hours)       Procedure Component Value Units Date/Time    POC Glucose Once [[id number]]  (Normal) Collected: 05/16/24 1153    Specimen: Blood Updated: 05/16/24 1155     Glucose 96 mg/dL     POC Glucose Once [[id number]]  (Abnormal) Collected: 05/16/24 0735    Specimen: Blood Updated: 05/16/24 0737     Glucose 137 mg/dL     Protime-INR [[id number]]  (Abnormal) Collected: 05/16/24 0607    Specimen: Blood Updated: 05/16/24 0657     Protime 15.1 Seconds      INR 1.16    POC Glucose Once [[id number]]  (Abnormal) Collected: 05/16/24 0548    Specimen: Blood Updated: 05/16/24 0548     Glucose 64 mg/dL     POC Glucose Once [[id number]]  (Abnormal) Collected: 05/15/24 2349    Specimen: Blood Updated: 05/15/24 2350     Glucose 147 mg/dL     Potassium [[id number]]  (Normal) Collected: 05/15/24 2048    Specimen: Blood Updated: 05/15/24 2131     Potassium 4.3 mmol/L      Comment: Specimen hemolyzed.  Result may be falsely elevated.       POC Glucose Once [[id number]]  (Abnormal) Collected: 05/15/24 2024    Specimen: Blood Updated: 05/15/24 2027     Glucose 163 mg/dL     Protime-INR [[id number]]  (Abnormal) Collected: 05/15/24 1827    Specimen: Blood from Arm, Left Updated: 05/15/24 1859     Protime 14.7 Seconds      INR 1.13    POC Glucose Once [[id number]]  (Abnormal) Collected: 05/15/24 1806    Specimen:  Blood Updated: 05/15/24 1807     Glucose 142 mg/dL     POC Glucose Once [[id number]]  (Normal) Collected: 05/15/24 1207    Specimen: Blood Updated: 05/15/24 1208     Glucose 80 mg/dL     POC Glucose Once [[id number]]  (Normal) Collected: 05/15/24 0650    Specimen: Blood Updated: 05/15/24 0651     Glucose 87 mg/dL     Basic Metabolic Panel [[id number]]  (Abnormal) Collected: 05/15/24 0301    Specimen: Blood from Arm, Left Updated: 05/15/24 0346     Glucose 119 mg/dL      BUN 8 mg/dL      Creatinine 0.82 mg/dL      Sodium 138 mmol/L      Potassium 3.4 mmol/L      Chloride 107 mmol/L      CO2 22.0 mmol/L      Calcium 8.5 mg/dL      BUN/Creatinine Ratio 9.8     Anion Gap 9.0 mmol/L      eGFR 105.0 mL/min/1.73     Narrative:      GFR Normal >60  Chronic Kidney Disease <60  Kidney Failure <15      CBC (No Diff) [[id number]]  (Normal) Collected: 05/15/24 0301    Specimen: Blood from Arm, Left Updated: 05/15/24 0326     WBC 6.33 10*3/mm3      RBC 5.08 10*6/mm3      Hemoglobin 14.3 g/dL      Hematocrit 43.0 %      MCV 84.6 fL      MCH 28.1 pg      MCHC 33.3 g/dL      RDW 14.2 %      RDW-SD 43.0 fl      MPV 10.9 fL      Platelets 169 10*3/mm3     POC Glucose Once [[id number]]  (Normal) Collected: 05/14/24 2331    Specimen: Blood Updated: 05/14/24 2332     Glucose 129 mg/dL     Hemoglobin A1c [[id number]]  (Abnormal) Collected: 05/14/24 1832    Specimen: Blood Updated: 05/14/24 2157     Hemoglobin A1C 10.00 %     Narrative:      Hemoglobin A1C Ranges:    Increased Risk for Diabetes  5.7% to 6.4%  Diabetes                     >= 6.5%  Diabetic Goal                < 7.0%    Lipid Panel [[id number]]  (Abnormal) Collected: 05/14/24 1832    Specimen: Blood Updated: 05/14/24 2147     Total Cholesterol 193 mg/dL      Triglycerides 104 mg/dL      HDL Cholesterol 38 mg/dL      LDL Cholesterol  136 mg/dL      VLDL Cholesterol 19 mg/dL      LDL/HDL Ratio 3.53    Narrative:      Cholesterol Reference Ranges  (U.[place]. Department of Health and Human  Services ATP III Classifications)    Desirable          <200 mg/dL  Borderline High    200-239 mg/dL  High Risk          >240 mg/dL      Triglyceride Reference Ranges  (U.S. Department of Health and Human Services ATP III Classifications)    Normal           <150 mg/dL  Borderline High  150-199 mg/dL  High             200-499 mg/dL  Very High        >500 mg/dL    HDL Reference Ranges  (U.S. Department of Health and Human Services ATP III Classifications)    Low     <40 mg/dl (major risk factor for CHD)  High    >60 mg/dl ('negative' risk factor for CHD)        LDL Reference Ranges  (U.S. Department of Health and Human Services ATP III Classifications)    Optimal          <100 mg/dL  Near Optimal     100-129 mg/dL  Borderline High  130-159 mg/dL  High             160-189 mg/dL  Very High        >189 mg/dL    Urinalysis, Microscopic Only - Urine, Clean Catch [[id number]]  (Abnormal) Collected: 05/14/24 2001    Specimen: Urine, Clean Catch Updated: 05/14/24 2042     RBC, UA 3-5 /HPF      WBC, UA 11-20 /HPF      Bacteria, UA None Seen /HPF      Squamous Epithelial Cells, UA 3-6 /HPF      Hyaline Casts, UA 21-30 /LPF      Methodology Automated Microscopy    Urine Drug Screen - Urine, Clean Catch [[id number]]  (Normal) Collected: 05/14/24 2001    Specimen: Urine, Clean Catch Updated: 05/14/24 2040     Amphet/Methamphet, Screen Negative     Barbiturates Screen, Urine Negative     Benzodiazepine Screen, Urine Negative     Cocaine Screen, Urine Negative     Opiate Screen Negative     THC, Screen, Urine Negative     Methadone Screen, Urine Negative     Oxycodone Screen, Urine Negative     Fentanyl, Urine Negative    Narrative:      Negative Thresholds Per Drugs Screened:    Amphetamines                 500 ng/ml  Barbiturates                 200 ng/ml  Benzodiazepines              100 ng/ml  Cocaine                      300 ng/ml  Methadone                    300 ng/ml  Opiates                      300 ng/ml  Oxycodone                     100 ng/ml  THC                           50 ng/ml  Fentanyl                       5 ng/ml      The Normal Value for all drugs tested is negative. This report includes final unconfirmed screening results to be used for medical treatment purposes only. Unconfirmed results must not be used for non-medical purposes such as employment or legal testing. Clinical consideration should be applied to any drug of abuse test, particularly when unconfirmed results are used.            Urinalysis With Microscopic If Indicated (No Culture) - Urine, Clean Catch [[id number]]  (Abnormal) Collected: 05/14/24 2001    Specimen: Urine, Clean Catch Updated: 05/14/24 2020     Color, UA Dark Yellow     Appearance, UA Cloudy     pH, UA 5.5     Specific Gravity, UA >1.030     Glucose,  mg/dL (1+)     Ketones, UA Negative     Bilirubin, UA Negative     Blood, UA Small (1+)     Protein, UA >=300 mg/dL (3+)     Leuk Esterase, UA Negative     Nitrite, UA Negative     Urobilinogen, UA 1.0 E.U./dL    Manual Differential [[id number]]  (Abnormal) Collected: 05/14/24 1832    Specimen: Blood Updated: 05/14/24 1953     Neutrophil % 30.6 %      Lymphocyte % 61.2 %      Monocyte % 5.1 %      Eosinophil % 1.0 %      Basophil % 2.0 %      Neutrophils Absolute 1.74 10*3/mm3      Lymphocytes Absolute 3.47 10*3/mm3      Monocytes Absolute 0.29 10*3/mm3      Eosinophils Absolute 0.06 10*3/mm3      Basophils Absolute 0.11 10*3/mm3      RBC Morphology Normal     WBC Morphology Normal     Platelet Morphology Normal    CBC & Differential [[id number]]  (Normal) Collected: 05/14/24 1832    Specimen: Blood Updated: 05/14/24 1953    Narrative:      The following orders were created for panel order CBC & Differential.  Procedure                               Abnormality         Status                     ---------                               -----------         ------                     CBC Auto Differential[[id number]]        Normal              Final  result                 Please view results for these tests on the individual orders.    CBC Auto Differential [[id number]]  (Normal) Collected: 05/14/24 1832    Specimen: Blood Updated: 05/14/24 1953     WBC 5.67 10*3/mm3      RBC 5.12 10*6/mm3      Hemoglobin 14.4 g/dL      Hematocrit 43.7 %      MCV 85.4 fL      MCH 28.1 pg      MCHC 33.0 g/dL      RDW 14.5 %      RDW-SD 44.9 fl      MPV 11.1 fL      Platelets 176 10*3/mm3     Protime-INR [[id number]]  (Normal) Collected: 05/14/24 1832    Specimen: Blood Updated: 05/14/24 1915     Protime 14.0 Seconds      INR 1.05    aPTT [[id number]]  (Normal) Collected: 05/14/24 1832    Specimen: Blood Updated: 05/14/24 1915     PTT 25.3 seconds     Single High Sensitivity Troponin T [[id number]]  (Abnormal) Collected: 05/14/24 1832    Specimen: Blood Updated: 05/14/24 1911     HS Troponin T 26 ng/L     Narrative:      High Sensitive Troponin T Reference Range:  <14.0 ng/L- Negative Female for AMI  <22.0 ng/L- Negative Male for AMI  >=14 - Abnormal Female indicating possible myocardial injury.  >=22 - Abnormal Male indicating possible myocardial injury.   Clinicians would have to utilize clinical acumen, EKG, Troponin, and serial changes to determine if it is an Acute Myocardial Infarction or myocardial injury due to an underlying chronic condition.         BNP [[id number]]  (Abnormal) Collected: 05/14/24 1832    Specimen: Blood Updated: 05/14/24 1911     proBNP 916.0 pg/mL     Narrative:      This assay is used as an aid in the diagnosis of individuals suspected of having heart failure. It can be used as an aid in the diagnosis of acute decompensated heart failure (ADHF) in patients presenting with signs and symptoms of ADHF to the emergency department (ED). In addition, NT-proBNP of <300 pg/mL indicates ADHF is not likely.    Age Range Result Interpretation  NT-proBNP Concentration (pg/mL:      <50             Positive            >450                   [place]                  300-450                    Negative             <300    50-75           Positive            >900                  Gray                300-900                  Negative            <300      >75             Positive            >1800                  Gray                300-1800                  Negative            <300    TSH [[id number]]  (Normal) Collected: 05/14/24 1832    Specimen: Blood Updated: 05/14/24 1911     TSH 1.280 uIU/mL     Comprehensive Metabolic Panel [[id number]]  (Abnormal) Collected: 05/14/24 1832    Specimen: Blood Updated: 05/14/24 1908     Glucose 182 mg/dL      BUN 8 mg/dL      Creatinine 1.10 mg/dL      Sodium 139 mmol/L      Potassium 4.1 mmol/L      Chloride 106 mmol/L      CO2 23.6 mmol/L      Calcium 9.0 mg/dL      Total Protein 6.6 g/dL      Albumin 3.6 g/dL      ALT (SGPT) 19 U/L      AST (SGOT) 11 U/L      Alkaline Phosphatase 67 U/L      Total Bilirubin 0.3 mg/dL      Globulin 3.0 gm/dL      A/G Ratio 1.2 g/dL      BUN/Creatinine Ratio 7.3     Anion Gap 9.4 mmol/L      eGFR 80.3 mL/min/1.73     Narrative:      GFR Normal >60  Chronic Kidney Disease <60  Kidney Failure <15      Ethanol [[id number]] Collected: 05/14/24 1832    Specimen: Blood Updated: 05/14/24 1908     Ethanol <10 mg/dL      Ethanol % <0.010 %     Magnesium [[id number]]  (Normal) Collected: 05/14/24 1832    Specimen: Blood Updated: 05/14/24 1908     Magnesium 1.7 mg/dL           Data Review:  Results from last 7 days   Lab Units 05/17/24  0509 05/15/24  2048 05/15/24  0301 05/14/24  1832   SODIUM mmol/L 141  --  138 139   POTASSIUM mmol/L 3.2* 4.3 3.4* 4.1   CHLORIDE mmol/L 107  --  107 106   CO2 mmol/L 22.3  --  22.0 23.6   BUN mg/dL 8  --  8 8   CREATININE mg/dL 0.89  --  0.82 1.10   GLUCOSE mg/dL 68  --  119* 182*   CALCIUM mg/dL 8.8  --  8.5* 9.0     Results from last 7 days   Lab Units 05/17/24  0509 05/15/24  0301 05/14/24  1832   WBC 10*3/mm3 3.47 6.33 5.67   HEMOGLOBIN g/dL 15.0 14.3 14.4   HEMATOCRIT % 46.6 43.0  43.7   PLATELETS 10*3/mm3 156 169 176     Results from last 7 days   Lab Units 05/14/24  1832   TSH uIU/mL 1.280     Results from last 7 days   Lab Units 05/14/24  1832   HEMOGLOBIN A1C % 10.00*     Lab Results   Lab Value Date/Time    TROPONINT 26 (H) 05/14/2024 1832    TROPONINT <0.010 10/10/2022 1444    TROPONINT <0.010 09/27/2022 1420     Results from last 7 days   Lab Units 05/14/24  1832   CHOLESTEROL mg/dL 193   TRIGLYCERIDES mg/dL 104   HDL CHOL mg/dL 38*   LDL CHOL mg/dL 136*     Results from last 7 days   Lab Units 05/14/24  1832   ALK PHOS U/L 67   BILIRUBIN mg/dL 0.3   ALT (SGPT) U/L 19   AST (SGOT) U/L 11     Results from last 7 days   Lab Units 05/14/24  1832   TSH uIU/mL 1.280     Results from last 7 days   Lab Units 05/14/24  1832   HEMOGLOBIN A1C % 10.00*     Glucose   Date/Time Value Ref Range Status   05/17/2024 1136 103 70 - 130 mg/dL Final   05/17/2024 0800 76 70 - 130 mg/dL Final   05/17/2024 0519 73 70 - 130 mg/dL Final   05/16/2024 2252 279 (H) 70 - 130 mg/dL Final   05/16/2024 1917 380 (H) 70 - 130 mg/dL Final   05/16/2024 1708 153 (H) 70 - 130 mg/dL Final   05/16/2024 1153 96 70 - 130 mg/dL Final   05/16/2024 0735 137 (H) 70 - 130 mg/dL Final     Results from last 7 days   Lab Units 05/17/24  0509 05/16/24  0607 05/15/24  1827   INR  3.76* 1.16* 1.13*       Past Medical History:   Diagnosis Date    COPD (chronic obstructive pulmonary disease)     Coronary artery disease     Diabetes mellitus     DVT (deep venous thrombosis)     Elevated cholesterol     GERD (gastroesophageal reflux disease)     H/O Cancer     right arm, in throat, chest and stomach, in remission    H/O Ischemia of extremity     History of MI (myocardial infarction) 2019    History of snoring     History of transfusion     HIV (human immunodeficiency virus infection)     Hypertension     Non Hodgkin's lymphoma     PAD (peripheral artery disease)     Pulmonary embolism     Stroke        Assessment:  Active Hospital Problems     Diagnosis  POA    **Slurred speech [R47.81]  Yes    Ischemic cardiomyopathy [I25.5]  Yes    History of CVA (cerebrovascular accident) [Z86.73]  Not Applicable    Acute CVA (cerebrovascular accident) [I63.9]  Yes    NHL (non-Hodgkin's lymphoma) [C85.90]  Yes    Type 2 diabetes mellitus with circulatory disorder, 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       Plan:  Consults from cardiology noted.  Continue with anticoagulation for presumed embolic strokes.  Needs to have better control of anticoagulation.  Plans as per cardiology and neurology.  Speech is recommending some outpatient speech therapy when he is well enough to go home.  Cardiology adjusting meds and he had abnormal stress test.  INR is above 3 now on off Lovenox.  Await final recommendations from cardiology.  Rosendo Segal MD  5/17/2024  15:04 EDT

## 2024-05-17 NOTE — PROGRESS NOTES
[facility] Clinical Pharmacy Services: Warfarin Dosing/Monitoring Consult    Cecilio Puckett is a 54 y.o. male, estimated creatinine clearance is 109.3 mL/min (by C-G formula based on SCr of 0.89 mg/dL). weighing 95.3 kg (210 lb).    Results from last 7 days   Lab Units 05/17/24  0509 05/16/24  0607 05/15/24  1827 05/15/24  0301 05/14/24  1832   INR  3.76* 1.16* 1.13*  --  1.05   APTT seconds  --   --   --   --  25.3   HEMOGLOBIN g/dL 15.0  --   --  14.3 14.4   HEMATOCRIT % 46.6  --   --  43.0 43.7   PLATELETS 10*3/mm3 156  --   --  169 176     Prior to admission anticoagulation: Warfarin 10mg - 1.5 tablets M/W/F and 1 tablet AOD per patient    Hospital Anticoagulation:  Consulting provider: MARIANNE Marie  Start date: 5/15  Indication: history of DVT/PE  Target INR: 2 - 3  Expected duration: indefinite   Bridge Therapy: Yes; with lovenox 100mg (1mg/kg x 95.7kg)    Potential food or drug interactions:   Patient is bridging with lovenox (increased risk for bleed)      Education complete?/Date: No; plan for follow up TBD; Patient follows with [facility] for anticoagulation monitoring.    Assessment/Plan:  INR now suddenly and rather profoundly supratherapeutic. I can point to no concrete cause of this trend. The patient's reported home med Genvoya was resumed, but logic would follow that this potential drug interaction was already tolerated. However, if compliance has been an issue with either warfarin or Genvoya, than perhaps not. At any rate, HOLD warfarin this evening.  Get a repeat INR to ensure a lab error is not at play.  Nurse to monitor for any signs or symptoms of bleeding.  Follow up daily INRs and dose adjustments.    Pharmacy will continue to follow until discharge or discontinuation of warfarin.     Cheyenne Gowers, [facility]  Clinical Pharmacist

## 2024-05-17 NOTE — PLAN OF CARE
Problem: Adult Inpatient Plan of Care  Goal: Plan of Care Review  Outcome: Ongoing, Progressing  Flowsheets  Taken 5/17/2024 0344 by Greta Ospina RN  Plan of Care Reviewed With: patient  Taken 5/16/2024 1613 by Magda Ferris RN  Progress: no change  Goal: Patient-Specific Goal (Individualized)  Outcome: Ongoing, Progressing  Goal: Absence of Hospital-Acquired Illness or Injury  Outcome: Ongoing, Progressing  Intervention: Identify and Manage Fall Risk  Recent Flowsheet Documentation  Taken 5/17/2024 0200 by Greta Ospina RN  Safety Promotion/Fall Prevention:   nonskid shoes/slippers when out of bed   fall prevention program maintained   assistive device/personal items within reach   activity supervised   safety round/check completed  Taken 5/17/2024 0030 by Greta Ospina RN  Safety Promotion/Fall Prevention:   nonskid shoes/slippers when out of bed   fall prevention program maintained   assistive device/personal items within reach   activity supervised   safety round/check completed  Taken 5/16/2024 2200 by Greta Ospina RN  Safety Promotion/Fall Prevention:   nonskid shoes/slippers when out of bed   fall prevention program maintained   assistive device/personal items within reach   activity supervised   safety round/check completed  Taken 5/16/2024 2020 by Greta Ospnia RN  Safety Promotion/Fall Prevention:   nonskid shoes/slippers when out of bed   fall prevention program maintained   assistive device/personal items within reach   activity supervised   safety round/check completed  Intervention: Prevent Skin Injury  Recent Flowsheet Documentation  Taken 5/17/2024 0200 by Greta Ospina RN  Body Position: position changed independently  Taken 5/17/2024 0030 by Greta Ospina RN  Body Position:   weight shifting   position changed independently  Taken 5/16/2024 2200 by Greta Ospina RN  Body Position: position changed independently  Taken 5/16/2024 2020 by Greta Ospina RN  Body  Position: position changed independently  Intervention: Prevent and Manage VTE (Venous Thromboembolism) Risk  Recent Flowsheet Documentation  Taken 5/17/2024 0200 by Greta Ospina RN  Activity Management: up ad yash  Taken 5/17/2024 0030 by Greta Ospina RN  Activity Management: up ad yash  Taken 5/16/2024 2200 by Greta Ospina RN  Activity Management: activity encouraged  Taken 5/16/2024 2020 by Greta Ospina RN  Activity Management: up in chair  Intervention: Prevent Infection  Recent Flowsheet Documentation  Taken 5/17/2024 0200 by Greta Ospina RN  Infection Prevention:   single patient room provided   rest/sleep promoted  Taken 5/17/2024 0030 by Greta Ospina RN  Infection Prevention:   single patient room provided   rest/sleep promoted  Taken 5/16/2024 2200 by Greta Ospina RN  Infection Prevention:   single patient room provided   rest/sleep promoted  Taken 5/16/2024 2020 by Greta Ospina RN  Infection Prevention:   single patient room provided   rest/sleep promoted  Goal: Optimal Comfort and Wellbeing  Outcome: Ongoing, Progressing  Intervention: Provide Person-Centered Care  Recent Flowsheet Documentation  Taken 5/16/2024 2020 by Greta Ospina RN  Trust Relationship/Rapport:   care explained   reassurance provided  Goal: Readiness for Transition of Care  Outcome: Ongoing, Progressing     Problem: Diabetes Comorbidity  Goal: Blood Glucose Level Within Targeted Range  Outcome: Ongoing, Progressing     Problem: Heart Failure Comorbidity  Goal: Maintenance of Heart Failure Symptom Control  Outcome: Ongoing, Progressing  Intervention: Maintain Heart Failure-Management  Recent Flowsheet Documentation  Taken 5/17/2024 0200 by Greta Ospina RN  Medication Review/Management: medications reviewed  Taken 5/17/2024 0030 by Greta Ospina RN  Medication Review/Management: medications reviewed  Taken 5/16/2024 2200 by Greta Ospina RN  Medication Review/Management: medications  reviewed  Taken 5/16/2024 2020 by Greta Ospina, RN  Medication Review/Management: medications reviewed     Problem: Hypertension Comorbidity  Goal: Blood Pressure in Desired Range  Outcome: Ongoing, Progressing  Intervention: Maintain Blood Pressure Management  Recent Flowsheet Documentation  Taken 5/17/2024 0200 by Greta Ospina, RN  Medication Review/Management: medications reviewed  Taken 5/17/2024 0030 by Greta Ospina, RN  Medication Review/Management: medications reviewed  Taken 5/16/2024 2200 by Greta Ospina, RN  Medication Review/Management: medications reviewed  Taken 5/16/2024 2020 by Greta Ospina RN  Medication Review/Management: medications reviewed     Problem: Obstructive Sleep Apnea Risk or Actual Comorbidity Management  Goal: Unobstructed Breathing During Sleep  Outcome: Ongoing, Progressing     Problem: Adjustment to Illness (Stroke, Ischemic/Transient Ischemic Attack)  Goal: Optimal Coping  Outcome: Ongoing, Progressing  Goal: Optimal Coping  Outcome: Ongoing, Progressing     Problem: Bowel Elimination Impaired (Stroke, Ischemic/Transient Ischemic Attack)  Goal: Effective Bowel Elimination  Outcome: Ongoing, Progressing  Goal: Effective Bowel Elimination  Outcome: Ongoing, Progressing     Problem: Cerebral Tissue Perfusion (Stroke, Ischemic/Transient Ischemic Attack)  Goal: Optimal Cerebral Tissue Perfusion  Outcome: Ongoing, Progressing  Goal: Optimal Cerebral Tissue Perfusion  Outcome: Ongoing, Progressing     Problem: Cognitive Impairment (Stroke, Ischemic/Transient Ischemic Attack)  Goal: Optimal Cognitive Function  Outcome: Ongoing, Progressing  Goal: Optimal Cognitive Function  Outcome: Ongoing, Progressing     Problem: Communication Impairment (Stroke, Ischemic/Transient Ischemic Attack)  Goal: Improved Communication Skills  Outcome: Ongoing, Progressing  Goal: Improved Communication Skills  Outcome: Ongoing, Progressing     Problem: Functional Ability Impaired (Stroke,  Ischemic/Transient Ischemic Attack)  Goal: Optimal Functional Ability  Outcome: Ongoing, Progressing  Intervention: Optimize Functional Ability  Recent Flowsheet Documentation  Taken 5/17/2024 0200 by Greta Ospina RN  Activity Management: up ad yash  Taken 5/17/2024 0030 by Greta Ospina RN  Activity Management: up ad yash  Taken 5/16/2024 2200 by Greta Ospina RN  Activity Management: activity encouraged  Taken 5/16/2024 2020 by Greta Ospina RN  Activity Management: up in chair  Goal: Optimal Functional Ability  Outcome: Ongoing, Progressing  Intervention: Optimize Functional Ability  Recent Flowsheet Documentation  Taken 5/17/2024 0200 by Greta Ospina RN  Activity Management: up ad yash  Taken 5/17/2024 0030 by Greta Ospina RN  Activity Management: up ad yash  Taken 5/16/2024 2200 by Greta Ospina RN  Activity Management: activity encouraged  Taken 5/16/2024 2020 by Greta Ospina RN  Activity Management: up in chair     Problem: Respiratory Compromise (Stroke, Ischemic/Transient Ischemic Attack)  Goal: Effective Oxygenation and Ventilation  Outcome: Ongoing, Progressing  Intervention: Optimize Oxygenation and Ventilation  Recent Flowsheet Documentation  Taken 5/17/2024 0200 by Greta Ospina RN  Head of Bed (HOB) Positioning: HOB lowered  Taken 5/17/2024 0030 by Greta Ospina RN  Head of Bed (HOB) Positioning: HOB elevated  Taken 5/16/2024 2200 by Greta Ospina RN  Head of Bed (HOB) Positioning: HOB elevated  Taken 5/16/2024 2020 by Greta Ospina RN  Head of Bed (HOB) Positioning: HOB elevated  Goal: Effective Oxygenation and Ventilation  Outcome: Ongoing, Progressing  Intervention: Optimize Oxygenation and Ventilation  Recent Flowsheet Documentation  Taken 5/17/2024 0200 by Greta Ospina RN  Head of Bed (HOB) Positioning: HOB lowered  Taken 5/17/2024 0030 by Greta Ospina RN  Head of Bed (HOB) Positioning: HOB elevated  Taken 5/16/2024 2200 by Anai  RAMAN Hernandes  Head of Bed (HOB) Positioning: HOB elevated  Taken 5/16/2024 2020 by Greta Ospina RN  Head of Bed (HOB) Positioning: HOB elevated     Problem: Sensorimotor Impairment (Stroke, Ischemic/Transient Ischemic Attack)  Goal: Improved Sensorimotor Function  Outcome: Ongoing, Progressing  Intervention: Optimize Range of Motion, Motor Control and Function  Recent Flowsheet Documentation  Taken 5/17/2024 0200 by Greta Ospina RN  Positioning/Transfer Devices:   pillows   in use  Taken 5/17/2024 0030 by Greta Ospina RN  Positioning/Transfer Devices:   pillows   in use  Taken 5/16/2024 2200 by Greta Ospina RN  Positioning/Transfer Devices:   pillows   in use  Taken 5/16/2024 2020 by Greta Ospina RN  Positioning/Transfer Devices:   pillows   in use  Goal: Improved Sensorimotor Function  Outcome: Ongoing, Progressing  Intervention: Optimize Range of Motion, Motor Control and Function  Recent Flowsheet Documentation  Taken 5/17/2024 0200 by Greta Ospina RN  Positioning/Transfer Devices:   pillows   in use  Taken 5/17/2024 0030 by Greta Ospina RN  Positioning/Transfer Devices:   pillows   in use  Taken 5/16/2024 2200 by Greta Ospina RN  Positioning/Transfer Devices:   pillows   in use  Taken 5/16/2024 2020 by Greta Ospina RN  Positioning/Transfer Devices:   pillows   in use     Problem: Swallowing Impairment (Stroke, Ischemic/Transient Ischemic Attack)  Goal: Optimal Eating and Swallowing without Aspiration  Outcome: Ongoing, Progressing  Goal: Optimal Eating and Swallowing without Aspiration  Outcome: Ongoing, Progressing     Problem: Urinary Elimination Impaired (Stroke, Ischemic/Transient Ischemic Attack)  Goal: Effective Urinary Elimination  Outcome: Ongoing, Progressing  Goal: Effective Urinary Elimination  Outcome: Ongoing, Progressing   Goal Outcome Evaluation:  Plan of Care Reviewed With: patient

## 2024-05-17 NOTE — PROGRESS NOTES
HOSPITAL FOLLOW UP NOTE    Patient Name: Cecilio Puckett  Patient : 1970        Date of Service:24  Provider of Service: MARIANNE Panchal  Place of Service: [facility]  Referral Provider: Jenny Thomason MD          Follow Up: chest discomfort, CVA    Interval Hx: remains hypertensive, tolerating GDMT, no chest pain        OBJECTIVE  Temp:  [98.1 °F (36.7 °C)-98.8 °F (37.1 °C)] 98.1 °F (36.7 °C)  Heart Rate:  [69-83] 78  Resp:  [18] 18  BP: (136-169)/(68-98) 145/96     Intake/Output Summary (Last 24 hours) at 2024 1154  Last data filed at 2024 1814  Gross per 24 hour   Intake 420 ml   Output --   Net 420 ml     Body mass index is 31.01 kg/m².      24  2100 05/15/24  0907   Weight: 95.7 kg (210 lb 14.4 oz) 95.3 kg (210 lb)         Physical Exam:     General Appearance:    Alert, cooperative, in no acute distress   Head:    Normocephalic, without obvious abnormality, atraumatic   Eyes:            Conjunctivae and sclerae normal, no   icterus, no pallor, corneas clear, PERRLA   Neck:   No adenopathy, supple, trachea midline, no thyromegaly, no   carotid bruit, no JVD   Lungs:     Clear to auscultation,respirations regular, even and unlabored    Heart:    Regular rhythm and normal rate, normal S1 and S2, no murmur, no gallop, no rub, no click   Chest Wall:    No abnormalities observed   Abdomen:     Normal bowel sounds, no masses, no organomegaly, soft, nontender, nondistended, no guarding, no rebound  tenderness   Extremities:   Moves all extremities well, no edema, no cyanosis, no redness   Pulses:   Pulses palpable and equal bilaterally.          CURRENT MEDS    Scheduled Meds:amLODIPine, 10 mg, Oral, Daily  aspirin, 81 mg, Oral, Daily  carvedilol, 6.25 mg, Oral, Q12H  Vjgrgrj-Exduk-Wqkdwmol-TenofAF, 1 tablet, Oral, Daily  [Held by provider] empagliflozin, 25 mg, Oral, Daily  enoxaparin, 1 mg/kg, Subcutaneous, Q12H  furosemide, 20 mg, Oral, Daily  [Held by  provider] glipizide, 10 mg, Oral, Q12H  insulin glargine, 25 Units, Subcutaneous, Nightly  insulin regular, 2-7 Units, Subcutaneous, Q6H  pantoprazole, 40 mg, Oral, Q AM  potassium chloride ER, 40 mEq, Oral, Q4H  rosuvastatin, 20 mg, Oral, Daily  sacubitril-valsartan, 1 tablet, Oral, Q12H  sertraline, 50 mg, Oral, Daily  sodium chloride, 10 mL, Intravenous, Q12H  spironolactone, 25 mg, Oral, Daily      Continuous Infusions:Pharmacy to dose warfarin,   sodium chloride, 75 mL/hr, Last Rate: 75 mL/hr (05/15/24 1530)          Lab Review:   Results from last 7 days   Lab Units 05/17/24  0509 05/15/24  2048 05/15/24  0301 05/14/24  1832   SODIUM mmol/L 141  --  138 139   POTASSIUM mmol/L 3.2* 4.3 3.4* 4.1   CHLORIDE mmol/L 107  --  107 106   CO2 mmol/L 22.3  --  22.0 23.6   BUN mg/dL 8  --  8 8   CREATININE mg/dL 0.89  --  0.82 1.10   GLUCOSE mg/dL 68  --  119* 182*   CALCIUM mg/dL 8.8  --  8.5* 9.0   AST (SGOT) U/L  --   --   --  11   ALT (SGPT) U/L  --   --   --  19         Results from last 7 days   Lab Units 05/17/24  0509 05/15/24  0301   WBC 10*3/mm3 3.47 6.33   HEMOGLOBIN g/dL 15.0 14.3   HEMATOCRIT % 46.6 43.0   PLATELETS 10*3/mm3 156 169     Results from last 7 days   Lab Units 05/17/24  0509 05/16/24  0607 05/15/24  1827 05/14/24  1832   INR  3.76* 1.16*   < > 1.05   APTT seconds  --   --   --  25.3    < > = values in this interval not displayed.     Results from last 7 days   Lab Units 05/14/24  1832   MAGNESIUM mg/dL 1.7     Results from last 7 days   Lab Units 05/14/24  1832   CHOLESTEROL mg/dL 193   TRIGLYCERIDES mg/dL 104   HDL CHOL mg/dL 38*   LDL CHOL mg/dL 136*     Results from last 7 days   Lab Units 05/14/24  1832   PROBNP pg/mL 916.0*     Results from last 7 days   Lab Units 05/14/24  1832   TSH uIU/mL 1.280     2D Echocardiogram 05/15/2024:    Left ventricular systolic function is moderately decreased. Calculated left ventricular EF = 36.9%    Left ventricular wall thickness is consistent with severe  concentric hypertrophy.    The following left ventricular wall segments are hypokinetic: mid anterior. The following left ventricular wall segments are akinetic: apical anterior and apex.    Left ventricular diastolic function is consistent with (grade I) impaired relaxation.    The left atrial cavity is moderate to severely dilated.    Left atrial volume is severely increased.    Mild mitral valve regurgitation is present.    Saline test results are negative.    2D Echocardiogram 09/30/2023:    Left ventricular systolic function is low normal. Calculated left ventricular EF = 48.1%    The following left ventricular wall segments are hypokinetic: mid anterior and apical anterior.    Left ventricular diastolic function is consistent with (grade I) impaired relaxation.    Mild tricuspid valve regurgitation is present.    Estimated right ventricular systolic pressure from tricuspid regurgitation is normal (<35 mmHg). Calculated right ventricular systolic pressure from tricuspid regurgitation is 13 mmHg.    Saline test results are negative.     Left Heart Cath 12/01/2019:  1. High-grade stenosis of a large first diagonal is visualized (culprit for ST   segment elevation MI) .   2. Moderate CAD is visualized in the distal circumflex system distal to a   previous stent.   3. Nonobstructive RCA disease is identified.   4. Additional medical issues are noted in the record.   Diagnostic Recommendations   1. PCI to the diagonal is recommended.   2. Further recommendations will be dictated by the results of this procedure.   Interventional Summary   1. Successful percutaneous intervention to the first diagonal was completed   with a 3.5 x 24 mm Synergy drug-eluting stent (post without a 4.5 millimeter   noncompliant balloon).   2. No procedural complications were identified.     ASSESSMENT & PLAN    Slurred speech    NHL (non-Hodgkin's lymphoma)    HIV (human immunodeficiency virus infection)    Type 2 diabetes mellitus with  circulatory disorder, without long-term current use of insulin    Acute CVA (cerebrovascular accident)    Anticoagulated on Coumadin    Coronary artery disease involving native coronary artery of native heart with angina pectoris    Arteriosclerosis of coronary artery    Ischemic cardiomyopathy    History of CVA (cerebrovascular accident)    1.  Acute CVA: MRI of the demonstrated multiple bilateral areas of acute infarction consistent with embolic source  2.  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  Noncompliant with chronic anticoagulation: INR 1 on admission--> now 3.76.  Pharmacy to dose.  Goal 2-3  4.  Coronary disease with prior stenting to D1 in 2019. Patient suffered STEMI at that time.  Left heart cath in 2019 showed  5.  Chest discomfort and increasing shortness of breath:  Stress perfusion study this am.   6.  Ischemic cardiomyopathy: EF 36% (previous EF 48% 9/2023). Titrate guideline directed medical therapy.  On carvedilol, Entresto 49/51, lasix, spironolactone, Jardiance.  Normal kidney function.   7.  Hypokalemia/ Hypomagnesia: K 3.2, Mg 1.7.  K replaced. Give one time dose Mg Ox.   8.  Diabetes type 2: HbA1c 10.  Insulin  9.  Hypercholesterolemia with goal LDL less than 70: .  On rosuvastatin  10.  Hypertension: severe concentric hypertrophy.  Remains elevated.  Increase coreg which will also help with cardiac recruitment    Addendum: Stress perfusion study today shows moderate size infarct located in inferior wall with mild to moderate isi-infarct ischemia.  Severe hypokinesis of inferior wall.  LVEF 34%.      INR 3.76.  NPO after midnight for possible coronary angiography.  Last cardiac cath in 2019 showed moderate CAD in the distal circumflex system distal to a previous stent.        Tracy Felipe, APRN  05/17/24

## 2024-05-18 LAB
ANION GAP SERPL CALCULATED.3IONS-SCNC: 9.7 MMOL/L (ref 5–15)
BASOPHILS # BLD MANUAL: 0.03 10*3/MM3 (ref 0–0.2)
BASOPHILS NFR BLD MANUAL: 1 % (ref 0–1.5)
BUN SERPL-MCNC: 9 MG/DL (ref 6–20)
BUN/CREAT SERPL: 9.5 (ref 7–25)
CALCIUM SPEC-SCNC: 8.8 MG/DL (ref 8.6–10.5)
CHLORIDE SERPL-SCNC: 106 MMOL/L (ref 98–107)
CO2 SERPL-SCNC: 21.3 MMOL/L (ref 22–29)
CREAT SERPL-MCNC: 0.95 MG/DL (ref 0.76–1.27)
DEPRECATED RDW RBC AUTO: 43.4 FL (ref 37–54)
EGFRCR SERPLBLD CKD-EPI 2021: 95.7 ML/MIN/1.73
EOSINOPHIL # BLD MANUAL: 0.03 10*3/MM3 (ref 0–0.4)
EOSINOPHIL NFR BLD MANUAL: 1 % (ref 0.3–6.2)
ERYTHROCYTE [DISTWIDTH] IN BLOOD BY AUTOMATED COUNT: 14.4 % (ref 12.3–15.4)
GLUCOSE BLDC GLUCOMTR-MCNC: 110 MG/DL (ref 70–130)
GLUCOSE BLDC GLUCOMTR-MCNC: 118 MG/DL (ref 70–130)
GLUCOSE BLDC GLUCOMTR-MCNC: 234 MG/DL (ref 70–130)
GLUCOSE BLDC GLUCOMTR-MCNC: 88 MG/DL (ref 70–130)
GLUCOSE BLDC GLUCOMTR-MCNC: 93 MG/DL (ref 70–130)
GLUCOSE SERPL-MCNC: 76 MG/DL (ref 65–99)
HCT VFR BLD AUTO: 46.4 % (ref 37.5–51)
HGB BLD-MCNC: 15.3 G/DL (ref 13–17.7)
INR PPP: 1.61 (ref 0.9–1.1)
LYMPHOCYTES # BLD MANUAL: 1.91 10*3/MM3 (ref 0.7–3.1)
LYMPHOCYTES NFR BLD MANUAL: 11.1 % (ref 5–12)
MCH RBC QN AUTO: 27.8 PG (ref 26.6–33)
MCHC RBC AUTO-ENTMCNC: 33 G/DL (ref 31.5–35.7)
MCV RBC AUTO: 84.2 FL (ref 79–97)
MONOCYTES # BLD: 0.37 10*3/MM3 (ref 0.1–0.9)
NEUTROPHILS # BLD AUTO: 1.02 10*3/MM3 (ref 1.7–7)
NEUTROPHILS NFR BLD MANUAL: 30.3 % (ref 42.7–76)
PLAT MORPH BLD: NORMAL
PLATELET # BLD AUTO: 168 10*3/MM3 (ref 140–450)
PMV BLD AUTO: 11.6 FL (ref 6–12)
POTASSIUM SERPL-SCNC: 3.7 MMOL/L (ref 3.5–5.2)
PROTHROMBIN TIME: 19.4 SECONDS (ref 11.7–14.2)
RBC # BLD AUTO: 5.51 10*6/MM3 (ref 4.14–5.8)
RBC MORPH BLD: NORMAL
SMUDGE CELLS BLD QL SMEAR: ABNORMAL
SODIUM SERPL-SCNC: 137 MMOL/L (ref 136–145)
VARIANT LYMPHS NFR BLD MANUAL: 56.6 % (ref 19.6–45.3)
WBC NRBC COR # BLD AUTO: 3.37 10*3/MM3 (ref 3.4–10.8)

## 2024-05-18 PROCEDURE — 63710000001 INSULIN REGULAR HUMAN PER 5 UNITS

## 2024-05-18 PROCEDURE — 63710000001 INSULIN REGULAR HUMAN PER 5 UNITS: Performed by: HOSPITALIST

## 2024-05-18 PROCEDURE — 85007 BL SMEAR W/DIFF WBC COUNT: CPT | Performed by: HOSPITALIST

## 2024-05-18 PROCEDURE — 63710000001 INSULIN GLARGINE PER 5 UNITS

## 2024-05-18 PROCEDURE — 99232 SBSQ HOSP IP/OBS MODERATE 35: CPT | Performed by: INTERNAL MEDICINE

## 2024-05-18 PROCEDURE — 25010000002 ENOXAPARIN PER 10 MG: Performed by: HOSPITALIST

## 2024-05-18 PROCEDURE — 85025 COMPLETE CBC W/AUTO DIFF WBC: CPT | Performed by: HOSPITALIST

## 2024-05-18 PROCEDURE — 80048 BASIC METABOLIC PNL TOTAL CA: CPT | Performed by: HOSPITALIST

## 2024-05-18 PROCEDURE — 82948 REAGENT STRIP/BLOOD GLUCOSE: CPT

## 2024-05-18 PROCEDURE — 85610 PROTHROMBIN TIME: CPT | Performed by: NURSE PRACTITIONER

## 2024-05-18 RX ORDER — WARFARIN SODIUM 7.5 MG/1
15 TABLET ORAL
Status: COMPLETED | OUTPATIENT
Start: 2024-05-18 | End: 2024-05-18

## 2024-05-18 RX ORDER — ENOXAPARIN SODIUM 100 MG/ML
1 INJECTION SUBCUTANEOUS EVERY 12 HOURS
Status: DISCONTINUED | OUTPATIENT
Start: 2024-05-18 | End: 2024-05-21

## 2024-05-18 RX ADMIN — SERTRALINE 50 MG: 50 TABLET, FILM COATED ORAL at 08:37

## 2024-05-18 RX ADMIN — ROSUVASTATIN CALCIUM 20 MG: 20 TABLET, FILM COATED ORAL at 08:37

## 2024-05-18 RX ADMIN — PANTOPRAZOLE SODIUM 40 MG: 40 TABLET, DELAYED RELEASE ORAL at 06:21

## 2024-05-18 RX ADMIN — SACUBITRIL AND VALSARTAN 1 TABLET: 49; 51 TABLET, FILM COATED ORAL at 08:37

## 2024-05-18 RX ADMIN — SACUBITRIL AND VALSARTAN 1 TABLET: 49; 51 TABLET, FILM COATED ORAL at 21:25

## 2024-05-18 RX ADMIN — INSULIN GLARGINE 25 UNITS: 100 INJECTION, SOLUTION SUBCUTANEOUS at 21:24

## 2024-05-18 RX ADMIN — AMLODIPINE BESYLATE 10 MG: 10 TABLET ORAL at 08:37

## 2024-05-18 RX ADMIN — FUROSEMIDE 20 MG: 20 TABLET ORAL at 08:37

## 2024-05-18 RX ADMIN — ASPIRIN 81 MG: 81 TABLET, COATED ORAL at 08:38

## 2024-05-18 RX ADMIN — CARVEDILOL 12.5 MG: 12.5 TABLET, FILM COATED ORAL at 08:37

## 2024-05-18 RX ADMIN — WARFARIN 15 MG: 7.5 TABLET ORAL at 17:30

## 2024-05-18 RX ADMIN — ENOXAPARIN SODIUM 100 MG: 100 INJECTION SUBCUTANEOUS at 21:25

## 2024-05-18 RX ADMIN — CARVEDILOL 12.5 MG: 12.5 TABLET, FILM COATED ORAL at 21:24

## 2024-05-18 RX ADMIN — Medication 10 ML: at 21:30

## 2024-05-18 RX ADMIN — Medication 10 ML: at 08:38

## 2024-05-18 RX ADMIN — ELVITEGRAVIR, COBICISTAT, EMTRICITABINE, AND TENOFOVIR ALAFENAMIDE 1 TABLET: 150; 150; 200; 10 TABLET ORAL at 08:37

## 2024-05-18 RX ADMIN — INSULIN HUMAN 2 UNITS: 100 INJECTION, SOLUTION PARENTERAL at 00:10

## 2024-05-18 RX ADMIN — MAGNESIUM OXIDE 400 MG (241.3 MG MAGNESIUM) TABLET 400 MG: TABLET at 08:37

## 2024-05-18 RX ADMIN — SPIRONOLACTONE 25 MG: 25 TABLET ORAL at 08:38

## 2024-05-18 RX ADMIN — INSULIN HUMAN 3 UNITS: 100 INJECTION, SOLUTION PARENTERAL at 21:24

## 2024-05-18 RX ADMIN — ENOXAPARIN SODIUM 100 MG: 100 INJECTION SUBCUTANEOUS at 08:38

## 2024-05-18 NOTE — PLAN OF CARE
Problem: Adult Inpatient Plan of Care  Goal: Plan of Care Review  Outcome: Ongoing, Progressing     Problem: Adult Inpatient Plan of Care  Goal: Absence of Hospital-Acquired Illness or Injury  Intervention: Identify and Manage Fall Risk  Recent Flowsheet Documentation  Taken 5/18/2024 0728 by Margoth Matos, RN  Safety Promotion/Fall Prevention:   nonskid shoes/slippers when out of bed   safety round/check completed     Problem: Respiratory Compromise (Stroke, Ischemic/Transient Ischemic Attack)  Goal: Effective Oxygenation and Ventilation  Intervention: Optimize Oxygenation and Ventilation  Recent Flowsheet Documentation  Taken 5/18/2024 0728 by Margoth Matos RN  Head of Bed ([facility]) Positioning: [facility] elevated  Goal: Effective Oxygenation and Ventilation  Intervention: Optimize Oxygenation and Ventilation  Recent Flowsheet Documentation  Taken 5/18/2024 0728 by Margoth Matos RN  Head of Bed (HOB) Positioning: HOB elevated   Goal Outcome Evaluation:

## 2024-05-18 NOTE — PROGRESS NOTES
[facility] Clinical Pharmacy Services: Warfarin Dosing/Monitoring Consult    Cecilio Puckett is a 54 y.o. male, estimated creatinine clearance is 102.4 mL/min (by C-G formula based on SCr of 0.95 mg/dL). weighing 95.3 kg (210 lb).    Results from last 7 days   Lab Units 05/18/24  0510 05/17/24  1431 05/17/24  0509 05/16/24  0607 05/15/24  1827 05/15/24  0301 05/14/24  1832   INR  1.61* 1.45* 3.76* 1.16* 1.13*  --  1.05   APTT seconds  --   --   --   --   --   --  25.3   HEMOGLOBIN g/dL 15.3  --  15.0  --   --  14.3 14.4   HEMATOCRIT % 46.4  --  46.6  --   --  43.0 43.7   PLATELETS 10*3/mm3 168  --  156  --   --  169 176     Prior to admission anticoagulation: Warfarin 10mg - 1.5 tablets M/W/F and 1 tablet AOD per patient    Hospital Anticoagulation:  Consulting provider: MARIANNE Marie  Start date: 5/15  Indication: history of DVT/PE  Target INR: 2 - 3  Expected duration: indefinite   Bridge Therapy: Yes; with lovenox 100mg (1mg/kg x 95.7kg)    Potential food or drug interactions:   Patient is bridging with lovenox (increased risk for bleed)      Education complete?/Date: No; plan for follow up TBD; Patient follows with [facility] for anticoagulation monitoring.    Assessment/Plan:  Supratherapeutic INR yesterday appears to be a lab error as redraw resulted in line with normal upward trend. AM INR follows same trend at 1.61 despite patient not getting dose last evening.         Today's Dose: warfarin 15 mg PO x1  Nurse to monitor for any signs or symptoms of bleeding.  Follow up daily INRs and dose adjustments.    Pharmacy will continue to follow until discharge or discontinuation of warfarin.     Andrea Mo [facility]  Clinical Pharmacist

## 2024-05-18 NOTE — PROGRESS NOTES
"DAILY PROGRESS NOTE  [facility]    Patient Identification:  Name: Cecilio Puckett  Age: 54 y.o.  Sex: male  :  1970  MRN: [id number]         Primary Care Physician: Arias Lehman APRN    Subjective:  Interval History: He still complains about some trouble with slurred speech but he is up ambulating independently without much difficulty and PT signed off.    Objective:    Scheduled Meds:amLODIPine, 10 mg, Oral, Daily  aspirin, 81 mg, Oral, Daily  carvedilol, 12.5 mg, Oral, Q12H  Rrqquvk-Drmqt-Ozlnnftb-TenofAF, 1 tablet, Oral, Daily  [Held by provider] empagliflozin, 25 mg, Oral, Daily  enoxaparin, 1 mg/kg, Subcutaneous, Q12H  furosemide, 20 mg, Oral, Daily  [Held by provider] glipizide, 10 mg, Oral, Q12H  insulin glargine, 25 Units, Subcutaneous, Nightly  insulin regular, 2-7 Units, Subcutaneous, Q6H  magnesium oxide, 400 mg, Oral, Daily  pantoprazole, 40 mg, Oral, Q AM  rosuvastatin, 20 mg, Oral, Daily  sacubitril-valsartan, 1 tablet, Oral, Q12H  sertraline, 50 mg, Oral, Daily  sodium chloride, 10 mL, Intravenous, Q12H  spironolactone, 25 mg, Oral, Daily  warfarin, 15 mg, Oral, Once      Continuous Infusions:Pharmacy to dose warfarin,   sodium chloride, 75 mL/hr, Last Rate: 75 mL/hr (05/15/24 1530)        Vital signs in last 24 hours:  Temp:  [98.1 °F (36.7 °C)-98.6 °F (37 °C)] 98.4 °F (36.9 °C)  Heart Rate:  [71-83] 71  Resp:  [18] 18  BP: (132-179)/(75-86) 132/78    Intake/Output:  No intake or output data in the 24 hours ending 24 1453      Exam:  /78 (BP Location: Right arm, Patient Position: Lying)   Pulse 71   Temp 98.4 °F (36.9 °C) (Oral)   Resp 18   Ht 175.3 cm (69\")   Wt 95.3 kg (210 lb)   SpO2 100%   BMI 31.01 kg/m²     General Appearance:    Alert, cooperative, no distress   Head:    Normocephalic, without obvious abnormality, atraumatic   Eyes:       Throat:   Lips, tongue, gums normal   Neck:   Supple, symmetrical, trachea midline, no JVD   Lungs:     Clear " to auscultation bilaterally, respirations unlabored   Chest Wall:    No tenderness or deformity    Heart:    Regular rate and rhythm, S1 and S2 normal, no murmur,no  Rub or gallop   Abdomen:     Soft, nontender, bowel sounds active, no masses, no organomegaly    Extremities:   Extremities normal, atraumatic, no cyanosis or edema   Pulses:      Skin:   Skin is warm and dry,  no rashes or palpable lesions   Neurologic: Some slurring of speech but generally is up ambulating well without any assistance      Lab Results (last 72 hours)       Procedure Component Value Units Date/Time    POC Glucose Once [[id number]]  (Normal) Collected: 05/16/24 1153    Specimen: Blood Updated: 05/16/24 1155     Glucose 96 mg/dL     POC Glucose Once [[id number]]  (Abnormal) Collected: 05/16/24 0735    Specimen: Blood Updated: 05/16/24 0737     Glucose 137 mg/dL     Protime-INR [[id number]]  (Abnormal) Collected: 05/16/24 0607    Specimen: Blood Updated: 05/16/24 0657     Protime 15.1 Seconds      INR 1.16    POC Glucose Once [[id number]]  (Abnormal) Collected: 05/16/24 0548    Specimen: Blood Updated: 05/16/24 0548     Glucose 64 mg/dL     POC Glucose Once [[id number]]  (Abnormal) Collected: 05/15/24 2349    Specimen: Blood Updated: 05/15/24 2350     Glucose 147 mg/dL     Potassium [[id number]]  (Normal) Collected: 05/15/24 2048    Specimen: Blood Updated: 05/15/24 2131     Potassium 4.3 mmol/L      Comment: Specimen hemolyzed.  Result may be falsely elevated.       POC Glucose Once [[id number]]  (Abnormal) Collected: 05/15/24 2024    Specimen: Blood Updated: 05/15/24 2027     Glucose 163 mg/dL     Protime-INR [[id number]]  (Abnormal) Collected: 05/15/24 1827    Specimen: Blood from Arm, Left Updated: 05/15/24 1859     Protime 14.7 Seconds      INR 1.13    POC Glucose Once [[id number]]  (Abnormal) Collected: 05/15/24 1806    Specimen: Blood Updated: 05/15/24 1807     Glucose 142 mg/dL     POC Glucose Once [[id number]]  (Normal) Collected: 05/15/24  1207    Specimen: Blood Updated: 05/15/24 1208     Glucose 80 mg/dL     POC Glucose Once [[id number]]  (Normal) Collected: 05/15/24 0650    Specimen: Blood Updated: 05/15/24 0651     Glucose 87 mg/dL     Basic Metabolic Panel [[id number]]  (Abnormal) Collected: 05/15/24 0301    Specimen: Blood from Arm, Left Updated: 05/15/24 0346     Glucose 119 mg/dL      BUN 8 mg/dL      Creatinine 0.82 mg/dL      Sodium 138 mmol/L      Potassium 3.4 mmol/L      Chloride 107 mmol/L      CO2 22.0 mmol/L      Calcium 8.5 mg/dL      BUN/Creatinine Ratio 9.8     Anion Gap 9.0 mmol/L      eGFR 105.0 mL/min/1.73     Narrative:      GFR Normal >60  Chronic Kidney Disease <60  Kidney Failure <15      CBC (No Diff) [[id number]]  (Normal) Collected: 05/15/24 0301    Specimen: Blood from Arm, Left Updated: 05/15/24 0326     WBC 6.33 10*3/mm3      RBC 5.08 10*6/mm3      Hemoglobin 14.3 g/dL      Hematocrit 43.0 %      MCV 84.6 fL      MCH 28.1 pg      MCHC 33.3 g/dL      RDW 14.2 %      RDW-SD 43.0 fl      MPV 10.9 fL      Platelets 169 10*3/mm3     POC Glucose Once [[id number]]  (Normal) Collected: 05/14/24 2331    Specimen: Blood Updated: 05/14/24 2332     Glucose 129 mg/dL     Hemoglobin A1c [[id number]]  (Abnormal) Collected: 05/14/24 1832    Specimen: Blood Updated: 05/14/24 2157     Hemoglobin A1C 10.00 %     Narrative:      Hemoglobin A1C Ranges:    Increased Risk for Diabetes  5.7% to 6.4%  Diabetes                     >= 6.5%  Diabetic Goal                < 7.0%    Lipid Panel [[id number]]  (Abnormal) Collected: 05/14/24 1832    Specimen: Blood Updated: 05/14/24 2147     Total Cholesterol 193 mg/dL      Triglycerides 104 mg/dL      HDL Cholesterol 38 mg/dL      LDL Cholesterol  136 mg/dL      VLDL Cholesterol 19 mg/dL      LDL/HDL Ratio 3.53    Narrative:      Cholesterol Reference Ranges  (U.[place]. Department of Health and Human Services ATP III Classifications)    Desirable          <200 mg/dL  Borderline High    200-239 mg/dL  High Risk           >240 mg/dL      Triglyceride Reference Ranges  (U.S. Department of Health and Human Services ATP III Classifications)    Normal           <150 mg/dL  Borderline High  150-199 mg/dL  High             200-499 mg/dL  Very High        >500 mg/dL    HDL Reference Ranges  (U.S. Department of Health and Human Services ATP III Classifications)    Low     <40 mg/dl (major risk factor for CHD)  High    >60 mg/dl ('negative' risk factor for CHD)        LDL Reference Ranges  (U.S. Department of Health and Human Services ATP III Classifications)    Optimal          <100 mg/dL  Near Optimal     100-129 mg/dL  Borderline High  130-159 mg/dL  High             160-189 mg/dL  Very High        >189 mg/dL    Urinalysis, Microscopic Only - Urine, Clean Catch [[id number]]  (Abnormal) Collected: 05/14/24 2001    Specimen: Urine, Clean Catch Updated: 05/14/24 2042     RBC, UA 3-5 /HPF      WBC, UA 11-20 /HPF      Bacteria, UA None Seen /HPF      Squamous Epithelial Cells, UA 3-6 /HPF      Hyaline Casts, UA 21-30 /LPF      Methodology Automated Microscopy    Urine Drug Screen - Urine, Clean Catch [[id number]]  (Normal) Collected: 05/14/24 2001    Specimen: Urine, Clean Catch Updated: 05/14/24 2040     Amphet/Methamphet, Screen Negative     Barbiturates Screen, Urine Negative     Benzodiazepine Screen, Urine Negative     Cocaine Screen, Urine Negative     Opiate Screen Negative     THC, Screen, Urine Negative     Methadone Screen, Urine Negative     Oxycodone Screen, Urine Negative     Fentanyl, Urine Negative    Narrative:      Negative Thresholds Per Drugs Screened:    Amphetamines                 500 ng/ml  Barbiturates                 200 ng/ml  Benzodiazepines              100 ng/ml  Cocaine                      300 ng/ml  Methadone                    300 ng/ml  Opiates                      300 ng/ml  Oxycodone                    100 ng/ml  THC                           50 ng/ml  Fentanyl                       5 ng/ml      The  Normal Value for all drugs tested is negative. This report includes final unconfirmed screening results to be used for medical treatment purposes only. Unconfirmed results must not be used for non-medical purposes such as employment or legal testing. Clinical consideration should be applied to any drug of abuse test, particularly when unconfirmed results are used.            Urinalysis With Microscopic If Indicated (No Culture) - Urine, Clean Catch [[id number]]  (Abnormal) Collected: 05/14/24 2001    Specimen: Urine, Clean Catch Updated: 05/14/24 2020     Color, UA Dark Yellow     Appearance, UA Cloudy     pH, UA 5.5     Specific Gravity, UA >1.030     Glucose,  mg/dL (1+)     Ketones, UA Negative     Bilirubin, UA Negative     Blood, UA Small (1+)     Protein, UA >=300 mg/dL (3+)     Leuk Esterase, UA Negative     Nitrite, UA Negative     Urobilinogen, UA 1.0 E.U./dL    Manual Differential [[id number]]  (Abnormal) Collected: 05/14/24 1832    Specimen: Blood Updated: 05/14/24 1953     Neutrophil % 30.6 %      Lymphocyte % 61.2 %      Monocyte % 5.1 %      Eosinophil % 1.0 %      Basophil % 2.0 %      Neutrophils Absolute 1.74 10*3/mm3      Lymphocytes Absolute 3.47 10*3/mm3      Monocytes Absolute 0.29 10*3/mm3      Eosinophils Absolute 0.06 10*3/mm3      Basophils Absolute 0.11 10*3/mm3      RBC Morphology Normal     WBC Morphology Normal     Platelet Morphology Normal    CBC & Differential [[id number]]  (Normal) Collected: 05/14/24 1832    Specimen: Blood Updated: 05/14/24 1953    Narrative:      The following orders were created for panel order CBC & Differential.  Procedure                               Abnormality         Status                     ---------                               -----------         ------                     CBC Auto Differential[[id number]]        Normal              Final result                 Please view results for these tests on the individual orders.    CBC Auto Differential  [[id number]]  (Normal) Collected: 05/14/24 1832    Specimen: Blood Updated: 05/14/24 1953     WBC 5.67 10*3/mm3      RBC 5.12 10*6/mm3      Hemoglobin 14.4 g/dL      Hematocrit 43.7 %      MCV 85.4 fL      MCH 28.1 pg      MCHC 33.0 g/dL      RDW 14.5 %      RDW-SD 44.9 fl      MPV 11.1 fL      Platelets 176 10*3/mm3     Protime-INR [[id number]]  (Normal) Collected: 05/14/24 1832    Specimen: Blood Updated: 05/14/24 1915     Protime 14.0 Seconds      INR 1.05    aPTT [[id number]]  (Normal) Collected: 05/14/24 1832    Specimen: Blood Updated: 05/14/24 1915     PTT 25.3 seconds     Single High Sensitivity Troponin T [[id number]]  (Abnormal) Collected: 05/14/24 1832    Specimen: Blood Updated: 05/14/24 1911     HS Troponin T 26 ng/L     Narrative:      High Sensitive Troponin T Reference Range:  <14.0 ng/L- Negative Female for AMI  <22.0 ng/L- Negative Male for AMI  >=14 - Abnormal Female indicating possible myocardial injury.  >=22 - Abnormal Male indicating possible myocardial injury.   Clinicians would have to utilize clinical acumen, EKG, Troponin, and serial changes to determine if it is an Acute Myocardial Infarction or myocardial injury due to an underlying chronic condition.         BNP [[id number]]  (Abnormal) Collected: 05/14/24 1832    Specimen: Blood Updated: 05/14/24 1911     proBNP 916.0 pg/mL     Narrative:      This assay is used as an aid in the diagnosis of individuals suspected of having heart failure. It can be used as an aid in the diagnosis of acute decompensated heart failure (ADHF) in patients presenting with signs and symptoms of ADHF to the emergency department (ED). In addition, NT-proBNP of <300 pg/mL indicates ADHF is not likely.    Age Range Result Interpretation  NT-proBNP Concentration (pg/mL:      <50             Positive            >450                   Gray                 300-450                    Negative             <300    50-75           Positive            >900                   Griffin                300-900                  Negative            <300      >75             Positive            >1800                  Gray                300-1800                  Negative            <300    TSH [[id number]]  (Normal) Collected: 05/14/24 1832    Specimen: Blood Updated: 05/14/24 1911     TSH 1.280 uIU/mL     Comprehensive Metabolic Panel [[id number]]  (Abnormal) Collected: 05/14/24 1832    Specimen: Blood Updated: 05/14/24 1908     Glucose 182 mg/dL      BUN 8 mg/dL      Creatinine 1.10 mg/dL      Sodium 139 mmol/L      Potassium 4.1 mmol/L      Chloride 106 mmol/L      CO2 23.6 mmol/L      Calcium 9.0 mg/dL      Total Protein 6.6 g/dL      Albumin 3.6 g/dL      ALT (SGPT) 19 U/L      AST (SGOT) 11 U/L      Alkaline Phosphatase 67 U/L      Total Bilirubin 0.3 mg/dL      Globulin 3.0 gm/dL      A/G Ratio 1.2 g/dL      BUN/Creatinine Ratio 7.3     Anion Gap 9.4 mmol/L      eGFR 80.3 mL/min/1.73     Narrative:      GFR Normal >60  Chronic Kidney Disease <60  Kidney Failure <15      Ethanol [[id number]] Collected: 05/14/24 1832    Specimen: Blood Updated: 05/14/24 1908     Ethanol <10 mg/dL      Ethanol % <0.010 %     Magnesium [[id number]]  (Normal) Collected: 05/14/24 1832    Specimen: Blood Updated: 05/14/24 1908     Magnesium 1.7 mg/dL           Data Review:  Results from last 7 days   Lab Units 05/18/24  0510 05/17/24  2014 05/17/24  0509 05/15/24  2048 05/15/24  0301   SODIUM mmol/L 137  --  141  --  138   POTASSIUM mmol/L 3.7 4.5 3.2*   < > 3.4*   CHLORIDE mmol/L 106  --  107  --  107   CO2 mmol/L 21.3*  --  22.3  --  22.0   BUN mg/dL 9  --  8  --  8   CREATININE mg/dL 0.95  --  0.89  --  0.82   GLUCOSE mg/dL 76  --  68  --  119*   CALCIUM mg/dL 8.8  --  8.8  --  8.5*    < > = values in this interval not displayed.     Results from last 7 days   Lab Units 05/18/24  0510 05/17/24  0509 05/15/24  0301   WBC 10*3/mm3 3.37* 3.47 6.33   HEMOGLOBIN g/dL 15.3 15.0 14.3   HEMATOCRIT % 46.4 46.6 43.0    PLATELETS 10*3/mm3 168 156 169     Results from last 7 days   Lab Units 05/14/24  1832   TSH uIU/mL 1.280     Results from last 7 days   Lab Units 05/14/24  1832   HEMOGLOBIN A1C % 10.00*     Lab Results   Lab Value Date/Time    TROPONINT 26 (H) 05/14/2024 1832    TROPONINT <0.010 10/10/2022 1444    TROPONINT <0.010 09/27/2022 1420     Results from last 7 days   Lab Units 05/14/24  1832   CHOLESTEROL mg/dL 193   TRIGLYCERIDES mg/dL 104   HDL CHOL mg/dL 38*   LDL CHOL mg/dL 136*     Results from last 7 days   Lab Units 05/14/24  1832   ALK PHOS U/L 67   BILIRUBIN mg/dL 0.3   ALT (SGPT) U/L 19   AST (SGOT) U/L 11     Results from last 7 days   Lab Units 05/14/24  1832   TSH uIU/mL 1.280     Results from last 7 days   Lab Units 05/14/24  1832   HEMOGLOBIN A1C % 10.00*     Glucose   Date/Time Value Ref Range Status   05/18/2024 1136 110 70 - 130 mg/dL Final   05/18/2024 0757 88 70 - 130 mg/dL Final   05/18/2024 0607 93 70 - 130 mg/dL Final   05/17/2024 2334 163 (H) 70 - 130 mg/dL Final   05/17/2024 1625 202 (H) 70 - 130 mg/dL Final   05/17/2024 1136 103 70 - 130 mg/dL Final   05/17/2024 0800 76 70 - 130 mg/dL Final   05/17/2024 0519 73 70 - 130 mg/dL Final     Results from last 7 days   Lab Units 05/18/24  0510 05/17/24  1431 05/17/24  0509   INR  1.61* 1.45* 3.76*       Past Medical History:   Diagnosis Date    COPD (chronic obstructive pulmonary disease)     Coronary artery disease     Diabetes mellitus     DVT (deep venous thrombosis)     Elevated cholesterol     GERD (gastroesophageal reflux disease)     H/O Cancer     right arm, in throat, chest and stomach, in remission    H/O Ischemia of extremity     History of MI (myocardial infarction) 2019    History of snoring     History of transfusion     HIV (human immunodeficiency virus infection)     Hypertension     Non Hodgkin's lymphoma     PAD (peripheral artery disease)     Pulmonary embolism     Stroke        Assessment:  Active Hospital Problems    Diagnosis   POA    **Slurred speech [R47.81]  Yes    Ischemic cardiomyopathy [I25.5]  Yes    History of CVA (cerebrovascular accident) [Z86.73]  Not Applicable    Acute CVA (cerebrovascular accident) [I63.9]  Yes    NHL (non-Hodgkin's lymphoma) [C85.90]  Yes    Type 2 diabetes mellitus with circulatory disorder, 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       Plan:  Consults from cardiology noted.  Continue with anticoagulation for presumed embolic strokes.  Needs to have better control of anticoagulation.  Plans as per cardiology and neurology.  Speech is recommending some outpatient speech therapy when he is well enough to go home.  Cardiology adjusting meds and he had abnormal stress test.  INR below 2 again and back on Lovenox.  Cardiology recommends noted.  May be home tomorrow if his INR is therapeutic.    .  Rosendo Segal MD  5/18/2024  14:53 EDT

## 2024-05-19 LAB
GLUCOSE BLDC GLUCOMTR-MCNC: 111 MG/DL (ref 70–130)
GLUCOSE BLDC GLUCOMTR-MCNC: 112 MG/DL (ref 70–130)
GLUCOSE BLDC GLUCOMTR-MCNC: 296 MG/DL (ref 70–130)
GLUCOSE BLDC GLUCOMTR-MCNC: 58 MG/DL (ref 70–130)
GLUCOSE BLDC GLUCOMTR-MCNC: 91 MG/DL (ref 70–130)
INR PPP: 1.6 (ref 0.9–1.1)
PROTHROMBIN TIME: 19.3 SECONDS (ref 11.7–14.2)

## 2024-05-19 PROCEDURE — 25010000002 ENOXAPARIN PER 10 MG: Performed by: HOSPITALIST

## 2024-05-19 PROCEDURE — 63710000001 INSULIN REGULAR HUMAN PER 5 UNITS: Performed by: HOSPITALIST

## 2024-05-19 PROCEDURE — 85610 PROTHROMBIN TIME: CPT | Performed by: NURSE PRACTITIONER

## 2024-05-19 PROCEDURE — 63710000001 INSULIN GLARGINE PER 5 UNITS

## 2024-05-19 PROCEDURE — 82948 REAGENT STRIP/BLOOD GLUCOSE: CPT

## 2024-05-19 RX ORDER — BENZONATATE 100 MG/1
200 CAPSULE ORAL 3 TIMES DAILY PRN
Status: DISCONTINUED | OUTPATIENT
Start: 2024-05-19 | End: 2024-05-22 | Stop reason: HOSPADM

## 2024-05-19 RX ADMIN — SPIRONOLACTONE 25 MG: 25 TABLET ORAL at 08:40

## 2024-05-19 RX ADMIN — BENZONATATE 200 MG: 100 CAPSULE ORAL at 23:45

## 2024-05-19 RX ADMIN — INSULIN HUMAN 4 UNITS: 100 INJECTION, SOLUTION PARENTERAL at 20:43

## 2024-05-19 RX ADMIN — PANTOPRAZOLE SODIUM 40 MG: 40 TABLET, DELAYED RELEASE ORAL at 06:45

## 2024-05-19 RX ADMIN — SERTRALINE 50 MG: 50 TABLET, FILM COATED ORAL at 08:40

## 2024-05-19 RX ADMIN — Medication 10 ML: at 08:41

## 2024-05-19 RX ADMIN — ELVITEGRAVIR, COBICISTAT, EMTRICITABINE, AND TENOFOVIR ALAFENAMIDE 1 TABLET: 150; 150; 200; 10 TABLET ORAL at 08:40

## 2024-05-19 RX ADMIN — ASPIRIN 81 MG: 81 TABLET, COATED ORAL at 08:40

## 2024-05-19 RX ADMIN — ENOXAPARIN SODIUM 100 MG: 100 INJECTION SUBCUTANEOUS at 08:41

## 2024-05-19 RX ADMIN — INSULIN GLARGINE 25 UNITS: 100 INJECTION, SOLUTION SUBCUTANEOUS at 20:43

## 2024-05-19 RX ADMIN — MAGNESIUM OXIDE 400 MG (241.3 MG MAGNESIUM) TABLET 400 MG: TABLET at 08:40

## 2024-05-19 RX ADMIN — FUROSEMIDE 20 MG: 20 TABLET ORAL at 08:40

## 2024-05-19 RX ADMIN — ENOXAPARIN SODIUM 100 MG: 100 INJECTION SUBCUTANEOUS at 20:43

## 2024-05-19 RX ADMIN — AMLODIPINE BESYLATE 10 MG: 10 TABLET ORAL at 08:41

## 2024-05-19 RX ADMIN — CARVEDILOL 12.5 MG: 12.5 TABLET, FILM COATED ORAL at 20:43

## 2024-05-19 RX ADMIN — CARVEDILOL 12.5 MG: 12.5 TABLET, FILM COATED ORAL at 08:40

## 2024-05-19 RX ADMIN — SACUBITRIL AND VALSARTAN 1 TABLET: 49; 51 TABLET, FILM COATED ORAL at 22:00

## 2024-05-19 RX ADMIN — WARFARIN 12.5 MG: 10 TABLET ORAL at 18:17

## 2024-05-19 RX ADMIN — ROSUVASTATIN CALCIUM 20 MG: 20 TABLET, FILM COATED ORAL at 08:40

## 2024-05-19 RX ADMIN — Medication 10 ML: at 20:44

## 2024-05-19 RX ADMIN — SACUBITRIL AND VALSARTAN 1 TABLET: 49; 51 TABLET, FILM COATED ORAL at 08:40

## 2024-05-19 NOTE — PROGRESS NOTES
[facility] Clinical Pharmacy Services: Warfarin Dosing/Monitoring Consult    Cecilio Puckett is a 54 y.o. male, estimated creatinine clearance is 102.4 mL/min (by C-G formula based on SCr of 0.95 mg/dL). weighing 95.3 kg (210 lb).    Results from last 7 days   Lab Units 05/19/24  0534 05/18/24  0510 05/17/24  1431 05/17/24  0509 05/16/24  0607 05/15/24  1827 05/15/24  0301 05/14/24  1832   INR  1.60* 1.61* 1.45* 3.76* 1.16*   < >  --  1.05   APTT seconds  --   --   --   --   --   --   --  25.3   HEMOGLOBIN g/dL  --  15.3  --  15.0  --   --  14.3 14.4   HEMATOCRIT %  --  46.4  --  46.6  --   --  43.0 43.7   PLATELETS 10*3/mm3  --  168  --  156  --   --  169 176    < > = values in this interval not displayed.     Prior to admission anticoagulation: Warfarin 10mg - 1.5 tablets M/W/F and 1 tablet AOD per patient    Hospital Anticoagulation:  Consulting provider: MARIANNE Marie  Start date: 5/15  Indication: history of DVT/PE  Target INR: 2 - 3  Expected duration: indefinite   Bridge Therapy: Yes; with lovenox 100mg (1mg/kg x 95.7kg)    Potential food or drug interactions:   Patient is bridging with lovenox (increased risk for bleed)      Education complete?/Date: No; plan for follow up TBD; Patient follows with [facility] for anticoagulation monitoring.    Assessment/Plan:  AM INR is holding at about same 1.60 (1.61 yesterday) as expected d/t false elevated INR level and HELD dose on 5/17. Patient rec'd 15 mg dose last evening. Will order a 12.5 mg dose for this evening.         Today's Dose: warfarin 12.5 mg PO x1  Nurse to monitor for any signs or symptoms of bleeding.  Follow up daily INRs and dose adjustments.    Pharmacy will continue to follow until discharge or discontinuation of warfarin.     Andrea Mo [facility]  Clinical Pharmacist

## 2024-05-19 NOTE — PLAN OF CARE
Goal Outcome Evaluation:         Vitals as charted, A&Ox4, up ad yash, on RA, BG as charted and corrected this AM, INR to be checked in the AM to determine further plan.

## 2024-05-19 NOTE — PROGRESS NOTES
"DAILY PROGRESS NOTE  [facility]    Patient Identification:  Name: Cecilio Puckett  Age: 54 y.o.  Sex: male  :  1970  MRN: [id number]         Primary Care Physician: Arias Lehman APRN    Subjective:  Interval History: He still complains about some trouble with slurred speech but he is up ambulating independently without much difficulty and PT signed off.    Objective:    Scheduled Meds:amLODIPine, 10 mg, Oral, Daily  aspirin, 81 mg, Oral, Daily  carvedilol, 12.5 mg, Oral, Q12H  Rxbjqcb-Fhjvj-Bxybihnp-TenofAF, 1 tablet, Oral, Daily  [Held by provider] empagliflozin, 25 mg, Oral, Daily  enoxaparin, 1 mg/kg, Subcutaneous, Q12H  furosemide, 20 mg, Oral, Daily  [Held by provider] glipizide, 10 mg, Oral, Q12H  insulin glargine, 25 Units, Subcutaneous, Nightly  insulin regular, 2-7 Units, Subcutaneous, 4x Daily AC & at Bedtime  magnesium oxide, 400 mg, Oral, Daily  pantoprazole, 40 mg, Oral, Q AM  rosuvastatin, 20 mg, Oral, Daily  sacubitril-valsartan, 1 tablet, Oral, Q12H  sertraline, 50 mg, Oral, Daily  sodium chloride, 10 mL, Intravenous, Q12H  spironolactone, 25 mg, Oral, Daily  warfarin, 12.5 mg, Oral, Once      Continuous Infusions:Pharmacy to dose warfarin,   sodium chloride, 75 mL/hr, Last Rate: 75 mL/hr (05/15/24 1530)        Vital signs in last 24 hours:  Temp:  [97.6 °F (36.4 °C)-98.8 °F (37.1 °C)] 98.2 °F (36.8 °C)  Heart Rate:  [68-72] 72  Resp:  [18] 18  BP: (123-141)/(71-94) 123/94    Intake/Output:  No intake or output data in the 24 hours ending 24 1412      Exam:  /94 (BP Location: Right arm, Patient Position: Lying)   Pulse 72   Temp 98.2 °F (36.8 °C) (Oral)   Resp 18   Ht 175.3 cm (69\")   Wt 95.3 kg (210 lb)   SpO2 94%   BMI 31.01 kg/m²     General Appearance:    Alert, cooperative, no distress   Head:    Normocephalic, without obvious abnormality, atraumatic   Eyes:       Throat:   Lips, tongue, gums normal   Neck:   Supple, symmetrical, trachea midline, " no JVD   Lungs:     Clear to auscultation bilaterally, respirations unlabored   Chest Wall:    No tenderness or deformity    Heart:    Regular rate and rhythm, S1 and S2 normal, no murmur,no  Rub or gallop   Abdomen:     Soft, nontender, bowel sounds active, no masses, no organomegaly    Extremities:   Extremities normal, atraumatic, no cyanosis or edema   Pulses:      Skin:   Skin is warm and dry,  no rashes or palpable lesions   Neurologic: Some slurring of speech but generally is up ambulating well without any assistance      Lab Results (last 72 hours)       Procedure Component Value Units Date/Time    POC Glucose Once [[id number]]  (Normal) Collected: 05/16/24 1153    Specimen: Blood Updated: 05/16/24 1155     Glucose 96 mg/dL     POC Glucose Once [[id number]]  (Abnormal) Collected: 05/16/24 0735    Specimen: Blood Updated: 05/16/24 0737     Glucose 137 mg/dL     Protime-INR [[id number]]  (Abnormal) Collected: 05/16/24 0607    Specimen: Blood Updated: 05/16/24 0657     Protime 15.1 Seconds      INR 1.16    POC Glucose Once [[id number]]  (Abnormal) Collected: 05/16/24 0548    Specimen: Blood Updated: 05/16/24 0548     Glucose 64 mg/dL     POC Glucose Once [[id number]]  (Abnormal) Collected: 05/15/24 2349    Specimen: Blood Updated: 05/15/24 2350     Glucose 147 mg/dL     Potassium [[id number]]  (Normal) Collected: 05/15/24 2048    Specimen: Blood Updated: 05/15/24 2131     Potassium 4.3 mmol/L      Comment: Specimen hemolyzed.  Result may be falsely elevated.       POC Glucose Once [[id number]]  (Abnormal) Collected: 05/15/24 2024    Specimen: Blood Updated: 05/15/24 2027     Glucose 163 mg/dL     Protime-INR [[id number]]  (Abnormal) Collected: 05/15/24 1827    Specimen: Blood from Arm, Left Updated: 05/15/24 1859     Protime 14.7 Seconds      INR 1.13    POC Glucose Once [[id number]]  (Abnormal) Collected: 05/15/24 1806    Specimen: Blood Updated: 05/15/24 1807     Glucose 142 mg/dL     POC Glucose Once [[id number]]   (Normal) Collected: 05/15/24 1207    Specimen: Blood Updated: 05/15/24 1208     Glucose 80 mg/dL     POC Glucose Once [[id number]]  (Normal) Collected: 05/15/24 0650    Specimen: Blood Updated: 05/15/24 0651     Glucose 87 mg/dL     Basic Metabolic Panel [[id number]]  (Abnormal) Collected: 05/15/24 0301    Specimen: Blood from Arm, Left Updated: 05/15/24 0346     Glucose 119 mg/dL      BUN 8 mg/dL      Creatinine 0.82 mg/dL      Sodium 138 mmol/L      Potassium 3.4 mmol/L      Chloride 107 mmol/L      CO2 22.0 mmol/L      Calcium 8.5 mg/dL      BUN/Creatinine Ratio 9.8     Anion Gap 9.0 mmol/L      eGFR 105.0 mL/min/1.73     Narrative:      GFR Normal >60  Chronic Kidney Disease <60  Kidney Failure <15      CBC (No Diff) [[id number]]  (Normal) Collected: 05/15/24 0301    Specimen: Blood from Arm, Left Updated: 05/15/24 0326     WBC 6.33 10*3/mm3      RBC 5.08 10*6/mm3      Hemoglobin 14.3 g/dL      Hematocrit 43.0 %      MCV 84.6 fL      MCH 28.1 pg      MCHC 33.3 g/dL      RDW 14.2 %      RDW-SD 43.0 fl      MPV 10.9 fL      Platelets 169 10*3/mm3     POC Glucose Once [[id number]]  (Normal) Collected: 05/14/24 2331    Specimen: Blood Updated: 05/14/24 2332     Glucose 129 mg/dL     Hemoglobin A1c [[id number]]  (Abnormal) Collected: 05/14/24 1832    Specimen: Blood Updated: 05/14/24 2157     Hemoglobin A1C 10.00 %     Narrative:      Hemoglobin A1C Ranges:    Increased Risk for Diabetes  5.7% to 6.4%  Diabetes                     >= 6.5%  Diabetic Goal                < 7.0%    Lipid Panel [[id number]]  (Abnormal) Collected: 05/14/24 1832    Specimen: Blood Updated: 05/14/24 2147     Total Cholesterol 193 mg/dL      Triglycerides 104 mg/dL      HDL Cholesterol 38 mg/dL      LDL Cholesterol  136 mg/dL      VLDL Cholesterol 19 mg/dL      LDL/HDL Ratio 3.53    Narrative:      Cholesterol Reference Ranges  (U.[place]. Department of Health and Human Services ATP III Classifications)    Desirable          <200 mg/dL  Borderline High     200-239 mg/dL  High Risk          >240 mg/dL      Triglyceride Reference Ranges  (U.S. Department of Health and Human Services ATP III Classifications)    Normal           <150 mg/dL  Borderline High  150-199 mg/dL  High             200-499 mg/dL  Very High        >500 mg/dL    HDL Reference Ranges  (U.S. Department of Health and Human Services ATP III Classifications)    Low     <40 mg/dl (major risk factor for CHD)  High    >60 mg/dl ('negative' risk factor for CHD)        LDL Reference Ranges  (U.S. Department of Health and Human Services ATP III Classifications)    Optimal          <100 mg/dL  Near Optimal     100-129 mg/dL  Borderline High  130-159 mg/dL  High             160-189 mg/dL  Very High        >189 mg/dL    Urinalysis, Microscopic Only - Urine, Clean Catch [[id number]]  (Abnormal) Collected: 05/14/24 2001    Specimen: Urine, Clean Catch Updated: 05/14/24 2042     RBC, UA 3-5 /HPF      WBC, UA 11-20 /HPF      Bacteria, UA None Seen /HPF      Squamous Epithelial Cells, UA 3-6 /HPF      Hyaline Casts, UA 21-30 /LPF      Methodology Automated Microscopy    Urine Drug Screen - Urine, Clean Catch [[id number]]  (Normal) Collected: 05/14/24 2001    Specimen: Urine, Clean Catch Updated: 05/14/24 2040     Amphet/Methamphet, Screen Negative     Barbiturates Screen, Urine Negative     Benzodiazepine Screen, Urine Negative     Cocaine Screen, Urine Negative     Opiate Screen Negative     THC, Screen, Urine Negative     Methadone Screen, Urine Negative     Oxycodone Screen, Urine Negative     Fentanyl, Urine Negative    Narrative:      Negative Thresholds Per Drugs Screened:    Amphetamines                 500 ng/ml  Barbiturates                 200 ng/ml  Benzodiazepines              100 ng/ml  Cocaine                      300 ng/ml  Methadone                    300 ng/ml  Opiates                      300 ng/ml  Oxycodone                    100 ng/ml  THC                           50 ng/ml  Fentanyl                        5 ng/ml      The Normal Value for all drugs tested is negative. This report includes final unconfirmed screening results to be used for medical treatment purposes only. Unconfirmed results must not be used for non-medical purposes such as employment or legal testing. Clinical consideration should be applied to any drug of abuse test, particularly when unconfirmed results are used.            Urinalysis With Microscopic If Indicated (No Culture) - Urine, Clean Catch [[id number]]  (Abnormal) Collected: 05/14/24 2001    Specimen: Urine, Clean Catch Updated: 05/14/24 2020     Color, UA Dark Yellow     Appearance, UA Cloudy     pH, UA 5.5     Specific Gravity, UA >1.030     Glucose,  mg/dL (1+)     Ketones, UA Negative     Bilirubin, UA Negative     Blood, UA Small (1+)     Protein, UA >=300 mg/dL (3+)     Leuk Esterase, UA Negative     Nitrite, UA Negative     Urobilinogen, UA 1.0 E.U./dL    Manual Differential [[id number]]  (Abnormal) Collected: 05/14/24 1832    Specimen: Blood Updated: 05/14/24 1953     Neutrophil % 30.6 %      Lymphocyte % 61.2 %      Monocyte % 5.1 %      Eosinophil % 1.0 %      Basophil % 2.0 %      Neutrophils Absolute 1.74 10*3/mm3      Lymphocytes Absolute 3.47 10*3/mm3      Monocytes Absolute 0.29 10*3/mm3      Eosinophils Absolute 0.06 10*3/mm3      Basophils Absolute 0.11 10*3/mm3      RBC Morphology Normal     WBC Morphology Normal     Platelet Morphology Normal    CBC & Differential [[id number]]  (Normal) Collected: 05/14/24 1832    Specimen: Blood Updated: 05/14/24 1953    Narrative:      The following orders were created for panel order CBC & Differential.  Procedure                               Abnormality         Status                     ---------                               -----------         ------                     CBC Auto Differential[[id number]]        Normal              Final result                 Please view results for these tests on the individual orders.     CBC Auto Differential [[id number]]  (Normal) Collected: 05/14/24 1832    Specimen: Blood Updated: 05/14/24 1953     WBC 5.67 10*3/mm3      RBC 5.12 10*6/mm3      Hemoglobin 14.4 g/dL      Hematocrit 43.7 %      MCV 85.4 fL      MCH 28.1 pg      MCHC 33.0 g/dL      RDW 14.5 %      RDW-SD 44.9 fl      MPV 11.1 fL      Platelets 176 10*3/mm3     Protime-INR [[id number]]  (Normal) Collected: 05/14/24 1832    Specimen: Blood Updated: 05/14/24 1915     Protime 14.0 Seconds      INR 1.05    aPTT [[id number]]  (Normal) Collected: 05/14/24 1832    Specimen: Blood Updated: 05/14/24 1915     PTT 25.3 seconds     Single High Sensitivity Troponin T [[id number]]  (Abnormal) Collected: 05/14/24 1832    Specimen: Blood Updated: 05/14/24 1911     HS Troponin T 26 ng/L     Narrative:      High Sensitive Troponin T Reference Range:  <14.0 ng/L- Negative Female for AMI  <22.0 ng/L- Negative Male for AMI  >=14 - Abnormal Female indicating possible myocardial injury.  >=22 - Abnormal Male indicating possible myocardial injury.   Clinicians would have to utilize clinical acumen, EKG, Troponin, and serial changes to determine if it is an Acute Myocardial Infarction or myocardial injury due to an underlying chronic condition.         BNP [[id number]]  (Abnormal) Collected: 05/14/24 1832    Specimen: Blood Updated: 05/14/24 1911     proBNP 916.0 pg/mL     Narrative:      This assay is used as an aid in the diagnosis of individuals suspected of having heart failure. It can be used as an aid in the diagnosis of acute decompensated heart failure (ADHF) in patients presenting with signs and symptoms of ADHF to the emergency department (ED). In addition, NT-proBNP of <300 pg/mL indicates ADHF is not likely.    Age Range Result Interpretation  NT-proBNP Concentration (pg/mL:      <50             Positive            >450                   Gray                 300-450                    Negative             <300    50-75           Positive             >900                  Griffin                300-900                  Negative            <300      >75             Positive            >1800                  Gray                300-1800                  Negative            <300    TSH [[id number]]  (Normal) Collected: 05/14/24 1832    Specimen: Blood Updated: 05/14/24 1911     TSH 1.280 uIU/mL     Comprehensive Metabolic Panel [[id number]]  (Abnormal) Collected: 05/14/24 1832    Specimen: Blood Updated: 05/14/24 1908     Glucose 182 mg/dL      BUN 8 mg/dL      Creatinine 1.10 mg/dL      Sodium 139 mmol/L      Potassium 4.1 mmol/L      Chloride 106 mmol/L      CO2 23.6 mmol/L      Calcium 9.0 mg/dL      Total Protein 6.6 g/dL      Albumin 3.6 g/dL      ALT (SGPT) 19 U/L      AST (SGOT) 11 U/L      Alkaline Phosphatase 67 U/L      Total Bilirubin 0.3 mg/dL      Globulin 3.0 gm/dL      A/G Ratio 1.2 g/dL      BUN/Creatinine Ratio 7.3     Anion Gap 9.4 mmol/L      eGFR 80.3 mL/min/1.73     Narrative:      GFR Normal >60  Chronic Kidney Disease <60  Kidney Failure <15      Ethanol [[id number]] Collected: 05/14/24 1832    Specimen: Blood Updated: 05/14/24 1908     Ethanol <10 mg/dL      Ethanol % <0.010 %     Magnesium [[id number]]  (Normal) Collected: 05/14/24 1832    Specimen: Blood Updated: 05/14/24 1908     Magnesium 1.7 mg/dL           Data Review:  Results from last 7 days   Lab Units 05/18/24  0510 05/17/24 2014 05/17/24  0509 05/15/24  2048 05/15/24  0301   SODIUM mmol/L 137  --  141  --  138   POTASSIUM mmol/L 3.7 4.5 3.2*   < > 3.4*   CHLORIDE mmol/L 106  --  107  --  107   CO2 mmol/L 21.3*  --  22.3  --  22.0   BUN mg/dL 9  --  8  --  8   CREATININE mg/dL 0.95  --  0.89  --  0.82   GLUCOSE mg/dL 76  --  68  --  119*   CALCIUM mg/dL 8.8  --  8.8  --  8.5*    < > = values in this interval not displayed.     Results from last 7 days   Lab Units 05/18/24  0510 05/17/24  0509 05/15/24  0301   WBC 10*3/mm3 3.37* 3.47 6.33   HEMOGLOBIN g/dL 15.3 15.0 14.3   HEMATOCRIT %  46.4 46.6 43.0   PLATELETS 10*3/mm3 168 156 169     Results from last 7 days   Lab Units 05/14/24  1832   TSH uIU/mL 1.280     Results from last 7 days   Lab Units 05/14/24  1832   HEMOGLOBIN A1C % 10.00*     Lab Results   Lab Value Date/Time    TROPONINT 26 (H) 05/14/2024 1832    TROPONINT <0.010 10/10/2022 1444    TROPONINT <0.010 09/27/2022 1420     Results from last 7 days   Lab Units 05/14/24  1832   CHOLESTEROL mg/dL 193   TRIGLYCERIDES mg/dL 104   HDL CHOL mg/dL 38*   LDL CHOL mg/dL 136*     Results from last 7 days   Lab Units 05/14/24  1832   ALK PHOS U/L 67   BILIRUBIN mg/dL 0.3   ALT (SGPT) U/L 19   AST (SGOT) U/L 11     Results from last 7 days   Lab Units 05/14/24  1832   TSH uIU/mL 1.280     Results from last 7 days   Lab Units 05/14/24  1832   HEMOGLOBIN A1C % 10.00*     Glucose   Date/Time Value Ref Range Status   05/19/2024 1137 112 70 - 130 mg/dL Final   05/19/2024 0836 91 70 - 130 mg/dL Final   05/19/2024 0738 58 (L) 70 - 130 mg/dL Final   05/18/2024 2048 234 (H) 70 - 130 mg/dL Final   05/18/2024 1600 118 70 - 130 mg/dL Final   05/18/2024 1136 110 70 - 130 mg/dL Final   05/18/2024 0757 88 70 - 130 mg/dL Final   05/18/2024 0607 93 70 - 130 mg/dL Final     Results from last 7 days   Lab Units 05/19/24  0534 05/18/24  0510 05/17/24  1431   INR  1.60* 1.61* 1.45*       Past Medical History:   Diagnosis Date    COPD (chronic obstructive pulmonary disease)     Coronary artery disease     Diabetes mellitus     DVT (deep venous thrombosis)     Elevated cholesterol     GERD (gastroesophageal reflux disease)     H/O Cancer     right arm, in throat, chest and stomach, in remission    H/O Ischemia of extremity     History of MI (myocardial infarction) 2019    History of snoring     History of transfusion     HIV (human immunodeficiency virus infection)     Hypertension     Non Hodgkin's lymphoma     PAD (peripheral artery disease)     Pulmonary embolism     Stroke        Assessment:  Active Hospital Problems     Diagnosis  POA    **Slurred speech [R47.81]  Yes    Ischemic cardiomyopathy [I25.5]  Yes    History of CVA (cerebrovascular accident) [Z86.73]  Not Applicable    Acute CVA (cerebrovascular accident) [I63.9]  Yes    NHL (non-Hodgkin's lymphoma) [C85.90]  Yes    Type 2 diabetes mellitus with circulatory disorder, 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       Plan:  Consults from cardiology noted.  Continue with anticoagulation for presumed embolic strokes.  Needs to have better control of anticoagulation.  Plans as per cardiology and neurology.  Speech is recommending some outpatient speech therapy when he is well enough to go home.  Cardiology adjusting meds and he had abnormal stress test.  INR below 2 again and back on Lovenox.  Cardiology recommends noted.  May be home tomorrow if his INR is therapeutic versus going home with Lovenox as a bridge to therapeutic INR.    .  Rosendo Segal MD  5/19/2024  14:12 EDT

## 2024-05-20 ENCOUNTER — APPOINTMENT (OUTPATIENT)
Dept: GENERAL RADIOLOGY | Facility: HOSPITAL | Age: 54
End: 2024-05-20
Payer: MEDICARE

## 2024-05-20 LAB
ANION GAP SERPL CALCULATED.3IONS-SCNC: 11.6 MMOL/L (ref 5–15)
BACTERIA UR QL AUTO: NORMAL /HPF
BASOPHILS # BLD AUTO: 0.04 10*3/MM3 (ref 0–0.2)
BASOPHILS NFR BLD AUTO: 0.6 % (ref 0–1.5)
BILIRUB UR QL STRIP: NEGATIVE
BUN SERPL-MCNC: 22 MG/DL (ref 6–20)
BUN/CREAT SERPL: 12.9 (ref 7–25)
CALCIUM SPEC-SCNC: 8.7 MG/DL (ref 8.6–10.5)
CHLORIDE SERPL-SCNC: 103 MMOL/L (ref 98–107)
CLARITY UR: CLEAR
CO2 SERPL-SCNC: 20.4 MMOL/L (ref 22–29)
COLOR UR: YELLOW
CREAT SERPL-MCNC: 1.71 MG/DL (ref 0.76–1.27)
DEPRECATED RDW RBC AUTO: 46.2 FL (ref 37–54)
EGFRCR SERPLBLD CKD-EPI 2021: 47.3 ML/MIN/1.73
EOSINOPHIL # BLD AUTO: 0.02 10*3/MM3 (ref 0–0.4)
EOSINOPHIL NFR BLD AUTO: 0.3 % (ref 0.3–6.2)
ERYTHROCYTE [DISTWIDTH] IN BLOOD BY AUTOMATED COUNT: 14.4 % (ref 12.3–15.4)
GLUCOSE BLDC GLUCOMTR-MCNC: 101 MG/DL (ref 70–130)
GLUCOSE BLDC GLUCOMTR-MCNC: 115 MG/DL (ref 70–130)
GLUCOSE BLDC GLUCOMTR-MCNC: 169 MG/DL (ref 70–130)
GLUCOSE BLDC GLUCOMTR-MCNC: 198 MG/DL (ref 70–130)
GLUCOSE BLDC GLUCOMTR-MCNC: 64 MG/DL (ref 70–130)
GLUCOSE SERPL-MCNC: 144 MG/DL (ref 65–99)
GLUCOSE UR STRIP-MCNC: NEGATIVE MG/DL
HCT VFR BLD AUTO: 47.9 % (ref 37.5–51)
HGB BLD-MCNC: 15.5 G/DL (ref 13–17.7)
HGB UR QL STRIP.AUTO: ABNORMAL
HYALINE CASTS UR QL AUTO: NORMAL /LPF
IMM GRANULOCYTES # BLD AUTO: 0.02 10*3/MM3 (ref 0–0.05)
IMM GRANULOCYTES NFR BLD AUTO: 0.3 % (ref 0–0.5)
INR PPP: 1.81 (ref 0.9–1.1)
KETONES UR QL STRIP: ABNORMAL
LEUKOCYTE ESTERASE UR QL STRIP.AUTO: NEGATIVE
LYMPHOCYTES # BLD AUTO: 2.24 10*3/MM3 (ref 0.7–3.1)
LYMPHOCYTES NFR BLD AUTO: 34.1 % (ref 19.6–45.3)
MCH RBC QN AUTO: 28.2 PG (ref 26.6–33)
MCHC RBC AUTO-ENTMCNC: 32.4 G/DL (ref 31.5–35.7)
MCV RBC AUTO: 87.1 FL (ref 79–97)
MONOCYTES # BLD AUTO: 0.52 10*3/MM3 (ref 0.1–0.9)
MONOCYTES NFR BLD AUTO: 7.9 % (ref 5–12)
NEUTROPHILS NFR BLD AUTO: 3.72 10*3/MM3 (ref 1.7–7)
NEUTROPHILS NFR BLD AUTO: 56.8 % (ref 42.7–76)
NITRITE UR QL STRIP: NEGATIVE
NRBC BLD AUTO-RTO: 0 /100 WBC (ref 0–0.2)
PH UR STRIP.AUTO: 5.5 [PH] (ref 5–8)
PLATELET # BLD AUTO: 169 10*3/MM3 (ref 140–450)
PMV BLD AUTO: 12.2 FL (ref 6–12)
POTASSIUM SERPL-SCNC: 4.3 MMOL/L (ref 3.5–5.2)
PROT UR QL STRIP: ABNORMAL
PROTHROMBIN TIME: 21.2 SECONDS (ref 11.7–14.2)
RBC # BLD AUTO: 5.5 10*6/MM3 (ref 4.14–5.8)
RBC # UR STRIP: NORMAL /HPF
REF LAB TEST METHOD: NORMAL
SODIUM SERPL-SCNC: 135 MMOL/L (ref 136–145)
SP GR UR STRIP: 1.02 (ref 1–1.03)
SQUAMOUS #/AREA URNS HPF: NORMAL /HPF
UROBILINOGEN UR QL STRIP: ABNORMAL
WBC # UR STRIP: NORMAL /HPF
WBC NRBC COR # BLD AUTO: 6.56 10*3/MM3 (ref 3.4–10.8)

## 2024-05-20 PROCEDURE — 87040 BLOOD CULTURE FOR BACTERIA: CPT | Performed by: HOSPITALIST

## 2024-05-20 PROCEDURE — 63710000001 INSULIN GLARGINE PER 5 UNITS: Performed by: HOSPITALIST

## 2024-05-20 PROCEDURE — 25010000002 ENOXAPARIN PER 10 MG: Performed by: HOSPITALIST

## 2024-05-20 PROCEDURE — 81001 URINALYSIS AUTO W/SCOPE: CPT | Performed by: HOSPITALIST

## 2024-05-20 PROCEDURE — 92523 SPEECH SOUND LANG COMPREHEN: CPT

## 2024-05-20 PROCEDURE — 25010000002 PIPERACILLIN SOD-TAZOBACTAM PER 1 G: Performed by: HOSPITALIST

## 2024-05-20 PROCEDURE — 92526 ORAL FUNCTION THERAPY: CPT

## 2024-05-20 PROCEDURE — 63710000001 INSULIN REGULAR HUMAN PER 5 UNITS: Performed by: HOSPITALIST

## 2024-05-20 PROCEDURE — 25810000003 SODIUM CHLORIDE 0.9 % SOLUTION: Performed by: HOSPITALIST

## 2024-05-20 PROCEDURE — 85025 COMPLETE CBC W/AUTO DIFF WBC: CPT | Performed by: HOSPITALIST

## 2024-05-20 PROCEDURE — 82948 REAGENT STRIP/BLOOD GLUCOSE: CPT

## 2024-05-20 PROCEDURE — 80048 BASIC METABOLIC PNL TOTAL CA: CPT | Performed by: HOSPITALIST

## 2024-05-20 PROCEDURE — 71046 X-RAY EXAM CHEST 2 VIEWS: CPT

## 2024-05-20 PROCEDURE — 85610 PROTHROMBIN TIME: CPT | Performed by: NURSE PRACTITIONER

## 2024-05-20 RX ORDER — SODIUM CHLORIDE 9 MG/ML
75 INJECTION, SOLUTION INTRAVENOUS CONTINUOUS
Status: DISCONTINUED | OUTPATIENT
Start: 2024-05-20 | End: 2024-05-20

## 2024-05-20 RX ORDER — UREA 10 %
5 LOTION (ML) TOPICAL NIGHTLY PRN
Status: DISCONTINUED | OUTPATIENT
Start: 2024-05-20 | End: 2024-05-22 | Stop reason: HOSPADM

## 2024-05-20 RX ADMIN — ENOXAPARIN SODIUM 100 MG: 100 INJECTION SUBCUTANEOUS at 08:09

## 2024-05-20 RX ADMIN — Medication 10 ML: at 20:44

## 2024-05-20 RX ADMIN — INSULIN GLARGINE 20 UNITS: 100 INJECTION, SOLUTION SUBCUTANEOUS at 20:43

## 2024-05-20 RX ADMIN — CARVEDILOL 12.5 MG: 12.5 TABLET, FILM COATED ORAL at 08:08

## 2024-05-20 RX ADMIN — SODIUM CHLORIDE 75 ML/HR: 9 INJECTION, SOLUTION INTRAVENOUS at 08:09

## 2024-05-20 RX ADMIN — PANTOPRAZOLE SODIUM 40 MG: 40 TABLET, DELAYED RELEASE ORAL at 05:16

## 2024-05-20 RX ADMIN — PIPERACILLIN AND TAZOBACTAM 3.38 G: 3; .375 INJECTION, POWDER, FOR SOLUTION INTRAVENOUS at 23:23

## 2024-05-20 RX ADMIN — WARFARIN 12.5 MG: 10 TABLET ORAL at 17:10

## 2024-05-20 RX ADMIN — PIPERACILLIN AND TAZOBACTAM 3.38 G: 3; .375 INJECTION, POWDER, FOR SOLUTION INTRAVENOUS at 17:59

## 2024-05-20 RX ADMIN — CARVEDILOL 12.5 MG: 12.5 TABLET, FILM COATED ORAL at 20:43

## 2024-05-20 RX ADMIN — MAGNESIUM OXIDE 400 MG (241.3 MG MAGNESIUM) TABLET 400 MG: TABLET at 08:08

## 2024-05-20 RX ADMIN — SERTRALINE 50 MG: 50 TABLET, FILM COATED ORAL at 08:08

## 2024-05-20 RX ADMIN — INSULIN HUMAN 2 UNITS: 100 INJECTION, SOLUTION PARENTERAL at 17:05

## 2024-05-20 RX ADMIN — INSULIN HUMAN 2 UNITS: 100 INJECTION, SOLUTION PARENTERAL at 20:43

## 2024-05-20 RX ADMIN — AMLODIPINE BESYLATE 10 MG: 10 TABLET ORAL at 08:08

## 2024-05-20 RX ADMIN — ROSUVASTATIN CALCIUM 20 MG: 20 TABLET, FILM COATED ORAL at 08:08

## 2024-05-20 RX ADMIN — ELVITEGRAVIR, COBICISTAT, EMTRICITABINE, AND TENOFOVIR ALAFENAMIDE 1 TABLET: 150; 150; 200; 10 TABLET ORAL at 08:08

## 2024-05-20 RX ADMIN — ACETAMINOPHEN 650 MG: 325 TABLET, FILM COATED ORAL at 08:07

## 2024-05-20 RX ADMIN — ASPIRIN 81 MG: 81 TABLET, COATED ORAL at 08:07

## 2024-05-20 RX ADMIN — ENOXAPARIN SODIUM 100 MG: 100 INJECTION SUBCUTANEOUS at 20:44

## 2024-05-20 RX ADMIN — Medication 10 ML: at 08:09

## 2024-05-20 NOTE — CASE MANAGEMENT/SOCIAL WORK
Continued Stay Note  [facility]     Patient Name: Cecilio Puckett  MRN: [id number]  Today's Date: 5/20/2024    Admit Date: 5/14/2024    Plan: Home with out patient speech therapy   Discharge Plan       Row Name 05/20/24 0851       Plan    Plan Home with out patient speech therapy    Patient/Family in Agreement with Plan yes    Plan Comments Plan remains home with out patient speech therapy. Patient up ad yash. Will need out patient therapy ordered at discharge. CCP following.                   Discharge Codes    No documentation.                 Expected Discharge Date and Time       Expected Discharge Date Expected Discharge Time    May 17, 2024

## 2024-05-20 NOTE — PLAN OF CARE
Goal Outcome Evaluation:              Outcome Evaluation: Patient seen for diet tolerance and speech/language/cognitive evaluation. Reports double vision and frontal head pain rated as a 10/10 during middle of evaluation; RN notified. Delayed coughing following PO trials; suspect unrelated to swallow function. No other overt ss of aspiration. Can consider VFSS pending CXR results. Recommend continue regular/thin diet. Meds as tolerated. Sitting upright, slow rate, small bites/sips.    [place] Aphasia Screening Test administered to assess speech/language abilities. Patient scored an overall score of 73/100. Expressive subscale 35/50. Receptive subscale 38/50. Clear but slow speech. Left labial droop appreciated on retraction. Decreased breath support with phrase length repetition and automatic speech tasks. No paraphasias observed. Pt reports his speech and language skills are not at baseline, however, unable to further elaborate. Informal cognitive assessment completed as well. Pt exhibited deficits with temporal orientation and concrete thought organization/verbal fluency. Anticipate therapy at next level of care.

## 2024-05-20 NOTE — PROGRESS NOTES
[facility] Clinical Pharmacy Services: Warfarin Dosing/Monitoring Consult    Cecilio Puckett is a 54 y.o. male, estimated creatinine clearance is 56.9 mL/min (A) (by C-G formula based on SCr of 1.71 mg/dL (H)). weighing 95.3 kg (210 lb).    Results from last 7 days   Lab Units 05/20/24  0138 05/19/24  0534 05/18/24  0510 05/17/24  1431 05/17/24  0509 05/15/24  1827 05/15/24  0301 05/14/24  1832   INR  1.81* 1.60* 1.61* 1.45* 3.76*   < >  --  1.05   APTT seconds  --   --   --   --   --   --   --  25.3   HEMOGLOBIN g/dL 15.5  --  15.3  --  15.0  --  14.3 14.4   HEMATOCRIT % 47.9  --  46.4  --  46.6  --  43.0 43.7   PLATELETS 10*3/mm3 169  --  168  --  156  --  169 176    < > = values in this interval not displayed.     Prior to admission anticoagulation: Warfarin 10mg - 1.5 tablets M/W/F and 1 tablet AOD per patient    Hospital Anticoagulation:  Consulting provider: MARIANNE Marie  Start date: 5/15  Indication: history of DVT/PE  Target INR: 2 - 3  Expected duration: indefinite   Bridge Therapy: Yes; with lovenox 100mg (1mg/kg x 95.7kg)    Potential food or drug interactions:   Patient is bridging with lovenox (increased risk for bleed)      Education complete?/Date: No; plan for follow up TBD; Patient [facility] with [facility] for anticoagulation monitoring.    Assessment/Plan:  AM INR is better today (1.81) had a fever overnight which can acutely rise INR due to clotting factor catabolism. Repeating the 12.5 mg from yesterday.         Today's Dose: warfarin 12.5 mg PO x1  Nurse to monitor for any signs or symptoms of bleeding.  Follow up daily INRs and dose adjustments.    Pharmacy will continue to follow until discharge or discontinuation of warfarin.     Jose Miguel Hernandez [facility]  Clinical Pharmacist

## 2024-05-20 NOTE — PROGRESS NOTES
"    DAILY PROGRESS NOTE  [facility]    Patient Identification:  Name: Cecilio Puckett  Age: 54 y.o.  Sex: male  :  1970  MRN: [id number]         Primary Care Physician: Arias Lehman APRN    Subjective:  Interval History: Called by RN for fever 101 last night.  Patient did not sleep well.  Admits to some cough as well as some dysuria.  Denies any problems swallowing or choking.  Denies any altered mentation or confusion.    Objective: Conversational and pleasant.  Speech seems fluent to me.    Scheduled Meds:amLODIPine, 10 mg, Oral, Daily  aspirin, 81 mg, Oral, Daily  carvedilol, 12.5 mg, Oral, Q12H  Verxfgm-Egnlt-Nbkhrxmq-TenofAF, 1 tablet, Oral, Daily  enoxaparin, 1 mg/kg, Subcutaneous, Q12H  insulin glargine, 20 Units, Subcutaneous, Nightly  insulin regular, 2-7 Units, Subcutaneous, 4x Daily AC & at Bedtime  magnesium oxide, 400 mg, Oral, Daily  pantoprazole, 40 mg, Oral, Q AM  rosuvastatin, 20 mg, Oral, Daily  sertraline, 50 mg, Oral, Daily  sodium chloride, 10 mL, Intravenous, Q12H      Continuous Infusions:Pharmacy to dose warfarin,   sodium chloride, 50 mL/hr, Last Rate: 75 mL/hr (05/15/24 1530)        Vital signs in last 24 hours:  Temp:  [98.2 °F (36.8 °C)-101.5 °F (38.6 °C)] 101.5 °F (38.6 °C)  Heart Rate:  [] 106  Resp:  [18] 18  BP: (123-151)/() 134/87    Intake/Output:    Intake/Output Summary (Last 24 hours) at 2024 0831  Last data filed at 2024 0809  Gross per 24 hour   Intake 1000 ml   Output --   Net 1000 ml       Exam:  /87 (BP Location: Right arm, Patient Position: Lying)   Pulse 106   Temp (!) 101.5 °F (38.6 °C) (Oral)   Resp 18   Ht 175.3 cm (69\")   Wt 95.3 kg (210 lb)   SpO2 96%   BMI 31.01 kg/m²     General Appearance:    Alert, cooperative, nontoxic, AAOx3                          Head:    Normocephalic, without obvious abnormality, atraumatic                         Throat:   Oral mucosa pink and moist                           " Neck:   No JVD                         Lungs:    Diminished bases otherwise clear to auscultation bilaterally, respirations unlabored.  When counseling I-S technique patient started to cough quite a bit which is ideal if atelectasis is initiated                 Chest Wall:    No tenderness or deformity                          Heart:    Regular rate and rhythm, S1 and S2 normal                  Abdomen:     Soft, nontender, bowel sounds active                  Extremities: Moving all, no cyanosis or edema and definitely no volume overload                        Pulses:   Pulses palpable in all extremities                  Neurologic:   CNII-XII intact      Data Review:  Labs in chart were reviewed.    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       Plan:    Acute CVA   -Neurology signed off 5/16/2024   -ASA/statin   -TTE with ICM/LV 36% with negative PFO/thrombus -cardiology has evaluated no recommendations for intervention at this time no concern considering coronary angiography at a later date   -INR subtherapeutic at 1.8 pharmacy dosing and on Lovenox as a bridge      History of DVT/PE-noncompliant with Coumadin    DM2 with an A1c of 10 concerning for noncompliance   -Current basal bolus managing sugars quite well -likely dietary noncompliance  as outpatient   -Low blood sugars at 64 this a.m. noted and parameters have been written and will reduce basal this evening    HTN stable but holding Entresto and Lasix and spironolactone for tonight given mild acute renal failure with creatinine bumping up to 1.7   -Gentle IVF at 50 cc an hour   -Check bladder scan and repeat BMP in a.m.    Acute fever of 101.5 overnight in a patient with a past history of HIV   -Check blood cultures x 2, chest x-ray   -Counseled I-S technique   -Check UA since also complains of some dysuria   -WBC/CBC normal   -Consider ID pending workup    Insomnia-melatonin as needed    Disposition -[facility] coordinating but not medically ready today    Jeff Waters MD  5/20/2024  08:31 EDT

## 2024-05-20 NOTE — PROGRESS NOTES
[facility] Clinical Pharmacy Services: Piperacillin-Tazobactam Consult    Pt Name: Cecilio Puckett   : 1970    Indication: Pneumonia    Relevant clinical data and objective history reviewed:    Past Medical History:   Diagnosis Date    COPD (chronic obstructive pulmonary disease)     Coronary artery disease     Diabetes mellitus     DVT (deep venous thrombosis)     Elevated cholesterol     GERD (gastroesophageal reflux disease)     H/O Cancer     right arm, in throat, chest and stomach, in remission    H/O Ischemia of extremity     History of MI (myocardial infarction) 2019    History of snoring     History of transfusion     HIV (human immunodeficiency virus infection)     Hypertension     Non Hodgkin's lymphoma     PAD (peripheral artery disease)     Pulmonary embolism     Stroke      Creatinine   Date Value Ref Range Status   2024 1.71 (H) 0.76 - 1.27 mg/dL Final   2024 0.95 0.76 - 1.27 mg/dL Final   2024 0.89 0.76 - 1.27 mg/dL Final   2022 0.96 0.73 - 1.18 mg/dL Final   2022 1.09 0.73 - 1.18 mg/dL Final   2022 0.86 0.73 - 1.18 mg/dL Final     BUN   Date Value Ref Range Status   2024 22 (H) 6 - 20 mg/dL Final   2022 12 8 - 26 mg/dL Final     Estimated Creatinine Clearance: 56.9 mL/min (A) (by C-G formula based on SCr of 1.71 mg/dL (H)).    Lab Results   Component Value Date    WBC 6.56 2024     Temp Readings from Last 3 Encounters:   24 97.7 °F (36.5 °C) (Oral)   10/03/23 99.1 °F (37.3 °C) (Oral)   23 97.8 °F (36.6 °C) (Oral)      Assessment/Plan  Estimated CrCl >20 mL/min at this time; BMI 31.01 kg/m2  Will start piperacillin-tazobactam 3.375 g IV every 8 hours     Pharmacy will continue to follow daily while on piperacillin-tazobactam and adjust as needed. Thank you for this consult.    Cindy Ott [facility]  Clinical Pharmacist

## 2024-05-20 NOTE — PLAN OF CARE
Goal Outcome Evaluation:  Plan of Care Reviewed With: patient        Progress: no change     No new neuro deficits noted. Temp of 101.5 and coughing this morning, MD notified, see new orders.

## 2024-05-20 NOTE — PAYOR COMM NOTE
"Cecilio Owens (54 y.o. Male)          INPATIENT REQUEST FOR [id number]     CONTACT JOSE MORILLO  F# [phone]         Date of Birth   1970    Social Security Number       Address   [address] [place] [address]    Home Phone   [phone]    MRN   [id number]       Nondenominational   [facility]    Marital Status   Single                            Admission Date   24    Admission Type   Emergency    Admitting Provider   Sade Prasad MD    Attending Provider   Jeff Waters MD    Department, Room/Bed   [address], P578/1       Discharge Date       Discharge Disposition       Discharge Destination                                 Attending Provider: Jeff Waters MD    Allergies: No Known Allergies    Isolation: None   Infection: None   Code Status: CPR    Ht: 175.3 cm (69\")   Wt: 95.3 kg (210 lb)    Admission Cmt: None   Principal Problem: Acute CVA (cerebrovascular accident) [I63.9]                   Active Insurance as of 2024       Primary Coverage       Payor Plan Insurance Group Employer/Plan Group    AETNA MEDICARE REPLACEMENT AETNA MEDICARE REPLACEMENT [id number]       Payor Plan Address Payor Plan Phone Number Payor Plan Fax Number Effective Dates    [address] [phone]  2022 - None Entered    [facility] [id number]         Subscriber Name Subscriber Birth Date Member ID       CECILIO OWENS 1970 [id number]                     Emergency Contacts        (Rel.) Home Phone Work Phone Mobile Phone    Linda Owens (Mother) -- -- [phone]                 History & Physical        Lisbet Chavez APRN at 24 1959       Attestation signed by Severo Man MD at 24 0700    I have reviewed this documentation and agree.                   [facility]   HISTORY AND PHYSICAL    Patient Name: Cecilio Owens  : 1970  MRN: [id number]  Primary Care Physician:  Arias Lehman APRN  Date of admission: " 5/14/2024    Subjective  Subjective     Chief Complaint:   Chief Complaint   Patient presents with    speech issues    Dizziness         HPI:    Cecilio Puckett is a 54 y.o. male, with a past medical history including, but not limited to, DVT, diabetes, HIV, hypertension, non-Hodgkin's lymphoma, coronary artery disease, presented to the emergency department with a complaint of ongoing speech difficulty and dizziness.  Patient states that he has had worsening difficulty with speech since a stroke in September.  He states he has also had intermittent dizziness since that time, however, he feels that the dizziness is getting more frequent and lasting longer.  He states that he is having trouble walking and that that is the reason he came in.  He admits to being noncompliant with his medications and states that he does not take them most days.  He states that it was recommended for him to go to rehab after his stroke however he states that he went home and has not continued with any therapies.  MRI brain is pending at this time.  Physical therapy, Occupational Therapy, and speech therapy have been consulted to see the patient in the AM.    Review of Systems   All systems were reviewed and negative except for: What was mentioned above in the HPI.    Personal History     Past Medical History:   Diagnosis Date    COPD (chronic obstructive pulmonary disease)     Coronary artery disease     Diabetes mellitus     DVT (deep venous thrombosis)     Elevated cholesterol     GERD (gastroesophageal reflux disease)     H/O Cancer     right arm, in throat, chest and stomach, in remission    H/O Ischemia of extremity     History of MI (myocardial infarction) 2019    History of snoring     History of transfusion     HIV (human immunodeficiency virus infection)     Hypertension     Non Hodgkin's lymphoma     PAD (peripheral artery disease)     Pulmonary embolism     Stroke        Past Surgical History:   Procedure Laterality Date    CARDIAC  CATHETERIZATION      CORONARY ANGIOPLASTY WITH STENT PLACEMENT      FEMORAL ARTERY - FEMORAL ARTERY BYPASS GRAFT      PORTACATH PLACEMENT         Family History: family history includes Diabetes in his mother; Hypertension in his maternal grandmother and mother; Stroke in his mother. Otherwise pertinent FHx was reviewed and not pertinent to current issue.    Social History:  reports that he has been smoking cigars and cigarettes. He started smoking about 30 years ago. He has a 30.4 pack-year smoking history. He does not have any smokeless tobacco history on file. He reports current alcohol use of about 4.0 standard drinks of alcohol per week. He reports that he does not currently use drugs.    Home Medications:  Mgwgcms-Bvkvz-Hdhreeqz-TenofAF, Enoxaparin Sodium, HYDROcodone-acetaminophen, Insulin Glargine, Lancets, amLODIPine, carvedilol, dapagliflozin Propanediol, ergocalciferol, glucose blood, glucose monitor, insulin lispro, losartan, pantoprazole, sertraline, and warfarin    Allergies:  No Known Allergies    Objective  Objective     Vitals:   Temp:  [97.9 °F (36.6 °C)] 97.9 °F (36.6 °C)  Heart Rate:  [] 75  Resp:  [16] 16  BP: (174)/(112) 174/112  Physical Exam    Constitutional: Awake, alert   Eyes: PERRLA   HENT: NCAT, mucous membranes moist   Neck: Supple   Respiratory: Clear to auscultation bilaterally, nonlabored respirations    Cardiovascular: Regular rate, palpable pedal pulses bilaterally   Gastrointestinal: Positive bowel sounds, soft, nontender, nondistended   Musculoskeletal: No bilateral ankle edema   Psychiatric: Flat affect, cooperative   Neurologic: Oriented x 3, strength symmetric in all extremities, speech clear   Skin: No rashes       Result Review:  I have personally reviewed the results from the time of this admission to 5/14/2024 19:59 EDT and agree with these findings:  x laboratory list / accordion  Microbiology  x radiology  x EKG/Telemetry   Cardiology/Vascular   Pathology  x old  records  Other:    Initial workup in the emergency department showed high-sensitivity troponin of 26, proBNP 916, glucose 182, all of the lab work is at baseline for the patient.  Urine tox screen is negative, ethanol level is less than 10.  Chest x-ray shows no evidence for acute pulmonary process.  There is a moderate size area of acute/subacute infarction extending from the posterior inferior left parietal lobe into the superior left occipital lobe measuring 4.8 x 3 x 4 cm in size in the distribution of  parieto-occipital branch of the left MCA territory. Its precise age is uncertain and I recommend an MRI of the brain to further date it. There is an 11 x 6 mm infarct in the posterior lateral left cerebellum that is new when compared to the MRI of the brain 09/29/2023 and I suspect this is either subacute or chronic. Otherwise the head CT is unchanged when compared to MRI of the brain 09/29/2023. There is an 11 x 7 mm old  lacunar infarct extending from the mid right corona radiata region into the superior right putamen that occurred back in September of 2023. There are small old lacunar infarcts in the anterior right putamen and posterior limb of the right internal capsule.    Assessment & Plan  Assessment / Plan     Brief Patient Summary:  Cecilio Puckett is a 54 y.o. male who was admitted unit for further evaluation and treatment of his intermittent slurred speech.    Active Hospital Problems:  Active Hospital Problems    Diagnosis     **Slurred speech      Plan    Slurred speech  -Neurochecks every 4 hours   -Vital signs every 4 hours   -Cardiac monitoring   -MRI-brain without contrast pending  -CT Head -There is a moderate size area of acute/subacute infarction extending  from the posterior inferior left parietal lobe into the superior left occipital lobe measuring 4.8 x 3 x 4 cm in size in the distribution of parieto-occipital branch of the left MCA territory. Its precise age is uncertain and I recommend an MRI  of the brain to further date it.   -PT to eval and treat  -Speech therapy to eval secondary to failing dysphagia screen  -Lipid panel pending  -Neuro consult       Diabetes  -Accu-Cheks every 6 hours  -Adult subcutaneous insulin management-low dose  -Hemoglobin A1c -pending    -HIV  -Continue home medication for HIV-once cleared by speech therapy    History of CVA\DVT  -Pharmacy to dose Lovenox    DVT prophylaxis:  Mechanical DVT prophylaxis orders are present.        CODE STATUS:    Code Status (Patient has no pulse and is not breathing): CPR (Attempt to Resuscitate)  Medical Interventions (Patient has pulse or is breathing): Full Support    Admission Status:  I believe this patient meets observation status.    78 minutes have been spent by [facility] providers in the care of this patient while under observation status.      Appropriate PPE worn during patient encounter.  Hand hygeine performed before and after seeing the patient.      Electronically signed by MARIANNE Sanchez, 05/14/24, 7:59 PM EDT.             Electronically signed by Severo Man MD at 05/17/24 0700          Emergency Department Notes        Jenny Thomason MD at 05/14/24 1821          MD ATTESTATION NOTE    SHARED VISIT: This visit was performed by BOTH a physician and an APC. The substantive portion of the medical decision making was performed by this attesting physician who made or approved the management plan and takes responsibility for patient management. All studies in the APC note (if performed) were independently interpreted by me.     The LAURA and I have discussed this patient's history, physical exam, and treatment plan.  I have reviewed the documentation and affirm the documentation and agree with the treatment and plan.  The attached note describes my personal findings.      Independent Historians: Patient    A complete HPI/ROS/PMH/PSH/SH/FH are unobtainable due to: None    Chronic  or social conditions impacting patient care (social determinants of health): None    Cecilio Puckett is a 54 y.o. male with history of stroke and TIA as well as non-Hodgkin's lymphoma, diabetes, hypertension, and HIV who presents to the ED c/o acute episodes of dizziness and speech difficulty.  Patient is on warfarin for his history of DVT.  Patient's dizziness is not described as a lightheadedness but more of a unsteadiness or off-balance feeling.  Patient has not had any syncopal episodes.  Patient denies any chest pain or shortness of breath.  He also denies headache, falls, head injury.        On exam:  GENERAL: Cooperative and conversant male, alert, no acute distress  SKIN: Warm, dry  HENT: Normocephalic, atraumatic  RESPIRATORY: Relaxed breathing  MUSCULOSKELETAL: no deformity  NEURO: alert, face symmetric, moves all extremities, follows commands          Radiology  CT Head Without Contrast    Result Date: 5/14/2024  EMERGENCY CT SCAN OF THE HEAD WITHOUT CONTRAST ON 05/14/2024  CLINICAL HISTORY: This is a 54-year-old male patient who feels like he has been slurring words for greater than a month  TECHNIQUE: Spiral CT images were obtained from the base of the skull to the vertex without intravenous contrast. The images were reformatted and are submitted in 3 mm thick axial, sagittal and coronal CT sections with brain algorithm.  COMPARISON: This is correlated to a prior MRI of the brain on 09/29/2023 and 10/10/2022..  FINDINGS: There is a 10 x 7 mm old lacunar infarct extending from the mid right corona radiata region into the superior right putamen that occurred back in September 2023. There is an additional 8 x 5 mm old lacunar infarct in the anterior right putamen, 4 mm old lacunar infarct in the posterior limb of the right internal capsule. There is a moderate size area of infarction involving the posterior inferior medial left parietal lobe extending in the superior left occipital lobe. It measures  approximately 4.8 x 3 x 4 cm. It is in the distribution of parieto-occipital branches left MCA territory and the precise age of this infarct is uncertain but may be subacute in nature. There is a 11 x 6 mm infarct in the posterior lateral left cerebellum and left PICA territory either subacute or chronic. The remainder of the brain parenchyma is normal in attenuation. The ventricles are normal in size. I see no mass effect and no midline shift and no extra-axial fluid collections are identified and there is no evidence of acute intracranial hemorrhage. The calvarium and the skull base are normal in appearance. Paranasal sinuses, mastoid air cells and middle ear cavities are clear.      1. There is a moderate size area of acute/subacute infarction extending from the posterior inferior left parietal lobe into the superior left occipital lobe measuring 4.8 x 3 x 4 cm in size in the distribution of parieto-occipital branch of the left MCA territory. Its precise age is uncertain and I recommend an MRI of the brain to better date it. There is an 11 x 6 mm infarct in the posterior lateral left cerebellum that is new when compared to the MRI of the brain on 09/29/2023 and I suspect this is either subacute or chronic. Otherwise, the head CT is unchanged when compared to MRI of the brain 09/29/2023. There is an 11 x 7 mm old lacunar infarct extending from the mid right corona radiata region into the superior right putamen that occurred back in September of 2023. There are small old lacunar infarcts in the anterior right putamen and posterior limb of the right internal capsule. The remainder of the head CT is within normal limits. The results and recommendations were communicated to Kely Luna by telephone on 05/14/2024 at 8 p.m.  Radiation dose reduction techniques were utilized, including automated exposure control and exposure modulation based on body size.   This report was finalized on 5/14/2024 10:31 PM by Dr. Cortes  JODIE Way on Workstation: [id number]      XR Chest 1 View    Result Date: 5/14/2024  XR CHEST 1 VW-  HISTORY: Male who is 54 years-old, dizziness  TECHNIQUE: Frontal view of the chest  COMPARISON: 9/29/2023  FINDINGS: Heart, mediastinum and pulmonary vasculature are unremarkable. No focal pulmonary consolidation, pleural effusion, or pneumothorax. No acute osseous process.      No evidence for acute pulmonary process. Follow-up as clinical indications persist.  This report was finalized on 5/14/2024 7:18 PM by Dr. Junior Weiner M.D on Workstation: [id number]       Medical Decision Making:  ED Course as of 05/15/24 0021   Tue May 14, 2024   1835 EKG ER MD interpretation   Time: 18: 32  Rhythm and rate: Normal sinus rhythm at a rate of 83  Axis: Normal  P waves: Normal for probable left atrial enlargement  QRS complexes: LVH with repolarization changes especially anterior leads  ST segments: no depressions  T waves: Inversions in 2, 3, aVF, V5, V6  Q waves in 1, aVL  Comparison EKG is from September 29, 2023 where patient had similar findings with no significant change [AR]   1942 CT scan of head independently interpreted me as no gross hemorrhage [MP]   1956 I spoke with Lisbet in the observation unit.  Reviewed patient presentation and ED findings.  She agrees to admit patient to an ED observation bed. [MP]      ED Course User Index  [AR] Jenny Thomason MD  [MP] Kely Luna, KENAN       MDM: This patient presents with symptoms concerning for CVA versus TIA. Other possibilities on the differential diagnosis list include: dissection, AMI, hypoglycemia or other metabolic derangement such as hepatic/uremic encephalopathy, medication side effect, complex migraine, or post-ictal Marcio´s paralysis. However, presentation most concerning for a TIA given that he is symptom-free currently but has significant history of CVA and TIA in the past.     Procedures:  Procedures        PPE: I followed hospital protocols  for proper PPE based on patient presentation including use of N95 mask for suspected infectious respiratory conditions.  Proper hand hygiene was performed both before and after the patient encounter.          Diagnosis  Final diagnoses:   Difficulty with speech   Dizziness   History of CVA (cerebrovascular accident)   Hypertension not at goal       Note Disclaimer: At [facility], we believe that sharing information builds trust and better relationships. You are receiving this note because you recently visited [facility]. It is possible you will see health information before a provider has talked with you about it. This kind of information can be easy to misunderstand. To help you fully understand what it means for your health, we urge you to discuss this note with your provider.       Jenny Thomason MD  05/16/24 1959      Electronically signed by Jenny Thomason MD at 05/16/24 1959       Kely Luna PA-C at 05/14/24 1818       Attestation signed by Jenny Thomason MD at 05/14/24 0137        SHARED APC FACE TO FACE: I performed a substantive part of the MDM during the patient's E/M visit. I personally evaluated and examined the patient. I personally made or approved the documented management plan and acknowledge its risk of complications.   Jenny Thomason MD 5/14/2024 21:51 EDT                          EMERGENCY DEPARTMENT ENCOUNTER  Room Number:  28/28  PCP: Arias Lehman APRN  Independent Historians: Patient and Family      HPI:  Chief Complaint: had concerns including speech issues and Dizziness.     A complete HPI/ROS/PMH/PSH/SH/FH are unobtainable due to: None    Chronic or social conditions impacting patient care (Social Determinants of Health): None      Context: Cecilio Puckett is a 54 y.o. male with a medical history of non-Hodgkin's lymphoma, DVT, diabetes, hypertension, hyperlipidemia, GERD, CVA, and HIV who presents to the ED c/o acute speech difficulty and  dizziness.  Patient reports he has noticed the symptoms over the last month.  Reports when he stands in the shower sometimes he feels like he is swaying.  Patient has history of TIA/CVA.  He is anticoagulated on warfarin for history of DVT/PE.  He reports there are times that he misses his medications.  Patient denies fall or head injury.  Patient denies vision loss, headache, facial droop, unilateral numbness/weakness, or any other systemic complaints.      Review of prior external notes (non-ED) -and- Review of prior external test results outside of this encounter:  Patient seen in office by infectious disease on 4/5/2024 for HIV.  Reviewed assessment and plan.  Patient to continue Genvoya and will follow-up in office in 4 months.  Reviewed labs collected on 4/5/2024.  CBC with hemoglobin 14.3, CMP with creatinine 0.88.    Prescription drug monitoring program review:     N/A      ALLERGIES  Patient has no known allergies.      REVIEW OF SYSTEMS  Review of Systems   Constitutional:  Negative for chills and fever.   HENT:  Negative for ear pain and sore throat.    Respiratory:  Negative for cough and shortness of breath.    Cardiovascular:  Negative for chest pain and palpitations.   Gastrointestinal:  Negative for abdominal pain and vomiting.   Genitourinary:  Negative for dysuria and hematuria.   Musculoskeletal:  Negative for arthralgias and joint swelling.   Skin:  Negative for pallor and rash.   Neurological:  Positive for dizziness. Negative for syncope and headaches.   Psychiatric/Behavioral:  Negative for confusion and hallucinations.      Included in HPI  All systems reviewed and negative except for those discussed in HPI.      PHYSICAL EXAM    I have reviewed the triage vital signs and nursing notes.    ED Triage Vitals   Temp Heart Rate Resp BP SpO2   05/14/24 1759 05/14/24 1759 05/14/24 1759 05/14/24 1808 05/14/24 1759   97.9 °F (36.6 °C) 108 16 (!) 174/112 97 %      Temp src Heart Rate Source Patient  Position BP Location FiO2 (%)   -- -- -- -- --              Physical Exam  Constitutional:       General: He is not in acute distress.     Appearance: Normal appearance.   HENT:      Head: Normocephalic and atraumatic.      Nose: Nose normal.      Mouth/Throat:      Mouth: Mucous membranes are moist.   Eyes:      Conjunctiva/sclera: Conjunctivae normal.      Pupils: Pupils are equal, round, and reactive to light.   Cardiovascular:      Rate and Rhythm: Normal rate and regular rhythm.      Pulses: Normal pulses.      Heart sounds: Normal heart sounds.      Comments: Distal pulses intact  Pulmonary:      Effort: Pulmonary effort is normal.      Breath sounds: Normal breath sounds.   Abdominal:      General: There is no distension.   Musculoskeletal:         General: Normal range of motion.      Cervical back: Normal range of motion and neck supple.   Skin:     General: Skin is warm.      Capillary Refill: Capillary refill takes less than 2 seconds.   Neurological:      General: No focal deficit present.      Mental Status: He is alert and oriented to person, place, and time.      Comments: CN II through XII intact.  No facial asymmetry.  No aphasia or dysarthria.  No drift of the upper or lower extremities.  Sensation intact to light touch all 4 extremities.  Motor strength 5/5 all 4 extremities   Psychiatric:         Mood and Affect: Mood normal.             PROGRESS, DATA ANALYSIS, CONSULTS, AND MEDICAL DECISION MAKING  All labs have been independently interpreted by me.  All radiology studies have been reviewed by me. All EKG's have been independently viewed and interpreted by me.  Discussion below represents my analysis of pertinent findings related to patient's condition, differential diagnosis, treatment plan and final disposition.    Differential diagnosis includes but is not limited to peripheral vertigo, TIA, CVA, migraine headache.    Clinical Scores:            Total (NIH Stroke Scale): 1      ED Course as  of 05/14/24 1957   e May 14, 2024   1835 EKG ER MD interpretation   Time: 18: 32  Rhythm and rate: Normal sinus rhythm at a rate of 83  Axis: Normal  P waves: Normal for probable left atrial enlargement  QRS complexes: LVH with repolarization changes especially anterior leads  ST segments: no depressions  T waves: Inversions in 2, 3, aVF, V5, V6  Q waves in 1, aVL  Comparison EKG is from September 29, 2023 where patient had similar findings with no significant change [AR]   1942 CT scan of head independently interpreted me as no gross hemorrhage [MP]   1956 I spoke with Lisbet in the observation unit.  Reviewed patient presentation and ED findings.  She agrees to admit patient to an ED observation bed. [MP]      ED Course User Index  [AR] Jenny Thomason MD  [MP] Kely Luna PA-C             AS OF 19:57 EDT VITALS:    BP - (!) 174/112  HR - 75  TEMP - 97.9 °F (36.6 °C)  O2 SATS - 100%    COMPLEXITY OF CARE  The patient requires admission.      DIAGNOSIS  Final diagnoses:   Difficulty with speech   Dizziness   History of CVA (cerebrovascular accident)   Hypertension not at goal         DISPOSITION  ED Disposition       ED Disposition   Decision to Admit    Condition   --    Comment   --                Please note that portions of this document were completed with a voice recognition program.    Note Disclaimer: At [facility], we believe that sharing information builds trust and better relationships. You are receiving this note because you recently visited [facility]. It is possible you will see health information before a provider has talked with you about it. This kind of information can be easy to misunderstand. To help you fully understand what it means for your health, we urge you to discuss this note with your provider.         Kely Luna PA-C  05/14/24 1957      Electronically signed by Jenny Thomason MD at 05/14/24 7037       Cristy Barba, RN at 05/14/24 9335           Patient to ER via car from home for light headed and speech issues x a while   Patient reports started after first stroke and is getting worse     Electronically signed by Cristy Barba RN at 24 7835          Physician Progress Notes (last 7 days)        Jeff Waters MD at 24 0823              DAILY PROGRESS NOTE  [facility]    Patient Identification:  Name: Cecilio Puckett  Age: 54 y.o.  Sex: male  :  1970  MRN: [id number]         Primary Care Physician: Arias Lehman APRN    Subjective:  Interval History: Called by RN for fever 101 last night.  Patient did not sleep well.  Admits to some cough as well as some dysuria.  Denies any problems swallowing or choking.  Denies any altered mentation or confusion.    Objective: Conversational and pleasant.  Speech seems fluent to me.    Scheduled Meds:amLODIPine, 10 mg, Oral, Daily  aspirin, 81 mg, Oral, Daily  carvedilol, 12.5 mg, Oral, Q12H  Givjbku-Puqjq-Jvbsbckd-TenofAF, 1 tablet, Oral, Daily  enoxaparin, 1 mg/kg, Subcutaneous, Q12H  insulin glargine, 20 Units, Subcutaneous, Nightly  insulin regular, 2-7 Units, Subcutaneous, 4x Daily AC & at Bedtime  magnesium oxide, 400 mg, Oral, Daily  pantoprazole, 40 mg, Oral, Q AM  rosuvastatin, 20 mg, Oral, Daily  sertraline, 50 mg, Oral, Daily  sodium chloride, 10 mL, Intravenous, Q12H      Continuous Infusions:Pharmacy to dose warfarin,   sodium chloride, 50 mL/hr, Last Rate: 75 mL/hr (05/15/24 1530)        Vital signs in last 24 hours:  Temp:  [98.2 °F (36.8 °C)-101.5 °F (38.6 °C)] 101.5 °F (38.6 °C)  Heart Rate:  [] 106  Resp:  [18] 18  BP: (123-151)/() 134/87    Intake/Output:    Intake/Output Summary (Last 24 hours) at 2024 0831  Last data filed at 2024 0809  Gross per 24 hour   Intake 1000 ml   Output --   Net 1000 ml       Exam:  /87 (BP Location: Right arm, Patient Position: Lying)   Pulse 106   Temp (!) 101.5 °F (38.6 °C) (Oral)   Resp 18   " Ht 175.3 cm (69\")   Wt 95.3 kg (210 lb)   SpO2 96%   BMI 31.01 kg/m²     General Appearance:    Alert, cooperative, nontoxic, AAOx3                          Head:    Normocephalic, without obvious abnormality, atraumatic                         Throat:   Oral mucosa pink and moist                           Neck:   No JVD                         Lungs:    Diminished bases otherwise clear to auscultation bilaterally, respirations unlabored.  When counseling I-S technique patient started to cough quite a bit which is ideal if atelectasis is initiated                 Chest Wall:    No tenderness or deformity                          Heart:    Regular rate and rhythm, S1 and S2 normal                  Abdomen:     Soft, nontender, bowel sounds active                  Extremities: Moving all, no cyanosis or edema and definitely no volume overload                        Pulses:   Pulses palpable in all extremities                  Neurologic:   CNII-XII intact      Data Review:  Labs in chart were reviewed.    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       Plan:    Acute CVA   -Neurology signed off 5/16/2024   -ASA/statin   -TTE with ICM/LV 36% with negative PFO/thrombus -cardiology has evaluated no recommendations for intervention at this time no " concern considering coronary angiography at a later date   -INR subtherapeutic at 1.8 pharmacy dosing and on Lovenox as a bridge      History of DVT/PE-noncompliant with Coumadin    DM2 with an A1c of 10 concerning for noncompliance   -Current basal bolus managing sugars quite well -likely dietary noncompliance as outpatient   -Low blood sugars at 64 this a.m. noted and parameters have been written and will reduce basal this evening    HTN stable but holding Entresto and Lasix and spironolactone for tonight given mild acute renal failure with creatinine bumping up to 1.7   -Gentle IVF at 50 cc an hour   -Check bladder scan and repeat BMP in a.m.    Acute fever of 101.5 overnight in a patient with a past history of HIV   -Check blood cultures x 2, chest x-ray   -Counseled I-S technique   -Check UA since also complains of some dysuria   -WBC/CBC normal   -Consider ID pending workup    Insomnia-melatonin as needed    Disposition -CCP coordinating but not medically ready today    Jeff Waters MD  2024  08:31 EDT      Electronically signed by Jeff Waters MD at 24 0831       Rosendo Segal MD at 24 1412          DAILY PROGRESS NOTE  [facility]    Patient Identification:  Name: Cecilio Puckett  Age: 54 y.o.  Sex: male  :  1970  MRN: [id number]         Primary Care Physician: Arias Lehman APRN    Subjective:  Interval History: He still complains about some trouble with slurred speech but he is up ambulating independently without much difficulty and PT signed off.    Objective:      Continuous Infusions:Pharmacy to dose warfarin,   sodium chloride, 75 mL/hr, Last Rate: 75 mL/hr (05/15/24 1530)        Vital signs in last 24 hours:  Temp:  [97.6 °F (36.4 °C)-98.8 °F (37.1 °C)] 98.2 °F (36.8 °C)  Heart Rate:  [68-72] 72  Resp:  [18] 18  BP: (123-141)/(71-94) 123/94    Intake/Output:  No intake or output data in the 24 hours ending 24 1412      Exam:  /94  "(BP Location: Right arm, Patient Position: Lying)   Pulse 72   Temp 98.2 °F (36.8 °C) (Oral)   Resp 18   Ht 175.3 cm (69\")   Wt 95.3 kg (210 lb)   SpO2 94%   BMI 31.01 kg/m²     General Appearance:    Alert, cooperative, no distress   Head:    Normocephalic, without obvious abnormality, atraumatic   Eyes:       Throat:   Lips, tongue, gums normal   Neck:   Supple, symmetrical, trachea midline, no JVD   Lungs:     Clear to auscultation bilaterally, respirations unlabored   Chest Wall:    No tenderness or deformity    Heart:    Regular rate and rhythm, S1 and S2 normal, no murmur,no  Rub or gallop   Abdomen:     Soft, nontender, bowel sounds active, no masses, no organomegaly    Extremities:   Extremities normal, atraumatic, no cyanosis or edema   Pulses:      Skin:   Skin is warm and dry,  no rashes or palpable lesions   Neurologic: Some slurring of speech but generally is up ambulating well without any assistance      Plan:  Consults from cardiology noted.  Continue with anticoagulation for presumed embolic strokes.  Needs to have better control of anticoagulation.  Plans as per cardiology and neurology.  Speech is recommending some outpatient speech therapy when he is well enough to go home.  Cardiology adjusting meds and he had abnormal stress test.  INR below 2 again and back on Lovenox.  Cardiology recommends noted.  May be home tomorrow if his INR is therapeutic versus going home with Lovenox as a bridge to therapeutic INR.    .  Rosendo Segal MD  2024  14:12 EDT     Electronically signed by Rosendo Segal MD at 24 1413       Rosendo Segal MD at 24 1453          DAILY PROGRESS NOTE  [facility]    Patient Identification:  Name: Cecilio Puckett  Age: 54 y.o.  Sex: male  :  1970  MRN: [id number]         Primary Care Physician: Arias Lehman APRN    Subjective:  Interval History: He still complains about some trouble with slurred speech but he is up ambulating " "independently without much difficulty and PT signed off.    Objective:        Continuous Infusions:Pharmacy to dose warfarin,   sodium chloride, 75 mL/hr, Last Rate: 75 mL/hr (05/15/24 1530)        Vital signs in last 24 hours:  Temp:  [98.1 °F (36.7 °C)-98.6 °F (37 °C)] 98.4 °F (36.9 °C)  Heart Rate:  [71-83] 71  Resp:  [18] 18  BP: (132-179)/(75-86) 132/78    Intake/Output:  No intake or output data in the 24 hours ending 05/18/24 1453      Exam:  /78 (BP Location: Right arm, Patient Position: Lying)   Pulse 71   Temp 98.4 °F (36.9 °C) (Oral)   Resp 18   Ht 175.3 cm (69\")   Wt 95.3 kg (210 lb)   SpO2 100%   BMI 31.01 kg/m²     General Appearance:    Alert, cooperative, no distress   Head:    Normocephalic, without obvious abnormality, atraumatic   Eyes:       Throat:   Lips, tongue, gums normal   Neck:   Supple, symmetrical, trachea midline, no JVD   Lungs:     Clear to auscultation bilaterally, respirations unlabored   Chest Wall:    No tenderness or deformity    Heart:    Regular rate and rhythm, S1 and S2 normal, no murmur,no  Rub or gallop   Abdomen:     Soft, nontender, bowel sounds active, no masses, no organomegaly    Extremities:   Extremities normal, atraumatic, no cyanosis or edema   Pulses:      Skin:   Skin is warm and dry,  no rashes or palpable lesions   Neurologic: Some slurring of speech but generally is up ambulating well without any assistance        Glucose   Date/Time Value Ref Range Status   05/18/2024 1136 110 70 - 130 mg/dL Final   05/18/2024 0757 88 70 - 130 mg/dL Final   05/18/2024 0607 93 70 - 130 mg/dL Final   05/17/2024 2334 163 (H) 70 - 130 mg/dL Final   05/17/2024 1625 202 (H) 70 - 130 mg/dL Final   05/17/2024 1136 103 70 - 130 mg/dL Final   05/17/2024 0800 76 70 - 130 mg/dL Final   05/17/2024 0519 73 70 - 130 mg/dL Final     Results from last 7 days   Lab Units 05/18/24  0510 05/17/24  1431 05/17/24  0509   INR  1.61* 1.45* 3.76*       Past Medical History:   Diagnosis " Date    COPD (chronic obstructive pulmonary disease)     Coronary artery disease     Diabetes mellitus     DVT (deep venous thrombosis)     Elevated cholesterol     GERD (gastroesophageal reflux disease)     H/O Cancer     right arm, in throat, chest and stomach, in remission    H/O Ischemia of extremity     History of MI (myocardial infarction) 2019    History of snoring     History of transfusion     HIV (human immunodeficiency virus infection)     Hypertension     Non Hodgkin's lymphoma     PAD (peripheral artery disease)     Pulmonary embolism     Stroke        Assessment:  Active Hospital Problems    Diagnosis  POA    **Slurred speech [R47.81]  Yes    Ischemic cardiomyopathy [I25.5]  Yes    History of CVA (cerebrovascular accident) [Z86.73]  Not Applicable    Acute CVA (cerebrovascular accident) [I63.9]  Yes    NHL (non-Hodgkin's lymphoma) [C85.90]  Yes    Type 2 diabetes mellitus with circulatory disorder, 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       Plan:  Consults from cardiology noted.  Continue with anticoagulation for presumed embolic strokes.  Needs to have better control of anticoagulation.  Plans as per cardiology and neurology.  Speech is recommending some outpatient speech therapy when he is well enough to go home.  Cardiology adjusting meds and he had abnormal stress test.  INR below 2 again and back on Lovenox.  Cardiology recommends noted.  May be home tomorrow if his INR is therapeutic.    .  Rosendo Segal MD  5/18/2024  14:53 EDT     Electronically signed by Rosendo Segal MD at 05/18/24 4725       Derek Rivero MD at 05/18/24 3461  "         Patient Care Team:  Arias Lehman APRN as PCP - General (Family Medicine)  Bear Rojas PA as Referring Physician (Emergency Medicine)  Arthur Salazar MD as Consulting Physician (Hematology and Oncology)    Chief Complaint:abnormal stress, history of CAD    Interval History:   No chest pain currently.  Stress test images reviewed and I do not see significant reversibility.  He has evidence of an inferior infarct and a moderately decreased ejection fraction.    Objective   Vital Signs  Temp:  [98.1 °F (36.7 °C)-98.6 °F (37 °C)] 98.4 °F (36.9 °C)  Heart Rate:  [71-83] 71  Resp:  [18] 18  BP: (132-179)/(75-86) 132/78  No intake or output data in the 24 hours ending 05/18/24 1352  Flowsheet Rows      Flowsheet Row First Filed Value   Admission Height 175.3 cm (69\") Documented at 05/14/2024 2100   Admission Weight 95.7 kg (210 lb 14.4 oz) Documented at 05/14/2024 2100            Temp:  [98.1 °F (36.7 °C)-98.6 °F (37 °C)] 98.4 °F (36.9 °C)  Heart Rate:  [71-83] 71  Resp:  [18] 18  BP: (132-179)/(75-86) 132/78  No intake or output data in the 24 hours ending 05/18/24 1352  Flowsheet Rows      Flowsheet Row First Filed Value   Admission Height 175.3 cm (69\") Documented at 05/14/2024 2100   Admission Weight 95.7 kg (210 lb 14.4 oz) Documented at 05/14/2024 2100            General Appearance:    Alert, cooperative, in no acute distress   Head:    Normocephalic, without obvious abnormality, atraumatic       Neck/Lymph   No adenopathy, supple, no thyromegaly, no carotid bruit, no    JVD   Lungs:     Clear to auscultation bilaterally, no wheezes, rales, or     rhonchi    Cardiac:    Normal rate, regular rhythm, no murmur, no rub, no gallop   Chest Wall:    No abnormalities observed   GI:     Normal bowel sounds, soft, nontender, nondistended,            no rebound tenderness   Extremities:   No cyanosis, clubbing, or edema   Circulatory/Peripheral Vascular :   Pulses palpable and equal bilaterally "   Integumentary:   No bleeding or rash. Normal temperature                amLODIPine, 10 mg, Oral, Daily  aspirin, 81 mg, Oral, Daily  carvedilol, 12.5 mg, Oral, Q12H  Lgxwmef-Hpruo-Qlyqtvqg-TenofAF, 1 tablet, Oral, Daily  [Held by provider] empagliflozin, 25 mg, Oral, Daily  enoxaparin, 1 mg/kg, Subcutaneous, Q12H  furosemide, 20 mg, Oral, Daily  [Held by provider] glipizide, 10 mg, Oral, Q12H  insulin glargine, 25 Units, Subcutaneous, Nightly  insulin regular, 2-7 Units, Subcutaneous, Q6H  magnesium oxide, 400 mg, Oral, Daily  pantoprazole, 40 mg, Oral, Q AM  rosuvastatin, 20 mg, Oral, Daily  sacubitril-valsartan, 1 tablet, Oral, Q12H  sertraline, 50 mg, Oral, Daily  sodium chloride, 10 mL, Intravenous, Q12H  spironolactone, 25 mg, Oral, Daily  warfarin, 15 mg, Oral, Once        Pharmacy to dose warfarin,   sodium chloride, 75 mL/hr, Last Rate: 75 mL/hr (05/15/24 1530)        Results Review:    Results from last 7 days   Lab Units 05/18/24  0510   SODIUM mmol/L 137   POTASSIUM mmol/L 3.7   CHLORIDE mmol/L 106   CO2 mmol/L 21.3*   BUN mg/dL 9   CREATININE mg/dL 0.95   GLUCOSE mg/dL 76   CALCIUM mg/dL 8.8     Results from last 7 days   Lab Units 05/14/24  1832   HSTROP T ng/L 26*     Results from last 7 days   Lab Units 05/18/24  0510   WBC 10*3/mm3 3.37*   HEMOGLOBIN g/dL 15.3   HEMATOCRIT % 46.4   PLATELETS 10*3/mm3 168     Results from last 7 days   Lab Units 05/18/24  0510 05/17/24  1431 05/17/24  0509 05/15/24  1827 05/14/24  1832   INR  1.61* 1.45* 3.76*   < > 1.05   APTT seconds  --   --   --   --  25.3    < > = values in this interval not displayed.     Results from last 7 days   Lab Units 05/14/24  1832   CHOLESTEROL mg/dL 193     Results from last 7 days   Lab Units 05/14/24  1832   MAGNESIUM mg/dL 1.7     Results from last 7 days   Lab Units 05/14/24  1832   CHOLESTEROL mg/dL 193   TRIGLYCERIDES mg/dL 104   HDL CHOL mg/dL 38*   LDL CHOL mg/dL 136*     @LABRCNT(bnp)@  I reviewed the patient's new clinical  results.  I personally viewed and interpreted the patient's EKG/Telemetry data        5/15/24    Left ventricular systolic function is moderately decreased. Calculated left ventricular EF = 36.9%    Left ventricular wall thickness is consistent with severe concentric hypertrophy.    The following left ventricular wall segments are hypokinetic: mid anterior. The following left ventricular wall segments are akinetic: apical anterior and apex.    Left ventricular diastolic function is consistent with (grade I) impaired relaxation.    The left atrial cavity is moderate to severely dilated.    Left atrial volume is severely increased.    Mild mitral valve regurgitation is present.    Saline test results are negative.     Stress   Myocardial perfusion imaging indicates a moderate-sized infarct located in the inferior wall with mild-to-moderate isi-infarct ischemia.    Abnormal LV wall motion consistent with severe hypokinesis of the inferior wall.    Left ventricular ejection fraction is moderately reduced (Calculated EF = 34%).    Assessment & Plan   1.  Acute CVA: MRI of the demonstrated multiple bilateral areas of acute infarction consistent with embolic source  2.  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  Coronary disease with prior stenting to D1 in 2019. Patient suffered STEMI at that time.  Left heart cath in 2019 showed  4.  Ischemic cardiomyopathy: EF 36% (previous EF 48% 9/2023).  On carvedilol, Entresto 49/51, lasix, spironolactone, Jardiance.   5.   Diabetes type 2: HbA1c 10.  Insulin  6.  Hypercholesterolemia with goal LDL less than 70: .  On rosuvastatin  7.  Hypertension:    -I would not recommend catheterization at this time.  I would recommend continuing goal-directed medical therapy for his cardiomyopathy and considering catheterization depending on how he recovers.  We will see him in a month and reassess.  We could consider coronary angiography at that time.  I will sign  "off.                Electronically signed by Derek Rivero MD at 24 1421       Rosendo Segal MD at 24 1504          DAILY PROGRESS NOTE  [facility]    Patient Identification:  Name: Cecilio Puckett  Age: 54 y.o.  Sex: male  :  1970  MRN: [id number]         Primary Care Physician: Arias Lehman APRN    Subjective:  Interval History: He still complains about some trouble with slurred speech but he is up ambulating independently without much difficulty and PT signed off.    Objective:    Scheduled Meds:amLODIPine, 10 mg, Oral, Daily  aspirin, 81 mg, Oral, Daily  carvedilol, 12.5 mg, Oral, Q12H  Amqbqgg-Vwvxo-Awlbgriw-TenofAF, 1 tablet, Oral, Daily  [Held by provider] empagliflozin, 25 mg, Oral, Daily  enoxaparin, 1 mg/kg, Subcutaneous, Q12H  furosemide, 20 mg, Oral, Daily  [Held by provider] glipizide, 10 mg, Oral, Q12H  insulin glargine, 25 Units, Subcutaneous, Nightly  insulin regular, 2-7 Units, Subcutaneous, Q6H  magnesium oxide, 400 mg, Oral, Daily  pantoprazole, 40 mg, Oral, Q AM  potassium chloride ER, 40 mEq, Oral, Q4H  rosuvastatin, 20 mg, Oral, Daily  sacubitril-valsartan, 1 tablet, Oral, Q12H  sertraline, 50 mg, Oral, Daily  sodium chloride, 10 mL, Intravenous, Q12H  spironolactone, 25 mg, Oral, Daily      Continuous Infusions:Pharmacy to dose warfarin,   sodium chloride, 75 mL/hr, Last Rate: 75 mL/hr (05/15/24 1530)        Vital signs in last 24 hours:  Temp:  [98.1 °F (36.7 °C)-98.8 °F (37.1 °C)] 98.1 °F (36.7 °C)  Heart Rate:  [69-83] 78  Resp:  [18] 18  BP: (136-169)/(68-98) 145/96    Intake/Output:    Intake/Output Summary (Last 24 hours) at 2024 1504  Last data filed at 2024 1814  Gross per 24 hour   Intake 420 ml   Output --   Net 420 ml       Exam:  /96 (BP Location: Right arm, Patient Position: Sitting)   Pulse 78   Temp 98.1 °F (36.7 °C) (Oral)   Resp 18   Ht 175.3 cm (69\")   Wt 95.3 kg (210 lb)   SpO2 100%   BMI 31.01 kg/m² "     General Appearance:    Alert, cooperative, no distress   Head:    Normocephalic, without obvious abnormality, atraumatic   Eyes:       Throat:   Lips, tongue, gums normal   Neck:   Supple, symmetrical, trachea midline, no JVD   Lungs:     Clear to auscultation bilaterally, respirations unlabored   Chest Wall:    No tenderness or deformity    Heart:    Regular rate and rhythm, S1 and S2 normal, no murmur,no  Rub or gallop   Abdomen:     Soft, nontender, bowel sounds active, no masses, no organomegaly    Extremities:   Extremities normal, atraumatic, no cyanosis or edema   Pulses:      Skin:   Skin is warm and dry,  no rashes or palpable lesions   Neurologic: Some slurring of speech but generally is up ambulating well without any assistance      Lab Results (last 72 hours)       Procedure Component Value Units Date/Time    POC Glucose Once [[id number]]  (Normal) Collected: 05/16/24 1153    Specimen: Blood Updated: 05/16/24 1155     Glucose 96 mg/dL     POC Glucose Once [[id number]]  (Abnormal) Collected: 05/16/24 0735    Specimen: Blood Updated: 05/16/24 0737     Glucose 137 mg/dL     Protime-INR [[id number]]  (Abnormal) Collected: 05/16/24 0607    Specimen: Blood Updated: 05/16/24 0657     Protime 15.1 Seconds      INR 1.16    POC Glucose Once [[id number]]  (Abnormal) Collected: 05/16/24 0548    Specimen: Blood Updated: 05/16/24 0548     Glucose 64 mg/dL     POC Glucose Once [[id number]]  (Abnormal) Collected: 05/15/24 2349    Specimen: Blood Updated: 05/15/24 2350     Glucose 147 mg/dL     Potassium [[id number]]  (Normal) Collected: 05/15/24 2048    Specimen: Blood Updated: 05/15/24 2131     Potassium 4.3 mmol/L      Comment: Specimen hemolyzed.  Result may be falsely elevated.       POC Glucose Once [[id number]]  (Abnormal) Collected: 05/15/24 2024    Specimen: Blood Updated: 05/15/24 2027     Glucose 163 mg/dL     Protime-INR [[id number]]  (Abnormal) Collected: 05/15/24 1827    Specimen: Blood from Arm, Left Updated:  05/15/24 1859     Protime 14.7 Seconds      INR 1.13    POC Glucose Once [[id number]]  (Abnormal) Collected: 05/15/24 1806    Specimen: Blood Updated: 05/15/24 1807     Glucose 142 mg/dL     POC Glucose Once [[id number]]  (Normal) Collected: 05/15/24 1207    Specimen: Blood Updated: 05/15/24 1208     Glucose 80 mg/dL     POC Glucose Once [[id number]]  (Normal) Collected: 05/15/24 0650    Specimen: Blood Updated: 05/15/24 0651     Glucose 87 mg/dL     Basic Metabolic Panel [[id number]]  (Abnormal) Collected: 05/15/24 0301    Specimen: Blood from Arm, Left Updated: 05/15/24 0346     Glucose 119 mg/dL      BUN 8 mg/dL      Creatinine 0.82 mg/dL      Sodium 138 mmol/L      Potassium 3.4 mmol/L      Chloride 107 mmol/L      CO2 22.0 mmol/L      Calcium 8.5 mg/dL      BUN/Creatinine Ratio 9.8     Anion Gap 9.0 mmol/L      eGFR 105.0 mL/min/1.73     Narrative:      GFR Normal >60  Chronic Kidney Disease <60  Kidney Failure <15      CBC (No Diff) [[id number]]  (Normal) Collected: 05/15/24 0301    Specimen: Blood from Arm, Left Updated: 05/15/24 0326     WBC 6.33 10*3/mm3      RBC 5.08 10*6/mm3      Hemoglobin 14.3 g/dL      Hematocrit 43.0 %      MCV 84.6 fL      MCH 28.1 pg      MCHC 33.3 g/dL      RDW 14.2 %      RDW-SD 43.0 fl      MPV 10.9 fL      Platelets 169 10*3/mm3     POC Glucose Once [[id number]]  (Normal) Collected: 05/14/24 2331    Specimen: Blood Updated: 05/14/24 2332     Glucose 129 mg/dL     Hemoglobin A1c [[id number]]  (Abnormal) Collected: 05/14/24 1832    Specimen: Blood Updated: 05/14/24 2157     Hemoglobin A1C 10.00 %     Narrative:      Hemoglobin A1C Ranges:    Increased Risk for Diabetes  5.7% to 6.4%  Diabetes                     >= 6.5%  Diabetic Goal                < 7.0%    Lipid Panel [[id number]]  (Abnormal) Collected: 05/14/24 1832    Specimen: Blood Updated: 05/14/24 2147     Total Cholesterol 193 mg/dL      Triglycerides 104 mg/dL      HDL Cholesterol 38 mg/dL      LDL Cholesterol  136 mg/dL       VLDL Cholesterol 19 mg/dL      LDL/HDL Ratio 3.53    Narrative:      Cholesterol Reference Ranges  (U.S. Department of Health and Human Services ATP III Classifications)    Desirable          <200 mg/dL  Borderline High    200-239 mg/dL  High Risk          >240 mg/dL      Triglyceride Reference Ranges  (U.S. Department of Health and Human Services ATP III Classifications)    Normal           <150 mg/dL  Borderline High  150-199 mg/dL  High             200-499 mg/dL  Very High        >500 mg/dL    HDL Reference Ranges  (U.S. Department of Health and Human Services ATP III Classifications)    Low     <40 mg/dl (major risk factor for CHD)  High    >60 mg/dl ('negative' risk factor for CHD)        LDL Reference Ranges  (U.S. Department of Health and Human Services ATP III Classifications)    Optimal          <100 mg/dL  Near Optimal     100-129 mg/dL  Borderline High  130-159 mg/dL  High             160-189 mg/dL  Very High        >189 mg/dL    Urinalysis, Microscopic Only - Urine, Clean Catch [[id number]]  (Abnormal) Collected: 05/14/24 2001    Specimen: Urine, Clean Catch Updated: 05/14/24 2042     RBC, UA 3-5 /HPF      WBC, UA 11-20 /HPF      Bacteria, UA None Seen /HPF      Squamous Epithelial Cells, UA 3-6 /HPF      Hyaline Casts, UA 21-30 /LPF      Methodology Automated Microscopy    Urine Drug Screen - Urine, Clean Catch [[id number]]  (Normal) Collected: 05/14/24 2001    Specimen: Urine, Clean Catch Updated: 05/14/24 2040     Amphet/Methamphet, Screen Negative     Barbiturates Screen, Urine Negative     Benzodiazepine Screen, Urine Negative     Cocaine Screen, Urine Negative     Opiate Screen Negative     THC, Screen, Urine Negative     Methadone Screen, Urine Negative     Oxycodone Screen, Urine Negative     Fentanyl, Urine Negative    Narrative:      Negative Thresholds Per Drugs Screened:    Amphetamines                 500 ng/ml  Barbiturates                 200 ng/ml  Benzodiazepines              100  ng/ml  Cocaine                      300 ng/ml  Methadone                    300 ng/ml  Opiates                      300 ng/ml  Oxycodone                    100 ng/ml  THC                           50 ng/ml  Fentanyl                       5 ng/ml      The Normal Value for all drugs tested is negative. This report includes final unconfirmed screening results to be used for medical treatment purposes only. Unconfirmed results must not be used for non-medical purposes such as employment or legal testing. Clinical consideration should be applied to any drug of abuse test, particularly when unconfirmed results are used.            Urinalysis With Microscopic If Indicated (No Culture) - Urine, Clean Catch [[id number]]  (Abnormal) Collected: 05/14/24 2001    Specimen: Urine, Clean Catch Updated: 05/14/24 2020     Color, UA Dark Yellow     Appearance, UA Cloudy     pH, UA 5.5     Specific Gravity, UA >1.030     Glucose,  mg/dL (1+)     Ketones, UA Negative     Bilirubin, UA Negative     Blood, UA Small (1+)     Protein, UA >=300 mg/dL (3+)     Leuk Esterase, UA Negative     Nitrite, UA Negative     Urobilinogen, UA 1.0 E.U./dL    Manual Differential [[id number]]  (Abnormal) Collected: 05/14/24 1832    Specimen: Blood Updated: 05/14/24 1953     Neutrophil % 30.6 %      Lymphocyte % 61.2 %      Monocyte % 5.1 %      Eosinophil % 1.0 %      Basophil % 2.0 %      Neutrophils Absolute 1.74 10*3/mm3      Lymphocytes Absolute 3.47 10*3/mm3      Monocytes Absolute 0.29 10*3/mm3      Eosinophils Absolute 0.06 10*3/mm3      Basophils Absolute 0.11 10*3/mm3      RBC Morphology Normal     WBC Morphology Normal     Platelet Morphology Normal    CBC & Differential [[id number]]  (Normal) Collected: 05/14/24 1832    Specimen: Blood Updated: 05/14/24 1953    Narrative:      The following orders were created for panel order CBC & Differential.  Procedure                               Abnormality         Status                     ---------                                -----------         ------                     CBC Auto Differential[[id number]]        Normal              Final result                 Please view results for these tests on the individual orders.    CBC Auto Differential [[id number]]  (Normal) Collected: 05/14/24 1832    Specimen: Blood Updated: 05/14/24 1953     WBC 5.67 10*3/mm3      RBC 5.12 10*6/mm3      Hemoglobin 14.4 g/dL      Hematocrit 43.7 %      MCV 85.4 fL      MCH 28.1 pg      MCHC 33.0 g/dL      RDW 14.5 %      RDW-SD 44.9 fl      MPV 11.1 fL      Platelets 176 10*3/mm3     Protime-INR [[id number]]  (Normal) Collected: 05/14/24 1832    Specimen: Blood Updated: 05/14/24 1915     Protime 14.0 Seconds      INR 1.05    aPTT [[id number]]  (Normal) Collected: 05/14/24 1832    Specimen: Blood Updated: 05/14/24 1915     PTT 25.3 seconds     Single High Sensitivity Troponin T [[id number]]  (Abnormal) Collected: 05/14/24 1832    Specimen: Blood Updated: 05/14/24 1911     HS Troponin T 26 ng/L     Narrative:      High Sensitive Troponin T Reference Range:  <14.0 ng/L- Negative Female for AMI  <22.0 ng/L- Negative Male for AMI  >=14 - Abnormal Female indicating possible myocardial injury.  >=22 - Abnormal Male indicating possible myocardial injury.   Clinicians would have to utilize clinical acumen, EKG, Troponin, and serial changes to determine if it is an Acute Myocardial Infarction or myocardial injury due to an underlying chronic condition.         BNP [[id number]]  (Abnormal) Collected: 05/14/24 1832    Specimen: Blood Updated: 05/14/24 1911     proBNP 916.0 pg/mL     Narrative:      This assay is used as an aid in the diagnosis of individuals suspected of having heart failure. It can be used as an aid in the diagnosis of acute decompensated heart failure (ADHF) in patients presenting with signs and symptoms of ADHF to the emergency department (ED). In addition, NT-proBNP of <300 pg/mL indicates ADHF is not likely.    Age  Range Result Interpretation  NT-proBNP Concentration (pg/mL:      <50             Positive            >450                   Gray                 300-450                    Negative             <300    50-75           Positive            >900                  Gray                300-900                  Negative            <300      >75             Positive            >1800                  Gray                300-1800                  Negative            <300    TSH [[id number]]  (Normal) Collected: 05/14/24 1832    Specimen: Blood Updated: 05/14/24 1911     TSH 1.280 uIU/mL     Comprehensive Metabolic Panel [[id number]]  (Abnormal) Collected: 05/14/24 1832    Specimen: Blood Updated: 05/14/24 1908     Glucose 182 mg/dL      BUN 8 mg/dL      Creatinine 1.10 mg/dL      Sodium 139 mmol/L      Potassium 4.1 mmol/L      Chloride 106 mmol/L      CO2 23.6 mmol/L      Calcium 9.0 mg/dL      Total Protein 6.6 g/dL      Albumin 3.6 g/dL      ALT (SGPT) 19 U/L      AST (SGOT) 11 U/L      Alkaline Phosphatase 67 U/L      Total Bilirubin 0.3 mg/dL      Globulin 3.0 gm/dL      A/G Ratio 1.2 g/dL      BUN/Creatinine Ratio 7.3     Anion Gap 9.4 mmol/L      eGFR 80.3 mL/min/1.73     Narrative:      GFR Normal >60  Chronic Kidney Disease <60  Kidney Failure <15      Ethanol [[id number]] Collected: 05/14/24 1832    Specimen: Blood Updated: 05/14/24 1908     Ethanol <10 mg/dL      Ethanol % <0.010 %     Magnesium [[id number]]  (Normal) Collected: 05/14/24 1832    Specimen: Blood Updated: 05/14/24 1908     Magnesium 1.7 mg/dL           Data Review:  Results from last 7 days   Lab Units 05/17/24  0509 05/15/24  2048 05/15/24  0301 05/14/24 1832   SODIUM mmol/L 141  --  138 139   POTASSIUM mmol/L 3.2* 4.3 3.4* 4.1   CHLORIDE mmol/L 107  --  107 106   CO2 mmol/L 22.3  --  22.0 23.6   BUN mg/dL 8  --  8 8   CREATININE mg/dL 0.89  --  0.82 1.10   GLUCOSE mg/dL 68  --  119* 182*   CALCIUM mg/dL 8.8  --  8.5* 9.0     Results from last 7 days    Lab Units 05/17/24  0509 05/15/24  0301 05/14/24  1832   WBC 10*3/mm3 3.47 6.33 5.67   HEMOGLOBIN g/dL 15.0 14.3 14.4   HEMATOCRIT % 46.6 43.0 43.7   PLATELETS 10*3/mm3 156 169 176     Results from last 7 days   Lab Units 05/14/24  1832   TSH uIU/mL 1.280     Results from last 7 days   Lab Units 05/14/24  1832   HEMOGLOBIN A1C % 10.00*     Lab Results   Lab Value Date/Time    TROPONINT 26 (H) 05/14/2024 1832    TROPONINT <0.010 10/10/2022 1444    TROPONINT <0.010 09/27/2022 1420     Results from last 7 days   Lab Units 05/14/24  1832   CHOLESTEROL mg/dL 193   TRIGLYCERIDES mg/dL 104   HDL CHOL mg/dL 38*   LDL CHOL mg/dL 136*     Results from last 7 days   Lab Units 05/14/24  1832   ALK PHOS U/L 67   BILIRUBIN mg/dL 0.3   ALT (SGPT) U/L 19   AST (SGOT) U/L 11     Results from last 7 days   Lab Units 05/14/24  1832   TSH uIU/mL 1.280     Results from last 7 days   Lab Units 05/14/24  1832   HEMOGLOBIN A1C % 10.00*     Glucose   Date/Time Value Ref Range Status   05/17/2024 1136 103 70 - 130 mg/dL Final   05/17/2024 0800 76 70 - 130 mg/dL Final   05/17/2024 0519 73 70 - 130 mg/dL Final   05/16/2024 2252 279 (H) 70 - 130 mg/dL Final   05/16/2024 1917 380 (H) 70 - 130 mg/dL Final   05/16/2024 1708 153 (H) 70 - 130 mg/dL Final   05/16/2024 1153 96 70 - 130 mg/dL Final   05/16/2024 0735 137 (H) 70 - 130 mg/dL Final     Results from last 7 days   Lab Units 05/17/24  0509 05/16/24  0607 05/15/24  1827   INR  3.76* 1.16* 1.13*       Past Medical History:   Diagnosis Date    COPD (chronic obstructive pulmonary disease)     Coronary artery disease     Diabetes mellitus     DVT (deep venous thrombosis)     Elevated cholesterol     GERD (gastroesophageal reflux disease)     H/O Cancer     right arm, in throat, chest and stomach, in remission    H/O Ischemia of extremity     History of MI (myocardial infarction) 2019    History of snoring     History of transfusion     HIV (human immunodeficiency virus infection)      Hypertension     Non Hodgkin's lymphoma     PAD (peripheral artery disease)     Pulmonary embolism     Stroke        Assessment:  Active Hospital Problems    Diagnosis  POA    **Slurred speech [R47.81]  Yes    Ischemic cardiomyopathy [I25.5]  Yes    History of CVA (cerebrovascular accident) [Z86.73]  Not Applicable    Acute CVA (cerebrovascular accident) [I63.9]  Yes    NHL (non-Hodgkin's lymphoma) [C85.90]  Yes    Type 2 diabetes mellitus with circulatory disorder, 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       Plan:  Consults from cardiology noted.  Continue with anticoagulation for presumed embolic strokes.  Needs to have better control of anticoagulation.  Plans as per cardiology and neurology.  Speech is recommending some outpatient speech therapy when he is well enough to go home.  Cardiology adjusting meds and he had abnormal stress test.  INR is above 3 now on off Lovenox.  Await final recommendations from cardiology.  Rosendo Segal MD  2024  15:04 EDT     Electronically signed by Rosendo Segal MD at 24 1504       Tracy Felipe APRN at 24 1153              HOSPITAL FOLLOW UP NOTE    Patient Name: Cecilio Puckett  Patient : 1970        Date of Service:24  Provider of Service: MARIANNE Panchal  Place of Service: [facility]  Referral Provider: Jenny Thomason MD          Follow Up: chest discomfort, CVA    Interval Hx: remains hypertensive, tolerating GDMT, no chest pain        OBJECTIVE  Temp:  [98.1 °F (36.7 °C)-98.8 °F (37.1 °C)] 98.1 °F (36.7 °C)  Heart Rate:  [69-83] 78  Resp:  [18] 18  BP:  (136-169)/(68-98) 145/96     Intake/Output Summary (Last 24 hours) at 5/17/2024 1154  Last data filed at 5/16/2024 1814  Gross per 24 hour   Intake 420 ml   Output --   Net 420 ml     Body mass index is 31.01 kg/m².      05/14/24  2100 05/15/24  0907   Weight: 95.7 kg (210 lb 14.4 oz) 95.3 kg (210 lb)         Physical Exam:     General Appearance:    Alert, cooperative, in no acute distress   Head:    Normocephalic, without obvious abnormality, atraumatic   Eyes:            Conjunctivae and sclerae normal, no   icterus, no pallor, corneas clear, PERRLA   Neck:   No adenopathy, supple, trachea midline, no thyromegaly, no   carotid bruit, no JVD   Lungs:     Clear to auscultation,respirations regular, even and unlabored    Heart:    Regular rhythm and normal rate, normal S1 and S2, no murmur, no gallop, no rub, no click   Chest Wall:    No abnormalities observed   Abdomen:     Normal bowel sounds, no masses, no organomegaly, soft, nontender, nondistended, no guarding, no rebound  tenderness   Extremities:   Moves all extremities well, no edema, no cyanosis, no redness   Pulses:   Pulses palpable and equal bilaterally.          CURRENT MEDS    Scheduled Meds:amLODIPine, 10 mg, Oral, Daily  aspirin, 81 mg, Oral, Daily  carvedilol, 6.25 mg, Oral, Q12H  Qqhkhqb-Oaxsh-Qmhuuali-TenofAF, 1 tablet, Oral, Daily  [Held by provider] empagliflozin, 25 mg, Oral, Daily  enoxaparin, 1 mg/kg, Subcutaneous, Q12H  furosemide, 20 mg, Oral, Daily  [Held by provider] glipizide, 10 mg, Oral, Q12H  insulin glargine, 25 Units, Subcutaneous, Nightly  insulin regular, 2-7 Units, Subcutaneous, Q6H  pantoprazole, 40 mg, Oral, Q AM  potassium chloride ER, 40 mEq, Oral, Q4H  rosuvastatin, 20 mg, Oral, Daily  sacubitril-valsartan, 1 tablet, Oral, Q12H  sertraline, 50 mg, Oral, Daily  sodium chloride, 10 mL, Intravenous, Q12H  spironolactone, 25 mg, Oral, Daily      Continuous Infusions:Pharmacy to dose warfarin,   sodium chloride, 75 mL/hr, Last  Rate: 75 mL/hr (05/15/24 1530)          Lab Review:   Results from last 7 days   Lab Units 05/17/24  0509 05/15/24  2048 05/15/24  0301 05/14/24  1832   SODIUM mmol/L 141  --  138 139   POTASSIUM mmol/L 3.2* 4.3 3.4* 4.1   CHLORIDE mmol/L 107  --  107 106   CO2 mmol/L 22.3  --  22.0 23.6   BUN mg/dL 8  --  8 8   CREATININE mg/dL 0.89  --  0.82 1.10   GLUCOSE mg/dL 68  --  119* 182*   CALCIUM mg/dL 8.8  --  8.5* 9.0   AST (SGOT) U/L  --   --   --  11   ALT (SGPT) U/L  --   --   --  19         Results from last 7 days   Lab Units 05/17/24  0509 05/15/24  0301   WBC 10*3/mm3 3.47 6.33   HEMOGLOBIN g/dL 15.0 14.3   HEMATOCRIT % 46.6 43.0   PLATELETS 10*3/mm3 156 169     Results from last 7 days   Lab Units 05/17/24  0509 05/16/24  0607 05/15/24  1827 05/14/24  1832   INR  3.76* 1.16*   < > 1.05   APTT seconds  --   --   --  25.3    < > = values in this interval not displayed.     Results from last 7 days   Lab Units 05/14/24  1832   MAGNESIUM mg/dL 1.7     Results from last 7 days   Lab Units 05/14/24  1832   CHOLESTEROL mg/dL 193   TRIGLYCERIDES mg/dL 104   HDL CHOL mg/dL 38*   LDL CHOL mg/dL 136*     Results from last 7 days   Lab Units 05/14/24  1832   PROBNP pg/mL 916.0*     Results from last 7 days   Lab Units 05/14/24  1832   TSH uIU/mL 1.280     2D Echocardiogram 05/15/2024:    Left ventricular systolic function is moderately decreased. Calculated left ventricular EF = 36.9%    Left ventricular wall thickness is consistent with severe concentric hypertrophy.    The following left ventricular wall segments are hypokinetic: mid anterior. The following left ventricular wall segments are akinetic: apical anterior and apex.    Left ventricular diastolic function is consistent with (grade I) impaired relaxation.    The left atrial cavity is moderate to severely dilated.    Left atrial volume is severely increased.    Mild mitral valve regurgitation is present.    Saline test results are negative.    2D Echocardiogram  09/30/2023:    Left ventricular systolic function is low normal. Calculated left ventricular EF = 48.1%    The following left ventricular wall segments are hypokinetic: mid anterior and apical anterior.    Left ventricular diastolic function is consistent with (grade I) impaired relaxation.    Mild tricuspid valve regurgitation is present.    Estimated right ventricular systolic pressure from tricuspid regurgitation is normal (<35 mmHg). Calculated right ventricular systolic pressure from tricuspid regurgitation is 13 mmHg.    Saline test results are negative.     Left Heart Cath 12/01/2019:  1. High-grade stenosis of a large first diagonal is visualized (culprit for ST   segment elevation MI) .   2. Moderate CAD is visualized in the distal circumflex system distal to a   previous stent.   3. Nonobstructive RCA disease is identified.   4. Additional medical issues are noted in the record.   Diagnostic Recommendations   1. PCI to the diagonal is recommended.   2. Further recommendations will be dictated by the results of this procedure.   Interventional Summary   1. Successful percutaneous intervention to the first diagonal was completed   with a 3.5 x 24 mm Synergy drug-eluting stent (post without a 4.5 millimeter   noncompliant balloon).   2. No procedural complications were identified.     ASSESSMENT & PLAN    Slurred speech    NHL (non-Hodgkin's lymphoma)    HIV (human immunodeficiency virus infection)    Type 2 diabetes mellitus with circulatory disorder, without long-term current use of insulin    Acute CVA (cerebrovascular accident)    Anticoagulated on Coumadin    Coronary artery disease involving native coronary artery of native heart with angina pectoris    Arteriosclerosis of coronary artery    Ischemic cardiomyopathy    History of CVA (cerebrovascular accident)    1.  Acute CVA: MRI of the demonstrated multiple bilateral areas of acute infarction consistent with embolic source  2.  History of DVT/PE: On  warfarin (noncompliant)  3.  Noncompliant with chronic anticoagulation: INR 1 on admission--> now 3.76.  Pharmacy to dose.  Goal 2-3  4.  Coronary disease with prior stenting to D1 in 2019. Patient suffered STEMI at that time.  Left heart cath in 2019 showed  5.  Chest discomfort and increasing shortness of breath:  Stress perfusion study this am.   6.  Ischemic cardiomyopathy: EF 36% (previous EF 48% 2023). Titrate guideline directed medical therapy.  On carvedilol, Entresto 49/51, lasix, spironolactone, Jardiance.  Normal kidney function.   7.  Hypokalemia/ Hypomagnesia: K 3.2, Mg 1.7.  K replaced. Give one time dose Mg Ox.   8.  Diabetes type 2: HbA1c 10.  Insulin  9.  Hypercholesterolemia with goal LDL less than 70: .  On rosuvastatin  10.  Hypertension: severe concentric hypertrophy.  Remains elevated.  Increase coreg which will also help with cardiac recruitment    Addendum: Stress perfusion study today shows moderate size infarct located in inferior wall with mild to moderate isi-infarct ischemia.  Severe hypokinesis of inferior wall.  LVEF 34%.      INR 3.76.  NPO after midnight for possible coronary angiography.  Last cardiac cath in 2019 showed moderate CAD in the distal circumflex system distal to a previous stent.        MARIANNE Panchal  24    Electronically signed by Tracy Felipe APRN at 24 1711       Rosendo Segal MD at 24 1508          DAILY PROGRESS NOTE  [facility]    Patient Identification:  Name: Cecilio Puckett  Age: 54 y.o.  Sex: male  :  1970  MRN: [id number]         Primary Care Physician: Arias Lehman APRN    Subjective:  Interval History: He still complains about some trouble with slurred speech but he is up ambulating independently without much difficulty and PT signed off.    Objective:    Scheduled Meds:amLODIPine, 10 mg, Oral, Daily  [Held by provider] aspirin, 81 mg, Oral, Daily  carvedilol, 6.25 mg, Oral,  "Q12H  [Held by provider] Tevnyqs-Vahwb-Vvuultft-TenofAF, 1 tablet, Oral, Daily  [Held by provider] empagliflozin, 25 mg, Oral, Daily  enoxaparin, 1 mg/kg, Subcutaneous, Q12H  furosemide, 20 mg, Oral, Daily  [Held by provider] glipizide, 10 mg, Oral, Q12H  insulin glargine, 25 Units, Subcutaneous, Nightly  insulin regular, 2-7 Units, Subcutaneous, Q6H  pantoprazole, 40 mg, Oral, Q AM  rosuvastatin, 20 mg, Oral, Daily  sacubitril-valsartan, 1 tablet, Oral, Q12H  [Held by provider] sertraline, 50 mg, Oral, Daily  sodium chloride, 10 mL, Intravenous, Q12H  spironolactone, 25 mg, Oral, Daily  warfarin, 12 mg, Oral, Once      Continuous Infusions:Pharmacy to dose warfarin,   sodium chloride, 75 mL/hr, Last Rate: 75 mL/hr (05/15/24 1530)        Vital signs in last 24 hours:  Temp:  [97.8 °F (36.6 °C)-98.2 °F (36.8 °C)] 98.2 °F (36.8 °C)  Heart Rate:  [63-77] 65  Resp:  [16-18] 16  BP: (121-177)/(88-99) 121/90    Intake/Output:  No intake or output data in the 24 hours ending 05/16/24 1508    Exam:  /90 (BP Location: Right arm, Patient Position: Sitting)   Pulse 65   Temp 98.2 °F (36.8 °C) (Oral)   Resp 16   Ht 175.3 cm (69\")   Wt 95.3 kg (210 lb)   SpO2 100%   BMI 31.01 kg/m²     General Appearance:    Alert, cooperative, no distress   Head:    Normocephalic, without obvious abnormality, atraumatic   Eyes:       Throat:   Lips, tongue, gums normal   Neck:   Supple, symmetrical, trachea midline, no JVD   Lungs:     Clear to auscultation bilaterally, respirations unlabored   Chest Wall:    No tenderness or deformity    Heart:    Regular rate and rhythm, S1 and S2 normal, no murmur,no  Rub or gallop   Abdomen:     Soft, nontender, bowel sounds active, no masses, no organomegaly    Extremities:   Extremities normal, atraumatic, no cyanosis or edema   Pulses:      Skin:   Skin is warm and dry,  no rashes or palpable lesions   Neurologic: Some slurring of speech but generally is up ambulating well without any " assistance      Lab Results (last 72 hours)       Procedure Component Value Units Date/Time    POC Glucose Once [[id number]]  (Normal) Collected: 05/16/24 1153    Specimen: Blood Updated: 05/16/24 1155     Glucose 96 mg/dL     POC Glucose Once [[id number]]  (Abnormal) Collected: 05/16/24 0735    Specimen: Blood Updated: 05/16/24 0737     Glucose 137 mg/dL     Protime-INR [[id number]]  (Abnormal) Collected: 05/16/24 0607    Specimen: Blood Updated: 05/16/24 0657     Protime 15.1 Seconds      INR 1.16    POC Glucose Once [[id number]]  (Abnormal) Collected: 05/16/24 0548    Specimen: Blood Updated: 05/16/24 0548     Glucose 64 mg/dL     POC Glucose Once [[id number]]  (Abnormal) Collected: 05/15/24 2349    Specimen: Blood Updated: 05/15/24 2350     Glucose 147 mg/dL     Potassium [[id number]]  (Normal) Collected: 05/15/24 2048    Specimen: Blood Updated: 05/15/24 2131     Potassium 4.3 mmol/L      Comment: Specimen hemolyzed.  Result may be falsely elevated.       POC Glucose Once [[id number]]  (Abnormal) Collected: 05/15/24 2024    Specimen: Blood Updated: 05/15/24 2027     Glucose 163 mg/dL     Protime-INR [[id number]]  (Abnormal) Collected: 05/15/24 1827    Specimen: Blood from Arm, Left Updated: 05/15/24 1859     Protime 14.7 Seconds      INR 1.13    POC Glucose Once [[id number]]  (Abnormal) Collected: 05/15/24 1806    Specimen: Blood Updated: 05/15/24 1807     Glucose 142 mg/dL     POC Glucose Once [[id number]]  (Normal) Collected: 05/15/24 1207    Specimen: Blood Updated: 05/15/24 1208     Glucose 80 mg/dL     POC Glucose Once [[id number]]  (Normal) Collected: 05/15/24 0650    Specimen: Blood Updated: 05/15/24 0651     Glucose 87 mg/dL     Basic Metabolic Panel [[id number]]  (Abnormal) Collected: 05/15/24 0301    Specimen: Blood from Arm, Left Updated: 05/15/24 0346     Glucose 119 mg/dL      BUN 8 mg/dL      Creatinine 0.82 mg/dL      Sodium 138 mmol/L      Potassium 3.4 mmol/L      Chloride 107 mmol/L      CO2 22.0 mmol/L       Calcium 8.5 mg/dL      BUN/Creatinine Ratio 9.8     Anion Gap 9.0 mmol/L      eGFR 105.0 mL/min/1.73     Narrative:      GFR Normal >60  Chronic Kidney Disease <60  Kidney Failure <15      CBC (No Diff) [[id number]]  (Normal) Collected: 05/15/24 0301    Specimen: Blood from Arm, Left Updated: 05/15/24 0326     WBC 6.33 10*3/mm3      RBC 5.08 10*6/mm3      Hemoglobin 14.3 g/dL      Hematocrit 43.0 %      MCV 84.6 fL      MCH 28.1 pg      MCHC 33.3 g/dL      RDW 14.2 %      RDW-SD 43.0 fl      MPV 10.9 fL      Platelets 169 10*3/mm3     POC Glucose Once [[id number]]  (Normal) Collected: 05/14/24 2331    Specimen: Blood Updated: 05/14/24 2332     Glucose 129 mg/dL     Hemoglobin A1c [[id number]]  (Abnormal) Collected: 05/14/24 1832    Specimen: Blood Updated: 05/14/24 2157     Hemoglobin A1C 10.00 %     Narrative:      Hemoglobin A1C Ranges:    Increased Risk for Diabetes  5.7% to 6.4%  Diabetes                     >= 6.5%  Diabetic Goal                < 7.0%    Lipid Panel [[id number]]  (Abnormal) Collected: 05/14/24 1832    Specimen: Blood Updated: 05/14/24 2147     Total Cholesterol 193 mg/dL      Triglycerides 104 mg/dL      HDL Cholesterol 38 mg/dL      LDL Cholesterol  136 mg/dL      VLDL Cholesterol 19 mg/dL      LDL/HDL Ratio 3.53    Narrative:      Cholesterol Reference Ranges  (U.S. Department of Health and Human Services ATP III Classifications)    Desirable          <200 mg/dL  Borderline High    200-239 mg/dL  High Risk          >240 mg/dL      Triglyceride Reference Ranges  (U.S. Department of Health and Human Services ATP III Classifications)    Normal           <150 mg/dL  Borderline High  150-199 mg/dL  High             200-499 mg/dL  Very High        >500 mg/dL    HDL Reference Ranges  (U.S. Department of Health and Human Services ATP III Classifications)    Low     <40 mg/dl (major risk factor for CHD)  High    >60 mg/dl ('negative' risk factor for CHD)        LDL Reference Ranges  (U.S. Department  of Health and Human Services ATP III Classifications)    Optimal          <100 mg/dL  Near Optimal     100-129 mg/dL  Borderline High  130-159 mg/dL  High             160-189 mg/dL  Very High        >189 mg/dL    Urinalysis, Microscopic Only - Urine, Clean Catch [[id number]]  (Abnormal) Collected: 05/14/24 2001    Specimen: Urine, Clean Catch Updated: 05/14/24 2042     RBC, UA 3-5 /HPF      WBC, UA 11-20 /HPF      Bacteria, UA None Seen /HPF      Squamous Epithelial Cells, UA 3-6 /HPF      Hyaline Casts, UA 21-30 /LPF      Methodology Automated Microscopy    Urine Drug Screen - Urine, Clean Catch [[id number]]  (Normal) Collected: 05/14/24 2001    Specimen: Urine, Clean Catch Updated: 05/14/24 2040     Amphet/Methamphet, Screen Negative     Barbiturates Screen, Urine Negative     Benzodiazepine Screen, Urine Negative     Cocaine Screen, Urine Negative     Opiate Screen Negative     THC, Screen, Urine Negative     Methadone Screen, Urine Negative     Oxycodone Screen, Urine Negative     Fentanyl, Urine Negative    Narrative:      Negative Thresholds Per Drugs Screened:    Amphetamines                 500 ng/ml  Barbiturates                 200 ng/ml  Benzodiazepines              100 ng/ml  Cocaine                      300 ng/ml  Methadone                    300 ng/ml  Opiates                      300 ng/ml  Oxycodone                    100 ng/ml  THC                           50 ng/ml  Fentanyl                       5 ng/ml      The Normal Value for all drugs tested is negative. This report includes final unconfirmed screening results to be used for medical treatment purposes only. Unconfirmed results must not be used for non-medical purposes such as employment or legal testing. Clinical consideration should be applied to any drug of abuse test, particularly when unconfirmed results are used.            Urinalysis With Microscopic If Indicated (No Culture) - Urine, Clean Catch [[id number]]  (Abnormal) Collected: 05/14/24  2001    Specimen: Urine, Clean Catch Updated: 05/14/24 2020     Color, UA Dark Yellow     Appearance, UA Cloudy     pH, UA 5.5     Specific Gravity, UA >1.030     Glucose,  mg/dL (1+)     Ketones, UA Negative     Bilirubin, UA Negative     Blood, UA Small (1+)     Protein, UA >=300 mg/dL (3+)     Leuk Esterase, UA Negative     Nitrite, UA Negative     Urobilinogen, UA 1.0 E.U./dL    Manual Differential [[id number]]  (Abnormal) Collected: 05/14/24 1832    Specimen: Blood Updated: 05/14/24 1953     Neutrophil % 30.6 %      Lymphocyte % 61.2 %      Monocyte % 5.1 %      Eosinophil % 1.0 %      Basophil % 2.0 %      Neutrophils Absolute 1.74 10*3/mm3      Lymphocytes Absolute 3.47 10*3/mm3      Monocytes Absolute 0.29 10*3/mm3      Eosinophils Absolute 0.06 10*3/mm3      Basophils Absolute 0.11 10*3/mm3      RBC Morphology Normal     WBC Morphology Normal     Platelet Morphology Normal    CBC & Differential [[id number]]  (Normal) Collected: 05/14/24 1832    Specimen: Blood Updated: 05/14/24 1953    Narrative:      The following orders were created for panel order CBC & Differential.  Procedure                               Abnormality         Status                     ---------                               -----------         ------                     CBC Auto Differential[[id number]]        Normal              Final result                 Please view results for these tests on the individual orders.    CBC Auto Differential [[id number]]  (Normal) Collected: 05/14/24 1832    Specimen: Blood Updated: 05/14/24 1953     WBC 5.67 10*3/mm3      RBC 5.12 10*6/mm3      Hemoglobin 14.4 g/dL      Hematocrit 43.7 %      MCV 85.4 fL      MCH 28.1 pg      MCHC 33.0 g/dL      RDW 14.5 %      RDW-SD 44.9 fl      MPV 11.1 fL      Platelets 176 10*3/mm3     Protime-INR [[id number]]  (Normal) Collected: 05/14/24 1832    Specimen: Blood Updated: 05/14/24 1915     Protime 14.0 Seconds      INR 1.05    aPTT [[id number]]  (Normal)  Collected: 05/14/24 1832    Specimen: Blood Updated: 05/14/24 1915     PTT 25.3 seconds     Single High Sensitivity Troponin T [[id number]]  (Abnormal) Collected: 05/14/24 1832    Specimen: Blood Updated: 05/14/24 1911     HS Troponin T 26 ng/L     Narrative:      High Sensitive Troponin T Reference Range:  <14.0 ng/L- Negative Female for AMI  <22.0 ng/L- Negative Male for AMI  >=14 - Abnormal Female indicating possible myocardial injury.  >=22 - Abnormal Male indicating possible myocardial injury.   Clinicians would have to utilize clinical acumen, EKG, Troponin, and serial changes to determine if it is an Acute Myocardial Infarction or myocardial injury due to an underlying chronic condition.         BNP [[id number]]  (Abnormal) Collected: 05/14/24 1832    Specimen: Blood Updated: 05/14/24 1911     proBNP 916.0 pg/mL     Narrative:      This assay is used as an aid in the diagnosis of individuals suspected of having heart failure. It can be used as an aid in the diagnosis of acute decompensated heart failure (ADHF) in patients presenting with signs and symptoms of ADHF to the emergency department (ED). In addition, NT-proBNP of <300 pg/mL indicates ADHF is not likely.    Age Range Result Interpretation  NT-proBNP Concentration (pg/mL:      <50             Positive            >450                   Gray                 300-450                    Negative             <300    50-75           Positive            >900                  Gray                300-900                  Negative            <300      >75             Positive            >1800                  Gray                300-1800                  Negative            <300    TSH [[id number]]  (Normal) Collected: 05/14/24 1832    Specimen: Blood Updated: 05/14/24 1911     TSH 1.280 uIU/mL     Comprehensive Metabolic Panel [[id number]]  (Abnormal) Collected: 05/14/24 1832    Specimen: Blood Updated: 05/14/24 1908     Glucose 182 mg/dL      BUN 8 mg/dL       Creatinine 1.10 mg/dL      Sodium 139 mmol/L      Potassium 4.1 mmol/L      Chloride 106 mmol/L      CO2 23.6 mmol/L      Calcium 9.0 mg/dL      Total Protein 6.6 g/dL      Albumin 3.6 g/dL      ALT (SGPT) 19 U/L      AST (SGOT) 11 U/L      Alkaline Phosphatase 67 U/L      Total Bilirubin 0.3 mg/dL      Globulin 3.0 gm/dL      A/G Ratio 1.2 g/dL      BUN/Creatinine Ratio 7.3     Anion Gap 9.4 mmol/L      eGFR 80.3 mL/min/1.73     Narrative:      GFR Normal >60  Chronic Kidney Disease <60  Kidney Failure <15      Ethanol [[id number]] Collected: 05/14/24 1832    Specimen: Blood Updated: 05/14/24 1908     Ethanol <10 mg/dL      Ethanol % <0.010 %     Magnesium [[id number]]  (Normal) Collected: 05/14/24 1832    Specimen: Blood Updated: 05/14/24 1908     Magnesium 1.7 mg/dL           Data Review:  Results from last 7 days   Lab Units 05/15/24  2048 05/15/24  0301 05/14/24  1832   SODIUM mmol/L  --  138 139   POTASSIUM mmol/L 4.3 3.4* 4.1   CHLORIDE mmol/L  --  107 106   CO2 mmol/L  --  22.0 23.6   BUN mg/dL  --  8 8   CREATININE mg/dL  --  0.82 1.10   GLUCOSE mg/dL  --  119* 182*   CALCIUM mg/dL  --  8.5* 9.0     Results from last 7 days   Lab Units 05/15/24  0301 05/14/24  1832   WBC 10*3/mm3 6.33 5.67   HEMOGLOBIN g/dL 14.3 14.4   HEMATOCRIT % 43.0 43.7   PLATELETS 10*3/mm3 169 176     Results from last 7 days   Lab Units 05/14/24  1832   TSH uIU/mL 1.280     Results from last 7 days   Lab Units 05/14/24  1832   HEMOGLOBIN A1C % 10.00*     Lab Results   Lab Value Date/Time    TROPONINT 26 (H) 05/14/2024 1832    TROPONINT <0.010 10/10/2022 1444    TROPONINT <0.010 09/27/2022 1420     Results from last 7 days   Lab Units 05/14/24  1832   CHOLESTEROL mg/dL 193   TRIGLYCERIDES mg/dL 104   HDL CHOL mg/dL 38*   LDL CHOL mg/dL 136*     Results from last 7 days   Lab Units 05/14/24  1832   ALK PHOS U/L 67   BILIRUBIN mg/dL 0.3   ALT (SGPT) U/L 19   AST (SGOT) U/L 11     Results from last 7 days   Lab Units 05/14/24  1832   TSH  uIU/mL 1.280     Results from last 7 days   Lab Units 05/14/24  1832   HEMOGLOBIN A1C % 10.00*     Glucose   Date/Time Value Ref Range Status   05/16/2024 1153 96 70 - 130 mg/dL Final   05/16/2024 0735 137 (H) 70 - 130 mg/dL Final   05/16/2024 0548 64 (L) 70 - 130 mg/dL Final   05/15/2024 2349 147 (H) 70 - 130 mg/dL Final   05/15/2024 2024 163 (H) 70 - 130 mg/dL Final   05/15/2024 1806 142 (H) 70 - 130 mg/dL Final   05/15/2024 1207 80 70 - 130 mg/dL Final   05/15/2024 0650 87 70 - 130 mg/dL Final     Results from last 7 days   Lab Units 05/16/24  0607 05/15/24  1827 05/14/24  1832   INR  1.16* 1.13* 1.05       Past Medical History:   Diagnosis Date    COPD (chronic obstructive pulmonary disease)     Coronary artery disease     Diabetes mellitus     DVT (deep venous thrombosis)     Elevated cholesterol     GERD (gastroesophageal reflux disease)     H/O Cancer     right arm, in throat, chest and stomach, in remission    H/O Ischemia of extremity     History of MI (myocardial infarction) 2019    History of snoring     History of transfusion     HIV (human immunodeficiency virus infection)     Hypertension     Non Hodgkin's lymphoma     PAD (peripheral artery disease)     Pulmonary embolism     Stroke        Assessment:  Active Hospital Problems    Diagnosis  POA    **Slurred speech [R47.81]  Yes    Ischemic cardiomyopathy [I25.5]  Yes    Acute CVA (cerebrovascular accident) [I63.9]  Yes    NHL (non-Hodgkin's lymphoma) [C85.90]  Yes    Type 2 diabetes mellitus with circulatory disorder, without long-term current use of insulin [E11.59]  Yes    Coronary artery disease involving native coronary artery of native heart with angina pectoris [I25.119]  Yes    Anticoagulated on Coumadin [Z79.01]  Not Applicable    Arteriosclerosis of coronary artery [I25.10]  Yes      Resolved Hospital Problems   No resolved problems to display.       Plan:  Consults from cardiology noted.  Continue with anticoagulation for presumed embolic  "strokes.  Needs to have better control of anticoagulation.  Plans as per cardiology and neurology.  Speech is recommending some outpatient speech therapy when he is well enough to go home.  Cardiology adjusting meds and need INR therapeutic to get off Lovenox.    Rosendo Segal MD  2024  15:08 EDT     Electronically signed by Rosendo Segal MD at 24 1510       Paty Sommers PA-C at 24 0736       Attestation signed by Wendy Hewitt MD at 24 0801    I have reviewed this documentation and agree.                  DOS: 2024  NAME: Cecilio Puckett   : 1970  PCP: Arias Lehman APRN  Chief Complaint   Patient presents with    speech issues    Dizziness     Stroke    Subjective: Patient resting in bed and seems a bit depressed today. He is upset about his vision loss and new strokes. He is concerned about help with transportation as he cannot drive with visual field deficit until cleared by ophthalmology. He has son at home he is concerned about and anxious to get back home. Patient with no acute events overnight.     Interval History  History taken from: patient    Objective:  Vital signs: /92 (BP Location: Right arm, Patient Position: Lying)   Pulse 63   Temp 98.1 °F (36.7 °C) (Oral)   Resp 18   Ht 175.3 cm (69\")   Wt 95.3 kg (210 lb)   SpO2 98%   BMI 31.01 kg/m²       Physical Exam:  GENERAL: NAD  MS: A&O to person, place, month and year today, recent/remote memory intact, normal attention/concentration, language intact, no neglect, normal fund of knowledge  CN: visual acuity grossly normal, right homonymous hemianopia, PERRL, EOMI, facial sensation equal, no facial droop, hearing symmetric, palate elevates symmetrically, shoulder shrug equal, tongue midline  Motor: 5/5 throughout upper and lower extremities, normal tone  Sensation: intact to vibration and temperature throughout  Reflexes: 2+ throughout upper and lower extremities, downgoing " plantars  Coordination: no dysmetria with finger to nose bilaterally    Results Review:     I reviewed the patient's new clinical results.    Current Medications:  Scheduled Medications:amLODIPine, 10 mg, Oral, Daily  [Held by provider] aspirin, 81 mg, Oral, Daily  carvedilol, 6.25 mg, Oral, Q12H  [Held by provider] Hizfimq-Emjja-Rgjdjjhz-TenofAF, 1 tablet, Oral, Daily  [Held by provider] empagliflozin, 25 mg, Oral, Daily  enoxaparin, 1 mg/kg, Subcutaneous, Q12H  furosemide, 20 mg, Oral, Daily  [Held by provider] glipizide, 10 mg, Oral, Q12H  hydroCHLOROthiazide, 12.5 mg, Oral, Daily  insulin glargine, 25 Units, Subcutaneous, Nightly  insulin regular, 2-7 Units, Subcutaneous, Q6H  losartan, 25 mg, Oral, Daily  pantoprazole, 40 mg, Oral, Q AM  rosuvastatin, 20 mg, Oral, Daily  [Held by provider] sertraline, 50 mg, Oral, Daily  sodium chloride, 10 mL, Intravenous, Q12H      Infusions: Pharmacy to dose warfarin,   sodium chloride, 75 mL/hr, Last Rate: 75 mL/hr (05/15/24 1530)      PRN Medications:    acetaminophen    senna-docusate sodium **AND** polyethylene glycol **AND** bisacodyl **AND** bisacodyl    Calcium Replacement - Follow Nurse / BPA Driven Protocol    dextrose    dextrose    glucagon (human recombinant)    Magnesium Standard Dose Replacement - Follow Nurse / BPA Driven Protocol    nitroglycerin    ondansetron ODT **OR** ondansetron    Pharmacy to dose warfarin    Phosphorus Replacement - Follow Nurse / BPA Driven Protocol    Potassium Replacement - Follow Nurse / BPA Driven Protocol    [COMPLETED] Insert Peripheral IV **AND** sodium chloride    sodium chloride    sodium chloride    Medications Reviewed:     Laboratory results:  Lab Results   Component Value Date    GLUCOSE 119 (H) 05/15/2024    CALCIUM 8.5 (L) 05/15/2024     05/15/2024    K 4.3 05/15/2024    CO2 22.0 05/15/2024     05/15/2024    BUN 8 05/15/2024    CREATININE 0.82 05/15/2024    EGFRIFAFRI >60 11/29/2022    BCR 9.8 05/15/2024     "ANIONGAP 9.0 05/15/2024     Lab Results   Component Value Date    WBC 6.33 05/15/2024    HGB 14.3 05/15/2024    HCT 43.0 05/15/2024    MCV 84.6 05/15/2024     05/15/2024      Results from last 7 days   Lab Units 05/14/24  1832   CHOLESTEROL mg/dL 193     Lab Results   Component Value Date    INR 1.16 (H) 05/16/2024    INR 1.13 (H) 05/15/2024    INR 1.05 05/14/2024    PROTIME 15.1 (H) 05/16/2024    PROTIME 14.7 (H) 05/15/2024    PROTIME 14.0 05/14/2024     Lab Results   Component Value Date    TSH 1.280 05/14/2024     No components found for: \"LDLCALC\"  Lab Results   Component Value Date    HGBA1C 10.00 (H) 05/14/2024     No components found for: \"B12\"    Review and interpretation of imaging:  BMP: K: 3.4, Glucose: 119, Calcium 8.5, rest WNL  CBC: WNL  INR: 1.13,     TTE:    Left ventricular systolic function is moderately decreased. Calculated left ventricular EF = 36.9%    Left ventricular wall thickness is consistent with severe concentric hypertrophy.    The following left ventricular wall segments are hypokinetic: mid anterior. The following left ventricular wall segments are akinetic: apical anterior and apex.    Left ventricular diastolic function is consistent with (grade I) impaired relaxation.    The left atrial cavity is moderate to severely dilated.    Left atrial volume is severely increased.    Mild mitral valve regurgitation is present.    Saline test results are negative.    Diagnoses:  Multifocal acute infarction within bilateral cerebral hemispheres, largest area in left parieto-occipital lobe: patient with right homonymous hemianopia on exam and describes apraxia which clinically aligns with patients acute infarct. Unclear if his the timing of his stroke symptoms he states are accurate as he states they have been going on about 1 month. Patient did not know what month it was on orientation questions yesterday so may not be the most reliable historian. VF deficit remains the same today " without improvement. TTE done demonstrating LVEF of 36.9%, left atrial cavitiy with moderate to severe dilation, negative saline test and no thrombus present.   HTN  Non-Hodgkin's Lymphoma   HIV   PAM continue compliance with CPAP    Plan:  Statin  Cautious lowering of BP  Compliance with medications discussed  Stroke Education  VTE prophylaxis, lovenox given   Continuous telemetry monitoring   Ophthalmology outpatient for visual field testing  PT/OT   Follow up with neurology outpatient in 3 months.  No further neuro work-up planned at this time, will sign off, please call with questions.     I have discussed the above with the patient and family.  Paty Sommers PA-C  05/16/24  07:36 EDT        Slurred speech    Acute CVA (cerebrovascular accident)       Electronically signed by Wendy Hewitt MD at 05/17/24 0801       Manny Johnson MD at 05/15/24 0957          ELLIOT RO Attestation Note    I supervised care provided by the midlevel provider.    The LAURA and I have discussed this patient's history, physical exam, and treatment plan. I have reviewed the documentation and personally had a face to face interaction with the patient  I affirm the documentation and agree with the treatment and plan. I provided a substantive portion of the care of this patient.  I personally performed the physical exam, in its entirety.  My personal findings are documented in below:    History:  Patient with history of diabetes, hypertension, HIV and CAD presented for evaluation of slurred speech and dizziness symptoms.  Patient reports noncompliant with medications on a regular basis.      Physical Exam:  General: No acute distress.  Lying in bed comfortably  HENT: NCAT, PERRL, Nares patent.  Eyes: no scleral icterus.  Neck: trachea midline, no ROM limitations.  CV: Pink warm and well-perfused throughout  Respiratory: No distress or increased work of breathing  Abdomen: soft, no focal tenderness or rigidity  Musculoskeletal:  no deformity.  Neuro: alert, moves all extremities, follows commands.  Skin: warm, dry.    Assessment and Plan:  Slurred speech and dizziness: MRI brain indicates multiple bilateral areas of acute infarct.  Neurology consulted.  CTA head and neck performed this morning, report pending.    Diabetes: Accu-Cheks.  subcutaneous insulin management    Electronically signed by Manny Johnson MD at 05/15/24 0993       Marycarmen Bran APRN at 05/15/24 0833       Attestation signed by Manny Johnson MD at 05/15/24 2105    MD Attestation Note    I supervised care provided by the midlevel provider.    The LAURA and I have discussed this patient's history, physical exam, and treatment plan.   I provided a substantive portion of the care of this patient.  I have reviewed the documentation and personally had a face to face interaction with the patient  I affirm the documentation and agree with the treatment and plan.     SHARED VISIT: This visit was performed by BOTH a physician and an APC. The substantive portion of the medical decision making was performed by this attesting physician who made or approved the management plan and takes responsibility for patient management. All studies in the APC note (if performed) were independently interpreted by me.     My personal findings are documented in a separate note.                     ED OBSERVATION PROGRESS/DISCHARGE SUMMARY    Date of Admission: 5/14/2024   LOS: 0 days   PCP: Arias Lehman APRN    Final Diagnosis acute CVA      Subjective     Hospital Outcome:     Pleasant afebrile 54-year-old gentleman admitted to the ED observation unit for speech changes and ataxia.  MRI of patient's brain obtained overnight showed multiple bilateral areas of acute infarction.  Echocardiogram shows an ejection fraction of 36% with negative saline test.  His last echo performed 9/30/2023 showed EF of 48.1%.  I discussed this with Dr. Machado with the neurology service who recommends  patient be admitted to the medicine service so that cardiology can be consulted for consideration of BRIAN.    I have discussed case with Dr. Prasad on-call for [facility] who has graciously accepted to admit patient to the medicine service.        ROS:  General: no fevers, chills  Respiratory: no cough, dyspnea  Cardiovascular: no chest pain, palpitations  Abdomen: No abdominal pain, nausea, vomiting, or diarrhea  Neurologic: No focal weakness    Objective   Physical Exam:  I have reviewed the vital signs.  Temp:  [97.7 °F (36.5 °C)-98.4 °F (36.9 °C)] 97.7 °F (36.5 °C)  Heart Rate:  [] 76  Resp:  [16-18] 16  BP: (168-195)/() 182/98  General Appearance:    Alert, cooperative, no distress  Head:    Normocephalic, atraumatic  Eyes:    Sclerae anicteric  Neck:   Supple, no mass  Lungs: Clear to auscultation bilaterally, respirations unlabored  Heart: Regular rate and rhythm, S1 and S2 normal, no murmur, rub or gallop  Abdomen:  Soft, non-tender, bowel sounds active, obese  Extremities: No clubbing, cyanosis, or edema to lower extremities  Pulses:  2+ and symmetric in distal lower extremities  Skin: No rashes   Neurologic: Oriented x3, Normal strength to extremities    Results Review:    I have reviewed the labs, radiology results and diagnostic studies.    Results from last 7 days   Lab Units 05/15/24  0301   WBC 10*3/mm3 6.33   HEMOGLOBIN g/dL 14.3   HEMATOCRIT % 43.0   PLATELETS 10*3/mm3 169     Results from last 7 days   Lab Units 05/15/24  0301 05/14/24  1832   SODIUM mmol/L 138 139   POTASSIUM mmol/L 3.4* 4.1   CHLORIDE mmol/L 107 106   CO2 mmol/L 22.0 23.6   BUN mg/dL 8 8   CREATININE mg/dL 0.82 1.10   CALCIUM mg/dL 8.5* 9.0   BILIRUBIN mg/dL  --  0.3   ALK PHOS U/L  --  67   ALT (SGPT) U/L  --  19   AST (SGOT) U/L  --  11   GLUCOSE mg/dL 119* 182*     Imaging Results (Last 24 Hours)       Procedure Component Value Units Date/Time    CT Angiogram Head [[id number]] Resulted: 05/15/24 0816     Updated: 05/15/24  0820    CT Angiogram Neck [[id number]] Resulted: 05/15/24 0816     Updated: 05/15/24 0820    MRI Brain Without Contrast [[id number]] Collected: 05/15/24 0142     Updated: 05/15/24 0153    Narrative:      BRAIN MRI WITHOUT CONTRAST     HISTORY: Slurred speech; R47.9-Unspecified speech disturbances;  R42-Dizziness and giddiness; Z86.73-Personal history of transient  ischemic attack (TIA), and cerebral infarction without residual  deficits; I10-Essential (primary) hypertension     COMPARISON: September 29, 2023.     FINDINGS:  Multiplanar images of the head were obtained without  gadolinium. There are multiple areas of restricted diffusion. They're  noted within the left midbrain, the left parietal occipital region, left  centrum semiovale and left frontal cortex. Further scattered areas of  restricted diffusion are noted within the right cerebral hemisphere.  Given bilateral distribution, these may be embolic. The ventricles are  normal in size. There is no midline shift. There is periventricular and  deep white matter microangiopathic change. There is an old left  cerebellar infarct. There are old lacunar infarcts noted within the  right corona radiata, as well as the bilateral basal ganglia. Further  old lacunar infarct is noted within the right thalamus. Intracranial  flow voids appear grossly intact. Mucosal thickening is noted within the  ethmoid sinuses, and mucous retention cysts are noted within the left  maxillary sinus. There is trace fluid within the mastoid air cells  bilaterally, more significant on the left. The old infarct within the  left cerebellar hemisphere is associated with some chronic hemosiderin  deposition. Patient also has some chronic hemosiderin deposition  associated with the old lacunar infarcts within the right corona radiata  and basal ganglia. There does appear to be some petechial hemorrhagic  transformation which is noted within the area of evolving infarction  within the left  parieto-occipital region.          Impression:      1. The patient has multiple bilateral areas of acute infarction within  the cerebral hemispheres, as well as an additional acute infarct within  the left midbrain. This would suggest an embolic source. The single  largest area is noted within the left parieto-occipital region. This  area does appear to be associated with some mild petechial hemorrhagic  transformation.        This report was finalized on 5/15/2024 1:50 AM by Dr. Isa Armendariz M.D on Workstation: [id number]       CT Head Without Contrast [[id number]] Collected: 05/14/24 2025     Updated: 05/14/24 2234    Narrative:      EMERGENCY CT SCAN OF THE HEAD WITHOUT CONTRAST ON 05/14/2024     CLINICAL HISTORY: This is a 54-year-old male patient who feels like he  has been slurring words for greater than a month     TECHNIQUE: Spiral CT images were obtained from the base of the skull to  the vertex without intravenous contrast. The images were reformatted and  are submitted in 3 mm thick axial, sagittal and coronal CT sections with  brain algorithm.     COMPARISON: This is correlated to a prior MRI of the brain on 09/29/2023  and 10/10/2022..     FINDINGS: There is a 10 x 7 mm old lacunar infarct extending from the  mid right corona radiata region into the superior right putamen that  occurred back in September 2023. There is an additional 8 x 5 mm old  lacunar infarct in the anterior right putamen, 4 mm old lacunar infarct  in the posterior limb of the right internal capsule. There is a moderate  size area of infarction involving the posterior inferior medial left  parietal lobe extending in the superior left occipital lobe. It measures  approximately 4.8 x 3 x 4 cm. It is in the distribution of  parieto-occipital branches left MCA territory and the precise age of  this infarct is uncertain but may be subacute in nature. There is a 11 x  6 mm infarct in the posterior lateral left cerebellum and left  PICA  territory either subacute or chronic. The remainder of the brain  parenchyma is normal in attenuation. The ventricles are normal in size.  I see no mass effect and no midline shift and no extra-axial fluid  collections are identified and there is no evidence of acute  intracranial hemorrhage. The calvarium and the skull base are normal in  appearance. Paranasal sinuses, mastoid air cells and middle ear cavities  are clear.       Impression:      1. There is a moderate size area of acute/subacute infarction extending  from the posterior inferior left parietal lobe into the superior left  occipital lobe measuring 4.8 x 3 x 4 cm in size in the distribution of  parieto-occipital branch of the left MCA territory. Its precise age is  uncertain and I recommend an MRI of the brain to better date it. There  is an 11 x 6 mm infarct in the posterior lateral left cerebellum that is  new when compared to the MRI of the brain on 09/29/2023 and I suspect  this is either subacute or chronic. Otherwise, the head CT is unchanged  when compared to MRI of the brain 09/29/2023. There is an 11 x 7 mm old  lacunar infarct extending from the mid right corona radiata region into  the superior right putamen that occurred back in September of 2023.  There are small old lacunar infarcts in the anterior right putamen and  posterior limb of the right internal capsule. The remainder of the head  CT is within normal limits. The results and recommendations were  communicated to Kely Luna by telephone on 05/14/2024 at 8 p.m.     Radiation dose reduction techniques were utilized, including automated  exposure control and exposure modulation based on body size.        This report was finalized on 5/14/2024 10:31 PM by Dr. Sebastian Way M.D  on Workstation: [id number]       XR Chest 1 View [[id number]] Collected: 05/14/24 1918     Updated: 05/14/24 1921    Narrative:      XR CHEST 1 VW-     HISTORY: Male who is 54 years-old, dizziness      TECHNIQUE: Frontal view of the chest     COMPARISON: 9/29/2023     FINDINGS: Heart, mediastinum and pulmonary vasculature are unremarkable.  No focal pulmonary consolidation, pleural effusion, or pneumothorax. No  acute osseous process.       Impression:      No evidence for acute pulmonary process. Follow-up as  clinical indications persist.     This report was finalized on 5/14/2024 7:18 PM by Dr. Junior Weiner M.D on Workstation: [id number]               I have reviewed the medications.  ---------------------------------------------------------------------------------------------  Assessment & Plan   Assessment/Problem List    Slurred speech      Plan:    Slurred speech  -Neurochecks every 4 hours   -Vital signs every 4 hours   -Cardiac monitoring   -MRI-brain without contrast + acute MRI  -CT Head -There is a moderate size area of acute/subacute infarction extending  from the posterior inferior left parietal lobe into the superior left occipital lobe measuring 4.8 x 3 x 4 cm in size in the distribution of parieto-occipital branch of the left MCA territory. Its precise age is uncertain and I recommend an MRI of the brain to further date it.   -PT to eval and treat  -Speech therapy to eval secondary to failing dysphagia screen  -Lipid panel shows   -Neuro consult, see MD note        Diabetes  -Accu-Cheks every 6 hours  -Adult subcutaneous insulin management-low dose  -Hemoglobin A1c 10.00     -HIV  -Continue home medication for HIV     History of CVA\DVT  -Pharmacy to dose Lovenox    Disposition: admitted to [facility], Dr. Prasad inpatient    This note will serve as a progress and transfer note    Marycarmen Bran, APRN 05/15/24 08:39 EDT    I have worn appropriate PPE during this patient encounter, sanitized my hands both with entering and exiting patient's room.      55 minutes has been spent by [facility] providers in the care of this patient while under observation  status              Electronically signed by Manny Johnson MD at 05/15/24 2102       Severo Man MD at 05/14/24 2210          MD ATTESTATION NOTE    The LAURA and I have discussed this patient's history, physical exam, and treatment plan.  I have reviewed the documentation and personally had a face to face interaction with the patient. I affirm the documentation and agree with the treatment and plan.  The attached note describes my personal findings.      I provided a substantive portion of the care of the patient.  I personally performed the physical exam in its entirety, and below are my findings.       Brief HPI: Patient is observation with intermittent slurred speech for the past month or so.  Patient reports occasional unsteady on his feet.  Patient with prior history of stroke.  Patient anticoagulated on warfarin.    PHYSICAL EXAM  ED Triage Vitals   Temp Heart Rate Resp BP SpO2   05/14/24 1759 05/14/24 1759 05/14/24 1759 05/14/24 1808 05/14/24 1759   97.9 °F (36.6 °C) 108 16 (!) 174/112 97 %      Temp src Heart Rate Source Patient Position BP Location FiO2 (%)   05/14/24 2100 05/14/24 1914 05/14/24 2100 05/14/24 2100 --   Axillary Monitor Lying Right arm          GENERAL: no acute distress  HENT: nares patent  EYES: no scleral icterus  CV: regular rhythm, normal rate  RESPIRATORY: normal effort  ABDOMEN: soft  MUSCULOSKELETAL: no deformity  NEURO: alert, moves all extremities, follows commands  PSYCH:  calm, cooperative  SKIN: warm, dry    Vital signs and nursing notes reviewed.        Plan: Neurology consult.  CT negative.  MRI pending.      Electronically signed by Severo Man MD at 05/14/24 2211          Consult Notes (last 7 days)        Paty Sommers PA-C at 05/15/24 0775        Consult Orders    1. Inpatient Neurology Consult Stroke [[id number]] ordered by Lisbet Chavez APRN at 05/14/24 1959              Attestation signed by Isaac Heaton MD at 05/17/24 1913     I have reviewed this documentation and agree.                  Neurology Consult Note    Consult Date: 5/15/2024    Referring MD: Jenny Thomason MD    Reason for Consult I have been asked to see the patient in neurological consultation to render advice and opinion regarding slurred speech.    Cecilio Puckett is a 54 y.o. male with PMH of COPD, diabetes, HIV, HTN, non-hodgkin's lymphoma, PAD, PE, TIA and stroke presents to the ED with slurred speech and dizziness. Patient states he noticed symptoms over the last month.  He states that his dizziness is more like a lightheadedness or feeling as though he is about to pass out. He denies a room spinning sensation or associated N/V. He admits to associated posterior headache that is pressure headache that is intermittent. He says sometimes ibuprofen helps the headache but not all the time. He denies any provoking factors. He admits to difficulty with vision that started about a month ago and describes it as wavy vision in his eyes and that he can see color. He denies pain with vision loss.  He also states that he has had trouble putting on jackets and with brushing his teeth for about 1 month as well. He is anticoagulated on warfarin for history of DVT/PE but admits to non-compliance wit his medications. He states it was recommended for him to go to rehab after previous stroke but he says he went home and did not continue therapies. Patient denies fall or head injury. Patient denies headache, and unilateral numbness/weakness. BP on arrival 174/112 mmHg, pulse 75, glucose 182. His last CD4 was 1060 on 4/5/2024.     Pertinent neurological history:  Patient seen 10/11/2022 with syncope, generalized weakness and was seen on 9/27/2022 for slurred speech and significantly elevated BP. Patient was non-compliant with coumadin then as well. Patient had subacute lacunar infarct of right internal capsule believed to be within the month. TTE demonstrated EF of 48% with negative  saline tests and no LAE or thrombus. It was recommended for him to continue coumadin and aspirin 81 mg plus statins. He was seen again on 9/30/2023 with worsening dysarthria and intermittent left-sided weakness. MRI revealed new small right subcortical stroke in very similar area. CTA head and neck revealed mild narrowing of bilateral common carotids and ICA's plus acute infarctions of right corona radiata and right basal ganglia without hemorrhagic transformation.    Past Medical/Surgical Hx:  Past Medical History:   Diagnosis Date    COPD (chronic obstructive pulmonary disease)     Coronary artery disease     Diabetes mellitus     DVT (deep venous thrombosis)     Elevated cholesterol     GERD (gastroesophageal reflux disease)     H/O Cancer     right arm, in throat, chest and stomach, in remission    H/O Ischemia of extremity     History of MI (myocardial infarction) 2019    History of snoring     History of transfusion     HIV (human immunodeficiency virus infection)     Hypertension     Non Hodgkin's lymphoma     PAD (peripheral artery disease)     Pulmonary embolism     Stroke      Past Surgical History:   Procedure Laterality Date    CARDIAC CATHETERIZATION      CORONARY ANGIOPLASTY WITH STENT PLACEMENT      FEMORAL ARTERY - FEMORAL ARTERY BYPASS GRAFT      PORTACATH PLACEMENT         Medications On Admission  Medications Prior to Admission   Medication Sig Dispense Refill Last Dose    Adult Aspirin Regimen 81 MG EC tablet Take 1 tablet by mouth Daily.   Past Week    amLODIPine (NORVASC) 10 MG tablet Take 1 tablet by mouth Daily.   Past Month    dapagliflozin Propanediol (Farxiga) 10 MG tablet    Past Month    Mvpafoj-Tzpli-Nmmfnrde-TenofAF (GENVOYA) [id number] MG per tablet Take 1 tablet by mouth Daily.   Past Month    Enoxaparin Sodium (LOVENOX) 100 MG/ML solution prefilled syringe syringe Inject 1 mL under the skin into the appropriate area as directed Every 12 (Twelve) Hours. Indications: DVT/PE  (active thrombosis) 28 mL 0 Past Week    ergocalciferol (ERGOCALCIFEROL) 1.25 MG (81166 UT) capsule Take 1 capsule by mouth 1 (One) Time Per Week.   Past Month    glipizide (GLUCOTROL) 10 MG tablet Take 1 tablet by mouth Every 12 (Twelve) Hours.   Past Week    glucose blood test strip Use to test blood glucose up to four times daily as needed. Formulary Compliance Approval. Diagnosis: Type 2 Diabetes - Insulin Dependent 100 each 0 5/13/2024    glucose monitor monitoring kit Use to test blood glucose up to four times daily as needed. Formulary Compliance Approval. Diagnosis: Type 2 Diabetes - Insulin Dependent 1 each 0 5/13/2024    Insulin Glargine (LANTUS SOLOSTAR) 100 UNIT/ML injection pen Inject 25 Units under the skin into the appropriate area as directed Every Night. 100 mL 0 Past Month    Lancets misc Use to test blood glucose up to four times daily as needed. Formulary Compliance Approval. Diagnosis: Type 2 Diabetes - Insulin Dependent 100 each 0 Past Week    losartan (COZAAR) 25 MG tablet Take 1 tablet by mouth Daily.   Past Month    pantoprazole (PROTONIX) 40 MG EC tablet    Past Month    sertraline (ZOLOFT) 50 MG tablet Take 1 tablet by mouth Daily.   5/13/2024    warfarin (COUMADIN) 10 MG tablet TAKE one AND one half TABLET BY MOUTH ON MONDAY, WEDNESDAY, FRIDAY. TAKE ONE TABLET BY MOUTH ALL other DAYS.   5/14/2024    carvedilol (COREG) 6.25 MG tablet Take 1 tablet by mouth Every 12 (Twelve) Hours for 30 days. 60 tablet 0     furosemide (LASIX) 20 MG tablet Take 1 tablet by mouth Daily.   Unknown    hydroCHLOROthiazide 12.5 MG tablet Take 1 tablet by mouth Daily.   Unknown    HYDROcodone-acetaminophen (NORCO)  MG per tablet Take 1 tablet by mouth.   Unknown    insulin lispro (ADMELOG) 100 UNIT/ML injection Inject 8 Units under the skin into the appropriate area as directed 3 (Three) Times a Day With Meals for 30 days. 100 mL 0     rosuvastatin (CRESTOR) 20 MG tablet Take 1 tablet by mouth Daily.    "Unknown       Allergies:  No Known Allergies    Social Hx:  Social History     Socioeconomic History    Marital status: Single   Tobacco Use    Smoking status: Every Day     Current packs/day: 1.00     Average packs/day: 1 pack/day for 30.4 years (30.4 ttl pk-yrs)     Types: Cigars, Cigarettes     Start date: 01/1994   Vaping Use    Vaping status: Never Used   Substance and Sexual Activity    Alcohol use: Yes     Alcohol/week: 2.0 standard drinks of alcohol     Types: 2 Cans of beer per week     Comment: occassionally    Drug use: Not Currently    Sexual activity: Defer       Family Hx:  Family History   Problem Relation Age of Onset    Hypertension Mother     Diabetes Mother     Stroke Mother     Hypertension Maternal Grandmother        Exam    BP (!) 168/101 (BP Location: Right arm, Patient Position: Lying)   Pulse 63   Temp 98.2 °F (36.8 °C) (Oral)   Resp 16   Ht 175.3 cm (69\")   Wt 95.7 kg (210 lb 14.4 oz)   SpO2 98%   BMI 31.14 kg/m²   gen: NAD, vitals reviewed  MS: oriented to person, year and place but guesses month as March, recent/remote memory intact, normal attention/concentration, language intact, no neglect, normal fund of knowledge  CN: visual acuity grossly normal, visual fields full, PERRL, EOMI, facial sensation equal, no facial droop, hearing symmetric, palate elevates symmetrically, shoulder shrug equal, tongue midline  Motor: 5/5 throughout upper and lower extremities, normal tone  Sensation: intact to vibration and temperature throughout  Reflexes: 2+ throughout upper and lower extremities, downgoing plantars  Coordination: no dysmetria with finger to nose bilaterally    DATA:    Lab Results   Component Value Date    GLUCOSE 119 (H) 05/15/2024    CALCIUM 8.5 (L) 05/15/2024     05/15/2024    K 3.4 (L) 05/15/2024    CO2 22.0 05/15/2024     05/15/2024    BUN 8 05/15/2024    CREATININE 0.82 05/15/2024    EGFRIFAFRI >60 11/29/2022    BCR 9.8 05/15/2024    ANIONGAP 9.0 05/15/2024 "     Lab Results   Component Value Date    WBC 6.33 05/15/2024    HGB 14.3 05/15/2024    HCT 43.0 05/15/2024    MCV 84.6 05/15/2024     05/15/2024     Lab Results   Component Value Date     (H) 05/14/2024     (H) 10/01/2023     (H) 09/29/2023     Lab Results   Component Value Date    HGBA1C 10.00 (H) 05/14/2024     Lab Results   Component Value Date    INR 1.05 05/14/2024    INR 1.20 (H) 01/22/2024    INR 1.10 12/22/2023    PROTIME 14.0 05/14/2024    PROTIME 14.4 (H) 01/22/2024    PROTIME 13.8 (H) 12/22/2023       Lab review:Hemoglobin A1c: 10, LDL: 136,  INR: 1.05, UDS: negative, ethanol: <0.10, CD4: 1060    Imaging review:   CTH head without:  FINDINGS: There is a 10 x 7 mm old lacunar infarct extending from the  mid right corona radiata region into the superior right putamen that  occurred back in September 2023. There is an additional 8 x 5 mm old  lacunar infarct in the anterior right putamen, 4 mm old lacunar infarct  in the posterior limb of the right internal capsule. There is a moderate  size area of infarction involving the posterior inferior medial left  parietal lobe extending in the superior left occipital lobe. It measures  approximately 4.8 x 3 x 4 cm. It is in the distribution of  parieto-occipital branches left MCA territory and the precise age of  this infarct is uncertain but may be subacute in nature. There is a 11 x  6 mm infarct in the posterior lateral left cerebellum and left PICA  territory either subacute or chronic. The remainder of the brain  parenchyma is normal in attenuation. The ventricles are normal in size.  I see no mass effect and no midline shift and no extra-axial fluid  collections are identified and there is no evidence of acute  intracranial hemorrhage. The calvarium and the skull base are normal in  appearance. Paranasal sinuses, mastoid air cells and middle ear cavities  are clear.  IMPRESSION:  1. There is a moderate size area of acute/subacute  infarction extending  from the posterior inferior left parietal lobe into the superior left  occipital lobe measuring 4.8 x 3 x 4 cm in size in the distribution of  parieto-occipital branch of the left MCA territory. Its precise age is  uncertain and I recommend an MRI of the brain to better date it. There  is an 11 x 6 mm infarct in the posterior lateral left cerebellum that is  new when compared to the MRI of the brain on 09/29/2023 and I suspect  this is either subacute or chronic. Otherwise, the head CT is unchanged  when compared to MRI of the brain 09/29/2023. There is an 11 x 7 mm old  lacunar infarct extending from the mid right corona radiata region into  the superior right putamen that occurred back in September of 2023.  There are small old lacunar infarcts in the anterior right putamen and  posterior limb of the right internal capsule. The remainder of the head  CT is within normal limits. The results and recommendations were  communicated to Kely Luna by telephone on 05/14/2024 at 8 p.m.    CTA head and neck:  FINDINGS: Initially, a noncontrasted CT examination of the brain was  performed. An area of decreased attenuation involving the left occipital  lobe posteriorly is appreciated corresponding to the largest of the  acute infarcts noted on the MRI examination from earlier the same day.  It measures approximately 4.5 cm in maximum transverse dimension. The  smaller acute infarcts noted on the MRI are largely occult on the  current CT examination. A small remote infarct involving the left  cerebellar hemisphere centrally is noted. No new area of decreased  attenuation to suggest a new infarction is identified.     A CT angiogram of the neck and head was performed. Multiplanar as well  as 3-dimensional reconstructions were generated.     The great vessels are arranged in a classic configuration. Noncalcified  plaque is appreciated involving the common carotid arteries bilaterally  as well as  extending to and involving the internal carotid arteries  proximally where mild vascular calcification is appreciated. There is 0%  stenosis of the internal carotid arteries using NASCET criteria.  Atherosclerotic disease is more severe involving the cavernous ICA on  the left where there is moderate stenosis and irregularity proximally.  The study is hampered by patient motion but the proximal aspects of the  anterior and middle cerebral arteries appear unremarkable.     The left vertebral artery is larger than that of the right. The left  vertebral artery is of relatively uniform caliber. The right vertebral  artery is small in caliber and there is a severe stenosis involving the  right vertebral artery proximally. The right vertebral artery was  obscured by beam-hardening artifact proximally on the prior examination.  Distally, the right vertebral artery is attenuated in caliber and is  nonopacified from a point just proximal to the dura to the  vertebrobasilar junction. The right vertebral artery was patent  distally. The posterior-inferior cerebellar artery on the right is not  opacified. The basilar artery and the proximal aspects of the posterior  cerebral arteries appear unremarkable. Moderate stenoses involving the  distal P2 segments are appreciated bilaterally.     A standard postcontrast CT examination of the brain showed no evidence  of abnormal enhancement. The right vertebral artery from the dura to the  vertebrobasilar junction is opacified on the postcontrast CT  examination. The V4 segment is irregular and smaller in caliber as  compared to the CT angiogram of 09/30/2023.     IMPRESSION:  The right vertebral artery is attenuated in caliber  distally, particularly the V4 segment and this is new versus 09/30/2023.  The right vertebral artery proximally was obscured by beam-hardening  artifact on the prior CT angiogram of 09/30/2023. On the current  examination there is severe stenosis involving the  right vertebral  artery proximally.    MRI brain with and without:  FINDINGS:  Multiplanar images of the head were obtained without  gadolinium. There are multiple areas of restricted diffusion. They're  noted within the left midbrain, the left parietal occipital region, left  centrum semiovale and left frontal cortex. Further scattered areas of  restricted diffusion are noted within the right cerebral hemisphere.  Given bilateral distribution, these may be embolic. The ventricles are  normal in size. There is no midline shift. There is periventricular and  deep white matter microangiopathic change. There is an old left  cerebellar infarct. There are old lacunar infarcts noted within the  right corona radiata, as well as the bilateral basal ganglia. Further  old lacunar infarct is noted within the right thalamus. Intracranial  flow voids appear grossly intact. Mucosal thickening is noted within the  ethmoid sinuses, and mucous retention cysts are noted within the left  maxillary sinus. There is trace fluid within the mastoid air cells  bilaterally, more significant on the left. The old infarct within the  left cerebellar hemisphere is associated with some chronic hemosiderin  deposition. Patient also has some chronic hemosiderin deposition  associated with the old lacunar infarcts within the right corona radiata  and basal ganglia. There does appear to be some petechial hemorrhagic  transformation which is noted within the area of evolving infarction  within the left parieto-occipital region.  IMPRESSION:  1. The patient has multiple bilateral areas of acute infarction within  the cerebral hemispheres, as well as an additional acute infarct within  the left midbrain. This would suggest an embolic source. The single  largest area is noted within the left parieto-occipital region. This  area does appear to be associated with some mild petechial hemorrhagic  transformation.      Diagnoses:  Multifocal acute infarction  within bilateral cerebral hemispheres, largest area in left parieto-occipital lobe: patient with right homonymous hemianopia on exam and describes apraxia which clinically aligns with patients acute infarct. Unclear if his the timing of his stroke symptoms he states are accurate as he states they have been going on about 1 month. Patient did not know what month it was on orientation questions so may not be the most reliable historian.  HTN  Non-Hodgkin's Lymphoma   HIV     NIHSS: 3    PLAN:   Lovenox to coumadin bridge started by primary care team, INR goal per primary/cardiology  Statin  Cautious lowering of BP  Compliance with medications discussed  Stroke Education  VTE prophylaxis  Continuous telemetry monitoring   Ophthalmology outpatient for visual field testing    Recommendations discussed with Dr. Heaton and he is in agreement with plan.    Electronically signed by Isaac Heaton MD at 24       Sade Prasad MD at 05/15/24 2317        Consult Orders    1. Inpatient Hospitalist Consult [[id number]] ordered by Marycarmen rBan APRN at 05/15/24 1443                 CONSULT NOTE    INTERNAL MEDICINE   [facility]       Patient Identification:  Name: Cecilio Puckett  Age: 54 y.o.  Sex: male  :  1970  MRN: [id number]             Date of Consultation:  05/15/24          Primary Care Physician: Arias Lehman APRN                               Requesting Physician: dr marin  Reason for Consultation: admission/assuming care    Chief Complaint:  54 year old gentleman presented to the emergency room with slurred speech and dizziness; workup revealed an acute cva; echo done showed a decreased ef from prior and cardiology evaluation is planned; he has had a prior cva and has been noncompliant with taking his medications    History of Present Illness:   As above      Past Medical History:  Past Medical History:   Diagnosis Date    COPD (chronic obstructive pulmonary  disease)     Coronary artery disease     Diabetes mellitus     DVT (deep venous thrombosis)     Elevated cholesterol     GERD (gastroesophageal reflux disease)     H/O Cancer     right arm, in throat, chest and stomach, in remission    H/O Ischemia of extremity     History of MI (myocardial infarction) 2019    History of snoring     History of transfusion     HIV (human immunodeficiency virus infection)     Hypertension     Non Hodgkin's lymphoma     PAD (peripheral artery disease)     Pulmonary embolism     Stroke      Past Surgical History:  Past Surgical History:   Procedure Laterality Date    CARDIAC CATHETERIZATION      CORONARY ANGIOPLASTY WITH STENT PLACEMENT      FEMORAL ARTERY - FEMORAL ARTERY BYPASS GRAFT      PORTACATH PLACEMENT        Home Meds:  Medications Prior to Admission   Medication Sig Dispense Refill Last Dose    Adult Aspirin Regimen 81 MG EC tablet Take 1 tablet by mouth Daily.   Past Week    amLODIPine (NORVASC) 10 MG tablet Take 1 tablet by mouth Daily.   Past Month    dapagliflozin Propanediol (Farxiga) 10 MG tablet    Past Month    Ayhnvye-Errot-Iwhqdvtz-TenofAF (GENVOYA) [phone] MG per tablet Take 1 tablet by mouth Daily.   Past Month    Enoxaparin Sodium (LOVENOX) 100 MG/ML solution prefilled syringe syringe Inject 1 mL under the skin into the appropriate area as directed Every 12 (Twelve) Hours. Indications: DVT/PE (active thrombosis) 28 mL 0 Past Week    ergocalciferol (ERGOCALCIFEROL) 1.25 MG (10938 UT) capsule Take 1 capsule by mouth 1 (One) Time Per Week.   Past Month    glipizide (GLUCOTROL) 10 MG tablet Take 1 tablet by mouth Every 12 (Twelve) Hours.   Past Week    glucose blood test strip Use to test blood glucose up to four times daily as needed. Formulary Compliance Approval. Diagnosis: Type 2 Diabetes - Insulin Dependent 100 each 0 5/13/2024    glucose monitor monitoring kit Use to test blood glucose up to four times daily as needed. Formulary Compliance Approval.  Diagnosis: Type 2 Diabetes - Insulin Dependent 1 each 0 5/13/2024    Insulin Glargine (LANTUS SOLOSTAR) 100 UNIT/ML injection pen Inject 25 Units under the skin into the appropriate area as directed Every Night. 100 mL 0 Past Month    Lancets misc Use to test blood glucose up to four times daily as needed. Formulary Compliance Approval. Diagnosis: Type 2 Diabetes - Insulin Dependent 100 each 0 Past Week    losartan (COZAAR) 25 MG tablet Take 1 tablet by mouth Daily.   Past Month    pantoprazole (PROTONIX) 40 MG EC tablet    Past Month    sertraline (ZOLOFT) 50 MG tablet Take 1 tablet by mouth Daily.   5/13/2024    warfarin (COUMADIN) 10 MG tablet TAKE one AND one half TABLET BY MOUTH ON MONDAY, WEDNESDAY, FRIDAY. TAKE ONE TABLET BY MOUTH ALL other DAYS.   5/14/2024    carvedilol (COREG) 6.25 MG tablet Take 1 tablet by mouth Every 12 (Twelve) Hours for 30 days. 60 tablet 0     furosemide (LASIX) 20 MG tablet Take 1 tablet by mouth Daily.   Unknown    hydroCHLOROthiazide 12.5 MG tablet Take 1 tablet by mouth Daily.   Unknown    HYDROcodone-acetaminophen (NORCO)  MG per tablet Take 1 tablet by mouth.   Unknown    insulin lispro (ADMELOG) 100 UNIT/ML injection Inject 8 Units under the skin into the appropriate area as directed 3 (Three) Times a Day With Meals for 30 days. 100 mL 0     rosuvastatin (CRESTOR) 20 MG tablet Take 1 tablet by mouth Daily.   Unknown     Current Meds:     Current Facility-Administered Medications:     acetaminophen (TYLENOL) tablet 650 mg, 650 mg, Oral, Q4H PRN, Lisbet Chavez APRN    amLODIPine (NORVASC) tablet 10 mg, 10 mg, Oral, Daily, Marycarmen Bran APRN, 10 mg at 05/15/24 1521    [Held by provider] aspirin EC tablet 81 mg, 81 mg, Oral, Daily, Lisbet Chavez APRN    sennosides-docusate (PERICOLACE) 8.6-50 MG per tablet 2 tablet, 2 tablet, Oral, BID PRN **AND** polyethylene glycol (MIRALAX) packet 17 g, 17 g, Oral, Daily PRN **AND** bisacodyl (DULCOLAX) EC tablet 5 mg, 5 mg, Oral,  Daily PRN **AND** bisacodyl (DULCOLAX) suppository 10 mg, 10 mg, Rectal, Daily PRN, Lisbet Chavez APRN    Calcium Replacement - Follow Nurse / BPA Driven Protocol, , Does not apply, PRN, Lisbet Chavez APRN    carvedilol (COREG) tablet 6.25 mg, 6.25 mg, Oral, Q12H, Marycarmen Bran APRN, 6.25 mg at 05/15/24 2042    dextrose (D50W) (25 g/50 mL) IV injection 25 g, 25 g, Intravenous, Q15 Min PRN, Lisbet Chavez APRN    dextrose (GLUTOSE) oral gel 15 g, 15 g, Oral, Q15 Min PRN, Lisbet Chavez APRN    [Held by provider] Eijhzqb-Kdunj-Uvozdmkg-TenofAF (GENVOYA) [phone] MG per tablet 1 tablet, 1 tablet, Oral, Daily, Lisbet Chavez APRN    [Held by provider] empagliflozin (JARDIANCE) tablet 25 mg, 25 mg, Oral, Daily, Lisbet Chavez APRN    Enoxaparin Sodium (LOVENOX) syringe 100 mg, 1 mg/kg, Subcutaneous, Q12H, Lisbet Chavez APRN, 100 mg at 05/15/24 0836    furosemide (LASIX) tablet 20 mg, 20 mg, Oral, Daily, Marycarmen Bran APRN, 20 mg at 05/15/24 1520    [Held by provider] glipizide (GLUCOTROL) tablet 10 mg, 10 mg, Oral, Q12H, Lisbet Chavez APRN    glucagon (GLUCAGEN) injection 1 mg, 1 mg, Intramuscular, Q15 Min PRN, Lisbet Chavez APRN    hydroCHLOROthiazide tablet 12.5 mg, 12.5 mg, Oral, Daily, Marycarmen Bran APRN, 12.5 mg at 05/15/24 1522    insulin glargine (LANTUS, SEMGLEE) injection 25 Units, 25 Units, Subcutaneous, Nightly, Lisbet Chavez APRN, 25 Units at 05/15/24 2043    insulin regular (humuLIN R,novoLIN R) injection 2-7 Units, 2-7 Units, Subcutaneous, Q6H, Lisbet Chavez APRN    losartan (COZAAR) tablet 25 mg, 25 mg, Oral, Daily, Marycarmen Bran APRN, 25 mg at 05/15/24 1520    Magnesium Standard Dose Replacement - Follow Nurse / BPA Driven Protocol, , Does not apply, PRN, Lisbet Chavez APRN    nitroglycerin (NITROSTAT) SL tablet 0.4 mg, 0.4 mg, Sublingual, Q5 Min PRN, Lisbet Chavez APRN    ondansetron ODT (ZOFRAN-ODT) disintegrating tablet 4 mg, 4 mg, Oral, Q6H PRN **OR** ondansetron (ZOFRAN)  injection 4 mg, 4 mg, Intravenous, Q6H PRN, ScottLatricehy S, APRN    pantoprazole (PROTONIX) EC tablet 40 mg, 40 mg, Oral, Q AM, Marycarmen Bran APRN    Pharmacy to dose warfarin, , Does not apply, Continuous PRN, Marycarmen Bran, APRCURT    Phosphorus Replacement - Follow Nurse / BPA Driven Protocol, , Does not apply, PRN, ScottLatricehy S, APRN    Potassium Replacement - Follow Nurse / BPA Driven Protocol, , Does not apply, PRN, Scott Lisbet S, APRN    rosuvastatin (CRESTOR) tablet 20 mg, 20 mg, Oral, Daily, Hertiffanie, Marycarmen, APRN, 20 mg at 05/15/24 1521    [Held by provider] sertraline (ZOLOFT) tablet 50 mg, 50 mg, Oral, Daily, ScottLatricehy S, APRN    [COMPLETED] Insert Peripheral IV, , , Once **AND** sodium chloride 0.9 % flush 10 mL, 10 mL, Intravenous, PRN, Kely Luna PA-C    sodium chloride 0.9 % flush 10 mL, 10 mL, Intravenous, Q12H, Scott, Lisbet S, APRN, 10 mL at 05/15/24 0836    sodium chloride 0.9 % flush 10 mL, 10 mL, Intravenous, PRN, Scott, Lisbet S, APRN    sodium chloride 0.9 % infusion 40 mL, 40 mL, Intravenous, PRN, Scott, Lisbet S, APRN    sodium chloride 0.9 % infusion, 75 mL/hr, Intravenous, Continuous, Scott, Lisbet S, APRN, Last Rate: 75 mL/hr at 05/15/24 1530, 75 mL/hr at 05/15/24 1530  Allergies:  No Known Allergies  Social History:   Social History     Socioeconomic History    Marital status: Single   Tobacco Use    Smoking status: Every Day     Current packs/day: 1.00     Average packs/day: 1 pack/day for 30.4 years (30.4 ttl pk-yrs)     Types: Cigars, Cigarettes     Start date: 01/1994   Vaping Use    Vaping status: Never Used   Substance and Sexual Activity    Alcohol use: Yes     Alcohol/week: 2.0 standard drinks of alcohol     Types: 2 Cans of beer per week     Comment: occassionally    Drug use: Not Currently    Sexual activity: Defer     Family History:  Family History   Problem Relation Age of Onset    Hypertension Mother     Diabetes Mother     Stroke Mother     Hypertension Maternal  "Grandmother           Review of Systems  See history of present illness and past medical history.  Patient denies headache, dizziness, syncope, falls, trauma, change in vision, change in hearing, change in taste, changes in weight, changes in appetite, focal weakness,   Patient denies chest pain, palpitations, dyspnea, orthopnea, PND, cough, sinus pressure, rhinorrhea, epistaxis, hemoptysis, nausea, vomiting,hematemesis, diarrhea, constipation or hematochezia. Denies cold or heat intolerance, polydipsia, polyuria, polyphagia. Denies hematuria, pyuria, dysuria, hesitancy, frequency or urgency. Denies consumption of raw and under cooked meats foods or change in water source.      Vitals:   /99 (BP Location: Left arm, Patient Position: Lying)   Pulse 74   Temp 98.2 °F (36.8 °C) (Oral)   Resp 18   Ht 175.3 cm (69\")   Wt 95.3 kg (210 lb)   SpO2 99%   BMI 31.01 kg/m²   I/O: No intake or output data in the 24 hours ending 05/15/24 0295  Exam:  General Appearance:    Alert, cooperative, no distress, appears stated age   Head:    Normocephalic, without obvious abnormality, atraumatic   Eyes:    PERRL, conjunctivae/corneas clear, EOM's intact, both eyes   Ears:    Normal external ear canals, both ears   Nose:   Nares normal, septum midline, mucosa normal, no drainage    or sinus tenderness   Throat:   Lips, tongue, gums normal; oral mucosa pink and moist   Neck:   Supple, symmetrical, trachea midline, no adenopathy;     thyroid:  no enlargement/tenderness/nodules; no carotid    bruit or JVD   Back:     Symmetric, no curvature, ROM normal, no CVA tenderness   Lungs:     Clear to auscultation bilaterally, respirations unlabored   Chest Wall:    No tenderness or deformity    Heart:    Regular rate and rhythm, S1 and S2 normal, no murmur, rub   or gallop   Abdomen:     Soft, nontender, bowel sounds active all four quadrants,     no masses, no hepatomegaly, no splenomegaly   Extremities:   Extremities normal, " atraumatic, no cyanosis or edema                Data Review:  Labs in chart were reviewed.  WBC   Date Value Ref Range Status   05/15/2024 6.33 3.40 - 10.80 10*3/mm3 Final     Hemoglobin   Date Value Ref Range Status   05/15/2024 14.3 13.0 - 17.7 g/dL Final     Hematocrit   Date Value Ref Range Status   05/15/2024 43.0 37.5 - 51.0 % Final     Platelets   Date Value Ref Range Status   05/15/2024 169 140 - 450 10*3/mm3 Final     Sodium   Date Value Ref Range Status   05/15/2024 138 136 - 145 mmol/L Final     Potassium   Date Value Ref Range Status   05/15/2024 4.3 3.5 - 5.2 mmol/L Final     Comment:     Specimen hemolyzed.  Result may be falsely elevated.     Chloride   Date Value Ref Range Status   05/15/2024 107 98 - 107 mmol/L Final     CO2   Date Value Ref Range Status   05/15/2024 22.0 22.0 - 29.0 mmol/L Final     BUN   Date Value Ref Range Status   05/15/2024 8 6 - 20 mg/dL Final     Creatinine   Date Value Ref Range Status   05/15/2024 0.82 0.76 - 1.27 mg/dL Final     Glucose   Date Value Ref Range Status   05/15/2024 119 (H) 65 - 99 mg/dL Final     Calcium   Date Value Ref Range Status   05/15/2024 8.5 (L) 8.6 - 10.5 mg/dL Final     Magnesium   Date Value Ref Range Status   05/14/2024 1.7 1.6 - 2.6 mg/dL Final     Results from last 7 days   Lab Units 05/14/24  1832   TSH uIU/mL 1.280     Results from last 7 days   Lab Units 05/14/24  1832   HEMOGLOBIN A1C % 10.00*       Imaging Results (Last 7 Days)       Procedure Component Value Units Date/Time    CT Angiogram Head [[id number]] Collected: 05/15/24 1047     Updated: 05/15/24 1047    Narrative:      CT ANGIOGRAM NECK AND HEAD WITH CONTRAST     HISTORY: Slurred speech, stroke.     COMPARISON: MRI brain 05/15/2024.     FINDINGS: Initially, a noncontrasted CT examination of the brain was  performed. An area of decreased attenuation involving the left occipital  lobe posteriorly is appreciated corresponding to the largest of the  acute infarcts noted on the MRI  examination from earlier the same day.  It measures approximately 4.5 cm in maximum transverse dimension. The  smaller acute infarcts noted on the MRI are largely occult on the  current CT examination. A small remote infarct involving the left  cerebellar hemisphere centrally is noted. No new area of decreased  attenuation to suggest a new infarction is identified.     A CT angiogram of the neck and head was performed. Multiplanar as well  as 3-dimensional reconstructions were generated.     The great vessels are arranged in a classic configuration. Noncalcified  plaque is appreciated involving the common carotid arteries bilaterally  as well as extending to and involving the internal carotid arteries  proximally where mild vascular calcification is appreciated. There is 0%  stenosis of the internal carotid arteries using NASCET criteria.  Atherosclerotic disease is more severe involving the cavernous ICA on  the left where there is moderate stenosis and irregularity proximally.  The study is hampered by patient motion but the proximal aspects of the  anterior and middle cerebral arteries appear unremarkable.     The left vertebral artery is larger than that of the right. The left  vertebral artery is of relatively uniform caliber. The right vertebral  artery is small in caliber and there is a severe stenosis involving the  right vertebral artery proximally. The right vertebral artery was  obscured by beam-hardening artifact proximally on the prior examination.  Distally, the right vertebral artery is attenuated in caliber and is  nonopacified from a point just proximal to the dura to the  vertebrobasilar junction. The right vertebral artery was patent  distally. The posterior-inferior cerebellar artery on the right is not  opacified. The basilar artery and the proximal aspects of the posterior  cerebral arteries appear unremarkable. Moderate stenoses involving the  distal P2 segments are appreciated bilaterally.      A standard postcontrast CT examination of the brain showed no evidence  of abnormal enhancement. The right vertebral artery from the dura to the  vertebrobasilar junction is opacified on the postcontrast CT  examination. The V4 segment is irregular and smaller in caliber as  compared to the CT angiogram of 09/30/2023.       Impression:      The right vertebral artery is attenuated in caliber  distally, particularly the V4 segment and this is new versus 09/30/2023.  The right vertebral artery proximally was obscured by beam-hardening  artifact on the prior CT angiogram of 09/30/2023. On the current  examination there is severe stenosis involving the right vertebral  artery proximally.     The above information was called to discussed with MARIANNE Marie.     NOTE: This is a preliminary report. The 3-dimensional reconstructions  are not yet available for review.           Radiation dose reduction techniques were utilized, including automated  exposure control and exposure modulation based on body size.       CT Angiogram Neck [[id number]] Collected: 05/15/24 1047     Updated: 05/15/24 1047    Narrative:      CT ANGIOGRAM NECK AND HEAD WITH CONTRAST     HISTORY: Slurred speech, stroke.     COMPARISON: MRI brain 05/15/2024.     FINDINGS: Initially, a noncontrasted CT examination of the brain was  performed. An area of decreased attenuation involving the left occipital  lobe posteriorly is appreciated corresponding to the largest of the  acute infarcts noted on the MRI examination from earlier the same day.  It measures approximately 4.5 cm in maximum transverse dimension. The  smaller acute infarcts noted on the MRI are largely occult on the  current CT examination. A small remote infarct involving the left  cerebellar hemisphere centrally is noted. No new area of decreased  attenuation to suggest a new infarction is identified.     A CT angiogram of the neck and head was performed. Multiplanar as well  as  3-dimensional reconstructions were generated.     The great vessels are arranged in a classic configuration. Noncalcified  plaque is appreciated involving the common carotid arteries bilaterally  as well as extending to and involving the internal carotid arteries  proximally where mild vascular calcification is appreciated. There is 0%  stenosis of the internal carotid arteries using NASCET criteria.  Atherosclerotic disease is more severe involving the cavernous ICA on  the left where there is moderate stenosis and irregularity proximally.  The study is hampered by patient motion but the proximal aspects of the  anterior and middle cerebral arteries appear unremarkable.     The left vertebral artery is larger than that of the right. The left  vertebral artery is of relatively uniform caliber. The right vertebral  artery is small in caliber and there is a severe stenosis involving the  right vertebral artery proximally. The right vertebral artery was  obscured by beam-hardening artifact proximally on the prior examination.  Distally, the right vertebral artery is attenuated in caliber and is  nonopacified from a point just proximal to the dura to the  vertebrobasilar junction. The right vertebral artery was patent  distally. The posterior-inferior cerebellar artery on the right is not  opacified. The basilar artery and the proximal aspects of the posterior  cerebral arteries appear unremarkable. Moderate stenoses involving the  distal P2 segments are appreciated bilaterally.     A standard postcontrast CT examination of the brain showed no evidence  of abnormal enhancement. The right vertebral artery from the dura to the  vertebrobasilar junction is opacified on the postcontrast CT  examination. The V4 segment is irregular and smaller in caliber as  compared to the CT angiogram of 09/30/2023.       Impression:      The right vertebral artery is attenuated in caliber  distally, particularly the V4 segment and this is  new versus 09/30/2023.  The right vertebral artery proximally was obscured by beam-hardening  artifact on the prior CT angiogram of 09/30/2023. On the current  examination there is severe stenosis involving the right vertebral  artery proximally.     The above information was called to discussed with MARIANNE Marie.     NOTE: This is a preliminary report. The 3-dimensional reconstructions  are not yet available for review.           Radiation dose reduction techniques were utilized, including automated  exposure control and exposure modulation based on body size.       MRI Brain Without Contrast [[id number]] Collected: 05/15/24 0142     Updated: 05/15/24 0153    Narrative:      BRAIN MRI WITHOUT CONTRAST     HISTORY: Slurred speech; R47.9-Unspecified speech disturbances;  R42-Dizziness and giddiness; Z86.73-Personal history of transient  ischemic attack (TIA), and cerebral infarction without residual  deficits; I10-Essential (primary) hypertension     COMPARISON: September 29, 2023.     FINDINGS:  Multiplanar images of the head were obtained without  gadolinium. There are multiple areas of restricted diffusion. They're  noted within the left midbrain, the left parietal occipital region, left  centrum semiovale and left frontal cortex. Further scattered areas of  restricted diffusion are noted within the right cerebral hemisphere.  Given bilateral distribution, these may be embolic. The ventricles are  normal in size. There is no midline shift. There is periventricular and  deep white matter microangiopathic change. There is an old left  cerebellar infarct. There are old lacunar infarcts noted within the  right corona radiata, as well as the bilateral basal ganglia. Further  old lacunar infarct is noted within the right thalamus. Intracranial  flow voids appear grossly intact. Mucosal thickening is noted within the  ethmoid sinuses, and mucous retention cysts are noted within the left  maxillary sinus. There is  trace fluid within the mastoid air cells  bilaterally, more significant on the left. The old infarct within the  left cerebellar hemisphere is associated with some chronic hemosiderin  deposition. Patient also has some chronic hemosiderin deposition  associated with the old lacunar infarcts within the right corona radiata  and basal ganglia. There does appear to be some petechial hemorrhagic  transformation which is noted within the area of evolving infarction  within the left parieto-occipital region.          Impression:      1. The patient has multiple bilateral areas of acute infarction within  the cerebral hemispheres, as well as an additional acute infarct within  the left midbrain. This would suggest an embolic source. The single  largest area is noted within the left parieto-occipital region. This  area does appear to be associated with some mild petechial hemorrhagic  transformation.        This report was finalized on 5/15/2024 1:50 AM by Dr. Isa Armendariz M.D on Workstation: [id number]       CT Head Without Contrast [[id number]] Collected: 05/14/24 2025     Updated: 05/14/24 2234    Narrative:      EMERGENCY CT SCAN OF THE HEAD WITHOUT CONTRAST ON 05/14/2024     CLINICAL HISTORY: This is a 54-year-old male patient who feels like he  has been slurring words for greater than a month     TECHNIQUE: Spiral CT images were obtained from the base of the skull to  the vertex without intravenous contrast. The images were reformatted and  are submitted in 3 mm thick axial, sagittal and coronal CT sections with  brain algorithm.     COMPARISON: This is correlated to a prior MRI of the brain on 09/29/2023  and 10/10/2022..     FINDINGS: There is a 10 x 7 mm old lacunar infarct extending from the  mid right corona radiata region into the superior right putamen that  occurred back in September 2023. There is an additional 8 x 5 mm old  lacunar infarct in the anterior right putamen, 4 mm old lacunar infarct  in  the posterior limb of the right internal capsule. There is a moderate  size area of infarction involving the posterior inferior medial left  parietal lobe extending in the superior left occipital lobe. It measures  approximately 4.8 x 3 x 4 cm. It is in the distribution of  parieto-occipital branches left MCA territory and the precise age of  this infarct is uncertain but may be subacute in nature. There is a 11 x  6 mm infarct in the posterior lateral left cerebellum and left PICA  territory either subacute or chronic. The remainder of the brain  parenchyma is normal in attenuation. The ventricles are normal in size.  I see no mass effect and no midline shift and no extra-axial fluid  collections are identified and there is no evidence of acute  intracranial hemorrhage. The calvarium and the skull base are normal in  appearance. Paranasal sinuses, mastoid air cells and middle ear cavities  are clear.       Impression:      1. There is a moderate size area of acute/subacute infarction extending  from the posterior inferior left parietal lobe into the superior left  occipital lobe measuring 4.8 x 3 x 4 cm in size in the distribution of  parieto-occipital branch of the left MCA territory. Its precise age is  uncertain and I recommend an MRI of the brain to better date it. There  is an 11 x 6 mm infarct in the posterior lateral left cerebellum that is  new when compared to the MRI of the brain on 09/29/2023 and I suspect  this is either subacute or chronic. Otherwise, the head CT is unchanged  when compared to MRI of the brain 09/29/2023. There is an 11 x 7 mm old  lacunar infarct extending from the mid right corona radiata region into  the superior right putamen that occurred back in September of 2023.  There are small old lacunar infarcts in the anterior right putamen and  posterior limb of the right internal capsule. The remainder of the head  CT is within normal limits. The results and recommendations  were  communicated to Kely Luna by telephone on 05/14/2024 at 8 p.m.     Radiation dose reduction techniques were utilized, including automated  exposure control and exposure modulation based on body size.        This report was finalized on 5/14/2024 10:31 PM by Dr. Sebastian Way M.D  on Workstation: [id number]       XR Chest 1 View [[id number]] Collected: 05/14/24 1918     Updated: 05/14/24 1921    Narrative:      XR CHEST 1 VW-     HISTORY: Male who is 54 years-old, dizziness     TECHNIQUE: Frontal view of the chest     COMPARISON: 9/29/2023     FINDINGS: Heart, mediastinum and pulmonary vasculature are unremarkable.  No focal pulmonary consolidation, pleural effusion, or pneumothorax. No  acute osseous process.       Impression:      No evidence for acute pulmonary process. Follow-up as  clinical indications persist.     This report was finalized on 5/14/2024 7:18 PM by Dr. Junior Weiner M.D on Workstation: [id number]             Past Medical History:   Diagnosis Date    COPD (chronic obstructive pulmonary disease)     Coronary artery disease     Diabetes mellitus     DVT (deep venous thrombosis)     Elevated cholesterol     GERD (gastroesophageal reflux disease)     H/O Cancer     right arm, in throat, chest and stomach, in remission    H/O Ischemia of extremity     History of MI (myocardial infarction) 2019    History of snoring     History of transfusion     HIV (human immunodeficiency virus infection)     Hypertension     Non Hodgkin's lymphoma     PAD (peripheral artery disease)     Pulmonary embolism     Stroke        Assessment:  Active Hospital Problems    Diagnosis  POA    **Slurred speech [R47.81]  Yes    Acute CVA (cerebrovascular accident) [I63.9]  Yes      Resolved Hospital Problems   No resolved problems to display.   Hypertension  Diabetes  Cva  Obesity  Copd  Hiv  Pad  Noncompliance  Tobacco use    Plan:  Cardiology eval planned  Stressed the importance of taking medications as  recommended  Dw patient and provider from the observation unit  Trend labs  Notes reviewed    Sade Prasad MD   5/15/2024  23:18 EDT      Electronically signed by aSde Prasad MD at 05/15/24 2324       Shashi Lomas III, MD at 24 0717        Consult Orders    1. Inpatient Cardiology Consult [[id number]] ordered by Sade Prasad MD at 05/15/24 2324                     Patient Name: Cecilio Puckett  :1970  54 y.o.    Date of Admission: 2024  Date of Consultation:  24  Encounter Provider: Shashi Lomas III, MD  Place of Service: [facility] CARDIOLOGY  Referring Provider: Jenny Thomason MD  Patient Care Team:  Arias Lehman APRN as PCP - General (Family Medicine)  Bear Rojas PA as Referring Physician (Emergency Medicine)  Arthur Salazar MD as Consulting Physician (Hematology and Oncology)      Chief complaint: speech issues and dizziness     History of Present Illness:     Cecilio Puckett is a 54 year old pt with a history of non Hodgkin's lymphoma, coronary artery disease, status post PCI, ischemic cardiomyopathy, PE/DVT on warfarin, diabetes, HTN, HLD, GERD, TIA/CVA and HIV.     Patient presented emergency room on May 14, 2024 with complaint of speech difficulty and dizziness.  He been having these intermittently and more noticeable over the last month.  When he would take a shower but sometimes feel like things were swaying.  Did not have a fall, no head injury, no syncope.  He denied any vision loss, facial droop, or unilateral numbness or weakness.    MRI demonstrated multiple bilateral areas of acute infarction consistent with an embolic source.    Patient had an echocardiogram performed that showed moderate LV dysfunction, worse when compared with prior studies.    Patient does note that he has been having some mid precordial chest discomfort at times.  Also been having shortness of breath.  Not much exercise habits at  this point.  Does have some shortness of breath with ambulation.  No chest pain here.    ECHO 5/15/24      Left ventricular systolic function is moderately decreased. Calculated left ventricular EF = 36.9%    Left ventricular wall thickness is consistent with severe concentric hypertrophy.    The following left ventricular wall segments are hypokinetic: mid anterior. The following left ventricular wall segments are akinetic: apical anterior and apex.    Left ventricular diastolic function is consistent with (grade I) impaired relaxation.    The left atrial cavity is moderate to severely dilated.    Left atrial volume is severely increased.    Mild mitral valve regurgitation is present.    Saline test results are negative.    Stress test 6/18/22    Summary    Abnormal pharmacological stress SPECT Myocardial Perfusion Imaging.    There is a medium-large sized moderate-severe severity predominantly fixed    perfusion defect(s) of the inferior wall consistent with infarction.    There is a medium sized moderate severity fixed perfusion defect(s) of the    lateral wall consistent with infarction. This improves a little bit with    prone imaging and tissue attenuation could be playing a role.    No evidence of significant stress induced myocardial ischemia.     CATH 12/1/19    Diagnostic Summary/Impression   1. High-grade stenosis of a large first diagonal is visualized (culprit for ST   segment elevation MI) .   2. Moderate CAD is visualized in the distal circumflex system distal to a   previous stent.   3. Nonobstructive RCA disease is identified.   4. Additional medical issues are noted in the record.   Diagnostic Recommendations   1. PCI to the diagonal is recommended.   2. Further recommendations will be dictated by the results of this procedure.   Interventional Summary   1. Successful percutaneous intervention to the first diagonal was completed   with a 3.5 x 24 mm Synergy drug-eluting stent (post without a 4.5  millimeter   noncompliant balloon).   2. No procedural complications were identified.   Interventional Recommendation   1. Dual antiplatelet therapy has been initiated.   2. Aggressive risk factor modification will be initiated including high   intensity statin therapy.   3. Further recommendations will be dictated by the patient's clinical course         Past Medical History:   Diagnosis Date    COPD (chronic obstructive pulmonary disease)     Coronary artery disease     Diabetes mellitus     DVT (deep venous thrombosis)     Elevated cholesterol     GERD (gastroesophageal reflux disease)     H/O Cancer     right arm, in throat, chest and stomach, in remission    H/O Ischemia of extremity     History of MI (myocardial infarction) 2019    History of snoring     History of transfusion     HIV (human immunodeficiency virus infection)     Hypertension     Non Hodgkin's lymphoma     PAD (peripheral artery disease)     Pulmonary embolism     Stroke        Past Surgical History:   Procedure Laterality Date    CARDIAC CATHETERIZATION      CORONARY ANGIOPLASTY WITH STENT PLACEMENT      FEMORAL ARTERY - FEMORAL ARTERY BYPASS GRAFT      PORTACATH PLACEMENT           Prior to Admission medications    Medication Sig Start Date End Date Taking? Authorizing Provider   Adult Aspirin Regimen 81 MG EC tablet Take 1 tablet by mouth Daily. 2/8/24  Yes Kaylin Mendez MD   amLODIPine (NORVASC) 10 MG tablet Take 1 tablet by mouth Daily.   Yes ProviderKaylin MD   dapagliflozin Propanediol (Farxiga) 10 MG tablet  6/29/22  Yes ProviderKaylin MD   Paootii-Ntwfa-Yikdvgek-TenofAF (GENVOYA) [id number] MG per tablet Take 1 tablet by mouth Daily.   Yes ProviderKaylin MD   Enoxaparin Sodium (LOVENOX) 100 MG/ML solution prefilled syringe syringe Inject 1 mL under the skin into the appropriate area as directed Every 12 (Twelve) Hours. Indications: DVT/PE (active thrombosis) 10/3/23  Yes Arthur Salazar MD    ergocalciferol (ERGOCALCIFEROL) 1.25 MG (29727 UT) capsule Take 1 capsule by mouth 1 (One) Time Per Week. 5/25/22  Yes Kaylin Mendez MD   glipizide (GLUCOTROL) 10 MG tablet Take 1 tablet by mouth Every 12 (Twelve) Hours. 2/8/24  Yes Kaylin Mendez MD   glucose blood test strip Use to test blood glucose up to four times daily as needed. Formulary Compliance Approval. Diagnosis: Type 2 Diabetes - Insulin Dependent 9/27/23  Yes Marc Hayes MD   glucose monitor monitoring kit Use to test blood glucose up to four times daily as needed. Formulary Compliance Approval. Diagnosis: Type 2 Diabetes - Insulin Dependent 9/27/23  Yes Marc Hayes MD   Insulin Glargine (LANTUS SOLOSTAR) 100 UNIT/ML injection pen Inject 25 Units under the skin into the appropriate area as directed Every Night. 10/11/22  Yes Osbaldo Levi MD   Lancets misc Use to test blood glucose up to four times daily as needed. Formulary Compliance Approval. Diagnosis: Type 2 Diabetes - Insulin Dependent 9/27/23  Yes Marc Hayes MD   losartan (COZAAR) 25 MG tablet Take 1 tablet by mouth Daily. 7/29/22  Yes Kaylin Mendez MD   pantoprazole (PROTONIX) 40 MG EC tablet  6/19/22  Yes Kaylin Mendez MD   sertraline (ZOLOFT) 50 MG tablet Take 1 tablet by mouth Daily. 9/7/22  Yes Kaylin Mendez MD   warfarin (COUMADIN) 10 MG tablet TAKE one AND one half TABLET BY MOUTH ON MONDAY, WEDNESDAY, FRIDAY. TAKE ONE TABLET BY MOUTH ALL other DAYS. 8/31/22  Yes Kaylin Mendez MD   carvedilol (COREG) 6.25 MG tablet Take 1 tablet by mouth Every 12 (Twelve) Hours for 30 days. 10/3/23 11/2/23  Osbaldo Levi MD   furosemide (LASIX) 20 MG tablet Take 1 tablet by mouth Daily. 2/8/24   Kaylin Mendez MD   hydroCHLOROthiazide 12.5 MG tablet Take 1 tablet by mouth Daily. 5/3/24   Kaylin Mendez MD   HYDROcodone-acetaminophen (NORCO)  MG per tablet Take 1 tablet by mouth. 8/19/22   Provider, MD Kaylin    insulin lispro (ADMELOG) 100 UNIT/ML injection Inject 8 Units under the skin into the appropriate area as directed 3 (Three) Times a Day With Meals for 30 days. 10/11/22 11/10/22  Osbaldo Levi MD   rosuvastatin (CRESTOR) 20 MG tablet Take 1 tablet by mouth Daily. 5/3/24   Provider, MD Kaylin       No Known Allergies    Social History     Socioeconomic History    Marital status: Single   Tobacco Use    Smoking status: Every Day     Current packs/day: 1.00     Average packs/day: 1 pack/day for 30.4 years (30.4 ttl pk-yrs)     Types: Cigars, Cigarettes     Start date: 01/1994   Vaping Use    Vaping status: Never Used   Substance and Sexual Activity    Alcohol use: Yes     Alcohol/week: 2.0 standard drinks of alcohol     Types: 2 Cans of beer per week     Comment: occassionally    Drug use: Not Currently    Sexual activity: Defer       Family History   Problem Relation Age of Onset    Hypertension Mother     Diabetes Mother     Stroke Mother     Hypertension Maternal Grandmother        REVIEW OF SYSTEMS:   All systems reviewed.  Pertinent positives identified in HPI.  All other systems are negative.      Objective:     Vitals:    05/15/24 2225 05/16/24 0421 05/16/24 0728 05/16/24 1147   BP: 172/99 177/92 163/92 121/90   BP Location: Left arm Right arm Right arm Right arm   Patient Position: Lying  Lying Sitting   Pulse: 74  63 65   Resp: 18 18 18 16   Temp: 98.2 °F (36.8 °C) 98.2 °F (36.8 °C) 98.1 °F (36.7 °C) 98.2 °F (36.8 °C)   TempSrc: Oral Oral Oral Oral   SpO2:   98% 100%   Weight:       Height:         Body mass index is 31.01 kg/m².    Physical Exam:  General Appearance:    Alert, cooperative, in no acute distress   Head:    Normocephalic, without obvious abnormality   Eyes:            Lids and lashes normal, conjunctivae and sclerae normal, no icterus, no pallor, corneas clear   Ears:    Ears appear intact with no abnormalities noted   Throat:   No oral lesions, oral mucosa moist   Neck:   No adenopathy,  supple, trachea midline, no thyromegaly, no carotid bruit, no JVD   Back:     No kyphosis present, no erythema or scars, no tenderness to palpation    Lungs:     Clear to auscultation,respirations regular, even and unlabored    Heart:    Regular rhythm and normal rate, normal S1 and S2, no murmur, no gallop, no rub, no click   Chest Wall:    No abnormalities observed   Abdomen:     Normal bowel sounds, no masses, no organomegaly, soft        non-tender, non-distended, no guarding   Extremities:   no edema, no cyanosis, no redness   Pulses:  Bilateral carotids brisk   Skin:  Psychiatric:   No bleeding or rash    Alert and oriented, normal mood and affect         Lab Review:     Results from last 7 days   Lab Units 05/15/24  2048 05/15/24  0301 05/14/24  1832   SODIUM mmol/L  --  138 139   POTASSIUM mmol/L 4.3 3.4* 4.1   CHLORIDE mmol/L  --  107 106   CO2 mmol/L  --  22.0 23.6   BUN mg/dL  --  8 8   CREATININE mg/dL  --  0.82 1.10   CALCIUM mg/dL  --  8.5* 9.0   BILIRUBIN mg/dL  --   --  0.3   ALK PHOS U/L  --   --  67   ALT (SGPT) U/L  --   --  19   AST (SGOT) U/L  --   --  11   GLUCOSE mg/dL  --  119* 182*     Results from last 7 days   Lab Units 05/14/24  1832   HSTROP T ng/L 26*     Results from last 7 days   Lab Units 05/15/24  0301   WBC 10*3/mm3 6.33   HEMOGLOBIN g/dL 14.3   HEMATOCRIT % 43.0   PLATELETS 10*3/mm3 169     Results from last 7 days   Lab Units 05/16/24  0607 05/15/24  1827 05/14/24  1832   INR  1.16* 1.13* 1.05   APTT seconds  --   --  25.3     Results from last 7 days   Lab Units 05/14/24  1832   MAGNESIUM mg/dL 1.7     Results from last 7 days   Lab Units 05/14/24  1832   CHOLESTEROL mg/dL 193   TRIGLYCERIDES mg/dL 104   HDL CHOL mg/dL 38*   LDL CHOL mg/dL 136*                               I personally viewed and interpreted the patient's EKG/Telemetry data.      Current Facility-Administered Medications:     acetaminophen (TYLENOL) tablet 650 mg, 650 mg, Oral, Q4H PRN, Lisbet Chavez, APRN     amLODIPine (NORVASC) tablet 10 mg, 10 mg, Oral, Daily, Marycarmen Bran APRN, 10 mg at 05/16/24 0808    [Held by provider] aspirin EC tablet 81 mg, 81 mg, Oral, Daily, Lisbet Chavez APRN    sennosides-docusate (PERICOLACE) 8.6-50 MG per tablet 2 tablet, 2 tablet, Oral, BID PRN **AND** polyethylene glycol (MIRALAX) packet 17 g, 17 g, Oral, Daily PRN **AND** bisacodyl (DULCOLAX) EC tablet 5 mg, 5 mg, Oral, Daily PRN **AND** bisacodyl (DULCOLAX) suppository 10 mg, 10 mg, Rectal, Daily PRN, Lisbet Chavez APRN    Calcium Replacement - Follow Nurse / BPA Driven Protocol, , Does not apply, PRN, Lisbet Chavez APRN    carvedilol (COREG) tablet 6.25 mg, 6.25 mg, Oral, Q12H, Marycarmen Bran APRN, 6.25 mg at 05/16/24 0808    dextrose (D50W) (25 g/50 mL) IV injection 25 g, 25 g, Intravenous, Q15 Min PRN, Lisbet Chavez APRN    dextrose (GLUTOSE) oral gel 15 g, 15 g, Oral, Q15 Min PRN, Lisbet Chavez APRN    [Held by provider] Xapgzcz-Wvewd-Xltmdius-TenofAF (GENVOYA) [phone] MG per tablet 1 tablet, 1 tablet, Oral, Daily, Lisbet Chavez APRN    [Held by provider] empagliflozin (JARDIANCE) tablet 25 mg, 25 mg, Oral, Daily, Lisbet Chavez APRN    Enoxaparin Sodium (LOVENOX) syringe 100 mg, 1 mg/kg, Subcutaneous, Q12H, Lisbet Chavez APRN, 100 mg at 05/16/24 1041    furosemide (LASIX) tablet 20 mg, 20 mg, Oral, Daily, Marycarmen Bran APRN, 20 mg at 05/16/24 0808    [Held by provider] glipizide (GLUCOTROL) tablet 10 mg, 10 mg, Oral, Q12H, Lisbet Chavez APRN    glucagon (GLUCAGEN) injection 1 mg, 1 mg, Intramuscular, Q15 Min PRN, Lisbet Chavez APRN    hydroCHLOROthiazide tablet 12.5 mg, 12.5 mg, Oral, Daily, HerMarycarmen moreno, MARIANNE, 12.5 mg at 05/16/24 0808    insulin glargine (LANTUS, SEMGLEE) injection 25 Units, 25 Units, Subcutaneous, Nightly, Lisbet Chavez APRN, 25 Units at 05/15/24 2043    insulin regular (humuLIN R,novoLIN R) injection 2-7 Units, 2-7 Units, Subcutaneous, Q6H, Lisbet Chavez APRN    losartan (COZAAR)  tablet 25 mg, 25 mg, Oral, Daily, Marycarmen Bran, APRN, 25 mg at 05/16/24 0808    Magnesium Standard Dose Replacement - Follow Nurse / BPA Driven Protocol, , Does not apply, PRN, Libset Chavez APRN    nitroglycerin (NITROSTAT) SL tablet 0.4 mg, 0.4 mg, Sublingual, Q5 Min PRN, Lisbet Chavez S, APRN    ondansetron ODT (ZOFRAN-ODT) disintegrating tablet 4 mg, 4 mg, Oral, Q6H PRN **OR** ondansetron (ZOFRAN) injection 4 mg, 4 mg, Intravenous, Q6H PRN, Lisbet Chavez S, APRN    pantoprazole (PROTONIX) EC tablet 40 mg, 40 mg, Oral, Q AM, Marycarmen Bran, APRN, 40 mg at 05/16/24 0602    Pharmacy to dose warfarin, , Does not apply, Continuous PRN, Marycarmen Bran APRCURT    Phosphorus Replacement - Follow Nurse / BPA Driven Protocol, , Does not apply, PRN, Lisbet Chavez S, APRN    Potassium Replacement - Follow Nurse / BPA Driven Protocol, , Does not apply, PRN, Lisbet Chavez S, APRN    rosuvastatin (CRESTOR) tablet 20 mg, 20 mg, Oral, Daily, Marycarmen Bran APRN, 20 mg at 05/16/24 0808    [Held by provider] sertraline (ZOLOFT) tablet 50 mg, 50 mg, Oral, Daily, Lisbet Chavez S, APRN    [COMPLETED] Insert Peripheral IV, , , Once **AND** sodium chloride 0.9 % flush 10 mL, 10 mL, Intravenous, PRN, Kely Luna PA-C    sodium chloride 0.9 % flush 10 mL, 10 mL, Intravenous, Q12H, Lisbet Chavez S, APRN, 10 mL at 05/15/24 0836    sodium chloride 0.9 % flush 10 mL, 10 mL, Intravenous, PRN, Latrice Chavezhy S, APRN    sodium chloride 0.9 % infusion 40 mL, 40 mL, Intravenous, PRN, ScottLatricehy S, APRN    sodium chloride 0.9 % infusion, 75 mL/hr, Intravenous, Continuous, Lisbet Chavez S, APRN, Last Rate: 75 mL/hr at 05/15/24 1530, 75 mL/hr at 05/15/24 1530    Assessment and Plan:       Active Hospital Problems    Diagnosis  POA    **Slurred speech [R47.81]  Yes    Ischemic cardiomyopathy [I25.5]  Yes    Acute CVA (cerebrovascular accident) [I63.9]  Yes    NHL (non-Hodgkin's lymphoma) [C85.90]  Yes    Type 2 diabetes mellitus with circulatory disorder,  without long-term current use of insulin [E11.59]  Yes    Coronary artery disease involving native coronary artery of native heart with angina pectoris [I25.119]  Yes    Anticoagulated on Coumadin [Z79.01]  Not Applicable    Arteriosclerosis of coronary artery [I25.10]  Yes      Resolved Hospital Problems   No resolved problems to display.     1.  Acute CVA-multiple bilateral areas, appears to be embolic.  Since he already has an indication for chronic anticoagulation I am not planning on a transesophageal echocardiogram as there is no evidence it would alter therapy.  2.  Anticoagulated on warfarin-noncompliant, has not been taking, INR 1 on arrival  3.  History of DVT PE  4.  Coronary disease, prior stent placement as outlined above, having episodes of chest discomfort and shortness of breath, we will arrange for pharmacologic nuclear perfusion study and we will arrange for this in the morning.  Aspirin being held at this point, we will want to restart aspirin 81 mg once daily once appropriate from a neurologic standpoint  5.  Ischemic cardiomyopathy, currently on carvedilol and losartan, we will want to uptitrate for guideline directed medical therapy.  Also on amlodipine which would be great to wean off.  Hypertensive on arrival so not can to change the amlodipine today, I will switch his losartan over to Entresto, discontinue HCTZ and replace with spironolactone.    Shashi Lomas III, MD  05/16/24  12:13 EDT         Electronically signed by Shashi Lomas III, MD at 05/16/24 5344

## 2024-05-20 NOTE — THERAPY EVALUATION
Acute Care - Speech Language Pathology   Swallow Treatment Note [facility]     Patient Name: Cecilio Puckett  : 1970  MRN: [id number]  Today's Date: 2024               Admit Date: 2024    Visit Dx:     ICD-10-CM ICD-9-CM   1. Difficulty with speech  R47.9 784.59   2. Dizziness  R42 780.4   3. History of CVA (cerebrovascular accident)  Z86.73 V12.54   4. Hypertension not at goal  I10 401.9     Patient Active Problem List   Diagnosis    Syncope, unspecified syncope type    Acute DVT (deep venous thrombosis)    NHL (non-Hodgkin's lymphoma)    HIV (human immunodeficiency virus infection)    Type 2 diabetes mellitus with circulatory disorder, without long-term current use of insulin    HTN (hypertension)    Slurred speech    Acute CVA (cerebrovascular accident)    Anticoagulated on Coumadin    Coronary artery disease involving native coronary artery of native heart with angina pectoris    Arteriosclerosis of coronary artery    Ischemic cardiomyopathy    History of CVA (cerebrovascular accident)     Past Medical History:   Diagnosis Date    COPD (chronic obstructive pulmonary disease)     Coronary artery disease     Diabetes mellitus     DVT (deep venous thrombosis)     Elevated cholesterol     GERD (gastroesophageal reflux disease)     H/O Cancer     right arm, in throat, chest and stomach, in remission    H/O Ischemia of extremity     History of MI (myocardial infarction) 2019    History of snoring     History of transfusion     HIV (human immunodeficiency virus infection)     Hypertension     Non Hodgkin's lymphoma     PAD (peripheral artery disease)     Pulmonary embolism     Stroke      Past Surgical History:   Procedure Laterality Date    CARDIAC CATHETERIZATION      CORONARY ANGIOPLASTY WITH STENT PLACEMENT      FEMORAL ARTERY - FEMORAL ARTERY BYPASS GRAFT      PORTACATH PLACEMENT         SLP Recommendation and Plan  SLP Swallowing Diagnosis: R/O pharyngeal dysphagia (24 1100)  SLP Diet  Recommendation: regular textures, thin liquids (05/20/24 1100)  Recommended Precautions and Strategies: upright posture during/after eating, small bites of food and sips of liquid (05/20/24 1100)  SLP Rec. for Method of Medication Administration: meds whole, with thin liquids, with puree, as tolerated (05/20/24 1100)     Monitor for Signs of Aspiration: yes, notify SLP if any concerns (05/20/24 1100)  Recommended Diagnostics:  (VFSS if negative lung changes) (05/20/24 1100)  Swallow Criteria for Skilled Therapeutic Interventions Met: demonstrates skilled criteria (05/20/24 1100)  Anticipated Discharge Disposition (SLP): anticipate therapy at next level of care (05/20/24 1120)  Rehab Potential/Prognosis, Swallowing: good, to achieve stated therapy goals (05/20/24 1100)  Therapy Frequency (Swallow): PRN (05/20/24 1100)  Predicted Duration Therapy Intervention (Days): until discharge (05/20/24 1120)  Oral Care Recommendations: Oral Care BID/PRN (05/20/24 1100)                                        Outcome Evaluation: Patient seen for diet tolerance and speech/language/cognitive evaluation. Reports double vision and frontal head pain rated as a 10/10 during middle of evaluation; RN notified. Delayed coughing following PO trials; suspect unrelated to swallow function. No other overt ss of aspiration. Can consider VFSS pending CXR results. Recommend continue regular/thin diet. Meds as tolerated. Sitting upright, slow rate, small bites/sips.      SWALLOW EVALUATION (Last 72 Hours)       SLP Adult Swallow Evaluation       Row Name 05/20/24 1100                   Rehab Evaluation    Document Type re-evaluation  -CR        Subjective Information no complaints  -CR        Patient Observations alert;cooperative  -CR        Patient Effort good  -CR        Symptoms Noted During/After Treatment none  -CR           General Information    Patient Profile Reviewed yes  -CR           Pain Scale: Numbers Pre/Post-Treatment     Pretreatment Pain Rating 0/10 - no pain  -CR        Pre/Posttreatment Pain Comment 10/10 frontal headache and double vision reported during middle of assessment  -CR           Oral Motor Structure and Function    Dentition Assessment natural, present and adequate  -CR        Secretion Management WNL/WFL  -CR        Mucosal Quality moist, healthy  -CR           SLP Evaluation Clinical Impression    SLP Swallowing Diagnosis R/O pharyngeal dysphagia  -CR        Functional Impact risk of aspiration/pneumonia  -CR        Rehab Potential/Prognosis, Swallowing good, to achieve stated therapy goals  -CR        Swallow Criteria for Skilled Therapeutic Interventions Met demonstrates skilled criteria  -CR           Recommendations    Therapy Frequency (Swallow) PRN  -CR        Predicted Duration Therapy Intervention (Days) until discharge  -CR        SLP Diet Recommendation regular textures;thin liquids  -CR        Recommended Diagnostics --  VFSS if negative lung changes  -CR        Recommended Precautions and Strategies upright posture during/after eating;small bites of food and sips of liquid  -CR        Oral Care Recommendations Oral Care BID/PRN  -CR        SLP Rec. for Method of Medication Administration meds whole;with thin liquids;with puree;as tolerated  -CR        Monitor for Signs of Aspiration yes;notify SLP if any concerns  -CR           (STG) Patient will tolerate trials of    Consistencies Trialed (Tolerate trials) regular textures;thin liquids  -CR        Desired Outcome (Tolerate trials) without signs/symptoms of aspiration  -CR        [place] (Tolerate trials) independently (over 90% accuracy)  -CR        Time Frame (Tolerate trials) by discharge  -CR        Progress/Outcomes (Tolerate trials) goal met  -CR        Comment (Tolerate trials) Patient seen for diet tolerance and speech/language/cognitive evaluation. Reports double vision and frontal head pain rated as a 10/10 during middle of evaluation; RN  notified. Delayed coughing following PO trials; suspect unrelated to swallow function. No other overt ss of aspiration. Can consider VFSS pending CXR results. Recommend continue regular/thin diet. Meds as tolerated. Sitting upright, slow rate, small bites/sips.  -CR                  User Key  (r) = Recorded By, (t) = Taken By, (c) = Cosigned By      Initials Name Effective Dates    CR Adele Perez, SLP 08/28/23 -                     EDUCATION  The patient has been educated in the following areas:   Dysphagia (Swallowing Impairment).        SLP GOALS       Row Name 05/20/24 1120 05/20/24 1100          (STG) Patient will tolerate trials of    Consistencies Trialed (Tolerate trials) -- regular textures;thin liquids  -CR     Desired Outcome (Tolerate trials) -- without signs/symptoms of aspiration  -CR     [place] (Tolerate trials) -- independently (over 90% accuracy)  -CR     Time Frame (Tolerate trials) -- by discharge  -CR     Progress/Outcomes (Tolerate trials) -- goal met  -CR     Comment (Tolerate trials) -- Patient seen for diet tolerance and speech/language/cognitive evaluation. Reports double vision and frontal head pain rated as a 10/10 during middle of evaluation; RN notified. Delayed coughing following PO trials; suspect unrelated to swallow function. No other overt ss of aspiration. Can consider VFSS pending CXR results. Recommend continue regular/thin diet. Meds as tolerated. Sitting upright, slow rate, small bites/sips.  -CR        Ability to Construct Phrase and Sentence Level Response Goal 1 (SLP)    Improve Ability to Construct Phrase and Sentence Level Responses By Goal 1 (SLP) constructing a sentence with a key word;80%;independently (over 90% accuracy)  -CR --     Time Frame (Phrase and Sentence Level Response Goal 1, SLP) by discharge  -CR --     Progress/Outcomes (Phrase and Sentence Level Response Goal 1, SLP) new goal  -CR --        Orientation Goal 1 (SLP)    Improve Orientation  Through Goal 1 (SLP) demonstrating orientation to day;demonstrating orientation to month;demonstrating orientation to year;80%;independently (over 90% accuracy)  -CR --     Time Frame (Orientation Goal 1, SLP) by discharge  -CR --     Progress/Outcomes (Orientation Goal 1, SLP) new goal  -CR --        Organizational Skills Goal 1 (SLP)    Improve Thought Organization Through Goal 1 (SLP) completing a divergent naming task;completing a verbal sequencing task;80%;independently (over 90% accuracy)  -CR --     Time Frame (Thought Organization Skills Goal 1, SLP) by discharge  -CR --     Progress/Outcomes (Thought Organization Skills Goal 1, SLP) new goal  -CR --               User Key  (r) = Recorded By, (t) = Taken By, (c) = Cosigned By      Initials Name Provider Type    Adele Mathews SLP Speech and Language Pathologist                         Time Calculation:    Time Calculation- SLP       Row Name 24 1128             Time Calculation- SLP    SLP Start Time 0730  -CR      SLP Received On 24  -CR         Untimed Charges    89690-ZH Eval Speech and Production w/ Language Minutes 60  -CR      53491-BJ Treatment Swallow Minutes 30  -CR         Total Minutes    Untimed Charges Total Minutes 90  -CR       Total Minutes 90  -CR                User Key  (r) = Recorded By, (t) = Taken By, (c) = Cosigned By      Initials Name Provider Type    Adele Mathews SLP Speech and Language Pathologist                    Therapy Charges for Today       Code Description Service Date Service Provider Modifiers Qty    36193912650 HC ST EVAL SPEECH AND PROD W LANG  4 2024 Adele Perez SLP GN 1    [id number] HC ST TREATMENT SWALLOW 2 2024 Adele Perez SLP GN 1                 SYLVIE Soto  2024   and Acute Care - Speech Language Pathology Initial Evaluation  [facility]     Patient Name: Cecilio Puckett  : 1970  MRN: [id number]  Today's Date: 2024                Admit Date: 5/14/2024     Visit Dx:    ICD-10-CM ICD-9-CM   1. Difficulty with speech  R47.9 784.59   2. Dizziness  R42 780.4   3. History of CVA (cerebrovascular accident)  Z86.73 V12.54   4. Hypertension not at goal  I10 401.9     Patient Active Problem List   Diagnosis    Syncope, unspecified syncope type    Acute DVT (deep venous thrombosis)    NHL (non-Hodgkin's lymphoma)    HIV (human immunodeficiency virus infection)    Type 2 diabetes mellitus with circulatory disorder, without long-term current use of insulin    HTN (hypertension)    Slurred speech    Acute CVA (cerebrovascular accident)    Anticoagulated on Coumadin    Coronary artery disease involving native coronary artery of native heart with angina pectoris    Arteriosclerosis of coronary artery    Ischemic cardiomyopathy    History of CVA (cerebrovascular accident)     Past Medical History:   Diagnosis Date    COPD (chronic obstructive pulmonary disease)     Coronary artery disease     Diabetes mellitus     DVT (deep venous thrombosis)     Elevated cholesterol     GERD (gastroesophageal reflux disease)     H/O Cancer     right arm, in throat, chest and stomach, in remission    H/O Ischemia of extremity     History of MI (myocardial infarction) 2019    History of snoring     History of transfusion     HIV (human immunodeficiency virus infection)     Hypertension     Non Hodgkin's lymphoma     PAD (peripheral artery disease)     Pulmonary embolism     Stroke      Past Surgical History:   Procedure Laterality Date    CARDIAC CATHETERIZATION      CORONARY ANGIOPLASTY WITH STENT PLACEMENT      FEMORAL ARTERY - FEMORAL ARTERY BYPASS GRAFT      PORTACATH PLACEMENT         SLP Recommendation and Plan  SLP Diagnosis: mild-moderate, communication disorder, cognitive-linguistic disorder (05/20/24 1120)  SLP Diagnosis Comments: [place] Aphasia Screening Test administered to assess speech/language abilities. Patient scored an overall score of 73/100.  Expressive subscale 35/50. Receptive subscale 38/50. Clear but slow speech. Left labial droop appreciated on retraction. Decreased breath support with phrase length repetition and automatic speech tasks. No paraphasias observed. Written expression moderately impaired; able to write name and 1-syllable words. Pt reports his speech and language skills are not at baseline, however, unable to further elaborate. 100% intelligibility in structured tasks and unstructured conversation. Informal cognitive assessment also completed. Pt exhibited deficits with temporal orientation and concrete thought organization/verbal fluency. Anticipate therapy at next level of care. (05/20/24 1120)     Monitor for Signs of Aspiration: yes, notify SLP if any concerns (05/20/24 1100)  Swallow Criteria for Skilled Therapeutic Interventions Met: demonstrates skilled criteria (05/20/24 1100)  SLC Criteria for Skilled Therapy Interventions Met: yes (05/20/24 1120)  Anticipated Discharge Disposition (SLP): anticipate therapy at next level of care (05/20/24 1120)     Therapy Frequency (Swallow): PRN (05/20/24 1100)  Therapy Frequency (SLP SLC): PRN (05/20/24 1120)  Predicted Duration Therapy Intervention (Days): until discharge (05/20/24 1120)  Oral Care Recommendations: Oral Care BID/PRN (05/20/24 1100)                          Outcome Evaluation: Patient seen for diet tolerance and speech/language/cognitive evaluation. Reports double vision and frontal head pain rated as a 10/10 during middle of evaluation; RN notified. Delayed coughing following PO trials; suspect unrelated to swallow function. No other overt ss of aspiration. Can consider VFSS pending CXR results. Recommend continue regular/thin diet. Meds as tolerated. Sitting upright, slow rate, small bites/sips. (05/20/24 1014)      SLP EVALUATION (Last 72 Hours)       SLP SLC Evaluation       Row Name 05/20/24 1120                   Communication Assessment/Intervention    Document Type  "evaluation  -CR        Subjective Information no complaints  -CR        Patient Observations alert;cooperative  -CR        Patient Effort good  -CR        Symptoms Noted During/After Treatment none  -CR           General Information    Patient Profile Reviewed yes  -CR        Pertinent History Of Current Problem \"Pt admitted with slurred speech and dizziness. MRI showed multiple B acute infarcts, suspected to be embolic. PMH: CVA (9/23), HIV, non Hodgkin's lymphoma.\"  -CR        Precautions/Limitations, Vision other (see comments)  double vision reported during eval  -CR        Precautions/Limitations, Hearing WFL;for purposes of eval  -CR        Prior Level of Function-Communication WFL  -CR        Plans/Goals Discussed with patient;agreed upon  -CR        Barriers to Rehab none identified  -CR           Pain Scale: Numbers Pre/Post-Treatment    Pretreatment Pain Rating 0/10 - no pain  -CR        Pre/Posttreatment Pain Comment 10/10 frontal headache and double vision reported during middle of assessment  -CR           Comprehension Assessment/Intervention    Comprehension Assessment/Intervention Auditory Comprehension  -CR           Auditory Comprehension Assessment/Intervention    Auditory Comprehension (Communication) mild impairment  -CR           Expression Assessment/Intervention    Expression Assessment/Intervention verbal expression;graphic expression  -CR           Verbal Expression Assessment/Intervention    Verbal Expression mild impairment  -CR           Graphic Expression Assessment/Intervention    Graphic Expression moderate impairment  -CR           Motor Speech Assessment/Intervention    Characteristics Consistent with Dysarthria slow rate  -CR           SLP Evaluation Clinical Impressions    SLP Diagnosis mild-moderate;communication disorder;cognitive-linguistic disorder  -CR        SLP Diagnosis Comments [place] Aphasia Screening Test administered to assess speech/language abilities. Patient " scored an overall score of 73/100. Expressive subscale 35/50. Receptive subscale 38/50. Clear but slow speech. Left labial droop appreciated on retraction. Decreased breath support with phrase length repetition and automatic speech tasks. No paraphasias observed. Written expression moderately impaired; able to write name and 1-syllable words. Pt reports his speech and language skills are not at baseline, however, unable to further elaborate. 100% intelligibility in structured tasks and unstructured conversation. Informal cognitive assessment also completed. Pt exhibited deficits with temporal orientation and concrete thought organization/verbal fluency. Anticipate therapy at next level of care.  -CR        Rehab Potential/Prognosis good  -CR        SLC Criteria for Skilled Therapy Interventions Met yes  -CR        Functional Impact difficulty communicating in an emergency;difficulty in expressing complex messages;functional impact in social situations;functional impact in ADLs;difficulty completing home management task  -CR           Recommendations    Therapy Frequency (SLP SLC) PRN  -CR        Predicted Duration Therapy Intervention (Days) until discharge  -CR        Anticipated Discharge Disposition (SLP) anticipate therapy at next level of care  -CR           Communication Treatment Objective and Progress Goals (SLP)    Verbal Expression Treatment Objectives Verbal Expression Treatment Objectives (Group)  -CR        Cognitive Linguistic Treatment Objectives Cognitive Linguistic Treatment Objectives (Group)  -CR           Verbal Expression Treatment Objectives    Phrase and Sentence Level Response Selection phrase and sentence level response, SLP goal 1  -CR           Ability to Construct Phrase and Sentence Level Response Goal 1 (SLP)    Improve Ability to Construct Phrase and Sentence Level Responses By Goal 1 (SLP) constructing a sentence with a key word;80%;independently (over 90% accuracy)  -CR        Time  Frame (Phrase and Sentence Level Response Goal 1, SLP) by discharge  -CR        Progress/Outcomes (Phrase and Sentence Level Response Goal 1, SLP) new goal  -CR           Cognitive Linguistic Treatment Objectives    Orientation Selection orientation, SLP goal 1  -CR        Organizational Skills Selection organizational skills, SLP goal 1  -CR           Orientation Goal 1 (SLP)    Improve Orientation Through Goal 1 (SLP) demonstrating orientation to day;demonstrating orientation to month;demonstrating orientation to year;80%;independently (over 90% accuracy)  -CR        Time Frame (Orientation Goal 1, SLP) by discharge  -CR        Progress/Outcomes (Orientation Goal 1, SLP) new goal  -CR           Organizational Skills Goal 1 (SLP)    Improve Thought Organization Through Goal 1 (SLP) completing a divergent naming task;completing a verbal sequencing task;80%;independently (over 90% accuracy)  -CR        Time Frame (Thought Organization Skills Goal 1, SLP) by discharge  -CR        Progress/Outcomes (Thought Organization Skills Goal 1, SLP) new goal  -CR                  User Key  (r) = Recorded By, (t) = Taken By, (c) = Cosigned By      Initials Name Effective Dates    CR Adele Perez, SLP 08/28/23 -                        EDUCATION  The patient has been educated in the following areas:     Cognitive Impairment Communication Impairment.           SLP GOALS       Row Name 05/20/24 1120 05/20/24 1100          (STG) Patient will tolerate trials of    Consistencies Trialed (Tolerate trials) -- regular textures;thin liquids  -CR     Desired Outcome (Tolerate trials) -- without signs/symptoms of aspiration  -CR     [place] (Tolerate trials) -- independently (over 90% accuracy)  -CR     Time Frame (Tolerate trials) -- by discharge  -CR     Progress/Outcomes (Tolerate trials) -- goal met  -CR     Comment (Tolerate trials) -- Patient seen for diet tolerance and speech/language/cognitive evaluation. Reports double  vision and frontal head pain rated as a 10/10 during middle of evaluation; RN notified. Delayed coughing following PO trials; suspect unrelated to swallow function. No other overt ss of aspiration. Can consider VFSS pending CXR results. Recommend continue regular/thin diet. Meds as tolerated. Sitting upright, slow rate, small bites/sips.  -CR        Ability to Construct Phrase and Sentence Level Response Goal 1 (SLP)    Improve Ability to Construct Phrase and Sentence Level Responses By Goal 1 (SLP) constructing a sentence with a key word;80%;independently (over 90% accuracy)  -CR --     Time Frame (Phrase and Sentence Level Response Goal 1, SLP) by discharge  -CR --     Progress/Outcomes (Phrase and Sentence Level Response Goal 1, SLP) new goal  -CR --        Orientation Goal 1 (SLP)    Improve Orientation Through Goal 1 (SLP) demonstrating orientation to day;demonstrating orientation to month;demonstrating orientation to year;80%;independently (over 90% accuracy)  -CR --     Time Frame (Orientation Goal 1, SLP) by discharge  -CR --     Progress/Outcomes (Orientation Goal 1, SLP) new goal  -CR --        Organizational Skills Goal 1 (SLP)    Improve Thought Organization Through Goal 1 (SLP) completing a divergent naming task;completing a verbal sequencing task;80%;independently (over 90% accuracy)  -CR --     Time Frame (Thought Organization Skills Goal 1, SLP) by discharge  -CR --     Progress/Outcomes (Thought Organization Skills Goal 1, SLP) new goal  -CR --               User Key  (r) = Recorded By, (t) = Taken By, (c) = Cosigned By      Initials Name Provider Type    Adele Mathews SLP Speech and Language Pathologist                              Time Calculation:      Time Calculation- SLP       Row Name 05/20/24 1128             Time Calculation- SLP    SLP Start Time 0730  -CR      SLP Received On 05/20/24  -CR         Untimed Charges    60118-IN Eval Speech and Production w/ Language Minutes 60  -CR       53695-QG Treatment Swallow Minutes 30  -CR         Total Minutes    Untimed Charges Total Minutes 90  -CR       Total Minutes 90  -CR                User Key  (r) = Recorded By, (t) = Taken By, (c) = Cosigned By      Initials Name Provider Type    Adele Mathews SLP Speech and Language Pathologist                    Therapy Charges for Today       Code Description Service Date Service Provider Modifiers Qty    08913016585 HC ST EVAL SPEECH AND PROD W LANG  4 5/20/2024 Adele Perez SLP GN 1    [id number] HC ST TREATMENT SWALLOW 2 5/20/2024 Adele Perez SLP GN 1                       SYLVIE Soto  5/20/2024

## 2024-05-21 LAB
ANION GAP SERPL CALCULATED.3IONS-SCNC: 8.7 MMOL/L (ref 5–15)
BUN SERPL-MCNC: 14 MG/DL (ref 6–20)
BUN/CREAT SERPL: 12.1 (ref 7–25)
CALCIUM SPEC-SCNC: 8 MG/DL (ref 8.6–10.5)
CHLORIDE SERPL-SCNC: 107 MMOL/L (ref 98–107)
CO2 SERPL-SCNC: 20.3 MMOL/L (ref 22–29)
CREAT SERPL-MCNC: 1.16 MG/DL (ref 0.76–1.27)
EGFRCR SERPLBLD CKD-EPI 2021: 75.3 ML/MIN/1.73
GLUCOSE BLDC GLUCOMTR-MCNC: 162 MG/DL (ref 70–130)
GLUCOSE BLDC GLUCOMTR-MCNC: 174 MG/DL (ref 70–130)
GLUCOSE BLDC GLUCOMTR-MCNC: 216 MG/DL (ref 70–130)
GLUCOSE BLDC GLUCOMTR-MCNC: 52 MG/DL (ref 70–130)
GLUCOSE BLDC GLUCOMTR-MCNC: 64 MG/DL (ref 70–130)
GLUCOSE BLDC GLUCOMTR-MCNC: 66 MG/DL (ref 70–130)
GLUCOSE SERPL-MCNC: 176 MG/DL (ref 65–99)
INR PPP: 2.25 (ref 0.9–1.1)
POTASSIUM SERPL-SCNC: 3.8 MMOL/L (ref 3.5–5.2)
PROTHROMBIN TIME: 25.1 SECONDS (ref 11.7–14.2)
SODIUM SERPL-SCNC: 136 MMOL/L (ref 136–145)

## 2024-05-21 PROCEDURE — 63710000001 INSULIN GLARGINE PER 5 UNITS: Performed by: HOSPITALIST

## 2024-05-21 PROCEDURE — 82948 REAGENT STRIP/BLOOD GLUCOSE: CPT

## 2024-05-21 PROCEDURE — 25810000003 SODIUM CHLORIDE 0.9 % SOLUTION: Performed by: HOSPITALIST

## 2024-05-21 PROCEDURE — 80048 BASIC METABOLIC PNL TOTAL CA: CPT | Performed by: HOSPITALIST

## 2024-05-21 PROCEDURE — 63710000001 INSULIN REGULAR HUMAN PER 5 UNITS: Performed by: HOSPITALIST

## 2024-05-21 PROCEDURE — 25010000002 PIPERACILLIN SOD-TAZOBACTAM PER 1 G: Performed by: HOSPITALIST

## 2024-05-21 PROCEDURE — 85610 PROTHROMBIN TIME: CPT | Performed by: NURSE PRACTITIONER

## 2024-05-21 PROCEDURE — 25010000002 ENOXAPARIN PER 10 MG: Performed by: HOSPITALIST

## 2024-05-21 RX ORDER — WARFARIN SODIUM 10 MG/1
10 TABLET ORAL
Status: DISCONTINUED | OUTPATIENT
Start: 2024-05-21 | End: 2024-05-22 | Stop reason: HOSPADM

## 2024-05-21 RX ADMIN — INSULIN GLARGINE 20 UNITS: 100 INJECTION, SOLUTION SUBCUTANEOUS at 21:23

## 2024-05-21 RX ADMIN — Medication 10 ML: at 21:24

## 2024-05-21 RX ADMIN — ELVITEGRAVIR, COBICISTAT, EMTRICITABINE, AND TENOFOVIR ALAFENAMIDE 1 TABLET: 150; 150; 200; 10 TABLET ORAL at 08:21

## 2024-05-21 RX ADMIN — WARFARIN 10 MG: 10 TABLET ORAL at 18:37

## 2024-05-21 RX ADMIN — SACUBITRIL AND VALSARTAN 1 TABLET: 49; 51 TABLET, FILM COATED ORAL at 13:22

## 2024-05-21 RX ADMIN — SACUBITRIL AND VALSARTAN 1 TABLET: 49; 51 TABLET, FILM COATED ORAL at 21:23

## 2024-05-21 RX ADMIN — PANTOPRAZOLE SODIUM 40 MG: 40 TABLET, DELAYED RELEASE ORAL at 05:37

## 2024-05-21 RX ADMIN — PIPERACILLIN AND TAZOBACTAM 3.38 G: 3; .375 INJECTION, POWDER, FOR SOLUTION INTRAVENOUS at 16:37

## 2024-05-21 RX ADMIN — Medication 10 ML: at 08:22

## 2024-05-21 RX ADMIN — ACETAMINOPHEN 650 MG: 325 TABLET, FILM COATED ORAL at 21:23

## 2024-05-21 RX ADMIN — CARVEDILOL 12.5 MG: 12.5 TABLET, FILM COATED ORAL at 21:23

## 2024-05-21 RX ADMIN — ROSUVASTATIN CALCIUM 20 MG: 20 TABLET, FILM COATED ORAL at 08:21

## 2024-05-21 RX ADMIN — DEXTROSE 15 G: 15 GEL ORAL at 08:14

## 2024-05-21 RX ADMIN — SODIUM CHLORIDE 50 ML/HR: 9 INJECTION, SOLUTION INTRAVENOUS at 05:39

## 2024-05-21 RX ADMIN — CARVEDILOL 12.5 MG: 12.5 TABLET, FILM COATED ORAL at 08:21

## 2024-05-21 RX ADMIN — INSULIN HUMAN 2 UNITS: 100 INJECTION, SOLUTION PARENTERAL at 16:37

## 2024-05-21 RX ADMIN — INSULIN HUMAN 3 UNITS: 100 INJECTION, SOLUTION PARENTERAL at 21:23

## 2024-05-21 RX ADMIN — ASPIRIN 81 MG: 81 TABLET, COATED ORAL at 08:21

## 2024-05-21 RX ADMIN — SERTRALINE 50 MG: 50 TABLET, FILM COATED ORAL at 08:21

## 2024-05-21 RX ADMIN — INSULIN HUMAN 2 UNITS: 100 INJECTION, SOLUTION PARENTERAL at 12:13

## 2024-05-21 RX ADMIN — PIPERACILLIN AND TAZOBACTAM 3.38 G: 3; .375 INJECTION, POWDER, FOR SOLUTION INTRAVENOUS at 08:15

## 2024-05-21 RX ADMIN — MAGNESIUM OXIDE 400 MG (241.3 MG MAGNESIUM) TABLET 400 MG: TABLET at 08:21

## 2024-05-21 RX ADMIN — AMLODIPINE BESYLATE 10 MG: 10 TABLET ORAL at 08:21

## 2024-05-21 RX ADMIN — ENOXAPARIN SODIUM 100 MG: 100 INJECTION SUBCUTANEOUS at 08:21

## 2024-05-21 NOTE — PROGRESS NOTES
"    DAILY PROGRESS NOTE  [facility]    Patient Identification:  Name: Cecilio Puckett  Age: 54 y.o.  Sex: male  :  1970  MRN: [id number]         Primary Care Physician: Arias Lehman APRN    Subjective:  Interval History: Feels tremendously better with the initiation antibiotics yesterday.  He is breathing easier.  He denies any nausea vomiting or any new neurodeficits.  No headache no fever    Objective: Clinically looks much improved today.  Seemingly close to his baseline.  No family present.  Patient on his phone entire time at bedside    Scheduled Meds:amLODIPine, 10 mg, Oral, Daily  aspirin, 81 mg, Oral, Daily  carvedilol, 12.5 mg, Oral, Q12H  Geulejp-Csdxc-Yimuqwzz-TenofAF, 1 tablet, Oral, Daily  enoxaparin, 1 mg/kg, Subcutaneous, Q12H  insulin glargine, 20 Units, Subcutaneous, Nightly  insulin regular, 2-7 Units, Subcutaneous, 4x Daily AC & at Bedtime  magnesium oxide, 400 mg, Oral, Daily  pantoprazole, 40 mg, Oral, Q AM  piperacillin-tazobactam, 3.375 g, Intravenous, Q8H  rosuvastatin, 20 mg, Oral, Daily  sertraline, 50 mg, Oral, Daily  sodium chloride, 10 mL, Intravenous, Q12H      Continuous Infusions:Pharmacy to dose warfarin,   Pharmacy to Dose Zosyn,   sodium chloride, 50 mL/hr, Last Rate: 50 mL/hr (24 0539)        Vital signs in last 24 hours:  Temp:  [97.5 °F (36.4 °C)-99.1 °F (37.3 °C)] 97.5 °F (36.4 °C)  Heart Rate:  [63-76] 63  Resp:  [18] 18  BP: ()/(68-90) 144/81    Intake/Output:    Intake/Output Summary (Last 24 hours) at 2024 1044  Last data filed at 2024 0539  Gross per 24 hour   Intake 2440 ml   Output 550 ml   Net 1890 ml       Exam:  /81 (BP Location: Right arm, Patient Position: Lying)   Pulse 63   Temp 97.5 °F (36.4 °C) (Oral)   Resp 18   Ht 175.3 cm (69\")   Wt 95.3 kg (210 lb)   SpO2 99%   BMI 31.01 kg/m²     General Appearance:    Alert, cooperative, nontoxic, AAOx3                         Throat:   oral mucosa pink and " moist                           Neck:   No JVD                         Lungs:    Clear to auscultation bilaterally, respirations unlabored                 Chest Wall:    No tenderness or deformity                          Heart:    Regular rate and rhythm, S1 and S2 normal                  Abdomen:     Soft, nontender, bowel sounds active                 Extremities: Moving all, no cyanosis or edema                        Pulses:   Pulses palpable in all extremities                            Skin:   Skin is warm and dry                  Neurologic:   CNII-XII intact     Data Review:  Labs in chart were reviewed.    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       Plan:    Chest x-ray was positive for pneumonia patient was started on Zosyn in case there was any type of aspiration component.  I will switch to p.o. antibiotics at disposition      Acute CVA doing quite well clinically with no significant deficits   -INR therapeutic today at 2.2 with pharmacy dosing and no need for further Lovenox bridge.  Issues with compliance with Coumadin in the past given past history of DVT/PE      DM2 with A1c of 10 concerning for noncompliance   -Basal bolus regimen continued with recent reduction in basal to 20 units and less hypoglycemia noted      ARF now  resolved status post IVF and will saline lock fluids   -Remains euvolemic and will hold Lasix and spironolactone for today with probable reinstatement tomorrow.   -Okay to reinstate Entresto today and will likely reinstate his diuretics tomorrow      Melatonin for insomnia        Disposition -CCP to coordinate needs but I am anticipating medically stable for discharge tomorrow    Jeff Waters MD  5/21/2024  10:44 EDT

## 2024-05-21 NOTE — PROGRESS NOTES
[facility] Clinical Pharmacy Services: Warfarin Dosing/Monitoring Consult    Cecilio Puckett is a 54 y.o. male, estimated creatinine clearance is 83.9 mL/min (by C-G formula based on SCr of 1.16 mg/dL). weighing 95.3 kg (210 lb).    Results from last 7 days   Lab Units 05/21/24  0454 05/20/24  0138 05/19/24  0534 05/18/24  0510 05/17/24  1431 05/17/24  0509 05/15/24  1827 05/15/24  0301 05/14/24  1832   INR  2.25* 1.81* 1.60* 1.61* 1.45* 3.76*   < >  --  1.05   APTT seconds  --   --   --   --   --   --   --   --  25.3   HEMOGLOBIN g/dL  --  15.5  --  15.3  --  15.0  --  14.3 14.4   HEMATOCRIT %  --  47.9  --  46.4  --  46.6  --  43.0 43.7   PLATELETS 10*3/mm3  --  169  --  168  --  156  --  169 176    < > = values in this interval not displayed.     Prior to admission anticoagulation: Warfarin 10mg - 1.5 tablets M/W/F and 1 tablet AOD per patient    Hospital Anticoagulation:  Consulting provider: MARIANNE Marie  Start date: 5/15  Indication: history of DVT/PE  Target INR: 2 - 3  Expected duration: indefinite   Bridge Therapy: stopped    Potential food or drug interactions:   Zosyn    Education complete?/Date: No; plan for follow up TBD; Patient follows with [facility] for anticoagulation monitoring.    Assessment/Plan:  AM INR is at goal today (2.25) had a fever yesterday which can acutely rise INR due to clotting factor catabolism and also was started on zosyn. Resuming 10 mg daily to since now at goal and will be on antibiotic for a while.    Nurse to monitor for any signs or symptoms of bleeding.  Follow up daily INRs and dose adjustments.    Pharmacy will continue to follow until discharge or discontinuation of warfarin.     Jose Miguel Hernandez [facility]  Clinical Pharmacist

## 2024-05-21 NOTE — PLAN OF CARE
Goal Outcome Evaluation:  Plan of Care Reviewed With: patient        Progress: improving     No new events during this shift

## 2024-05-22 ENCOUNTER — NURSE TRIAGE (OUTPATIENT)
Dept: CALL CENTER | Facility: HOSPITAL | Age: 54
End: 2024-05-22
Payer: MEDICARE

## 2024-05-22 ENCOUNTER — READMISSION MANAGEMENT (OUTPATIENT)
Dept: CALL CENTER | Facility: HOSPITAL | Age: 54
End: 2024-05-22
Payer: MEDICARE

## 2024-05-22 VITALS
DIASTOLIC BLOOD PRESSURE: 87 MMHG | TEMPERATURE: 98.4 F | BODY MASS INDEX: 31.1 KG/M2 | RESPIRATION RATE: 18 BRPM | WEIGHT: 210 LBS | SYSTOLIC BLOOD PRESSURE: 145 MMHG | HEIGHT: 69 IN | OXYGEN SATURATION: 97 % | HEART RATE: 62 BPM

## 2024-05-22 LAB
ANION GAP SERPL CALCULATED.3IONS-SCNC: 8.1 MMOL/L (ref 5–15)
BUN SERPL-MCNC: 9 MG/DL (ref 6–20)
BUN/CREAT SERPL: 9 (ref 7–25)
CALCIUM SPEC-SCNC: 8.3 MG/DL (ref 8.6–10.5)
CHLORIDE SERPL-SCNC: 109 MMOL/L (ref 98–107)
CO2 SERPL-SCNC: 21.9 MMOL/L (ref 22–29)
CREAT SERPL-MCNC: 1 MG/DL (ref 0.76–1.27)
EGFRCR SERPLBLD CKD-EPI 2021: 90 ML/MIN/1.73
GLUCOSE BLDC GLUCOMTR-MCNC: 77 MG/DL (ref 70–130)
GLUCOSE BLDC GLUCOMTR-MCNC: 80 MG/DL (ref 70–130)
GLUCOSE SERPL-MCNC: 57 MG/DL (ref 65–99)
INR PPP: 2.29 (ref 0.9–1.1)
POTASSIUM SERPL-SCNC: 3.3 MMOL/L (ref 3.5–5.2)
PROTHROMBIN TIME: 25.5 SECONDS (ref 11.7–14.2)
SODIUM SERPL-SCNC: 139 MMOL/L (ref 136–145)

## 2024-05-22 PROCEDURE — 82948 REAGENT STRIP/BLOOD GLUCOSE: CPT

## 2024-05-22 PROCEDURE — 80048 BASIC METABOLIC PNL TOTAL CA: CPT | Performed by: HOSPITALIST

## 2024-05-22 PROCEDURE — 85610 PROTHROMBIN TIME: CPT | Performed by: NURSE PRACTITIONER

## 2024-05-22 PROCEDURE — 25010000002 PIPERACILLIN SOD-TAZOBACTAM PER 1 G: Performed by: HOSPITALIST

## 2024-05-22 RX ORDER — POTASSIUM CHLORIDE 750 MG/1
40 TABLET, FILM COATED, EXTENDED RELEASE ORAL ONCE
Status: DISCONTINUED | OUTPATIENT
Start: 2024-05-22 | End: 2024-05-22 | Stop reason: HOSPADM

## 2024-05-22 RX ORDER — AMOXICILLIN AND CLAVULANATE POTASSIUM 875; 125 MG/1; MG/1
1 TABLET, FILM COATED ORAL 2 TIMES DAILY
Qty: 14 TABLET | Refills: 0 | Status: SHIPPED | OUTPATIENT
Start: 2024-05-22

## 2024-05-22 RX ADMIN — CARVEDILOL 12.5 MG: 12.5 TABLET, FILM COATED ORAL at 08:45

## 2024-05-22 RX ADMIN — PANTOPRAZOLE SODIUM 40 MG: 40 TABLET, DELAYED RELEASE ORAL at 06:28

## 2024-05-22 RX ADMIN — PIPERACILLIN AND TAZOBACTAM 3.38 G: 3; .375 INJECTION, POWDER, FOR SOLUTION INTRAVENOUS at 08:46

## 2024-05-22 RX ADMIN — ELVITEGRAVIR, COBICISTAT, EMTRICITABINE, AND TENOFOVIR ALAFENAMIDE 1 TABLET: 150; 150; 200; 10 TABLET ORAL at 08:45

## 2024-05-22 RX ADMIN — ASPIRIN 81 MG: 81 TABLET, COATED ORAL at 08:45

## 2024-05-22 RX ADMIN — SACUBITRIL AND VALSARTAN 1 TABLET: 49; 51 TABLET, FILM COATED ORAL at 08:45

## 2024-05-22 RX ADMIN — ROSUVASTATIN CALCIUM 20 MG: 20 TABLET, FILM COATED ORAL at 08:45

## 2024-05-22 RX ADMIN — MAGNESIUM OXIDE 400 MG (241.3 MG MAGNESIUM) TABLET 400 MG: TABLET at 08:45

## 2024-05-22 RX ADMIN — AMLODIPINE BESYLATE 10 MG: 10 TABLET ORAL at 08:45

## 2024-05-22 RX ADMIN — PIPERACILLIN AND TAZOBACTAM 3.38 G: 3; .375 INJECTION, POWDER, FOR SOLUTION INTRAVENOUS at 00:09

## 2024-05-22 RX ADMIN — Medication 10 ML: at 08:46

## 2024-05-22 RX ADMIN — SERTRALINE 50 MG: 50 TABLET, FILM COATED ORAL at 08:45

## 2024-05-22 NOTE — DISCHARGE SUMMARY
Date of Discharge:  5/22/2024    PCP: Arias Lehman APRN    Discharge Diagnosis:   Active Hospital Problems    Diagnosis  POA    **Acute CVA (cerebrovascular accident) [I63.9]  Yes    Ischemic cardiomyopathy [I25.5]  Yes    History of CVA (cerebrovascular accident) [Z86.73]  Not Applicable    NHL (non-Hodgkin's lymphoma) [C85.90]  Yes    Type 2 diabetes mellitus with circulatory disorder, 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          Consults       Date and Time Order Name Status Description    5/15/2024 11:24 PM Inpatient Cardiology Consult Completed     5/15/2024  2:43 PM Inpatient Hospitalist Consult Completed     5/14/2024  7:59 PM Inpatient Neurology Consult Stroke Completed           Hospital Course  54 y.o. male who does have extensive past medical history's as well as 1 of stroke and HIV and he comes to us already on Coumadin with therapeutic INR.  He also has issues with cardiomyopathy and cardiology saw in consultation as well as neurology.  To summarize, this patient was found to have an acute stroke but he is doing quite well as he does not have any significant deficits.  Following speech therapy and CCP recommendations at discharge, they recommend speech therapy so I also get nursing as well as PT to follow with home health.  He is already on Coumadin with a therapeutic INR and we have concerns about a patient noncompliance with medications especially anticoagulation and also particularly with significantly uncontrolled diabetes.  He also has past medical history of DVT/PE.  Strong concern for dietary noncompliance as we have this  gentleman on significantly lower doses than what he is utilizing at home and his blood sugars here are starting to show low results going into the 50s so I anticipate his dietary compliance as an outpatient is likely poor leading to further hyperglycemia.  Unfortunately his comorbidities particularly diabetes is accelerating his cardiovascular disease.  A1c is unfortunately 10+ signifying longstanding very poorly controlled diabetes.  Cardiology did see in consultation and  not recommend intervention at this time but has recommended that he will follow as an outpatient and consider cardiac catheterization once he recovers from all the above.  I will defer that follow-up to the cardiology post DC.  Adjustments have been made to some of his blood pressure medications to help with his heart failure.  Cardiology has started on Entresto so removed HCTZ as well as losartan from his previous regimen.  Spironolactone is also being utilized.  By discharge his creatinine has normalized at 1.00 and his K is 3.3 and I will give him additional dose of potassium prior to discharge.  Couple days after admission, he started to spike a fever and I felt atelectasis was present and I started I-S but I also checked a chest x-ray and blood cultures given his comorbidities.  He was actually found to be developing a pneumonia which could be aspiration in etiology and he was placed on Zosyn.  At discharge I will give him an additional 7 days of Augmentin given his immunosuppression/comorbidities.  He has absolutely no cardiopulmonary complaints by the time of disposition and has remained fever free since the initiation of antibiotics.  All questions otherwise answered the patient and he is thankful for the care he received while here while very much amenable to discharge from the hospital.  Discharge needs per CCP ordered.      Temp:  [97.7 °F (36.5 °C)-98.8 °F (37.1 °C)] 98.4 °F (36.9 °C)  Heart Rate:  [58-70] 62  Resp:  [18]  18  BP: (115-168)/(60-89) 145/87  Body mass index is 31.01 kg/m².    Physical Exam  Constitutional:       Comments: Very pleasant and conversational.  Nontoxic.  No family present   HENT:      Head: Normocephalic.      Nose: Nose normal.      Mouth/Throat:      Mouth: Mucous membranes are moist.      Pharynx: Oropharynx is clear.   Eyes:      Extraocular Movements: Extraocular movements intact.      Pupils: Pupils are equal, round, and reactive to light.   Cardiovascular:      Rate and Rhythm: Normal rate and regular rhythm.   Pulmonary:      Effort: Pulmonary effort is normal. No respiratory distress.      Breath sounds: Normal breath sounds. No stridor. No wheezing, rhonchi or rales.   Abdominal:      General: Bowel sounds are normal. There is no distension.      Palpations: Abdomen is soft.      Tenderness: There is no abdominal tenderness.   Musculoskeletal:         General: No swelling.   Neurological:      Mental Status: He is alert and oriented to person, place, and time. Mental status is at baseline.       Disposition: Home or Self Care       Discharge Medications        New Medications        Instructions Start Date   amoxicillin-clavulanate 875-125 MG per tablet  Commonly known as: AUGMENTIN   1 tablet, Oral, 2 Times Daily      sacubitril-valsartan 49-51 MG tablet  Commonly known as: ENTRESTO   1 tablet, Oral, Every 12 Hours Scheduled             Changes to Medications        Instructions Start Date   Insulin Glargine 100 UNIT/ML injection pen  Commonly known as: LANTUS SOLOSTAR  What changed: Another medication with the same name was removed. Continue taking this medication, and follow the directions you see here.   25 Units, Subcutaneous, Nightly             Continue These Medications        Instructions Start Date   Adult Aspirin Regimen 81 MG EC tablet  Generic drug: aspirin   1 tablet, Oral, Daily      amLODIPine 10 MG tablet  Commonly known as: NORVASC   10 mg, Oral, Daily      carvedilol 6.25 MG  tablet  Commonly known as: COREG   6.25 mg, Oral, Every 12 Hours Scheduled      Cqwszsn-Qhlfk-Dsykhtxk-TenofAF 516-585-069-10 MG per tablet  Commonly known as: GENVOYA   1 tablet, Oral, Daily      ergocalciferol 1.25 MG (54147 UT) capsule  Commonly known as: ERGOCALCIFEROL   1 capsule, Oral, Weekly      Farxiga 10 MG tablet  Generic drug: dapagliflozin Propanediol   10 mg, Oral, Daily      furosemide 20 MG tablet  Commonly known as: LASIX   1 tablet, Oral, Daily      glipizide 10 MG tablet  Commonly known as: GLUCOTROL   1 tablet, Oral, Every 12 Hours Scheduled      HYDROcodone-acetaminophen  MG per tablet  Commonly known as: NORCO   1 tablet, Oral      pantoprazole 40 MG EC tablet  Commonly known as: PROTONIX   No dose, route, or frequency recorded.      rosuvastatin 20 MG tablet  Commonly known as: CRESTOR   1 tablet, Oral, Daily      sertraline 50 MG tablet  Commonly known as: ZOLOFT   50 mg, Oral, Daily      warfarin 10 MG tablet  Commonly known as: COUMADIN   TAKE one AND one half TABLET BY MOUTH ON MONDAY, WEDNESDAY, FRIDAY. TAKE ONE TABLET BY MOUTH ALL other DAYS.             Stop These Medications      hydroCHLOROthiazide 12.5 MG tablet     losartan 25 MG tablet  Commonly known as: COZAAR               Additional Instructions for the Follow-ups that You Need to Schedule       Ambulatory Referral to Home Health (Hospital)   As directed      Face to Face Visit Date: 5/22/2024   Follow-up provider for Plan of Care?: I treated the patient in an acute care facility and will not continue treatment after discharge.   Follow-up provider: CHETAN PAUL [[id number]]   Reason/Clinical Findings: CVA/pneumonia   Describe mobility limitations that make leaving home difficult: Taxing effort to leave home   Nursing/Therapeutic Services Requested: Skilled Nursing Physical Therapy Speech Therapy   Skilled nursing orders: Medication education   PT orders: Home safety assessment Strengthening Therapeutic exercise Gait  Training Transfer training   Weight Bearing Status: As Tolerated   SLP orders:  (Post CVA treatment)   Frequency: 1 Week 1        Discharge Follow-up with PCP   As directed       Currently Documented PCP:    Arias Lehman APRN    PCP Phone Number:    [phone]     Follow Up Details: PCP 2 to 3 weeks.  Neurology and cardiology per their recommendations               Follow-up Information       [facility] NEUROLOGY Follow up in 3 month(s).    Specialty: Neurology  Contact information:  [address]  [place] [address] [phone]  Additional information:  Phone Number: [phone]      [facility] and across the [facility] from [facility]. Located in [facility] with the number 9180 on the building.             Arias Lehman APRN .    Specialty: Family Medicine  Why: PCP 2 to 3 weeks.  Neurology and cardiology per their recommendations  Contact information:  [facility]  [place] [address]  [phone]                          No future appointments.  Pending Labs       Order Current Status    Blood Culture - Blood, Hand, Left Preliminary result    Blood Culture - Blood, Hand, Right Preliminary result           Jeff Waters MD  [facility]  05/22/24    Discharge time spent greater than 30 minutes.

## 2024-05-22 NOTE — PLAN OF CARE
Goal Outcome Evaluation:  Plan of Care Reviewed With: patient        Progress: no change                                   no

## 2024-05-22 NOTE — PROGRESS NOTES
[facility] Clinical Pharmacy Services: Warfarin Dosing/Monitoring Consult    Cecilio Puckett is a 54 y.o. male, estimated creatinine clearance is 97.3 mL/min (by C-G formula based on SCr of 1 mg/dL). weighing 95.3 kg (210 lb).    Results from last 7 days   Lab Units 05/22/24  0434 05/21/24  0454 05/20/24  0138 05/19/24  0534 05/18/24  0510 05/17/24  1431 05/17/24  0509   INR  2.29* 2.25* 1.81* 1.60* 1.61*   < > 3.76*   HEMOGLOBIN g/dL  --   --  15.5  --  15.3  --  15.0   HEMATOCRIT %  --   --  47.9  --  46.4  --  46.6   PLATELETS 10*3/mm3  --   --  169  --  168  --  156    < > = values in this interval not displayed.     Prior to admission anticoagulation: Warfarin 10mg - 1.5 tablets M/W/F and 1 tablet AOD per patient    Hospital Anticoagulation:  Consulting provider: MARIANNE Marie  Start date: 5/15  Indication: history of DVT/PE  Target INR: 2 - 3  Expected duration: indefinite   Bridge Therapy: stopped    Potential food or drug interactions:   Zosyn    Education complete?/Date: No; plan for follow up TBD; Patient [facility] with [facility] for anticoagulation monitoring.    Assessment/Plan:  AM INR is at goal today (2.29) Patient had a fever day before yesterday and  was started on zosyn. Continue 10 mg daily for now since regimen in target range and will be on the antibiotic for a while.    Nurse to monitor for any signs or symptoms of bleeding.  Follow up daily INRs and dose adjustments.    Pharmacy will continue to follow until discharge or discontinuation of warfarin.     Isaac Porter [facility]  Clinical Pharmacist

## 2024-05-22 NOTE — PLAN OF CARE
Goal Outcome Evaluation:  Plan of Care Reviewed With: patient        Progress: improving     No new events during this shift, pt seen by Dr Waters and ok to d/c home, pt will be going home with Linda (mother) [phone]. Discharge meds to be delivered by meds to bed.

## 2024-05-22 NOTE — PAYOR COMM NOTE
"Cecilio Owens (54 y.o. Male)          DC SUMMARY FOR [id number]     CONTACT F# [phone]         Date of Birth   1970    Social Security Number       Address   [address] [place] [address]    Home Phone   [phone]    MRN   [id number]       [facility]    Marital Status   Single                            Admission Date   5/14/24    Admission Type   Emergency    Admitting Provider   Sade Prasad MD    Attending Provider       Department, Room/Bed   [address], P578/1       Discharge Date   5/22/2024    Discharge Disposition   Home or Self Care    Discharge Destination                                 Attending Provider: (none)   Allergies: No Known Allergies    Isolation: None   Infection: None   Code Status: Prior    Ht: 175.3 cm (69\")   Wt: 95.3 kg (210 lb)    Admission Cmt: None   Principal Problem: Acute CVA (cerebrovascular accident) [I63.9]                   Active Insurance as of 5/14/2024       Primary Coverage       Payor Plan Insurance Group Employer/Plan Group    AETNA MEDICARE REPLACEMENT AETNA MEDICARE REPLACEMENT [id number]       Payor Plan Address Payor Plan Phone Number Payor Plan Fax Number Effective Dates    [address] [phone]  1/1/2022 - None Entered    [place] [address]         Subscriber Name Subscriber Birth Date Member ID       CECILIO OWENS 1970 [id number]                     Emergency Contacts        (Rel.) Home Phone Work Phone Mobile Phone    Linda Owens (Mother) -- -- [phone]                 Discharge Summary        Jeff Waters MD at 05/22/24 0937          Date of Discharge:  5/22/2024    PCP: Arias Lehman APRN    Discharge Diagnosis:   Active Hospital Problems    Diagnosis  POA    **Acute CVA (cerebrovascular accident) [I63.9]  Yes    Ischemic cardiomyopathy [I25.5]  Yes    History of CVA (cerebrovascular accident) [Z86.73]  Not Applicable    NHL (non-Hodgkin's lymphoma) " [C85.90]  Yes    Type 2 diabetes mellitus with circulatory disorder, 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          Consults       Date and Time Order Name Status Description    5/15/2024 11:24 PM Inpatient Cardiology Consult Completed     5/15/2024  2:43 PM Inpatient Hospitalist Consult Completed     5/14/2024  7:59 PM Inpatient Neurology Consult Stroke Completed           Hospital Course  54 y.o. male who does have extensive past medical history's as well as 1 of stroke and HIV and he comes to us already on Coumadin with therapeutic INR.  He also has issues with cardiomyopathy and cardiology saw in consultation as well as neurology.  To summarize, this patient was found to have an acute stroke but he is doing quite well as he does not have any significant deficits.  Following speech therapy and CCP recommendations at discharge, they recommend speech therapy so I also get nursing as well as PT to follow with home health.  He is already on Coumadin with a therapeutic INR and we have concerns about a patient noncompliance with medications especially anticoagulation and also particularly with significantly uncontrolled diabetes.  He also has past medical history of DVT/PE.  Strong concern for dietary noncompliance as we have this gentleman on significantly lower doses than what he is utilizing at home and his blood sugars here are starting to show low results going into the 50s so I anticipate his dietary compliance as an outpatient is likely poor leading to further hyperglycemia.  Unfortunately his comorbidities particularly diabetes is accelerating his  cardiovascular disease.  A1c is unfortunately 10+ signifying longstanding very poorly controlled diabetes.  Cardiology did see in consultation and  not recommend intervention at this time but has recommended that he will follow as an outpatient and consider cardiac catheterization once he recovers from all the above.  I will defer that follow-up to the cardiology post DC.  Adjustments have been made to some of his blood pressure medications to help with his heart failure.  Cardiology has started on Entresto so removed HCTZ as well as losartan from his previous regimen.  Spironolactone is also being utilized.  By discharge his creatinine has normalized at 1.00 and his K is 3.3 and I will give him additional dose of potassium prior to discharge.  Couple days after admission, he started to spike a fever and I felt atelectasis was present and I started I-S but I also checked a chest x-ray and blood cultures given his comorbidities.  He was actually found to be developing a pneumonia which could be aspiration in etiology and he was placed on Zosyn.  At discharge I will give him an additional 7 days of Augmentin given his immunosuppression/comorbidities.  He has absolutely no cardiopulmonary complaints by the time of disposition and has remained fever free since the initiation of antibiotics.  All questions otherwise answered the patient and he is thankful for the care he received while here while very much amenable to discharge from the hospital.  Discharge needs per CCP ordered.      Temp:  [97.7 °F (36.5 °C)-98.8 °F (37.1 °C)] 98.4 °F (36.9 °C)  Heart Rate:  [58-70] 62  Resp:  [18] 18  BP: (115-168)/(60-89) 145/87  Body mass index is 31.01 kg/m².    Physical Exam  Constitutional:       Comments: Very pleasant and conversational.  Nontoxic.  No family present   HENT:      Head: Normocephalic.      Nose: Nose normal.      Mouth/Throat:      Mouth: Mucous membranes are moist.      Pharynx: Oropharynx is clear.    Eyes:      Extraocular Movements: Extraocular movements intact.      Pupils: Pupils are equal, round, and reactive to light.   Cardiovascular:      Rate and Rhythm: Normal rate and regular rhythm.   Pulmonary:      Effort: Pulmonary effort is normal. No respiratory distress.      Breath sounds: Normal breath sounds. No stridor. No wheezing, rhonchi or rales.   Abdominal:      General: Bowel sounds are normal. There is no distension.      Palpations: Abdomen is soft.      Tenderness: There is no abdominal tenderness.   Musculoskeletal:         General: No swelling.   Neurological:      Mental Status: He is alert and oriented to person, place, and time. Mental status is at baseline.       Disposition: Home or Self Care       Discharge Medications        New Medications        Instructions Start Date   amoxicillin-clavulanate 875-125 MG per tablet  Commonly known as: AUGMENTIN   1 tablet, Oral, 2 Times Daily      sacubitril-valsartan 49-51 MG tablet  Commonly known as: ENTRESTO   1 tablet, Oral, Every 12 Hours Scheduled             Changes to Medications        Instructions Start Date   Insulin Glargine 100 UNIT/ML injection pen  Commonly known as: LANKRISTIN SOLBRETAR  What changed: Another medication with the same name was removed. Continue taking this medication, and follow the directions you see here.   25 Units, Subcutaneous, Nightly             Continue These Medications        Instructions Start Date   Adult Aspirin Regimen 81 MG EC tablet  Generic drug: aspirin   1 tablet, Oral, Daily      amLODIPine 10 MG tablet  Commonly known as: NORVASC   10 mg, Oral, Daily      carvedilol 6.25 MG tablet  Commonly known as: COREG   6.25 mg, Oral, Every 12 Hours Scheduled      Njegges-Spmbo-Vtlyemrx-TenofAF 590-049-703-10 MG per tablet  Commonly known as: GENVOYA   1 tablet, Oral, Daily      ergocalciferol 1.25 MG (51520 UT) capsule  Commonly known as: ERGOCALCIFEROL   1 capsule, Oral, Weekly      Farxiga 10 MG tablet  Generic  drug: dapagliflozin Propanediol   10 mg, Oral, Daily      furosemide 20 MG tablet  Commonly known as: LASIX   1 tablet, Oral, Daily      glipizide 10 MG tablet  Commonly known as: GLUCOTROL   1 tablet, Oral, Every 12 Hours Scheduled      HYDROcodone-acetaminophen  MG per tablet  Commonly known as: NORCO   1 tablet, Oral      pantoprazole 40 MG EC tablet  Commonly known as: PROTONIX   No dose, route, or frequency recorded.      rosuvastatin 20 MG tablet  Commonly known as: CRESTOR   1 tablet, Oral, Daily      sertraline 50 MG tablet  Commonly known as: ZOLOFT   50 mg, Oral, Daily      warfarin 10 MG tablet  Commonly known as: COUMADIN   TAKE one AND one half TABLET BY MOUTH ON MONDAY, WEDNESDAY, FRIDAY. TAKE ONE TABLET BY MOUTH ALL other DAYS.             Stop These Medications      hydroCHLOROthiazide 12.5 MG tablet     losartan 25 MG tablet  Commonly known as: COZAAR               Additional Instructions for the Follow-ups that You Need to Schedule       Ambulatory Referral to Home Health (Hospital)   As directed      Face to Face Visit Date: 5/22/2024   Follow-up provider for Plan of Care?: I treated the patient in an acute care facility and will not continue treatment after discharge.   Follow-up provider: CHETAN PAUL [[id number]]   Reason/Clinical Findings: CVA/pneumonia   Describe mobility limitations that make leaving home difficult: Taxing effort to leave home   Nursing/Therapeutic Services Requested: Skilled Nursing Physical Therapy Speech Therapy   Skilled nursing orders: Medication education   PT orders: Home safety assessment Strengthening Therapeutic exercise Gait Training Transfer training   Weight Bearing Status: As Tolerated   SLP orders:  (Post CVA treatment)   Frequency: 1 Week 1        Discharge Follow-up with PCP   As directed       Currently Documented PCP:    Chetan Paul APRN    PCP Phone Number:    [phone]     Follow Up Details: PCP 2 to 3 weeks.  Neurology and  cardiology per their recommendations               Follow-up Information       [facility] NEUROLOGY Follow up in 3 month(s).    Specialty: Neurology  Contact information:  [address]  [facility] [address]  [facility] [address] [phone]  Additional information:  Phone Number: [phone]      [facility] and across the street from the [facility]. Located in [facility] with the number [address] on the building.             Arias Lehman, MARIANNE .    Specialty: Family Medicine  Why: PCP 2 to 3 weeks.  Neurology and cardiology per their recommendations  Contact information:  [address] [facility] [address]  [facility] [address]  [phone]                          No future appointments.  Pending Labs       Order Current Status    Blood Culture - Blood, Hand, Left Preliminary result    Blood Culture - Blood, Hand, Right Preliminary result           Jeff Waters MD  [facility]  05/22/24    Discharge time spent greater than 30 minutes.      Electronically signed by Jeff Waters MD at 05/22/24 2496

## 2024-05-22 NOTE — PROGRESS NOTES
"Enter Query Response Below      Query Response:Type II MI due to CVA             If applicable, please update the problem list.   Patient: Cecilio Puckett        : 1970  Account: [id number]           Admit Date:         How to Respond to this query:       a. Click New Note     b. Answer query within the yellow box.                c. Update the Problem List, if applicable.      If you have any questions about this query contact me at: [email or web address]     Dr. Rivero,     54-year male patient with PMHx that includes Type 2 DM, HTN, Ischemic Cardiomyopathy, DVT/PE, CAD, HIV, Non Hodgkin's Lymphoma admitted 25 with Acute Embolic CVA, Coronary artery disease involving native coronary artery of native heart with angina pectoris. 05/15 HS Troponin T - 26.    Cardiology Progress Note states, \"Stress perfusion study today shows moderate size infarct located in inferior wall with mild to moderate isi-infarct ischemia.  Severe hypokinesis of inferior wall.  LVEF 34%. \"   Cardiology Progress Note states, \" I would not recommend catheterization at this time.  I would recommend continuing goal-directed medical therapy for his cardiomyopathy and considering catheterization depending on how he recovers. \" Treatment has included Norvasc, Aspirin, Coreg, Rosuvastatin, Lasix, Spironolactone, Entresto.     Please clarify the type of MI the patient was treated/monitored for:  > Type 1 MI due to ______ (please specify- plaque erosion, rupture, fissure or thrombus)  > Type 2 MI due to Acute CVA  > Type 2 MI due to __________  > Other- specify______  > Unable to determine    By submitting this query, we are merely seeking further clarification of documentation to accurately reflect all conditions that you are monitoring, evaluating, treating or that extend the hospitalization or utilize additional resources of care. Please utilize your independent clinical judgment when addressing the question(s) above.     This " query and your response, once completed, will be entered into the legal medical record.    Sincerely,  Darlyn Jaramillo RN, [facility]  Clinical Documentation Integrity Program   [email or web address]

## 2024-05-23 ENCOUNTER — NURSE TRIAGE (OUTPATIENT)
Dept: CALL CENTER | Facility: HOSPITAL | Age: 54
End: 2024-05-23
Payer: MEDICARE

## 2024-05-23 NOTE — TELEPHONE ENCOUNTER
Mother is caller. She has found medications in home that are not listed on discharge AVS. Should he be taking them.    Advised if medication not listed on AVS to call prescriber for medication for clarification.            Reason for Disposition   [1] Caller has URGENT medicine question about med that PCP or specialist prescribed AND [2] triager unable to answer question    Additional Information   Negative: [1] Intentional drug overdose AND [2] suicidal thoughts or ideas   Negative: Drug overdose and triager unable to answer question   Negative: Caller requesting a renewal or refill of a medicine patient is currently taking   Negative: Caller requesting information unrelated to medicine   Negative: Caller requesting information about COVID-19 Vaccine   Negative: Caller requesting information about Emergency Contraception   Negative: Caller requesting information about Combined Birth Control Pills   Negative: Caller requesting information about Progestin Birth Control Pills   Negative: Caller requesting information about Post-Op pain or medicines   Negative: Caller requesting a prescription antibiotic (such as Penicillin) for Strep throat and has a positive culture result   Negative: Caller requesting a prescription anti-viral med (such as Tamiflu) and has influenza (flu) symptoms   Negative: Immunization reaction suspected   Negative: Rash while taking a medicine or within 3 days of stopping it   Negative: [1] Asthma and [2] having symptoms of asthma (cough, wheezing, etc.)   Negative: [1] Symptom of illness (e.g., headache, abdominal pain, earache, vomiting) AND [2] more than mild   Negative: Breastfeeding questions about mother's medicines and diet   Negative: MORE THAN A DOUBLE DOSE of a prescription or over-the-counter (OTC) drug   Negative: [1] DOUBLE DOSE (an extra dose or lesser amount) of prescription drug AND [2] any symptoms (e.g., dizziness, nausea, pain, sleepiness)   Negative: [1] DOUBLE DOSE (an extra  "dose or lesser amount) of over-the-counter (OTC) drug AND [2] any symptoms (e.g., dizziness, nausea, pain, sleepiness)   Negative: Took another person's prescription drug   Negative: [1] DOUBLE DOSE (an extra dose or lesser amount) of prescription drug AND [2] NO symptoms  (Exception: A double dose of antibiotics.)   Negative: Diabetes drug error or overdose (e.g., took wrong type of insulin or took extra dose)   Negative: [1] Prescription not at pharmacy AND [2] was prescribed by PCP recently (Exception: Triager has access to EMR and prescription is recorded there. Go to Home Care and confirm for pharmacy.)   Negative: [1] Pharmacy calling with prescription question AND [2] triager unable to answer question    Answer Assessment - Initial Assessment Questions  1. NAME of MEDICINE: \"What medicine(s) are you calling about?\"     Medications found in home not listed on AVS  2. QUESTION: \"What is your question?\" (e.g., double dose of medicine, side effect)     Should he be taking them  3. PRESCRIBER: \"Who prescribed the medicine?\" Reason: if prescribed by specialist, call should be referred to that group.      *No Answer*  4. SYMPTOMS: \"Do you have any symptoms?\" If Yes, ask: \"What symptoms are you having?\"  \"How bad are the symptoms (e.g., mild, moderate, severe)      *No Answer*  5. PREGNANCY:  \"Is there any chance that you are pregnant?\" \"When was your last menstrual period?\"      *No Answer*    Protocols used: Medication Question Call-ADULT-    "

## 2024-05-23 NOTE — TELEPHONE ENCOUNTER
Patient told to change night time insulin to Solostar.  Mom trying to organize meds and states she doesn't see it in the house.  Thinks it might be a new med.  Was ordered as a dose change and not a new med.  Mom found novolog flex pen.  Patient has not check BG yet.      Mom to continue looking for insulin and talk to patient.  Patient to check a blood sugar now.  Will call triage center back.    CALL BACK-- Mom found glargine insulin.  Patient's blood sugar is 320 currently.  Taking new dose of insulin now.  Instructed no snacks right now and drink a glass of water.  Mom states patient had M&M's as a snack a little bit ago.  Mom taking M&Ms away.      Reason for Disposition   Diabetes drug error or overdose (e.g., took wrong type of insulin or took extra dose)    Additional Information   Negative: [1] Intentional drug overdose AND [2] suicidal thoughts or ideas   Negative: Drug overdose and triager unable to answer question   Negative: Caller requesting a renewal or refill of a medicine patient is currently taking   Negative: Caller requesting information unrelated to medicine   Negative: Caller requesting information about COVID-19 Vaccine   Negative: Caller requesting information about Emergency Contraception   Negative: Caller requesting information about Combined Birth Control Pills   Negative: Caller requesting information about Progestin Birth Control Pills   Negative: Caller requesting information about Post-Op pain or medicines   Negative: Caller requesting a prescription antibiotic (such as Penicillin) for Strep throat and has a positive culture result   Negative: Caller requesting a prescription anti-viral med (such as Tamiflu) and has influenza (flu) symptoms   Negative: Immunization reaction suspected   Negative: Rash while taking a medicine or within 3 days of stopping it   Negative: [1] Asthma and [2] having symptoms of asthma (cough, wheezing, etc.)   Negative: [1] Symptom of illness (e.g.,  "headache, abdominal pain, earache, vomiting) AND [2] more than mild   Negative: Breastfeeding questions about mother's medicines and diet   Negative: MORE THAN A DOUBLE DOSE of a prescription or over-the-counter (OTC) drug   Negative: [1] DOUBLE DOSE (an extra dose or lesser amount) of prescription drug AND [2] any symptoms (e.g., dizziness, nausea, pain, sleepiness)   Negative: [1] DOUBLE DOSE (an extra dose or lesser amount) of over-the-counter (OTC) drug AND [2] any symptoms (e.g., dizziness, nausea, pain, sleepiness)   Negative: Took another person's prescription drug   Negative: [1] DOUBLE DOSE (an extra dose or lesser amount) of prescription drug AND [2] NO symptoms  (Exception: A double dose of antibiotics.)   Negative: [1] Prescription not at pharmacy AND [2] was prescribed by PCP recently (Exception: Triager has access to EMR and prescription is recorded there. Go to Home Care and confirm for pharmacy.)   Negative: [1] Pharmacy calling with prescription question AND [2] triager unable to answer question    Answer Assessment - Initial Assessment Questions  1. NAME of MEDICINE: \"What medicine(s) are you calling about?\"      Gargine/Solostar  2. QUESTION: \"What is your question?\" (e.g., double dose of medicine, side effect)      Patient told to change night time insulin to Solostar.  Mom trying to organize meds and states she doesn't see it in the house.  Thinks it might be a new med.  Was ordered as a dose change and not a new med.  Mom found novolog flex pen.  Patient has not check BG yet.      Mom to continue looking for insulin and talk to patient.  Patient to check a blood sugar now.  Will call triage center back.    3. PRESCRIBER: \"Who prescribed the medicine?\" Reason: if prescribed by specialist, call should be referred to that group.      Jeff Waters  4. SYMPTOMS: \"Do you have any symptoms?\" If Yes, ask: \"What symptoms are you having?\"  \"How bad are the symptoms (e.g., mild, moderate, severe)      No.  " "Has not checked BG yet.  5. PREGNANCY:  \"Is there any chance that you are pregnant?\" \"When was your last menstrual period?\"      No.    Protocols used: Medication Question Call-ADULT-    "

## 2024-05-23 NOTE — OUTREACH NOTE
Prep Survey      Flowsheet Row Responses   [facility] facility patient discharged from? [facility]   Is LACE score < 7 ? No   Eligibility Readm Mgmt   Discharge diagnosis Acute CVA   Does the patient have one of the following disease processes/diagnoses(primary or secondary)? Stroke   Does the patient have Home health ordered? No   Is there a DME ordered? No   Comments regarding appointments OP speech therapy   Prep survey completed? Yes            DAGO NAIDU - Registered Nurse

## 2024-05-25 LAB
BACTERIA SPEC AEROBE CULT: NORMAL
BACTERIA SPEC AEROBE CULT: NORMAL

## 2024-05-25 NOTE — CASE MANAGEMENT/SOCIAL WORK
Case Management Discharge Note      Final Note: home    Provided Post Acute Provider List?: N/A  Provided Post Acute Provider Quality & Resource List?: N/A    Selected Continued Care - Discharged on 5/22/2024 Admission date: 5/14/2024 - Discharge disposition: Home or Self Care      Destination    No services have been selected for the patient.                Durable Medical Equipment    No services have been selected for the patient.                Dialysis/Infusion    No services have been selected for the patient.                Home Medical Care    No services have been selected for the patient.                Therapy    No services have been selected for the patient.                Community Resources    No services have been selected for the patient.                Community & DME    No services have been selected for the patient.                         Final Discharge Disposition Code: 01 - home or self-care

## 2024-05-29 ENCOUNTER — HOSPITAL ENCOUNTER (OUTPATIENT)
Dept: SPEECH THERAPY | Facility: HOSPITAL | Age: 54
Setting detail: THERAPIES SERIES
Discharge: HOME | End: 2024-05-29
Payer: MEDICARE

## 2024-05-29 ENCOUNTER — READMISSION MANAGEMENT (OUTPATIENT)
Dept: CALL CENTER | Facility: HOSPITAL | Age: 54
End: 2024-05-29
Payer: MEDICARE

## 2024-05-29 DIAGNOSIS — I63.9 APHASIA DUE TO ACUTE CEREBROVASCULAR ACCIDENT (CVA): ICD-10-CM

## 2024-05-29 DIAGNOSIS — I63.9 ACUTE CVA (CEREBROVASCULAR ACCIDENT): Primary | ICD-10-CM

## 2024-05-29 DIAGNOSIS — R47.01 APHASIA DUE TO ACUTE CEREBROVASCULAR ACCIDENT (CVA): ICD-10-CM

## 2024-05-29 PROCEDURE — 96105 ASSESSMENT OF APHASIA: CPT

## 2024-05-29 NOTE — OUTREACH NOTE
Stroke Week 1 Survey      Flowsheet Row Responses   [facility] patient discharged from? [place]   Does the patient have one of the following disease processes/diagnoses(primary or secondary)? Stroke   Week 1 attempt successful? Yes   Call start time 1039   Call end time 1050   Discharge diagnosis Acute CVA   Meds reviewed with patient/caregiver? Yes   Is the patient having any side effects they believe may be caused by any medication additions or changes? No   Does the patient have all medications ordered at discharge? Yes   Is the patient taking all medications as directed (includes completed medication regime)? Yes   Comments regarding appointments OP speech therapy   Does the patient have a primary care provider?  Yes   Does the patient have an appointment with their PCP within 7 days of discharge? No   What is preventing the patient from scheduling follow up appointments within 7 days of discharge? Haven't had time   Nursing Interventions Educated patient on importance of making appointment, Advised patient to make appointment   Has the patient kept scheduled appointments due by today? N/A   Has home health visited the patient within 72 hours of discharge? N/A   Psychosocial issues? No   Does the patient have any residual symptoms from stroke/TIA? Yes  [Speech]   Did the patient receive a copy of their discharge instructions? Yes   Nursing interventions Reviewed instructions with patient   What is the patient's perception of their health status since discharge? Improving   Nursing interventions Nurse provided patient education   Is the patient/caregiver able to teach back the risk factors for a stroke? High blood pressure-goal below 120/80, High Cholesterol, History of TIAs, Smoking, Diabetes, Carotid or other artery disease   Is the patient/caregiver able to teach back signs and symptoms related to disease process for when to call PCP? Yes   Is the patient/caregiver able to teach back signs and  symptoms related to disease process for when to call 911? Yes   Is the patient/caregiver able to teach back the hierarchy of who to call/visit for symptoms/problems? PCP, Specialist, Home health nurse, Urgent Care, ED, 911 Yes   Is the patient able to teach back FAST for Stroke? B alance: Watch for sudden loss of balance, E yes: Check for vision loss, A rm: Check if one arm is weak, S peech: Listen for slurred speech, T jennifer: Call 9-1-1 right away   Week 1 call completed? Yes   Is the patient interested in additional calls from an ambulatory ? No   Would this patient benefit from a Referral to [employer] [facility]? No   Wrap up additional comments Pt. reports doing well, has all meds per AVS, mother helps with meds. Advised any worsening symptoms go to ED, any questions contact PCP. Advised f/u with Neurology and Cardiology per their recommendations, pt. v/u.   Call end time 1050            Mary Jo HINES - Registered Nurse

## 2024-05-29 NOTE — THERAPY EVALUATION
Outpatient Speech Language Pathology   Adult Speech Language Cognitive Initial Evaluation  [facility]     Patient Name: Cecilio Puckett  : 1970  MRN: [id number]  Today's Date: 2024        Visit Date: 2024   Patient Active Problem List   Diagnosis    Syncope, unspecified syncope type    Acute DVT (deep venous thrombosis)    NHL (non-Hodgkin's lymphoma)    HIV (human immunodeficiency virus infection)    Type 2 diabetes mellitus with circulatory disorder, without long-term current use of insulin    HTN (hypertension)    Slurred speech    Acute CVA (cerebrovascular accident)    Anticoagulated on Coumadin    Coronary artery disease involving native coronary artery of native heart with angina pectoris    Arteriosclerosis of coronary artery    Ischemic cardiomyopathy    History of CVA (cerebrovascular accident)        Past Medical History:   Diagnosis Date    COPD (chronic obstructive pulmonary disease)     Coronary artery disease     Diabetes mellitus     DVT (deep venous thrombosis)     Elevated cholesterol     GERD (gastroesophageal reflux disease)     H/O Cancer     right arm, in throat, chest and stomach, in remission    H/O Ischemia of extremity     History of MI (myocardial infarction) 2019    History of snoring     History of transfusion     HIV (human immunodeficiency virus infection)     Hypertension     Non Hodgkin's lymphoma     PAD (peripheral artery disease)     Pulmonary embolism     Stroke         Past Surgical History:   Procedure Laterality Date    CARDIAC CATHETERIZATION      CORONARY ANGIOPLASTY WITH STENT PLACEMENT      FEMORAL ARTERY - FEMORAL ARTERY BYPASS GRAFT      PORTACATH PLACEMENT           Visit Dx:    ICD-10-CM ICD-9-CM   1. Acute CVA (cerebrovascular accident)  I63.9 434.91   2. Aphasia due to acute cerebrovascular accident (CVA)  I63.9 434.91    R47.01 784.3            OP SLP Assessment/Plan - 24 1553          SLP Assessment    Functional Problems Speech Language-  Adult/Cognition  -CR    Clinical Impression: Speech Language-Adult/Congnition Mild:;Cognitive Communication Impairment;Dysarthria- Spastic  -CR    Prognosis Excellent (comment)  -CR    Patient/caregiver participated in establishment of treatment plan and goals Yes  -CR    Patient would benefit from skilled therapy intervention Yes  -CR       SLP Plan    Frequency 1x  -CR    Duration 5-6 weeks  -CR    Planned CPT's? SLP DEV COG SKILLS INITIAL (15 MIN) : 94217;SLP DEV COG SKILLS ADD (15 MIN) : 23606  -CR    Expected Duration of Therapy Session (SLP Eval) 45  -CR              User Key  (r) = Recorded By, (t) = Taken By, (c) = Cosigned By      Initials Name Provider Type    CR Adele Perez, SLP Speech and Language Pathologist                     SLP SLC Evaluation - 05/29/24 1300          Communication Assessment/Intervention    Document Type evaluation  -CR    Subjective Information no complaints  -CR    Patient Observations alert;cooperative  -CR    Patient Effort excellent  -CR    Symptoms Noted During/After Treatment none  -CR       General Information    Patient Profile Reviewed yes  -CR    Pertinent History Of Current Problem 55 y/o with recent hospitalization for CVA; MRI showed multiple bilateral acute infarcts, suspected to be embolic. PMH: CVA (9/23), HIV, non Hodgkin's lymphoma. Referred for outpatient speech therapy due to aphasia.  -CR    Precautions/Limitations, Vision WFL;for purposes of eval  -CR    Precautions/Limitations, Hearing WFL;for purposes of eval  -CR    Patient Level of Education HS graduate  -CR    Prior Level of Function-Communication WFL   no residual deficits from first CVA, per pt -CR    Plans/Goals Discussed with patient;agreed upon  -CR    Barriers to Rehab none identified  -CR    Patient's Goals for Discharge return to all previous roles/activities;functional communication;functional cognition  -CR       Pain    Additional Documentation Pain Scale: Numbers Pre/Post-Treatment  (Group)  -CR       Pain Scale: Numbers Pre/Post-Treatment    Pretreatment Pain Rating 0/10 - no pain  -CR    Posttreatment Pain Rating 0/10 - no pain  -CR       Standardized Tests    Formal Speech Language Tests WAB: Western Aphasia Battery  -CR       Spontaneous Speech    Information Content 9  -CR    Fluency 9  -CR    Spontaneous Speech Total 18  -CR       Comprehension    Yes/No Questions 60  -CR    Auditory Word Recongition 60  -CR    Sequential Commands 75  -CR    Comprehension Total 195  -CR       Repetition    Repetition Total 92  -CR       Naming    Object 60  -CR    Word Fluency 11  -CR    Sentence Completion 10  -CR    Responsive Speech 8  -CR    Naming Total 89  -CR       Aphasia    Aphasia Quotient 91.7  -CR    Aphasia Severity Very Mild (>90)  -CR    Type of Aphasia Anomic  -CR       SLP Evaluation Clinical Impressions    SLP Diagnosis mild;cognitive-linguistic disorder;dysarthria  -CR    SLP Diagnosis Comments Western Aphasia Battery Revised administered to assess the patient's speech/language abilities. Receptive/expressive communication much improved since hospitalization. Patient scored a 91.7/100 on the WAB indicating a very mild, if any, anomic aphasia. SLP observed mild dysarthria and slow processing speeds during the assessment. Pt verbalized primary complaints of slurred speech and slow thinking. Cognitive-linguistic deficits include decreased processing speeds, concrete/abstract thought organization, attention, and memory and visuospatial complaints. Recommend speech therapy to target the aforementioned skills to enhance the patient's ability to continue living alone safely and raising son independently. Plan to complete dedicated cognitive-linguistic evaluation (CLQT) next session.  -CR    Rehab Potential/Prognosis excellent  -CR    SLC Criteria for Skilled Therapy Interventions Met yes  -CR    Functional Impact difficulty communicating in an emergency;difficulty in expressing complex  messages;difficulty completing home management task;difficulty completing vocational tasks  -CR       Recommendations    Therapy Frequency (SLP SLC) 1 day per week  -CR    Predicted Duration Therapy Intervention (Days) until discharge   5-6 weeks -CR    Anticipated Discharge Disposition (SLP) home  -CR              User Key  (r) = Recorded By, (t) = Taken By, (c) = Cosigned By      Initials Name Provider Type    CR Adele Perez SLP Speech and Language Pathologist                                    OP SLP Education       Row Name 05/29/24 5415       Education    Barriers to Learning No barriers identified  -CR    Education Provided Described results of evaluation;Patient expressed understanding of evaluation  -CR    Assessed Learning readiness;Learning preferences;Learning motivation;Learning needs  -CR    Learning Motivation Strong  -CR    Learning Method Explanation;Demonstration  -CR    Teaching Response Verbalized understanding;Demonstrated understanding  -CR              User Key  (r) = Recorded By, (t) = Taken By, (c) = Cosigned By      Initials Name Effective Dates    Adele Mathews SLP 08/28/23 -                    SLP OP Goals       Row Name 05/29/24 1500          Goal Type Needed    Goal Type Needed Memory;Executive Function;Probelm Solving;Verbal Expression;Dysarthria  -CR        Verbal Expression Goals    Verbal Expression STG's Patient will improve verbal expressive language skills by completing divergent naming tasks  -CR     Patient will improve verbal expressive language skills by completing divergent naming tasks 90%:;without cues  -CR     Status: Patient will improve verbal expressive language skills by completing divergent naming tasks New  -CR        Executive Function Goals    Executive Function LTG's Patient will be able to execute all activities necessary to manage a home  -CR     Patient will be able to execute all activities necessary to manage a home 90%:;without cues  -CR      Status: Patient will be able to execute all activities necessary to manage a home New  -CR        Dysarthria Goals     Dysarthria STG's Patient will apply compensatory strategies to improve intelligibility of speech during in spontaneous speech  -CR     Patient will apply compensatory strategies to improve intelligibility of speech during in spontaneous speech 90%:;without cues  -CR     Status: Patient will apply compensatory strategies to improve intelligibility of speech during in spontaneous speech New  -CR        Memory Goals    Memory STG's Patient’s memory skills will be enhanced as reported by patient by utilizing internal memory strategies to recall up to 3 pieces of information after a 5- minute delay  -CR     Patient’s memory skills will be enhanced as reported by patient by utilizing internal memory strategies to recall up to 3 pieces of information after a 5- minute delay 90%:;without cues  -CR     Status: Patient’s memory skills will be enhanced as reported by patient by utilizing internal memory strategies to recall up to 3 pieces of information after a 5- minute delay New  -CR        Problem Solving Goals    Problem Solving STG's Patient will improve ability to analyze problems and determine solutions by completing a visual representation/filling in a chart by following  written directions  -CR     Patient will improve ability to analyze problems and determine solutions by completing a visual representation/filling in a chart by following  written directions 90%:;without cues  -CR     Status: Patient will improve ability to analyze problems and determine solutions by completing a visual representation/filling in a chart by following  written directions New  -CR               User Key  (r) = Recorded By, (t) = Taken By, (c) = Cosigned By      Initials Name Provider Type    Adele Mathews, SLP Speech and Language Pathologist                    SLP NOMS Scores (Last 72 Hours)       SLP NOMS Scores        Row Name 05/29/24 15:59:25             NOMS Scores    NOMS Record Number [id number]  Filed via Merged with [facility] NOMS  -CR      Form type Admission  Filed via Merged with [facility] NOMS  -CR      Form Submission Date 05/29/24  Filed via [facility]erged with [facility] [employer]  [place]      Cognition (6+) FCM 75%  Filed via [facility] NOMS  -CR      CPIB PRO 47.8  Filed via [facility]erged with [facility] NOMS  -CR                User Key  (r) = Recorded By, (t) = Taken By, (c) = Cosigned By      Initials Name Effective Dates    Adele Mathews SLP 08/28/23 -                        Time Calculation:   SLP Start Time: 1345  SLP Stop Time: 1430  SLP Time Calculation (min): 45 min    Therapy Charges for Today       Code Description Service Date Service Provider Modifiers Qty    02294462323 HC ST ASSESSMENT OF APHASIA PER HOUR 5/29/2024 Adele Perez, SLP GN 1                     SYLVIE Soto  5/29/2024

## 2024-06-05 ENCOUNTER — HOSPITAL ENCOUNTER (OUTPATIENT)
Dept: SPEECH THERAPY | Facility: HOSPITAL | Age: 54
Setting detail: THERAPIES SERIES
Discharge: HOME | End: 2024-06-05
Payer: MEDICARE

## 2024-06-05 DIAGNOSIS — R47.01 APHASIA DUE TO ACUTE CEREBROVASCULAR ACCIDENT (CVA): ICD-10-CM

## 2024-06-05 DIAGNOSIS — I63.9 APHASIA DUE TO ACUTE CEREBROVASCULAR ACCIDENT (CVA): ICD-10-CM

## 2024-06-05 DIAGNOSIS — I63.9 ACUTE CVA (CEREBROVASCULAR ACCIDENT): Primary | ICD-10-CM

## 2024-06-05 PROCEDURE — 96125 COGNITIVE TEST BY HC PRO: CPT

## 2024-06-05 NOTE — THERAPY TREATMENT NOTE
Outpatient Speech Language Pathology   Adult Speech Language Cognitive Treatment Note  [facility]     Patient Name: Cecilio Puckett  : 1970  MRN: [id number]  Today's Date: 2024         Visit Date: 2024   Patient Active Problem List   Diagnosis    Syncope, unspecified syncope type    Acute DVT (deep venous thrombosis)    NHL (non-Hodgkin's lymphoma)    HIV (human immunodeficiency virus infection)    Type 2 diabetes mellitus with circulatory disorder, without long-term current use of insulin    HTN (hypertension)    Slurred speech    Acute CVA (cerebrovascular accident)    Anticoagulated on Coumadin    Coronary artery disease involving native coronary artery of native heart with angina pectoris    Arteriosclerosis of coronary artery    Ischemic cardiomyopathy    History of CVA (cerebrovascular accident)          Visit Dx:    ICD-10-CM ICD-9-CM   1. Acute CVA (cerebrovascular accident)  I63.9 434.91   2. Aphasia due to acute cerebrovascular accident (CVA)  I63.9 434.91    R47.01 784.3                              SLP OP Goals       Row Name 24 1300          Goal Type Needed    Goal Type Needed Attention/Orientation  -CR        Subjective Comments    Subjective Comments Patient is pleasant and cooperative. Reports he is upset due to being unable to find the bathroom resulting in a urinary accident. Incontinence occurred again during session. SLP provided empathy and ensured patient's needs were met. Also educated patient to talk with doctor to address incontinence.  -CR        Subjective Pain    Able to rate subjective pain? yes  -CR     Pre-Treatment Pain Level 0  -CR     Post-Treatment Pain Level 0  -CR        Executive Function Goals    Executive Function LTG's Patient will be able to execute all activities necessary to manage a home  -CR     Patient will be able to execute all activities necessary to manage a home 90%:;without cues  -CR     Status: Patient will be able to execute all activities  necessary to manage a home Progressing as expected  -CR     Comments: Patient will be able to execute all activities necessary to manage a home Completed CLQT this date to further assess cognitive abilities. Entire session required to complete. Pt scored a 2.8/4.0 indicating mild cognitive impairment. Pt scored severe in executive function, mildly impaired in attention, memory, language, and visuospatial skills. Thorough explanation of results provided. Discussed new goals to add to POC which patient agreed to. Pt also agreeable to increasing speech therapy to 2x per week as SLP suspects better results given recent stroke.  -CR        Dysarthria Goals    Patient will apply compensatory strategies to improve intelligibility of speech during in spontaneous speech 90%:;without cues  -CR     Status: Patient will apply compensatory strategies to improve intelligibility of speech during in spontaneous speech Progressing as expected  -CR        Memory Goals    Patient’s memory skills will be enhanced as reported by patient by utilizing internal memory strategies to recall up to 3 pieces of information after a 5- minute delay 90%:;without cues  -CR     Status: Patient’s memory skills will be enhanced as reported by patient by utilizing internal memory strategies to recall up to 3 pieces of information after a 5- minute delay Progressing as expected  -CR        Problem Solving Goals    Patient will improve ability to analyze problems and determine solutions by completing a visual representation/filling in a chart by following  written directions 90%:;without cues  -CR     Status: Patient will improve ability to analyze problems and determine solutions by completing a visual representation/filling in a chart by following  written directions Progressing as expected  -CR        Attention/Orientation Goals    Attention/Orientation STG's Patient will improve attention skills by sustaining focus to high-level cognitive tasks in  order to complete task  -CR     Patient will improve attention skills by sustaining focus to high-level cognitive tasks in order to complete task 90%:;without cues  -CR     Status: Patient will improve attention skills by sustaining focus to high-level cognitive tasks in order to complete task New  -CR               User Key  (r) = Recorded By, (t) = Taken By, (c) = Cosigned By      Initials Name Provider Type    CR Adele Perez SLP Speech and Language Pathologist                           Time Calculation:   SLP Start Time: 1345  SLP Stop Time: 1430  SLP Time Calculation (min): 45 min  Total Timed Code Minutes- SLP: 75 min (5164-1288)    Therapy Charges for Today       Code Description Service Date Service Provider Modifiers Qty    58062169934 HC ST STD COG PERF TEST PER HOUR 6/5/2024 Adele Perez SLP GN 1                     SYLVIE Soto  6/5/2024

## 2024-06-12 ENCOUNTER — HOSPITAL ENCOUNTER (OUTPATIENT)
Dept: SPEECH THERAPY | Facility: HOSPITAL | Age: 54
Setting detail: THERAPIES SERIES
Discharge: HOME | End: 2024-06-12
Payer: MEDICARE

## 2024-06-12 DIAGNOSIS — I63.9 ACUTE CVA (CEREBROVASCULAR ACCIDENT): Primary | ICD-10-CM

## 2024-06-12 DIAGNOSIS — I63.9 APHASIA DUE TO ACUTE CEREBROVASCULAR ACCIDENT (CVA): ICD-10-CM

## 2024-06-12 DIAGNOSIS — R47.01 APHASIA DUE TO ACUTE CEREBROVASCULAR ACCIDENT (CVA): ICD-10-CM

## 2024-06-12 PROCEDURE — 97129 THER IVNTJ 1ST 15 MIN: CPT

## 2024-06-12 PROCEDURE — 97130 THER IVNTJ EA ADDL 15 MIN: CPT

## 2024-06-12 NOTE — THERAPY TREATMENT NOTE
Outpatient Speech Language Pathology   Adult Speech Language Cognitive Treatment Note  [facility]     Patient Name: Cecilio Puckett  : 1970  MRN: [id number]  Today's Date: 2024         Visit Date: 2024   Patient Active Problem List   Diagnosis    Syncope, unspecified syncope type    Acute DVT (deep venous thrombosis)    NHL (non-Hodgkin's lymphoma)    HIV (human immunodeficiency virus infection)    Type 2 diabetes mellitus with circulatory disorder, without long-term current use of insulin    HTN (hypertension)    Slurred speech    Acute CVA (cerebrovascular accident)    Anticoagulated on Coumadin    Coronary artery disease involving native coronary artery of native heart with angina pectoris    Arteriosclerosis of coronary artery    Ischemic cardiomyopathy    History of CVA (cerebrovascular accident)          Visit Dx:    ICD-10-CM ICD-9-CM   1. Acute CVA (cerebrovascular accident)  I63.9 434.91   2. Aphasia due to acute cerebrovascular accident (CVA)  I63.9 434.91    R47.01 784.3                              SLP OP Goals       Row Name 24 1300          Subjective Comments    Subjective Comments Patient is pleasant and cooperative. Reports difficult day yesterday as he could not get out of the car to go into son's doctors appt d/t weakness. Symptom resolved once returning home and pt reports he is at baseline this date. Education provided to seek medical attention if leg numbness/weakness occurs.  -CR        Subjective Pain    Able to rate subjective pain? yes  -CR     Pre-Treatment Pain Level 0  -CR     Post-Treatment Pain Level 0  -CR        Executive Function Goals    Patient will be able to execute all activities necessary to manage a home 90%:;without cues  -CR     Status: Patient will be able to execute all activities necessary to manage a home Progressing as expected  -CR     Executive Function STG's Patient will improve executive functioning skills by using planning strategies prior  to beginning tasks  -CR     Patient will improve executive functioning skills by using planning strategies prior to beginning tasks 90%:;without cues  -CR     Status: Patient will improve executive functioning skills by using planning strategies prior to beginning tasks New  -CR     Comments: Patient will improve executive functioning skills by using planning strategies prior to beginning tasks 4-step written ADL sequencing task completed with 75% accuracy given NO cues, increased to 100% accuracy given MIN cues.  -CR        Memory Goals    Patient’s memory skills will be enhanced as reported by patient by utilizing internal memory strategies to recall up to 3 pieces of information after a 5- minute delay 90%:;without cues  -CR     Status: Patient’s memory skills will be enhanced as reported by patient by utilizing internal memory strategies to recall up to 3 pieces of information after a 5- minute delay Progressing as expected  -CR     Comments: Patient’s memory skills will be enhanced as reported by patient by utilizing internal memory strategies to recall up to 3 pieces of information after a 5- minute delay Introduced BE FAST acronym to pt this date to identify s/s of CVA. Thorough education provided. Pt recalled x2/6 s/s without cues, increased to x6/6 given MOD cues. Following a 15-minute delay, pt recalled x3/6 s/s given NO cues, increased to 100% accuracy when given MIN-MOD cues.  -CR        Attention/Orientation Goals    Patient will improve attention skills by sustaining focus to high-level cognitive tasks in order to complete task 90%:;without cues  -CR     Status: Patient will improve attention skills by sustaining focus to high-level cognitive tasks in order to complete task Progressing as expected  -CR     Comments: Patient will improve attention skills by sustaining focus to high-level cognitive tasks in order to complete task Simple-moderately complex written directions completed with 30% accuracy  given NO cues, increased to 80% accuracy when given MIN cues, and 100% accuracy given MOD-MAX cues. Slight left neglect noted during task. Education provided for L/R scanning.  -BIPIN               User Key  (r) = Recorded By, (t) = Taken By, (c) = Cosigned By      Initials Name Provider Type    Adele Mathews SLP Speech and Language Pathologist                           Time Calculation:   SLP Start Time: 1344  SLP Stop Time: 1429  SLP Time Calculation (min): 45 min    Therapy Charges for Today       Code Description Service Date Service Provider Modifiers Qty    94441921212  ST DEV OF COGN SKILLS INITIAL 15 MIN 6/12/2024 Adele Perez SLP  1    21445909669 HC ST DEV OF COGN SKILLS EACH ADDT'L 15 MIN 6/12/2024 Adele Perez SLP  2                     SYLVIE Soto  6/12/2024

## 2024-06-18 ENCOUNTER — TELEPHONE (OUTPATIENT)
Dept: NEUROLOGY | Facility: CLINIC | Age: 54
End: 2024-06-18

## 2024-06-18 ENCOUNTER — TELEPHONE (OUTPATIENT)
Dept: CARDIOLOGY | Facility: CLINIC | Age: 54
End: 2024-06-18

## 2024-06-18 NOTE — TELEPHONE ENCOUNTER
Caller: Linda Puckett    Relationship to patient: Mother    Best call back number: [phone]    New or established patient?  [x] New  [] Established    Date of discharge: 5/22/24    Facility discharged from: [facility]    Diagnosis/Symptoms: ACUTE CVA    Length of stay (If applicable): 8 DAYS    Specialty Only: Did you see a [facility] health provider?    [x] Yes  [] No  If so, who? DR. MALDONADO      PT'S MOTHER IS CALLING TO SCHEDULE AN APPT FOR FOLLOW UP. THERE ARE NO INSTRUCTIONS IN DISCHARGE NOTES, PLEASE ADVISE

## 2024-06-18 NOTE — TELEPHONE ENCOUNTER
Caller: Jane Puckett    Relationship to patient: Mother    Best call back number:  [phone]    New or established patient?  [x] New  [] Established    Date of discharge: 5-22-24    Facility discharged from: [facility]    Diagnosis/Symptoms: CVA    Length of stay (If applicable): 8 DAYS    Specialty Only: Did you see a [facility] health provider?    [x] Yes  [] No  If so, who? JONATHAN HAWKINS PA-C    PATIENT GAVE VERBAL PERMISSION TO S/W JANE (MOTHER).  CALLING TO SCHEDULE A HOSPITAL F/U IN 3 MONTHS.    PLEASE CALL & ADVISE

## 2024-06-19 ENCOUNTER — HOSPITAL ENCOUNTER (OUTPATIENT)
Dept: SPEECH THERAPY | Facility: HOSPITAL | Age: 54
Setting detail: THERAPIES SERIES
Discharge: HOME | End: 2024-06-19
Payer: MEDICARE

## 2024-06-19 DIAGNOSIS — I63.9 ACUTE CVA (CEREBROVASCULAR ACCIDENT): Primary | ICD-10-CM

## 2024-06-19 DIAGNOSIS — R47.01 APHASIA DUE TO ACUTE CEREBROVASCULAR ACCIDENT (CVA): ICD-10-CM

## 2024-06-19 DIAGNOSIS — I63.9 APHASIA DUE TO ACUTE CEREBROVASCULAR ACCIDENT (CVA): ICD-10-CM

## 2024-06-19 DIAGNOSIS — R41.841 COGNITIVE COMMUNICATION DEFICIT: ICD-10-CM

## 2024-06-19 PROCEDURE — 97129 THER IVNTJ 1ST 15 MIN: CPT

## 2024-06-19 PROCEDURE — 97130 THER IVNTJ EA ADDL 15 MIN: CPT

## 2024-06-19 NOTE — THERAPY TREATMENT NOTE
"Outpatient Speech Language Pathology   Adult Speech Language Cognitive Treatment Note  [facility]     Patient Name: Cecilio Puckett  : 1970  MRN: [id number]  Today's Date: 2024         Visit Date: 2024   Patient Active Problem List   Diagnosis    Syncope, unspecified syncope type    Acute DVT (deep venous thrombosis)    NHL (non-Hodgkin's lymphoma)    HIV (human immunodeficiency virus infection)    Type 2 diabetes mellitus with circulatory disorder, without long-term current use of insulin    HTN (hypertension)    Slurred speech    Acute CVA (cerebrovascular accident)    Anticoagulated on Coumadin    Coronary artery disease involving native coronary artery of native heart with angina pectoris    Arteriosclerosis of coronary artery    Ischemic cardiomyopathy    History of CVA (cerebrovascular accident)          Visit Dx:    ICD-10-CM ICD-9-CM   1. Acute CVA (cerebrovascular accident)  I63.9 434.91   2. Cognitive communication deficit  R41.841 799.52   3. Aphasia due to acute cerebrovascular accident (CVA)  I63.9 434.91    R47.01 784.3                              SLP OP Goals       Row Name 24 1300          Subjective Comments    Subjective Comments Patient is pleasant and cooperative.  -CR        Subjective Pain    Able to rate subjective pain? yes  -CR     Pre-Treatment Pain Level 0  -CR     Post-Treatment Pain Level 0  -CR        Verbal Expression Goals    Patient will improve verbal expressive language skills by completing divergent naming tasks 90%:;without cues  -CR     Status: Patient will improve verbal expressive language skills by completing divergent naming tasks Progressing as expected  -CR     Comments: Patient will improve verbal expressive language skills by completing divergent naming tasks In \"Catch Phrase\" task, pt used semantic features to describe target word with 70% accuracy given NO cues, increased to 100% accuracy when given verbal cues for elaboration.  -CR        " Executive Function Goals    Patient will be able to execute all activities necessary to manage a home 90%:;without cues  -CR     Status: Patient will be able to execute all activities necessary to manage a home Progressing as expected  -CR     Patient will improve executive functioning skills by using planning strategies prior to beginning tasks 90%:;without cues  -CR     Status: Patient will improve executive functioning skills by using planning strategies prior to beginning tasks Progressing as expected  -CR     Comments: Patient will improve executive functioning skills by using planning strategies prior to beginning tasks 4-step written sequencing completed with 100% accuracy given NO cues.  -CR        Dysarthria Goals    Patient will apply compensatory strategies to improve intelligibility of speech during in spontaneous speech 90%:;without cues  -CR     Status: Patient will apply compensatory strategies to improve intelligibility of speech during in spontaneous speech Progressing as expected  -CR     Comments: Patient will apply compensatory strategies to improve intelligibility of speech during in spontaneous speech Speech clear and precise this session. 100% intelligible through structured tasks and unstructured conversation.  -CR        Memory Goals    Patient’s memory skills will be enhanced as reported by patient by utilizing internal memory strategies to recall up to 3 pieces of information after a 5- minute delay 90%:;without cues  -CR     Status: Patient’s memory skills will be enhanced as reported by patient by utilizing internal memory strategies to recall up to 3 pieces of information after a 5- minute delay Progressing as expected  -CR     Comments: Patient’s memory skills will be enhanced as reported by patient by utilizing internal memory strategies to recall up to 3 pieces of information after a 5- minute delay Constant [employer] vernon on iPad used to target short-term recall of voicemail. Completed  with 70% accuracy given NO cues.  -CR        Problem Solving Goals    Patient will improve ability to analyze problems and determine solutions by completing a visual representation/filling in a chart by following  written directions 90%:;without cues  -CR     Status: Patient will improve ability to analyze problems and determine solutions by completing a visual representation/filling in a chart by following  written directions Progressing as expected  -CR        Attention/Orientation Goals    Patient will improve attention skills by sustaining focus to high-level cognitive tasks in order to complete task 90%:;without cues  -CR     Status: Patient will improve attention skills by sustaining focus to high-level cognitive tasks in order to complete task Progressing as expected  -CR     Comments: Patient will improve attention skills by sustaining focus to high-level cognitive tasks in order to complete task Written directions completed with 70% accuracy given NO cues. Answering questions given directional map of a park completed with 40% accuracy given NO cues, increased to 90% accuracy given MIN-MOD cues. Difficulties noted with understanding directional parameters.  -CR               User Key  (r) = Recorded By, (t) = Taken By, (c) = Cosigned By      Initials Name Provider Type    CR Adele Perez SLP Speech and Language Pathologist                           Time Calculation:   SLP Start Time: 1345  SLP Stop Time: 1430  SLP Time Calculation (min): 45 min    Therapy Charges for Today       Code Description Service Date Service Provider Modifiers Qty    41201718589 HC ST DEV OF COGN SKILLS INITIAL 15 MIN 6/19/2024 Adele Perez SLP  1    55276042107 HC ST DEV OF COGN SKILLS EACH ADDT'L 15 MIN 6/19/2024 Adele Perez SLP  2                     SYLVIE Soto  6/19/2024

## 2024-06-26 ENCOUNTER — APPOINTMENT (OUTPATIENT)
Dept: SPEECH THERAPY | Facility: HOSPITAL | Age: 54
End: 2024-06-26
Payer: MEDICARE

## 2024-07-03 ENCOUNTER — APPOINTMENT (OUTPATIENT)
Dept: SPEECH THERAPY | Facility: HOSPITAL | Age: 54
End: 2024-07-03
Payer: MEDICARE

## 2024-07-10 ENCOUNTER — HOSPITAL ENCOUNTER (OUTPATIENT)
Dept: SPEECH THERAPY | Facility: HOSPITAL | Age: 54
Setting detail: THERAPIES SERIES
Discharge: HOME | End: 2024-07-10
Payer: MEDICARE

## 2024-07-10 DIAGNOSIS — I63.9 ACUTE CVA (CEREBROVASCULAR ACCIDENT): Primary | ICD-10-CM

## 2024-07-10 DIAGNOSIS — R41.841 COGNITIVE COMMUNICATION DEFICIT: ICD-10-CM

## 2024-07-10 PROCEDURE — 97129 THER IVNTJ 1ST 15 MIN: CPT

## 2024-07-10 PROCEDURE — 97130 THER IVNTJ EA ADDL 15 MIN: CPT

## 2024-07-10 NOTE — THERAPY TREATMENT NOTE
Outpatient Speech Language Pathology   Adult Speech Language Cognitive Treatment Note  [facility]     Patient Name: Cecilio Puckett  : 1970  MRN: [id number]  Today's Date: 7/10/2024         Visit Date: 07/10/2024   Patient Active Problem List   Diagnosis    Syncope, unspecified syncope type    Acute DVT (deep venous thrombosis)    NHL (non-Hodgkin's lymphoma)    HIV (human immunodeficiency virus infection)    Type 2 diabetes mellitus with circulatory disorder, without long-term current use of insulin    HTN (hypertension)    Slurred speech    Acute CVA (cerebrovascular accident)    Anticoagulated on Coumadin    Coronary artery disease involving native coronary artery of native heart with angina pectoris    Arteriosclerosis of coronary artery    Ischemic cardiomyopathy    History of CVA (cerebrovascular accident)          Visit Dx:    ICD-10-CM ICD-9-CM   1. Acute CVA (cerebrovascular accident)  I63.9 434.91   2. Cognitive communication deficit  R41.841 799.52                              SLP OP Goals       Row Name 07/10/24 1300          Subjective Comments    Subjective Comments Patient is pleasant and cooperative. Reports doing better the last couple of weeks, however, increasing unhealthy lifestyle (smoking cigarettes, drinking sodas and sleeping excessively) due to being stuck at home. Mother will not allow him to drive. Encouraged pt to talk to PCP regarding driving.  -CR        Subjective Pain    Able to rate subjective pain? yes  -CR     Pre-Treatment Pain Level 0  -CR     Post-Treatment Pain Level 0  -CR        Verbal Expression Goals    Patient will improve verbal expressive language skills by completing divergent naming tasks 90%:;without cues  -CR     Status: Patient will improve verbal expressive language skills by completing divergent naming tasks Progressing as expected  -CR     Comments: Patient will improve verbal expressive language skills by completing divergent naming tasks Word finding  skills exhibited with 60% accuracy given NO cues during Acrostic task, increased to 100% accuracy given MOD-MAX cues. No word finding difficulties noted in unstructured conversation.  -CR        Executive Function Goals    Patient will improve executive functioning skills by using planning strategies prior to beginning tasks 90%:;without cues  -CR     Status: Patient will improve executive functioning skills by using planning strategies prior to beginning tasks Progressing as expected  -CR     Comments: Patient will improve executive functioning skills by using planning strategies prior to beginning tasks See problem solving comment.  -CR        Memory Goals    Patient’s memory skills will be enhanced as reported by patient by utilizing internal memory strategies to recall up to 3 pieces of information after a 5- minute delay 90%:;without cues  -CR     Status: Patient’s memory skills will be enhanced as reported by patient by utilizing internal memory strategies to recall up to 3 pieces of information after a 5- minute delay Progressing as expected  -CR     Comments: Patient’s memory skills will be enhanced as reported by patient by utilizing internal memory strategies to recall up to 3 pieces of information after a 5- minute delay SLP provided written recommendations to improve memory and carryover of recs including replacing unhealthy lifestyle choices with healthy options, stimulating brain in home environment by completing home exercise tasks, and talking to MD about driving possibility.  -CR        Problem Solving Goals    Patient will improve ability to analyze problems and determine solutions by completing a visual representation/filling in a chart by following  written directions 90%:;without cues  -CR     Status: Patient will improve ability to analyze problems and determine solutions by completing a visual representation/filling in a chart by following  written directions Progressing as expected  -CR      Comments: Patient will improve ability to analyze problems and determine solutions by completing a visual representation/filling in a chart by following  written directions Functional math task involving money with attention to detial component (menu) completed with 60% accuracy given NO cues, increased to 100% accuracy when given MIN-MOD cues for attention to details.  -CR        Attention/Orientation Goals    Patient will improve attention skills by sustaining focus to high-level cognitive tasks in order to complete task 90%:;without cues  -CR     Status: Patient will improve attention skills by sustaining focus to high-level cognitive tasks in order to complete task Progressing as expected  -CR     Comments: Patient will improve attention skills by sustaining focus to high-level cognitive tasks in order to complete task Alternating attention during Acrostic task completed with 70% accuracy given NO cues, increased to 100% accuracy when given MIN cues. Question left neglect impact.  -CR               User Key  (r) = Recorded By, (t) = Taken By, (c) = Cosigned By      Initials Name Provider Type    CR Adele Perez SLP Speech and Language Pathologist                           Time Calculation:   SLP Start Time: 1315  SLP Stop Time: 1400  SLP Time Calculation (min): 45 min    Therapy Charges for Today       Code Description Service Date Service Provider Modifiers Qty    81532188494 HC ST DEV OF COGN SKILLS INITIAL 15 MIN 7/10/2024 Adele Perez SLP  1    47731313587 HC ST DEV OF COGN SKILLS EACH ADDT'L 15 MIN 7/10/2024 Adele Perez SLP  2                     SYLVIE Soto  7/10/2024

## 2024-07-23 ENCOUNTER — OFFICE VISIT (OUTPATIENT)
Dept: CARDIOLOGY | Facility: CLINIC | Age: 54
End: 2024-07-23
Payer: MEDICARE

## 2024-07-23 VITALS
OXYGEN SATURATION: 98 % | BODY MASS INDEX: 27.89 KG/M2 | WEIGHT: 210.4 LBS | SYSTOLIC BLOOD PRESSURE: 116 MMHG | HEIGHT: 73 IN | HEART RATE: 71 BPM | DIASTOLIC BLOOD PRESSURE: 90 MMHG

## 2024-07-23 DIAGNOSIS — Z86.73 HISTORY OF CVA (CEREBROVASCULAR ACCIDENT): ICD-10-CM

## 2024-07-23 DIAGNOSIS — I25.5 ISCHEMIC CARDIOMYOPATHY: ICD-10-CM

## 2024-07-23 DIAGNOSIS — B20 HIV INFECTION, UNSPECIFIED SYMPTOM STATUS: ICD-10-CM

## 2024-07-23 DIAGNOSIS — I25.10 ARTERIOSCLEROSIS OF CORONARY ARTERY: Primary | ICD-10-CM

## 2024-07-23 DIAGNOSIS — Z09 HOSPITAL DISCHARGE FOLLOW-UP: ICD-10-CM

## 2024-07-23 DIAGNOSIS — I25.10 CORONARY ARTERY DISEASE INVOLVING NATIVE CORONARY ARTERY OF NATIVE HEART WITHOUT ANGINA PECTORIS: ICD-10-CM

## 2024-07-23 DIAGNOSIS — I10 PRIMARY HYPERTENSION: ICD-10-CM

## 2024-07-23 PROCEDURE — 3074F SYST BP LT 130 MM HG: CPT | Performed by: INTERNAL MEDICINE

## 2024-07-23 PROCEDURE — 3080F DIAST BP >= 90 MM HG: CPT | Performed by: INTERNAL MEDICINE

## 2024-07-23 PROCEDURE — 99214 OFFICE O/P EST MOD 30 MIN: CPT | Performed by: INTERNAL MEDICINE

## 2024-07-23 PROCEDURE — 1159F MED LIST DOCD IN RCRD: CPT | Performed by: INTERNAL MEDICINE

## 2024-07-23 PROCEDURE — 1160F RVW MEDS BY RX/DR IN RCRD: CPT | Performed by: INTERNAL MEDICINE

## 2024-07-23 RX ORDER — ALBUTEROL SULFATE 90 UG/1
2 AEROSOL, METERED RESPIRATORY (INHALATION) EVERY 4 HOURS PRN
COMMUNITY
Start: 2024-06-27

## 2024-07-23 RX ORDER — OXYBUTYNIN CHLORIDE 5 MG/1
1 TABLET, EXTENDED RELEASE ORAL DAILY
COMMUNITY
Start: 2024-06-27

## 2024-07-23 NOTE — PROGRESS NOTES
Subjective:     Encounter Date:07/23/24      Patient ID: Cecilio Puckett is a 54 y.o. male.    Chief Complaint:  History of Present Illness    I had the pleasure of seeing this patient in the office today for hospital follow-up.  Patient has a history of coronary disease, ischemic cardiomyopathy, chronic systolic congestive heart failure, CVA, HIV, non-Hodgkin's lymphoma, hypertension, DVT.    Since going home he denies any cardiac plaints.  No chest pain or chest discomfort, no shortness of breath.    He was in the emergency room May 14, 2024 with speech difficulty and dizziness.  He was found to have multiple areas of acute infarction consistent with embolic source.  Patient had an echocardiogram that showed moderate LV dysfunction, which was somewhat worse when compared with prior studies.  Echocardiogram was reviewed in detail below.  Patient was supposed to be on anticoagulation due to his history of DVT and prior pulmonary emboli, but has been noncompliant and his INR was 1 on arrival.    Because of the decline in LV function he had a stress test as detailed below.    We had added spironolactone in the hospital but his creatinine went up and so the spironolactone was discontinued.  Hospitalist and neurology recommended continuing his current dose of amlodipine.    The following portions of the patient's history were reviewed and updated as appropriate: allergies, current medications, past family history, past medical history, past social history, past surgical history and problem list.    Past Medical History:   Diagnosis Date    COPD (chronic obstructive pulmonary disease)     Coronary artery disease     Diabetes mellitus     DVT (deep venous thrombosis)     Elevated cholesterol     GERD (gastroesophageal reflux disease)     H/O Cancer     right arm, in throat, chest and stomach, in remission    H/O Ischemia of extremity     History of MI (myocardial infarction) 2019    History of snoring     History of  "transfusion     HIV (human immunodeficiency virus infection)     Hypertension     Non Hodgkin's lymphoma     PAD (peripheral artery disease)     Pulmonary embolism     Stroke        Past Surgical History:   Procedure Laterality Date    CARDIAC CATHETERIZATION      CORONARY ANGIOPLASTY WITH STENT PLACEMENT      FEMORAL ARTERY - FEMORAL ARTERY BYPASS GRAFT      PORTACATH PLACEMENT           Procedures       Objective:     Vitals:    07/23/24 1137   BP: 116/90   BP Location: Left arm   Patient Position: Sitting   Cuff Size: Adult   Pulse: 71   SpO2: 98%   Weight: 95.4 kg (210 lb 6.4 oz)   Height: 185.4 cm (73\")         Vitals reviewed.   Constitutional:       General: Not in acute distress.     Appearance: Well-developed. Not diaphoretic.   Eyes:      General:         Right eye: No discharge.         Left eye: No discharge.      Conjunctiva/sclera: Conjunctivae normal.   HENT:      Head: Normocephalic and atraumatic.      Nose: Nose normal.   Neck:      Thyroid: No thyromegaly.      Trachea: No tracheal deviation.   Pulmonary:      Effort: Pulmonary effort is normal. No respiratory distress.      Breath sounds: Normal breath sounds. No stridor.   Chest:      Chest wall: Not tender to palpatation.   Cardiovascular:      Normal rate. Regular rhythm.      Murmurs: There is no murmur.      . No S3 gallop. No click. No rub.   Pulses:     Intact distal pulses.   Edema:     Peripheral edema absent.   Abdominal:      General: Bowel sounds are normal. There is no distension.      Palpations: Abdomen is soft. There is no abdominal mass.   Musculoskeletal: Normal range of motion.         General: No tenderness or deformity.      Cervical back: Normal range of motion and neck supple. Skin:     General: Skin is warm and dry.      Findings: No erythema or rash.   Neurological:      Mental Status: Alert.   Psychiatric:         Attention and Perception: Attention normal.         Lab Review:   Lab Results   Component Value Date    " "GLUCOSE 57 (L) 05/22/2024    BUN 9 05/22/2024    CREATININE 1.00 05/22/2024    EGFRIFAFRI >60 11/29/2022    BCR 9.0 05/22/2024    K 3.3 (L) 05/22/2024    CO2 21.9 (L) 05/22/2024    CALCIUM 8.3 (L) 05/22/2024    ALBUMIN 3.6 05/14/2024    LABIL2 1.1 11/29/2022    AST 11 05/14/2024    ALT 19 05/14/2024     CBC          5/17/2024    05:09 5/18/2024    05:10 5/20/2024    01:38   CBC   WBC 3.47  3.37  6.56    RBC 5.37  5.51  5.50    Hemoglobin 15.0  15.3  15.5    Hematocrit 46.6  46.4  47.9    MCV 86.8  84.2  87.1    MCH 27.9  27.8  28.2    MCHC 32.2  33.0  32.4    RDW 14.8  14.4  14.4    Platelets 156  168  169      Lab Results   Component Value Date    PROBNP 916.0 (H) 05/14/2024    PROBNP 393.0 09/30/2023     No components found for: \"TROP\"  No results found for: \"DDIMERQUANT\"      Performed        Assessment:          Diagnosis Plan   1. Arteriosclerosis of coronary artery  Adult Transthoracic Echo Complete W/ Cont if Necessary Per Protocol      2. Hospital discharge follow-up  Adult Transthoracic Echo Complete W/ Cont if Necessary Per Protocol      3. Coronary artery disease involving native coronary artery of native heart without angina pectoris        4. Ischemic cardiomyopathy        5. Primary hypertension        6. History of CVA (cerebrovascular accident)        7. HIV infection, unspecified symptom status               Plan:       1.  Hospital discharge [id number] clinical trajectory  2.  Coronary artery disease-no angina pectoris, no indication for further intervention at this point  2.  Chronic systolic congestive heart failure-euvolemic, was on spironolactone which was started in the hospital but then that was discontinued when his creatinine went up, I will keep him off it for now.  Remains on Entresto  4.  Hypertension-I wanted to get him off the amlodipine when he was hospitalized due to his decline in LV function but hospital medicine and neurology wanted to keep him on the amlodipine for blood " pressure control, continue same  5 ischemic cardiomyopathy-See him back in 6 months and we will repeat an echocardiogram at that point  6.  History of CVA, when he was noncompliant, follows with neurology  7.  History of DVT/PE-on chronic anticoagulation.    Thank you very much for allowing us to participate in the care of this pleasant patient.  Please don't hesitate to call if I can be of assistance in any way.      Current Outpatient Medications:     Adult Aspirin Regimen 81 MG EC tablet, Take 1 tablet by mouth Daily., Disp: , Rfl:     albuterol sulfate  (90 Base) MCG/ACT inhaler, Inhale 2 puffs Every 4 (Four) Hours As Needed., Disp: , Rfl:     amLODIPine (NORVASC) 10 MG tablet, Take 1 tablet by mouth Daily., Disp: , Rfl:     carvedilol (COREG) 6.25 MG tablet, Take 1 tablet by mouth Every 12 (Twelve) Hours for 30 days., Disp: 60 tablet, Rfl: 0    dapagliflozin Propanediol (Farxiga) 10 MG tablet, Take 10 mg by mouth Daily., Disp: , Rfl:     Vetodaz-Aazue-Xpkyiwbc-TenofAF (GENVOYA) 481-510-150-10 MG per tablet, Take 1 tablet by mouth Daily., Disp: , Rfl:     ergocalciferol (ERGOCALCIFEROL) 1.25 MG (49215 UT) capsule, Take 1 capsule by mouth 1 (One) Time Per Week., Disp: , Rfl:     furosemide (LASIX) 20 MG tablet, Take 1 tablet by mouth Daily., Disp: , Rfl:     glipizide (GLUCOTROL) 10 MG tablet, Take 1 tablet by mouth Every 12 (Twelve) Hours., Disp: , Rfl:     HYDROcodone-acetaminophen (NORCO)  MG per tablet, Take 1 tablet by mouth., Disp: , Rfl:     Insulin Glargine (LANTUS SOLOSTAR) 100 UNIT/ML injection pen, Inject 25 Units under the skin into the appropriate area as directed Every Night., Disp: 100 mL, Rfl: 0    Insulin Pen Needle 32G X 4 MM misc, Use to inject insulin subcutaneously via pens daily, Disp: 100 each, Rfl: 1    oxybutynin XL (DITROPAN-XL) 5 MG 24 hr tablet, Take 1 tablet by mouth Daily., Disp: , Rfl:     pantoprazole (PROTONIX) 40 MG EC tablet, , Disp: , Rfl:     rosuvastatin  (CRESTOR) 20 MG tablet, Take 1 tablet by mouth Daily., Disp: , Rfl:     sacubitril-valsartan (ENTRESTO) 49-51 MG tablet, Take 1 tablet by mouth Every 12 (Twelve) Hours., Disp: 60 tablet, Rfl: 0    sertraline (ZOLOFT) 50 MG tablet, Take 1 tablet by mouth Daily., Disp: , Rfl:     warfarin (COUMADIN) 10 MG tablet, 1 and 1/2 tab M,W<F<S and S,T 1 tab, Disp: , Rfl:          Return in about 6 months (around 1/23/2025).

## 2024-07-24 ENCOUNTER — HOSPITAL ENCOUNTER (OUTPATIENT)
Dept: SPEECH THERAPY | Facility: HOSPITAL | Age: 54
Setting detail: THERAPIES SERIES
Discharge: HOME | End: 2024-07-24
Payer: MEDICARE

## 2024-07-24 DIAGNOSIS — R41.841 COGNITIVE COMMUNICATION DEFICIT: ICD-10-CM

## 2024-07-24 DIAGNOSIS — R47.01 APHASIA DUE TO ACUTE CEREBROVASCULAR ACCIDENT (CVA): ICD-10-CM

## 2024-07-24 DIAGNOSIS — I63.9 APHASIA DUE TO ACUTE CEREBROVASCULAR ACCIDENT (CVA): ICD-10-CM

## 2024-07-24 DIAGNOSIS — I63.9 ACUTE CVA (CEREBROVASCULAR ACCIDENT): Primary | ICD-10-CM

## 2024-07-24 PROCEDURE — 97129 THER IVNTJ 1ST 15 MIN: CPT

## 2024-07-24 PROCEDURE — 97130 THER IVNTJ EA ADDL 15 MIN: CPT

## 2024-07-24 NOTE — THERAPY TREATMENT NOTE
Outpatient Speech Language Pathology   Adult Speech Language Cognitive Treatment Note  [facility]     Patient Name: Cecilio Puckett  : 1970  MRN: [id number]  Today's Date: 2024         Visit Date: 2024   Patient Active Problem List   Diagnosis    Syncope, unspecified syncope type    Acute DVT (deep venous thrombosis)    NHL (non-Hodgkin's lymphoma)    HIV (human immunodeficiency virus infection)    Type 2 diabetes mellitus with circulatory disorder, without long-term current use of insulin    HTN (hypertension)    Slurred speech    Acute CVA (cerebrovascular accident)    Anticoagulated on Coumadin    Coronary artery disease involving native coronary artery of native heart without angina pectoris    Arteriosclerosis of coronary artery    Ischemic cardiomyopathy    History of CVA (cerebrovascular accident)          Visit Dx:    ICD-10-CM ICD-9-CM   1. Acute CVA (cerebrovascular accident)  I63.9 434.91   2. Cognitive communication deficit  R41.841 799.52   3. Aphasia due to acute cerebrovascular accident (CVA)  I63.9 434.91    R47.01 784.3                              SLP OP Goals       Row Name 24 1300          Subjective Comments    Subjective Comments Pt reports that word finding and walking are his biggest priorities.  -BB        Verbal Expression Goals    Patient will improve verbal expressive language skills by completing divergent naming tasks 90%:;without cues  -BB     Status: Patient will improve verbal expressive language skills by completing divergent naming tasks Progressing as expected  -BB     Comments: Patient will improve verbal expressive language skills by completing divergent naming tasks Generative naming (5 items: concrete): 90% acc. Divergent naming (concrete): 100% acc. Divergent naming (abstract): 25% acc. wo cues and 75% acc w mod cues. Convergent namin% acc.  -BB        Problem Solving Goals    Patient will improve ability to analyze problems and determine  solutions by completing a visual representation/filling in a chart by following  written directions 90%:;without cues  -BB     Status: Patient will improve ability to analyze problems and determine solutions by completing a visual representation/filling in a chart by following  written directions Progressing as expected  -BB     Comments: Patient will improve ability to analyze problems and determine solutions by completing a visual representation/filling in a chart by following  written directions Advanced deductive reasonin% acc wo cues and 66% acc w mod cues  -BB               User Key  (r) = Recorded By, (t) = Taken By, (c) = Cosigned By      Initials Name Provider Type    BB Benjamin Trivedi, SLP Speech and Language Pathologist                           Time Calculation:   SLP Start Time: 1330  SLP Stop Time: 1415  SLP Time Calculation (min): 45 min  Timed Charges  39277-CT Dev of Cogn Skills Initial Minutes: 15  79397-EQ Dev of Cogn Skills Add Minutes: 30  Total Minutes  Timed Charges Total Minutes: 45   Total Minutes: 45    Therapy Charges for Today       Code Description Service Date Service Provider Modifiers Qty    18727832083 HC ST DEV OF COGN SKILLS INITIAL 15 MIN 2024 Benjamin Trivedi, SLP  1    11106325372 HC ST DEV OF COGN SKILLS EACH ADDT'L 15 MIN 2024 Benjamin Trivedi, SLP  2                     Benjamin Trivedi, SYLVIE  2024

## 2024-07-31 ENCOUNTER — APPOINTMENT (OUTPATIENT)
Dept: SPEECH THERAPY | Facility: HOSPITAL | Age: 54
End: 2024-07-31
Payer: MEDICARE

## 2025-01-03 ENCOUNTER — APPOINTMENT (OUTPATIENT)
Dept: GENERAL RADIOLOGY | Facility: HOSPITAL | Age: 55
End: 2025-01-03
Payer: MEDICARE

## 2025-01-03 ENCOUNTER — HOSPITAL ENCOUNTER (EMERGENCY)
Facility: HOSPITAL | Age: 55
Discharge: HOME | End: 2025-01-03
Attending: EMERGENCY MEDICINE
Payer: MEDICARE

## 2025-01-03 VITALS
HEART RATE: 80 BPM | WEIGHT: 210 LBS | SYSTOLIC BLOOD PRESSURE: 144 MMHG | HEIGHT: 73 IN | TEMPERATURE: 97.6 F | RESPIRATION RATE: 16 BRPM | OXYGEN SATURATION: 96 % | BODY MASS INDEX: 27.83 KG/M2 | DIASTOLIC BLOOD PRESSURE: 80 MMHG

## 2025-01-03 DIAGNOSIS — M79.675 GREAT TOE PAIN, LEFT: Primary | ICD-10-CM

## 2025-01-03 LAB
ALBUMIN SERPL-MCNC: 3.8 G/DL (ref 3.5–5.2)
ALBUMIN/GLOB SERPL: 1.2 G/DL
ALP SERPL-CCNC: 73 U/L (ref 39–117)
ALT SERPL W P-5'-P-CCNC: 27 U/L (ref 1–41)
ANION GAP SERPL CALCULATED.3IONS-SCNC: 9 MMOL/L (ref 5–15)
AST SERPL-CCNC: 17 U/L (ref 1–40)
BASOPHILS # BLD AUTO: 0.05 10*3/MM3 (ref 0–0.2)
BASOPHILS NFR BLD AUTO: 0.7 % (ref 0–1.5)
BILIRUB SERPL-MCNC: 0.2 MG/DL (ref 0–1.2)
BUN SERPL-MCNC: 10 MG/DL (ref 6–20)
BUN/CREAT SERPL: 10.5 (ref 7–25)
CALCIUM SPEC-SCNC: 8.6 MG/DL (ref 8.6–10.5)
CHLORIDE SERPL-SCNC: 104 MMOL/L (ref 98–107)
CO2 SERPL-SCNC: 25 MMOL/L (ref 22–29)
CREAT SERPL-MCNC: 0.95 MG/DL (ref 0.76–1.27)
CRP SERPL-MCNC: <0.3 MG/DL (ref 0–0.5)
DEPRECATED RDW RBC AUTO: 45.9 FL (ref 37–54)
EGFRCR SERPLBLD CKD-EPI 2021: 95.7 ML/MIN/1.73
EOSINOPHIL # BLD AUTO: 0.17 10*3/MM3 (ref 0–0.4)
EOSINOPHIL NFR BLD AUTO: 2.3 % (ref 0.3–6.2)
ERYTHROCYTE [DISTWIDTH] IN BLOOD BY AUTOMATED COUNT: 14.8 % (ref 12.3–15.4)
ERYTHROCYTE [SEDIMENTATION RATE] IN BLOOD: 12 MM/HR (ref 0–20)
GLOBULIN UR ELPH-MCNC: 3.1 GM/DL
GLUCOSE SERPL-MCNC: 102 MG/DL (ref 65–99)
HCT VFR BLD AUTO: 44.9 % (ref 37.5–51)
HGB BLD-MCNC: 15.2 G/DL (ref 13–17.7)
IMM GRANULOCYTES # BLD AUTO: 0.01 10*3/MM3 (ref 0–0.05)
IMM GRANULOCYTES NFR BLD AUTO: 0.1 % (ref 0–0.5)
LYMPHOCYTES # BLD AUTO: 3.43 10*3/MM3 (ref 0.7–3.1)
LYMPHOCYTES NFR BLD AUTO: 45.5 % (ref 19.6–45.3)
MCH RBC QN AUTO: 29 PG (ref 26.6–33)
MCHC RBC AUTO-ENTMCNC: 33.9 G/DL (ref 31.5–35.7)
MCV RBC AUTO: 85.7 FL (ref 79–97)
MONOCYTES # BLD AUTO: 0.55 10*3/MM3 (ref 0.1–0.9)
MONOCYTES NFR BLD AUTO: 7.3 % (ref 5–12)
NEUTROPHILS NFR BLD AUTO: 3.33 10*3/MM3 (ref 1.7–7)
NEUTROPHILS NFR BLD AUTO: 44.1 % (ref 42.7–76)
NRBC BLD AUTO-RTO: 0 /100 WBC (ref 0–0.2)
PLATELET # BLD AUTO: 197 10*3/MM3 (ref 140–450)
PMV BLD AUTO: 9.8 FL (ref 6–12)
POTASSIUM SERPL-SCNC: 3.6 MMOL/L (ref 3.5–5.2)
PROT SERPL-MCNC: 6.9 G/DL (ref 6–8.5)
RBC # BLD AUTO: 5.24 10*6/MM3 (ref 4.14–5.8)
SODIUM SERPL-SCNC: 138 MMOL/L (ref 136–145)
URATE SERPL-MCNC: 6.6 MG/DL (ref 3.4–7)
WBC NRBC COR # BLD AUTO: 7.54 10*3/MM3 (ref 3.4–10.8)

## 2025-01-03 PROCEDURE — 84550 ASSAY OF BLOOD/URIC ACID: CPT | Performed by: PHYSICIAN ASSISTANT

## 2025-01-03 PROCEDURE — 86140 C-REACTIVE PROTEIN: CPT | Performed by: PHYSICIAN ASSISTANT

## 2025-01-03 PROCEDURE — 99283 EMERGENCY DEPT VISIT LOW MDM: CPT

## 2025-01-03 PROCEDURE — 85652 RBC SED RATE AUTOMATED: CPT | Performed by: PHYSICIAN ASSISTANT

## 2025-01-03 PROCEDURE — 85025 COMPLETE CBC W/AUTO DIFF WBC: CPT | Performed by: PHYSICIAN ASSISTANT

## 2025-01-03 PROCEDURE — 36415 COLL VENOUS BLD VENIPUNCTURE: CPT | Performed by: PHYSICIAN ASSISTANT

## 2025-01-03 PROCEDURE — 73660 X-RAY EXAM OF TOE(S): CPT

## 2025-01-03 PROCEDURE — 80053 COMPREHEN METABOLIC PANEL: CPT | Performed by: PHYSICIAN ASSISTANT

## 2025-01-03 RX ORDER — MELOXICAM 15 MG/1
15 TABLET ORAL DAILY
Qty: 14 TABLET | Refills: 0 | Status: SHIPPED | OUTPATIENT
Start: 2025-01-03 | End: 2025-01-03 | Stop reason: ALTCHOICE

## 2025-01-03 NOTE — ED PROVIDER NOTES
I supervised care provided by the midlevel provider.   We have discussed this patient's history, physical exam, and treatment plan.  I have reviewed the note and personally saw and examined the patient and agree with the plan of care.   This is a gentleman who complains of left toe pain.  This pain has been occurring for about 1 week.  It does not hurt if he does not touch it.  It hurts at the very tip of his toe.  It will be a sharp shooting pain.  And then sometimes it will ease up.  He has been a longstanding diabetic.  He will sometimes have numbness and tingling in his toes and feet.  He has had no trauma or injury.  He has had no fevers or chills.  He reports no obvious swelling.  He has never had anything like this before.  He has no history of gout that he reports.    GENERAL: not distressed.  This patient is not septic or toxic appearing.  He appears older than his stated age.  Vital signs are unremarkable.  Head/neck/ face are symmetric without gross deformity or swelling  EYES: no scleral icterus  CV: regular rhythm, regular rate with intact distal pulses  RESPIRATORY: normal effort and no respiratory distress  ABDOMEN: soft and nontender  MUSCULOSKELETAL: When I look at both of his lower extremities at his feet and toes bilaterally he has poor care he is got long nails he has onychomycosis and onycholysis from chronic fungal infections.  I do not see really any difference between his left and his right big toe.  I do not feel any warmth or tenderness.  He has no tenderness at the tarsal phalangeal joint.  He has no tenderness at the base of the nail.  He has tenderness when I touch the tip of his toe.  I do not see any drainage or discharge.  No definitive warmth.  His toes are not cool and appropriate color and appear symmetric.  He has intact distal pulses.  NEURO: alert and appropriate, moves all extremities, follows commands  SKIN: warm, dry    Vital signs and nursing notes reviewed.    Plan   ED  Course as of 01/03/25 1948 Fri Jan 03, 2025   1327 WBC: 7.54 [DC]   1327 Sed Rate: 12 [DC]   1348 Glucose(!): 102 [DC]   1348 Creatinine: 0.95 [DC]   1348 Sodium: 138 [DC]   1348 Potassium: 3.6 [DC]   1348 Sed Rate: 12 [DC]   1348 C-Reactive Protein: <0.30 [DC]   1348 Uric Acid: 6.6 [DC]   1500 Pt takes coumadin. He will not take mobic previously sent to pharmacy. I will send topical anti inflammatory and have continue tylenol, ice. He will follow up with podiatry. [DC]      ED Course User Index  [DC] Sandy Burton PA         Patient's lab work including x-rays are unremarkable.  I am not 100% convinced this is an infection.  This does not look like a fever lawn, ingrown toenail, it does not have the appearance of a typical diabetic foot infection.  The plan is to have him follow-up with podiatry.  Will get a put him on some nonsteroidals.  Discussed the case with Sandy who is a midlevel provider seeing this patient.  Informed the patient as well of the results of the test.  All questions answered.         Abel Pimentel MD  01/03/25 1948

## 2025-01-03 NOTE — ED PROVIDER NOTES
EMERGENCY DEPARTMENT ENCOUNTER      PCP: Arias Lehman APRN  Patient Care Team:  Arias Lehman APRN as PCP - General (Family Medicine)  Bear Rojas PA as Referring Physician (Emergency Medicine)  Arthur Salazar MD as Consulting Physician (Hematology and Oncology)   Independent Historians: Patient    HPI:  Chief Complaint: Toe pain  A complete HPI/ROS/PMH/PSH/SH/FH are unobtainable due to: None    Chronic or social conditions impacting patient care (social determinants of health): None    Context: Cecilio Puckett is a 54 y.o. male with history of hypertension, diabetes who presents to the ED c/o acute left great toe pain that began about a week ago.  He denies any fall or injury.  He has not noticed any erythema, swelling, wounds.  He is taking all of his medications as prescribed.  No reported history of gout.  Patient does report some neuropathic type pain.    Review of prior external notes and/or external test results outside of this encounter: BMP on 5/22/2024 showed creatinine of 1.00.  Hemoglobin A1c on 5/14/2024 was 10.      PAST MEDICAL HISTORY  Active Ambulatory Problems     Diagnosis Date Noted    Syncope, unspecified syncope type 10/10/2022    Acute DVT (deep venous thrombosis) 09/26/2023    NHL (non-Hodgkin's lymphoma) 09/27/2023    HIV (human immunodeficiency virus infection) 09/27/2023    Type 2 diabetes mellitus with circulatory disorder, without long-term current use of insulin 09/27/2023    HTN (hypertension) 09/27/2023    Slurred speech 05/14/2024    Acute CVA (cerebrovascular accident) 05/15/2024    Anticoagulated on Coumadin 01/03/2013    Coronary artery disease involving native coronary artery of native heart without angina pectoris 12/01/2019    Arteriosclerosis of coronary artery 10/09/2012    Ischemic cardiomyopathy 05/16/2024    History of CVA (cerebrovascular accident) 05/16/2024     Resolved Ambulatory Problems     Diagnosis Date Noted    Leg DVT (deep venous  thromboembolism), acute, left 09/27/2023    TIA (transient ischemic attack) 09/29/2023    Acute CVA (cerebrovascular accident) 09/30/2023     Past Medical History:   Diagnosis Date    COPD (chronic obstructive pulmonary disease)     Coronary artery disease     Diabetes mellitus     DVT (deep venous thrombosis)     Elevated cholesterol     GERD (gastroesophageal reflux disease)     H/O Cancer     H/O Ischemia of extremity     History of MI (myocardial infarction) 2019    History of snoring     History of transfusion     Hypertension     Non Hodgkin's lymphoma     PAD (peripheral artery disease)     Pulmonary embolism     Stroke        The patient has started, but not completed, their COVID-19 vaccination series.    PAST SURGICAL HISTORY  Past Surgical History:   Procedure Laterality Date    CARDIAC CATHETERIZATION      CORONARY ANGIOPLASTY WITH STENT PLACEMENT      FEMORAL ARTERY - FEMORAL ARTERY BYPASS GRAFT      PORTACATH PLACEMENT           FAMILY HISTORY  Family History   Problem Relation Age of Onset    Hypertension Mother     Diabetes Mother     Stroke Mother     Hypertension Maternal Grandmother          SOCIAL HISTORY  Social History     Socioeconomic History    Marital status: Single   Tobacco Use    Smoking status: Every Day     Current packs/day: 1.00     Average packs/day: 1 pack/day for 31.0 years (31.0 ttl pk-yrs)     Types: Cigars, Cigarettes     Start date: 01/1994     Passive exposure: Current   Vaping Use    Vaping status: Never Used   Substance and Sexual Activity    Alcohol use: Yes     Alcohol/week: 2.0 standard drinks of alcohol     Types: 2 Cans of beer per week     Comment: occassionally    Drug use: Not Currently    Sexual activity: Defer         ALLERGIES  Patient has no known allergies.        REVIEW OF SYSTEMS  Review of Systems   Musculoskeletal:  Positive for arthralgias (L great toe).        All systems reviewed and negative except for those discussed in HPI.       PHYSICAL EXAM    I  have reviewed the triage vital signs and nursing notes.    ED Triage Vitals   Temp Heart Rate Resp BP SpO2   01/03/25 1238 01/03/25 1238 01/03/25 1238 01/03/25 1249 01/03/25 1238   97.6 °F (36.4 °C) 96 16 144/80 96 %      Temp src Heart Rate Source Patient Position BP Location FiO2 (%)   01/03/25 1238 -- 01/03/25 1249 01/03/25 1249 --   Tympanic  Sitting Left arm        Physical Exam  GENERAL: alert, no acute distress  SKIN: Warm, dry  HENT: Normocephalic, atraumatic  EYES: no scleral icterus  CV: regular rhythm, regular rate  RESPIRATORY: normal effort, lungs clear  ABDOMEN: soft, nontender, nondistended  MUSCULOSKELETAL: no deformity, ttp of left great toe, no swelling, erythema or wound, pt does have onychomycosis, no evidence of paronychia, no calf tenderness, 2+ pulses  NEURO: alert, moves all extremities, follows commands          LAB RESULTS  Recent Results (from the past 24 hours)   Comprehensive Metabolic Panel    Collection Time: 01/03/25  1:12 PM    Specimen: Arm, Left; Blood   Result Value Ref Range    Glucose 102 (H) 65 - 99 mg/dL    BUN 10 6 - 20 mg/dL    Creatinine 0.95 0.76 - 1.27 mg/dL    Sodium 138 136 - 145 mmol/L    Potassium 3.6 3.5 - 5.2 mmol/L    Chloride 104 98 - 107 mmol/L    CO2 25.0 22.0 - 29.0 mmol/L    Calcium 8.6 8.6 - 10.5 mg/dL    Total Protein 6.9 6.0 - 8.5 g/dL    Albumin 3.8 3.5 - 5.2 g/dL    ALT (SGPT) 27 1 - 41 U/L    AST (SGOT) 17 1 - 40 U/L    Alkaline Phosphatase 73 39 - 117 U/L    Total Bilirubin 0.2 0.0 - 1.2 mg/dL    Globulin 3.1 gm/dL    A/G Ratio 1.2 g/dL    BUN/Creatinine Ratio 10.5 7.0 - 25.0    Anion Gap 9.0 5.0 - 15.0 mmol/L    eGFR 95.7 >60.0 mL/min/1.73   Sedimentation Rate    Collection Time: 01/03/25  1:12 PM    Specimen: Arm, Left; Blood   Result Value Ref Range    Sed Rate 12 0 - 20 mm/hr   C-reactive Protein    Collection Time: 01/03/25  1:12 PM    Specimen: Arm, Left; Blood   Result Value Ref Range    C-Reactive Protein <0.30 0.00 - 0.50 mg/dL   Uric Acid     Collection Time: 01/03/25  1:12 PM    Specimen: Arm, Left; Blood   Result Value Ref Range    Uric Acid 6.6 3.4 - 7.0 mg/dL   CBC Auto Differential    Collection Time: 01/03/25  1:12 PM    Specimen: Arm, Left; Blood   Result Value Ref Range    WBC 7.54 3.40 - 10.80 10*3/mm3    RBC 5.24 4.14 - 5.80 10*6/mm3    Hemoglobin 15.2 13.0 - 17.7 g/dL    Hematocrit 44.9 37.5 - 51.0 %    MCV 85.7 79.0 - 97.0 fL    MCH 29.0 26.6 - 33.0 pg    MCHC 33.9 31.5 - 35.7 g/dL    RDW 14.8 12.3 - 15.4 %    RDW-SD 45.9 37.0 - 54.0 fl    MPV 9.8 6.0 - 12.0 fL    Platelets 197 140 - 450 10*3/mm3    Neutrophil % 44.1 42.7 - 76.0 %    Lymphocyte % 45.5 (H) 19.6 - 45.3 %    Monocyte % 7.3 5.0 - 12.0 %    Eosinophil % 2.3 0.3 - 6.2 %    Basophil % 0.7 0.0 - 1.5 %    Immature Grans % 0.1 0.0 - 0.5 %    Neutrophils, Absolute 3.33 1.70 - 7.00 10*3/mm3    Lymphocytes, Absolute 3.43 (H) 0.70 - 3.10 10*3/mm3    Monocytes, Absolute 0.55 0.10 - 0.90 10*3/mm3    Eosinophils, Absolute 0.17 0.00 - 0.40 10*3/mm3    Basophils, Absolute 0.05 0.00 - 0.20 10*3/mm3    Immature Grans, Absolute 0.01 0.00 - 0.05 10*3/mm3    nRBC 0.0 0.0 - 0.2 /100 WBC       Ordered the above labs and independently reviewed and interpreted the results.        RADIOLOGY  XR Toe 2+ View Left    Result Date: 1/3/2025  XR TOE 2+ VW LEFT-  DATE OF EXAM: 1/3/2025 1:53 PM  INDICATION: great toe pain w/o injury.  COMPARISON: None available.  TECHNIQUE: 3 views of the great toe were obtained.  FINDINGS: Normal bone mineralization. No evidence of acute fracture or dislocation. Mild hallux valgus and bunion formation with mild DJD at the great toe MTP joint. No definitive erosions. No lytic or destructive changes to suggest osteomyelitis. Mild diffuse soft tissue swelling. Moderate to severe vascular calcifications.       1. Diffuse soft tissue swelling and moderate to severe vascular calcifications, without radiographic evidence of acute fracture, dislocation, or osteomyelitis. 2. Mild hallux  valgus and bunion formation with mild DJD at the great toe MTP joint.  This report was finalized on 1/3/2025 2:04 PM by Jorge Luis Moore MD on Workstation: [id number]       I ordered the above noted radiological studies. Independently reviewed and interpreted by me.  See dictation for official radiology interpretation.      PROCEDURES    Procedures      MEDICATIONS GIVEN IN ER    Medications - No data to display      PROGRESS, DATA ANALYSIS, CONSULTS, AND MEDICAL DECISION MAKING    All labs have been independently reviewed and interpreted by me.  All radiology studies have been independently reviewed and interpreted by me and discussed with radiologist dictating the report.   EKG's independently reviewed and interpreted by me.  Discussion below represents my analysis of pertinent findings related to patient's condition, differential diagnosis, treatment plan and final disposition.    Differential diagnosis: OA, gout, neuropathy, ulcer, osteomyelitis, cellulitis    ED Course as of 01/06/25 2049 Fri Jan 03, 2025   1327 WBC: 7.54 [DC]   1327 Sed Rate: 12 [DC]   1348 Glucose(!): 102 [DC]   1348 Creatinine: 0.95 [DC]   1348 Sodium: 138 [DC]   1348 Potassium: 3.6 [DC]   1348 Sed Rate: 12 [DC]   1348 C-Reactive Protein: <0.30 [DC]   1348 Uric Acid: 6.6 [DC]   1500 Pt takes coumadin. He will not take mobic previously sent to pharmacy. I will send topical anti inflammatory and have continue tylenol, ice. He will follow up with podiatry. [DC]      ED Course User Index  [DC] Sandy Burton PA             AS OF 20:49 EST VITALS:    BP - 144/80  HR - 80  TEMP - 97.6 °F (36.4 °C) (Tympanic)  O2 SATS - 96%        DIAGNOSIS  Final diagnoses:   Great toe pain, left         DISPOSITION  ED Disposition       ED Disposition   Discharge    Condition   Stable    Comment   --                  Note Disclaimer: At [facility], we believe that sharing information builds trust and better relationships. You are receiving this note because  you recently visited [facility]. It is possible you will see health information before a provider has talked with you about it. This kind of information can be easy to misunderstand. To help you fully understand what it means for your health, we urge you to discuss this note with your provider.         Sandy Burton PA  01/06/25 2042

## 2025-01-14 ENCOUNTER — HOSPITAL ENCOUNTER (OUTPATIENT)
Dept: CARDIOLOGY | Facility: HOSPITAL | Age: 55
Discharge: HOME | End: 2025-01-14
Admitting: INTERNAL MEDICINE
Payer: MEDICARE

## 2025-01-14 ENCOUNTER — OFFICE VISIT (OUTPATIENT)
Dept: CARDIOLOGY | Facility: CLINIC | Age: 55
End: 2025-01-14
Payer: MEDICARE

## 2025-01-14 VITALS
SYSTOLIC BLOOD PRESSURE: 124 MMHG | HEIGHT: 73 IN | DIASTOLIC BLOOD PRESSURE: 86 MMHG | BODY MASS INDEX: 27.83 KG/M2 | WEIGHT: 210 LBS | HEART RATE: 78 BPM

## 2025-01-14 VITALS
WEIGHT: 227.2 LBS | BODY MASS INDEX: 30.11 KG/M2 | HEIGHT: 73 IN | HEART RATE: 76 BPM | DIASTOLIC BLOOD PRESSURE: 86 MMHG | OXYGEN SATURATION: 97 % | SYSTOLIC BLOOD PRESSURE: 124 MMHG

## 2025-01-14 DIAGNOSIS — R55 SYNCOPE, UNSPECIFIED SYNCOPE TYPE: ICD-10-CM

## 2025-01-14 DIAGNOSIS — Z86.73 HISTORY OF CVA (CEREBROVASCULAR ACCIDENT): ICD-10-CM

## 2025-01-14 DIAGNOSIS — I25.10 ARTERIOSCLEROSIS OF CORONARY ARTERY: ICD-10-CM

## 2025-01-14 DIAGNOSIS — I25.5 ISCHEMIC CARDIOMYOPATHY: ICD-10-CM

## 2025-01-14 DIAGNOSIS — R05.3 CHRONIC COUGH: ICD-10-CM

## 2025-01-14 DIAGNOSIS — I25.10 CORONARY ARTERY DISEASE INVOLVING NATIVE CORONARY ARTERY OF NATIVE HEART WITHOUT ANGINA PECTORIS: Primary | ICD-10-CM

## 2025-01-14 DIAGNOSIS — R94.31 ABNORMAL EKG: ICD-10-CM

## 2025-01-14 DIAGNOSIS — I10 PRIMARY HYPERTENSION: ICD-10-CM

## 2025-01-14 DIAGNOSIS — Z09 HOSPITAL DISCHARGE FOLLOW-UP: ICD-10-CM

## 2025-01-14 DIAGNOSIS — E78.2 MIXED HYPERLIPIDEMIA: ICD-10-CM

## 2025-01-14 DIAGNOSIS — F17.290 CIGAR SMOKER: ICD-10-CM

## 2025-01-14 PROBLEM — I63.9 ACUTE CVA (CEREBROVASCULAR ACCIDENT): Status: RESOLVED | Noted: 2024-05-15 | Resolved: 2025-01-14

## 2025-01-14 PROBLEM — R47.81 SLURRED SPEECH: Status: RESOLVED | Noted: 2024-05-14 | Resolved: 2025-01-14

## 2025-01-14 PROBLEM — I82.409 ACUTE DVT (DEEP VENOUS THROMBOSIS): Status: RESOLVED | Noted: 2023-09-26 | Resolved: 2025-01-14

## 2025-01-14 LAB
AORTIC ARCH: 2.8 CM
ASCENDING AORTA: 3 CM
AV MEAN PRESS GRAD SYS DOP V1V2: 3 MMHG
AV VMAX SYS DOP: 126 CM/SEC
BH CV ECHO LEFT VENTRICLE GLOBAL LONGITUDINAL STRAIN: -15.2 %
BH CV ECHO MEAS - ACS: 2.42 CM
BH CV ECHO MEAS - AO MAX PG: 6.4 MMHG
BH CV ECHO MEAS - AO ROOT DIAM: 3.4 CM
BH CV ECHO MEAS - AO V2 VTI: 25.9 CM
BH CV ECHO MEAS - AVA(I,D): 2.16 CM2
BH CV ECHO MEAS - EDV(CUBED): 91.1 ML
BH CV ECHO MEAS - EDV(MOD-SP2): 132 ML
BH CV ECHO MEAS - EDV(MOD-SP4): 155 ML
BH CV ECHO MEAS - EF(MOD-SP2): 43.2 %
BH CV ECHO MEAS - EF(MOD-SP4): 52.3 %
BH CV ECHO MEAS - ESV(CUBED): 26.9 ML
BH CV ECHO MEAS - ESV(MOD-SP2): 75 ML
BH CV ECHO MEAS - ESV(MOD-SP4): 74 ML
BH CV ECHO MEAS - FS: 33.5 %
BH CV ECHO MEAS - IVS/LVPW: 0.93 CM
BH CV ECHO MEAS - IVSD: 1.4 CM
BH CV ECHO MEAS - LAT PEAK E' VEL: 10.3 CM/SEC
BH CV ECHO MEAS - LV DIASTOLIC VOL/BSA (35-75): 70.6 CM2
BH CV ECHO MEAS - LV MASS(C)D: 261.9 GRAMS
BH CV ECHO MEAS - LV MAX PG: 2.9 MMHG
BH CV ECHO MEAS - LV MEAN PG: 1 MMHG
BH CV ECHO MEAS - LV SYSTOLIC VOL/BSA (12-30): 33.7 CM2
BH CV ECHO MEAS - LV V1 MAX: 85.1 CM/SEC
BH CV ECHO MEAS - LV V1 VTI: 16.6 CM
BH CV ECHO MEAS - LVIDD: 4.5 CM
BH CV ECHO MEAS - LVIDS: 3 CM
BH CV ECHO MEAS - LVOT AREA: 3.4 CM2
BH CV ECHO MEAS - LVOT DIAM: 2.07 CM
BH CV ECHO MEAS - LVPWD: 1.5 CM
BH CV ECHO MEAS - MED PEAK E' VEL: 7.2 CM/SEC
BH CV ECHO MEAS - MR MAX PG: 18.6 MMHG
BH CV ECHO MEAS - MR MAX VEL: 215.8 CM/SEC
BH CV ECHO MEAS - MV A DUR: 0.11 SEC
BH CV ECHO MEAS - MV A MAX VEL: 105 CM/SEC
BH CV ECHO MEAS - MV DEC SLOPE: 463.2 CM/SEC2
BH CV ECHO MEAS - MV DEC TIME: 0.16 SEC
BH CV ECHO MEAS - MV E MAX VEL: 98.1 CM/SEC
BH CV ECHO MEAS - MV E/A: 0.93
BH CV ECHO MEAS - MV MAX PG: 4.4 MMHG
BH CV ECHO MEAS - MV MEAN PG: 2.29 MMHG
BH CV ECHO MEAS - MV P1/2T: 63.5 MSEC
BH CV ECHO MEAS - MV V2 VTI: 33.3 CM
BH CV ECHO MEAS - MVA(P1/2T): 3.5 CM2
BH CV ECHO MEAS - MVA(VTI): 1.68 CM2
BH CV ECHO MEAS - PA ACC TIME: 0.13 SEC
BH CV ECHO MEAS - PA V2 MAX: 84.2 CM/SEC
BH CV ECHO MEAS - PULM A REVS DUR: 0.09 SEC
BH CV ECHO MEAS - PULM A REVS VEL: 21.7 CM/SEC
BH CV ECHO MEAS - PULM DIAS VEL: 33.2 CM/SEC
BH CV ECHO MEAS - PULM S/D: 1.44
BH CV ECHO MEAS - PULM SYS VEL: 47.9 CM/SEC
BH CV ECHO MEAS - QP/QS: 1.13
BH CV ECHO MEAS - RAP SYSTOLE: 3 MMHG
BH CV ECHO MEAS - RV MAX PG: 2.41 MMHG
BH CV ECHO MEAS - RV V1 MAX: 77.6 CM/SEC
BH CV ECHO MEAS - RV V1 VTI: 18.7 CM
BH CV ECHO MEAS - RVOT DIAM: 2.08 CM
BH CV ECHO MEAS - RVSP: 12.1 MMHG
BH CV ECHO MEAS - SUP REN AO DIAM: 2.3 CM
BH CV ECHO MEAS - SV(LVOT): 56 ML
BH CV ECHO MEAS - SV(MOD-SP2): 57 ML
BH CV ECHO MEAS - SV(MOD-SP4): 81 ML
BH CV ECHO MEAS - SV(RVOT): 63.3 ML
BH CV ECHO MEAS - SVI(LVOT): 25.5 ML/M2
BH CV ECHO MEAS - SVI(MOD-SP2): 25.9 ML/M2
BH CV ECHO MEAS - SVI(MOD-SP4): 36.9 ML/M2
BH CV ECHO MEAS - TAPSE (>1.6): 2.09 CM
BH CV ECHO MEAS - TR MAX PG: 9.1 MMHG
BH CV ECHO MEAS - TR MAX VEL: 150.7 CM/SEC
BH CV ECHO MEASUREMENTS AVERAGE E/E' RATIO: 11.21
BH CV XLRA - RV BASE: 3 CM
BH CV XLRA - RV LENGTH: 9.2 CM
BH CV XLRA - RV MID: 2.23 CM
BH CV XLRA - TDI S': 12.1 CM/SEC
LEFT ATRIUM VOLUME INDEX: 21.4 ML/M2
LV EF BIPLANE MOD: 46.8 %
SINUS: 3 CM
STJ: 2.8 CM

## 2025-01-14 PROCEDURE — 93356 MYOCRD STRAIN IMG SPCKL TRCK: CPT

## 2025-01-14 PROCEDURE — 93306 TTE W/DOPPLER COMPLETE: CPT

## 2025-01-14 RX ORDER — CARVEDILOL 6.25 MG/1
6.25 TABLET ORAL EVERY 12 HOURS SCHEDULED
Qty: 180 TABLET | Refills: 3 | Status: SHIPPED | OUTPATIENT
Start: 2025-01-14

## 2025-01-14 NOTE — PROGRESS NOTES
Date of Office Visit: 2025  Encounter Provider: MARIANNE Mojica  Place of Service: [facility] CARDIOLOGY  Patient Name: Cecilio Puckett  :1970  Primary Cardiologist: Dr. Shashi Lomas    Chief Complaint   Patient presents with    Coronary Artery Disease    Cardiomyopathy    Follow-up   :     HPI: Cecilio Puckett is a 54 y.o. male who presents today for 6-month cardiac follow-up visit.  He is a new patient to me and I reviewed his medical records.    He has been diagnosed with hypertension, hyperlipidemia, type 2 diabetes mellitus, COPD, and he is HIV positive.  He has a history of non-Hodgkin's lymphoma.  He also has a history of DVT and pulmonary embolism.  He has remained on warfarin and INR's are followed by his PCP.  He has known PAD and underwent vascular intervention in  and then aortobifemoral bypass in 2014.  He has been a longtime cigarette smoker.    He has a history of coronary artery disease and underwent bare-metal stent placement to the left circumflex in 2011.  He was also diagnosed with ischemic cardiomyopathy.    In 2019, he underwent successful PCI/drug-eluting stent to the first diagonal (3.5 x 24 mm Synergy drug-eluting stent) at [facility].      In 2022, he was hospitalized for chest pain and myocardial perfusion study was normal.    In May 2024, he presented to [facility] ED with speech difficulty and dizziness.  He was diagnosed with multiple areas of acute infarction consistent with embolic source.  He had not been compliant with his anticoagulation and INR was 1 upon arrival.  Echocardiogram showed the following: LVEF 37%, grade 1 diastolic dysfunction, severe LVH, left atrium moderately to severely dilated, left atrium volume severely increased, saline test negative, and mild mitral regurgitation.  Myocardial perfusion study showed the following: Moderate size inferior wall infarct with mild to moderate isi-infarct  ischemia and severe hypokinesis of the inferior wall.  Medical treatment was recommended.  He was tried on spironolactone, but creatinine elevated so it was discontinued.    In July 2024, he followed up with Dr. Lomas.  He denied anginal symptoms and no further ischemic testing was recommended at that time.    He presents today for cardiac follow-up visit with his mother accompanying him.  Echocardiogram was just completed today and the results are pending.  He occasionally feels the need to take small breaths, but denies dyspnea with exertion.  He reports a persistent, dry cough.  He still smokes Black and milds and sometimes coughs so hard that he passes out.  Last week he coughed up blood and did not seek medical attention.  He denies any further blood.  Occasionally, he notices some lower extremity edema.  He feels fatigued.  He denies chest pain, PND, orthopnea, palpitations, dizziness, syncope, or bleeding.      Past Medical History:   Diagnosis Date    COPD (chronic obstructive pulmonary disease)     Coronary artery disease 02/2011    Status post bare-metal stent placement to left circumflex 2/2011    Diabetes mellitus     DVT (deep venous thrombosis)     GERD (gastroesophageal reflux disease)     H/O Cancer     right arm, in throat, chest and stomach, in remission    H/O Ischemia of extremity     History of MI (myocardial infarction)     History of snoring     History of transfusion     HIV (human immunodeficiency virus infection)     Hyperlipidemia     Hypertension     Non Hodgkin's lymphoma     PAD (peripheral artery disease)     Pulmonary embolism     Stroke        Past Surgical History:   Procedure Laterality Date    CARDIAC CATHETERIZATION      CORONARY ANGIOPLASTY WITH STENT PLACEMENT      FEMORAL ARTERY - FEMORAL ARTERY BYPASS GRAFT      PORTACATH PLACEMENT         Social History     Socioeconomic History    Marital status: Single   Tobacco Use    Smoking status: Every Day     Current packs/day: 1.00      Average packs/day: 1 pack/day for 31.0 years (31.0 ttl pk-yrs)     Types: Cigars, Cigarettes     Start date: 01/1994     Passive exposure: Current   Vaping Use    Vaping status: Never Used   Substance and Sexual Activity    Alcohol use: Yes     Alcohol/week: 2.0 standard drinks of alcohol     Types: 2 Cans of beer per week     Comment: occassionally    Drug use: Not Currently    Sexual activity: Defer       Family History   Problem Relation Age of Onset    Hypertension Mother     Diabetes Mother     Stroke Mother     Hypertension Maternal Grandmother        The following portion of the patient's history were reviewed and updated as appropriate: past medical history, past surgical history, past social history, past family history, allergies, current medications, and problem list.    Review of Systems   Constitutional: Positive for malaise/fatigue.   Cardiovascular:  Positive for leg swelling.   Respiratory:  Positive for cough.    Hematologic/Lymphatic: Negative.    Neurological: Negative.        No Known Allergies      Current Outpatient Medications:     Adult Aspirin Regimen 81 MG EC tablet, Take 1 tablet by mouth Daily., Disp: , Rfl:     albuterol sulfate  (90 Base) MCG/ACT inhaler, Inhale 2 puffs Every 4 (Four) Hours As Needed., Disp: , Rfl:     amLODIPine (NORVASC) 10 MG tablet, Take 1 tablet by mouth Daily., Disp: , Rfl:     carvedilol (COREG) 6.25 MG tablet, Take 1 tablet by mouth Every 12 (Twelve) Hours., Disp: 180 tablet, Rfl: 3    dapagliflozin Propanediol (Farxiga) 10 MG tablet, Take 10 mg by mouth Daily., Disp: , Rfl:     Diclofenac Sodium (VOLTAREN) 1 % gel gel, Apply 4 g topically to the appropriate area as directed 2 (Two) Times a Day., Disp: 50 g, Rfl: 0    Iysxiyl-Levmi-Iaisiksw-TenofAF (GENVOYA) 325-968-226-10 MG per tablet, Take 1 tablet by mouth Daily., Disp: , Rfl:     ergocalciferol (ERGOCALCIFEROL) 1.25 MG (72058 UT) capsule, Take 1 capsule by mouth 1 (One) Time Per Week., Disp: ,  "Rfl:     furosemide (LASIX) 20 MG tablet, Take 1 tablet by mouth Daily., Disp: , Rfl:     glipizide (GLUCOTROL) 10 MG tablet, Take 1 tablet by mouth Daily., Disp: , Rfl:     HYDROcodone-acetaminophen (NORCO)  MG per tablet, Take 1 tablet by mouth As Needed., Disp: , Rfl:     Insulin Glargine (LANTUS SOLOSTAR) 100 UNIT/ML injection pen, Inject 25 Units under the skin into the appropriate area as directed Every Night., Disp: 100 mL, Rfl: 0    Insulin Pen Needle 32G X 4 MM misc, Use to inject insulin subcutaneously via pens daily, Disp: 100 each, Rfl: 1    pantoprazole (PROTONIX) 40 MG EC tablet, , Disp: , Rfl:     rosuvastatin (CRESTOR) 20 MG tablet, Take 1 tablet by mouth Daily., Disp: , Rfl:     sacubitril-valsartan (ENTRESTO) 49-51 MG tablet, Take 1 tablet by mouth Every 12 (Twelve) Hours., Disp: 60 tablet, Rfl: 0    warfarin (COUMADIN) 10 MG tablet, 1 and 1/2 tab M,W<F<S and S,T 1 tab, Disp: , Rfl:          Objective:     Vitals:    01/14/25 1137   BP: 124/86   Cuff Size: Adult   Pulse: 76   SpO2: 97%   Weight: 103 kg (227 lb 3.2 oz)   Height: 185.4 cm (72.99\")     Body mass index is 29.98 kg/m².    PHYSICAL EXAM:    Vitals Reviewed.   General Appearance: No acute distress, well developed and well nourished.   HENT: No hearing loss noted.    Respiratory: No signs of respiratory distress. Respiration rhythm and depth normal.  Clear to auscultation.   Cardiovascular:  Jugular Venous Pressure: Normal  Heart Rate and Rhythm: Normal, Heart Sounds: Normal S1 and S2. No S3 or S4 noted.  Murmurs: No murmurs noted. No rubs, thrills, or gallops.   Lower Extremities: No edema noted.  Musculoskeletal: Normal movement of extremities.  Skin: General appearance normal.    Psychiatric: Patient alert and oriented to person, place, and time. Speech and behavior appropriate. Normal mood and affect.       ECG 12 Lead    Date/Time: 1/14/2025 11:35 AM  Performed by: Glendy López APRN    Authorized by: Glendy López, MARIANNE  " Comparison: compared with previous ECG from 5/14/2024  Similar to previous ECG  Rhythm: sinus rhythm  Rate: normal  BPM: 76  Conduction: conduction normal  ST Segments: ST segments normal  T inversion: II, III, aVF, V4, V5 and V6  QRS axis: normal  Other findings: left ventricular hypertrophy    Clinical impression: abnormal EKG            Assessment:       Diagnosis Plan   1. Coronary artery disease involving native coronary artery of native heart without angina pectoris        2. Ischemic cardiomyopathy        3. Abnormal EKG        4. Primary hypertension        5. Mixed hyperlipidemia        6. Chronic cough        7. Syncope, unspecified syncope type        8. Cigar smoker               Plan:       1.  Coronary Artery Disease: Status post bare-metal stent placement to the left circumflex in February 2011.  Status post drug-eluting stent placement to the first diagonal in December 2019.  Denies angina.  Restart aspirin 81 mg daily.  Continue rosuvastatin.  Risk factor modification discussed.    2.  Ischemic Cardiomyopathy: LVEF 37% per echocardiogram in May 2024.  Treated with GDMT.  Echocardiogram results from today are pending.  Euvolemic.    3.  Chronically abnormal EKG with T wave inversions not felt to be ischemic.    4.  Hypertension: Blood pressure normal today on current medications.    5.  Hyperlipidemia: Remains on rosuvastatin.    6/7.  He reports a chronic cough.  He may have coughed up blood last week and he denies this at this time.  He has had symptoms he coughed so hard when smoking his black cigars that he passes out.  I have recommended that he quit smoking and follow-up with his PCP for further evaluation of the chronic cough.    8.  Cigar smoker.  He smokes Black and Milds.  He would highly benefit from stopping smoking.    9/10.  History of pulmonary embolism and DVT.  He remains on warfarin with INRs followed at his PCP office.    11.  History of stroke.    12.  We will call him with the  echocardiogram results.  Restart aspirin.  I refilled carvedilol.  Quit smoking.  Follow-up with PCP about cough.    ADDENDUM 1/15/2025:  Reviewed echo results.  LVEF 46%, moderate septal asymmetric hypertrophy, grade 1 diastolic dysfunction, and trace MR/TR.  Patient informed of results and verbalizes understanding.  Continue medications and add aspirin 81 mg daily.  Follow-up with Dr. Lomas in 6 months.  He verbalizes understanding.  He also asked that I call his mom with the results and she is listed on the JANUARY form.    As always, it has been a pleasure to participate in your patient's care. Thank you.         Sincerely,         MARIANNE Vasquez  [facility] Cardiology      Dictated utilizing Dragon Dictation  I spent 48 minutes reviewing her medical records/testing/previous office notes/labs, face-to-face interaction with patient, physical examination, formulating the plan of care, and discussion of plan of care with patient.

## 2025-01-15 ENCOUNTER — TELEPHONE (OUTPATIENT)
Dept: CARDIOLOGY | Facility: CLINIC | Age: 55
End: 2025-01-15
Payer: MEDICARE

## 2025-01-15 NOTE — TELEPHONE ENCOUNTER
I reviewed echo results.  LVEF 46%, moderate septal asymmetric hypertrophy, grade 1 diastolic dysfunction, and trace MR/TR.  Patient informed of results and verbalizes understanding.  Continue medications and add aspirin 81 mg daily.  Follow-up with Dr. Lomas in 6 months.  He verbalizes understanding.      He also asked that I call his mom with the results and she is listed on the JANUARY form. I tried calling, no answer. Left message.

## 2025-01-15 NOTE — PROGRESS NOTES
Reviewed echo results.  LVEF 46%, moderate septal asymmetric hypertrophy, grade 1 diastolic dysfunction, and trace MR/TR.  Patient informed of results and verbalizes understanding.  Continue medications and add aspirin 81 mg daily.  Follow-up with Dr. Lomas in 6 months.  He verbalizes understanding.  He also asked that I call his mom with the results and she is listed on the JANUARY form.

## 2025-01-28 ENCOUNTER — APPOINTMENT (OUTPATIENT)
Dept: CT IMAGING | Facility: HOSPITAL | Age: 55
DRG: 271 | End: 2025-01-28
Payer: MEDICARE

## 2025-01-28 ENCOUNTER — HOSPITAL ENCOUNTER (INPATIENT)
Facility: HOSPITAL | Age: 55
LOS: 3 days | Discharge: HOME | DRG: 271 | End: 2025-02-03
Attending: STUDENT IN AN ORGANIZED HEALTH CARE EDUCATION/TRAINING PROGRAM | Admitting: INTERNAL MEDICINE
Payer: MEDICARE

## 2025-01-28 DIAGNOSIS — I70.219 ATHEROSCLEROSIS OF ARTERY OF EXTREMITY WITH INTERMITTENT CLAUDICATION: Primary | ICD-10-CM

## 2025-01-28 DIAGNOSIS — R73.9 HYPERGLYCEMIA: ICD-10-CM

## 2025-01-28 DIAGNOSIS — M79.675 GREAT TOE PAIN, LEFT: ICD-10-CM

## 2025-01-28 DIAGNOSIS — Z79.01 CHRONIC ANTICOAGULATION: ICD-10-CM

## 2025-01-28 DIAGNOSIS — Z51.81 SUBTHERAPEUTIC ANTICOAGULATION: ICD-10-CM

## 2025-01-28 DIAGNOSIS — Z86.73 HISTORY OF CVA (CEREBROVASCULAR ACCIDENT): ICD-10-CM

## 2025-01-28 DIAGNOSIS — Z79.01 SUBTHERAPEUTIC ANTICOAGULATION: ICD-10-CM

## 2025-01-28 LAB
ALBUMIN SERPL-MCNC: 3.3 G/DL (ref 3.5–5.2)
ALBUMIN/GLOB SERPL: 0.9 G/DL
ALP SERPL-CCNC: 73 U/L (ref 39–117)
ALT SERPL W P-5'-P-CCNC: 17 U/L (ref 1–41)
ANION GAP SERPL CALCULATED.3IONS-SCNC: 9.6 MMOL/L (ref 5–15)
APTT PPP: 27.5 SECONDS (ref 22.7–35.4)
AST SERPL-CCNC: 14 U/L (ref 1–40)
BASOPHILS # BLD AUTO: 0.05 10*3/MM3 (ref 0–0.2)
BASOPHILS NFR BLD AUTO: 0.7 % (ref 0–1.5)
BILIRUB SERPL-MCNC: <0.2 MG/DL (ref 0–1.2)
BUN SERPL-MCNC: 10 MG/DL (ref 6–20)
BUN/CREAT SERPL: 8.4 (ref 7–25)
CALCIUM SPEC-SCNC: 8.5 MG/DL (ref 8.6–10.5)
CHLORIDE SERPL-SCNC: 104 MMOL/L (ref 98–107)
CO2 SERPL-SCNC: 22.4 MMOL/L (ref 22–29)
CREAT SERPL-MCNC: 1.19 MG/DL (ref 0.76–1.27)
CRP SERPL-MCNC: 0.61 MG/DL (ref 0–0.5)
DEPRECATED RDW RBC AUTO: 50.4 FL (ref 37–54)
EGFRCR SERPLBLD CKD-EPI 2021: 73 ML/MIN/1.73
EOSINOPHIL # BLD AUTO: 0.14 10*3/MM3 (ref 0–0.4)
EOSINOPHIL NFR BLD AUTO: 2.1 % (ref 0.3–6.2)
ERYTHROCYTE [DISTWIDTH] IN BLOOD BY AUTOMATED COUNT: 15.3 % (ref 12.3–15.4)
ERYTHROCYTE [SEDIMENTATION RATE] IN BLOOD: 10 MM/HR (ref 0–20)
GLOBULIN UR ELPH-MCNC: 3.5 GM/DL
GLUCOSE SERPL-MCNC: 323 MG/DL (ref 65–99)
HCT VFR BLD AUTO: 45.2 % (ref 37.5–51)
HGB BLD-MCNC: 14.1 G/DL (ref 13–17.7)
IMM GRANULOCYTES # BLD AUTO: 0.01 10*3/MM3 (ref 0–0.05)
IMM GRANULOCYTES NFR BLD AUTO: 0.1 % (ref 0–0.5)
INR PPP: 1.36 (ref 0.9–1.1)
LYMPHOCYTES # BLD AUTO: 3.24 10*3/MM3 (ref 0.7–3.1)
LYMPHOCYTES NFR BLD AUTO: 47.6 % (ref 19.6–45.3)
MCH RBC QN AUTO: 28.4 PG (ref 26.6–33)
MCHC RBC AUTO-ENTMCNC: 31.2 G/DL (ref 31.5–35.7)
MCV RBC AUTO: 90.9 FL (ref 79–97)
MONOCYTES # BLD AUTO: 0.57 10*3/MM3 (ref 0.1–0.9)
MONOCYTES NFR BLD AUTO: 8.4 % (ref 5–12)
NEUTROPHILS NFR BLD AUTO: 2.8 10*3/MM3 (ref 1.7–7)
NEUTROPHILS NFR BLD AUTO: 41.1 % (ref 42.7–76)
NRBC BLD AUTO-RTO: 0 /100 WBC (ref 0–0.2)
PLATELET # BLD AUTO: 196 10*3/MM3 (ref 140–450)
PMV BLD AUTO: 11.2 FL (ref 6–12)
POTASSIUM SERPL-SCNC: 4.1 MMOL/L (ref 3.5–5.2)
PROT SERPL-MCNC: 6.8 G/DL (ref 6–8.5)
PROTHROMBIN TIME: 17 SECONDS (ref 11.7–14.2)
RBC # BLD AUTO: 4.97 10*6/MM3 (ref 4.14–5.8)
SODIUM SERPL-SCNC: 136 MMOL/L (ref 136–145)
WBC NRBC COR # BLD AUTO: 6.81 10*3/MM3 (ref 3.4–10.8)

## 2025-01-28 PROCEDURE — 25010000002 HEPARIN (PORCINE) 25000-0.45 UT/250ML-% SOLUTION: Performed by: NURSE PRACTITIONER

## 2025-01-28 PROCEDURE — G0378 HOSPITAL OBSERVATION PER HR: HCPCS

## 2025-01-28 PROCEDURE — 99285 EMERGENCY DEPT VISIT HI MDM: CPT

## 2025-01-28 PROCEDURE — 25510000001 IOPAMIDOL PER 1 ML: Performed by: STUDENT IN AN ORGANIZED HEALTH CARE EDUCATION/TRAINING PROGRAM

## 2025-01-28 PROCEDURE — 85610 PROTHROMBIN TIME: CPT | Performed by: NURSE PRACTITIONER

## 2025-01-28 PROCEDURE — 75635 CT ANGIO ABDOMINAL ARTERIES: CPT

## 2025-01-28 PROCEDURE — 80053 COMPREHEN METABOLIC PANEL: CPT | Performed by: NURSE PRACTITIONER

## 2025-01-28 PROCEDURE — 85652 RBC SED RATE AUTOMATED: CPT | Performed by: STUDENT IN AN ORGANIZED HEALTH CARE EDUCATION/TRAINING PROGRAM

## 2025-01-28 PROCEDURE — 86140 C-REACTIVE PROTEIN: CPT | Performed by: NURSE PRACTITIONER

## 2025-01-28 PROCEDURE — 25010000002 HEPARIN (PORCINE) PER 1000 UNITS: Performed by: NURSE PRACTITIONER

## 2025-01-28 PROCEDURE — 85025 COMPLETE CBC W/AUTO DIFF WBC: CPT | Performed by: NURSE PRACTITIONER

## 2025-01-28 PROCEDURE — 85730 THROMBOPLASTIN TIME PARTIAL: CPT | Performed by: NURSE PRACTITIONER

## 2025-01-28 RX ORDER — BISACODYL 10 MG
10 SUPPOSITORY, RECTAL RECTAL DAILY PRN
Status: DISCONTINUED | OUTPATIENT
Start: 2025-01-28 | End: 2025-02-03 | Stop reason: HOSPADM

## 2025-01-28 RX ORDER — SODIUM CHLORIDE 0.9 % (FLUSH) 0.9 %
10 SYRINGE (ML) INJECTION AS NEEDED
Status: DISCONTINUED | OUTPATIENT
Start: 2025-01-28 | End: 2025-02-03 | Stop reason: HOSPADM

## 2025-01-28 RX ORDER — AMOXICILLIN 250 MG
2 CAPSULE ORAL 2 TIMES DAILY PRN
Status: DISCONTINUED | OUTPATIENT
Start: 2025-01-28 | End: 2025-02-03 | Stop reason: HOSPADM

## 2025-01-28 RX ORDER — ACETAMINOPHEN 650 MG/1
650 SUPPOSITORY RECTAL EVERY 4 HOURS PRN
Status: DISCONTINUED | OUTPATIENT
Start: 2025-01-28 | End: 2025-02-03 | Stop reason: HOSPADM

## 2025-01-28 RX ORDER — ACETAMINOPHEN 325 MG/1
650 TABLET ORAL EVERY 4 HOURS PRN
Status: DISCONTINUED | OUTPATIENT
Start: 2025-01-28 | End: 2025-02-03 | Stop reason: HOSPADM

## 2025-01-28 RX ORDER — SODIUM CHLORIDE 9 MG/ML
40 INJECTION, SOLUTION INTRAVENOUS AS NEEDED
Status: DISCONTINUED | OUTPATIENT
Start: 2025-01-28 | End: 2025-02-03 | Stop reason: HOSPADM

## 2025-01-28 RX ORDER — ACETAMINOPHEN 160 MG/5ML
650 SOLUTION ORAL EVERY 4 HOURS PRN
Status: DISCONTINUED | OUTPATIENT
Start: 2025-01-28 | End: 2025-02-03 | Stop reason: HOSPADM

## 2025-01-28 RX ORDER — ONDANSETRON 2 MG/ML
4 INJECTION INTRAMUSCULAR; INTRAVENOUS EVERY 6 HOURS PRN
Status: DISCONTINUED | OUTPATIENT
Start: 2025-01-28 | End: 2025-02-03 | Stop reason: HOSPADM

## 2025-01-28 RX ORDER — HYDROCODONE BITARTRATE AND ACETAMINOPHEN 5; 325 MG/1; MG/1
1 TABLET ORAL EVERY 6 HOURS PRN
Status: DISCONTINUED | OUTPATIENT
Start: 2025-01-28 | End: 2025-01-29

## 2025-01-28 RX ORDER — INSULIN LISPRO 100 [IU]/ML
2-7 INJECTION, SOLUTION INTRAVENOUS; SUBCUTANEOUS
Status: DISCONTINUED | OUTPATIENT
Start: 2025-01-29 | End: 2025-02-03 | Stop reason: HOSPADM

## 2025-01-28 RX ORDER — IOPAMIDOL 755 MG/ML
100 INJECTION, SOLUTION INTRAVASCULAR
Status: COMPLETED | OUTPATIENT
Start: 2025-01-28 | End: 2025-01-28

## 2025-01-28 RX ORDER — FAMOTIDINE 20 MG/1
20 TABLET, FILM COATED ORAL 2 TIMES DAILY PRN
Status: DISCONTINUED | OUTPATIENT
Start: 2025-01-28 | End: 2025-02-03 | Stop reason: HOSPADM

## 2025-01-28 RX ORDER — IBUPROFEN 600 MG/1
1 TABLET ORAL
Status: DISCONTINUED | OUTPATIENT
Start: 2025-01-28 | End: 2025-02-03 | Stop reason: HOSPADM

## 2025-01-28 RX ORDER — DEXTROSE MONOHYDRATE 25 G/50ML
25 INJECTION, SOLUTION INTRAVENOUS
Status: DISCONTINUED | OUTPATIENT
Start: 2025-01-28 | End: 2025-02-03 | Stop reason: HOSPADM

## 2025-01-28 RX ORDER — BISACODYL 5 MG/1
5 TABLET, DELAYED RELEASE ORAL DAILY PRN
Status: DISCONTINUED | OUTPATIENT
Start: 2025-01-28 | End: 2025-02-03 | Stop reason: HOSPADM

## 2025-01-28 RX ORDER — HEPARIN SODIUM 5000 [USP'U]/ML
40-80 INJECTION, SOLUTION INTRAVENOUS; SUBCUTANEOUS EVERY 6 HOURS PRN
Status: DISCONTINUED | OUTPATIENT
Start: 2025-01-28 | End: 2025-01-30

## 2025-01-28 RX ORDER — HEPARIN SODIUM 10000 [USP'U]/100ML
14.56 INJECTION, SOLUTION INTRAVENOUS
Status: DISCONTINUED | OUTPATIENT
Start: 2025-01-28 | End: 2025-01-30

## 2025-01-28 RX ORDER — HEPARIN SODIUM 5000 [USP'U]/ML
80 INJECTION, SOLUTION INTRAVENOUS; SUBCUTANEOUS ONCE
Status: COMPLETED | OUTPATIENT
Start: 2025-01-28 | End: 2025-01-28

## 2025-01-28 RX ORDER — NITROGLYCERIN 0.4 MG/1
0.4 TABLET SUBLINGUAL
Status: DISCONTINUED | OUTPATIENT
Start: 2025-01-28 | End: 2025-01-28

## 2025-01-28 RX ORDER — MORPHINE SULFATE 2 MG/ML
2 INJECTION, SOLUTION INTRAMUSCULAR; INTRAVENOUS ONCE
Status: COMPLETED | OUTPATIENT
Start: 2025-01-29 | End: 2025-01-29

## 2025-01-28 RX ORDER — SODIUM CHLORIDE 0.9 % (FLUSH) 0.9 %
10 SYRINGE (ML) INJECTION EVERY 12 HOURS SCHEDULED
Status: DISCONTINUED | OUTPATIENT
Start: 2025-01-28 | End: 2025-02-03 | Stop reason: HOSPADM

## 2025-01-28 RX ORDER — NICOTINE POLACRILEX 4 MG
15 LOZENGE BUCCAL
Status: DISCONTINUED | OUTPATIENT
Start: 2025-01-28 | End: 2025-02-03 | Stop reason: HOSPADM

## 2025-01-28 RX ORDER — POLYETHYLENE GLYCOL 3350 17 G/17G
17 POWDER, FOR SOLUTION ORAL DAILY PRN
Status: DISCONTINUED | OUTPATIENT
Start: 2025-01-28 | End: 2025-02-03 | Stop reason: HOSPADM

## 2025-01-28 RX ADMIN — HEPARIN SODIUM 8200 UNITS: 5000 INJECTION INTRAVENOUS; SUBCUTANEOUS at 21:00

## 2025-01-28 RX ADMIN — HEPARIN SODIUM 14.56 UNITS/KG/HR: 10000 INJECTION, SOLUTION INTRAVENOUS at 21:00

## 2025-01-28 RX ADMIN — IOPAMIDOL 95 ML: 755 INJECTION, SOLUTION INTRAVENOUS at 19:09

## 2025-01-28 RX ADMIN — Medication 10 ML: at 22:48

## 2025-01-28 NOTE — ED PROVIDER NOTES
EMERGENCY DEPARTMENT ENCOUNTER    Room Number:  S04/04  Date seen:  1/28/2025  PCP: Lara Iverson APRN  Historian/Independent historian: n/a  Chronic or social conditions impacting care: medical noncompliance      HPI:  Chief Complaint: left great toe pain  A complete HPI/ROS/PMH/PSH/SH/FH are unobtainable due to: n/a  Context: Cecilio Puckett is a 54 y.o. male who presents to the ED c/o acute left great toe pain that has been worsening over the last few weeks. He also reports discoloration to all 5 toes of his left foot. He has a history of blood PAD. He is on warfarin. He denies any injury, states that this is his third visit for similar complaints.     External Medical record review:   3/14/2014: Arterial bifemoral bypass graft  Hospital Course: Patient is a 44 year old male admitted to [facility] following aortobifemoral bypass per Dr. Vargas for a history of PVD with lifestyle-limiting claudication. Estimated blood loss was 500 ml, replaced 400 ml with Cell Saver. He tolerated the procedure well and was transported to the ICU for close postoperative monitoring. He had postop fever up to 102.2 with productive cough and chest x-ray suspicious for pneumonia. He has been treated with IV antibiotics. Sputum culture showed normal reggie and chest x-ray yesterday showed improvement in the left lower lobe infiltrate. He feels better and denies chest pain or shortness of breath. He has increased his activity with good tolerance. The NG tube was discontinued on POD#3 and he is now tolerating a regular diet. He is passing flatus and had a formed bowel movement today. His pain is well-controlled and he is ambulating with good tolerance. He has palpable distal pulses. His last blood pressure was elevated and this will be rechecked; he says that he has been anxious to go home. We will plan on discharging him home today in stable condition if his blood pressure is controlled. Discharge instructions were reviewed at  length and he verbalizes understanding. He will be discharged on doxycycline per Dr. Aponte's recommendation. His Coumadin will be restarted and he will have this checked per home health on Friday as he has been on antibiotic therapy. He will follow up with Dr. Vargas in 2 weeks and follow up with Infectious Disease as directed.     PAST MEDICAL HISTORY  Active Ambulatory Problems     Diagnosis Date Noted    Syncope, unspecified syncope type 10/10/2022    NHL (non-Hodgkin's lymphoma) 09/27/2023    HIV (human immunodeficiency virus infection) 09/27/2023    Type 2 diabetes mellitus with circulatory disorder, without long-term current use of insulin 09/27/2023    HTN (hypertension) 09/27/2023    Coronary artery disease involving native coronary artery of native heart without angina pectoris 12/01/2019    Ischemic cardiomyopathy 05/16/2024    History of CVA (cerebrovascular accident) 05/16/2024    Cigar smoker     Mixed hyperlipidemia 01/14/2025    Chronic cough 01/14/2025    Asymmetric septal hypertrophy      Resolved Ambulatory Problems     Diagnosis Date Noted    Acute DVT (deep venous thrombosis) 09/26/2023    Leg DVT (deep venous thromboembolism), acute, left 09/27/2023    TIA (transient ischemic attack) 09/29/2023    Acute CVA (cerebrovascular accident) 09/30/2023    Slurred speech 05/14/2024    Acute CVA (cerebrovascular accident) 05/15/2024    Anticoagulated on Coumadin 01/03/2013    Arteriosclerosis of coronary artery 10/09/2012     Past Medical History:   Diagnosis Date    COPD (chronic obstructive pulmonary disease)     Coronary artery disease 02/2011    Diabetes mellitus     DVT (deep venous thrombosis)     GERD (gastroesophageal reflux disease)     H/O Cancer     H/O Ischemia of extremity     History of MI (myocardial infarction)     History of snoring     History of transfusion     Hyperlipidemia     Hypertension     Non Hodgkin's lymphoma     PAD (peripheral artery disease)     Pulmonary embolism     Stroke           PAST SURGICAL HISTORY  Past Surgical History:   Procedure Laterality Date    CARDIAC CATHETERIZATION      CORONARY ANGIOPLASTY WITH STENT PLACEMENT      FEMORAL ARTERY - FEMORAL ARTERY BYPASS GRAFT      PORTACATH PLACEMENT           FAMILY HISTORY  Family History   Problem Relation Age of Onset    Hypertension Mother     Diabetes Mother     Stroke Mother     Hypertension Maternal Grandmother          SOCIAL HISTORY  Social History     Socioeconomic History    Marital status: Single   Tobacco Use    Smoking status: Every Day     Current packs/day: 1.00     Average packs/day: 1 pack/day for 31.1 years (31.1 ttl pk-yrs)     Types: Cigars, Cigarettes     Start date: 01/1994     Passive exposure: Current    Smokeless tobacco: Never   Vaping Use    Vaping status: Never Used   Substance and Sexual Activity    Alcohol use: Yes     Alcohol/week: 2.0 standard drinks of alcohol     Types: 2 Cans of beer per week     Comment: occassionally    Drug use: Not Currently    Sexual activity: Defer         ALLERGIES  Patient has no known allergies.        REVIEW OF SYSTEMS  Per HPI, otherwise negative.       PHYSICAL EXAM  ED Triage Vitals   Temp Heart Rate Resp BP SpO2   01/28/25 1741 01/28/25 1741 01/28/25 1741 01/28/25 1745 01/28/25 1741   97.8 °F (36.6 °C) 101 16 164/93 97 %      Temp src Heart Rate Source Patient Position BP Location FiO2 (%)   01/28/25 1741 01/28/25 1741 01/28/25 1745 01/28/25 1745 --   Tympanic Monitor Sitting Right arm        Physical Exam  Vitals and nursing note reviewed.   Constitutional:       Appearance: Normal appearance. He is well-developed.   HENT:      Head: Normocephalic and atraumatic.      Mouth/Throat:      Mouth: Mucous membranes are moist.   Eyes:      Conjunctiva/sclera: Conjunctivae normal.   Cardiovascular:      Rate and Rhythm: Normal rate and regular rhythm.      Pulses:           Dorsalis pedis pulses are 1+ on the right side and detected w/ Doppler on the left side.         Posterior tibial pulses are 1+ on the right side and detected w/ Doppler on the left side.      Heart sounds: Normal heart sounds.   Pulmonary:      Effort: Pulmonary effort is normal.      Breath sounds: Normal breath sounds. No wheezing or rhonchi.   Abdominal:      General: Bowel sounds are normal.      Palpations: Abdomen is soft.      Tenderness: There is no abdominal tenderness. There is no guarding.   Skin:     General: Skin is warm.      Capillary Refill: Capillary refill takes 2 to 3 seconds.   Neurological:      Mental Status: He is alert and oriented to person, place, and time. Mental status is at baseline.   Psychiatric:         Mood and Affect: Mood normal.             LAB RESULTS  Recent Results (from the past 24 hours)   Comprehensive Metabolic Panel    Collection Time: 01/28/25  6:17 PM    Specimen: Blood   Result Value Ref Range    Glucose 323 (H) 65 - 99 mg/dL    BUN 10 6 - 20 mg/dL    Creatinine 1.19 0.76 - 1.27 mg/dL    Sodium 136 136 - 145 mmol/L    Potassium 4.1 3.5 - 5.2 mmol/L    Chloride 104 98 - 107 mmol/L    CO2 22.4 22.0 - 29.0 mmol/L    Calcium 8.5 (L) 8.6 - 10.5 mg/dL    Total Protein 6.8 6.0 - 8.5 g/dL    Albumin 3.3 (L) 3.5 - 5.2 g/dL    ALT (SGPT) 17 1 - 41 U/L    AST (SGOT) 14 1 - 40 U/L    Alkaline Phosphatase 73 39 - 117 U/L    Total Bilirubin <0.2 0.0 - 1.2 mg/dL    Globulin 3.5 gm/dL    A/G Ratio 0.9 g/dL    BUN/Creatinine Ratio 8.4 7.0 - 25.0    Anion Gap 9.6 5.0 - 15.0 mmol/L    eGFR 73.0 >60.0 mL/min/1.73   Protime-INR    Collection Time: 01/28/25  6:17 PM    Specimen: Blood   Result Value Ref Range    Protime 17.0 (H) 11.7 - 14.2 Seconds    INR 1.36 (H) 0.90 - 1.10   aPTT    Collection Time: 01/28/25  6:17 PM    Specimen: Blood   Result Value Ref Range    PTT 27.5 22.7 - 35.4 seconds   CBC Auto Differential    Collection Time: 01/28/25  6:17 PM    Specimen: Blood   Result Value Ref Range    WBC 6.81 3.40 - 10.80 10*3/mm3    RBC 4.97 4.14 - 5.80 10*6/mm3    Hemoglobin 14.1  13.0 - 17.7 g/dL    Hematocrit 45.2 37.5 - 51.0 %    MCV 90.9 79.0 - 97.0 fL    MCH 28.4 26.6 - 33.0 pg    MCHC 31.2 (L) 31.5 - 35.7 g/dL    RDW 15.3 12.3 - 15.4 %    RDW-SD 50.4 37.0 - 54.0 fl    MPV 11.2 6.0 - 12.0 fL    Platelets 196 140 - 450 10*3/mm3    Neutrophil % 41.1 (L) 42.7 - 76.0 %    Lymphocyte % 47.6 (H) 19.6 - 45.3 %    Monocyte % 8.4 5.0 - 12.0 %    Eosinophil % 2.1 0.3 - 6.2 %    Basophil % 0.7 0.0 - 1.5 %    Immature Grans % 0.1 0.0 - 0.5 %    Neutrophils, Absolute 2.80 1.70 - 7.00 10*3/mm3    Lymphocytes, Absolute 3.24 (H) 0.70 - 3.10 10*3/mm3    Monocytes, Absolute 0.57 0.10 - 0.90 10*3/mm3    Eosinophils, Absolute 0.14 0.00 - 0.40 10*3/mm3    Basophils, Absolute 0.05 0.00 - 0.20 10*3/mm3    Immature Grans, Absolute 0.01 0.00 - 0.05 10*3/mm3    nRBC 0.0 0.0 - 0.2 /100 WBC   C-reactive Protein    Collection Time: 01/28/25  6:17 PM    Specimen: Blood   Result Value Ref Range    C-Reactive Protein 0.61 (H) 0.00 - 0.50 mg/dL   Sedimentation Rate    Collection Time: 01/28/25  6:17 PM    Specimen: Blood   Result Value Ref Range    Sed Rate 10 0 - 20 mm/hr       Ordered the above labs and reviewed the results.        RADIOLOGY  CT Angio Abdominal Aorta Bilateral Iliofem Runoff    Result Date: 1/28/2025  CTA ABDOMEN & PELVIS AND BILATERAL LOWER EXTREMITIES WITH IV CONTRAST  INDICATION: Decreased peripheral pulses, left great toe pain, history of peripheral vascular disease  TECHNIQUE: CT angiography was performed of the abdomen and pelvis with axial images as well as coronal and sagittal reformatted MIP images provided following the administration of IV contrast.  3-D surface rendered reformats of the vascular structures of the abdomen and pelvis were created and reviewed .   Radiation dose reduction techniques were utilized, including automated exposure control, and exposure modulation based on body size.   COMPARISON: None available.  FINDINGS:  Lung bases: The visualized lung bases are unremarkable,  without acute abnormality.  Abdomen: The liver and gallbladder are normal.  The spleen and pancreas appear normal.  Both adrenal glands are normal.  Both kidneys are normal.     There is no abdominal or retroperitoneal adenopathy or other mass. There is no abdominal or retroperitoneal inflammatory change, or abnormal fluid or air collection.  There is no evidence of bowel obstruction.   Pelvis:  The appendix is clearly identified, and is normal.  There is diverticulosis, but there is no CT evidence of diverticulitis.  There is no pelvic adenopathy or other soft tissue mass.  There is no CT evidence of hernia or bowel obstruction.  Vascular: The visualized lower thoracic aorta is normal in caliber, and there is no evidence of dissection.  The abdominal aorta is normal in caliber, and there is no evidence of dissection.  The mesenteric and renal vessels are normally patent, without acute abnormality.  There has been prior bilateral native iliac artery stenting, and the right native common iliac is patent. There has been aorto bifemoral bypass grafting. Both limbs of the graft are only patent.  On the right, despite mild atheromatous changes of the common deep and superficial femoral arteries are patent. The popliteal artery is patent across the knee, though there is focal severe stenosis in the popliteal artery about 6 cm above the knee. Below the knee, there is extensive atheromatous change. There is two-vessel runoff in the proximal to mid calf via the anterior tibial and peroneal arteries. The anterior tibial is heavily diseased but does appear to cross the ankle and supply the foot. The posterior tibial artery is occluded in the upper calf, but may be partially reconstituted at about the ankle.  On the left, the common and deep and superficial femoral arteries are patent proximally, but the superficial femoral artery occludes in the proximal to mid thigh. There is reconstitution of the popliteal artery 10 to 11 cm  above the knee. Below the knee, there is extensive atheromatous disease, with noncontiguous opacification of the posterior tibial, occlusion of the anterior tibial at its origin, and segmental opacification of the peroneal artery in the proximal to mid calf.  There is no acute bony abnormality.       No evidence of acute visceral, soft tissue, or bony abnormality in the abdomen, or pelvis.  Abdominal aorta is normally patent and there has been prior aortobifemoral bypass, and both limbs of the graft are normally patent.  On the right, the SFA and popliteal artery are patent though there is focal popliteal stenosis a few centimeters above the knee, and below the knee there is heavy atheromatous change and heavily diseased two-vessel runoff.  On the left, there is occlusion of the SFA in the mid to upper thigh, and then collateral reconstitution of the popliteal artery 10 to 11 cm above the knee. There is extensive 7 trifurcation disease with only segmental visualization of the posterior tibial and peroneal arteries.    This report was finalized on 1/28/2025 7:46 PM by Dr. Severo Vann M.D on Workstation: [id number]       Ordered the above noted radiological studies. Reviewed by me in PACS.        MEDICATIONS GIVEN IN ER  Medications   sodium chloride 0.9 % flush 10 mL (has no administration in time range)   heparin (porcine) 5000 UNIT/ML injection 8,200 Units (has no administration in time range)   heparin 05111 units/250 mL (100 units/mL) in 0.45 % NaCl infusion (has no administration in time range)   heparin (porcine) 5000 UNIT/ML injection 4,100-8,200 Units (has no administration in time range)   iopamidol (ISOVUE-370) 76 % injection 100 mL (95 mL Intravenous Given 1/28/25 1909)           MEDICAL DECISION MAKING, PROGRESS, and CONSULTS    All labs have been independently reviewed by me.  All radiology studies have been reviewed by me and I have also reviewed the radiology report.   EKG's independently viewed  and interpreted by me.  Discussion below represents my analysis of pertinent findings related to patient's condition, differential diagnosis, treatment plan and final disposition.    54 y/o male who presents with left great toe pain, known to have a history of clots. INR subtherapeutic and pulses difficult to find. CTA run off concerning for decreased flow, patient started on heparin and admitted to [facility] for vascular consult.         Shared decision making/consideration for admission:  admit      Orders placed during this visit:  Orders Placed This Encounter   Procedures    CT Angio Abdominal Aorta Bilateral Iliofem Runoff    Comprehensive Metabolic Panel    Protime-INR    aPTT    CBC Auto Differential    C-reactive Protein    Sedimentation Rate    aPTT    aPTT    Vital Signs Recheck    Doppler pulse    Adjust Heparin Rate Based on aPTT Using Nomogram    Verify All Anticoagulant Orders Are Discontinued Upon Initiation of Heparin Protocol (eg Enoxaparin, Fondaparinux, Apixaban, Dabigatran, Edoxaban, or Rivaroxaban)    RN To Release aPTT Order 6 Hours After Heparin Infusion & 6 Hours After Each Infusion Change Until  2 Consecutive Therapeutic aPTTs Are Achieved    [facility] (on-call MD unless specified) Details    Insert Peripheral IV    Initiate Observation Status    CBC & Differential    CBC & Differential         Differential diagnosis include, but not limited to: Arterial clot, DVT, osteomyelitis, gout      Additional orders considered but not ordered: ANDERSON    Independent interpretation of labs, radiology studies, and discussions with consultants:    Discussed with Dr. Aguirre, who agrees with plan.     ED Course as of 01/28/25 2025 Tue Jan 28, 2025 1853 Glucose(!): 323 [JG]   1853 Creatinine: 1.19 [JG]   1853 Sodium: 136 [JG]   1853 Potassium: 4.1 [JG]   1853 C-Reactive Protein(!): 0.61 [JG]   1853 WBC: 6.81 [JG]   1853 Hemoglobin: 14.1 [JG]   1853 Hematocrit: 45.2 [JG]   1946 CT Angio Abdominal Aorta Bilateral  Iliofem Runoff  I reviewed CT images.  Lack of signal in proximal left external iliac artery with distal reconstitution, faint proximal anterior tibial artery which loses opacification, faint peroneal artery which loses opacification of mid calf, patient with one-vessel runoff to left foot.  I reviewed radiologist interpretation of CT images [DN]   2012 I updated the patient on current ED work up, including labs and imaging (if performed) with indication for admission. All questions and concerns addressed and ready for admit at this time.    [JG]   2012 I discussed results with patient, severe arterial disease, subtherapeutic INR, recommended admission for heparin and vascular evaluation.  Patient wishes to stay here for his care.  He does follow with Dr. Leon at [facility]. [DN]   2023 Discussed with A vernon, she agrees to admission for vascular consult. Heparin has been ordered. No further recommendations.  [JG]      ED Course User Index  [DN] Derrick Aguirre MD  [JG] Vinita Romero APRN             DIAGNOSIS  Final diagnoses:   Great toe pain, left   Hyperglycemia   Chronic anticoagulation   Atherosclerosis of artery of extremity with intermittent claudication   Subtherapeutic anticoagulation         DISPOSITION  admit        Latest Documented Vital Signs:  As of 20:25 EST  BP- 149/97 HR- 81 Temp- 97.8 °F (36.6 °C) (Tympanic) O2 sat- 97%              --    Please note that portions of this were completed with a voice recognition program.       Note Disclaimer: At [facility], we believe that sharing information builds trust and better relationships. You are receiving this note because you are receiving care at [facility] or recently visited. It is possible you will see health information before a provider has talked with you about it. This kind of information can be easy to misunderstand. To help you fully understand what it means for your health, we urge you to discuss this note with your  provider.             Vinita Romero, APRN  02/05/25 0605

## 2025-01-28 NOTE — ED NOTES
"Patient states he has had sharp shooting pain on the bottom of his foot \"for a while now.\" Patient states he had an xray last time and would like an MRI this time. Patient states that he has not been able to follow up yet with podiatry.   "

## 2025-01-28 NOTE — ED PROVIDER NOTES
MD ATTESTATION NOTE    The LAURA and I have discussed this patient's history, physical exam, MDM, and treatment plan.  I have reviewed the documentation and personally had a face to face interaction with the patient. I affirm the documentation and agree with the treatment and plan.  The attached note describes my personal findings.      I provided a substantive portion of the medical decision making.        Brief HPI: 54-year-old male, history of HIV, diabetes, DVT on warfarin, tobacco abuse, presents to the ED for evaluation of worsening pain in his foot.  He complains of worsening pain in all the toes of his left foot.  Achy.    PHYSICAL EXAM  ED Triage Vitals   Temp Heart Rate Resp BP SpO2   01/28/25 1741 01/28/25 1741 01/28/25 1741 01/28/25 1745 01/28/25 1741   97.8 °F (36.6 °C) 101 16 164/93 97 %      Temp src Heart Rate Source Patient Position BP Location FiO2 (%)   01/28/25 1741 01/28/25 1741 01/28/25 1745 01/28/25 1745 --   Tympanic Monitor Sitting Right arm          GENERAL: no acute distress  HENT: nares patent  EYES: no scleral icterus  CV: regular rhythm, normal rate  RESPIRATORY: normal effort  MUSCULOSKELETAL: no deformity, Extremities are warm.  Palpable DP pulse.  Unable to palpate PT pulse.  Patient declined Doppler at this time to go to restroom  NEURO: alert, moves all extremities, follows commands  PSYCH:  calm, cooperative  SKIN: warm, dry    Vital signs and nursing notes reviewed.    Critical care provider statement:    Critical care time (minutes): 20.   Critical care time was exclusive of:  Separately billable procedures and treating other patients   Critical care was necessary to treat or prevent imminent or life-threatening deterioration of the following conditions:  Circulatory Failure   Critical care was time spent personally by me on the following activities:  Development of treatment plan with patient or surrogate, discussions with consultants, evaluation of patient's response to  treatment, examination of patient, obtaining history from patient or surrogate, ordering and performing treatments and interventions, ordering and review of laboratory studies, ordering and review of radiographic studies, pulse oximetry, re-evaluation of patient's condition and review of old charts. Critical Care indicators:  Others: aminophylline, diazepam, glucagon, heparin, lovenox, morphine, sodium bicarbonate, et al.  Severe arterial disease, monitor for critical limb ischemia, requiring heparin    Medical Decision Making:  ED Course as of 01/28/25 2040 Tue Jan 28, 2025 1853 Glucose(!): 323 [JG]   1853 Creatinine: 1.19 [JG]   1853 Sodium: 136 [JG]   1853 Potassium: 4.1 [JG]   1853 C-Reactive Protein(!): 0.61 [JG]   1853 WBC: 6.81 [JG]   1853 Hemoglobin: 14.1 [JG]   1853 Hematocrit: 45.2 [JG]   1946 CT Angio Abdominal Aorta Bilateral Iliofem Runoff  I reviewed CT images.  Lack of signal in proximal left external iliac artery with distal reconstitution, faint proximal anterior tibial artery which loses opacification, faint peroneal artery which loses opacification of mid calf, patient with one-vessel runoff to left foot.  I reviewed radiologist interpretation of CT images [DN]   2012 I updated the patient on current ED work up, including labs and imaging (if performed) with indication for admission. All questions and concerns addressed and ready for admit at this time.    [JG]   2012 I discussed results with patient, severe arterial disease, subtherapeutic INR, recommended admission for heparin and vascular evaluation.  Patient wishes to stay here for his care.  He does follow with Dr. Leon at [facility]. [DN]   2023 Discussed with [place] vernon, she agrees to admission for vascular consult. Heparin has been ordered. No further recommendations.  [JG]      ED Course User Index  [DN] Derrick Aguirre MD  [JG] Vinita Romero APRN Nichols, Dylan J, MD  01/28/25 1848       Derrick Aguirre,  MD  01/28/25 1843       Derrick Aguirre MD  01/28/25 9516

## 2025-01-29 ENCOUNTER — APPOINTMENT (OUTPATIENT)
Dept: GENERAL RADIOLOGY | Facility: HOSPITAL | Age: 55
DRG: 271 | End: 2025-01-29
Payer: MEDICARE

## 2025-01-29 PROBLEM — E78.5 DYSLIPIDEMIA: Status: ACTIVE | Noted: 2025-01-29

## 2025-01-29 LAB
ABO GROUP BLD: NORMAL
ALBUMIN SERPL-MCNC: 3.4 G/DL (ref 3.5–5.2)
ALBUMIN/GLOB SERPL: 1.1 G/DL
ALP SERPL-CCNC: 67 U/L (ref 39–117)
ALT SERPL W P-5'-P-CCNC: 17 U/L (ref 1–41)
ANION GAP SERPL CALCULATED.3IONS-SCNC: 9 MMOL/L (ref 5–15)
APTT PPP: 187.7 SECONDS (ref 22.7–35.4)
APTT PPP: 48.3 SECONDS (ref 22.7–35.4)
APTT PPP: 53.8 SECONDS (ref 22.7–35.4)
APTT PPP: >200 SECONDS (ref 22.7–35.4)
AST SERPL-CCNC: 13 U/L (ref 1–40)
BASOPHILS # BLD MANUAL: 0.16 10*3/MM3 (ref 0–0.2)
BASOPHILS NFR BLD MANUAL: 2 % (ref 0–1.5)
BILIRUB SERPL-MCNC: 0.3 MG/DL (ref 0–1.2)
BLD GP AB SCN SERPL QL: NEGATIVE
BUN SERPL-MCNC: 9 MG/DL (ref 6–20)
BUN/CREAT SERPL: 9.9 (ref 7–25)
CALCIUM SPEC-SCNC: 8.5 MG/DL (ref 8.6–10.5)
CHLORIDE SERPL-SCNC: 107 MMOL/L (ref 98–107)
CO2 SERPL-SCNC: 22 MMOL/L (ref 22–29)
CREAT SERPL-MCNC: 0.91 MG/DL (ref 0.76–1.27)
DEPRECATED RDW RBC AUTO: 48.4 FL (ref 37–54)
EGFRCR SERPLBLD CKD-EPI 2021: 100.2 ML/MIN/1.73
EOSINOPHIL # BLD MANUAL: 0.16 10*3/MM3 (ref 0–0.4)
EOSINOPHIL NFR BLD MANUAL: 2 % (ref 0.3–6.2)
ERYTHROCYTE [DISTWIDTH] IN BLOOD BY AUTOMATED COUNT: 14.9 % (ref 12.3–15.4)
GLOBULIN UR ELPH-MCNC: 3 GM/DL
GLUCOSE BLDC GLUCOMTR-MCNC: 155 MG/DL (ref 70–130)
GLUCOSE BLDC GLUCOMTR-MCNC: 184 MG/DL (ref 70–130)
GLUCOSE BLDC GLUCOMTR-MCNC: 207 MG/DL (ref 70–130)
GLUCOSE BLDC GLUCOMTR-MCNC: 301 MG/DL (ref 70–130)
GLUCOSE SERPL-MCNC: 148 MG/DL (ref 65–99)
HBA1C MFR BLD: 9.3 % (ref 4.8–5.6)
HCT VFR BLD AUTO: 42.4 % (ref 37.5–51)
HGB BLD-MCNC: 13.7 G/DL (ref 13–17.7)
LYMPHOCYTES # BLD MANUAL: 4.29 10*3/MM3 (ref 0.7–3.1)
LYMPHOCYTES NFR BLD MANUAL: 7.1 % (ref 5–12)
MCH RBC QN AUTO: 28.7 PG (ref 26.6–33)
MCHC RBC AUTO-ENTMCNC: 32.3 G/DL (ref 31.5–35.7)
MCV RBC AUTO: 88.7 FL (ref 79–97)
MONOCYTES # BLD: 0.55 10*3/MM3 (ref 0.1–0.9)
NEUTROPHILS # BLD AUTO: 2.62 10*3/MM3 (ref 1.7–7)
NEUTROPHILS NFR BLD MANUAL: 33.7 % (ref 42.7–76)
NRBC BLD AUTO-RTO: 0 /100 WBC (ref 0–0.2)
PLAT MORPH BLD: NORMAL
PLATELET # BLD AUTO: 183 10*3/MM3 (ref 140–450)
PMV BLD AUTO: 10.9 FL (ref 6–12)
POTASSIUM SERPL-SCNC: 4.1 MMOL/L (ref 3.5–5.2)
PROT SERPL-MCNC: 6.4 G/DL (ref 6–8.5)
RBC # BLD AUTO: 4.78 10*6/MM3 (ref 4.14–5.8)
RBC MORPH BLD: NORMAL
RH BLD: POSITIVE
SODIUM SERPL-SCNC: 138 MMOL/L (ref 136–145)
T&S EXPIRATION DATE: NORMAL
VARIANT LYMPHS NFR BLD MANUAL: 1 % (ref 0–5)
VARIANT LYMPHS NFR BLD MANUAL: 54.1 % (ref 19.6–45.3)
WBC MORPH BLD: NORMAL
WBC NRBC COR # BLD AUTO: 7.78 10*3/MM3 (ref 3.4–10.8)

## 2025-01-29 PROCEDURE — 63710000001 INSULIN LISPRO (HUMAN) PER 5 UNITS: Performed by: NURSE PRACTITIONER

## 2025-01-29 PROCEDURE — 85730 THROMBOPLASTIN TIME PARTIAL: CPT | Performed by: INTERNAL MEDICINE

## 2025-01-29 PROCEDURE — 85730 THROMBOPLASTIN TIME PARTIAL: CPT | Performed by: NURSE PRACTITIONER

## 2025-01-29 PROCEDURE — 99205 OFFICE O/P NEW HI 60 MIN: CPT | Performed by: SURGERY

## 2025-01-29 PROCEDURE — 25010000002 MORPHINE PER 10 MG: Performed by: NURSE PRACTITIONER

## 2025-01-29 PROCEDURE — 80053 COMPREHEN METABOLIC PANEL: CPT | Performed by: NURSE PRACTITIONER

## 2025-01-29 PROCEDURE — 86901 BLOOD TYPING SEROLOGIC RH(D): CPT | Performed by: SURGERY

## 2025-01-29 PROCEDURE — 86900 BLOOD TYPING SEROLOGIC ABO: CPT | Performed by: SURGERY

## 2025-01-29 PROCEDURE — 85007 BL SMEAR W/DIFF WBC COUNT: CPT | Performed by: NURSE PRACTITIONER

## 2025-01-29 PROCEDURE — 83036 HEMOGLOBIN GLYCOSYLATED A1C: CPT | Performed by: INTERNAL MEDICINE

## 2025-01-29 PROCEDURE — 82948 REAGENT STRIP/BLOOD GLUCOSE: CPT

## 2025-01-29 PROCEDURE — 25010000002 HEPARIN (PORCINE) 25000-0.45 UT/250ML-% SOLUTION: Performed by: INTERNAL MEDICINE

## 2025-01-29 PROCEDURE — 85025 COMPLETE CBC W/AUTO DIFF WBC: CPT | Performed by: NURSE PRACTITIONER

## 2025-01-29 PROCEDURE — 25010000002 HEPARIN (PORCINE) PER 1000 UNITS: Performed by: INTERNAL MEDICINE

## 2025-01-29 PROCEDURE — 36415 COLL VENOUS BLD VENIPUNCTURE: CPT | Performed by: NURSE PRACTITIONER

## 2025-01-29 PROCEDURE — 86850 RBC ANTIBODY SCREEN: CPT | Performed by: SURGERY

## 2025-01-29 PROCEDURE — G0378 HOSPITAL OBSERVATION PER HR: HCPCS

## 2025-01-29 PROCEDURE — 71045 X-RAY EXAM CHEST 1 VIEW: CPT

## 2025-01-29 RX ORDER — HYDROCODONE BITARTRATE AND ACETAMINOPHEN 5; 325 MG/1; MG/1
1 TABLET ORAL EVERY 4 HOURS PRN
Status: COMPLETED | OUTPATIENT
Start: 2025-01-29 | End: 2025-01-29

## 2025-01-29 RX ORDER — PANTOPRAZOLE SODIUM 40 MG/1
40 TABLET, DELAYED RELEASE ORAL
Status: DISCONTINUED | OUTPATIENT
Start: 2025-01-29 | End: 2025-02-03 | Stop reason: HOSPADM

## 2025-01-29 RX ORDER — FUROSEMIDE 20 MG/1
20 TABLET ORAL DAILY
Status: DISCONTINUED | OUTPATIENT
Start: 2025-01-29 | End: 2025-02-03 | Stop reason: HOSPADM

## 2025-01-29 RX ORDER — ALBUTEROL SULFATE 0.83 MG/ML
2.5 SOLUTION RESPIRATORY (INHALATION) EVERY 6 HOURS PRN
Status: DISCONTINUED | OUTPATIENT
Start: 2025-01-29 | End: 2025-02-03 | Stop reason: HOSPADM

## 2025-01-29 RX ORDER — ASPIRIN 81 MG/1
81 TABLET ORAL DAILY
Status: DISCONTINUED | OUTPATIENT
Start: 2025-01-29 | End: 2025-01-29

## 2025-01-29 RX ORDER — ERGOCALCIFEROL 1.25 MG/1
50000 CAPSULE, LIQUID FILLED ORAL WEEKLY
Status: DISCONTINUED | OUTPATIENT
Start: 2025-02-04 | End: 2025-02-03 | Stop reason: HOSPADM

## 2025-01-29 RX ORDER — ROSUVASTATIN CALCIUM 20 MG/1
20 TABLET, COATED ORAL NIGHTLY
Status: DISCONTINUED | OUTPATIENT
Start: 2025-01-29 | End: 2025-01-29

## 2025-01-29 RX ORDER — AMLODIPINE BESYLATE 10 MG/1
10 TABLET ORAL DAILY
Status: DISCONTINUED | OUTPATIENT
Start: 2025-01-29 | End: 2025-02-03 | Stop reason: HOSPADM

## 2025-01-29 RX ORDER — CARVEDILOL 6.25 MG/1
6.25 TABLET ORAL EVERY 12 HOURS SCHEDULED
Status: DISCONTINUED | OUTPATIENT
Start: 2025-01-29 | End: 2025-02-03 | Stop reason: HOSPADM

## 2025-01-29 RX ORDER — ROSUVASTATIN CALCIUM 10 MG/1
20 TABLET, COATED ORAL DAILY
Status: DISCONTINUED | OUTPATIENT
Start: 2025-01-29 | End: 2025-02-03 | Stop reason: HOSPADM

## 2025-01-29 RX ORDER — ASPIRIN 81 MG/1
81 TABLET ORAL DAILY
Status: DISCONTINUED | OUTPATIENT
Start: 2025-01-30 | End: 2025-02-03 | Stop reason: HOSPADM

## 2025-01-29 RX ORDER — MORPHINE SULFATE 2 MG/ML
2 INJECTION, SOLUTION INTRAMUSCULAR; INTRAVENOUS EVERY 4 HOURS PRN
Status: DISPENSED | OUTPATIENT
Start: 2025-01-29 | End: 2025-01-31

## 2025-01-29 RX ADMIN — Medication 10 ML: at 21:44

## 2025-01-29 RX ADMIN — HYDROCODONE BITARTRATE AND ACETAMINOPHEN 1 TABLET: 5; 325 TABLET ORAL at 08:13

## 2025-01-29 RX ADMIN — SACUBITRIL AND VALSARTAN 1 TABLET: 49; 51 TABLET, FILM COATED ORAL at 11:49

## 2025-01-29 RX ADMIN — Medication 5 MG: at 21:44

## 2025-01-29 RX ADMIN — INSULIN LISPRO 3 UNITS: 100 INJECTION, SOLUTION INTRAVENOUS; SUBCUTANEOUS at 17:41

## 2025-01-29 RX ADMIN — INSULIN LISPRO 2 UNITS: 100 INJECTION, SOLUTION INTRAVENOUS; SUBCUTANEOUS at 12:15

## 2025-01-29 RX ADMIN — INSULIN LISPRO 2 UNITS: 100 INJECTION, SOLUTION INTRAVENOUS; SUBCUTANEOUS at 08:11

## 2025-01-29 RX ADMIN — CARVEDILOL 6.25 MG: 6.25 TABLET, FILM COATED ORAL at 21:44

## 2025-01-29 RX ADMIN — EMPAGLIFLOZIN 25 MG: 25 TABLET, FILM COATED ORAL at 11:49

## 2025-01-29 RX ADMIN — FUROSEMIDE 20 MG: 20 TABLET ORAL at 11:49

## 2025-01-29 RX ADMIN — HEPARIN SODIUM 8200 UNITS: 5000 INJECTION INTRAVENOUS; SUBCUTANEOUS at 11:48

## 2025-01-29 RX ADMIN — HEPARIN SODIUM 15.5 UNITS/KG/HR: 10000 INJECTION, SOLUTION INTRAVENOUS at 11:52

## 2025-01-29 RX ADMIN — HYDROCODONE BITARTRATE AND ACETAMINOPHEN 1 TABLET: 5; 325 TABLET ORAL at 01:25

## 2025-01-29 RX ADMIN — ROSUVASTATIN CALCIUM 20 MG: 10 TABLET, FILM COATED ORAL at 11:49

## 2025-01-29 RX ADMIN — HYDROCODONE BITARTRATE AND ACETAMINOPHEN 1 TABLET: 5; 325 TABLET ORAL at 21:44

## 2025-01-29 RX ADMIN — MORPHINE SULFATE 2 MG: 2 INJECTION, SOLUTION INTRAMUSCULAR; INTRAVENOUS at 12:15

## 2025-01-29 RX ADMIN — Medication 10 ML: at 09:00

## 2025-01-29 RX ADMIN — ELVITEGRAVIR, COBICISTAT, EMTRICITABINE, AND TENOFOVIR ALAFENAMIDE 1 TABLET: 150; 150; 200; 10 TABLET ORAL at 11:49

## 2025-01-29 RX ADMIN — MORPHINE SULFATE 2 MG: 2 INJECTION, SOLUTION INTRAMUSCULAR; INTRAVENOUS at 00:15

## 2025-01-29 RX ADMIN — ASPIRIN 81 MG: 81 TABLET, COATED ORAL at 08:11

## 2025-01-29 RX ADMIN — AMLODIPINE BESYLATE 10 MG: 10 TABLET ORAL at 11:49

## 2025-01-29 RX ADMIN — HEPARIN SODIUM 15.5 UNITS/KG/HR: 10000 INJECTION, SOLUTION INTRAVENOUS at 14:44

## 2025-01-29 RX ADMIN — HYDROCODONE BITARTRATE AND ACETAMINOPHEN 1 TABLET: 5; 325 TABLET ORAL at 14:41

## 2025-01-29 RX ADMIN — CARVEDILOL 6.25 MG: 6.25 TABLET, FILM COATED ORAL at 11:49

## 2025-01-29 RX ADMIN — MORPHINE SULFATE 2 MG: 2 INJECTION, SOLUTION INTRAMUSCULAR; INTRAVENOUS at 04:24

## 2025-01-29 RX ADMIN — PANTOPRAZOLE SODIUM 40 MG: 40 TABLET, DELAYED RELEASE ORAL at 11:49

## 2025-01-29 RX ADMIN — INSULIN LISPRO 5 UNITS: 100 INJECTION, SOLUTION INTRAVENOUS; SUBCUTANEOUS at 21:44

## 2025-01-29 RX ADMIN — SACUBITRIL AND VALSARTAN 1 TABLET: 49; 51 TABLET, FILM COATED ORAL at 21:44

## 2025-01-29 NOTE — PLAN OF CARE
Goal Outcome Evaluation:         Patient is alert and oriented x 4, vitals stable, room air, up with standby assist with cane, heparin gtt in use, Q6 aPTT, complaints of pain, see MAR for all medications administered, safety precautions in use, plan of care ongoing.

## 2025-01-29 NOTE — CONSULTS
[facility]   Consult Note    Patient Name: Cecilio Puckett  : 1970  MRN: [id number]  Primary Care Physician:  Lara Iverson APRN  Referring Physician: Derrick Aguirre MD  Date of admission: 2025    Inpatient Vascular Surgery Consult  Consult performed by: Mukesh Wharton MD  Consult ordered by: Blossom Cornejo APRN        Subjective   Subjective     Reason for Consult/ Chief Complaint: Left foot pain.    Foot Pain    Cecilio Puckett is a 55 y.o. male patient has about 3-week history of left foot pain of ischemic etiology.  Was seen in the emergency department previously with foot x-ray that failed to demonstrate any fractures.  States he is having difficulties taking his warfarin.    Review of Systems     Personal History     Past Medical History:   Diagnosis Date    Anticoagulated on Coumadin 2013    COPD (chronic obstructive pulmonary disease)     Coronary artery disease 2011    Status post bare-metal stent placement to left circumflex 2011    Diabetes mellitus     DVT (deep venous thrombosis)     GERD (gastroesophageal reflux disease)     H/O Cancer     right arm, in throat, chest and stomach, in remission    H/O Ischemia of extremity     History of MI (myocardial infarction)     History of snoring     History of transfusion     HIV (human immunodeficiency virus infection)     Hyperlipidemia     Hypertension     Non Hodgkin's lymphoma     PAD (peripheral artery disease)     Pulmonary embolism     Stroke        Past Surgical History:   Procedure Laterality Date    CARDIAC CATHETERIZATION      CORONARY ANGIOPLASTY WITH STENT PLACEMENT      FEMORAL ARTERY - FEMORAL ARTERY BYPASS GRAFT      PORTACATH PLACEMENT         Family History: His family history includes Diabetes in his mother; Hypertension in his maternal grandmother and mother; Stroke in his mother.     Social History: He  reports that he has been smoking cigars and cigarettes. He started smoking about 31 years ago. He has a  31.1 pack-year smoking history. He has been exposed to tobacco smoke. He has never used smokeless tobacco. He reports current alcohol use of about 2.0 standard drinks of alcohol per week. He reports that he does not currently use drugs.    Home Medications:  Diclofenac Sodium, Rcnbqkq-Acvwa-Mkdznayi-TenofAF, HYDROcodone-acetaminophen, Insulin Glargine, Insulin Pen Needle, albuterol sulfate HFA, amLODIPine, aspirin, carvedilol, dapagliflozin Propanediol, ergocalciferol, furosemide, glipizide, pantoprazole, rosuvastatin, sacubitril-valsartan, and warfarin    Allergies:  He has No Known Allergies.    Objective    Objective     Vitals:    Temp:  [97.7 °F (36.5 °C)-98.6 °F (37 °C)] 97.7 °F (36.5 °C)  Heart Rate:  [] 70  Resp:  [16-18] 18  BP: (149-169)/(87-97) 168/92    Physical Exam  Constitutional:       Appearance: He is well-developed.   Pulmonary:      Effort: Pulmonary effort is normal. No respiratory distress.   Abdominal:      General: There is no distension.      Palpations: Abdomen is soft.   Neurological:      Mental Status: He is alert and oriented to person, place, and time.     Patient with surgical incisions on the abdomen and bilateral groins consistent with a aortobifemoral bypass.  No evidence of hernias.  Palpable femoral pulses bilaterally.    Normal sensation and motor function in the left foot with    Result Review    Result Review:  I have personally reviewed the results from the time of this admission to 1/29/2025 07:02 EST and agree with these findings:  [x]  Laboratory list / accordion  []  Microbiology  [x]  Radiology  []  EKG/Telemetry   []  Cardiology/Vascular   []  Pathology  []  Old records  []  Other:  Most notable findings include: As below    Results from last 7 days   Lab Units 01/28/25  1817   WBC 10*3/mm3 6.81   HEMOGLOBIN g/dL 14.1   PLATELETS 10*3/mm3 196     Results from last 7 days   Lab Units 01/28/25  1817   SODIUM mmol/L 136   POTASSIUM mmol/L 4.1   CHLORIDE mmol/L 104  "  CO2 mmol/L 22.4   BUN mg/dL 10   CREATININE mg/dL 1.19   GLUCOSE mg/dL 323*   Estimated Creatinine Clearance: 88.6 mL/min (by C-G formula based on SCr of 1.19 mg/dL).  Results from last 7 days   Lab Units 01/28/25  1817   PROTIME Seconds 17.0*   INR  1.36*       Lab Results   Component Value Date    HGBA1C 10.00 (H) 05/14/2024    HGBA1C 11.40 (H) 09/29/2023    HGBA1C 10.10 (H) 10/11/2022   No results found for: \"POCGLU\"    CT angiogram of the aorta with runoff:  1/28/2025: Patent aortobifemoral bypass with mid left SFA occlusion.  Unclear chronicity but favor acute.    Assessment & Plan   Assessment / Plan     Brief Patient Summary:  Cecilio Puckett is a 55 y.o. male likely acute on chronic peripheral arterial disease in the setting of uncontrolled diabetes with A1c of 10.  Currently with [place] grade 1 ischemia.    Aortobifemoral bypass performed by Dr. Mike Vargas in 2014.  2020: ABIs at [facility]: Noncompressible but reported normal waveforms.    Active Hospital Problems:  Active Hospital Problems    Diagnosis     **Atherosclerosis of artery of extremity with intermittent claudication     History of CVA (cerebrovascular accident)     HIV (human immunodeficiency virus infection)     HTN (hypertension)     NHL (non-Hodgkin's lymphoma)     Type 2 diabetes mellitus with circulatory disorder, without long-term current use of insulin     Coronary artery disease involving native coronary artery of native heart without angina pectoris        Plan:   1/29/2025: Would recommend continuation of heparin infusion.  Will require open left superficial femoral artery thrombectomy with angiographic evaluation and stenting as indicated.  Medical compliance and control risk factors important to maintain long-term limb salvage.    Surgery scheduled for 10 AM 1/30/2025.    Patient has been recommended for acute lower extremity surgical revascularization.  This is a major surgery.  Patient understands the inherent risks associated " with lower extremity surgical revascularization .  These include but not are not limited to stroke, heart attack, bleeding, infection, need for secondary surgery/intervention, progression of arterial disease requiring amputation, and even death.  Patient also understands the nature of peripheral arterial disease and if a left untreated surgically would put them at increased risk for worsening ischemia, infection, pain, and possibility for amputation.      VTE Prophylaxis:  Pharmacologic VTE prophylaxis orders are present.         Mukesh Wharton MD

## 2025-01-29 NOTE — H&P
Patient Name:  Cecilio Puckett  YOB: 1970  MRN:  [id number]  Admit Date:  1/28/2025  Patient Care Team:  Lara vIerson APRN as PCP - General (Nurse Practitioner)  Bear Rojas PA as Referring Physician (Emergency Medicine)  Arthur Salazar MD as Consulting Physician (Hematology and Oncology)  Shashi Lomas III, MD as Cardiologist (Cardiology)        Chief Complaint   Patient presents with    Foot Pain   Left side.  Duration 3 weeks    History Present Illness     A pleasant 55 years old gentleman with a past history of coronary artery disease/hypertension/ischemic cardiomyopathy/HIV/NHL/DM2/dyslipidemia/CVA with slurred speech/peripheral vascular disease status post aortobifemoral bypass in the past.  Patient smokes cigar and occasionally consumes alcohol.  Patient presented to the emergency department with left foot pain that is made worse with walking and relieved by rest.  No trauma.  No back pain.  No pain in any other parts of the lower extremity.  Positive tingling left foot.  Patient did not describe any change in the color or temperature of his left foot.  No ulcers of the left foot.  No fever or chills.  In the emergency department C-reactive protein was elevated at 0.61.  ESR was normal.  CBC was normal.  PTT was normal.  INR was 1.36.  CMP normal except a random blood sugar of 323, calcium 8.5, albumin 3.3.  CTA of the aorta with body lateral iliofemoral runoff revealed left superficial femoral artery occlusion with collateral/right superficial femoral artery and popliteal artery occlusive disease.  Patient was subsequently admitted        Review of Systems   Cardiovascular/respiratory.  Positive exertional dyspnea.  No chest pain/no palpitations/no PND orthopnea/no ankle edema/positive occasional dry cough with an episode of hemoptysis 1 month ago that has resolved  .  Positive frequency.  No dysuria or hematuria.  GI.  No abdominal pain.  No nausea or vomiting.  Normal bowel  movement without constipation/diarrhea/bleeding per rectum/melena  CNS.  An episode of syncope 1 month ago without recurrence.  No dizziness.  Positive old slurred speech.  No other focal neurological symptoms  Personal History     Past Medical History:   Diagnosis Date    Anticoagulated on Coumadin 01/03/2013    COPD (chronic obstructive pulmonary disease)     Coronary artery disease 02/2011    Status post bare-metal stent placement to left circumflex 2/2011    Diabetes mellitus     DVT (deep venous thrombosis)     GERD (gastroesophageal reflux disease)     H/O Cancer     right arm, in throat, chest and stomach, in remission    H/O Ischemia of extremity     History of MI (myocardial infarction)     History of snoring     History of transfusion     HIV (human immunodeficiency virus infection)     Hyperlipidemia     Hypertension     Non Hodgkin's lymphoma     PAD (peripheral artery disease)     Pulmonary embolism     Stroke      Past Surgical History:   Procedure Laterality Date    CARDIAC CATHETERIZATION      CORONARY ANGIOPLASTY WITH STENT PLACEMENT      FEMORAL ARTERY - FEMORAL ARTERY BYPASS GRAFT      PORTACATH PLACEMENT       Family History   Problem Relation Age of Onset    Hypertension Mother     Diabetes Mother     Stroke Mother     Hypertension Maternal Grandmother      Social History     Tobacco Use    Smoking status: Every Day     Current packs/day: 1.00     Average packs/day: 1 pack/day for 31.1 years (31.1 ttl pk-yrs)     Types: Cigars, Cigarettes     Start date: 01/1994     Passive exposure: Current    Smokeless tobacco: Never   Vaping Use    Vaping status: Never Used   Substance Use Topics    Alcohol use: Yes     Alcohol/week: 2.0 standard drinks of alcohol     Types: 2 Cans of beer per week     Comment: occassionally    Drug use: Not Currently     No current facility-administered medications on file prior to encounter.     Current Outpatient Medications on File Prior to Encounter   Medication Sig  Dispense Refill    Adult Aspirin Regimen 81 MG EC tablet Take 1 tablet by mouth Daily.      albuterol sulfate  (90 Base) MCG/ACT inhaler Inhale 2 puffs Every 4 (Four) Hours As Needed.      amLODIPine (NORVASC) 10 MG tablet Take 1 tablet by mouth Daily.      carvedilol (COREG) 6.25 MG tablet Take 1 tablet by mouth Every 12 (Twelve) Hours. 180 tablet 3    dapagliflozin Propanediol (Farxiga) 10 MG tablet Take 10 mg by mouth Daily.      Alqafah-Uxoqo-Hjknzkxr-TenofAF (GENVOYA) [phone] MG per tablet Take 1 tablet by mouth Daily.      ergocalciferol (ERGOCALCIFEROL) 1.25 MG (06229 UT) capsule Take 1 capsule by mouth 1 (One) Time Per Week.      furosemide (LASIX) 20 MG tablet Take 1 tablet by mouth Daily.      glipizide (GLUCOTROL) 10 MG tablet Take 1 tablet by mouth Daily.      Insulin Glargine (LANTUS SOLOSTAR) 100 UNIT/ML injection pen Inject 25 Units under the skin into the appropriate area as directed Every Night. 100 mL 0    Insulin Pen Needle 32G X 4 MM misc Use to inject insulin subcutaneously via pens daily 100 each 1    pantoprazole (PROTONIX) 40 MG EC tablet       rosuvastatin (CRESTOR) 20 MG tablet Take 1 tablet by mouth Daily.      sacubitril-valsartan (ENTRESTO) 49-51 MG tablet Take 1 tablet by mouth Every 12 (Twelve) Hours. 60 tablet 0    warfarin (COUMADIN) 10 MG tablet 1 and 1/2 tab M,W<F<S and S,T 1 tab      Diclofenac Sodium (VOLTAREN) 1 % gel gel Apply 4 g topically to the appropriate area as directed 2 (Two) Times a Day. 50 g 0    HYDROcodone-acetaminophen (NORCO)  MG per tablet Take 1 tablet by mouth As Needed.       No Known Allergies    Objective    Objective     Vital Signs  Temp:  [97.7 °F (36.5 °C)-98.6 °F (37 °C)] 97.7 °F (36.5 °C)  Heart Rate:  [] 64  Resp:  [16-18] 18  BP: (146-169)/(87-97) 146/97  SpO2:  [93 %-97 %] 93 %  on   ;   Device (Oxygen Therapy): room air  Body mass index is 29.38 kg/m².    Physical Exam  General.  Middle-aged gentleman.  Obese.  Alert and  oriented x 4.  Slow verbal response.  In no apparent pain/distress/diaphoresis.  Normal mood and affect.  Eyes.  Pupils equal round and reactive.  Intact extraocular musculature.  No pallor or jaundice.    Oral cavity.  Moist mucous membrane  Neck.  Supple.  No JVD.  No carotid bruit.  No lymphadenopathy or thyromegaly.  Cardiovascular.  Regular rate and rhythm with no gallops or murmurs.  Chest.  Poor bilateral air entry with no added sounds.  Abdomen.  Soft lax.  No tenderness.  No organomegaly.  No guarding or rebound.  No abdominal bruits.  Extremities.  Tenderness to touch of the left foot.  Left foot is colder than the right with no obvious cyanosis.  No ulcers.  No clubbing.  No edema.  No lower extremity neurodeficits.  Both dorsalis pedis and posterior tibial pulses were not felt bilaterally.  CNS.  Old slurred speech otherwise no acute focal neurological deficits.      Results Review:  I reviewed the patient's new clinical results.  I reviewed the patient's new imaging results and agree with the interpretation.  I reviewed the patient's other test results and agree with the interpretation  I personally viewed and interpreted the patient's EKG/Telemetry data  Discussed with ED provider.    Lab Results (last 24 hours)       Procedure Component Value Units Date/Time    CBC & Differential [[id number]]  (Abnormal) Collected: 01/28/25 1817    Specimen: Blood Updated: 01/28/25 1845    Narrative:      The following orders were created for panel order CBC & Differential.  Procedure                               Abnormality         Status                     ---------                               -----------         ------                     CBC Auto Differential[[id number]]        Abnormal            Final result                 Please view results for these tests on the individual orders.    Comprehensive Metabolic Panel [[id number]]  (Abnormal) Collected: 01/28/25 1817    Specimen: Blood Updated: 01/28/25 1852      Glucose 323 mg/dL      BUN 10 mg/dL      Creatinine 1.19 mg/dL      Sodium 136 mmol/L      Potassium 4.1 mmol/L      Chloride 104 mmol/L      CO2 22.4 mmol/L      Calcium 8.5 mg/dL      Total Protein 6.8 g/dL      Albumin 3.3 g/dL      ALT (SGPT) 17 U/L      AST (SGOT) 14 U/L      Alkaline Phosphatase 73 U/L      Total Bilirubin <0.2 mg/dL      Globulin 3.5 gm/dL      A/G Ratio 0.9 g/dL      BUN/Creatinine Ratio 8.4     Anion Gap 9.6 mmol/L      eGFR 73.0 mL/min/1.73     Narrative:      GFR Categories in Chronic Kidney Disease (CKD)      GFR Category          GFR (mL/min/1.73)    Interpretation  G1                     90 or greater         Normal or high (1)  G2                      60-89                Mild decrease (1)  G3a                   45-59                Mild to moderate decrease  G3b                   30-44                Moderate to severe decrease  G4                    15-29                Severe decrease  G5                    14 or less           Kidney failure          (1)In the absence of evidence of kidney disease, neither GFR category G1 or G2 fulfill the criteria for CKD.    eGFR calculation 2021 CKD-EPI creatinine equation, which does not include race as a factor    Protime-INR [[id number]]  (Abnormal) Collected: 01/28/25 1817    Specimen: Blood Updated: 01/28/25 1837     Protime 17.0 Seconds      INR 1.36    aPTT [[id number]]  (Normal) Collected: 01/28/25 1817    Specimen: Blood Updated: 01/28/25 1837     PTT 27.5 seconds     CBC Auto Differential [[id number]]  (Abnormal) Collected: 01/28/25 1817    Specimen: Blood Updated: 01/28/25 1845     WBC 6.81 10*3/mm3      RBC 4.97 10*6/mm3      Hemoglobin 14.1 g/dL      Hematocrit 45.2 %      MCV 90.9 fL      MCH 28.4 pg      MCHC 31.2 g/dL      RDW 15.3 %      RDW-SD 50.4 fl      MPV 11.2 fL      Platelets 196 10*3/mm3      Neutrophil % 41.1 %      Lymphocyte % 47.6 %      Monocyte % 8.4 %      Eosinophil % 2.1 %      Basophil % 0.7 %      Immature  Grans % 0.1 %      Neutrophils, Absolute 2.80 10*3/mm3      Lymphocytes, Absolute 3.24 10*3/mm3      Monocytes, Absolute 0.57 10*3/mm3      Eosinophils, Absolute 0.14 10*3/mm3      Basophils, Absolute 0.05 10*3/mm3      Immature Grans, Absolute 0.01 10*3/mm3      nRBC 0.0 /100 WBC     C-reactive Protein [[id number]]  (Abnormal) Collected: 01/28/25 1817    Specimen: Blood Updated: 01/28/25 1852     C-Reactive Protein 0.61 mg/dL     Sedimentation Rate [[id number]]  (Normal) Collected: 01/28/25 1817    Specimen: Blood Updated: 01/28/25 1946     Sed Rate 10 mm/hr     aPTT [[id number]]  (Abnormal) Collected: 01/29/25 0112    Specimen: Blood Updated: 01/29/25 0246     .7 seconds     aPTT [[id number]]  (Abnormal) Collected: 01/29/25 0620    Specimen: Blood Updated: 01/29/25 0719     PTT 53.8 seconds     CBC & Differential [[id number]]  (Normal) Collected: 01/29/25 0620    Specimen: Blood Updated: 01/29/25 0712    Narrative:      The following orders were created for panel order CBC & Differential.  Procedure                               Abnormality         Status                     ---------                               -----------         ------                     CBC Auto Differential[[id number]]        Normal              Final result                 Please view results for these tests on the individual orders.    CBC Auto Differential [[id number]]  (Normal) Collected: 01/29/25 0620    Specimen: Blood Updated: 01/29/25 0712     WBC 7.78 10*3/mm3      RBC 4.78 10*6/mm3      Hemoglobin 13.7 g/dL      Hematocrit 42.4 %      MCV 88.7 fL      MCH 28.7 pg      MCHC 32.3 g/dL      RDW 14.9 %      RDW-SD 48.4 fl      MPV 10.9 fL      Platelets 183 10*3/mm3      nRBC 0.0 /100 WBC     Comprehensive Metabolic Panel [[id number]]  (Abnormal) Collected: 01/29/25 0620    Specimen: Blood Updated: 01/29/25 0708     Glucose 148 mg/dL      BUN 9 mg/dL      Creatinine 0.91 mg/dL      Sodium 138 mmol/L      Potassium 4.1 mmol/L       Chloride 107 mmol/L      CO2 22.0 mmol/L      Calcium 8.5 mg/dL      Total Protein 6.4 g/dL      Albumin 3.4 g/dL      ALT (SGPT) 17 U/L      AST (SGOT) 13 U/L      Alkaline Phosphatase 67 U/L      Total Bilirubin 0.3 mg/dL      Globulin 3.0 gm/dL      A/G Ratio 1.1 g/dL      BUN/Creatinine Ratio 9.9     Anion Gap 9.0 mmol/L      eGFR 100.2 mL/min/1.73     Narrative:      GFR Categories in Chronic Kidney Disease (CKD)      GFR Category          GFR (mL/min/1.73)    Interpretation  G1                     90 or greater         Normal or high (1)  G2                      60-89                Mild decrease (1)  G3a                   45-59                Mild to moderate decrease  G3b                   30-44                Moderate to severe decrease  G4                    15-29                Severe decrease  G5                    14 or less           Kidney failure          (1)In the absence of evidence of kidney disease, neither GFR category G1 or G2 fulfill the criteria for CKD.    eGFR calculation 2021 CKD-EPI creatinine equation, which does not include race as a factor    Manual Differential [[id number]] Collected: 01/29/25 0620    Specimen: Blood Updated: 01/29/25 0655    POC Glucose Once [[id number]]  (Abnormal) Collected: 01/29/25 0750    Specimen: Blood Updated: 01/29/25 0751     Glucose 184 mg/dL             Imaging Results (Last 24 Hours)       Procedure Component Value Units Date/Time    CT Angio Abdominal Aorta Bilateral Iliofem Runoff [[id number]] Collected: 01/28/25 1928     Updated: 01/28/25 1949    Narrative:      CTA ABDOMEN & PELVIS AND BILATERAL LOWER EXTREMITIES WITH IV CONTRAST     INDICATION:   Decreased peripheral pulses, left great toe pain, history of peripheral  vascular disease     TECHNIQUE:  CT angiography was performed of the abdomen and pelvis with axial images  as well as coronal and sagittal reformatted MIP images provided  following the administration of IV contrast.  3-D surface  rendered  reformats of the vascular structures of the abdomen and pelvis were  created and reviewed .   Radiation dose reduction techniques were  utilized, including automated exposure control, and exposure modulation  based on body size.        COMPARISON:   None available.     FINDINGS:     Lung bases: The visualized lung bases are unremarkable, without acute  abnormality.     Abdomen: The liver and gallbladder are normal.  The spleen and pancreas  appear normal.  Both adrenal glands are normal.  Both kidneys are  normal.         There is no abdominal or retroperitoneal adenopathy or other mass.   There is no abdominal or retroperitoneal inflammatory change, or  abnormal fluid or air collection.  There is no evidence of bowel  obstruction.       Pelvis:  The appendix is clearly identified, and is normal.  There is  diverticulosis, but there is no CT evidence of diverticulitis.  There is  no pelvic adenopathy or other soft tissue mass.  There is no CT evidence  of hernia or bowel obstruction.     Vascular: The visualized lower thoracic aorta is normal in caliber, and  there is no evidence of dissection.  The abdominal aorta is normal in  caliber, and there is no evidence of dissection.  The mesenteric and  renal vessels are normally patent, without acute abnormality.     There has been prior bilateral native iliac artery stenting, and the  right native common iliac is patent. There has been aorto bifemoral  bypass grafting. Both limbs of the graft are only patent.     On the right, despite mild atheromatous changes of the common deep and  superficial femoral arteries are patent. The popliteal artery is patent  across the knee, though there is focal severe stenosis in the popliteal  artery about 6 cm above the knee. Below the knee, there is extensive  atheromatous change. There is two-vessel runoff in the proximal to mid  calf via the anterior tibial and peroneal arteries. The anterior tibial  is heavily diseased  but does appear to cross the ankle and supply the  foot. The posterior tibial artery is occluded in the upper calf, but may  be partially reconstituted at about the ankle.     On the left, the common and deep and superficial femoral arteries are  patent proximally, but the superficial femoral artery occludes in the  proximal to mid thigh. There is reconstitution of the popliteal artery  10 to 11 cm above the knee. Below the knee, there is extensive  atheromatous disease, with noncontiguous opacification of the posterior  tibial, occlusion of the anterior tibial at its origin, and segmental  opacification of the peroneal artery in the proximal to mid calf.     There is no acute bony abnormality.       Impression:         No evidence of acute visceral, soft tissue, or bony abnormality in the  abdomen, or pelvis.     Abdominal aorta is normally patent and there has been prior  aortobifemoral bypass, and both limbs of the graft are normally patent.     On the right, the SFA and popliteal artery are patent though there is  focal popliteal stenosis a few centimeters above the knee, and below the  knee there is heavy atheromatous change and heavily diseased two-vessel  runoff.     On the left, there is occlusion of the SFA in the mid to upper thigh,  and then collateral reconstitution of the popliteal artery 10 to 11 cm  above the knee. There is extensive 7 trifurcation disease with only  segmental visualization of the posterior tibial and peroneal arteries.           This report was finalized on 1/28/2025 7:46 PM by Dr. Severo Vann M.D on Workstation: [id number]               Results for orders placed during the hospital encounter of 01/14/25    Adult Transthoracic Echo Complete W/ Cont if Necessary Per Protocol    Interpretation Summary    Left ventricular systolic function is low normal. Calculated left ventricular EF = 46.8%    Left ventricular wall thickness is consistent with moderate septal asymmetric  hypertrophy.    The following left ventricular wall segments are hypokinetic: mid anterolateral, apical lateral and apex hypokinetic.    Left ventricular diastolic function is consistent with (grade I) impaired relaxation.    No significant valvular abnormalities.      No orders to display            Assessment/Plan     Active Hospital Problems    Diagnosis  POA    **Atherosclerosis of artery of extremity with intermittent claudication [I70.219]  Yes    History of CVA (cerebrovascular accident) [Z86.73]  Not Applicable    HIV (human immunodeficiency virus infection) [Z21]  Yes    HTN (hypertension) [I10]  Yes    NHL (non-Hodgkin's lymphoma) [C85.90]  Yes    Type 2 diabetes mellitus with circulatory disorder, without long-term current use of insulin [E11.59]  Yes    Coronary artery disease involving native coronary artery of native heart without angina pectoris [I25.10]  Yes      Resolved Hospital Problems   No resolved problems to display.           Bilateral lower extremity peripheral vascular disease and left lower extremity intermittent claudication and rest ischemia in a patient with a history of dyslipidemia and lower extremity peripheral vascular disease status post aortobifemoral bypass in the past.  CTA of the abdominal aorta with lower extremity runoff revealed an occlusive disease of the right SFA F a and popliteal and left SFA F a with collateral.  Patient supposedly on warfarin but INR is subtherapeutic.  Will continue IV heparin/Crestor/aspirin.  Vascular surgery planning revascularization procedure and angiographic evaluation tomorrow.  Type 2 diabetes.  Will hold long-acting insulin and Glucotrol.  Initiate sliding scale and continue Farxiga.  Hypoglycemia protocol.  Check A1c.  History of coronary artery disease/hypertension/ischemic cardiomyopathy.  There is no clinical evidence of angina or congestive heart failure.  Will check EKG.  Patient last echo on January 2025 revealed an ejection fraction  of 46% with left ventricular hypertrophy and multiple left ventricular hypokinetic segments and grade 1 diastolic dysfunction with mild MR.  Good blood pressure control.  Will continue Norvasc/Coreg/Farxiga/Lasix/Crestor/Entresto  History of HIV.  No evidence of secondary infection.  Continue anti-HIV medications.  History of non-Hodgkin's lymphoma.  No clinical evidence of recurrence.  CBC is normal  History of CVA.  Negative CNS examination except old slurred speech..  Continue aspirin and Crestor.    Tobacco abuse.  Patient strongly counseled  VTE prophylaxis.  Patient anticoagulated.          Discussed my findings and plan of treatment with the patient      Code Status -full code.       Lisa Guo MD  [facility]  01/29/25  09:08 EST

## 2025-01-29 NOTE — PLAN OF CARE
Goal Outcome Evaluation:      Patient pleasant, alert/oriented x4. Heparin drip infusing- Q6 aPTT. PRN Norco, Morphine for pain in left foot. Scheduled for surgery tomorrow at 1000- consent signed. NPO at midnight. Standby assist with cane. ACHS blood sugars- insulin per orders.                                        Pt remains missing from lobby, will DC chart out at this time.

## 2025-01-29 NOTE — SIGNIFICANT NOTE
01/29/25 0932   OTHER   Discipline physical therapist   Rehab Time/Intention   Session Not Performed other (see comments)  (Patient going for L LE thrombectomy 1/30 AM. PT will f/u post op.)   Recommendation   PT - Next Appointment 01/30/25

## 2025-01-30 ENCOUNTER — APPOINTMENT (OUTPATIENT)
Dept: GENERAL RADIOLOGY | Facility: HOSPITAL | Age: 55
DRG: 271 | End: 2025-01-30
Payer: MEDICARE

## 2025-01-30 ENCOUNTER — ANESTHESIA (OUTPATIENT)
Dept: PERIOP | Facility: HOSPITAL | Age: 55
End: 2025-01-30
Payer: MEDICARE

## 2025-01-30 ENCOUNTER — ANESTHESIA EVENT (OUTPATIENT)
Dept: PERIOP | Facility: HOSPITAL | Age: 55
End: 2025-01-30
Payer: MEDICARE

## 2025-01-30 LAB
ANION GAP SERPL CALCULATED.3IONS-SCNC: 10.9 MMOL/L (ref 5–15)
APTT PPP: 28.8 SECONDS (ref 22.7–35.4)
APTT PPP: 61.8 SECONDS (ref 22.7–35.4)
APTT PPP: 75.1 SECONDS (ref 22.7–35.4)
BASOPHILS # BLD AUTO: 0.05 10*3/MM3 (ref 0–0.2)
BASOPHILS NFR BLD AUTO: 0.7 % (ref 0–1.5)
BUN SERPL-MCNC: 16 MG/DL (ref 6–20)
BUN/CREAT SERPL: 12.6 (ref 7–25)
CALCIUM SPEC-SCNC: 8.9 MG/DL (ref 8.6–10.5)
CHLORIDE SERPL-SCNC: 104 MMOL/L (ref 98–107)
CO2 SERPL-SCNC: 22.1 MMOL/L (ref 22–29)
CREAT SERPL-MCNC: 1.27 MG/DL (ref 0.76–1.27)
DEPRECATED RDW RBC AUTO: 49.6 FL (ref 37–54)
EGFRCR SERPLBLD CKD-EPI 2021: 67.1 ML/MIN/1.73
EOSINOPHIL # BLD AUTO: 0.22 10*3/MM3 (ref 0–0.4)
EOSINOPHIL NFR BLD AUTO: 2.9 % (ref 0.3–6.2)
ERYTHROCYTE [DISTWIDTH] IN BLOOD BY AUTOMATED COUNT: 15.2 % (ref 12.3–15.4)
GLUCOSE BLDC GLUCOMTR-MCNC: 169 MG/DL (ref 70–130)
GLUCOSE BLDC GLUCOMTR-MCNC: 171 MG/DL (ref 70–130)
GLUCOSE BLDC GLUCOMTR-MCNC: 185 MG/DL (ref 70–130)
GLUCOSE BLDC GLUCOMTR-MCNC: 239 MG/DL (ref 70–130)
GLUCOSE SERPL-MCNC: 168 MG/DL (ref 65–99)
HCT VFR BLD AUTO: 43.3 % (ref 37.5–51)
HGB BLD-MCNC: 13.6 G/DL (ref 13–17.7)
IMM GRANULOCYTES # BLD AUTO: 0.02 10*3/MM3 (ref 0–0.05)
IMM GRANULOCYTES NFR BLD AUTO: 0.3 % (ref 0–0.5)
LYMPHOCYTES # BLD AUTO: 3.94 10*3/MM3 (ref 0.7–3.1)
LYMPHOCYTES NFR BLD AUTO: 51.6 % (ref 19.6–45.3)
MCH RBC QN AUTO: 27.9 PG (ref 26.6–33)
MCHC RBC AUTO-ENTMCNC: 31.4 G/DL (ref 31.5–35.7)
MCV RBC AUTO: 88.9 FL (ref 79–97)
MONOCYTES # BLD AUTO: 0.63 10*3/MM3 (ref 0.1–0.9)
MONOCYTES NFR BLD AUTO: 8.3 % (ref 5–12)
NEUTROPHILS NFR BLD AUTO: 2.77 10*3/MM3 (ref 1.7–7)
NEUTROPHILS NFR BLD AUTO: 36.2 % (ref 42.7–76)
NRBC BLD AUTO-RTO: 0 /100 WBC (ref 0–0.2)
PLATELET # BLD AUTO: 190 10*3/MM3 (ref 140–450)
PMV BLD AUTO: 11.2 FL (ref 6–12)
POTASSIUM SERPL-SCNC: 3.9 MMOL/L (ref 3.5–5.2)
RBC # BLD AUTO: 4.87 10*6/MM3 (ref 4.14–5.8)
SODIUM SERPL-SCNC: 137 MMOL/L (ref 136–145)
WBC NRBC COR # BLD AUTO: 7.63 10*3/MM3 (ref 3.4–10.8)

## 2025-01-30 PROCEDURE — 25010000002 HEPARIN (PORCINE) PER 1000 UNITS: Performed by: SURGERY

## 2025-01-30 PROCEDURE — C1876 STENT, NON-COA/NON-COV W/DEL: HCPCS | Performed by: SURGERY

## 2025-01-30 PROCEDURE — C1757 CATH, THROMBECTOMY/EMBOLECT: HCPCS | Performed by: SURGERY

## 2025-01-30 PROCEDURE — 25010000002 HEPARIN (PORCINE) PER 1000 UNITS: Performed by: INTERNAL MEDICINE

## 2025-01-30 PROCEDURE — 85025 COMPLETE CBC W/AUTO DIFF WBC: CPT | Performed by: INTERNAL MEDICINE

## 2025-01-30 PROCEDURE — 25010000002 HEPARIN (PORCINE) 25000-0.45 UT/250ML-% SOLUTION: Performed by: SURGERY

## 2025-01-30 PROCEDURE — 85730 THROMBOPLASTIN TIME PARTIAL: CPT | Performed by: SURGERY

## 2025-01-30 PROCEDURE — 25010000002 PHENYLEPHRINE 10 MG/ML SOLUTION 5 ML VIAL

## 2025-01-30 PROCEDURE — 25010000002 ONDANSETRON PER 1 MG

## 2025-01-30 PROCEDURE — 25010000002 HEPARIN (PORCINE) 25000-0.45 UT/250ML-% SOLUTION: Performed by: INTERNAL MEDICINE

## 2025-01-30 PROCEDURE — C1894 INTRO/SHEATH, NON-LASER: HCPCS | Performed by: SURGERY

## 2025-01-30 PROCEDURE — 25010000002 PHENYLEPHRINE 10 MG/ML SOLUTION

## 2025-01-30 PROCEDURE — 25810000003 SODIUM CHLORIDE 0.9 % SOLUTION 250 ML FLEX CONT

## 2025-01-30 PROCEDURE — 82948 REAGENT STRIP/BLOOD GLUCOSE: CPT

## 2025-01-30 PROCEDURE — 36415 COLL VENOUS BLD VENIPUNCTURE: CPT | Performed by: INTERNAL MEDICINE

## 2025-01-30 PROCEDURE — C1769 GUIDE WIRE: HCPCS | Performed by: SURGERY

## 2025-01-30 PROCEDURE — 25010000002 FENTANYL CITRATE (PF) 50 MCG/ML SOLUTION

## 2025-01-30 PROCEDURE — 25510000001 IOPAMIDOL PER 1 ML: Performed by: SURGERY

## 2025-01-30 PROCEDURE — 63710000001 INSULIN LISPRO (HUMAN) PER 5 UNITS: Performed by: SURGERY

## 2025-01-30 PROCEDURE — 25010000002 PROTAMINE SULFATE PER 10 MG

## 2025-01-30 PROCEDURE — 25010000002 HYDROMORPHONE PER 4 MG

## 2025-01-30 PROCEDURE — 047L3DZ DILATION OF LEFT FEMORAL ARTERY WITH INTRALUMINAL DEVICE, PERCUTANEOUS APPROACH: ICD-10-PCS | Performed by: SURGERY

## 2025-01-30 PROCEDURE — 85730 THROMBOPLASTIN TIME PARTIAL: CPT | Performed by: INTERNAL MEDICINE

## 2025-01-30 PROCEDURE — 25010000002 CEFAZOLIN PER 500 MG: Performed by: SURGERY

## 2025-01-30 PROCEDURE — G0378 HOSPITAL OBSERVATION PER HR: HCPCS

## 2025-01-30 PROCEDURE — C1887 CATHETER, GUIDING: HCPCS | Performed by: SURGERY

## 2025-01-30 PROCEDURE — 04CL3ZZ EXTIRPATION OF MATTER FROM LEFT FEMORAL ARTERY, PERCUTANEOUS APPROACH: ICD-10-PCS | Performed by: SURGERY

## 2025-01-30 PROCEDURE — 25010000002 HEPARIN (PORCINE) PER 1000 UNITS

## 2025-01-30 PROCEDURE — 25010000002 DEXAMETHASONE SODIUM PHOSPHATE 20 MG/5ML SOLUTION

## 2025-01-30 PROCEDURE — 04CN3ZZ EXTIRPATION OF MATTER FROM LEFT POPLITEAL ARTERY, PERCUTANEOUS APPROACH: ICD-10-PCS | Performed by: SURGERY

## 2025-01-30 PROCEDURE — 25010000002 LIDOCAINE 2% SOLUTION

## 2025-01-30 PROCEDURE — 37226 PR REVSC OPN/PRQ FEM/POP W/STNT/ANGIOP SM VSL: CPT | Performed by: SURGERY

## 2025-01-30 PROCEDURE — 80048 BASIC METABOLIC PNL TOTAL CA: CPT | Performed by: INTERNAL MEDICINE

## 2025-01-30 PROCEDURE — 25810000003 LACTATED RINGERS PER 1000 ML: Performed by: ANESTHESIOLOGY

## 2025-01-30 PROCEDURE — 75710 ARTERY X-RAYS ARM/LEG: CPT

## 2025-01-30 PROCEDURE — 34201 REMOVAL OF ARTERY CLOT: CPT | Performed by: SURGERY

## 2025-01-30 PROCEDURE — 25010000002 FENTANYL CITRATE (PF) 50 MCG/ML SOLUTION: Performed by: ANESTHESIOLOGY

## 2025-01-30 PROCEDURE — C1725 CATH, TRANSLUMIN NON-LASER: HCPCS | Performed by: SURGERY

## 2025-01-30 PROCEDURE — 25010000002 PROPOFOL 200 MG/20ML EMULSION

## 2025-01-30 DEVICE — LIGACLIP MCA MULTIPLE CLIP APPLIERS, 20 SMALL CLIPS
Type: IMPLANTABLE DEVICE | Site: LEG | Status: FUNCTIONAL
Brand: LIGACLIP

## 2025-01-30 DEVICE — LIGACLIP MCA MULTIPLE CLIP APPLIERS, 20 MEDIUM CLIPS
Type: IMPLANTABLE DEVICE | Site: LEG | Status: FUNCTIONAL
Brand: LIGACLIP

## 2025-01-30 DEVICE — STENT PRB35-08-100-120 PROTEGE EF V07
Type: IMPLANTABLE DEVICE | Site: ARTERY SUPERFICIAL FEMORAL | Status: FUNCTIONAL
Brand: EVERFLEX™ PROTÉGÉ™ EVERFLEX™

## 2025-01-30 RX ORDER — ROCURONIUM BROMIDE 10 MG/ML
INJECTION, SOLUTION INTRAVENOUS AS NEEDED
Status: DISCONTINUED | OUTPATIENT
Start: 2025-01-30 | End: 2025-01-30 | Stop reason: SURG

## 2025-01-30 RX ORDER — DIPHENHYDRAMINE HYDROCHLORIDE 50 MG/ML
12.5 INJECTION INTRAMUSCULAR; INTRAVENOUS
Status: DISCONTINUED | OUTPATIENT
Start: 2025-01-30 | End: 2025-01-30 | Stop reason: HOSPADM

## 2025-01-30 RX ORDER — HEPARIN SODIUM 1000 [USP'U]/ML
INJECTION, SOLUTION INTRAVENOUS; SUBCUTANEOUS AS NEEDED
Status: DISCONTINUED | OUTPATIENT
Start: 2025-01-30 | End: 2025-01-30 | Stop reason: SURG

## 2025-01-30 RX ORDER — HYDROMORPHONE HYDROCHLORIDE 1 MG/ML
0.5 INJECTION, SOLUTION INTRAMUSCULAR; INTRAVENOUS; SUBCUTANEOUS
Status: DISCONTINUED | OUTPATIENT
Start: 2025-01-30 | End: 2025-01-30 | Stop reason: HOSPADM

## 2025-01-30 RX ORDER — MIDAZOLAM HYDROCHLORIDE 1 MG/ML
1 INJECTION, SOLUTION INTRAMUSCULAR; INTRAVENOUS
Status: DISCONTINUED | OUTPATIENT
Start: 2025-01-30 | End: 2025-01-30 | Stop reason: HOSPADM

## 2025-01-30 RX ORDER — SODIUM CHLORIDE, SODIUM LACTATE, POTASSIUM CHLORIDE, CALCIUM CHLORIDE 600; 310; 30; 20 MG/100ML; MG/100ML; MG/100ML; MG/100ML
9 INJECTION, SOLUTION INTRAVENOUS CONTINUOUS
Status: DISCONTINUED | OUTPATIENT
Start: 2025-01-30 | End: 2025-01-30

## 2025-01-30 RX ORDER — PROMETHAZINE HYDROCHLORIDE 25 MG/1
25 TABLET ORAL ONCE AS NEEDED
Status: DISCONTINUED | OUTPATIENT
Start: 2025-01-30 | End: 2025-01-30 | Stop reason: HOSPADM

## 2025-01-30 RX ORDER — SODIUM CHLORIDE 0.9 % (FLUSH) 0.9 %
3 SYRINGE (ML) INJECTION EVERY 12 HOURS SCHEDULED
Status: DISCONTINUED | OUTPATIENT
Start: 2025-01-30 | End: 2025-01-30 | Stop reason: HOSPADM

## 2025-01-30 RX ORDER — EPHEDRINE SULFATE 50 MG/ML
5 INJECTION, SOLUTION INTRAVENOUS ONCE AS NEEDED
Status: DISCONTINUED | OUTPATIENT
Start: 2025-01-30 | End: 2025-01-30 | Stop reason: HOSPADM

## 2025-01-30 RX ORDER — FLUMAZENIL 0.1 MG/ML
0.2 INJECTION INTRAVENOUS AS NEEDED
Status: DISCONTINUED | OUTPATIENT
Start: 2025-01-30 | End: 2025-01-30 | Stop reason: HOSPADM

## 2025-01-30 RX ORDER — HYDRALAZINE HYDROCHLORIDE 20 MG/ML
5 INJECTION INTRAMUSCULAR; INTRAVENOUS
Status: DISCONTINUED | OUTPATIENT
Start: 2025-01-30 | End: 2025-01-30 | Stop reason: HOSPADM

## 2025-01-30 RX ORDER — LABETALOL HYDROCHLORIDE 5 MG/ML
5 INJECTION, SOLUTION INTRAVENOUS
Status: DISCONTINUED | OUTPATIENT
Start: 2025-01-30 | End: 2025-01-30 | Stop reason: HOSPADM

## 2025-01-30 RX ORDER — LIDOCAINE HYDROCHLORIDE 10 MG/ML
0.5 INJECTION, SOLUTION INFILTRATION; PERINEURAL ONCE AS NEEDED
Status: DISCONTINUED | OUTPATIENT
Start: 2025-01-30 | End: 2025-01-30 | Stop reason: HOSPADM

## 2025-01-30 RX ORDER — FAMOTIDINE 10 MG/ML
20 INJECTION, SOLUTION INTRAVENOUS ONCE
Status: COMPLETED | OUTPATIENT
Start: 2025-01-30 | End: 2025-01-30

## 2025-01-30 RX ORDER — NALOXONE HCL 0.4 MG/ML
0.2 VIAL (ML) INJECTION AS NEEDED
Status: DISCONTINUED | OUTPATIENT
Start: 2025-01-30 | End: 2025-01-30 | Stop reason: HOSPADM

## 2025-01-30 RX ORDER — SODIUM CHLORIDE 0.9 % (FLUSH) 0.9 %
3-10 SYRINGE (ML) INJECTION AS NEEDED
Status: DISCONTINUED | OUTPATIENT
Start: 2025-01-30 | End: 2025-01-30 | Stop reason: HOSPADM

## 2025-01-30 RX ORDER — PROTAMINE SULFATE 10 MG/ML
INJECTION, SOLUTION INTRAVENOUS AS NEEDED
Status: DISCONTINUED | OUTPATIENT
Start: 2025-01-30 | End: 2025-01-30 | Stop reason: SURG

## 2025-01-30 RX ORDER — ATROPINE SULFATE 0.4 MG/ML
0.4 INJECTION, SOLUTION INTRAMUSCULAR; INTRAVENOUS; SUBCUTANEOUS ONCE AS NEEDED
Status: DISCONTINUED | OUTPATIENT
Start: 2025-01-30 | End: 2025-01-30 | Stop reason: HOSPADM

## 2025-01-30 RX ORDER — HYDROCODONE BITARTRATE AND ACETAMINOPHEN 5; 325 MG/1; MG/1
1 TABLET ORAL ONCE AS NEEDED
Status: DISCONTINUED | OUTPATIENT
Start: 2025-01-30 | End: 2025-01-30 | Stop reason: HOSPADM

## 2025-01-30 RX ORDER — ONDANSETRON 2 MG/ML
4 INJECTION INTRAMUSCULAR; INTRAVENOUS ONCE AS NEEDED
Status: DISCONTINUED | OUTPATIENT
Start: 2025-01-30 | End: 2025-01-30 | Stop reason: HOSPADM

## 2025-01-30 RX ORDER — FENTANYL CITRATE 50 UG/ML
50 INJECTION, SOLUTION INTRAMUSCULAR; INTRAVENOUS ONCE AS NEEDED
Status: COMPLETED | OUTPATIENT
Start: 2025-01-30 | End: 2025-01-30

## 2025-01-30 RX ORDER — FENTANYL CITRATE 50 UG/ML
INJECTION, SOLUTION INTRAMUSCULAR; INTRAVENOUS AS NEEDED
Status: DISCONTINUED | OUTPATIENT
Start: 2025-01-30 | End: 2025-01-30 | Stop reason: SURG

## 2025-01-30 RX ORDER — HYDROCODONE BITARTRATE AND ACETAMINOPHEN 7.5; 325 MG/1; MG/1
2 TABLET ORAL EVERY 4 HOURS PRN
Status: DISCONTINUED | OUTPATIENT
Start: 2025-01-30 | End: 2025-01-30 | Stop reason: HOSPADM

## 2025-01-30 RX ORDER — ONDANSETRON 2 MG/ML
INJECTION INTRAMUSCULAR; INTRAVENOUS AS NEEDED
Status: DISCONTINUED | OUTPATIENT
Start: 2025-01-30 | End: 2025-01-30 | Stop reason: SURG

## 2025-01-30 RX ORDER — PHENYLEPHRINE HYDROCHLORIDE 10 MG/ML
INJECTION INTRAVENOUS AS NEEDED
Status: DISCONTINUED | OUTPATIENT
Start: 2025-01-30 | End: 2025-01-30 | Stop reason: SURG

## 2025-01-30 RX ORDER — HEPARIN SODIUM 10000 [USP'U]/100ML
5 INJECTION, SOLUTION INTRAVENOUS
Status: DISCONTINUED | OUTPATIENT
Start: 2025-01-30 | End: 2025-02-03

## 2025-01-30 RX ORDER — IPRATROPIUM BROMIDE AND ALBUTEROL SULFATE 2.5; .5 MG/3ML; MG/3ML
3 SOLUTION RESPIRATORY (INHALATION) ONCE AS NEEDED
Status: DISCONTINUED | OUTPATIENT
Start: 2025-01-30 | End: 2025-01-30 | Stop reason: HOSPADM

## 2025-01-30 RX ORDER — IOPAMIDOL 510 MG/ML
100 INJECTION, SOLUTION INTRAVASCULAR
Status: COMPLETED | OUTPATIENT
Start: 2025-01-30 | End: 2025-01-30

## 2025-01-30 RX ORDER — PROPOFOL 10 MG/ML
INJECTION, EMULSION INTRAVENOUS AS NEEDED
Status: DISCONTINUED | OUTPATIENT
Start: 2025-01-30 | End: 2025-01-30 | Stop reason: SURG

## 2025-01-30 RX ORDER — DEXAMETHASONE SODIUM PHOSPHATE 4 MG/ML
INJECTION, SOLUTION INTRA-ARTICULAR; INTRALESIONAL; INTRAMUSCULAR; INTRAVENOUS; SOFT TISSUE AS NEEDED
Status: DISCONTINUED | OUTPATIENT
Start: 2025-01-30 | End: 2025-01-30 | Stop reason: SURG

## 2025-01-30 RX ORDER — HYDROMORPHONE HYDROCHLORIDE 1 MG/ML
0.5 INJECTION, SOLUTION INTRAMUSCULAR; INTRAVENOUS; SUBCUTANEOUS
Status: DISCONTINUED | OUTPATIENT
Start: 2025-01-30 | End: 2025-02-03 | Stop reason: HOSPADM

## 2025-01-30 RX ORDER — LIDOCAINE HYDROCHLORIDE 20 MG/ML
INJECTION, SOLUTION INFILTRATION; PERINEURAL AS NEEDED
Status: DISCONTINUED | OUTPATIENT
Start: 2025-01-30 | End: 2025-01-30 | Stop reason: SURG

## 2025-01-30 RX ORDER — PROMETHAZINE HYDROCHLORIDE 25 MG/1
25 SUPPOSITORY RECTAL ONCE AS NEEDED
Status: DISCONTINUED | OUTPATIENT
Start: 2025-01-30 | End: 2025-01-30 | Stop reason: HOSPADM

## 2025-01-30 RX ADMIN — SACUBITRIL AND VALSARTAN 1 TABLET: 49; 51 TABLET, FILM COATED ORAL at 21:10

## 2025-01-30 RX ADMIN — HEPARIN SODIUM 4100 UNITS: 5000 INJECTION INTRAVENOUS; SUBCUTANEOUS at 02:05

## 2025-01-30 RX ADMIN — ROCURONIUM BROMIDE 20 MG: 10 INJECTION, SOLUTION INTRAVENOUS at 13:04

## 2025-01-30 RX ADMIN — ROSUVASTATIN CALCIUM 20 MG: 10 TABLET, FILM COATED ORAL at 08:51

## 2025-01-30 RX ADMIN — DEXAMETHASONE SODIUM PHOSPHATE 4 MG: 4 INJECTION, SOLUTION INTRAMUSCULAR; INTRAVENOUS at 12:42

## 2025-01-30 RX ADMIN — HEPARIN SODIUM 9.1 UNITS/KG/HR: 10000 INJECTION, SOLUTION INTRAVENOUS at 21:20

## 2025-01-30 RX ADMIN — FAMOTIDINE 20 MG: 10 INJECTION INTRAVENOUS at 10:42

## 2025-01-30 RX ADMIN — PHENYLEPHRINE HYDROCHLORIDE 200 MCG: 10 INJECTION INTRAVENOUS at 12:46

## 2025-01-30 RX ADMIN — ASPIRIN 81 MG: 81 TABLET, DELAYED RELEASE ORAL at 08:51

## 2025-01-30 RX ADMIN — FENTANYL CITRATE 50 MCG: 50 INJECTION, SOLUTION INTRAMUSCULAR; INTRAVENOUS at 10:42

## 2025-01-30 RX ADMIN — PHENYLEPHRINE HYDROCHLORIDE 0.5 MCG/KG/MIN: 10 INJECTION, SOLUTION INTRAVENOUS at 12:50

## 2025-01-30 RX ADMIN — INSULIN LISPRO 3 UNITS: 100 INJECTION, SOLUTION INTRAVENOUS; SUBCUTANEOUS at 21:10

## 2025-01-30 RX ADMIN — EMPAGLIFLOZIN 25 MG: 25 TABLET, FILM COATED ORAL at 08:51

## 2025-01-30 RX ADMIN — CARVEDILOL 6.25 MG: 6.25 TABLET, FILM COATED ORAL at 21:10

## 2025-01-30 RX ADMIN — FENTANYL CITRATE 50 MCG: 50 INJECTION, SOLUTION INTRAMUSCULAR; INTRAVENOUS at 12:32

## 2025-01-30 RX ADMIN — SACUBITRIL AND VALSARTAN 1 TABLET: 49; 51 TABLET, FILM COATED ORAL at 08:51

## 2025-01-30 RX ADMIN — CARVEDILOL 6.25 MG: 6.25 TABLET, FILM COATED ORAL at 08:51

## 2025-01-30 RX ADMIN — Medication 10 ML: at 08:52

## 2025-01-30 RX ADMIN — PROTAMINE SULFATE 40 MG: 10 INJECTION, SOLUTION INTRAVENOUS at 13:58

## 2025-01-30 RX ADMIN — PROPOFOL 200 MG: 10 INJECTION, EMULSION INTRAVENOUS at 12:34

## 2025-01-30 RX ADMIN — SODIUM CHLORIDE, POTASSIUM CHLORIDE, SODIUM LACTATE AND CALCIUM CHLORIDE 9 ML/HR: 600; 310; 30; 20 INJECTION, SOLUTION INTRAVENOUS at 12:12

## 2025-01-30 RX ADMIN — FUROSEMIDE 20 MG: 20 TABLET ORAL at 08:51

## 2025-01-30 RX ADMIN — FENTANYL CITRATE 50 MCG: 50 INJECTION, SOLUTION INTRAMUSCULAR; INTRAVENOUS at 13:59

## 2025-01-30 RX ADMIN — AMLODIPINE BESYLATE 10 MG: 10 TABLET ORAL at 08:51

## 2025-01-30 RX ADMIN — HYDROMORPHONE HYDROCHLORIDE 0.5 MG: 1 INJECTION, SOLUTION INTRAMUSCULAR; INTRAVENOUS; SUBCUTANEOUS at 15:46

## 2025-01-30 RX ADMIN — ELVITEGRAVIR, COBICISTAT, EMTRICITABINE, AND TENOFOVIR ALAFENAMIDE 1 TABLET: 150; 150; 200; 10 TABLET ORAL at 08:51

## 2025-01-30 RX ADMIN — CEFAZOLIN 2000 MG: 2 INJECTION, POWDER, FOR SOLUTION INTRAMUSCULAR; INTRAVENOUS at 12:11

## 2025-01-30 RX ADMIN — IOPAMIDOL 40 ML: 510 INJECTION, SOLUTION INTRAVASCULAR at 14:22

## 2025-01-30 RX ADMIN — INSULIN LISPRO 2 UNITS: 100 INJECTION, SOLUTION INTRAVENOUS; SUBCUTANEOUS at 18:03

## 2025-01-30 RX ADMIN — ROCURONIUM BROMIDE 50 MG: 10 INJECTION, SOLUTION INTRAVENOUS at 12:34

## 2025-01-30 RX ADMIN — FENTANYL CITRATE 50 MCG: 50 INJECTION, SOLUTION INTRAMUSCULAR; INTRAVENOUS at 13:10

## 2025-01-30 RX ADMIN — LIDOCAINE HYDROCHLORIDE 100 MG: 20 INJECTION, SOLUTION INFILTRATION; PERINEURAL at 12:34

## 2025-01-30 RX ADMIN — SODIUM CHLORIDE, POTASSIUM CHLORIDE, SODIUM LACTATE AND CALCIUM CHLORIDE 9 ML/HR: 600; 310; 30; 20 INJECTION, SOLUTION INTRAVENOUS at 10:42

## 2025-01-30 RX ADMIN — HEPARIN SODIUM 10000 UNITS: 1000 INJECTION, SOLUTION INTRAVENOUS; SUBCUTANEOUS at 13:07

## 2025-01-30 RX ADMIN — Medication 10 ML: at 21:11

## 2025-01-30 RX ADMIN — HEPARIN SODIUM 5 UNITS/KG/HR: 10000 INJECTION, SOLUTION INTRAVENOUS at 15:03

## 2025-01-30 RX ADMIN — ONDANSETRON 4 MG: 2 INJECTION INTRAMUSCULAR; INTRAVENOUS at 12:42

## 2025-01-30 RX ADMIN — PHENYLEPHRINE HYDROCHLORIDE 100 MCG: 10 INJECTION INTRAVENOUS at 13:19

## 2025-01-30 NOTE — PROGRESS NOTES
[facility]     LOS: 0 days     Subjective:    CC:Foot Pain    DIET:  Diet Order   Procedures    Diet: Cardiac, Diabetic; Healthy Heart (2-3 Na+); Consistent Carbohydrate; Fluid Consistency: Thin (IDDSI 0)       Objective:    Vital Signs:  Temp:  [97.7 °F (36.5 °C)-98.7 °F (37.1 °C)] 97.8 °F (36.6 °C)  Heart Rate:  [70-92] 74  Resp:  [12-18] 16  BP: (115-168)/(81-99) 131/93    SpO2:  [94 %-100 %] 96 %  on   ;   Device (Oxygen Therapy): room air  Body mass index is 29.38 kg/m².    Physical Exam  General Awake alert answering questions appropriately  Heart is regular rate rhythm no murmurs rubs gallops  Lungs clear to auscultation bilaterally  Abdomen soft nontender nondistended  Extremities right and left foot temperatures similar    Results Review:    Glucose   Date Value Ref Range Status   01/30/2025 168 (H) 65 - 99 mg/dL Final   01/29/2025 148 (H) 65 - 99 mg/dL Final   01/28/2025 323 (H) 65 - 99 mg/dL Final     Results from last 7 days   Lab Units 01/30/25  0607   WBC 10*3/mm3 7.63   HEMOGLOBIN g/dL 13.6   HEMATOCRIT % 43.3   PLATELETS 10*3/mm3 190     Results from last 7 days   Lab Units 01/30/25  0607 01/29/25  0620   SODIUM mmol/L 137 138   POTASSIUM mmol/L 3.9 4.1   CHLORIDE mmol/L 104 107   CO2 mmol/L 22.1 22.0   BUN mg/dL 16 9   CREATININE mg/dL 1.27 0.91   CALCIUM mg/dL 8.9 8.5*   BILIRUBIN mg/dL  --  0.3   ALK PHOS U/L  --  67   ALT (SGPT) U/L  --  17   AST (SGOT) U/L  --  13   GLUCOSE mg/dL 168* 148*     Results from last 7 days   Lab Units 01/30/25  0607 01/29/25  0112 01/28/25  1817   INR   --   --  1.36*   APTT seconds 75.1*   < > 27.5    < > = values in this interval not displayed.             Cultures:  No results found for: \"BLOODCX\", \"URINECX\", \"WOUNDCX\", \"MRSACX\", \"RESPCX\", \"STOOLCX\"    I have reviewed daily medications and changes in CPOE    Scheduled meds  amLODIPine, 10 mg, Oral, Daily  aspirin, 81 mg, Oral, Daily  carvedilol, 6.25 mg, Oral, " Q12H  Hwbppqd-Kfuyt-Meqphhcz-TenofAF, 1 tablet, Oral, Daily  empagliflozin, 25 mg, Oral, Daily  [START ON 2/4/2025] ergocalciferol, 50,000 Units, Oral, Weekly  furosemide, 20 mg, Oral, Daily  insulin lispro, 2-7 Units, Subcutaneous, 4x Daily AC & at Bedtime  pantoprazole, 40 mg, Oral, Q AM  rosuvastatin, 20 mg, Oral, Daily  sacubitril-valsartan, 1 tablet, Oral, Q12H  sodium chloride, 10 mL, Intravenous, Q12H        heparin, 5 Units/kg/hr, Last Rate: 5 Units/kg/hr (01/30/25 1650)      PRN meds    acetaminophen **OR** acetaminophen **OR** acetaminophen    albuterol    senna-docusate sodium **AND** polyethylene glycol **AND** bisacodyl **AND** bisacodyl    dextrose    dextrose    famotidine    glucagon (human recombinant)    HYDROmorphone    melatonin    [Transfer Hold] Morphine    ondansetron    [COMPLETED] Insert Peripheral IV **AND** sodium chloride    sodium chloride    sodium chloride        Atherosclerosis of artery of extremity with intermittent claudication    NHL (non-Hodgkin's lymphoma)    HIV (human immunodeficiency virus infection)    Type 2 diabetes mellitus with circulatory disorder, without long-term current use of insulin    HTN (hypertension)    Coronary artery disease involving native coronary artery of native heart without angina pectoris    Ischemic cardiomyopathy    History of CVA (cerebrovascular accident)    Dyslipidemia        Assessment/Plan:      Bilateral lower extremity peripheral vascular disease and left lower extremity intermittent claudication and rest ischemia in a patient with a history of dyslipidemia and lower extremity peripheral vascular disease status post aortobifemoral bypass in the past.    -Status post revascularization but surgery  -Continue with aspirin and heparin    Type 2 diabetes.  Will hold long-acting insulin and Glucotrol.  Initiate sliding scale and continue Farxiga.    -Blood sugars reasonably controlled     history of coronary artery disease/hypertension/ischemic  cardiomyopathy.  There is no clinical evidence of angina or congestive heart failure.    -Patient last echo on January 2025 revealed an ejection fraction of 46.8% with left ventricular hypertrophy and multiple left ventricular hypokinetic segments and grade 1 diastolic dysfunction with mild MR.  Good blood pressure control.  Will continue Norvasc/Coreg/Farxiga/Lasix/Crestor/Entresto    History of HIV.  No evidence of secondary infection.  Continue anti-HIV medications.    History of non-Hodgkin's lymphoma.  No clinical evidence of recurrence.  CBC is normal    History of CVA.  Negative CNS examination except old slurred speech..  Continue aspirin and Crestor.      Tobacco abuse.  Patient strongly counseled    VTE prophylaxis.  Patient anticoagulated.               Raymon Case MD  01/30/25  17:02 EST

## 2025-01-30 NOTE — ANESTHESIA PROCEDURE NOTES
Peripheral IV    Start time: 1/30/2025 12:40 PM  End time: 1/30/2025 12:45 PM  Line placed for Fluids/Medication Admin.  Performed By   Anesthesiologist: Nereida Trivedi MD  Preanesthetic Checklist  Completed: patient identified, IV checked, site marked, risks and benefits discussed, surgical consent, monitors and equipment checked, pre-op evaluation and timeout performed  Peripheral IV Prep    Prep: ChloraPrep  Peripheral IV Procedure   Laterality:right  Location:  Arm  Catheter size: 16 G          Post Assessment     IV Dressing/Site: clean, dry and intact

## 2025-01-30 NOTE — PLAN OF CARE
Goal Outcome Evaluation:         Patient is alert and oriented x 4, vitals stable, room air, up with standby assist, complaints of pain, see MAR for all medications administered, NPO since midnight due to a scheduled procedure later today. Heparin in use, safety precautions in use, plan of care ongoing.

## 2025-01-30 NOTE — ANESTHESIA PREPROCEDURE EVALUATION
Anesthesia Evaluation     NPO Solid Status: > 8 hours             Airway   Mallampati: II  TM distance: >3 FB  Neck ROM: full  no difficulty expected  Dental - normal exam     Pulmonary - normal exam   (+) a smoker Current, Abstained day of surgery, COPD,  Cardiovascular - normal exam    (+) hypertension, past MI , CAD, cardiac stents more than 12 months ago , PVD      Neuro/Psych  (+) CVA  GI/Hepatic/Renal/Endo    (+) diabetes mellitus    Musculoskeletal     Abdominal    Substance History      OB/GYN          Other        ROS/Med Hx Other: HIV              Anesthesia Plan    ASA 4     general     (---------------------------               01/30/25                      1004         ---------------------------   BP:          141/90         Pulse:         70           Resp:          14           Temp:   37.1 °C (98.7 °F)   SpO2:          96%         ---------------------------)  intravenous induction     Anesthetic plan, risks, benefits, and alternatives have been provided, discussed and informed consent has been obtained with: patient.    CODE STATUS:    Code Status (Patient has no pulse and is not breathing): CPR (Attempt to Resuscitate)  Medical Interventions (Patient has pulse or is breathing): Full Support

## 2025-01-30 NOTE — PLAN OF CARE
Goal Outcome Evaluation:  Plan of Care Reviewed With: patient           Outcome Evaluation: VSS (see chart), AOx4, rm air, sabillon, prn pain medication to manage pain symptoms, continue plan of care, bedrest

## 2025-01-30 NOTE — ANESTHESIA POSTPROCEDURE EVALUATION
Patient: Cecilio Puckett    Procedure Summary       Date: 01/30/25 Room / [facility]    Anesthesia Start: 1219 Anesthesia Stop: 1427    Procedure: Left leg thrombectomy with angiogram and left Superficial femoral artery stent placement. (Left: Thigh) Diagnosis:       Atherosclerosis of artery of extremity with intermittent claudication      (Atherosclerosis of artery of extremity with intermittent claudication [I70.219])    Surgeons: Mukesh Wharton MD Provider: Nickolas Frausto MD    Anesthesia Type: general ASA Status: 4            Anesthesia Type: general    Vitals  Vitals Value Taken Time   /90 01/30/25 1600   Temp 36.7 °C (98.1 °F) 01/30/25 1423   Pulse 89 01/30/25 1612   Resp 16 01/30/25 1600   SpO2 99 % 01/30/25 1612   Vitals shown include unfiled device data.        Post Anesthesia Care and Evaluation    Anesthetic complications: No anesthetic complications

## 2025-01-30 NOTE — SIGNIFICANT NOTE
01/30/25 0740   OTHER   Discipline physical therapist   Rehab Time/Intention   Session Not Performed other (see comments)  (Pt is scheduled for LE thrombectomy this AM with stent placement and has been up with SBA in room with nursing. PT will check back tomorrow.)   Recommendation   PT - Next Appointment 01/31/25

## 2025-01-30 NOTE — ANESTHESIA PROCEDURE NOTES
Airway  Urgency: elective    Date/Time: 1/30/2025 12:37 PM  Airway not difficult    General Information and Staff    Patient location during procedure: OR  CRNA/CAA: Yvan Matos CRNA    Indications and Patient Condition  Indications for airway management: airway protection    Preoxygenated: yes  MILS not maintained throughout  Mask difficulty assessment: 1 - vent by mask    Final Airway Details  Final airway type: endotracheal airway      Successful airway: ETT  Cuffed: yes   Successful intubation technique: direct laryngoscopy  Endotracheal tube insertion site: oral  Blade: Scotty  Blade size: 4  ETT size (mm): 8.0  Cormack-Lehane Classification: grade I - full view of glottis  Placement verified by: chest auscultation and capnometry   Cuff volume (mL): 10  Measured from: teeth  ETT/EBT  to teeth (cm): 22  Number of attempts at approach: 1  Assessment: lips, teeth, and gum same as pre-op and atraumatic intubation

## 2025-01-30 NOTE — OP NOTE
Date of Admission:  1/28/2025  Today's Date:  01/30/25  Mukesh Wharton MD  [facility]    Preoperative Diagnosis:   Acute left leg ischemia.  History of aortobifemoral bypass with left SFA occlusion.    Postoperative Diagnosis:   Acute on chronic left leg peripheral arterial disease    Procedure Performed:   -Left superficial femoral artery cutdown with thrombectomy of the superficial femoral artery and popliteal artery.  -Left superficial femoral artery catheter placement with left lower extremity runoff arteriogram.  -Left superficial femoral artery stent placement with 8 x 100 self-expanding stent    Surgeon:   Mukesh Wharton MD    Assistant:    Ghada Ceron RN, Provided critical assistance in exposure, retraction, and suction that overall decrease blood loss and operative time.    Anesthesia:   General    Estimated Blood Loss:    cc    Findings:    Acute on chronic peripheral arterial disease with in situ thrombosis of the left superficial femoral artery.  After Mady embolectomy there was significant irregularities in the mid SFA likely related to a fixed stenosis/dissection.  Primary runoff was through the posterior tibial artery    Implants:    Implant Name Type Inv. Item Serial No.  Lot No. LRB No. Used Action   CLIPAPPLR M/ ENDO LIGACLIP9 3/8IN MD - [id number] Implant CLIPAPPLR M/ ENDO LIGACLIP9 3/8IN MD  [employer]  DIV OF J [employer] J [id number] Left 1 Implanted   CLIPAPPLR M/ ENDO LIGACLIP 9 3/8IN  - [id number] Implant CLIPAPPLR M/ ENDO LIGACLIP 9 3/8IN   [employer]  DIV OF J [employer] J [id number] Left 1 Implanted   STNT PROTEGE EVERFLXSEXP .035 8X100 120 - [id number] Stent STNT PROTEGE EVERFLXSEXP .035 8X100 120  EV3  [employer] [id number] Left 1 Implanted       Staff:   Cell Saver : Gucci Kraft  Circulator: Rocael Thomas RN  Scrub Person: Carola Laughlin  Assistant: Ghada Ceron CSFA  Vascular Radiology Technician: Patrick Barbosa    Specimen:    none    Complications:   none    Dispo:   to PACU    Indication for procedure:   55 y.o. male with peripheral arterial disease with low grade [place] class I ischemia.  Related to an SFA occlusion on the left side.  He has history of an aortobifemoral bypass done in the [facility].  He reports to the OR today for urgent revascularization.  Risk benefits discussed    Description of procedure:   The patient was taken to the operating room, anesthesia was administered via IV in a monitored setting. Surgical site was prepped and draped in the usual sterile manner. A full surgical time out was performed. Incision made in a vertical manner overlying the proximal left superficial femoral artery.  Bovie electrocautery was used to dissect down. Artery was encountered and circumferentially controlled.  Heparin was administered.  After 5 minutes, clamps were applied.  Arteriotomy was made.  There was pulsatile in-bleeding, and minimal back-bleeding.  A #4 Mady embolectomy catheter was placed down to 40 cm and a significant amount of acute thrombus removed.  Eventually, no more thrombus was being pulled back.  An 11-Swazi sheath was placed in the SFA, and a left lower extremity angiogram performed.  This showed mid SFA dissection with significant residual stenosis.  There was some atherosclerotic debris in the popliteal artery and main run-off was through the posterior tibial with occlusion of the peroneal and anterior tibial.  At this point, I performed an over-wire embolectomy of the popliteal artery to remove the residual atherosclerotic disease.  This was successful.  SFA was stented with an 8 x 100 self-expanding stent with postdilation with an 8 mm balloon.  This resolved all flow limitations, and angiographically had patent vessels to the foot.  Sheath was removed.  Arteriotomy closed with a running to-and-fro 5-0 Prolene.  Appropriate flushing maneuvers were taken prior to restoration of flow.  Good Doppler  signals noted throughout.  40 mg of protamine was administered.  Good hemostasis confirmed.  Deep tissues closed with 2-0 and 3-0 Vicryl and skin run closed with Vicryl in subcuticular manner.  Overall, the patient tolerated the procedure well.        At case completion all instrument count, needle count, and sponge counts were correct x2.    Mukesh Wharton MD  01/30/25     Active Hospital Problems    Diagnosis  POA    **Atherosclerosis of artery of extremity with intermittent claudication [I70.219]  Yes    Dyslipidemia [E78.5]  Yes    History of CVA (cerebrovascular accident) [Z86.73]  Not Applicable    Ischemic cardiomyopathy [I25.5]  Yes    HIV (human immunodeficiency virus infection) [Z21]  Yes    HTN (hypertension) [I10]  Yes    NHL (non-Hodgkin's lymphoma) [C85.90]  Yes    Type 2 diabetes mellitus with circulatory disorder, without long-term current use of insulin [E11.59]  Yes    Coronary artery disease involving native coronary artery of native heart without angina pectoris [I25.10]  Yes      Resolved Hospital Problems   No resolved problems to display.

## 2025-01-31 LAB
ANION GAP SERPL CALCULATED.3IONS-SCNC: 13 MMOL/L (ref 5–15)
APTT PPP: 35.7 SECONDS (ref 22.7–35.4)
APTT PPP: 39.6 SECONDS (ref 22.7–35.4)
APTT PPP: 72.8 SECONDS (ref 22.7–35.4)
BASOPHILS # BLD AUTO: 0.01 10*3/MM3 (ref 0–0.2)
BASOPHILS NFR BLD AUTO: 0.1 % (ref 0–1.5)
BUN SERPL-MCNC: 22 MG/DL (ref 6–20)
BUN/CREAT SERPL: 18.2 (ref 7–25)
CALCIUM SPEC-SCNC: 8.8 MG/DL (ref 8.6–10.5)
CHLORIDE SERPL-SCNC: 104 MMOL/L (ref 98–107)
CO2 SERPL-SCNC: 20 MMOL/L (ref 22–29)
CREAT SERPL-MCNC: 1.21 MG/DL (ref 0.76–1.27)
DEPRECATED RDW RBC AUTO: 47.2 FL (ref 37–54)
EGFRCR SERPLBLD CKD-EPI 2021: 71.2 ML/MIN/1.73
EOSINOPHIL # BLD AUTO: 0 10*3/MM3 (ref 0–0.4)
EOSINOPHIL NFR BLD AUTO: 0 % (ref 0.3–6.2)
ERYTHROCYTE [DISTWIDTH] IN BLOOD BY AUTOMATED COUNT: 14.8 % (ref 12.3–15.4)
GLUCOSE BLDC GLUCOMTR-MCNC: 239 MG/DL (ref 70–130)
GLUCOSE BLDC GLUCOMTR-MCNC: 240 MG/DL (ref 70–130)
GLUCOSE BLDC GLUCOMTR-MCNC: 260 MG/DL (ref 70–130)
GLUCOSE BLDC GLUCOMTR-MCNC: 339 MG/DL (ref 70–130)
GLUCOSE SERPL-MCNC: 274 MG/DL (ref 65–99)
HCT VFR BLD AUTO: 40.9 % (ref 37.5–51)
HGB BLD-MCNC: 13.8 G/DL (ref 13–17.7)
IMM GRANULOCYTES # BLD AUTO: 0.05 10*3/MM3 (ref 0–0.05)
IMM GRANULOCYTES NFR BLD AUTO: 0.4 % (ref 0–0.5)
LYMPHOCYTES # BLD AUTO: 1.9 10*3/MM3 (ref 0.7–3.1)
LYMPHOCYTES NFR BLD AUTO: 16.3 % (ref 19.6–45.3)
MCH RBC QN AUTO: 29.5 PG (ref 26.6–33)
MCHC RBC AUTO-ENTMCNC: 33.7 G/DL (ref 31.5–35.7)
MCV RBC AUTO: 87.4 FL (ref 79–97)
MONOCYTES # BLD AUTO: 0.39 10*3/MM3 (ref 0.1–0.9)
MONOCYTES NFR BLD AUTO: 3.3 % (ref 5–12)
NEUTROPHILS NFR BLD AUTO: 79.9 % (ref 42.7–76)
NEUTROPHILS NFR BLD AUTO: 9.31 10*3/MM3 (ref 1.7–7)
NRBC BLD AUTO-RTO: 0 /100 WBC (ref 0–0.2)
PLATELET # BLD AUTO: 191 10*3/MM3 (ref 140–450)
PMV BLD AUTO: 11.5 FL (ref 6–12)
POTASSIUM SERPL-SCNC: 4.2 MMOL/L (ref 3.5–5.2)
RBC # BLD AUTO: 4.68 10*6/MM3 (ref 4.14–5.8)
SODIUM SERPL-SCNC: 137 MMOL/L (ref 136–145)
WBC NRBC COR # BLD AUTO: 11.66 10*3/MM3 (ref 3.4–10.8)

## 2025-01-31 PROCEDURE — 85730 THROMBOPLASTIN TIME PARTIAL: CPT | Performed by: SURGERY

## 2025-01-31 PROCEDURE — 99024 POSTOP FOLLOW-UP VISIT: CPT | Performed by: SURGERY

## 2025-01-31 PROCEDURE — 85730 THROMBOPLASTIN TIME PARTIAL: CPT | Performed by: HOSPITALIST

## 2025-01-31 PROCEDURE — 85025 COMPLETE CBC W/AUTO DIFF WBC: CPT | Performed by: SURGERY

## 2025-01-31 PROCEDURE — 63710000001 INSULIN LISPRO (HUMAN) PER 5 UNITS: Performed by: SURGERY

## 2025-01-31 PROCEDURE — 25010000002 HEPARIN (PORCINE) 25000-0.45 UT/250ML-% SOLUTION: Performed by: SURGERY

## 2025-01-31 PROCEDURE — 80048 BASIC METABOLIC PNL TOTAL CA: CPT | Performed by: SURGERY

## 2025-01-31 PROCEDURE — 82948 REAGENT STRIP/BLOOD GLUCOSE: CPT

## 2025-01-31 RX ORDER — DAPAGLIFLOZIN 10 MG/1
10 TABLET, FILM COATED ORAL DAILY
Qty: 30 TABLET | Refills: 0 | Status: CANCELLED | OUTPATIENT
Start: 2025-01-31

## 2025-01-31 RX ORDER — WARFARIN SODIUM 10 MG/1
10 TABLET ORAL
Status: DISCONTINUED | OUTPATIENT
Start: 2025-02-01 | End: 2025-02-02

## 2025-01-31 RX ORDER — WARFARIN SODIUM 7.5 MG/1
15 TABLET ORAL
Status: DISCONTINUED | OUTPATIENT
Start: 2025-01-31 | End: 2025-02-03 | Stop reason: HOSPADM

## 2025-01-31 RX ORDER — MORPHINE SULFATE 2 MG/ML
2 INJECTION, SOLUTION INTRAMUSCULAR; INTRAVENOUS EVERY 4 HOURS PRN
Status: ACTIVE | OUTPATIENT
Start: 2025-01-31 | End: 2025-02-02

## 2025-01-31 RX ADMIN — AMLODIPINE BESYLATE 10 MG: 10 TABLET ORAL at 08:33

## 2025-01-31 RX ADMIN — CARVEDILOL 6.25 MG: 6.25 TABLET, FILM COATED ORAL at 08:33

## 2025-01-31 RX ADMIN — ROSUVASTATIN CALCIUM 20 MG: 10 TABLET, FILM COATED ORAL at 08:33

## 2025-01-31 RX ADMIN — SACUBITRIL AND VALSARTAN 1 TABLET: 49; 51 TABLET, FILM COATED ORAL at 21:02

## 2025-01-31 RX ADMIN — HEPARIN SODIUM 17.1 UNITS/KG/HR: 10000 INJECTION, SOLUTION INTRAVENOUS at 13:55

## 2025-01-31 RX ADMIN — PANTOPRAZOLE SODIUM 40 MG: 40 TABLET, DELAYED RELEASE ORAL at 06:40

## 2025-01-31 RX ADMIN — INSULIN LISPRO 3 UNITS: 100 INJECTION, SOLUTION INTRAVENOUS; SUBCUTANEOUS at 21:02

## 2025-01-31 RX ADMIN — EMPAGLIFLOZIN 25 MG: 25 TABLET, FILM COATED ORAL at 08:33

## 2025-01-31 RX ADMIN — INSULIN LISPRO 3 UNITS: 100 INJECTION, SOLUTION INTRAVENOUS; SUBCUTANEOUS at 17:35

## 2025-01-31 RX ADMIN — INSULIN LISPRO 5 UNITS: 100 INJECTION, SOLUTION INTRAVENOUS; SUBCUTANEOUS at 11:35

## 2025-01-31 RX ADMIN — ASPIRIN 81 MG: 81 TABLET, DELAYED RELEASE ORAL at 08:33

## 2025-01-31 RX ADMIN — INSULIN LISPRO 4 UNITS: 100 INJECTION, SOLUTION INTRAVENOUS; SUBCUTANEOUS at 06:40

## 2025-01-31 RX ADMIN — Medication 10 ML: at 21:02

## 2025-01-31 RX ADMIN — FUROSEMIDE 20 MG: 20 TABLET ORAL at 08:33

## 2025-01-31 RX ADMIN — Medication 10 ML: at 08:39

## 2025-01-31 RX ADMIN — CARVEDILOL 6.25 MG: 6.25 TABLET, FILM COATED ORAL at 21:02

## 2025-01-31 RX ADMIN — WARFARIN 15 MG: 7.5 TABLET ORAL at 18:55

## 2025-01-31 RX ADMIN — ELVITEGRAVIR, COBICISTAT, EMTRICITABINE, AND TENOFOVIR ALAFENAMIDE 1 TABLET: 150; 150; 200; 10 TABLET ORAL at 21:02

## 2025-01-31 RX ADMIN — SACUBITRIL AND VALSARTAN 1 TABLET: 49; 51 TABLET, FILM COATED ORAL at 08:34

## 2025-01-31 NOTE — PROGRESS NOTES
[facility] Clinical Pharmacy Services: Warfarin Dosing/Monitoring Consult    Cecilio Puckett is a 55 y.o. male, estimated creatinine clearance is 87.2 mL/min (by C-G formula based on SCr of 1.21 mg/dL). weighing 101 kg (222 lb 10.6 oz).    Results from last 7 days   Lab Units 01/31/25  1213 01/31/25  0455 01/30/25  2053 01/30/25  0607 01/30/25  0026 01/29/25  1014 01/29/25  0620 01/29/25  0112 01/28/25  1817   INR   --   --   --   --   --   --   --   --  1.36*   APTT seconds 39.6* 35.7* 28.8 75.1* 61.8*   < > 53.8*   < > 27.5   HEMOGLOBIN g/dL  --  13.8  --  13.6  --   --  13.7  --  14.1   HEMATOCRIT %  --  40.9  --  43.3  --   --  42.4  --  45.2   PLATELETS 10*3/mm3  --  191  --  190  --   --  183  --  196    < > = values in this interval not displayed.     Prior to admission anticoagulation: Warfarin 10 mg nightly except 15 mg on Mon, Wed and Fri    Hospital Anticoagulation:  Consulting provider: Dr. Case  Start date: 1/31  Indication: history of DVT/PE  Target INR: 2 - 3  Expected duration: Indefinite   Bridge Therapy: Yes  with unfractionated heparin    Potential food or drug interactions:     Education complete?/Date: N/A; home medication    Assessment/Plan:  Dose: Resume home regimen.  Warfarin 15 mg due tonight.  Monitor for any signs or symptoms of bleeding  Follow up daily INRs and dose adjustments    Pharmacy will continue to follow until discharge or discontinuation of warfarin.     Caleb Polo III [facility]  Clinical Pharmacist

## 2025-01-31 NOTE — CASE MANAGEMENT/SOCIAL WORK
Discharge Planning Assessment  [facility]     Patient Name: Cecilio Puckett  MRN: [id number]  Today's Date: 1/31/2025    Admit Date: 1/28/2025    Plan: Home with 13 y/o son   Discharge Needs Assessment       Row Name 01/31/25 1559       Living Environment    People in Home child(elmer), dependent    Name(s) of People in Home 13 y/o son    Current Living Arrangements home    Potentially Unsafe Housing Conditions none    Primary Care Provided by self    Family Caregiver if Needed parent(s)    Quality of Family Relationships involved;helpful    Able to Return to Prior Arrangements yes       Resource/Environmental Concerns    Resource/Environmental Concerns none    Transportation Concerns none       Transition Planning    Patient/Family Anticipates Transition to home    Patient/Family Anticipated Services at Transition none    Transportation Anticipated family or friend will provide       Discharge Needs Assessment    Readmission Within the Last 30 Days no previous admission in last 30 days    Equipment Currently Used at Home none    Concerns to be Addressed no discharge needs identified    Anticipated Changes Related to Illness none    Equipment Needed After Discharge none                   Discharge Plan       Row Name 01/31/25 1559       Plan    Plan Home with 13 y/o son    Patient/Family in Agreement with Plan yes    Plan Comments Spoke to pt at bedside, introduced self and explained CCP role, verified face sheet and pharmacy information. Pt lives with his 13 y/o son, in 2 level home with no ROSALBA and 2nd floor bedroom. Pt is IADL's, uses no medical equipment, he has no HH or SNF history, he plans return home with mother or sister to transport, denies dc needs CCP will follow - Dee S.                  Continued Care and Services - Admitted Since 1/28/2025    No active coordination exists for this encounter.       Expected Discharge Date and Time       Expected Discharge Date Expected Discharge Time    Jan 31, 2025             Demographic Summary       Row Name 01/31/25 1558       General Information    Admission Type observation                   Functional Status       Row Name 01/31/25 1558       Functional Status    Usual Activity Tolerance excellent    Current Activity Tolerance excellent       Assessment of Health Literacy    Health Literacy Excellent       Functional Status, IADL    Medications independent    Meal Preparation independent    Housekeeping independent    Laundry independent    Shopping independent       Mental Status    General Appearance WDL WDL       Mental Status Summary    Recent Changes in Mental Status/Cognitive Functioning no changes                   Psychosocial    No documentation.                  Abuse/Neglect    No documentation.                  Legal       Row Name 01/31/25 1559       Financial/Legal    Who Manages Finances if Patient Unable Mother                   Substance Abuse    No documentation.                  Patient Forms    No documentation.                     Dee Staton RN

## 2025-01-31 NOTE — PROGRESS NOTES
[facility]   Surgical Progress Note    Patient Name: Cecilio Puckett  : 1970  MRN: [id number]  Date of admission: 2025  Surgical Procedures Since Admission:  Procedure(s):  Left leg thrombectomy with angiogram and left Superficial femoral artery stent placement.  Surgeon:  Mukesh Wharton MD  Status:  1 Day Post-Op  -------------------    Subjective   Subjective     Chief Complaint: Postop follow-up.    Foot Pain     Patient had resolution of his left foot pain.  Tolerated surgery without difficulty.  Denies chest pain or shortness of breath.    Review of Systems    Objective   Objective     Vitals:   Temp:  [97.8 °F (36.6 °C)-98.7 °F (37.1 °C)] 98 °F (36.7 °C)  Heart Rate:  [70-92] 80  Resp:  [12-16] 16  BP: (122-150)/(76-99) 150/87    Physical Exam  Constitutional:       Appearance: He is well-developed.   Pulmonary:      Effort: Pulmonary effort is normal. No respiratory distress.   Abdominal:      General: There is no distension.      Palpations: Abdomen is soft.   Neurological:      Mental Status: He is alert and oriented to person, place, and time.     No concerns for left femoral infection.  Triphasic posterior tibial signal on the left foot.  Soft compartments.     Result Review    Result Review:  I have personally reviewed the results from the time of this admission to 2025 09:55 EST and agree with these findings:  [x]  Laboratory list / accordion  []  Microbiology  []  Radiology  []  EKG/Telemetry   []  Cardiology/Vascular   []  Pathology  []  Old records  []  Other:  Most notable findings include:       Results from last 7 days   Lab Units 25  0455 25  0607 25  0620 25  1817   WBC 10*3/mm3 11.66* 7.63 7.78 6.81   HEMOGLOBIN g/dL 13.8 13.6 13.7 14.1   PLATELETS 10*3/mm3 191 190 183 196     Results from last 7 days   Lab Units 25  0455 25  0607 25  0620 25  1817   SODIUM mmol/L 137 137 138 136   POTASSIUM mmol/L 4.2 3.9 4.1 4.1   CHLORIDE  mmol/L 104 104 107 104   CO2 mmol/L 20.0* 22.1 22.0 22.4   BUN mg/dL 22* 16 9 10   CREATININE mg/dL 1.21 1.27 0.91 1.19   GLUCOSE mg/dL 274* 168* 148* 323*   Estimated Creatinine Clearance: 87.2 mL/min (by C-G formula based on SCr of 1.21 mg/dL).  Results from last 7 days   Lab Units 01/28/25  1817   PROTIME Seconds 17.0*   INR  1.36*     Lab Results   Component Value Date    HGBA1C 9.30 (H) 01/29/2025    HGBA1C 10.00 (H) 05/14/2024    HGBA1C 11.40 (H) 09/29/2023     Glucose   Date/Time Value Ref Range Status   01/31/2025 0523 260 (H) 70 - 130 mg/dL Final   01/30/2025 2103 239 (H) 70 - 130 mg/dL Final   01/30/2025 1434 185 (H) 70 - 130 mg/dL Final   01/30/2025 1005 169 (H) 70 - 130 mg/dL Final   01/30/2025 0830 171 (H) 70 - 130 mg/dL Final   01/29/2025 2124 301 (H) 70 - 130 mg/dL Final   01/29/2025 1720 207 (H) 70 - 130 mg/dL Final   01/29/2025 1141 155 (H) 70 - 130 mg/dL Final       Assessment & Plan   Assessment / Plan     Brief Patient Summary:  Cecilio Puckett is a 55 y.o. male likely acute on chronic peripheral arterial disease in the setting of uncontrolled diabetes with A1c of 10.  Currently with Harris grade 1 ischemia.  Active tobacco abuse.     Aortobifemoral bypass performed by Dr. Mike Vargas in 2014.  2020: ABIs at [facility]: Noncompressible but reported normal waveforms.       Active Hospital Problems:   Active Hospital Problems    Diagnosis     **Atherosclerosis of artery of extremity with intermittent claudication     Dyslipidemia     History of CVA (cerebrovascular accident)     Ischemic cardiomyopathy     HIV (human immunodeficiency virus infection)     HTN (hypertension)     NHL (non-Hodgkin's lymphoma)     Type 2 diabetes mellitus with circulatory disorder, without long-term current use of insulin     Coronary artery disease involving native coronary artery of native heart without angina pectoris      Plan:   1/30/2025: Left superficial femoral artery open thrombectomy, angiogram, stent  placement.    1/31/2025: Postop day #1.  Warm well-perfused foot without complications.  Triphasic signal.  Will continue aspirin and heparin infusion today with plans for conversion to aspirin and Plavix long-term.  In addition to his Crestor.  Long discussion about need for absolute tobacco cessation and diabetic control.  He is quite young for these problems and if his risk factors are modified he would likely end up with an amputation at some point in his life.  Patient seems to understand.  Likely ready for discharge tomorrow.    VTE Prophylaxis:  Pharmacologic VTE prophylaxis orders are present.        Mukesh Wharton MD

## 2025-01-31 NOTE — SIGNIFICANT NOTE
01/31/25 1327   OTHER   Discipline physical therapist   Rehab Time/Intention   Session Not Performed other (see comments)  (Observed pt up walking independently with nsg around unit - pt denies any acute PT needs. Will sign-off, d/w RN.)   Therapy Assessment/Plan (PT)   Criteria for Skilled Interventions Met (PT) no;no problems identified which require skilled intervention

## 2025-01-31 NOTE — PROGRESS NOTES
[facility]     LOS: 0 days     Subjective:    CC:Foot Pain    DIET:  Diet Order   Procedures    Diet: Cardiac, Diabetic; Healthy Heart (2-3 Na+); Consistent Carbohydrate; Fluid Consistency: Thin (IDDSI 0)     Foot pain is resolved  Objective:    Vital Signs:  Temp:  [97.2 °F (36.2 °C)-98.5 °F (36.9 °C)] 97.2 °F (36.2 °C)  Heart Rate:  [76-87] 80  Resp:  [16] 16  BP: (116-150)/(76-92) 141/84    SpO2:  [95 %-97 %] 97 %  on   ;   Device (Oxygen Therapy): room air  Body mass index is 29.38 kg/m².    Physical Exam  General Awake alert answering questions appropriately  Heart is regular rate rhythm no murmurs rubs gallops  Lungs clear to auscultation bilaterally  Abdomen soft nontender nondistended  Extremities right foot temperature remains normal.    Results Review:    Glucose   Date Value Ref Range Status   01/31/2025 274 (H) 65 - 99 mg/dL Final   01/30/2025 168 (H) 65 - 99 mg/dL Final   01/29/2025 148 (H) 65 - 99 mg/dL Final   01/28/2025 323 (H) 65 - 99 mg/dL Final     Results from last 7 days   Lab Units 01/31/25  0455   WBC 10*3/mm3 11.66*   HEMOGLOBIN g/dL 13.8   HEMATOCRIT % 40.9   PLATELETS 10*3/mm3 191     Results from last 7 days   Lab Units 01/31/25  0455 01/30/25  0607 01/29/25  0620   SODIUM mmol/L 137   < > 138   POTASSIUM mmol/L 4.2   < > 4.1   CHLORIDE mmol/L 104   < > 107   CO2 mmol/L 20.0*   < > 22.0   BUN mg/dL 22*   < > 9   CREATININE mg/dL 1.21   < > 0.91   CALCIUM mg/dL 8.8   < > 8.5*   BILIRUBIN mg/dL  --   --  0.3   ALK PHOS U/L  --   --  67   ALT (SGPT) U/L  --   --  17   AST (SGOT) U/L  --   --  13   GLUCOSE mg/dL 274*   < > 148*    < > = values in this interval not displayed.     Results from last 7 days   Lab Units 01/31/25  1213 01/29/25  0112 01/28/25  1817   INR   --   --  1.36*   APTT seconds 39.6*   < > 27.5    < > = values in this interval not displayed.             Cultures:  No results found for: \"BLOODCX\", \"URINECX\", \"WOUNDCX\", \"MRSACX\", \"RESPCX\", " "\"STOOLCX\"    I have reviewed daily medications and changes in CPOE    Scheduled meds  amLODIPine, 10 mg, Oral, Daily  aspirin, 81 mg, Oral, Daily  carvedilol, 6.25 mg, Oral, Q12H  Zhbvwuz-Mifln-Vfaudssv-TenofAF, 1 tablet, Oral, Daily  empagliflozin, 25 mg, Oral, Daily  [START ON 2/4/2025] ergocalciferol, 50,000 Units, Oral, Weekly  furosemide, 20 mg, Oral, Daily  insulin lispro, 2-7 Units, Subcutaneous, 4x Daily AC & at Bedtime  pantoprazole, 40 mg, Oral, Q AM  rosuvastatin, 20 mg, Oral, Daily  sacubitril-valsartan, 1 tablet, Oral, Q12H  sodium chloride, 10 mL, Intravenous, Q12H  [START ON 2/1/2025] warfarin, 10 mg, Oral, Once per day on Sunday Tuesday Thursday Saturday  warfarin, 15 mg, Oral, Once per day on Monday Wednesday Friday        heparin, 5 Units/kg/hr, Last Rate: 17.1 Units/kg/hr (01/31/25 1355)  Pharmacy to dose warfarin,       PRN meds    acetaminophen **OR** acetaminophen **OR** acetaminophen    albuterol    senna-docusate sodium **AND** polyethylene glycol **AND** bisacodyl **AND** bisacodyl    dextrose    dextrose    famotidine    glucagon (human recombinant)    HYDROmorphone    melatonin    Morphine    ondansetron    Pharmacy to dose warfarin    [COMPLETED] Insert Peripheral IV **AND** sodium chloride    sodium chloride    sodium chloride        Atherosclerosis of artery of extremity with intermittent claudication    NHL (non-Hodgkin's lymphoma)    HIV (human immunodeficiency virus infection)    Type 2 diabetes mellitus with circulatory disorder, without long-term current use of insulin    HTN (hypertension)    Coronary artery disease involving native coronary artery of native heart without angina pectoris    Ischemic cardiomyopathy    History of CVA (cerebrovascular accident)    Dyslipidemia        Assessment/Plan:      Bilateral lower extremity peripheral vascular disease and left lower extremity intermittent claudication and rest ischemia in a patient with a history of dyslipidemia and lower " extremity peripheral vascular disease status post aortobifemoral bypass in the past.    -Status post revascularization but surgery  -Continue with aspirin and heparin    Type 2 diabetes.  Will hold long-acting insulin and Glucotrol.  Initiate sliding scale and continue Farxiga.    -Blood sugars reasonably controlled     history of coronary artery disease/hypertension/ischemic cardiomyopathy.  There is no clinical evidence of angina or congestive heart failure.    -Patient last echo on January 2025 revealed an ejection fraction of 46.8% with left ventricular hypertrophy and multiple left ventricular hypokinetic segments and grade 1 diastolic dysfunction with mild MR.  Good blood pressure control.  Will continue Norvasc/Coreg/Farxiga/Lasix/Crestor/Entresto    History of HIV.  No evidence of secondary infection.  Continue anti-HIV medications.    History of non-Hodgkin's lymphoma.  No clinical evidence of recurrence.  CBC is normal    History of CVA.  Negative CNS examination except old slurred speech..  Continue aspirin and Crestor.      Tobacco abuse.  Patient strongly counseled    VTE prophylaxis.  Patient anticoagulated.    Reviewed with vascular and we will start Coumadin in anticipation of possible discharge with aspirin and Coumadin               Raymon Case MD  01/31/25  16:51 EST

## 2025-01-31 NOTE — PLAN OF CARE
Goal Outcome Evaluation:      Pt complained of minimal pain through night with sabillon. Remained free from falls. Educated on need for sugar free drinks and snacks for diabetes control.

## 2025-01-31 NOTE — PAYOR COMM NOTE
"Cecilio Owens (55 y.o. Male)    PLEASE SEE ATTACHED FOR INPT AUTH  OBS 1/28  INPATIENT 1/31  REF#[id number]  PLEASE CALL JANNY PORTILLO RN/ DEPT   [phone] OR FAX  DEPT [phone]  THANK YOU   JANNY PORTILLO RN  [facility]      Date of Birth   1970    Social Security Number       Address   [address] [place] [address]    Home Phone   [phone]    MRN   [id number]       [facility]    Marital Status   Single                            Admission Date   1/28/25 OBS  1/31/25 INPATIENT    Admission Type   Emergency    Admitting Provider   Lisa Guo MD    Attending Provider   Raymon Case MD    Department, Room/Bed   [address], E548/1       Discharge Date       Discharge Disposition       Discharge Destination                                 Attending Provider: Raymon Case MD    Allergies: No Known Allergies    Isolation: None   Infection: None   Code Status: CPR    Ht: 185.4 cm (73\")   Wt: 101 kg (222 lb 10.6 oz)    Admission Cmt: None   Principal Problem: Atherosclerosis of artery of extremity with intermittent claudication [I70.219]                   Active Insurance as of 1/28/2025       Primary Coverage       Payor Plan Insurance Group Employer/Plan Group    AETNA MEDICARE REPLACEMENT AETNA MEDICARE REPLACEMENT [id number]       Payor Plan Address Payor Plan Phone Number Payor Plan Fax Number Effective Dates    [address] [phone]  1/1/2022 - None Entered    [facility] [address]         Subscriber Name Subscriber Birth Date Member ID       CECILIO OWENS 1970 [id number]                     Emergency Contacts        (Rel.) Home Phone Work Phone Mobile Phone    Linda Owens (Mother) -- -- [phone]              [place]: NPI [id number]  Tax ID [id number]     History & Physical        Lisa Guo MD at 01/29/25 0908              Patient Name:  Cecilio Owens  YOB: 1970  MRN:  " [id number]  Admit Date:  1/28/2025  Patient Care Team:  Lara Iverson APRN as PCP - General (Nurse Practitioner)  Bear Rojas PA as Referring Physician (Emergency Medicine)  Arthur Salazar MD as Consulting Physician (Hematology and Oncology)  Shashi Lomas III, MD as Cardiologist (Cardiology)        Chief Complaint   Patient presents with    Foot Pain   Left side.  Duration 3 weeks    History Present Illness     A pleasant 55 years old gentleman with a past history of coronary artery disease/hypertension/ischemic cardiomyopathy/HIV/NHL/DM2/dyslipidemia/CVA with slurred speech/peripheral vascular disease status post aortobifemoral bypass in the past.  Patient smokes cigar and occasionally consumes alcohol.  Patient presented to the emergency department with left foot pain that is made worse with walking and relieved by rest.  No trauma.  No back pain.  No pain in any other parts of the lower extremity.  Positive tingling left foot.  Patient did not describe any change in the color or temperature of his left foot.  No ulcers of the left foot.  No fever or chills.  In the emergency department C-reactive protein was elevated at 0.61.  ESR was normal.  CBC was normal.  PTT was normal.  INR was 1.36.  CMP normal except a random blood sugar of 323, calcium 8.5, albumin 3.3.  CTA of the aorta with body lateral iliofemoral runoff revealed left superficial femoral artery occlusion with collateral/right superficial femoral artery and popliteal artery occlusive disease.  Patient was subsequently admitted        Review of Systems   Cardiovascular/respiratory.  Positive exertional dyspnea.  No chest pain/no palpitations/no PND orthopnea/no ankle edema/positive occasional dry cough with an episode of hemoptysis 1 month ago that has resolved  .  Positive frequency.  No dysuria or hematuria.  GI.  No abdominal pain.  No nausea or vomiting.  Normal bowel movement without constipation/diarrhea/bleeding per  rectum/melena  CNS.  An episode of syncope 1 month ago without recurrence.  No dizziness.  Positive old slurred speech.  No other focal neurological symptoms  Personal History     Past Medical History:   Diagnosis Date    Anticoagulated on Coumadin 01/03/2013    COPD (chronic obstructive pulmonary disease)     Coronary artery disease 02/2011    Status post bare-metal stent placement to left circumflex 2/2011    Diabetes mellitus     DVT (deep venous thrombosis)     GERD (gastroesophageal reflux disease)     H/O Cancer     right arm, in throat, chest and stomach, in remission    H/O Ischemia of extremity     History of MI (myocardial infarction)     History of snoring     History of transfusion     HIV (human immunodeficiency virus infection)     Hyperlipidemia     Hypertension     Non Hodgkin's lymphoma     PAD (peripheral artery disease)     Pulmonary embolism     Stroke      Past Surgical History:   Procedure Laterality Date    CARDIAC CATHETERIZATION      CORONARY ANGIOPLASTY WITH STENT PLACEMENT      FEMORAL ARTERY - FEMORAL ARTERY BYPASS GRAFT      PORTACATH PLACEMENT       Family History   Problem Relation Age of Onset    Hypertension Mother     Diabetes Mother     Stroke Mother     Hypertension Maternal Grandmother      Social History     Tobacco Use    Smoking status: Every Day     Current packs/day: 1.00     Average packs/day: 1 pack/day for 31.1 years (31.1 ttl pk-yrs)     Types: Cigars, Cigarettes     Start date: 01/1994     Passive exposure: Current    Smokeless tobacco: Never   Vaping Use    Vaping status: Never Used   Substance Use Topics    Alcohol use: Yes     Alcohol/week: 2.0 standard drinks of alcohol     Types: 2 Cans of beer per week     Comment: occassionally    Drug use: Not Currently     No current facility-administered medications on file prior to encounter.     Current Outpatient Medications on File Prior to Encounter   Medication Sig Dispense Refill    Adult Aspirin Regimen 81 MG EC tablet  Take 1 tablet by mouth Daily.      albuterol sulfate  (90 Base) MCG/ACT inhaler Inhale 2 puffs Every 4 (Four) Hours As Needed.      amLODIPine (NORVASC) 10 MG tablet Take 1 tablet by mouth Daily.      carvedilol (COREG) 6.25 MG tablet Take 1 tablet by mouth Every 12 (Twelve) Hours. 180 tablet 3    dapagliflozin Propanediol (Farxiga) 10 MG tablet Take 10 mg by mouth Daily.      Jprfefi-Uetxb-Wbvcvqht-TenofAF (GENVOYA) 737-939-329-10 MG per tablet Take 1 tablet by mouth Daily.      ergocalciferol (ERGOCALCIFEROL) 1.25 MG (78644 UT) capsule Take 1 capsule by mouth 1 (One) Time Per Week.      furosemide (LASIX) 20 MG tablet Take 1 tablet by mouth Daily.      glipizide (GLUCOTROL) 10 MG tablet Take 1 tablet by mouth Daily.      Insulin Glargine (LANTUS SOLOSTAR) 100 UNIT/ML injection pen Inject 25 Units under the skin into the appropriate area as directed Every Night. 100 mL 0    Insulin Pen Needle 32G X 4 MM misc Use to inject insulin subcutaneously via pens daily 100 each 1    pantoprazole (PROTONIX) 40 MG EC tablet       rosuvastatin (CRESTOR) 20 MG tablet Take 1 tablet by mouth Daily.      sacubitril-valsartan (ENTRESTO) 49-51 MG tablet Take 1 tablet by mouth Every 12 (Twelve) Hours. 60 tablet 0    warfarin (COUMADIN) 10 MG tablet 1 and 1/2 tab M,W<F<S and S,T 1 tab      Diclofenac Sodium (VOLTAREN) 1 % gel gel Apply 4 g topically to the appropriate area as directed 2 (Two) Times a Day. 50 g 0    HYDROcodone-acetaminophen (NORCO)  MG per tablet Take 1 tablet by mouth As Needed.       No Known Allergies    Objective    Objective     Vital Signs  Temp:  [97.7 °F (36.5 °C)-98.6 °F (37 °C)] 97.7 °F (36.5 °C)  Heart Rate:  [] 64  Resp:  [16-18] 18  BP: (146-169)/(87-97) 146/97  SpO2:  [93 %-97 %] 93 %  on   ;   Device (Oxygen Therapy): room air  Body mass index is 29.38 kg/m².    Physical Exam  General.  Middle-aged gentleman.  Obese.  Alert and oriented x 4.  Slow verbal response.  In no apparent  pain/distress/diaphoresis.  Normal mood and affect.  Eyes.  Pupils equal round and reactive.  Intact extraocular musculature.  No pallor or jaundice.    Oral cavity.  Moist mucous membrane  Neck.  Supple.  No JVD.  No carotid bruit.  No lymphadenopathy or thyromegaly.  Cardiovascular.  Regular rate and rhythm with no gallops or murmurs.  Chest.  Poor bilateral air entry with no added sounds.  Abdomen.  Soft lax.  No tenderness.  No organomegaly.  No guarding or rebound.  No abdominal bruits.  Extremities.  Tenderness to touch of the left foot.  Left foot is colder than the right with no obvious cyanosis.  No ulcers.  No clubbing.  No edema.  No lower extremity neurodeficits.  Both dorsalis pedis and posterior tibial pulses were not felt bilaterally.  CNS.  Old slurred speech otherwise no acute focal neurological deficits.      Results Review:  I reviewed the patient's new clinical results.  I reviewed the patient's new imaging results and agree with the interpretation.  I reviewed the patient's other test results and agree with the interpretation  I personally viewed and interpreted the patient's EKG/Telemetry data  Discussed with ED provider.    Lab Results (last 24 hours)       Procedure Component Value Units Date/Time    CBC & Differential [[id number]]  (Abnormal) Collected: 01/28/25 1817    Specimen: Blood Updated: 01/28/25 1845    Narrative:      The following orders were created for panel order CBC & Differential.  Procedure                               Abnormality         Status                     ---------                               -----------         ------                     CBC Auto Differential[[id number]]        Abnormal            Final result                 Please view results for these tests on the individual orders.    Comprehensive Metabolic Panel [[id number]]  (Abnormal) Collected: 01/28/25 1817    Specimen: Blood Updated: 01/28/25 1852     Glucose 323 mg/dL      BUN 10 mg/dL      Creatinine  1.19 mg/dL      Sodium 136 mmol/L      Potassium 4.1 mmol/L      Chloride 104 mmol/L      CO2 22.4 mmol/L      Calcium 8.5 mg/dL      Total Protein 6.8 g/dL      Albumin 3.3 g/dL      ALT (SGPT) 17 U/L      AST (SGOT) 14 U/L      Alkaline Phosphatase 73 U/L      Total Bilirubin <0.2 mg/dL      Globulin 3.5 gm/dL      A/G Ratio 0.9 g/dL      BUN/Creatinine Ratio 8.4     Anion Gap 9.6 mmol/L      eGFR 73.0 mL/min/1.73     Narrative:      GFR Categories in Chronic Kidney Disease (CKD)      GFR Category          GFR (mL/min/1.73)    Interpretation  G1                     90 or greater         Normal or high (1)  G2                      60-89                Mild decrease (1)  G3a                   45-59                Mild to moderate decrease  G3b                   30-44                Moderate to severe decrease  G4                    15-29                Severe decrease  G5                    14 or less           Kidney failure          (1)In the absence of evidence of kidney disease, neither GFR category G1 or G2 fulfill the criteria for CKD.    eGFR calculation 2021 CKD-EPI creatinine equation, which does not include race as a factor    Protime-INR [[id number]]  (Abnormal) Collected: 01/28/25 1817    Specimen: Blood Updated: 01/28/25 1837     Protime 17.0 Seconds      INR 1.36    aPTT [[id number]]  (Normal) Collected: 01/28/25 1817    Specimen: Blood Updated: 01/28/25 1837     PTT 27.5 seconds     CBC Auto Differential [[id number]]  (Abnormal) Collected: 01/28/25 1817    Specimen: Blood Updated: 01/28/25 1845     WBC 6.81 10*3/mm3      RBC 4.97 10*6/mm3      Hemoglobin 14.1 g/dL      Hematocrit 45.2 %      MCV 90.9 fL      MCH 28.4 pg      MCHC 31.2 g/dL      RDW 15.3 %      RDW-SD 50.4 fl      MPV 11.2 fL      Platelets 196 10*3/mm3      Neutrophil % 41.1 %      Lymphocyte % 47.6 %      Monocyte % 8.4 %      Eosinophil % 2.1 %      Basophil % 0.7 %      Immature Grans % 0.1 %      Neutrophils, Absolute 2.80 10*3/mm3       Lymphocytes, Absolute 3.24 10*3/mm3      Monocytes, Absolute 0.57 10*3/mm3      Eosinophils, Absolute 0.14 10*3/mm3      Basophils, Absolute 0.05 10*3/mm3      Immature Grans, Absolute 0.01 10*3/mm3      nRBC 0.0 /100 WBC     C-reactive Protein [[id number]]  (Abnormal) Collected: 01/28/25 1817    Specimen: Blood Updated: 01/28/25 1852     C-Reactive Protein 0.61 mg/dL     Sedimentation Rate [[id number]]  (Normal) Collected: 01/28/25 1817    Specimen: Blood Updated: 01/28/25 1946     Sed Rate 10 mm/hr     aPTT [[id number]]  (Abnormal) Collected: 01/29/25 0112    Specimen: Blood Updated: 01/29/25 0246     .7 seconds     aPTT [[id number]]  (Abnormal) Collected: 01/29/25 0620    Specimen: Blood Updated: 01/29/25 0719     PTT 53.8 seconds     CBC & Differential [[id number]]  (Normal) Collected: 01/29/25 0620    Specimen: Blood Updated: 01/29/25 0712    Narrative:      The following orders were created for panel order CBC & Differential.  Procedure                               Abnormality         Status                     ---------                               -----------         ------                     CBC Auto Differential[[id number]]        Normal              Final result                 Please view results for these tests on the individual orders.    CBC Auto Differential [[id number]]  (Normal) Collected: 01/29/25 0620    Specimen: Blood Updated: 01/29/25 0712     WBC 7.78 10*3/mm3      RBC 4.78 10*6/mm3      Hemoglobin 13.7 g/dL      Hematocrit 42.4 %      MCV 88.7 fL      MCH 28.7 pg      MCHC 32.3 g/dL      RDW 14.9 %      RDW-SD 48.4 fl      MPV 10.9 fL      Platelets 183 10*3/mm3      nRBC 0.0 /100 WBC     Comprehensive Metabolic Panel [[id number]]  (Abnormal) Collected: 01/29/25 0620    Specimen: Blood Updated: 01/29/25 0708     Glucose 148 mg/dL      BUN 9 mg/dL      Creatinine 0.91 mg/dL      Sodium 138 mmol/L      Potassium 4.1 mmol/L      Chloride 107 mmol/L      CO2 22.0 mmol/L      Calcium 8.5  mg/dL      Total Protein 6.4 g/dL      Albumin 3.4 g/dL      ALT (SGPT) 17 U/L      AST (SGOT) 13 U/L      Alkaline Phosphatase 67 U/L      Total Bilirubin 0.3 mg/dL      Globulin 3.0 gm/dL      A/G Ratio 1.1 g/dL      BUN/Creatinine Ratio 9.9     Anion Gap 9.0 mmol/L      eGFR 100.2 mL/min/1.73     Narrative:      GFR Categories in Chronic Kidney Disease (CKD)      GFR Category          GFR (mL/min/1.73)    Interpretation  G1                     90 or greater         Normal or high (1)  G2                      60-89                Mild decrease (1)  G3a                   45-59                Mild to moderate decrease  G3b                   30-44                Moderate to severe decrease  G4                    15-29                Severe decrease  G5                    14 or less           Kidney failure          (1)In the absence of evidence of kidney disease, neither GFR category G1 or G2 fulfill the criteria for CKD.    eGFR calculation 2021 CKD-EPI creatinine equation, which does not include race as a factor    Manual Differential [[id number]] Collected: 01/29/25 0620    Specimen: Blood Updated: 01/29/25 0655    POC Glucose Once [[id number]]  (Abnormal) Collected: 01/29/25 0750    Specimen: Blood Updated: 01/29/25 0751     Glucose 184 mg/dL             Imaging Results (Last 24 Hours)       Procedure Component Value Units Date/Time    CT Angio Abdominal Aorta Bilateral Iliofem Runoff [[id number]] Collected: 01/28/25 1928     Updated: 01/28/25 1949    Narrative:      CTA ABDOMEN & PELVIS AND BILATERAL LOWER EXTREMITIES WITH IV CONTRAST     INDICATION:   Decreased peripheral pulses, left great toe pain, history of peripheral  vascular disease     TECHNIQUE:  CT angiography was performed of the abdomen and pelvis with axial images  as well as coronal and sagittal reformatted MIP images provided  following the administration of IV contrast.  3-D surface rendered  reformats of the vascular structures of the abdomen and  pelvis were  created and reviewed .   Radiation dose reduction techniques were  utilized, including automated exposure control, and exposure modulation  based on body size.        COMPARISON:   None available.     FINDINGS:     Lung bases: The visualized lung bases are unremarkable, without acute  abnormality.     Abdomen: The liver and gallbladder are normal.  The spleen and pancreas  appear normal.  Both adrenal glands are normal.  Both kidneys are  normal.         There is no abdominal or retroperitoneal adenopathy or other mass.   There is no abdominal or retroperitoneal inflammatory change, or  abnormal fluid or air collection.  There is no evidence of bowel  obstruction.       Pelvis:  The appendix is clearly identified, and is normal.  There is  diverticulosis, but there is no CT evidence of diverticulitis.  There is  no pelvic adenopathy or other soft tissue mass.  There is no CT evidence  of hernia or bowel obstruction.     Vascular: The visualized lower thoracic aorta is normal in caliber, and  there is no evidence of dissection.  The abdominal aorta is normal in  caliber, and there is no evidence of dissection.  The mesenteric and  renal vessels are normally patent, without acute abnormality.     There has been prior bilateral native iliac artery stenting, and the  right native common iliac is patent. There has been aorto bifemoral  bypass grafting. Both limbs of the graft are only patent.     On the right, despite mild atheromatous changes of the common deep and  superficial femoral arteries are patent. The popliteal artery is patent  across the knee, though there is focal severe stenosis in the popliteal  artery about 6 cm above the knee. Below the knee, there is extensive  atheromatous change. There is two-vessel runoff in the proximal to mid  calf via the anterior tibial and peroneal arteries. The anterior tibial  is heavily diseased but does appear to cross the ankle and supply the  foot. The  posterior tibial artery is occluded in the upper calf, but may  be partially reconstituted at about the ankle.     On the left, the common and deep and superficial femoral arteries are  patent proximally, but the superficial femoral artery occludes in the  proximal to mid thigh. There is reconstitution of the popliteal artery  10 to 11 cm above the knee. Below the knee, there is extensive  atheromatous disease, with noncontiguous opacification of the posterior  tibial, occlusion of the anterior tibial at its origin, and segmental  opacification of the peroneal artery in the proximal to mid calf.     There is no acute bony abnormality.       Impression:         No evidence of acute visceral, soft tissue, or bony abnormality in the  abdomen, or pelvis.     Abdominal aorta is normally patent and there has been prior  aortobifemoral bypass, and both limbs of the graft are normally patent.     On the right, the SFA and popliteal artery are patent though there is  focal popliteal stenosis a few centimeters above the knee, and below the  knee there is heavy atheromatous change and heavily diseased two-vessel  runoff.     On the left, there is occlusion of the SFA in the mid to upper thigh,  and then collateral reconstitution of the popliteal artery 10 to 11 cm  above the knee. There is extensive 7 trifurcation disease with only  segmental visualization of the posterior tibial and peroneal arteries.           This report was finalized on 1/28/2025 7:46 PM by Dr. Severo Vnan M.D on Workstation: [id number]               Results for orders placed during the hospital encounter of 01/14/25    Adult Transthoracic Echo Complete W/ Cont if Necessary Per Protocol    Interpretation Summary    Left ventricular systolic function is low normal. Calculated left ventricular EF = 46.8%    Left ventricular wall thickness is consistent with moderate septal asymmetric hypertrophy.    The following left ventricular wall segments are  hypokinetic: mid anterolateral, apical lateral and apex hypokinetic.    Left ventricular diastolic function is consistent with (grade I) impaired relaxation.    No significant valvular abnormalities.      No orders to display            Assessment/Plan     Active Hospital Problems    Diagnosis  POA    **Atherosclerosis of artery of extremity with intermittent claudication [I70.219]  Yes    History of CVA (cerebrovascular accident) [Z86.73]  Not Applicable    HIV (human immunodeficiency virus infection) [Z21]  Yes    HTN (hypertension) [I10]  Yes    NHL (non-Hodgkin's lymphoma) [C85.90]  Yes    Type 2 diabetes mellitus with circulatory disorder, without long-term current use of insulin [E11.59]  Yes    Coronary artery disease involving native coronary artery of native heart without angina pectoris [I25.10]  Yes      Resolved Hospital Problems   No resolved problems to display.           Bilateral lower extremity peripheral vascular disease and left lower extremity intermittent claudication and rest ischemia in a patient with a history of dyslipidemia and lower extremity peripheral vascular disease status post aortobifemoral bypass in the past.  CTA of the abdominal aorta with lower extremity runoff revealed an occlusive disease of the right SFA F a and popliteal and left SFA F a with collateral.  Patient supposedly on warfarin but INR is subtherapeutic.  Will continue IV heparin/Crestor/aspirin.  Vascular surgery planning revascularization procedure and angiographic evaluation tomorrow.  Type 2 diabetes.  Will hold long-acting insulin and Glucotrol.  Initiate sliding scale and continue Farxiga.  Hypoglycemia protocol.  Check A1c.  History of coronary artery disease/hypertension/ischemic cardiomyopathy.  There is no clinical evidence of angina or congestive heart failure.  Will check EKG.  Patient last echo on January 2025 revealed an ejection fraction of 46% with left ventricular hypertrophy and multiple left  ventricular hypokinetic segments and grade 1 diastolic dysfunction with mild MR.  Good blood pressure control.  Will continue Norvasc/Coreg/Farxiga/Lasix/Crestor/Entresto  History of HIV.  No evidence of secondary infection.  Continue anti-HIV medications.  History of non-Hodgkin's lymphoma.  No clinical evidence of recurrence.  CBC is normal  History of CVA.  Negative CNS examination except old slurred speech..  Continue aspirin and Crestor.    Tobacco abuse.  Patient strongly counseled  VTE prophylaxis.  Patient anticoagulated.          Discussed my findings and plan of treatment with the patient      Code Status -full code.       Lisa Guo MD  [facility]  01/29/25  09:08 EST     Electronically signed by Lisa Guo MD at 01/29/25 0938          Emergency Department Notes        Lara Marino RN at 01/28/25 2134          Nursing report ED to floor  Cecilio Puckett  54 y.o.  male    HPI :  HPI  Stated Reason for Visit: left foot pain  History Obtained From: patient    Chief Complaint  Chief Complaint   Patient presents with    Foot Pain       Admitting doctor:   Lisa Guo MD    Admitting diagnosis:   The primary encounter diagnosis was Atherosclerosis of artery of extremity with intermittent claudication. Diagnoses of Great toe pain, left, Hyperglycemia, Chronic anticoagulation, and Subtherapeutic anticoagulation were also pertinent to this visit.    Code status:   Current Code Status       Date Active Code Status Order ID Comments User Context       Prior            Allergies:   Patient has no known allergies.    Isolation:   No active isolations    Intake and Output  No intake or output data in the 24 hours ending 01/28/25 2134    Weight:   There were no vitals filed for this visit.    Most recent vitals:   Vitals:    01/28/25 1741 01/28/25 1745 01/28/25 1758 01/28/25 1931   BP:  164/93 149/93 149/97   BP Location:  Right arm Right arm Right arm   Patient Position:   Sitting Lying Sitting   Pulse: 101  78 81   Resp: 16  16 16   Temp: 97.8 °F (36.6 °C)      TempSrc: Tympanic      SpO2: 97%          Active LDAs/IV Access:   Lines, Drains & Airways       Active LDAs       Name Placement date Placement time Site Days    Peripheral IV 01/28/25 1817 Anterior;Right Forearm 01/28/25 1817  Forearm  less than 1    Peripheral IV 01/28/25 2125 Left;Posterior Hand 01/28/25 2125  Hand  less than 1                    Labs (abnormal labs have a star):   Labs Reviewed   COMPREHENSIVE METABOLIC PANEL - Abnormal; Notable for the following components:       Result Value    Glucose 323 (*)     Calcium 8.5 (*)     Albumin 3.3 (*)     All other components within normal limits    Narrative:     GFR Categories in Chronic Kidney Disease (CKD)      GFR Category          GFR (mL/min/1.73)    Interpretation  G1                     90 or greater         Normal or high (1)  G2                      60-89                Mild decrease (1)  G3a                   45-59                Mild to moderate decrease  G3b                   30-44                Moderate to severe decrease  G4                    15-29                Severe decrease  G5                    14 or less           Kidney failure          (1)In the absence of evidence of kidney disease, neither GFR category G1 or G2 fulfill the criteria for CKD.    eGFR calculation 2021 CKD-EPI creatinine equation, which does not include race as a factor   PROTIME-INR - Abnormal; Notable for the following components:    Protime 17.0 (*)     INR 1.36 (*)     All other components within normal limits   CBC WITH AUTO DIFFERENTIAL - Abnormal; Notable for the following components:    MCHC 31.2 (*)     Neutrophil % 41.1 (*)     Lymphocyte % 47.6 (*)     Lymphocytes, Absolute 3.24 (*)     All other components within normal limits   C-REACTIVE PROTEIN - Abnormal; Notable for the following components:    C-Reactive Protein 0.61 (*)     All other components within normal  limits   APTT - Normal   SEDIMENTATION RATE - Normal   APTT   CBC WITH AUTO DIFFERENTIAL   CBC AND DIFFERENTIAL    Narrative:     The following orders were created for panel order CBC & Differential.  Procedure                               Abnormality         Status                     ---------                               -----------         ------                     CBC Auto Differential[[id number]]        Abnormal            Final result                 Please view results for these tests on the individual orders.   CBC AND DIFFERENTIAL    Narrative:     The following orders were created for panel order CBC & Differential.  Procedure                               Abnormality         Status                     ---------                               -----------         ------                     CBC Auto Differential[[id number]]                                                         Please view results for these tests on the individual orders.       EKG:   No orders to display       Meds given in ED:   Medications   sodium chloride 0.9 % flush 10 mL (has no administration in time range)   heparin 58590 units/250 mL (100 units/mL) in 0.45 % NaCl infusion (14.56 Units/kg/hr × 103 kg Intravenous New Bag 1/28/25 2100)   heparin (porcine) 5000 UNIT/ML injection 4,100-8,200 Units (has no administration in time range)   iopamidol (ISOVUE-370) 76 % injection 100 mL (95 mL Intravenous Given 1/28/25 1909)   heparin (porcine) 5000 UNIT/ML injection 8,200 Units (8,200 Units Intravenous Given 1/28/25 2100)       Imaging results:  CT Angio Abdominal Aorta Bilateral Iliofem Runoff    Result Date: 1/28/2025   No evidence of acute visceral, soft tissue, or bony abnormality in the abdomen, or pelvis.  Abdominal aorta is normally patent and there has been prior aortobifemoral bypass, and both limbs of the graft are normally patent.  On the right, the SFA and popliteal artery are patent though there is focal popliteal stenosis a  few centimeters above the knee, and below the knee there is heavy atheromatous change and heavily diseased two-vessel runoff.  On the left, there is occlusion of the SFA in the mid to upper thigh, and then collateral reconstitution of the popliteal artery 10 to 11 cm above the knee. There is extensive 7 trifurcation disease with only segmental visualization of the posterior tibial and peroneal arteries.    This report was finalized on 1/28/2025 7:46 PM by Dr. Severo Vann M.D on Workstation: [id number]       Ambulatory status:   - assist x1    Social issues:   Social History     Socioeconomic History    Marital status: Single   Tobacco Use    Smoking status: Every Day     Current packs/day: 1.00     Average packs/day: 1 pack/day for 31.1 years (31.1 ttl pk-yrs)     Types: Cigars, Cigarettes     Start date: 01/1994     Passive exposure: Current    Smokeless tobacco: Never   Vaping Use    Vaping status: Never Used   Substance and Sexual Activity    Alcohol use: Yes     Alcohol/week: 2.0 standard drinks of alcohol     Types: 2 Cans of beer per week     Comment: occassionally    Drug use: Not Currently    Sexual activity: Defer       Peripheral Neurovascular  Peripheral Neurovascular (Adult)  Peripheral Neurovascular WDL: .WDL except, pulse assessment  Pulse Assessment: dorsalis pedis, posterior tibial    Neuro Cognitive  Neuro Cognitive (Adult)  Cognitive/Neuro/Behavioral WDL: WDL    Learning  Learning Assessment  Learning Readiness and Ability: no barriers identified    Respiratory  Respiratory WDL  Respiratory WDL: WDL    Abdominal Pain       Pain Assessments  Pain (Adult)  (0-10) Pain Rating: Rest: 10  (0-10) Pain Rating: Activity: 10    NIH Stroke Scale       Lara Marino RN  01/28/25 21:34 EST     Electronically signed by Lara Marino RN at 01/28/25 4427       Derrick Aguirre MD at 01/28/25 0947          MD ATTESTATION NOTE    The LAURA and I have discussed this patient's history, physical  exam, MDM, and treatment plan.  I have reviewed the documentation and personally had a face to face interaction with the patient. I affirm the documentation and agree with the treatment and plan.  The attached note describes my personal findings.      I provided a substantive portion of the medical decision making.        Brief HPI: 54-year-old male, history of HIV, diabetes, DVT on warfarin, tobacco abuse, presents to the ED for evaluation of worsening pain in his foot.  He complains of worsening pain in all the toes of his left foot.  Achy.    PHYSICAL EXAM  ED Triage Vitals   Temp Heart Rate Resp BP SpO2   01/28/25 1741 01/28/25 1741 01/28/25 1741 01/28/25 1745 01/28/25 1741   97.8 °F (36.6 °C) 101 16 164/93 97 %      Temp src Heart Rate Source Patient Position BP Location FiO2 (%)   01/28/25 1741 01/28/25 1741 01/28/25 1745 01/28/25 1745 --   Tympanic Monitor Sitting Right arm          GENERAL: no acute distress  HENT: nares patent  EYES: no scleral icterus  CV: regular rhythm, normal rate  RESPIRATORY: normal effort  MUSCULOSKELETAL: no deformity, Extremities are warm.  Palpable DP pulse.  Unable to palpate PT pulse.  Patient declined Doppler at this time to go to restroom  NEURO: alert, moves all extremities, follows commands  PSYCH:  calm, cooperative  SKIN: warm, dry    Vital signs and nursing notes reviewed.    Critical care provider statement:    Critical care time (minutes): 20.   Critical care time was exclusive of:  Separately billable procedures and treating other patients   Critical care was necessary to treat or prevent imminent or life-threatening deterioration of the following conditions:  Circulatory Failure   Critical care was time spent personally by me on the following activities:  Development of treatment plan with patient or surrogate, discussions with consultants, evaluation of patient's response to treatment, examination of patient, obtaining history from patient or surrogate, ordering and  performing treatments and interventions, ordering and review of laboratory studies, ordering and review of radiographic studies, pulse oximetry, re-evaluation of patient's condition and review of old charts. Critical Care indicators:  Others: aminophylline, diazepam, glucagon, heparin, lovenox, morphine, sodium bicarbonate, et al.  Severe arterial disease, monitor for critical limb ischemia, requiring heparin    Medical Decision Making:  ED Course as of 01/28/25 2040 Tue Jan 28, 2025 1853 Glucose(!): 323 [JG]   1853 Creatinine: 1.19 [JG]   1853 Sodium: 136 [JG]   1853 Potassium: 4.1 [JG]   1853 C-Reactive Protein(!): 0.61 [JG]   1853 WBC: 6.81 [JG]   1853 Hemoglobin: 14.1 [JG]   1853 Hematocrit: 45.2 [JG]   1946 CT Angio Abdominal Aorta Bilateral Iliofem Runoff  I reviewed CT images.  Lack of signal in proximal left external iliac artery with distal reconstitution, faint proximal anterior tibial artery which loses opacification, faint peroneal artery which loses opacification of mid calf, patient with one-vessel runoff to left foot.  I reviewed radiologist interpretation of CT images [DN]   2012 I updated the patient on current ED work up, including labs and imaging (if performed) with indication for admission. All questions and concerns addressed and ready for admit at this time.    [JG]   2012 I discussed results with patient, severe arterial disease, subtherapeutic INR, recommended admission for heparin and vascular evaluation.  Patient wishes to stay here for his care.  He does follow with Dr. Leon at [facility]. [DN]   2023 Discussed with A vernon, she agrees to admission for vascular consult. Heparin has been ordered. No further recommendations.  [JG]      ED Course User Index  [DN] Derrick Aguirre MD  [JG] Vinita Romero APRN Nichols, Dylan J, MD  01/28/25 1848       Derrick Augirre MD  01/28/25 1848       Derrick Aguirre MD  01/28/25 2041      Electronically signed by Carla  "Derrick THOMPSON MD at 01/28/25 2041       Sandy Dickson, RN at 01/28/25 1757          Patient states he has had sharp shooting pain on the bottom of his foot \"for a while now.\" Patient states he had an xray last time and would like an MRI this time. Patient states that he has not been able to follow up yet with podiatry.     Electronically signed by Sandy Dickson, RN at 01/28/25 1758       Medication Administration Report for Cecilio Puckett as of 1/30/25 through 1/31/25     Legend:    Given Hold Not Given Due Canceled Entry Other Actions    Time Time (Time) Time Time-Action         Discontinued     Completed     Future     MAR Hold     Linked             Medications 01/30/25 01/31/25      acetaminophen (TYLENOL) tablet 650 mg  Dose: 650 mg  Freq: Every 4 Hours PRN Route: PO  PRN Reason: Mild Pain  Start: 01/28/25 2224     Admin Instructions:   If given for fever, use fever parameter: fever greater than 100.4 °F  Based on patient request - if ordered for moderate or severe pain, provider allows for administration of a medication prescribed for a lower pain scale.    Do not exceed 4 grams of acetaminophen in a 24 hr period. Max dose of 2gm for AST/ALT greater than 120 units/L.    If given for pain, use the following pain scale:   Mild Pain = Pain Score of 1-3, CPOT 1-2  Moderate Pain = Pain Score of 4-6, CPOT 3-4  Severe Pain = Pain Score of 7-10, CPOT 5-8      0951-MAR Hold     1628-MAR Unhold              Or   acetaminophen (TYLENOL) 160 MG/5ML oral solution 650 mg  Dose: 650 mg  Freq: Every 4 Hours PRN Route: PO  PRN Reason: Mild Pain  Start: 01/28/25 2224     Admin Instructions:   If given for fever, use fever parameter: fever greater than 100.4 °F  Based on patient request - if ordered for moderate or severe pain, provider allows for administration of a medication prescribed for a lower pain scale.    Do not exceed 4 grams of acetaminophen in a 24 hr period. Max dose of 2gm for AST/ALT greater than 120 units/L.    If " given for pain, use the following pain scale:   Mild Pain = Pain Score of 1-3, CPOT 1-2  Moderate Pain = Pain Score of 4-6, CPOT 3-4  Severe Pain = Pain Score of 7-10, CPOT 5-8      0951-MAR Hold     1628-MAR Unhold              Or   acetaminophen (TYLENOL) suppository 650 mg  Dose: 650 mg  Freq: Every 4 Hours PRN Route: RE  PRN Reason: Mild Pain  Start: 01/28/25 2224     Admin Instructions:   If given for fever, use fever parameter: fever greater than 100.4 °F  Based on patient request - if ordered for moderate or severe pain, provider allows for administration of a medication prescribed for a lower pain scale.    Do not exceed 4 grams of acetaminophen in a 24 hr period. Max dose of 2gm for AST/ALT greater than 120 units/L.    If given for pain, use the following pain scale:   Mild Pain = Pain Score of 1-3, CPOT 1-2  Moderate Pain = Pain Score of 4-6, CPOT 3-4  Severe Pain = Pain Score of 7-10, CPOT 5-8      0951-MAR Hold     1628-MAR Unhold              albuterol (PROVENTIL) nebulizer solution 0.083% 2.5 mg/3mL  Dose: 2.5 mg  Freq: Every 6 Hours PRN Route: NEBULIZATION  PRN Reasons: Shortness of Air,Wheezing  Start: 01/29/25 0901      0951-MAR Hold     1628-MAR Unhold              amLODIPine (NORVASC) tablet 10 mg  Dose: 10 mg  Freq: Daily Route: PO  Start: 01/29/25 1000     Admin Instructions:   Hold for SBP less than 100, DBP less than 60.  Caution: Look alike/sound alike drug alert. Avoid grapefruit juice.      0851-Given            0833-Given              aspirin EC tablet 81 mg  Dose: 81 mg  Freq: Daily Route: PO  Start: 01/30/25 0900     Admin Instructions:   Do not crush or chew the capsules or tablets. The drug may not work as designed if the capsule or tablet is crushed or chewed. Swallow whole.  Do not exceed 4 grams of aspirin in a 24 hr period.    If given for pain, use the following pain scale:   Mild Pain = Pain Score of 1-3, CPOT 1-2  Moderate Pain = Pain Score of 4-6, CPOT 3-4  Severe Pain = Pain  Score of 7-10, CPOT 5-8      0851-Given     0951-MAR Hold     1628-MAR Unhold          0833-Given               sennosides-docusate (PERICOLACE) 8.6-50 MG per tablet 2 tablet  Dose: 2 tablet  Freq: 2 Times Daily PRN Route: PO  PRN Reason: Constipation  Start: 01/28/25 2224     Admin Instructions:   Start bowel management regimen if patient has not had a bowel movement after 12 hours.      0951-MAR Hold     1628-MAR Unhold              And   polyethylene glycol (MIRALAX) packet 17 g  Dose: 17 g  Freq: Daily PRN Route: PO  PRN Reason: Constipation  PRN Comment: Use if senna-docusate is ineffective  Start: 01/28/25 2224     Admin Instructions:   Use if no bowel movement after 12 hours. Mix in 6-8 ounces of water.  Use 4-8 ounces of water, tea, or juice for each 17 gram dose.      0951-MAR Hold     1628-MAR Unhold              And   bisacodyl (DULCOLAX) EC tablet 5 mg  Dose: 5 mg  Freq: Daily PRN Route: PO  PRN Reason: Constipation  PRN Comment: Use if polyethylene glycol is ineffective  Start: 01/28/25 2224     Admin Instructions:   Use if no bowel movement after 12 hours.  Swallow whole. Do not crush, split, or chew tablet.      0951-MAR Hold     1628-MAR Unhold              And   bisacodyl (DULCOLAX) suppository 10 mg  Dose: 10 mg  Freq: Daily PRN Route: RE  PRN Reason: Constipation  PRN Comment: Use if bisacodyl oral is ineffective  Start: 01/28/25 2224     Admin Instructions:   Use if no bowel movement after 12 hours.  Hold for diarrhea      0951-MAR Hold     1628-MAR Unhold              carvedilol (COREG) tablet 6.25 mg  Dose: 6.25 mg  Freq: Every 12 Hours Scheduled Route: PO  Start: 01/29/25 1000     Admin Instructions:   Hold for SBP less than 100, DBP less than 60, or heart rate less than 50. If a dose is held, please contact the provider.  Give with food.      0851-Given     2110-Given           0833-Given     2100             dextrose (D50W) (25 g/50 mL) IV injection 25 g  Dose: 25 g  Freq: Every 15 Minutes  PRN Route: IV  PRN Reason: Low Blood Sugar  PRN Comment: Blood Sugar Less Than 70  Start: 01/28/25 2224     Admin Instructions:   Blood sugar less than 70; patient has IV access - Unresponsive, NPO or Unable To Safely Swallow      0951-MAR Hold     1628-MAR Unhold              dextrose (GLUTOSE) oral gel 15 g  Dose: 15 g  Freq: Every 15 Minutes PRN Route: PO  PRN Reason: Low Blood Sugar  PRN Comment: Blood sugar less than 70  Start: 01/28/25 2224     Admin Instructions:   BS<70, Patient Alert, Is not NPO, Can safely swallow.      0951-MAR Hold     1628-MAR Unhold              Dnzwinp-Apuid-Gyrdkiex-TenofAF (GENVOYA) 335-131-529-10 MG per tablet 1 tablet  Dose: 1 tablet  Freq: Daily Route: PO  Start: 01/31/25 1203     Admin Instructions:   Give with food.       2100              empagliflozin (JARDIANCE) tablet 25 mg  Dose: 25 mg  Freq: Daily Route: PO  Start: 01/29/25 1000      0851-Given     0951-MAR Hold     1628-MAR Unhold          0833-Given              famotidine (PEPCID) tablet 20 mg  Dose: 20 mg  Freq: 2 Times Daily PRN Route: PO  PRN Reasons: Heartburn,Indigestion  Start: 01/28/25 2224          furosemide (LASIX) tablet 20 mg  Dose: 20 mg  Freq: Daily Route: PO  Start: 01/29/25 1000      0851-Given     0951-MAR Hold     1628-MAR Unhold          0833-Given              glucagon (GLUCAGEN) injection 1 mg  Dose: 1 mg  Freq: Every 15 Minutes PRN Route: IM  PRN Reason: Low Blood Sugar  PRN Comment: Blood Glucose Less Than 70  Start: 01/28/25 2224     Admin Instructions:   Blood Glucose Less Than 70 - Patient Without IV Access - Unresponsive, NPO or Unable To Safely Swallow  Reconstitute powder for injection by adding 1 mL of -supplied sterile diluent or sterile water for injection to a vial containing 1 mg of the drug, to provide solutions containing 1 mg/mL. Shake vial gently to dissolve.      0951-MAR Hold     1628-MAR Unhold              heparin 51132 units/250 mL (100 units/mL) in 0.45 % NaCl  infusion  Rate: 5.05 mL/hr Dose: 5 Units/kg/hr  Weight Dosing Info: 101 kg  Freq: Titrated Route: IV  Indications of Use: Other - full anticoagulation  Start: 01/30/25 1715     Admin Instructions:   Weight Based Heparin Nomogram: VTE / PE: Heparin Infusion 18 units/kg/hr (initial max of 1,500 units/hr)    aPTT Less Than 60: No Bolus. Increase Dose By 4 units/kg/hr.  aPTT 60-70: No Bolus. Increase Dose By 2 units/kg/hr.  aPTT 71-95: No Bolus, No Dose Change  aPTT : No Bolus, Decrease Dose By 2 units/kg/hr.  aPTT Greater Than 115: No Bolus. Hold Infusion For 1 hour.  Decrease Dose By 3 units/kg/hr. Repeat aPTT 6 Hours After Restarted.  If aPTT Remains Greater Than 115 Stop Heparin Drip & Contact Provider      1650-Currently Infusing     1923-Handoff     2120-New Bag          0058-Handoff     0635-Rate Change (DUAL SIGN)     0714-Handoff     1353-Rate Change (DUAL SIGN)     1355-New Bag          HYDROmorphone (DILAUDID) injection 0.5 mg  Dose: 0.5 mg  Freq: Every 2 Hours PRN Route: IV  PRN Reason: Severe Pain  Start: 01/30/25 1628 End: 02/04/25 1627     Admin Instructions:   Based on patient request - if ordered for moderate or severe pain, provider allows for administration of a medication prescribed for a lower pain scale.  If given for pain, use the following pain scale:  Mild Pain = Pain Score of 1-3, CPOT 1-2  Moderate Pain = Pain Score of 4-6, CPOT 3-4  Severe Pain = Pain Score of 7-10, CPOT 5-8          insulin lispro (HUMALOG/ADMELOG) injection 2-7 Units  Dose: 2-7 Units  Freq: 4 Times Daily Before Meals & Nightly Route: SC  Start: 01/29/25 0730     Admin Instructions:   Correction Insulin - Low Dose - Total Insulin Dose Less Than 40 units/day (Lean, Elderly or Renal Patients)    Blood Glucose 150-199 mg/dL - 2 units  Blood Glucose 200-249 mg/dL - 3 units  Blood Glucose 250-299 mg/dL - 4 units  Blood Glucose 300-349 mg/dL - 5 units  Blood Glucose 350-400 mg/dL - 6 units  Blood Glucose Greater Than 400  mg/dL - 7 units & Call Provider   Caution: Look alike/sound alike drug alert      (0843)-Not Given     0951-MAR Hold     1130-MAR Hold     1628-MAR Unhold     1803-Given       2110-Given            0640-Given     1135-Given     1730     2100           melatonin tablet 5 mg  Dose: 5 mg  Freq: Nightly PRN Route: PO  PRN Reason: Sleep  Start: 01/28/25 2230      0951-MAR Hold     1628-MAR Unhold              ondansetron (ZOFRAN) injection 4 mg  Dose: 4 mg  Freq: Every 6 Hours PRN Route: IV  PRN Reasons: Nausea,Vomiting  Start: 01/28/25 2224     Admin Instructions:   If BOTH ondansetron (ZOFRAN) and promethazine (PHENERGAN) are ordered use ondansetron first and THEN promethazine IF ondansetron is ineffective.      0951-MAR Hold     1628-MAR Unhold              pantoprazole (PROTONIX) EC tablet 40 mg  Dose: 40 mg  Freq: Every Early Morning Route: PO  Start: 01/29/25 1000     Admin Instructions:   Do not crush or chew the capsules or tablets. The drug may not work as designed if the capsule or tablet is crushed or chewed. Swallow whole.  Swallow whole; do not crush, split, or chew.      (0523)-Not Given     0951-MAR Hold     1628-MAR Unhold          0640-Given              rosuvastatin (CRESTOR) tablet 20 mg  Dose: 20 mg  Freq: Daily Route: PO  Start: 01/29/25 1000     Admin Instructions:   Avoid grapefruit juice.      0851-Given     0951-MAR Hold     1628-MAR Unhold          0833-Given              sacubitril-valsartan (ENTRESTO) 49-51 MG tablet 1 tablet  Dose: 1 tablet  Freq: Every 12 Hours Scheduled Route: PO  Start: 01/29/25 1000     Order specific questions:   Is this new therapy for the patient? No      0851-Given     0951-MAR Hold     1628-MAR Unhold     2110-Given         0834-Given     2100             sodium chloride 0.9 % flush 10 mL  Dose: 10 mL  Freq: As Needed Route: IV  PRN Reason: Line Care  Start: 01/28/25 2224      0951-MAR Hold     1628-MAR Unhold              sodium chloride 0.9 % flush 10 mL  Dose: 10  mL  Freq: Every 12 Hours Scheduled Route: IV  Start: 01/28/25 2330      0852-Given     0951-MAR Hold     1628-MAR Unhold     2111-Given         0839-Given     2100              sodium chloride 0.9 % flush 10 mL  Dose: 10 mL  Freq: As Needed Route: IV  PRN Reason: Line Care  Start: 01/28/25 1806      0951-MAR Hold     1628-MAR Unhold              sodium chloride 0.9 % infusion 40 mL  Dose: 40 mL  Freq: As Needed Route: IV  PRN Reason: Line Care  Start: 01/28/25 2224     Admin Instructions:   Following administration of an IV intermittent medication, flush line with 40mL NS at 100mL/hr.      0951-MAR Hold     1628-MAR Unhold              Future Medications  Medications 01/30/25 01/31/25      ergocalciferol capsule 50,000 Units  Dose: 50,000 Units  Freq: Weekly Route: PO  Start: 02/04/25 0900      0951-MAR Hold     1628-MAR Unhold              Completed Medications  Medications 01/30/25 01/31/25      ceFAZolin 2000 mg IVPB in 100 mL NS (MBP)  Dose: 2,000 mg  Freq: Once Route: IV  Indications of Use: PERIOPERATIVE PHARMACOPROPHYLAXIS  Start: 01/30/25 1052 End: 01/30/25 1241     Admin Instructions:   Caution: Look alike/sound alike drug alert      1211-New Bag               famotidine (PEPCID) injection 20 mg  Dose: 20 mg  Freq: Once Route: IV  Start: 01/30/25 1013 End: 01/30/25 1042     Admin Instructions:   Give IV push over 2 minutes.      1042-Given               fentaNYL citrate (PF) (SUBLIMAZE) injection 50 mcg  Dose: 50 mcg  Freq: Once As Needed Route: IV  PRN Reason: Severe Pain  Start: 01/30/25 1011 End: 01/30/25 1042     Admin Instructions:   Maximum total dose of fentanyl is 50 mcg without additional orders from physician. May be used for preoperative pain or procedural sedation.  If given for pain, use the following pain scale:  Mild Pain = Pain Score of 1-3, CPOT 1-2  Moderate Pain = Pain Score of 4-6, CPOT 3-4  Severe Pain = Pain Score of 7-10, CPOT 5-8      1042-Given               heparin (porcine) 5000  UNIT/ML injection 8,200 Units  Dose: 80 Units/kg  Weight Dosing Info: 103 kg  Freq: Once Route: IV  Indications of Use: Other - full anticoagulation  Start: 01/28/25 2032 End: 01/28/25 2100     Admin Instructions:   **Max Dose of 10,000 units** Bolus for VTE / PE          HYDROcodone-acetaminophen (NORCO) 5-325 MG per tablet 1 tablet  Dose: 1 tablet  Freq: Every 4 Hours PRN Route: PO  PRN Reason: Moderate Pain  Start: 01/29/25 0403 End: 01/29/25 2144     Admin Instructions:   Based on patient request - if ordered for moderate or severe pain, provider allows for administration of a medication prescribed for a lower pain scale.  [CHAPIS]    Do not exceed 4 grams of acetaminophen in a 24 hr period. Max dose of 2gm for AST/ALT greater than 120 units/L        If given for pain, use the following pain scale:   Mild Pain = Pain Score of 1-3, CPOT 1-2  Moderate Pain = Pain Score of 4-6, CPOT 3-4  Severe Pain = Pain Score of 7-10, CPOT 5-8          iopamidol (ISOVUE-250) 51 % injection 100 mL  Dose: 100 mL  Freq: Once in Imaging Route: IA  Start: 01/30/25 1424 End: 01/30/25 1422      1422-Given               iopamidol (ISOVUE-370) 76 % injection 100 mL  Dose: 100 mL  Freq: Once in Imaging Route: IV  Start: 01/28/25 1923 End: 01/28/25 1909          morphine injection 2 mg  Dose: 2 mg  Freq: Once Route: IV  Start: 01/29/25 0000 End: 01/29/25 0015     Admin Instructions:   If given for pain, use the following pain scale:  Mild Pain = Pain Score of 1-3, CPOT 1-2  Moderate Pain = Pain Score of 4-6, CPOT 3-4  Severe Pain = Pain Score of 7-10, CPOT 5-8          Discontinued Medications  Medications 01/30/25 01/31/25      aspirin EC tablet 81 mg  Dose: 81 mg  Freq: Daily Route: PO  Start: 01/29/25 0900 End: 01/29/25 0904     Admin Instructions:   Herbal/drug interaction: Avoid use with ginkgo biloba. Do not crush or chew.  Do not exceed 4 grams of aspirin in a 24 hr period.    If given for pain, use the following pain scale:   Mild  Pain = Pain Score of 1-3, CPOT 1-2  Moderate Pain = Pain Score of 4-6, CPOT 3-4  Severe Pain = Pain Score of 7-10, CPOT 5-8          Atropine Sulfate (PF) injection 0.4 mg  Dose: 0.4 mg  Freq: Once As Needed Route: IV  PRN Reason: Bradycardia  PRN Comment: symptomatic bradycardia (hypotension, dizziness, confusion). Notify attending anesthesiologist.  Start: 01/30/25 1418 End: 01/30/25 1620          ceFAZolin 2000 mg IVPB in 100 mL NS (MBP)  Dose: 2,000 mg  Freq: On Call to O.R. Route: IV  Indications of Use: PERIOPERATIVE PHARMACOPROPHYLAXIS  Indications Comment: Give in Pre op Holding  Start: 01/31/25 0600 End: 01/30/25 1049     Admin Instructions:   Caution: Look alike/sound alike drug alert          diphenhydrAMINE (BENADRYL) injection 12.5 mg  Dose: 12.5 mg  Freq: Every 15 Minutes PRN Route: IV  PRN Reason: Itching  PRN Comment: May repeat x 1  Start: 01/30/25 1418 End: 01/30/25 1620     Admin Instructions:   Caution: Look alike/sound alike drug alert. This med may be ordered in other forms and routes. Before giving verify the last time the drug was given by any route/form.          Wcafxyg-Mmvgb-Zpdtvvdq-TenofAF (GENVOYA) [id number] MG per tablet 1 tablet  Dose: 1 tablet  Freq: Daily Route: PO  Start: 01/29/25 1000 End: 01/31/25 1203     Admin Instructions:   Give with food.      0851-Given            (0900)-Not Given [C]              ePHEDrine injection 5 mg  Dose: 5 mg  Freq: Once As Needed Route: IV  PRN Comment: symptomatic hypotension - Notify attending anesthesiologist if this needs to be given  Start: 01/30/25 1418 End: 01/30/25 1620     Admin Instructions:   Caution: Look alike/sound alike drug alert   Dilute with NS to 5-10 mg/mL.  Central line preferred, if unavailable use large bore IV access with frequent nurse monitoring of IV site.          flumazenil (ROMAZICON) injection 0.2 mg  Dose: 0.2 mg  Freq: As Needed Route: IV  PRN Comment: for benzodiazepine induced unresponsiveness or  sedation  Start: 01/30/25 1418 End: 01/30/25 1620     Admin Instructions:   Notify Anesthesia if given  ** give IV over 15-30 seconds **          heparin (porcine) 5000 UNIT/ML injection 4,100-8,200 Units  Dose: 40-80 Units/kg  Weight Dosing Info: 103 kg  Freq: Every 6 Hours PRN Route: IV  PRN Comment: Per Heparin Nomogram  Indications of Use: Other - full anticoagulation  Indications Comment: Atherosclerosis  Start: 01/28/25 2015 End: 01/30/25 1628     Admin Instructions:   **Max Dose of 10,000 units** Bolus for VTE / PE:  PTT Less Than 60 sec, Administer 80 units/kg Bolus   PTT 60 - 70 sec, Administer 40 units/kg Bolus      0205-Given     0951-MAR Hold     1628-MAR Unhold             heparin 32128 units/250 mL (100 units/mL) in 0.45 % NaCl infusion  Rate: 14.99 mL/hr Dose: 14.56 Units/kg/hr  Weight Dosing Info: 103 kg  Freq: Titrated Route: IV  Indications of Use: DVT/PE (active thrombosis)  Start: 01/28/25 2031 End: 01/30/25 1628     Admin Instructions:   Weight Based Heparin Nomogram: VTE / PE: Heparin Infusion 18 units/kg/hr (initial max of 1,500 units/hr)    aPTT Less Than 60: Bolus 80 units/kg & Increase Dose By 4 units/kg/hr.  aPTT 60-70: Bolus 40 units/kg & Increase Dose By 2 units/kg/hr.  aPTT 71-95: No Bolus, No Dose Change  aPTT : No Bolus, Decrease Dose By 2 units/kg/hr.  aPTT Greater Than 115: No Bolus. Hold Infusion For 1 hour.  Decrease Dose By 3 units/kg/hr. Repeat aPTT 6 Hours After Restarted.  If aPTT Remains Greater Than 115 Stop Heparin Drip & Contact Provider      0204-Rate Change (DUAL SIGN)     0444-Currently Infusing     0700-Currently Infusing     0859-Hold [C]     1503-New Bag       1650-Stopped               hydrALAZINE (APRESOLINE) injection 5 mg  Dose: 5 mg  Freq: Every 10 Minutes PRN Route: IV  PRN Reason: High Blood Pressure  PRN Comment: for systolic blood pressure greater than 180 mmHg or diastolic blood pressure greater than 105 mmHg  Start: 01/30/25 1418 End: 01/30/25 1620      Admin Instructions:   Up to 20 mg. If labetalol and hydralazine are both ordered, use labetalol first.  Caution: Look alike/sound alike drug alert          HYDROcodone-acetaminophen (NORCO) 5-325 MG per tablet 1 tablet  Dose: 1 tablet  Freq: Once As Needed Route: PO  PRN Reason: Moderate Pain  Start: 01/30/25 1418 End: 01/30/25 1620     Admin Instructions:   Based on patient request - if ordered for moderate or severe pain, provider allows for administration of a medication prescribed for a lower pain scale.  [CHAPIS]    Do not exceed 4 grams of acetaminophen in a 24 hr period. Max dose of 2gm for AST/ALT greater than 120 units/L        If given for pain, use the following pain scale:   Mild Pain = Pain Score of 1-3, CPOT 1-2  Moderate Pain = Pain Score of 4-6, CPOT 3-4  Severe Pain = Pain Score of 7-10, CPOT 5-8          HYDROcodone-acetaminophen (NORCO) 5-325 MG per tablet 1 tablet  Dose: 1 tablet  Freq: Every 6 Hours PRN Route: PO  PRN Reason: Moderate Pain  Start: 01/28/25 2300 End: 01/29/25 0403     Admin Instructions:   Based on patient request - if ordered for moderate or severe pain, provider allows for administration of a medication prescribed for a lower pain scale.  [CHAPIS]    Do not exceed 4 grams of acetaminophen in a 24 hr period. Max dose of 2gm for AST/ALT greater than 120 units/L        If given for pain, use the following pain scale:   Mild Pain = Pain Score of 1-3, CPOT 1-2  Moderate Pain = Pain Score of 4-6, CPOT 3-4  Severe Pain = Pain Score of 7-10, CPOT 5-8          HYDROcodone-acetaminophen (NORCO) 7.5-325 MG per tablet 2 tablet  Dose: 2 tablet  Freq: Every 4 Hours PRN Route: PO  PRN Reason: Severe Pain  Start: 01/30/25 1418 End: 01/30/25 1620     Admin Instructions:   Based on patient request - if ordered for moderate or severe pain, provider allows for administration of a medication prescribed for a lower pain scale.  [CHAPIS]    Do not exceed 4 grams of acetaminophen in a 24 hr period. Max dose  of 2gm for AST/ALT greater than 120 units/L        If given for pain, use the following pain scale:   Mild Pain = Pain Score of 1-3, CPOT 1-2  Moderate Pain = Pain Score of 4-6, CPOT 3-4  Severe Pain = Pain Score of 7-10, CPOT 5-8          HYDROmorphone (DILAUDID) injection 0.5 mg  Dose: 0.5 mg  Freq: Every 5 Minutes PRN Route: IV  PRN Reason: Moderate Pain  Start: 01/30/25 1418 End: 01/30/25 1620     Admin Instructions:   Maximum total dose of hydromorphone is 2 mg.  If given for pain, use the following pain scale:  Mild Pain = Pain Score of 1-3, CPOT 1-2  Moderate Pain = Pain Score of 4-6, CPOT 3-4  Severe Pain = Pain Score of 7-10, CPOT 5-8      1546-Given               ipratropium-albuterol (DUO-NEB) nebulizer solution 3 mL  Dose: 3 mL  Freq: Once As Needed Route: NEBULIZATION  PRN Reasons: Wheezing,Shortness of Air  PRN Comment: bronchospasm  Start: 01/30/25 1418 End: 01/30/25 1620     Admin Instructions:   Notify Anesthesia if minineb is given          labetalol (NORMODYNE,TRANDATE) injection 5 mg  Dose: 5 mg  Freq: Every 5 Minutes PRN Route: IV  PRN Reason: High Blood Pressure  PRN Comment: for systolic blood pressure greater than 180 mmHg or diastolic blood pressure greater than 105 mmHg  Start: 01/30/25 1418 End: 01/30/25 1620     Admin Instructions:   Hold for heart rate less than 60.    If labetalol and hydralazine are both ordered, use labetalol first. Maximum dose of labetalol is 20mg IV while in PACU, notify anesthesiologist for further orders if needed.  Give IV Push over 2 minutes.          lactated ringers infusion  Rate: 9 mL/hr Dose: 9 mL/hr  Freq: Continuous Route: IV  Start: 01/30/25 1013 End: 01/30/25 1628      1042-New Bag     1212-New Bag     1218-Paused [C]     1219-Restarted     1220-Anesthesia Volume Adjustment       1230-Anesthesia Volume Adjustment     1240-Anesthesia Volume Adjustment     1250-Anesthesia Volume Adjustment     1330-Anesthesia Volume Adjustment     1350-Anesthesia Volume  Adjustment       1644-Stopped               lidocaine (XYLOCAINE) 1 % injection 0.5 mL  Dose: 0.5 mL  Freq: Once As Needed Route: ID  PRN Comment: IV Start  Start: 01/30/25 1011 End: 01/30/25 1421          midazolam (VERSED) injection 1 mg  Dose: 1 mg  Freq: Every 5 Minutes PRN Route: IV  PRN Comment: Anxiety prophylaxis, Pre-op comfort  Start: 01/30/25 1011 End: 01/30/25 1421     Admin Instructions:   May repeat dose in 5 minutes one time then contact provider for additional orders.            morphine injection 2 mg  Dose: 2 mg  Freq: Every 4 Hours PRN Route: IV  PRN Reason: Severe Pain  Start: 01/29/25 0402 End: 01/31/25 0401     Admin Instructions:   If given for pain, use the following pain scale:  Mild Pain = Pain Score of 1-3, CPOT 1-2  Moderate Pain = Pain Score of 4-6, CPOT 3-4  Severe Pain = Pain Score of 7-10, CPOT 5-8      0951-MAR Hold               naloxone (NARCAN) injection 0.2 mg  Dose: 0.2 mg  Freq: As Needed Route: IV  PRN Reasons: Opioid Reversal,Respiratory Depression  PRN Comment: unresponsiveness, decrease oxygen saturation  Indications of Use: ACUTE RESPIRATORY FAILURE,OPIOID-INDUCED RESPIRATORY DEPRESSION  Start: 01/30/25 1418 End: 01/30/25 1620     Admin Instructions:   Notify Anesthesia if given          nitroglycerin (NITROSTAT) SL tablet 0.4 mg  Dose: 0.4 mg  Freq: Every 5 Minutes PRN Route: SL  PRN Reason: Chest Pain  Start: 01/28/25 2224 End: 01/28/25 2300     Admin Instructions:   If Pain Unrelieved After 3 Doses Notify MD  May administer up to 3 doses per episode. Hold if SBP less than 100.          ondansetron (ZOFRAN) injection 4 mg  Dose: 4 mg  Freq: Once As Needed Route: IV  PRN Reasons: Nausea,Vomiting  Indications of Use: POSTOPERATIVE NAUSEA AND VOMITING  Start: 01/30/25 1418 End: 01/30/25 1620     Admin Instructions:   If BOTH ondansetron (ZOFRAN) and promethazine (PHENERGAN) are ordered use ondansetron first and THEN promethazine IF ondansetron is ineffective.            promethazine (PHENERGAN) suppository 25 mg  Dose: 25 mg  Freq: Once As Needed Route: RE  PRN Reasons: Nausea,Vomiting  Start: 01/30/25 1418 End: 01/30/25 1620     Admin Instructions:   If BOTH ondansetron (ZOFRAN) and promethazine (PHENERGAN) are ordered use ondansetron first and THEN promethazine IF ondansetron is ineffective.          Or   promethazine (PHENERGAN) tablet 25 mg  Dose: 25 mg  Freq: Once As Needed Route: PO  PRN Reasons: Nausea,Vomiting  Start: 01/30/25 1418 End: 01/30/25 1620     Admin Instructions:   If BOTH ondansetron (ZOFRAN) and promethazine (PHENERGAN) are ordered use ondansetron first and THEN promethazine IF ondansetron is ineffective.            rosuvastatin (CRESTOR) tablet 20 mg  Dose: 20 mg  Freq: Nightly Route: PO  Start: 01/29/25 0815 End: 01/29/25 0904     Admin Instructions:   Avoid grapefruit juice.          sodium chloride 0.9 % flush 3 mL  Dose: 3 mL  Freq: Every 12 Hours Scheduled Route: IV  Start: 01/30/25 1013 End: 01/30/25 1421      1013               sodium chloride 0.9 % flush 3-10 mL  Dose: 3-10 mL  Freq: As Needed Route: IV  PRN Reason: Line Care  Start: 01/30/25 1011 End: 01/30/25 1421          sodium chloride 1,000 mL with ceFAZolin 2 g irrigation  Freq: As Needed  Start: 01/30/25 1319 End: 01/30/25 1421      1319-Given [C]               sodium chloride 500 mL with heparin (porcine) 5,000 Units mixture  Freq: As Needed  Start: 01/30/25 1322 End: 01/30/25 1421      1322-Given [C]                              Operative/Procedure Notes (all)        Mukesh Wharton MD at 01/30/25 1306  Version 2 of 2         Date of Admission:  1/28/2025  Today's Date:  01/30/25  Mukesh GUEVARA. MD Dio  [facility]    Preoperative Diagnosis:   Acute left leg ischemia.  History of aortobifemoral bypass with left SFA occlusion.    Postoperative Diagnosis:   Acute on chronic left leg peripheral arterial disease    Procedure Performed:   -Left superficial femoral artery cutdown with  thrombectomy of the superficial femoral artery and popliteal artery.  -Left superficial femoral artery catheter placement with left lower extremity runoff arteriogram.  -Left superficial femoral artery stent placement with 8 x 100 self-expanding stent    Surgeon:   Mukesh Wharton MD    Assistant:    Ghada Ceron  RN, Provided critical assistance in exposure, retraction, and suction that overall decrease blood loss and operative time.    Anesthesia:   General    Estimated Blood Loss:    cc    Findings:    Acute on chronic peripheral arterial disease with in situ thrombosis of the left superficial femoral artery.  After Mady embolectomy there was significant irregularities in the mid SFA likely related to a fixed stenosis/dissection.  Primary runoff was through the posterior tibial artery    Implants:    Implant Name Type Inv. Item Serial No.  Lot No. LRB No. Used Action   CLIPAPPLR M/ ENDO LIGACLIP9 3/8IN MD - [id number] Implant CLIPAPPLR M/ ENDO LIGACLIP9 3/8IN MD  [employer]  DIV OF J AND J [id number] Left 1 Implanted   CLIPAPPLR M/ ENDO LIGACLIP 9 3/8IN SM - [id number] Implant CLIPAPPLR M/ ENDO LIGACLIP 9 3/8IN SM  [employer]  DIV OF J AND J [id number] Left 1 Implanted   STNT PROTEGE EVERFLXSEXP .035 8X100 120 - [id number] Stent STNT PROTEGE EVERFLXSEXP .035 8X100 120  EV3  A [employer] [id number] Left 1 Implanted       Staff:   Cell Saver : Gucci Kraft  Circulator: Rocael Thomas RN  Scrub Person: Carola Laughlin  Assistant: Ghada Ceron CSFA  Vascular Radiology Technician: Patrick Barbosa    Specimen:   none    Complications:   none    Dispo:   to PACU    Indication for procedure:   55 y.o. male with peripheral arterial disease with low grade Soco class I ischemia.  Related to an SFA occlusion on the left side.  He has history of an aortobifemoral bypass done in the [facility].  He reports to the OR today for urgent revascularization.  Risk benefits  discussed    Description of procedure:   The patient was taken to the operating room, anesthesia was administered via IV in a monitored setting. Surgical site was prepped and draped in the usual sterile manner. A full surgical time out was performed. Incision made in a vertical manner overlying the proximal left superficial femoral artery.  Bovie electrocautery was used to dissect down. Artery was encountered and circumferentially controlled.  Heparin was administered.  After 5 minutes, clamps were applied.  Arteriotomy was made.  There was pulsatile in-bleeding, and minimal back-bleeding.  A #4 Mady embolectomy catheter was placed down to 40 cm and a significant amount of acute thrombus removed.  Eventually, no more thrombus was being pulled back.  An 11-Spanish sheath was placed in the SFA, and a left lower extremity angiogram performed.  This showed mid SFA dissection with significant residual stenosis.  There was some atherosclerotic debris in the popliteal artery and main run-off was through the posterior tibial with occlusion of the peroneal and anterior tibial.  At this point, I performed an over-wire embolectomy of the popliteal artery to remove the residual atherosclerotic disease.  This was successful.  SFA was stented with an 8 x 100 self-expanding stent with postdilation with an 8 mm balloon.  This resolved all flow limitations, and angiographically had patent vessels to the foot.  Sheath was removed.  Arteriotomy closed with a running to-and-fro 5-0 Prolene.  Appropriate flushing maneuvers were taken prior to restoration of flow.  Good Doppler signals noted throughout.  40 mg of protamine was administered.  Good hemostasis confirmed.  Deep tissues closed with 2-0 and 3-0 Vicryl and skin run closed with Vicryl in subcuticular manner.  Overall, the patient tolerated the procedure well.        At case completion all instrument count, needle count, and sponge counts were correct x2.    Mukesh Wharton,  MD  01/30/25     Active Hospital Problems    Diagnosis  POA    **Atherosclerosis of artery of extremity with intermittent claudication [I70.219]  Yes    Dyslipidemia [E78.5]  Yes    History of CVA (cerebrovascular accident) [Z86.73]  Not Applicable    Ischemic cardiomyopathy [I25.5]  Yes    HIV (human immunodeficiency virus infection) [Z21]  Yes    HTN (hypertension) [I10]  Yes    NHL (non-Hodgkin's lymphoma) [C85.90]  Yes    Type 2 diabetes mellitus with circulatory disorder, without long-term current use of insulin [E11.59]  Yes    Coronary artery disease involving native coronary artery of native heart without angina pectoris [I25.10]  Yes      Resolved Hospital Problems   No resolved problems to display.             Electronically signed by Mukesh Wharton MD at 01/30/25 2565       Mukesh Wharton MD at 01/30/25 1306  Version 1 of 2         Date of Admission:  1/28/2025  Today's Date:  01/30/25  Mukesh Wharton MD  [facility]    Preoperative Diagnosis:   Acute left leg ischemia.  History of aortobifemoral bypass with left SFA occlusion.    Postoperative Diagnosis:   Acute on chronic left leg peripheral arterial disease    Procedure Performed:   -Left superficial femoral artery cutdown with thrombectomy of the superficial femoral artery and popliteal artery.  -Left superficial femoral artery catheter placement with left lower extremity runoff arteriogram.  -Left superficial femoral artery stent placement with 8 x 100 self-expanding stent    Surgeon:   Mukesh Wharton MD    Assistant:    Ghada Ceron RN, Provided critical assistance in exposure, retraction, and suction that overall decrease blood loss and operative time.    Anesthesia:   General    Estimated Blood Loss:    cc    Findings:    Acute on chronic peripheral arterial disease with in situ thrombosis of the left superficial femoral artery.  After Mady embolectomy there was significant irregularities in the mid SFA likely related to a  fixed stenosis/dissection.  Primary runoff was through the posterior tibial artery    Implants:    Implant Name Type Inv. Item Serial No.  Lot No. LRB No. Used Action   CLIPAPPLR M/ ENDO LIGACLIP9 3/8IN MD - [id number] Implant CLIPAPPLR M/ ENDO LIGACLIP9 3/8IN MD  [employer]  DIV OF J AND J [id number] Left 1 Implanted   CLIPAPPLR M/ ENDO LIGACLIP 9 3/8IN SM - [id number] Implant CLIPAPPLR M/ ENDO LIGACLIP 9 3/8IN SM  [employer] END[employer]  DIV OF J AND J [id number] Left 1 Implanted   STNT PROTEGE EVERFLXSEXP .035 8X100 120 - [id number] Stent STNT PROTEGE EVERFLXSEXP .035 8X100 120  EV3  A [employer] [id number] Left 1 Implanted       Staff:   Cell Saver : Gucci Kraft  Circulator: Rocael Thomas RN  Scrub Person: Carola Laughlin  Assistant: Ghada Ceron CSFA  Vascular Radiology Technician: Patrick Barbosa    Specimen:   none    Complications:   none    Dispo:   to PACU    Indication for procedure:   55 y.o. male with peripheral arterial disease with low grade Callaway class I ischemia.  Related to an SFA occlusion on the left side.  He has history of an aortobifemoral bypass done in the [facility].  He reports to the OR today for urgent revascularization.  Risk benefits discussed    Description of procedure:    Dictation on: 01/30/2025  2:24 PM by: MUKESH DIXON [[id number]]     At case completion all instrument count, needle count, and sponge counts were correct x2.    Mukesh Dixon MD  01/30/25     Active Hospital Problems    Diagnosis  POA    **Atherosclerosis of artery of extremity with intermittent claudication [I70.219]  Yes    Dyslipidemia [E78.5]  Yes    History of CVA (cerebrovascular accident) [Z86.73]  Not Applicable    Ischemic cardiomyopathy [I25.5]  Yes    HIV (human immunodeficiency virus infection) [Z21]  Yes    HTN (hypertension) [I10]  Yes    NHL (non-Hodgkin's lymphoma) [C85.90]  Yes    Type 2 diabetes mellitus with circulatory disorder, without long-term current use  of insulin [E11.59]  Yes    Coronary artery disease involving native coronary artery of native heart without angina pectoris [I25.10]  Yes      Resolved Hospital Problems   No resolved problems to display.             Electronically signed by Mukesh Wharton MD at 25 1424          Physician Progress Notes (last 48 hours)        Mukesh Wharton MD at 25 0984           [facility]   Surgical Progress Note    Patient Name: Cecilio Puckett  : 1970  MRN: [id number]  Date of admission: 2025  Surgical Procedures Since Admission:  Procedure(s):  Left leg thrombectomy with angiogram and left Superficial femoral artery stent placement.  Surgeon:  Mukesh Wharton MD  Status:  1 Day Post-Op  -------------------    Subjective   Subjective     Chief Complaint: Postop follow-up.    Foot Pain     Patient had resolution of his left foot pain.  Tolerated surgery without difficulty.  Denies chest pain or shortness of breath.    Review of Systems    Objective   Objective     Vitals:   Temp:  [97.8 °F (36.6 °C)-98.7 °F (37.1 °C)] 98 °F (36.7 °C)  Heart Rate:  [70-92] 80  Resp:  [12-16] 16  BP: (122-150)/(76-99) 150/87    Physical Exam  Constitutional:       Appearance: He is well-developed.   Pulmonary:      Effort: Pulmonary effort is normal. No respiratory distress.   Abdominal:      General: There is no distension.      Palpations: Abdomen is soft.   Neurological:      Mental Status: He is alert and oriented to person, place, and time.     No concerns for left femoral infection.  Triphasic posterior tibial signal on the left foot.  Soft compartments.     Result Review    Result Review:  I have personally reviewed the results from the time of this admission to 2025 09:55 EST and agree with these findings:  [x]  Laboratory list / accordion  []  Microbiology  []  Radiology  []  EKG/Telemetry   []  Cardiology/Vascular   []  Pathology  []  Old records  []  Other:  Most notable findings include:        Results from last 7 days   Lab Units 01/31/25  0455 01/30/25  0607 01/29/25  0620 01/28/25  1817   WBC 10*3/mm3 11.66* 7.63 7.78 6.81   HEMOGLOBIN g/dL 13.8 13.6 13.7 14.1   PLATELETS 10*3/mm3 191 190 183 196     Results from last 7 days   Lab Units 01/31/25  0455 01/30/25  0607 01/29/25  0620 01/28/25  1817   SODIUM mmol/L 137 137 138 136   POTASSIUM mmol/L 4.2 3.9 4.1 4.1   CHLORIDE mmol/L 104 104 107 104   CO2 mmol/L 20.0* 22.1 22.0 22.4   BUN mg/dL 22* 16 9 10   CREATININE mg/dL 1.21 1.27 0.91 1.19   GLUCOSE mg/dL 274* 168* 148* 323*   Estimated Creatinine Clearance: 87.2 mL/min (by C-G formula based on SCr of 1.21 mg/dL).  Results from last 7 days   Lab Units 01/28/25  1817   PROTIME Seconds 17.0*   INR  1.36*     Lab Results   Component Value Date    HGBA1C 9.30 (H) 01/29/2025    HGBA1C 10.00 (H) 05/14/2024    HGBA1C 11.40 (H) 09/29/2023     Glucose   Date/Time Value Ref Range Status   01/31/2025 0523 260 (H) 70 - 130 mg/dL Final   01/30/2025 2103 239 (H) 70 - 130 mg/dL Final   01/30/2025 1434 185 (H) 70 - 130 mg/dL Final   01/30/2025 1005 169 (H) 70 - 130 mg/dL Final   01/30/2025 0830 171 (H) 70 - 130 mg/dL Final   01/29/2025 2124 301 (H) 70 - 130 mg/dL Final   01/29/2025 1720 207 (H) 70 - 130 mg/dL Final   01/29/2025 1141 155 (H) 70 - 130 mg/dL Final       Assessment & Plan   Assessment / Plan     Brief Patient Summary:  Cecilio Puckett is a 55 y.o. male likely acute on chronic peripheral arterial disease in the setting of uncontrolled diabetes with A1c of 10.  Currently with Calvin grade 1 ischemia.  Active tobacco abuse.     Aortobifemoral bypass performed by Dr. Mike Vargas in 2014.  2020: ABIs at [place]: Noncompressible but reported normal waveforms.       Active Hospital Problems:   Active Hospital Problems    Diagnosis     **Atherosclerosis of artery of extremity with intermittent claudication     Dyslipidemia     History of CVA (cerebrovascular accident)     Ischemic cardiomyopathy     HIV  (human immunodeficiency virus infection)     HTN (hypertension)     NHL (non-Hodgkin's lymphoma)     Type 2 diabetes mellitus with circulatory disorder, without long-term current use of insulin     Coronary artery disease involving native coronary artery of native heart without angina pectoris      Plan:   1/30/2025: Left superficial femoral artery open thrombectomy, angiogram, stent placement.    1/31/2025: Postop day #1.  Warm well-perfused foot without complications.  Triphasic signal.  Will continue aspirin and heparin infusion today with plans for conversion to aspirin and Plavix long-term.  In addition to his Crestor.  Long discussion about need for absolute tobacco cessation and diabetic control.  He is quite young for these problems and if his risk factors are modified he would likely end up with an amputation at some point in his life.  Patient seems to understand.        Mukesh Wharton MD       Electronically signed by Mukesh Wharton MD at 01/31/25 1876       Raymon Case MD at 01/30/25 1702              [facility]     LOS: 0 days     Subjective:    CC:Foot Pain    DIET:  Diet Order   Procedures    Diet: Cardiac, Diabetic; Healthy Heart (2-3 Na+); Consistent Carbohydrate; Fluid Consistency: Thin (IDDSI 0)       Objective:    Vital Signs:  Temp:  [97.7 °F (36.5 °C)-98.7 °F (37.1 °C)] 97.8 °F (36.6 °C)  Heart Rate:  [70-92] 74  Resp:  [12-18] 16  BP: (115-168)/(81-99) 131/93    SpO2:  [94 %-100 %] 96 %  on   ;   Device (Oxygen Therapy): room air  Body mass index is 29.38 kg/m².    Physical Exam  General Awake alert answering questions appropriately  Heart is regular rate rhythm no murmurs rubs gallops  Lungs clear to auscultation bilaterally  Abdomen soft nontender nondistended  Extremities right and left foot temperatures similar    Results Review:    Glucose   Date Value Ref Range Status   01/30/2025 168 (H) 65 - 99 mg/dL Final   01/29/2025 148 (H) 65 - 99 mg/dL Final  "  01/28/2025 323 (H) 65 - 99 mg/dL Final     Results from last 7 days   Lab Units 01/30/25  0607   WBC 10*3/mm3 7.63   HEMOGLOBIN g/dL 13.6   HEMATOCRIT % 43.3   PLATELETS 10*3/mm3 190     Results from last 7 days   Lab Units 01/30/25  0607 01/29/25  0620   SODIUM mmol/L 137 138   POTASSIUM mmol/L 3.9 4.1   CHLORIDE mmol/L 104 107   CO2 mmol/L 22.1 22.0   BUN mg/dL 16 9   CREATININE mg/dL 1.27 0.91   CALCIUM mg/dL 8.9 8.5*   BILIRUBIN mg/dL  --  0.3   ALK PHOS U/L  --  67   ALT (SGPT) U/L  --  17   AST (SGOT) U/L  --  13   GLUCOSE mg/dL 168* 148*     Results from last 7 days   Lab Units 01/30/25  0607 01/29/25  0112 01/28/25  1817   INR   --   --  1.36*   APTT seconds 75.1*   < > 27.5    < > = values in this interval not displayed.             Cultures:  No results found for: \"BLOODCX\", \"URINECX\", \"WOUNDCX\", \"MRSACX\", \"RESPCX\", \"STOOLCX\"    I have reviewed daily medications and changes in CPOE    Scheduled meds  amLODIPine, 10 mg, Oral, Daily  aspirin, 81 mg, Oral, Daily  carvedilol, 6.25 mg, Oral, Q12H  Rwujuxp-Obqnv-Qsqjgxct-TenofAF, 1 tablet, Oral, Daily  empagliflozin, 25 mg, Oral, Daily  [START ON 2/4/2025] ergocalciferol, 50,000 Units, Oral, Weekly  furosemide, 20 mg, Oral, Daily  insulin lispro, 2-7 Units, Subcutaneous, 4x Daily AC & at Bedtime  pantoprazole, 40 mg, Oral, Q AM  rosuvastatin, 20 mg, Oral, Daily  sacubitril-valsartan, 1 tablet, Oral, Q12H  sodium chloride, 10 mL, Intravenous, Q12H        heparin, 5 Units/kg/hr, Last Rate: 5 Units/kg/hr (01/30/25 1650)      PRN meds    acetaminophen **OR** acetaminophen **OR** acetaminophen    albuterol    senna-docusate sodium **AND** polyethylene glycol **AND** bisacodyl **AND** bisacodyl    dextrose    dextrose    famotidine    glucagon (human recombinant)    HYDROmorphone    melatonin    [Transfer Hold] Morphine    ondansetron    [COMPLETED] Insert Peripheral IV **AND** sodium chloride    sodium chloride    sodium chloride        Atherosclerosis of artery of " extremity with intermittent claudication    NHL (non-Hodgkin's lymphoma)    HIV (human immunodeficiency virus infection)    Type 2 diabetes mellitus with circulatory disorder, without long-term current use of insulin    HTN (hypertension)    Coronary artery disease involving native coronary artery of native heart without angina pectoris    Ischemic cardiomyopathy    History of CVA (cerebrovascular accident)    Dyslipidemia        Assessment/Plan:      Bilateral lower extremity peripheral vascular disease and left lower extremity intermittent claudication and rest ischemia in a patient with a history of dyslipidemia and lower extremity peripheral vascular disease status post aortobifemoral bypass in the past.    -Status post revascularization but surgery  -Continue with aspirin and heparin    Type 2 diabetes.  Will hold long-acting insulin and Glucotrol.  Initiate sliding scale and continue Farxiga.    -Blood sugars reasonably controlled     history of coronary artery disease/hypertension/ischemic cardiomyopathy.  There is no clinical evidence of angina or congestive heart failure.    -Patient last echo on January 2025 revealed an ejection fraction of 46.8% with left ventricular hypertrophy and multiple left ventricular hypokinetic segments and grade 1 diastolic dysfunction with mild MR.  Good blood pressure control.  Will continue Norvasc/Coreg/Farxiga/Lasix/Crestor/Entresto    History of HIV.  No evidence of secondary infection.  Continue anti-HIV medications.    History of non-Hodgkin's lymphoma.  No clinical evidence of recurrence.  CBC is normal    History of CVA.  Negative CNS examination except old slurred speech..  Continue aspirin and Crestor.      Tobacco abuse.  Patient strongly counseled    VTE prophylaxis.  Patient anticoagulated.               Raymon Case MD  01/30/25  17:02 EST       Electronically signed by Raymon Case MD at 01/30/25 5519       Consult Notes (last 48 hours)  Notes from  01/29/25 1629 through 01/31/25 1629   No notes of this type exist for this encounter.        30

## 2025-02-01 LAB
ANION GAP SERPL CALCULATED.3IONS-SCNC: 14.1 MMOL/L (ref 5–15)
APTT PPP: 58.5 SECONDS (ref 22.7–35.4)
APTT PPP: 75.9 SECONDS (ref 22.7–35.4)
BASOPHILS # BLD AUTO: 0.02 10*3/MM3 (ref 0–0.2)
BASOPHILS NFR BLD AUTO: 0.2 % (ref 0–1.5)
BUN SERPL-MCNC: 22 MG/DL (ref 6–20)
BUN/CREAT SERPL: 22 (ref 7–25)
CALCIUM SPEC-SCNC: 8.7 MG/DL (ref 8.6–10.5)
CHLORIDE SERPL-SCNC: 105 MMOL/L (ref 98–107)
CO2 SERPL-SCNC: 20.9 MMOL/L (ref 22–29)
CREAT SERPL-MCNC: 1 MG/DL (ref 0.76–1.27)
DEPRECATED RDW RBC AUTO: 46.3 FL (ref 37–54)
EGFRCR SERPLBLD CKD-EPI 2021: 89.4 ML/MIN/1.73
EOSINOPHIL # BLD AUTO: 0 10*3/MM3 (ref 0–0.4)
EOSINOPHIL NFR BLD AUTO: 0 % (ref 0.3–6.2)
ERYTHROCYTE [DISTWIDTH] IN BLOOD BY AUTOMATED COUNT: 14.8 % (ref 12.3–15.4)
GLUCOSE BLDC GLUCOMTR-MCNC: 173 MG/DL (ref 70–130)
GLUCOSE BLDC GLUCOMTR-MCNC: 180 MG/DL (ref 70–130)
GLUCOSE BLDC GLUCOMTR-MCNC: 190 MG/DL (ref 70–130)
GLUCOSE BLDC GLUCOMTR-MCNC: 219 MG/DL (ref 70–130)
GLUCOSE SERPL-MCNC: 178 MG/DL (ref 65–99)
HCT VFR BLD AUTO: 39.3 % (ref 37.5–51)
HGB BLD-MCNC: 13.5 G/DL (ref 13–17.7)
IMM GRANULOCYTES # BLD AUTO: 0.04 10*3/MM3 (ref 0–0.05)
IMM GRANULOCYTES NFR BLD AUTO: 0.3 % (ref 0–0.5)
INR PPP: 1.23 (ref 0.9–1.1)
LYMPHOCYTES # BLD AUTO: 2.96 10*3/MM3 (ref 0.7–3.1)
LYMPHOCYTES NFR BLD AUTO: 24.5 % (ref 19.6–45.3)
MCH RBC QN AUTO: 29.7 PG (ref 26.6–33)
MCHC RBC AUTO-ENTMCNC: 34.4 G/DL (ref 31.5–35.7)
MCV RBC AUTO: 86.6 FL (ref 79–97)
MONOCYTES # BLD AUTO: 0.91 10*3/MM3 (ref 0.1–0.9)
MONOCYTES NFR BLD AUTO: 7.5 % (ref 5–12)
NEUTROPHILS NFR BLD AUTO: 67.5 % (ref 42.7–76)
NEUTROPHILS NFR BLD AUTO: 8.13 10*3/MM3 (ref 1.7–7)
NRBC BLD AUTO-RTO: 0 /100 WBC (ref 0–0.2)
PLATELET # BLD AUTO: 176 10*3/MM3 (ref 140–450)
PMV BLD AUTO: 11.9 FL (ref 6–12)
POTASSIUM SERPL-SCNC: 3.7 MMOL/L (ref 3.5–5.2)
PROTHROMBIN TIME: 15.7 SECONDS (ref 11.7–14.2)
RBC # BLD AUTO: 4.54 10*6/MM3 (ref 4.14–5.8)
SODIUM SERPL-SCNC: 140 MMOL/L (ref 136–145)
WBC NRBC COR # BLD AUTO: 12.06 10*3/MM3 (ref 3.4–10.8)

## 2025-02-01 PROCEDURE — 99024 POSTOP FOLLOW-UP VISIT: CPT | Performed by: SURGERY

## 2025-02-01 PROCEDURE — 85730 THROMBOPLASTIN TIME PARTIAL: CPT | Performed by: HOSPITALIST

## 2025-02-01 PROCEDURE — 80048 BASIC METABOLIC PNL TOTAL CA: CPT | Performed by: SURGERY

## 2025-02-01 PROCEDURE — 63710000001 INSULIN LISPRO (HUMAN) PER 5 UNITS: Performed by: SURGERY

## 2025-02-01 PROCEDURE — 85025 COMPLETE CBC W/AUTO DIFF WBC: CPT | Performed by: SURGERY

## 2025-02-01 PROCEDURE — 82948 REAGENT STRIP/BLOOD GLUCOSE: CPT

## 2025-02-01 PROCEDURE — 25010000002 HEPARIN (PORCINE) 25000-0.45 UT/250ML-% SOLUTION: Performed by: SURGERY

## 2025-02-01 PROCEDURE — 85730 THROMBOPLASTIN TIME PARTIAL: CPT | Performed by: SURGERY

## 2025-02-01 PROCEDURE — 85610 PROTHROMBIN TIME: CPT | Performed by: HOSPITALIST

## 2025-02-01 RX ADMIN — ELVITEGRAVIR, COBICISTAT, EMTRICITABINE, AND TENOFOVIR ALAFENAMIDE 1 TABLET: 150; 150; 200; 10 TABLET ORAL at 20:01

## 2025-02-01 RX ADMIN — INSULIN LISPRO 3 UNITS: 100 INJECTION, SOLUTION INTRAVENOUS; SUBCUTANEOUS at 11:15

## 2025-02-01 RX ADMIN — INSULIN LISPRO 2 UNITS: 100 INJECTION, SOLUTION INTRAVENOUS; SUBCUTANEOUS at 21:40

## 2025-02-01 RX ADMIN — INSULIN LISPRO 2 UNITS: 100 INJECTION, SOLUTION INTRAVENOUS; SUBCUTANEOUS at 17:01

## 2025-02-01 RX ADMIN — SACUBITRIL AND VALSARTAN 1 TABLET: 49; 51 TABLET, FILM COATED ORAL at 08:12

## 2025-02-01 RX ADMIN — PANTOPRAZOLE SODIUM 40 MG: 40 TABLET, DELAYED RELEASE ORAL at 06:16

## 2025-02-01 RX ADMIN — EMPAGLIFLOZIN 25 MG: 25 TABLET, FILM COATED ORAL at 08:12

## 2025-02-01 RX ADMIN — INSULIN LISPRO 2 UNITS: 100 INJECTION, SOLUTION INTRAVENOUS; SUBCUTANEOUS at 08:12

## 2025-02-01 RX ADMIN — SACUBITRIL AND VALSARTAN 1 TABLET: 49; 51 TABLET, FILM COATED ORAL at 20:01

## 2025-02-01 RX ADMIN — Medication 10 ML: at 21:40

## 2025-02-01 RX ADMIN — HEPARIN SODIUM 21.1 UNITS/KG/HR: 10000 INJECTION, SOLUTION INTRAVENOUS at 19:48

## 2025-02-01 RX ADMIN — AMLODIPINE BESYLATE 10 MG: 10 TABLET ORAL at 08:12

## 2025-02-01 RX ADMIN — FUROSEMIDE 20 MG: 20 TABLET ORAL at 08:12

## 2025-02-01 RX ADMIN — Medication 10 ML: at 08:14

## 2025-02-01 RX ADMIN — CARVEDILOL 6.25 MG: 6.25 TABLET, FILM COATED ORAL at 20:01

## 2025-02-01 RX ADMIN — CARVEDILOL 6.25 MG: 6.25 TABLET, FILM COATED ORAL at 08:12

## 2025-02-01 RX ADMIN — HEPARIN SODIUM 17.1 UNITS/KG/HR: 10000 INJECTION, SOLUTION INTRAVENOUS at 06:16

## 2025-02-01 RX ADMIN — ROSUVASTATIN CALCIUM 20 MG: 10 TABLET, FILM COATED ORAL at 08:12

## 2025-02-01 RX ADMIN — ASPIRIN 81 MG: 81 TABLET, DELAYED RELEASE ORAL at 08:12

## 2025-02-01 RX ADMIN — WARFARIN SODIUM 10 MG: 10 TABLET ORAL at 17:01

## 2025-02-01 NOTE — PROGRESS NOTES
[facility] Clinical Pharmacy Services: Warfarin Dosing/Monitoring Consult    Cecilio Puckett is a 55 y.o. male, estimated creatinine clearance is 105.5 mL/min (by C-G formula based on SCr of 1 mg/dL). weighing 101 kg (222 lb 10.6 oz).    Results from last 7 days   Lab Units 02/01/25  0512 01/31/25  1955 01/31/25  1213 01/31/25  0455 01/30/25  2053 01/30/25  0607 01/29/25  1014 01/29/25  0620 01/29/25  0112 01/28/25  1817   INR  1.23*  --   --   --   --   --   --   --   --  1.36*   APTT seconds 75.9* 72.8* 39.6* 35.7* 28.8 75.1*   < > 53.8*   < > 27.5   HEMOGLOBIN g/dL 13.5  --   --  13.8  --  13.6  --  13.7  --  14.1   HEMATOCRIT % 39.3  --   --  40.9  --  43.3  --  42.4  --  45.2   PLATELETS 10*3/mm3 176  --   --  191  --  190  --  183  --  196    < > = values in this interval not displayed.     Prior to admission anticoagulation: Warfarin 10 mg nightly except 15 mg on Mon, Wed and Fri per med rec - follows with [facility] - established care with PCP 1/22/25 and they said in note that they were referring him to coumadin clinic for monitoring - not sure that he has seen them yet     Hospital Anticoagulation:  Consulting provider: Dr. Case  Start date: 1/31  Indication: history of DVT/PE  Target INR: 2 - 3  Expected duration: Indefinite   Bridge Therapy: Yes  with unfractionated heparin    Potential food or drug interactions:   No new     Education complete?/Date: N/A; home medication    Assessment/Plan:  Dose: Resume home regimen.  Warfarin 10 mg due tonight.  RN to monitor for any signs or symptoms of bleeding  Pharmacy to follow up daily INRs and dose adjustments    Pharmacy will continue to follow until discharge or discontinuation of warfarin.     Dee Mayes, PharmD  Clinical Pharmacist

## 2025-02-01 NOTE — PROGRESS NOTES
[facility]     LOS: 1 day     Subjective:    CC:Foot Pain    DIET:  Diet Order   Procedures    Diet: Cardiac, Diabetic; Healthy Heart (2-3 Na+); Consistent Carbohydrate; Fluid Consistency: Thin (IDDSI 0)     Foot pain is resolved  Objective:    Vital Signs:  Temp:  [97.9 °F (36.6 °C)-98.7 °F (37.1 °C)] 98.3 °F (36.8 °C)  Heart Rate:  [65-85] 65  Resp:  [16] 16  BP: (120-131)/() 122/100    SpO2:  [98 %-100 %] 100 %  on   ;   Device (Oxygen Therapy): room air  Body mass index is 29.38 kg/m².    Physical Exam  General Awake alert answering questions appropriately  Heart is regular rate rhythm no murmurs rubs gallops  Lungs clear to auscultation bilaterally  Abdomen soft nontender nondistended  Extremities right foot temperature remains normal.    Results Review:    Glucose   Date Value Ref Range Status   02/01/2025 178 (H) 65 - 99 mg/dL Final   01/31/2025 274 (H) 65 - 99 mg/dL Final   01/30/2025 168 (H) 65 - 99 mg/dL Final     Results from last 7 days   Lab Units 02/01/25  0512   WBC 10*3/mm3 12.06*   HEMOGLOBIN g/dL 13.5   HEMATOCRIT % 39.3   PLATELETS 10*3/mm3 176     Results from last 7 days   Lab Units 02/01/25  0512 01/30/25  0607 01/29/25  0620   SODIUM mmol/L 140   < > 138   POTASSIUM mmol/L 3.7   < > 4.1   CHLORIDE mmol/L 105   < > 107   CO2 mmol/L 20.9*   < > 22.0   BUN mg/dL 22*   < > 9   CREATININE mg/dL 1.00   < > 0.91   CALCIUM mg/dL 8.7   < > 8.5*   BILIRUBIN mg/dL  --   --  0.3   ALK PHOS U/L  --   --  67   ALT (SGPT) U/L  --   --  17   AST (SGOT) U/L  --   --  13   GLUCOSE mg/dL 178*   < > 148*    < > = values in this interval not displayed.     Results from last 7 days   Lab Units 02/01/25  1602 02/01/25  0512   INR   --  1.23*   APTT seconds 58.5* 75.9*             Cultures:  No results found for: \"BLOODCX\", \"URINECX\", \"WOUNDCX\", \"MRSACX\", \"RESPCX\", \"STOOLCX\"    I have reviewed daily medications and changes in CPOE    Scheduled meds  amLODIPine, 10 mg, Oral, " Daily  aspirin, 81 mg, Oral, Daily  carvedilol, 6.25 mg, Oral, Q12H  Hahyfqq-Zjvbk-Cckavsts-TenofAF, 1 tablet, Oral, Daily  empagliflozin, 25 mg, Oral, Daily  [START ON 2/4/2025] ergocalciferol, 50,000 Units, Oral, Weekly  furosemide, 20 mg, Oral, Daily  insulin lispro, 2-7 Units, Subcutaneous, 4x Daily AC & at Bedtime  pantoprazole, 40 mg, Oral, Q AM  rosuvastatin, 20 mg, Oral, Daily  sacubitril-valsartan, 1 tablet, Oral, Q12H  sodium chloride, 10 mL, Intravenous, Q12H  warfarin, 10 mg, Oral, Once per day on Sunday Tuesday Thursday Saturday  warfarin, 15 mg, Oral, Once per day on Monday Wednesday Friday        heparin, 5 Units/kg/hr, Last Rate: 21.1 Units/kg/hr (02/01/25 1728)  Pharmacy to dose warfarin,       PRN meds    acetaminophen **OR** acetaminophen **OR** acetaminophen    albuterol    senna-docusate sodium **AND** polyethylene glycol **AND** bisacodyl **AND** bisacodyl    dextrose    dextrose    famotidine    glucagon (human recombinant)    HYDROmorphone    melatonin    Morphine    ondansetron    Pharmacy to dose warfarin    [COMPLETED] Insert Peripheral IV **AND** sodium chloride    sodium chloride    sodium chloride        Atherosclerosis of artery of extremity with intermittent claudication    NHL (non-Hodgkin's lymphoma)    HIV (human immunodeficiency virus infection)    Type 2 diabetes mellitus with circulatory disorder, without long-term current use of insulin    HTN (hypertension)    Coronary artery disease involving native coronary artery of native heart without angina pectoris    Ischemic cardiomyopathy    History of CVA (cerebrovascular accident)    Dyslipidemia        Assessment/Plan:      Bilateral lower extremity peripheral vascular disease and left lower extremity intermittent claudication and rest ischemia in a patient with a history of dyslipidemia and lower extremity peripheral vascular disease status post aortobifemoral bypass in the past.    -Status post revascularization but  surgery  -Continue with aspirin and heparin    Type 2 diabetes.  Will hold long-acting insulin and Glucotrol.  Initiate sliding scale and continue Farxiga.    -Blood sugars reasonably controlled     history of coronary artery disease/hypertension/ischemic cardiomyopathy.  There is no clinical evidence of angina or congestive heart failure.    -Patient last echo on January 2025 revealed an ejection fraction of 46.8% with left ventricular hypertrophy and multiple left ventricular hypokinetic segments and grade 1 diastolic dysfunction with mild MR.  Good blood pressure control.  Will continue Norvasc/Coreg/Farxiga/Lasix/Crestor/Entresto    History of HIV.  No evidence of secondary infection.  Continue anti-HIV medications.    History of non-Hodgkin's lymphoma.  No clinical evidence of recurrence.  CBC is normal    History of CVA.  Negative CNS examination except old slurred speech..  Continue aspirin and Crestor.      Tobacco abuse.  Patient strongly counseled    VTE prophylaxis.  Patient anticoagulated.    Likely d/c tomorrow with lovenox injections, monitor INR               Raymon Case MD  02/01/25  17:32 EST

## 2025-02-01 NOTE — PLAN OF CARE
Problem: Adult Inpatient Plan of Care  Goal: Plan of Care Review  Outcome: Progressing  Flowsheets (Taken 2/1/2025 1851)  Outcome Evaluation: Ax4. VSS. Room Air. No complaints of pain. Refuses bed alarm. Heparin gtt continuing while INR works towards therapeutic.  Goal: Patient-Specific Goal (Individualized)  Outcome: Progressing  Goal: Absence of Hospital-Acquired Illness or Injury  Outcome: Progressing  Intervention: Identify and Manage Fall Risk  Recent Flowsheet Documentation  Taken 2/1/2025 1800 by Ruddy Barnes RN  Safety Promotion/Fall Prevention:   assistive device/personal items within reach   clutter free environment maintained   nonskid shoes/slippers when out of bed   room organization consistent   safety round/check completed  Taken 2/1/2025 1549 by Ruddy Barnes RN  Safety Promotion/Fall Prevention:   assistive device/personal items within reach   clutter free environment maintained   nonskid shoes/slippers when out of bed   room organization consistent   safety round/check completed  Taken 2/1/2025 1410 by Ruddy Barnes RN  Safety Promotion/Fall Prevention:   assistive device/personal items within reach   clutter free environment maintained   nonskid shoes/slippers when out of bed   room organization consistent   safety round/check completed  Taken 2/1/2025 1200 by Ruddy Barnes RN  Safety Promotion/Fall Prevention:   assistive device/personal items within reach   clutter free environment maintained   nonskid shoes/slippers when out of bed   room organization consistent   safety round/check completed  Taken 2/1/2025 1000 by Ruddy Barnes RN  Safety Promotion/Fall Prevention:   assistive device/personal items within reach   clutter free environment maintained   nonskid shoes/slippers when out of bed   room organization consistent   safety round/check completed  Taken 2/1/2025 0805 by Ruddy Barnes RN  Safety Promotion/Fall Prevention:   assistive device/personal items within reach    clutter free environment maintained   nonskid shoes/slippers when out of bed   room organization consistent   safety round/check completed  Intervention: Prevent Skin Injury  Recent Flowsheet Documentation  Taken 2/1/2025 1410 by Ruddy Barnes RN  Body Position: position changed independently  Taken 2/1/2025 0805 by Ruddy Barnes RN  Body Position: position changed independently  Goal: Optimal Comfort and Wellbeing  Outcome: Progressing  Goal: Readiness for Transition of Care  Outcome: Progressing     Problem: Comorbidity Management  Goal: Maintenance of Asthma Control  Outcome: Progressing  Goal: Maintenance of COPD Symptom Control  Outcome: Progressing  Goal: Blood Glucose Level Within Target Range  Outcome: Progressing     Problem: Fall Injury Risk  Goal: Absence of Fall and Fall-Related Injury  Outcome: Progressing  Intervention: Promote Injury-Free Environment  Recent Flowsheet Documentation  Taken 2/1/2025 1800 by Ruddy Barnes RN  Safety Promotion/Fall Prevention:   assistive device/personal items within reach   clutter free environment maintained   nonskid shoes/slippers when out of bed   room organization consistent   safety round/check completed  Taken 2/1/2025 1549 by Ruddy Barnes RN  Safety Promotion/Fall Prevention:   assistive device/personal items within reach   clutter free environment maintained   nonskid shoes/slippers when out of bed   room organization consistent   safety round/check completed  Taken 2/1/2025 1410 by Ruddy Barnes RN  Safety Promotion/Fall Prevention:   assistive device/personal items within reach   clutter free environment maintained   nonskid shoes/slippers when out of bed   room organization consistent   safety round/check completed  Taken 2/1/2025 1200 by Ruddy Barnes RN  Safety Promotion/Fall Prevention:   assistive device/personal items within reach   clutter free environment maintained   nonskid shoes/slippers when out of bed   room organization  consistent   safety round/check completed  Taken 2/1/2025 1000 by Ruddy Barnes, RN  Safety Promotion/Fall Prevention:   assistive device/personal items within reach   clutter free environment maintained   nonskid shoes/slippers when out of bed   room organization consistent   safety round/check completed  Taken 2/1/2025 0805 by Ruddy Barnes, RN  Safety Promotion/Fall Prevention:   assistive device/personal items within reach   clutter free environment maintained   nonskid shoes/slippers when out of bed   room organization consistent   safety round/check completed   Goal Outcome Evaluation:              Outcome Evaluation: Ax4. VSS. Room Air. No complaints of pain. Refuses bed alarm. Heparin gtt continuing while INR works towards therapeutic.

## 2025-02-01 NOTE — PLAN OF CARE
Goal Outcome Evaluation:      VSS. A/o x4. Pedal pulses dopplerable. L groin incision willem, cdi and soft. Up ad yash refusing bed alarm. Heparin gtt infusing.

## 2025-02-01 NOTE — DISCHARGE INSTRUCTIONS
[facility]  Junior Ramos, H[place], Dio Moura Thomas, [place]  [address] [facility], [address]  [phone]    Lower Extremity Surgery    Please refer to the following instructions for your post-procedure care.  Your surgeon and/or nurse will discuss any changes with you.    Activity  Avoid lifting more than five to 10 pounds (one or two gallons of milk) until after your first post-operative visit. You are encouraged to walk as much as you can. You can slowly return to normal activities during the month after your surgery. Avoid strenuous activity and heavy lifting until your doctor tells you it's OK. Avoid activities such as vacuuming, shoveling snow or swinging a golf club.    Do not drive until your doctor gives the OK and you are no longer taking prescription pain medications. It is also normal to have difficulty with sleep habits, eating, and bowel movements after surgery. These will go away with time.    Bathing/Showering  You may shower after you go home. Do not remove the bandages unless they begin to peel off. Do not soak in a bathtub, hot tub, or swim until the incision heals completely and the staples are removed.    Incision Care  Clean your incision with mild soap and water. Pat the area dry with a clean towel. You do not need a bandage unless otherwise instructed. Do not apply any ointments  or creams to your incision. If you have open wounds you will be instructed on how  to care for them or a visiting nurse will be prescribed for you. If you have staples or sutures along your incision they will be removed at your post-op appointment.    Diet  Resume your normal diet. There are no special food restrictions following this procedure. A low fat/low cholesterol diet is recommended for all patients with  vascular disease.    Medications  Resume taking all of your medications unless your doctor or nurse practitioner tells you not to. If your incision is causing pain, you may take  over-the-counter pain relievers such as acetaminophen (Tylenol®).    If you were prescribed a stronger pain medication, please be aware these medications can cause nausea and constipation. Prevent nausea by taking the medication  with a snack or meal. Avoid constipation by drinking plenty of fluids and eating foods with a high amount of fiber, such as fruits, vegetables, and grains. Do not take  Tylenol® if you are taking prescription pain medications.    It is important that you understand the medications that have been prescribed to keep your bypass open. These  medications may include aspirin, clopidogrel (PLAVIX®), or warfarin (COUMADIN®). If you are unsure of which medication (s) have been prescribed, contact the office.    Please call us immediately for any of the following conditions   Severe or worsening pain in your legs or feet while at rest or while walking   Increased pain, redness, warmth, or drainage (pus) from your incision site(s)   Fever of 101 degrees or higher   The swelling in your leg with the bypass suddenly worsens and becomes more painful than when you were in the hospital   If you have been instructed to feel your graft pulse then you should do so every day. If you can no longer feel this pulse, call the office immediately. Not all patients are given this instruction.    Leg swelling is common after leg bypass surgery. The swelling should improve over a few months following surgery. To improve the swelling, you may elevate your legs above the level of your heart while you are sitting or resting. Your surgeon or nurse practitioner may ask you to apply an ACE wrap or wear compression (TORREY) stockings to help to reduce swelling.    Reduce your risk of vascular disease  Stop smoking if you smoke!  Manage your cholesterol  Maintain a desired weight  Control your diabetes  Keep your blood pressure down

## 2025-02-01 NOTE — PROGRESS NOTES
Name: Cecilio Puckett ADMIT: 2025   : 1970  PCP: Lara Iverson APRN    MRN: [id number] LOS: 1 days   AGE/SEX: 55 y.o. male  ROOM:  [place] [address]     CC: Left leg revascularization  Interval History: Just lost his hand IV.  Subjective   Subjective     Review of Systems  Objective   Objective     Vitals:   Temp:  [97.2 °F (36.2 °C)-98.7 °F (37.1 °C)] 98.7 °F (37.1 °C)  Heart Rate:  [71-85] 71  Resp:  [16] 16  BP: (116-141)/(62-84) 131/62    No intake/output data recorded.    Scheduled Meds:     amLODIPine, 10 mg, Oral, Daily  aspirin, 81 mg, Oral, Daily  carvedilol, 6.25 mg, Oral, Q12H  Dmvqmta-Jpokf-Aqqzltvx-TenofAF, 1 tablet, Oral, Daily  empagliflozin, 25 mg, Oral, Daily  [START ON 2025] ergocalciferol, 50,000 Units, Oral, Weekly  furosemide, 20 mg, Oral, Daily  insulin lispro, 2-7 Units, Subcutaneous, 4x Daily AC & at Bedtime  pantoprazole, 40 mg, Oral, Q AM  rosuvastatin, 20 mg, Oral, Daily  sacubitril-valsartan, 1 tablet, Oral, Q12H  sodium chloride, 10 mL, Intravenous, Q12H  warfarin, 10 mg, Oral, Once per day on   warfarin, 15 mg, Oral, Once per day on       IV Meds:   heparin, 5 Units/kg/hr, Last Rate: 17.1 Units/kg/hr (25 0616)  Pharmacy to dose warfarin,         Physical Exam  Vascular: Legs warm and well-perfused.    Data Reviewed:  CBC    Results from last 7 days   Lab Units 25  0512 25  0455 25  0607 25  0620 25  1817   WBC 10*3/mm3 12.06* 11.66* 7.63 7.78 6.81   HEMOGLOBIN g/dL 13.5 13.8 13.6 13.7 14.1   PLATELETS 10*3/mm3 176 191 190 183 196     BMP   Results from last 7 days   Lab Units 25  0512 25  0455 25  0607 25  0620 25  1817   SODIUM mmol/L 140 137 137 138 136   POTASSIUM mmol/L 3.7 4.2 3.9 4.1 4.1   CHLORIDE mmol/L 105 104 104 107 104   CO2 mmol/L 20.9* 20.0* 22.1 22.0 22.4   BUN mg/dL 22* 22* 16 9 10   CREATININE mg/dL 1.00 1.21 1.27 0.91 1.19    GLUCOSE mg/dL 178* 274* 168* 148* 323*     Results from last 7 days   Lab Units 01/28/25  1817   PROTIME Seconds 17.0*   INR  1.36*         Most notable findings include: White blood cell count 12.  Creatinine 1.0.  Glucose elevated.    Active Hospital Problems:   Active Hospital Problems    Diagnosis  POA    **Atherosclerosis of artery of extremity with intermittent claudication [I70.219]  Yes    Dyslipidemia [E78.5]  Yes    History of CVA (cerebrovascular accident) [Z86.73]  Not Applicable    Ischemic cardiomyopathy [I25.5]  Yes    HIV (human immunodeficiency virus infection) [Z21]  Yes    HTN (hypertension) [I10]  Yes    NHL (non-Hodgkin's lymphoma) [C85.90]  Yes    Type 2 diabetes mellitus with circulatory disorder, without long-term current use of insulin [E11.59]  Yes    Coronary artery disease involving native coronary artery of native heart without angina pectoris [I25.10]  Yes      Resolved Hospital Problems   No resolved problems to display.      Assessment & Plan     Assessment / Plan     Plan:   1/30/2025: Left superficial femoral artery open thrombectomy, angiogram, stent placement.     1/31/2025: Postop day #1.  Warm well-perfused foot without complications.  Triphasic signal.  Will continue aspirin and heparin infusion today with plans for conversion to aspirin and Plavix long-term.  In addition to his Crestor.  Long discussion about need for absolute tobacco cessation and diabetic control.  He is quite young for these problems and if his risk factors are modified he would likely end up with an amputation at some point in his life.  Patient seems to understand.  Likely ready for discharge tomorrow.    2/1/25: Patient still on a heparin drip.  Coumadin orders written.  Plan to discharge on Coumadin and aspirin.  He just lost his IV so I informed his nurse.  We would actually be okay with a Lovenox bridge if necessary.  I will write for follow-up with Dr. Wharton.    Jeffrey Elkins,  MD  02/01/25  09:17 EST  Available via Secure Chat during the day for non-urgent issues.  After hours and for urgent issues please call the office at [phone]

## 2025-02-02 LAB
ANION GAP SERPL CALCULATED.3IONS-SCNC: 11.5 MMOL/L (ref 5–15)
APTT PPP: 111 SECONDS (ref 22.7–35.4)
APTT PPP: 121.9 SECONDS (ref 22.7–35.4)
APTT PPP: 71 SECONDS (ref 22.7–35.4)
APTT PPP: 73.9 SECONDS (ref 22.7–35.4)
BASOPHILS # BLD AUTO: 0.04 10*3/MM3 (ref 0–0.2)
BASOPHILS NFR BLD AUTO: 0.4 % (ref 0–1.5)
BUN SERPL-MCNC: 20 MG/DL (ref 6–20)
BUN/CREAT SERPL: 20.8 (ref 7–25)
CALCIUM SPEC-SCNC: 8.6 MG/DL (ref 8.6–10.5)
CHLORIDE SERPL-SCNC: 105 MMOL/L (ref 98–107)
CO2 SERPL-SCNC: 21.5 MMOL/L (ref 22–29)
CREAT SERPL-MCNC: 0.96 MG/DL (ref 0.76–1.27)
DEPRECATED RDW RBC AUTO: 48.6 FL (ref 37–54)
EGFRCR SERPLBLD CKD-EPI 2021: 93.9 ML/MIN/1.73
EOSINOPHIL # BLD AUTO: 0.06 10*3/MM3 (ref 0–0.4)
EOSINOPHIL NFR BLD AUTO: 0.6 % (ref 0.3–6.2)
ERYTHROCYTE [DISTWIDTH] IN BLOOD BY AUTOMATED COUNT: 15 % (ref 12.3–15.4)
GLUCOSE BLDC GLUCOMTR-MCNC: 127 MG/DL (ref 70–130)
GLUCOSE BLDC GLUCOMTR-MCNC: 129 MG/DL (ref 70–130)
GLUCOSE BLDC GLUCOMTR-MCNC: 181 MG/DL (ref 70–130)
GLUCOSE BLDC GLUCOMTR-MCNC: 360 MG/DL (ref 70–130)
GLUCOSE SERPL-MCNC: 119 MG/DL (ref 65–99)
HCT VFR BLD AUTO: 40.1 % (ref 37.5–51)
HGB BLD-MCNC: 12.4 G/DL (ref 13–17.7)
IMM GRANULOCYTES # BLD AUTO: 0.03 10*3/MM3 (ref 0–0.05)
IMM GRANULOCYTES NFR BLD AUTO: 0.3 % (ref 0–0.5)
INR PPP: 1.27 (ref 0.9–1.1)
LYMPHOCYTES # BLD AUTO: 4.66 10*3/MM3 (ref 0.7–3.1)
LYMPHOCYTES NFR BLD AUTO: 47.1 % (ref 19.6–45.3)
MCH RBC QN AUTO: 27.9 PG (ref 26.6–33)
MCHC RBC AUTO-ENTMCNC: 30.9 G/DL (ref 31.5–35.7)
MCV RBC AUTO: 90.3 FL (ref 79–97)
MONOCYTES # BLD AUTO: 1.03 10*3/MM3 (ref 0.1–0.9)
MONOCYTES NFR BLD AUTO: 10.4 % (ref 5–12)
NEUTROPHILS NFR BLD AUTO: 4.07 10*3/MM3 (ref 1.7–7)
NEUTROPHILS NFR BLD AUTO: 41.2 % (ref 42.7–76)
NRBC BLD AUTO-RTO: 0 /100 WBC (ref 0–0.2)
PLATELET # BLD AUTO: 172 10*3/MM3 (ref 140–450)
PMV BLD AUTO: 11.2 FL (ref 6–12)
POTASSIUM SERPL-SCNC: 3.7 MMOL/L (ref 3.5–5.2)
PROTHROMBIN TIME: 16.1 SECONDS (ref 11.7–14.2)
RBC # BLD AUTO: 4.44 10*6/MM3 (ref 4.14–5.8)
SODIUM SERPL-SCNC: 138 MMOL/L (ref 136–145)
WBC NRBC COR # BLD AUTO: 9.89 10*3/MM3 (ref 3.4–10.8)

## 2025-02-02 PROCEDURE — 85730 THROMBOPLASTIN TIME PARTIAL: CPT | Performed by: SURGERY

## 2025-02-02 PROCEDURE — 85610 PROTHROMBIN TIME: CPT | Performed by: HOSPITALIST

## 2025-02-02 PROCEDURE — 80048 BASIC METABOLIC PNL TOTAL CA: CPT | Performed by: SURGERY

## 2025-02-02 PROCEDURE — 85730 THROMBOPLASTIN TIME PARTIAL: CPT | Performed by: HOSPITALIST

## 2025-02-02 PROCEDURE — 99024 POSTOP FOLLOW-UP VISIT: CPT | Performed by: SURGERY

## 2025-02-02 PROCEDURE — 82948 REAGENT STRIP/BLOOD GLUCOSE: CPT

## 2025-02-02 PROCEDURE — 25010000002 HEPARIN (PORCINE) 25000-0.45 UT/250ML-% SOLUTION: Performed by: SURGERY

## 2025-02-02 PROCEDURE — 85025 COMPLETE CBC W/AUTO DIFF WBC: CPT | Performed by: SURGERY

## 2025-02-02 PROCEDURE — 63710000001 INSULIN LISPRO (HUMAN) PER 5 UNITS: Performed by: SURGERY

## 2025-02-02 RX ORDER — WARFARIN SODIUM 10 MG/1
10 TABLET ORAL
Status: DISCONTINUED | OUTPATIENT
Start: 2025-02-04 | End: 2025-02-03 | Stop reason: HOSPADM

## 2025-02-02 RX ADMIN — ROSUVASTATIN CALCIUM 20 MG: 10 TABLET, FILM COATED ORAL at 08:27

## 2025-02-02 RX ADMIN — Medication 10 ML: at 08:30

## 2025-02-02 RX ADMIN — INSULIN LISPRO 6 UNITS: 100 INJECTION, SOLUTION INTRAVENOUS; SUBCUTANEOUS at 21:50

## 2025-02-02 RX ADMIN — CARVEDILOL 6.25 MG: 6.25 TABLET, FILM COATED ORAL at 08:26

## 2025-02-02 RX ADMIN — SACUBITRIL AND VALSARTAN 1 TABLET: 49; 51 TABLET, FILM COATED ORAL at 20:30

## 2025-02-02 RX ADMIN — WARFARIN SODIUM 12.5 MG: 2.5 TABLET ORAL at 20:30

## 2025-02-02 RX ADMIN — HEPARIN SODIUM 19.1 UNITS/KG/HR: 10000 INJECTION, SOLUTION INTRAVENOUS at 08:28

## 2025-02-02 RX ADMIN — EMPAGLIFLOZIN 25 MG: 25 TABLET, FILM COATED ORAL at 08:26

## 2025-02-02 RX ADMIN — CARVEDILOL 6.25 MG: 6.25 TABLET, FILM COATED ORAL at 20:30

## 2025-02-02 RX ADMIN — PANTOPRAZOLE SODIUM 40 MG: 40 TABLET, DELAYED RELEASE ORAL at 06:35

## 2025-02-02 RX ADMIN — Medication 10 ML: at 20:31

## 2025-02-02 RX ADMIN — AMLODIPINE BESYLATE 10 MG: 10 TABLET ORAL at 08:27

## 2025-02-02 RX ADMIN — SACUBITRIL AND VALSARTAN 1 TABLET: 49; 51 TABLET, FILM COATED ORAL at 08:27

## 2025-02-02 RX ADMIN — FUROSEMIDE 20 MG: 20 TABLET ORAL at 08:27

## 2025-02-02 RX ADMIN — ASPIRIN 81 MG: 81 TABLET, DELAYED RELEASE ORAL at 08:26

## 2025-02-02 RX ADMIN — INSULIN LISPRO 2 UNITS: 100 INJECTION, SOLUTION INTRAVENOUS; SUBCUTANEOUS at 11:14

## 2025-02-02 RX ADMIN — ELVITEGRAVIR, COBICISTAT, EMTRICITABINE, AND TENOFOVIR ALAFENAMIDE 1 TABLET: 150; 150; 200; 10 TABLET ORAL at 20:31

## 2025-02-02 NOTE — PROGRESS NOTES
[facility] Clinical Pharmacy Services: Warfarin Dosing/Monitoring Consult    Cecilio Puckett is a 55 y.o. male, estimated creatinine clearance is 109.9 mL/min (by C-G formula based on SCr of 0.96 mg/dL). weighing 101 kg (222 lb 10.6 oz).    Results from last 7 days   Lab Units 02/02/25  0752 02/02/25  0014 02/01/25  1602 02/01/25  0512 01/31/25  1955 01/31/25  1213 01/31/25  0455 01/30/25  2053 01/30/25  0607 01/29/25  1014 01/29/25  0620 01/29/25  0112 01/28/25  1817   INR  1.27*  --   --  1.23*  --   --   --   --   --   --   --   --  1.36*   APTT seconds 121.9* 111.0* 58.5* 75.9* 72.8*   < > 35.7*   < > 75.1*   < > 53.8*   < > 27.5   HEMOGLOBIN g/dL 12.4*  --   --  13.5  --   --  13.8  --  13.6  --  13.7  --  14.1   HEMATOCRIT % 40.1  --   --  39.3  --   --  40.9  --  43.3  --  42.4  --  45.2   PLATELETS 10*3/mm3 172  --   --  176  --   --  191  --  190  --  183  --  196    < > = values in this interval not displayed.     Prior to admission anticoagulation: Warfarin 10 mg nightly except 15 mg on Mon, Wed and Fri per med rec - follows with [facility] - established care with PCP 1/22/25 and they said in note that they were referring him to coumadin clinic for monitoring - not sure that he has seen them yet     Hospital Anticoagulation:  Consulting provider: Dr. Case  Start date: 1/31  Indication: history of DVT/PE  Target INR: 2 - 3  Expected duration: Indefinite   Bridge Therapy: Yes  with unfractionated heparin    Potential food or drug interactions:   No new     Education complete?/Date: N/A; home medication    Assessment/Plan:  INR subtherapeutic again today but has only received 2 doses of warfarin so far 15mg on 1/31 and 10 mg 2/1. Continue home regimen for now.   Dose: Warfarin 10 mg due tonight.  RN to monitor for any signs or symptoms of bleeding  Pharmacy to follow up daily INRs and dose adjustments    Pharmacy will continue to follow until discharge or discontinuation of warfarin.     Dee Mayes,  PharmD  Clinical Pharmacist

## 2025-02-02 NOTE — PLAN OF CARE
Problem: Adult Inpatient Plan of Care  Goal: Plan of Care Review  Outcome: Progressing  Flowsheets (Taken 2/2/2025 1659)  Progress: improving  Outcome Evaluation: Ax4. VSS. Room Air. Ad yash. No reports of pain. INR not yet therapeutic, hep gtt continues to run. Will DC when po anticoagulation allows.  Plan of Care Reviewed With: patient  Goal: Patient-Specific Goal (Individualized)  Outcome: Progressing  Goal: Absence of Hospital-Acquired Illness or Injury  Outcome: Progressing  Intervention: Identify and Manage Fall Risk  Recent Flowsheet Documentation  Taken 2/2/2025 1551 by Ruddy Barnes RN  Safety Promotion/Fall Prevention:   assistive device/personal items within reach   clutter free environment maintained   nonskid shoes/slippers when out of bed   room organization consistent   safety round/check completed  Taken 2/2/2025 1338 by Ruddy Barnes RN  Safety Promotion/Fall Prevention:   assistive device/personal items within reach   clutter free environment maintained   nonskid shoes/slippers when out of bed   room organization consistent   safety round/check completed  Taken 2/2/2025 1200 by Ruddy Barnes RN  Safety Promotion/Fall Prevention:   assistive device/personal items within reach   clutter free environment maintained   nonskid shoes/slippers when out of bed   room organization consistent   safety round/check completed  Taken 2/2/2025 0950 by Ruddy Barnes RN  Safety Promotion/Fall Prevention:   assistive device/personal items within reach   clutter free environment maintained   nonskid shoes/slippers when out of bed   room organization consistent   safety round/check completed  Taken 2/2/2025 0836 by Ruddy Barnes RN  Safety Promotion/Fall Prevention:   assistive device/personal items within reach   clutter free environment maintained   nonskid shoes/slippers when out of bed   room organization consistent   safety round/check completed  Intervention: Prevent Skin Injury  Recent Flowsheet  Documentation  Taken 2/2/2025 1338 by Ruddy Barnes RN  Body Position: position changed independently  Taken 2/2/2025 0836 by Ruddy Barnes RN  Body Position: position changed independently  Goal: Optimal Comfort and Wellbeing  Outcome: Progressing  Goal: Readiness for Transition of Care  Outcome: Progressing     Problem: Comorbidity Management  Goal: Maintenance of Asthma Control  Outcome: Progressing  Goal: Maintenance of COPD Symptom Control  Outcome: Progressing  Goal: Blood Glucose Level Within Target Range  Outcome: Progressing     Problem: Fall Injury Risk  Goal: Absence of Fall and Fall-Related Injury  Outcome: Progressing  Intervention: Promote Injury-Free Environment  Recent Flowsheet Documentation  Taken 2/2/2025 1551 by Ruddy Barnes RN  Safety Promotion/Fall Prevention:   assistive device/personal items within reach   clutter free environment maintained   nonskid shoes/slippers when out of bed   room organization consistent   safety round/check completed  Taken 2/2/2025 1338 by Ruddy Barnes RN  Safety Promotion/Fall Prevention:   assistive device/personal items within reach   clutter free environment maintained   nonskid shoes/slippers when out of bed   room organization consistent   safety round/check completed  Taken 2/2/2025 1200 by Ruddy Barnes RN  Safety Promotion/Fall Prevention:   assistive device/personal items within reach   clutter free environment maintained   nonskid shoes/slippers when out of bed   room organization consistent   safety round/check completed  Taken 2/2/2025 0950 by Ruddy Barnes RN  Safety Promotion/Fall Prevention:   assistive device/personal items within reach   clutter free environment maintained   nonskid shoes/slippers when out of bed   room organization consistent   safety round/check completed  Taken 2/2/2025 0836 by Ruddy Barnes RN  Safety Promotion/Fall Prevention:   assistive device/personal items within reach   clutter free environment  maintained   nonskid shoes/slippers when out of bed   room organization consistent   safety round/check completed   Goal Outcome Evaluation:  Plan of Care Reviewed With: patient        Progress: improving  Outcome Evaluation: Ax4. VSS. Room Air. Ad yash. No reports of pain. INR not yet therapeutic, hep gtt continues to run. Will DC when po anticoagulation allows.

## 2025-02-02 NOTE — PLAN OF CARE
Goal Outcome Evaluation:      VSS. A/o x4. L groin incision site cdi and soft. Pulses dopplerable. Heparin gtt infusing per protocol. Up ad yash. Possible d/c home today.

## 2025-02-02 NOTE — PROGRESS NOTES
Name: Cecilio Puckett ADMIT: 2025   : 1970  PCP: Lara Iverson APRN    MRN: [id number] LOS: 2 days   AGE/SEX: 55 y.o. male  ROOM:  [place] [address]     CC: Left leg revascularization  Interval History: Awaiting full anticoagulation  Subjective   Subjective     Review of Systems denies real pain  Objective   Objective     Vitals:   Temp:  [98.2 °F (36.8 °C)-98.5 °F (36.9 °C)] 98.2 °F (36.8 °C)  Heart Rate:  [65-83] 67  Resp:  [16] 16  BP: (120-149)/() 143/88    No intake/output data recorded.    Scheduled Meds:     amLODIPine, 10 mg, Oral, Daily  aspirin, 81 mg, Oral, Daily  carvedilol, 6.25 mg, Oral, Q12H  Rpmlpou-Qgwxa-Cnyqlzgf-TenofAF, 1 tablet, Oral, Daily  empagliflozin, 25 mg, Oral, Daily  [START ON 2025] ergocalciferol, 50,000 Units, Oral, Weekly  furosemide, 20 mg, Oral, Daily  insulin lispro, 2-7 Units, Subcutaneous, 4x Daily AC & at Bedtime  pantoprazole, 40 mg, Oral, Q AM  rosuvastatin, 20 mg, Oral, Daily  sacubitril-valsartan, 1 tablet, Oral, Q12H  sodium chloride, 10 mL, Intravenous, Q12H  warfarin, 10 mg, Oral, Once per day on   warfarin, 15 mg, Oral, Once per day on       IV Meds:   heparin, 5 Units/kg/hr, Last Rate: 19.1 Units/kg/hr (25 0053)  Pharmacy to dose warfarin,         Physical Exam  Vascular: Legs warm and well-perfused.    Data Reviewed:  CBC    Results from last 7 days   Lab Units 25  0512 25  0455 25  0607 25  0620 25  1817   WBC 10*3/mm3 12.06* 11.66* 7.63 7.78 6.81   HEMOGLOBIN g/dL 13.5 13.8 13.6 13.7 14.1   PLATELETS 10*3/mm3 176 191 190 183 196     BMP   Results from last 7 days   Lab Units 25  0512 25  0455 25  0607 25  0620 25  1817   SODIUM mmol/L 140 137 137 138 136   POTASSIUM mmol/L 3.7 4.2 3.9 4.1 4.1   CHLORIDE mmol/L 105 104 104 107 104   CO2 mmol/L 20.9* 20.0* 22.1 22.0 22.4   BUN mg/dL 22* 22* 16 9 10   CREATININE mg/dL 1.00  1.21 1.27 0.91 1.19   GLUCOSE mg/dL 178* 274* 168* 148* 323*     Results from last 7 days   Lab Units 02/01/25  0512 01/28/25  1817   PROTIME Seconds 15.7* 17.0*   INR  1.23* 1.36*         Most notable findings include: White blood cell count 12.  Creatinine 1.0.  Glucose elevated.    Active Hospital Problems:   Active Hospital Problems    Diagnosis  POA    **Atherosclerosis of artery of extremity with intermittent claudication [I70.219]  Yes    Dyslipidemia [E78.5]  Yes    History of CVA (cerebrovascular accident) [Z86.73]  Not Applicable    Ischemic cardiomyopathy [I25.5]  Yes    HIV (human immunodeficiency virus infection) [Z21]  Yes    HTN (hypertension) [I10]  Yes    NHL (non-Hodgkin's lymphoma) [C85.90]  Yes    Type 2 diabetes mellitus with circulatory disorder, without long-term current use of insulin [E11.59]  Yes    Coronary artery disease involving native coronary artery of native heart without angina pectoris [I25.10]  Yes      Resolved Hospital Problems   No resolved problems to display.      Assessment & Plan     Assessment / Plan     Plan:   1/30/2025: Left superficial femoral artery open thrombectomy, angiogram, stent placement.     1/31/2025: Postop day #1.  Warm well-perfused foot without complications.  Triphasic signal.  Will continue aspirin and heparin infusion today with plans for conversion to aspirin and Plavix long-term.  In addition to his Crestor.  Long discussion about need for absolute tobacco cessation and diabetic control.  He is quite young for these problems and if his risk factors are modified he would likely end up with an amputation at some point in his life.  Patient seems to understand.  Likely ready for discharge tomorrow.    2/1/25: Patient still on a heparin drip.  Coumadin orders written.  Plan to discharge on Coumadin and aspirin.  He just lost his IV so I informed his nurse.  We would actually be okay with a Lovenox bridge if necessary.  I will write for follow-up with   [facility].    2/2/2025: Awaiting full anticoagulation for discharge.  Recommend INR greater than 2 prior to discharge.  At this point we will sign off.  Please call for issues.  Office will call to finalize should follow-up    Reginald Moura MD  02/02/25  08:02 EST  Available via Secure Chat during the day for non-urgent issues.  After hours and for urgent issues please call the office at [phone]

## 2025-02-02 NOTE — PROGRESS NOTES
[facility]     LOS: 2 days     Subjective:    CC:Foot Pain    DIET:  Diet Order   Procedures    Diet: Cardiac, Diabetic; Healthy Heart (2-3 Na+); Consistent Carbohydrate; Fluid Consistency: Thin (IDDSI 0)     Foot pain is resolved  Objective:    Vital Signs:  Temp:  [98.2 °F (36.8 °C)-98.3 °F (36.8 °C)] 98.3 °F (36.8 °C)  Heart Rate:  [63-83] 63  Resp:  [16] 16  BP: (118-149)/(78-95) 141/95    SpO2:  [96 %-99 %] 98 %  on   ;   Device (Oxygen Therapy): room air  Body mass index is 29.38 kg/m².    Physical Exam  General Awake alert answering questions appropriately  Heart is regular rate rhythm no murmurs rubs gallops  Lungs clear to auscultation bilaterally  Abdomen soft nontender nondistended  Extremities right foot temperature remains normal.    Results Review:    Glucose   Date Value Ref Range Status   02/02/2025 119 (H) 65 - 99 mg/dL Final   02/01/2025 178 (H) 65 - 99 mg/dL Final   01/31/2025 274 (H) 65 - 99 mg/dL Final     Results from last 7 days   Lab Units 02/02/25  0752   WBC 10*3/mm3 9.89   HEMOGLOBIN g/dL 12.4*   HEMATOCRIT % 40.1   PLATELETS 10*3/mm3 172     Results from last 7 days   Lab Units 02/02/25  0752 01/30/25  0607 01/29/25  0620   SODIUM mmol/L 138   < > 138   POTASSIUM mmol/L 3.7   < > 4.1   CHLORIDE mmol/L 105   < > 107   CO2 mmol/L 21.5*   < > 22.0   BUN mg/dL 20   < > 9   CREATININE mg/dL 0.96   < > 0.91   CALCIUM mg/dL 8.6   < > 8.5*   BILIRUBIN mg/dL  --   --  0.3   ALK PHOS U/L  --   --  67   ALT (SGPT) U/L  --   --  17   AST (SGOT) U/L  --   --  13   GLUCOSE mg/dL 119*   < > 148*    < > = values in this interval not displayed.     Results from last 7 days   Lab Units 02/02/25  1456 02/02/25  0752   INR   --  1.27*   APTT seconds 73.9* 121.9*             Cultures:  No results found for: \"BLOODCX\", \"URINECX\", \"WOUNDCX\", \"MRSACX\", \"RESPCX\", \"STOOLCX\"    I have reviewed daily medications and changes in CPOE    Scheduled meds  amLODIPine, 10 mg, Oral, " Daily  aspirin, 81 mg, Oral, Daily  carvedilol, 6.25 mg, Oral, Q12H  Lehujrq-Tsqum-Nvibfdbl-TenofAF, 1 tablet, Oral, Daily  empagliflozin, 25 mg, Oral, Daily  [START ON 2/4/2025] ergocalciferol, 50,000 Units, Oral, Weekly  furosemide, 20 mg, Oral, Daily  insulin lispro, 2-7 Units, Subcutaneous, 4x Daily AC & at Bedtime  pantoprazole, 40 mg, Oral, Q AM  rosuvastatin, 20 mg, Oral, Daily  sacubitril-valsartan, 1 tablet, Oral, Q12H  sodium chloride, 10 mL, Intravenous, Q12H  warfarin, 10 mg, Oral, Once per day on Sunday Tuesday Thursday Saturday  warfarin, 15 mg, Oral, Once per day on Monday Wednesday Friday        heparin, 5 Units/kg/hr, Last Rate: 16.1 Units/kg/hr (02/02/25 0952)  Pharmacy to dose warfarin,       PRN meds    acetaminophen **OR** acetaminophen **OR** acetaminophen    albuterol    senna-docusate sodium **AND** polyethylene glycol **AND** bisacodyl **AND** bisacodyl    dextrose    dextrose    famotidine    glucagon (human recombinant)    HYDROmorphone    melatonin    ondansetron    Pharmacy to dose warfarin    [COMPLETED] Insert Peripheral IV **AND** sodium chloride    sodium chloride    sodium chloride        Atherosclerosis of artery of extremity with intermittent claudication    NHL (non-Hodgkin's lymphoma)    HIV (human immunodeficiency virus infection)    Type 2 diabetes mellitus with circulatory disorder, without long-term current use of insulin    HTN (hypertension)    Coronary artery disease involving native coronary artery of native heart without angina pectoris    Ischemic cardiomyopathy    History of CVA (cerebrovascular accident)    Dyslipidemia        Assessment/Plan:      Bilateral lower extremity peripheral vascular disease and left lower extremity intermittent claudication and rest ischemia in a patient with a history of dyslipidemia and lower extremity peripheral vascular disease status post aortobifemoral bypass in the past.    -Status post revascularization but surgery  -Continue with  aspirin and heparin    Type 2 diabetes.  Will hold long-acting insulin and Glucotrol.  Initiate sliding scale and continue Farxiga.    -Blood sugars reasonably controlled     history of coronary artery disease/hypertension/ischemic cardiomyopathy.  There is no clinical evidence of angina or congestive heart failure.    -Patient last echo on January 2025 revealed an ejection fraction of 46.8% with left ventricular hypertrophy and multiple left ventricular hypokinetic segments and grade 1 diastolic dysfunction with mild MR.  Good blood pressure control.  Will continue Norvasc/Coreg/Farxiga/Lasix/Crestor/Entresto    History of HIV.  No evidence of secondary infection.  Continue anti-HIV medications.    History of non-Hodgkin's lymphoma.  No clinical evidence of recurrence.  CBC is normal    History of CVA.  Negative CNS examination except old slurred speech..  Continue aspirin and Crestor.      Tobacco abuse.  Patient strongly counseled    VTE prophylaxis.  Patient anticoagulated.    Likely d/c tomorrow with lovenox injections, monitor INR hopefully INR starting to trend up.  He will need to follow-up with warfarin clinic on Wednesday or Thursday this week.     Discussed with Dr. Moura and he would like for the patient be on bridge therapy until INR is fully therapeutic    Would like to give the patient at least 12-1/2 to 15 mg.  Will discuss with pharmacy         Raymon Case MD  02/02/25  16:41 EST

## 2025-02-03 ENCOUNTER — READMISSION MANAGEMENT (OUTPATIENT)
Dept: CALL CENTER | Facility: HOSPITAL | Age: 55
End: 2025-02-03
Payer: MEDICARE

## 2025-02-03 VITALS
HEIGHT: 73 IN | HEART RATE: 82 BPM | RESPIRATION RATE: 17 BRPM | WEIGHT: 222.66 LBS | SYSTOLIC BLOOD PRESSURE: 133 MMHG | BODY MASS INDEX: 29.51 KG/M2 | DIASTOLIC BLOOD PRESSURE: 90 MMHG | TEMPERATURE: 98 F | OXYGEN SATURATION: 98 %

## 2025-02-03 LAB
ANION GAP SERPL CALCULATED.3IONS-SCNC: 8.2 MMOL/L (ref 5–15)
APTT PPP: 79.6 SECONDS (ref 22.7–35.4)
APTT PPP: 85 SECONDS (ref 22.7–35.4)
BASOPHILS # BLD AUTO: 0.03 10*3/MM3 (ref 0–0.2)
BASOPHILS NFR BLD AUTO: 0.4 % (ref 0–1.5)
BUN SERPL-MCNC: 20 MG/DL (ref 6–20)
BUN/CREAT SERPL: 17.7 (ref 7–25)
CALCIUM SPEC-SCNC: 8.9 MG/DL (ref 8.6–10.5)
CHLORIDE SERPL-SCNC: 105 MMOL/L (ref 98–107)
CO2 SERPL-SCNC: 22.8 MMOL/L (ref 22–29)
CREAT SERPL-MCNC: 1.13 MG/DL (ref 0.76–1.27)
DEPRECATED RDW RBC AUTO: 47 FL (ref 37–54)
EGFRCR SERPLBLD CKD-EPI 2021: 77.2 ML/MIN/1.73
EOSINOPHIL # BLD AUTO: 0.13 10*3/MM3 (ref 0–0.4)
EOSINOPHIL NFR BLD AUTO: 1.5 % (ref 0.3–6.2)
ERYTHROCYTE [DISTWIDTH] IN BLOOD BY AUTOMATED COUNT: 14.8 % (ref 12.3–15.4)
GLUCOSE BLDC GLUCOMTR-MCNC: 147 MG/DL (ref 70–130)
GLUCOSE BLDC GLUCOMTR-MCNC: 193 MG/DL (ref 70–130)
GLUCOSE BLDC GLUCOMTR-MCNC: 210 MG/DL (ref 70–130)
GLUCOSE SERPL-MCNC: 160 MG/DL (ref 65–99)
HCT VFR BLD AUTO: 38.5 % (ref 37.5–51)
HGB BLD-MCNC: 12.9 G/DL (ref 13–17.7)
IMM GRANULOCYTES # BLD AUTO: 0.03 10*3/MM3 (ref 0–0.05)
IMM GRANULOCYTES NFR BLD AUTO: 0.4 % (ref 0–0.5)
INR PPP: 1.35 (ref 0.9–1.1)
LYMPHOCYTES # BLD AUTO: 4.45 10*3/MM3 (ref 0.7–3.1)
LYMPHOCYTES NFR BLD AUTO: 52.9 % (ref 19.6–45.3)
MCH RBC QN AUTO: 29.3 PG (ref 26.6–33)
MCHC RBC AUTO-ENTMCNC: 33.5 G/DL (ref 31.5–35.7)
MCV RBC AUTO: 87.3 FL (ref 79–97)
MONOCYTES # BLD AUTO: 0.85 10*3/MM3 (ref 0.1–0.9)
MONOCYTES NFR BLD AUTO: 10.1 % (ref 5–12)
NEUTROPHILS NFR BLD AUTO: 2.92 10*3/MM3 (ref 1.7–7)
NEUTROPHILS NFR BLD AUTO: 34.7 % (ref 42.7–76)
NRBC BLD AUTO-RTO: 0 /100 WBC (ref 0–0.2)
PLATELET # BLD AUTO: 185 10*3/MM3 (ref 140–450)
PMV BLD AUTO: 11.6 FL (ref 6–12)
POTASSIUM SERPL-SCNC: 3.5 MMOL/L (ref 3.5–5.2)
PROTHROMBIN TIME: 16.6 SECONDS (ref 11.7–14.2)
RBC # BLD AUTO: 4.41 10*6/MM3 (ref 4.14–5.8)
SODIUM SERPL-SCNC: 136 MMOL/L (ref 136–145)
WBC NRBC COR # BLD AUTO: 8.41 10*3/MM3 (ref 3.4–10.8)

## 2025-02-03 PROCEDURE — 63710000001 INSULIN LISPRO (HUMAN) PER 5 UNITS: Performed by: SURGERY

## 2025-02-03 PROCEDURE — 82948 REAGENT STRIP/BLOOD GLUCOSE: CPT

## 2025-02-03 PROCEDURE — 85610 PROTHROMBIN TIME: CPT | Performed by: HOSPITALIST

## 2025-02-03 PROCEDURE — 85730 THROMBOPLASTIN TIME PARTIAL: CPT | Performed by: HOSPITALIST

## 2025-02-03 PROCEDURE — 80048 BASIC METABOLIC PNL TOTAL CA: CPT | Performed by: SURGERY

## 2025-02-03 PROCEDURE — 25010000002 ENOXAPARIN PER 10 MG: Performed by: HOSPITALIST

## 2025-02-03 PROCEDURE — 25010000002 HEPARIN (PORCINE) 25000-0.45 UT/250ML-% SOLUTION: Performed by: SURGERY

## 2025-02-03 PROCEDURE — 85025 COMPLETE CBC W/AUTO DIFF WBC: CPT | Performed by: SURGERY

## 2025-02-03 RX ORDER — ENOXAPARIN SODIUM 100 MG/ML
1 INJECTION SUBCUTANEOUS EVERY 12 HOURS
Status: DISCONTINUED | OUTPATIENT
Start: 2025-02-03 | End: 2025-02-03 | Stop reason: HOSPADM

## 2025-02-03 RX ORDER — WARFARIN SODIUM 10 MG/1
TABLET ORAL
Start: 2025-02-03

## 2025-02-03 RX ORDER — ENOXAPARIN SODIUM 100 MG/ML
100 INJECTION SUBCUTANEOUS EVERY 12 HOURS SCHEDULED
Qty: 20 ML | Refills: 1 | Status: SHIPPED | OUTPATIENT
Start: 2025-02-03 | End: 2025-02-23

## 2025-02-03 RX ADMIN — AMLODIPINE BESYLATE 10 MG: 10 TABLET ORAL at 09:13

## 2025-02-03 RX ADMIN — INSULIN LISPRO 2 UNITS: 100 INJECTION, SOLUTION INTRAVENOUS; SUBCUTANEOUS at 11:41

## 2025-02-03 RX ADMIN — EMPAGLIFLOZIN 25 MG: 25 TABLET, FILM COATED ORAL at 09:13

## 2025-02-03 RX ADMIN — PANTOPRAZOLE SODIUM 40 MG: 40 TABLET, DELAYED RELEASE ORAL at 06:34

## 2025-02-03 RX ADMIN — CARVEDILOL 6.25 MG: 6.25 TABLET, FILM COATED ORAL at 09:12

## 2025-02-03 RX ADMIN — ENOXAPARIN SODIUM 100 MG: 100 INJECTION SUBCUTANEOUS at 17:08

## 2025-02-03 RX ADMIN — SACUBITRIL AND VALSARTAN 1 TABLET: 49; 51 TABLET, FILM COATED ORAL at 09:13

## 2025-02-03 RX ADMIN — ROSUVASTATIN CALCIUM 20 MG: 10 TABLET, FILM COATED ORAL at 09:12

## 2025-02-03 RX ADMIN — ASPIRIN 81 MG: 81 TABLET, DELAYED RELEASE ORAL at 09:12

## 2025-02-03 RX ADMIN — INSULIN LISPRO 3 UNITS: 100 INJECTION, SOLUTION INTRAVENOUS; SUBCUTANEOUS at 09:17

## 2025-02-03 RX ADMIN — HEPARIN SODIUM 16.1 UNITS/KG/HR: 10000 INJECTION, SOLUTION INTRAVENOUS at 01:50

## 2025-02-03 RX ADMIN — Medication 10 ML: at 09:17

## 2025-02-03 RX ADMIN — FUROSEMIDE 20 MG: 20 TABLET ORAL at 09:13

## 2025-02-03 NOTE — DISCHARGE SUMMARY
[facility]    ASSOCIATES  [phone]    DISCHARGE SUMMARY  [facility]    Patient Identification:  Name: Cecilio Puckett  Age: 55 y.o.  Sex: male  :  1970  MRN: [id number]  Primary Care Physician: Lara Iverson APRN    Admit date: 2025  Discharge date and time:      Discharge Diagnoses:  Atherosclerosis of artery of extremity with intermittent claudication    NHL (non-Hodgkin's lymphoma)    HIV (human immunodeficiency virus infection)    Type 2 diabetes mellitus with circulatory disorder, without long-term current use of insulin    HTN (hypertension)    Coronary artery disease involving native coronary artery of native heart without angina pectoris    Ischemic cardiomyopathy    History of CVA (cerebrovascular accident)    Dyslipidemia       History of present illness from H&P:    A pleasant 55 years old gentleman with a past history of coronary artery disease/hypertension/ischemic cardiomyopathy/HIV/NHL/DM2/dyslipidemia/CVA with slurred speech/peripheral vascular disease status post aortobifemoral bypass in the past. Patient smokes cigar and occasionally consumes alcohol. Patient presented to the emergency department with left foot pain that is made worse with walking and relieved by rest. No trauma. No back pain. No pain in any other parts of the lower extremity. Positive tingling left foot. Patient did not describe any change in the color or temperature of his left foot. No ulcers of the left foot. No fever or chills. In the emergency department C-reactive protein was elevated at 0.61. ESR was normal. CBC was normal. PTT was normal. INR was 1.36. CMP normal except a random blood sugar of 323, calcium 8.5, albumin 3.3. CTA of the aorta with body lateral iliofemoral runoff revealed left superficial femoral artery occlusion with collateral/right superficial femoral artery and popliteal artery occlusive disease. Patient was subsequently admitted     Hospital  Course:     55-year-old gentleman who comes to the hospital because of ischemic left foot.  Patient had not been able to follow-up with the INR clinic and his INR admission was subtherapeutic 1.36.  He also had possible stroke in May of this year because of fluctuating INR's.  Because of medication interaction oral blood thinners are not considered an option for the patient.  The patient underwent femoral artery cutdown with thrombectomy by Dr. Cole and he will need to follow-up with Dr. Cole outpatient.    He was maintained on heparin drip during hospitalization.  He is back on his Coumadin.  His INR today is 1.35.  He received 15 mg of Coumadin on 2/1, 12-1/2 units on 2/2, and will get 15 units today.  Patient is to take 15 units tomorrow and then he will need to have his INR rechecked at Coumadin clinic on Wednesday.  CCP is to call tomorrow to verify that the patient can get into the Coumadin clinic on Wednesday.  The patient will need to stay on Lovenox until INR is greater then 2.0.  Both the patient and his mother are understanding of the importance of stopping the Lovenox once he is therapeutic.  And they also understand the importance of maintaining Lovenox while subtherapeutic.    Patient is doing well today.  Blood sugars are controlled he is going to restart his diabetes regimen on discharge.    Kidney function is stable.    Patient's blood pressure is good today.    Patient is anxious for discharge    The patient was seen and examined on the day of discharge.    Consults:   Consults       Date and Time Order Name Status Description    2/2/2025 11:22 AM Inpatient Cardiology Consult      1/28/2025 10:30 PM Inpatient Vascular Surgery Consult Completed     1/28/2025  8:13 PM [facility] (on-call MD unless specified) Details              Results from last 7 days   Lab Units 02/03/25  0507   WBC 10*3/mm3 8.41   HEMOGLOBIN g/dL 12.9*   HEMATOCRIT % 38.5   PLATELETS 10*3/mm3 185       Results from last 7 days   Lab  "Units 02/03/25  0508   SODIUM mmol/L 136   POTASSIUM mmol/L 3.5   CHLORIDE mmol/L 105   CO2 mmol/L 22.8   BUN mg/dL 20   CREATININE mg/dL 1.13   GLUCOSE mg/dL 160*   CALCIUM mg/dL 8.9       Significant Diagnostic Studies:   WBC   Date Value Ref Range Status   02/03/2025 8.41 3.40 - 10.80 10*3/mm3 Final     Hemoglobin   Date Value Ref Range Status   02/03/2025 12.9 (L) 13.0 - 17.7 g/dL Final     Hematocrit   Date Value Ref Range Status   02/03/2025 38.5 37.5 - 51.0 % Final     Platelets   Date Value Ref Range Status   02/03/2025 185 140 - 450 10*3/mm3 Final     Sodium   Date Value Ref Range Status   02/03/2025 136 136 - 145 mmol/L Final     Potassium   Date Value Ref Range Status   02/03/2025 3.5 3.5 - 5.2 mmol/L Final     Chloride   Date Value Ref Range Status   02/03/2025 105 98 - 107 mmol/L Final     CO2   Date Value Ref Range Status   02/03/2025 22.8 22.0 - 29.0 mmol/L Final     BUN   Date Value Ref Range Status   02/03/2025 20 6 - 20 mg/dL Final     Creatinine   Date Value Ref Range Status   02/03/2025 1.13 0.76 - 1.27 mg/dL Final     Glucose   Date Value Ref Range Status   02/03/2025 160 (H) 65 - 99 mg/dL Final     Calcium   Date Value Ref Range Status   02/03/2025 8.9 8.6 - 10.5 mg/dL Final     No results found for: \"AST\", \"ALT\", \"ALKPHOS\"  INR   Date Value Ref Range Status   02/03/2025 1.35 (H) 0.90 - 1.10 Final     No results found for: \"COLORU\", \"CLARITYU\", \"SPECGRAV\", \"PHUR\", \"PROTEINUR\", \"GLUCOSEU\", \"KETONESU\", \"BLOODU\", \"NITRITE\", \"LEUKOCYTESUR\", \"BILIRUBINUR\", \"UROBILINOGEN\", \"RBCUA\", \"WBCUA\", \"BACTERIA\", \"UACOMMENT\"  No results found for: \"TROPONINT\", \"TROPONINI\", \"BNP\"  No components found for: \"HGBA1C;2\"  No components found for: \"TSH;2\"    Imaging Results (All)       Procedure Component Value Units Date/Time    Arteriogram (Autofinalize) [[id number]] Resulted: 01/30/25 1424     Updated: 01/30/25 1424    Narrative:      This procedure was auto-finalized with no dictation required.    XR Chest 1 View " [[id number]] Collected: 01/29/25 1233     Updated: 01/29/25 1241    Narrative:      XR CHEST 1 VW-     HISTORY: Male who is 55 years-old, dyspnea     TECHNIQUE: Frontal view of the chest     COMPARISON: 5/20/2024     FINDINGS:  The heart size is normal. Aorta is tortuous. Pulmonary vasculature is  unremarkable. No focal pulmonary consolidation, pleural effusion, or  pneumothorax. No acute osseous process.       Impression:      No focal pulmonary consolidation. Tortuous aorta.  Follow-up/further evaluation can be obtained as clinically indicated.              This report was finalized on 1/29/2025 12:38 PM by Dr. Junior Weiner M.D on Workstation: [employer]       CT Angio Abdominal Aorta Bilateral Iliofem Runoff [[id number]] Collected: 01/28/25 1928     Updated: 01/28/25 1949    Narrative:      CTA ABDOMEN & PELVIS AND BILATERAL LOWER EXTREMITIES WITH IV CONTRAST     INDICATION:   Decreased peripheral pulses, left great toe pain, history of peripheral  vascular disease     TECHNIQUE:  CT angiography was performed of the abdomen and pelvis with axial images  as well as coronal and sagittal reformatted MIP images provided  following the administration of IV contrast.  3-D surface rendered  reformats of the vascular structures of the abdomen and pelvis were  created and reviewed .   Radiation dose reduction techniques were  utilized, including automated exposure control, and exposure modulation  based on body size.        COMPARISON:   None available.     FINDINGS:     Lung bases: The visualized lung bases are unremarkable, without acute  abnormality.     Abdomen: The liver and gallbladder are normal.  The spleen and pancreas  appear normal.  Both adrenal glands are normal.  Both kidneys are  normal.         There is no abdominal or retroperitoneal adenopathy or other mass.   There is no abdominal or retroperitoneal inflammatory change, or  abnormal fluid or air collection.  There is no evidence of bowel  obstruction.        Pelvis:  The appendix is clearly identified, and is normal.  There is  diverticulosis, but there is no CT evidence of diverticulitis.  There is  no pelvic adenopathy or other soft tissue mass.  There is no CT evidence  of hernia or bowel obstruction.     Vascular: The visualized lower thoracic aorta is normal in caliber, and  there is no evidence of dissection.  The abdominal aorta is normal in  caliber, and there is no evidence of dissection.  The mesenteric and  renal vessels are normally patent, without acute abnormality.     There has been prior bilateral native iliac artery stenting, and the  right native common iliac is patent. There has been aorto bifemoral  bypass grafting. Both limbs of the graft are only patent.     On the right, despite mild atheromatous changes of the common deep and  superficial femoral arteries are patent. The popliteal artery is patent  across the knee, though there is focal severe stenosis in the popliteal  artery about 6 cm above the knee. Below the knee, there is extensive  atheromatous change. There is two-vessel runoff in the proximal to mid  calf via the anterior tibial and peroneal arteries. The anterior tibial  is heavily diseased but does appear to cross the ankle and supply the  foot. The posterior tibial artery is occluded in the upper calf, but may  be partially reconstituted at about the ankle.     On the left, the common and deep and superficial femoral arteries are  patent proximally, but the superficial femoral artery occludes in the  proximal to mid thigh. There is reconstitution of the popliteal artery  10 to 11 cm above the knee. Below the knee, there is extensive  atheromatous disease, with noncontiguous opacification of the posterior  tibial, occlusion of the anterior tibial at its origin, and segmental  opacification of the peroneal artery in the proximal to mid calf.     There is no acute bony abnormality.       Impression:         No evidence of acute  "visceral, soft tissue, or bony abnormality in the  abdomen, or pelvis.     Abdominal aorta is normally patent and there has been prior  aortobifemoral bypass, and both limbs of the graft are normally patent.     On the right, the SFA and popliteal artery are patent though there is  focal popliteal stenosis a few centimeters above the knee, and below the  knee there is heavy atheromatous change and heavily diseased two-vessel  runoff.     On the left, there is occlusion of the SFA in the mid to upper thigh,  and then collateral reconstitution of the popliteal artery 10 to 11 cm  above the knee. There is extensive 7 trifurcation disease with only  segmental visualization of the posterior tibial and peroneal arteries.           This report was finalized on 1/28/2025 7:46 PM by Dr. Severo Vann M.D on Workstation: [id number]           No results found for: \"SITE\", \"ALLENTEST\", \"PHART\", \"NVS9AZA\", \"PO2ART\", \"BHN3WPU\", \"BASEEXCESS\", \"Q5RMFZSC\", \"HGBBG\", \"HCTABG\", \"OXYHEMOGLOBI\", \"METHHGBN\", \"CARBOXYHGB\", \"CO2CT\", \"BAROMETRIC\", \"MODALITY\", \"FIO2\"       Discharge Medications        New Medications        Instructions Start Date   Enoxaparin Sodium 100 MG/ML solution prefilled syringe syringe  Commonly known as: LOVENOX   100 mg, Subcutaneous, Every 12 Hours Scheduled             Changes to Medications        Instructions Start Date   warfarin 10 MG tablet  Commonly known as: COUMADIN  What changed: See the new instructions.   10 mg Monday, Wednesday and friday and 15 mg sat and Sunday, Tuesday and thursday.             Continue These Medications        Instructions Start Date   Adult Aspirin Regimen 81 MG EC tablet  Generic drug: aspirin   1 tablet, Daily      albuterol sulfate  (90 Base) MCG/ACT inhaler  Commonly known as: PROVENTIL HFA;VENTOLIN HFA;PROAIR HFA   2 puffs, Every 4 Hours PRN      amLODIPine 10 MG tablet  Commonly known as: NORVASC   10 mg, Daily      BD Pen Needle Marimar 2nd Gen 32G X 4 MM " misc  Generic drug: Insulin Pen Needle   Use to inject insulin subcutaneously via pens daily      carvedilol 6.25 MG tablet  Commonly known as: COREG   6.25 mg, Oral, Every 12 Hours Scheduled      Ghdzmlx-Azpha-Mxlinzuz-TenofAF 351-886-188-10 MG per tablet  Commonly known as: GENVOYA   1 tablet, Daily      Entresto 49-51 MG tablet  Generic drug: sacubitril-valsartan   1 tablet, Oral, Every 12 Hours Scheduled      ergocalciferol 1.25 MG (08505 UT) capsule  Commonly known as: ERGOCALCIFEROL   1 capsule, Weekly      Farxiga 10 MG tablet  Generic drug: dapagliflozin Propanediol   10 mg, Daily      furosemide 20 MG tablet  Commonly known as: LASIX   1 tablet, Daily      glipizide 10 MG tablet  Commonly known as: GLUCOTROL   1 tablet, Daily      HYDROcodone-acetaminophen  MG per tablet  Commonly known as: NORCO   1 tablet, As Needed      Insulin Glargine 100 UNIT/ML injection pen  Commonly known as: LANTUS SOLOSTAR   25 Units, Subcutaneous, Nightly      pantoprazole 40 MG EC tablet  Commonly known as: PROTONIX   No dose, route, or frequency recorded.      rosuvastatin 20 MG tablet  Commonly known as: CRESTOR   1 tablet, Daily             Stop These Medications      Diclofenac Sodium 1 % gel gel  Commonly known as: VOLTAREN                Patient Instructions:       Future Appointments   Date Time Provider Department Center   2/18/2025 11:45 AM Mukesh Wharton MD [facility]         Follow-up Information       Mukesh Wharton MD Follow up in 2 week(s).    Specialty: Vascular Surgery  Contact information:  [address]  [facility] [id number] [phone]               Lara Iverson, APRN .    Specialty: Nurse Practitioner  Contact information:  [address]  [address]  [facility] [id number] [phone]                             Discharge Order (From admission, onward)       Start     Ordered    02/03/25 1607  Discharge patient  Once        Expected Discharge Date: 02/03/25   Discharge  Disposition: Home or Self Care   Physician of Record for Attribution - Please select from Treatment Team: RAYMON CASE [[id number]]   Review needed by CMO to determine Physician of Record: No      Question Answer Comment   Physician of Record for Attribution - Please select from Treatment Team RAYMON CASE    Review needed by CMO to determine Physician of Record No        02/03/25 1621                    Diet Order   Procedures    Diet: Cardiac, Diabetic; Healthy Heart (2-3 Na+); Consistent Carbohydrate; Fluid Consistency: Thin (IDDSI 0)       Discharge instructions:  Follow up with your primary care provider in 1 week with a cbc and cmp         Total time spent discharging patient including evaluation, post hospitalization follow up,  medication and post hospitalization instructions and education, total time exceeds 30 minutes.    Signed:  Raymon Case MD  2/3/2025  16:22 EST

## 2025-02-03 NOTE — PROGRESS NOTES
[facility] Clinical Pharmacy Services: Warfarin Dosing/Monitoring Consult    Cecilio Puckett is a 55 y.o. male, estimated creatinine clearance is 93.3 mL/min (by C-G formula based on SCr of 1.13 mg/dL). weighing 101 kg (222 lb 10.6 oz).    Results from last 7 days   Lab Units 02/03/25  1153 02/03/25  0508 02/03/25  0507 02/02/25  2204 02/02/25  1456 02/02/25  0752 02/01/25  1602 02/01/25  0512 01/31/25  1213 01/31/25  0455 01/30/25  2053 01/30/25  0607 01/29/25  0112 01/28/25  1817   INR   --  1.35*  --   --   --  1.27*  --  1.23*  --   --   --   --   --  1.36*   APTT seconds 79.6* 85.0*  --  71.0* 73.9* 121.9*   < > 75.9*   < > 35.7*   < > 75.1*   < > 27.5   HEMOGLOBIN g/dL  --   --  12.9*  --   --  12.4*  --  13.5  --  13.8  --  13.6   < > 14.1   HEMATOCRIT %  --   --  38.5  --   --  40.1  --  39.3  --  40.9  --  43.3   < > 45.2   PLATELETS 10*3/mm3  --   --  185  --   --  172  --  176  --  191  --  190   < > 196    < > = values in this interval not displayed.     Prior to admission anticoagulation: Warfarin 10 mg nightly except 15 mg on Mon, Wed and Fri per med rec - follows with [facility] - established care with PCP 1/22/25 and they said in note that they were referring him to coumadin clinic for monitoring - not sure that he has seen them yet     Hospital Anticoagulation:  Consulting provider: Dr. Case  Start date: 1/31  Indication: history of DVT/PE  Target INR: 2 - 3  Expected duration: Indefinite   Bridge Therapy: Yes  with unfractionated heparin    Potential food or drug interactions:   No new     Education complete?/Date: N/A; home medication    Assessment/Plan:  INR subtherapeutic at 1.35 (Goal 2-3), day 4 of resumed therapy, with a boosted dose yesterday. Patient to receive warfarin 15 mg today.   RN to monitor for any signs or symptoms of bleeding  Pharmacy to follow up daily INRs and dose adjustments    Pharmacy will continue to follow until discharge or discontinuation of warfarin.     Cheyenne Gowers,  RPH  Clinical Pharmacist

## 2025-02-04 NOTE — CASE MANAGEMENT/SOCIAL WORK
Continued Stay Note  [facility]     Patient Name: Cecilio Puckett  MRN: [id number]  Today's Date: 2/4/2025    Admit Date: 1/28/2025    Plan: Home   Discharge Plan       Row Name 02/04/25 1447       Plan    Plan Home    Patient/Family in Agreement with Plan yes    Plan Comments Pt needs an INR recheck after discharge. Order for lab draw at the outpatient lab entered for Dr Moura. Called Dr Salazar's office, as he usually manages the pt's warfarin and requested an appointment. The office stated that they would contact the pt directly and set up an appointment. Called Pt's Mom Linda and let her know. She verbalized understanding. She denied any further needs at this time. Instruced Linda to call CCP if they do not receive a call with an appointment. CCP following. Tera ELIAS RN                   Discharge Codes    No documentation.                 Expected Discharge Date and Time       Expected Discharge Date Expected Discharge Time    Feb 3, 2025               Tera Fox RN

## 2025-02-04 NOTE — OUTREACH NOTE
Prep Survey      Flowsheet Row Responses   [facility] facility patient discharged from? [place]   Is LACE score < 7 ? No   Eligibility Readm Mgmt   Discharge diagnosis Atherosclerosis of artery of extremity with intermittent claudication,  Left leg thrombectomy with angiogram and left Superficial femoral artery stent placement.   Does the patient have one of the following disease processes/diagnoses(primary or secondary)? General Surgery   Does the patient have Home health ordered? No   Is there a DME ordered? No   Prep survey completed? Yes            Mary GRANGER - Registered Nurse

## 2025-02-04 NOTE — PAYOR COMM NOTE
"Cecilio Owens (55 y.o. Male)    PLEASE SEE ATTACHED FOR DC NOTICE   REF#[id number]   THANK YOU  JANNY PORTILLO RN/ DEPT   [facility]  PH: [phone]  FAX:  [phone]     Date of Birth   1970    Social Security Number       Address   [address] [place] [address]    Home Phone   [phone]    MRN   [id number]       [facility]    Marital Status   Single                            Admission Date   1/28/25    Admission Type   Emergency    Admitting Provider   Lisa Guo MD    Attending Provider       Department, Room/Bed   [facility], E548/1       Discharge Date   2/3/2025    Discharge Disposition   Home or Self Care    Discharge Destination   Home                              Attending Provider: (none)   Allergies: No Known Allergies    Isolation: None   Infection: None   Code Status: Prior    Ht: 185.4 cm (73\")   Wt: 101 kg (222 lb 10.6 oz)    Admission Cmt: None   Principal Problem: Atherosclerosis of artery of extremity with intermittent claudication [I70.219]                   Active Insurance as of 1/28/2025       Primary Coverage       Payor Plan Insurance Group Employer/Plan Group    AETNA MEDICARE REPLACEMENT AETNA MEDICARE REPLACEMENT [id number]       Payor Plan Address Payor Plan Phone Number Payor Plan Fax Number Effective Dates    [address] [phone]  1/1/2022 - None Entered    [facility] [id number]         Subscriber Name Subscriber Birth Date Member ID       CECILIO OWENS 1970 [id number]                     Emergency Contacts        (Rel.) Home Phone Work Phone Mobile Phone    Linda Owens (Mother) -- -- [phone]              [place]: NPI [id number]  Tax ID [id number]     Discharge Summary        Raymon Case MD at 02/03/25 1621                       [facility]  [phone]    DISCHARGE SUMMARY  [facility]    Patient Identification:  Name: Cecilio" Lavern  Age: 55 y.o.  Sex: male  :  1970  MRN: [id number]  Primary Care Physician: Lara Iverson APRN    Admit date: 2025  Discharge date and time:      Discharge Diagnoses:  Atherosclerosis of artery of extremity with intermittent claudication    NHL (non-Hodgkin's lymphoma)    HIV (human immunodeficiency virus infection)    Type 2 diabetes mellitus with circulatory disorder, without long-term current use of insulin    HTN (hypertension)    Coronary artery disease involving native coronary artery of native heart without angina pectoris    Ischemic cardiomyopathy    History of CVA (cerebrovascular accident)    Dyslipidemia       History of present illness from H&P:    A pleasant 55 years old gentleman with a past history of coronary artery disease/hypertension/ischemic cardiomyopathy/HIV/NHL/DM2/dyslipidemia/CVA with slurred speech/peripheral vascular disease status post aortobifemoral bypass in the past. Patient smokes cigar and occasionally consumes alcohol. Patient presented to the emergency department with left foot pain that is made worse with walking and relieved by rest. No trauma. No back pain. No pain in any other parts of the lower extremity. Positive tingling left foot. Patient did not describe any change in the color or temperature of his left foot. No ulcers of the left foot. No fever or chills. In the emergency department C-reactive protein was elevated at 0.61. ESR was normal. CBC was normal. PTT was normal. INR was 1.36. CMP normal except a random blood sugar of 323, calcium 8.5, albumin 3.3. CTA of the aorta with body lateral iliofemoral runoff revealed left superficial femoral artery occlusion with collateral/right superficial femoral artery and popliteal artery occlusive disease. Patient was subsequently admitted     Hospital Course:     55-year-old gentleman who comes to the hospital because of ischemic left foot.  Patient had not been able to follow-up with the INR clinic and  his INR admission was subtherapeutic 1.36.  He also had possible stroke in May of this year because of fluctuating INR's.  Because of medication interaction oral blood thinners are not considered an option for the patient.  The patient underwent femoral artery cutdown with thrombectomy by Dr. Cole and he will need to follow-up with Dr. Cole outpatient.    He was maintained on heparin drip during hospitalization.  He is back on his Coumadin.  His INR today is 1.35.  He received 15 mg of Coumadin on 2/1, 12-1/2 units on 2/2, and will get 15 units today.  Patient is to take 15 units tomorrow and then he will need to have his INR rechecked at Coumadin clinic on Wednesday.  CCP is to call tomorrow to verify that the patient can get into the Coumadin clinic on Wednesday.  The patient will need to stay on Lovenox until INR is greater then 2.0.  Both the patient and his mother are understanding of the importance of stopping the Lovenox once he is therapeutic.  And they also understand the importance of maintaining Lovenox while subtherapeutic.    Patient is doing well today.  Blood sugars are controlled he is going to restart his diabetes regimen on discharge.    Kidney function is stable.    Patient's blood pressure is good today.    Patient is anxious for discharge    The patient was seen and examined on the day of discharge.    Consults:   Consults       Date and Time Order Name Status Description    2/2/2025 11:22 AM Inpatient Cardiology Consult      1/28/2025 10:30 PM Inpatient Vascular Surgery Consult Completed     1/28/2025  8:13 PM [facility] (on-call MD unless specified) Details              Results from last 7 days   Lab Units 02/03/25  0507   WBC 10*3/mm3 8.41   HEMOGLOBIN g/dL 12.9*   HEMATOCRIT % 38.5   PLATELETS 10*3/mm3 185       Results from last 7 days   Lab Units 02/03/25  0508   SODIUM mmol/L 136   POTASSIUM mmol/L 3.5   CHLORIDE mmol/L 105   CO2 mmol/L 22.8   BUN mg/dL 20   CREATININE mg/dL 1.13   GLUCOSE  "mg/dL 160*   CALCIUM mg/dL 8.9       Significant Diagnostic Studies:   WBC   Date Value Ref Range Status   02/03/2025 8.41 3.40 - 10.80 10*3/mm3 Final     Hemoglobin   Date Value Ref Range Status   02/03/2025 12.9 (L) 13.0 - 17.7 g/dL Final     Hematocrit   Date Value Ref Range Status   02/03/2025 38.5 37.5 - 51.0 % Final     Platelets   Date Value Ref Range Status   02/03/2025 185 140 - 450 10*3/mm3 Final     Sodium   Date Value Ref Range Status   02/03/2025 136 136 - 145 mmol/L Final     Potassium   Date Value Ref Range Status   02/03/2025 3.5 3.5 - 5.2 mmol/L Final     Chloride   Date Value Ref Range Status   02/03/2025 105 98 - 107 mmol/L Final     CO2   Date Value Ref Range Status   02/03/2025 22.8 22.0 - 29.0 mmol/L Final     BUN   Date Value Ref Range Status   02/03/2025 20 6 - 20 mg/dL Final     Creatinine   Date Value Ref Range Status   02/03/2025 1.13 0.76 - 1.27 mg/dL Final     Glucose   Date Value Ref Range Status   02/03/2025 160 (H) 65 - 99 mg/dL Final     Calcium   Date Value Ref Range Status   02/03/2025 8.9 8.6 - 10.5 mg/dL Final     No results found for: \"AST\", \"ALT\", \"ALKPHOS\"  INR   Date Value Ref Range Status   02/03/2025 1.35 (H) 0.90 - 1.10 Final     No results found for: \"COLORU\", \"CLARITYU\", \"SPECGRAV\", \"PHUR\", \"PROTEINUR\", \"GLUCOSEU\", \"KETONESU\", \"BLOODU\", \"NITRITE\", \"LEUKOCYTESUR\", \"BILIRUBINUR\", \"UROBILINOGEN\", \"RBCUA\", \"WBCUA\", \"BACTERIA\", \"UACOMMENT\"  No results found for: \"TROPONINT\", \"TROPONINI\", \"BNP\"  No components found for: \"HGBA1C;2\"  No components found for: \"TSH;2\"    Imaging Results (All)       Procedure Component Value Units Date/Time    Arteriogram (Autofinalize) [[id number]] Resulted: 01/30/25 1424     Updated: 01/30/25 1424    Narrative:      This procedure was auto-finalized with no dictation required.    XR Chest 1 View [[id number]] Collected: 01/29/25 1233     Updated: 01/29/25 1241    Narrative:      XR CHEST 1 VW-     HISTORY: Male who is 55 years-old, dyspnea   "   TECHNIQUE: Frontal view of the chest     COMPARISON: 5/20/2024     FINDINGS:  The heart size is normal. Aorta is tortuous. Pulmonary vasculature is  unremarkable. No focal pulmonary consolidation, pleural effusion, or  pneumothorax. No acute osseous process.       Impression:      No focal pulmonary consolidation. Tortuous aorta.  Follow-up/further evaluation can be obtained as clinically indicated.              This report was finalized on 1/29/2025 12:38 PM by Dr. Junior Weiner M.D on Workstation: [id number]       CT Angio Abdominal Aorta Bilateral Iliofem Runoff [[id number]] Collected: 01/28/25 1928     Updated: 01/28/25 1949    Narrative:      CTA ABDOMEN & PELVIS AND BILATERAL LOWER EXTREMITIES WITH IV CONTRAST     INDICATION:   Decreased peripheral pulses, left great toe pain, history of peripheral  vascular disease     TECHNIQUE:  CT angiography was performed of the abdomen and pelvis with axial images  as well as coronal and sagittal reformatted MIP images provided  following the administration of IV contrast.  3-D surface rendered  reformats of the vascular structures of the abdomen and pelvis were  created and reviewed .   Radiation dose reduction techniques were  utilized, including automated exposure control, and exposure modulation  based on body size.        COMPARISON:   None available.     FINDINGS:     Lung bases: The visualized lung bases are unremarkable, without acute  abnormality.     Abdomen: The liver and gallbladder are normal.  The spleen and pancreas  appear normal.  Both adrenal glands are normal.  Both kidneys are  normal.         There is no abdominal or retroperitoneal adenopathy or other mass.   There is no abdominal or retroperitoneal inflammatory change, or  abnormal fluid or air collection.  There is no evidence of bowel  obstruction.       Pelvis:  The appendix is clearly identified, and is normal.  There is  diverticulosis, but there is no CT evidence of diverticulitis.  There  is  no pelvic adenopathy or other soft tissue mass.  There is no CT evidence  of hernia or bowel obstruction.     Vascular: The visualized lower thoracic aorta is normal in caliber, and  there is no evidence of dissection.  The abdominal aorta is normal in  caliber, and there is no evidence of dissection.  The mesenteric and  renal vessels are normally patent, without acute abnormality.     There has been prior bilateral native iliac artery stenting, and the  right native common iliac is patent. There has been aorto bifemoral  bypass grafting. Both limbs of the graft are only patent.     On the right, despite mild atheromatous changes of the common deep and  superficial femoral arteries are patent. The popliteal artery is patent  across the knee, though there is focal severe stenosis in the popliteal  artery about 6 cm above the knee. Below the knee, there is extensive  atheromatous change. There is two-vessel runoff in the proximal to mid  calf via the anterior tibial and peroneal arteries. The anterior tibial  is heavily diseased but does appear to cross the ankle and supply the  foot. The posterior tibial artery is occluded in the upper calf, but may  be partially reconstituted at about the ankle.     On the left, the common and deep and superficial femoral arteries are  patent proximally, but the superficial femoral artery occludes in the  proximal to mid thigh. There is reconstitution of the popliteal artery  10 to 11 cm above the knee. Below the knee, there is extensive  atheromatous disease, with noncontiguous opacification of the posterior  tibial, occlusion of the anterior tibial at its origin, and segmental  opacification of the peroneal artery in the proximal to mid calf.     There is no acute bony abnormality.       Impression:         No evidence of acute visceral, soft tissue, or bony abnormality in the  abdomen, or pelvis.     Abdominal aorta is normally patent and there has been prior  aortobifemoral  "bypass, and both limbs of the graft are normally patent.     On the right, the SFA and popliteal artery are patent though there is  focal popliteal stenosis a few centimeters above the knee, and below the  knee there is heavy atheromatous change and heavily diseased two-vessel  runoff.     On the left, there is occlusion of the SFA in the mid to upper thigh,  and then collateral reconstitution of the popliteal artery 10 to 11 cm  above the knee. There is extensive 7 trifurcation disease with only  segmental visualization of the posterior tibial and peroneal arteries.           This report was finalized on 1/28/2025 7:46 PM by Dr. Severo Vann M.D on Workstation: [id number]           No results found for: \"SITE\", \"ALLENTEST\", \"PHART\", \"AQB8OHB\", \"PO2ART\", \"ZZO2GDA\", \"BASEEXCESS\", \"R6YEUVZU\", \"HGBBG\", \"HCTABG\", \"OXYHEMOGLOBI\", \"METHHGBN\", \"CARBOXYHGB\", \"CO2CT\", \"BAROMETRIC\", \"MODALITY\", \"FIO2\"       Discharge Medications        New Medications        Instructions Start Date   Enoxaparin Sodium 100 MG/ML solution prefilled syringe syringe  Commonly known as: LOVENOX   100 mg, Subcutaneous, Every 12 Hours Scheduled             Changes to Medications        Instructions Start Date   warfarin 10 MG tablet  Commonly known as: COUMADIN  What changed: See the new instructions.   10 mg Monday, Wednesday and friday and 15 mg sat and Sunday, Tuesday and thursday.             Continue These Medications        Instructions Start Date   Adult Aspirin Regimen 81 MG EC tablet  Generic drug: aspirin   1 tablet, Daily      albuterol sulfate  (90 Base) MCG/ACT inhaler  Commonly known as: PROVENTIL HFA;VENTOLIN HFA;PROAIR HFA   2 puffs, Every 4 Hours PRN      amLODIPine 10 MG tablet  Commonly known as: NORVASC   10 mg, Daily      BD Pen Needle Marimar 2nd Gen 32G X 4 MM misc  Generic drug: Insulin Pen Needle   Use to inject insulin subcutaneously via pens daily      carvedilol 6.25 MG tablet  Commonly known as: COREG   6.25 " mg, Oral, Every 12 Hours Scheduled      Povhpoa-Obrdb-Mafreere-TenofAF 655-473-519-10 MG per tablet  Commonly known as: GENVOYA   1 tablet, Daily      Entresto 49-51 MG tablet  Generic drug: sacubitril-valsartan   1 tablet, Oral, Every 12 Hours Scheduled      ergocalciferol 1.25 MG (09456 UT) capsule  Commonly known as: ERGOCALCIFEROL   1 capsule, Weekly      Farxiga 10 MG tablet  Generic drug: dapagliflozin Propanediol   10 mg, Daily      furosemide 20 MG tablet  Commonly known as: LASIX   1 tablet, Daily      glipizide 10 MG tablet  Commonly known as: GLUCOTROL   1 tablet, Daily      HYDROcodone-acetaminophen  MG per tablet  Commonly known as: NORCO   1 tablet, As Needed      Insulin Glargine 100 UNIT/ML injection pen  Commonly known as: LANTUS SOLOSTAR   25 Units, Subcutaneous, Nightly      pantoprazole 40 MG EC tablet  Commonly known as: PROTONIX   No dose, route, or frequency recorded.      rosuvastatin 20 MG tablet  Commonly known as: CRESTOR   1 tablet, Daily             Stop These Medications      Diclofenac Sodium 1 % gel gel  Commonly known as: VOLTAREN                Patient Instructions:       Future Appointments   Date Time Provider Department Center   2/18/2025 11:45 AM Mukesh Wharton MD [facility]         Follow-up Information       Mukesh Wharton MD Follow up in 2 week(s).    Specialty: Vascular Surgery  Contact information:  [address]  [facility] [id number] [phone]               Lara Iverson, APRN .    Specialty: Nurse Practitioner  Contact information:  [address]  [address]  [facility] [id number] [phone]                             Discharge Order (From admission, onward)       Start     Ordered    02/03/25 1607  Discharge patient  Once        Expected Discharge Date: 02/03/25   Discharge Disposition: Home or Self Care   Physician of Record for Attribution - Please select from Treatment Team: GEORGIE SAXENA [[id number]]   Review needed by CMO to  determine Physician of Record: No      Question Answer Comment   Physician of Record for Attribution - Please select from Treatment Team RAYMON CASE    Review needed by CMO to determine Physician of Record No        02/03/25 1629                    Diet Order   Procedures    Diet: Cardiac, Diabetic; Healthy Heart (2-3 Na+); Consistent Carbohydrate; Fluid Consistency: Thin (IDDSI 0)       Discharge instructions:  Follow up with your primary care provider in 1 week with a cbc and cmp         Total time spent discharging patient including evaluation, post hospitalization follow up,  medication and post hospitalization instructions and education, total time exceeds 30 minutes.    Signed:  Raymon Case MD  2/3/2025  16:22 EST      Electronically signed by Raymon Case MD at 02/03/25 4126

## 2025-02-05 ENCOUNTER — LAB (OUTPATIENT)
Dept: LAB | Facility: HOSPITAL | Age: 55
End: 2025-02-05
Payer: MEDICARE

## 2025-02-05 DIAGNOSIS — Z51.81 SUBTHERAPEUTIC ANTICOAGULATION: ICD-10-CM

## 2025-02-05 DIAGNOSIS — Z79.01 SUBTHERAPEUTIC ANTICOAGULATION: ICD-10-CM

## 2025-02-05 DIAGNOSIS — Z79.01 CHRONIC ANTICOAGULATION: ICD-10-CM

## 2025-02-05 LAB
INR PPP: 1.7 (ref 0.9–1.1)
PROTHROMBIN TIME: 20 SECONDS (ref 11.7–14.2)

## 2025-02-05 PROCEDURE — 36415 COLL VENOUS BLD VENIPUNCTURE: CPT

## 2025-02-05 PROCEDURE — 85610 PROTHROMBIN TIME: CPT

## 2025-02-07 ENCOUNTER — READMISSION MANAGEMENT (OUTPATIENT)
Dept: CALL CENTER | Facility: HOSPITAL | Age: 55
End: 2025-02-07
Payer: MEDICARE

## 2025-02-07 NOTE — OUTREACH NOTE
General Surgery Week 1 Survey      Flowsheet Row Responses   [facility] facility patient discharged from? [facility]   Does the patient have one of the following disease processes/diagnoses(primary or secondary)? General Surgery   Week 1 attempt successful? No   Unsuccessful attempts Attempt 1            Thi Zuniga Registered Nurse

## 2025-02-10 NOTE — PROGRESS NOTES
"[facility] GROUP OUTPATIENT FOLLOW UP CLINIC VISIT    REASON FOR FOLLOW-UP:    History of DVT, PE, CVA  Recent arterial thrombus of the left lower extremity    HISTORY OF PRESENT ILLNESS:  Cecilio Puckett is a 55 y.o. male who returns today for follow up of the above issue.      He has followed up intermittently with our anticoagulation clinic but has not had follow-up in the office.  He presented on 1/28 with left foot pain.  His INR was subtherapeutic.  He had femoral artery cutdown with thrombectomy by Dr. Wharton.  He was maintained on a heparin drip and started on Lovenox and warfarin prior to discharge.    He reports some swelling and discomfort in the left groin area deep to the surgical incision.  No redness.  No fevers.  No drainage.  He has continued Lovenox injections and warfarin without bleeding.    REVIEW OF SYSTEMS:  As per the HPI    PHYSICAL EXAMINATION:    Vitals:    02/11/25 1009   BP: 132/87   Pulse: 86   SpO2: 95%   Weight: 102 kg (225 lb)   Height: 185.4 cm (72.99\")   PainSc:   5   PainLoc: Incisional       General:  No acute distress, awake, alert and oriented  Skin:  Warm and dry, no visible rash  HEENT:  Normocephalic/atraumatic.  Wearing a face mask.  Chest:  Normal respiratory effort.  Lungs clear to auscultation bilaterally.  Heart: Regular rate and rhythm  Extremities: There is a baseball sized area of firm swelling in the left groin deep to the surgical incision.  There is no erythema.  There is no drainage.  Neuro/psych:  Grossly nonfocal.  Normal mood and affect.      DIAGNOSTIC DATA:  Protime-INR, Fingerstick (02/11/2025 09:56)  CBC & Differential (02/11/2025 09:56)    IMAGING:    None reviewed    ASSESSMENT:  This is a 55 y.o. male with:    *HIV  Managed at [facility] by Dr. Laith Robison     *History of pulmonary embolism, left lower extremity DVT, left lower extremity peripheral vascular disease status post arterial stenting.    It appears he had an aortobifemoral bypass on " 3/14/2014.  On chronic therapy with warfarin  He states INR values recently have been fluctuating widely and his INR was very low over the past 1 to 2 weeks at close to 1  It is unclear if he ever had a hypercoagulable evaluation     *History of acute left lower extremity DVT by venous duplex on 9/26/2023  Left lower extremity venous duplex on 9/26/2023 with acute DVT in the popliteal, posterior tibial, and peroneal veins  Started on a heparin drip with improvement in left lower extremity pain  Discharged on warfarin with a therapeutic INR     *History of acute pulmonary embolism  9/30/2023 CT angiogram of the carotids appeared to show bilateral pulmonary thromboembolic disease with embolism in the distal right pulmonary artery and upper lobe segmental branches with atherosclerotic disease with noncalcified plaques involving both common carotid and internal carotid arteries.  Small acute infarctions involving the right corona radiata and right basal ganglia noted.  9/30/2023 CT angiogram of the chest showed acute pulmonary emboli throughout all 5 lobes of the lungs, right greater than left with a mildly dilated main pulmonary artery and no evidence of right heart strain.  Diffuse mild bronchitis noted.     *Recent arterial thrombus of the left lower extremity  He presented on 1/28/2025 with left foot pain.  His INR was subtherapeutic.  He had femoral artery cutdown with thrombectomy by Dr. Wharton.  He was maintained on a heparin drip and started on Lovenox and warfarin prior to discharge.  2/1/2025: Follow-up in the office.  I suspect he has a seroma deep to the surgical incision.  I notified Dr. Wharton with vascular surgery of this.  I advised the patient and his mother to notify the vascular surgery office should he develop worsening swelling, erythema, pain, drainage, or any other concerns.       PLAN:  With an iron of 2.1, discontinue Lovenox.  Continue warfarin.  He will see our anticoagulation clinic today  to discuss dosing.  Follow-up with the anticoagulation clinic as recommended and I will see him back in a few months for follow-up with labs  He will follow-up with vascular surgery as scheduled in 1 week but as noted above I did encourage him to reach out to the vascular surgery office if he has any worsening issues related to the left groin    I spent 45 minutes in this visit today reviewing his record, communicating with him and his mother, examining him, communicating with Dr. Wharton, communicating with staff, placing orders, documenting the encounter.

## 2025-02-11 ENCOUNTER — OFFICE VISIT (OUTPATIENT)
Dept: ONCOLOGY | Facility: CLINIC | Age: 55
End: 2025-02-11
Payer: MEDICARE

## 2025-02-11 ENCOUNTER — LAB (OUTPATIENT)
Dept: LAB | Facility: HOSPITAL | Age: 55
End: 2025-02-11
Payer: MEDICARE

## 2025-02-11 ENCOUNTER — ANTICOAGULATION VISIT (OUTPATIENT)
Dept: ONCOLOGY | Facility: HOSPITAL | Age: 55
End: 2025-02-11
Payer: MEDICARE

## 2025-02-11 VITALS
DIASTOLIC BLOOD PRESSURE: 87 MMHG | OXYGEN SATURATION: 95 % | HEIGHT: 73 IN | SYSTOLIC BLOOD PRESSURE: 132 MMHG | WEIGHT: 225 LBS | HEART RATE: 86 BPM | BODY MASS INDEX: 29.82 KG/M2

## 2025-02-11 DIAGNOSIS — I82.402 LEG DVT (DEEP VENOUS THROMBOEMBOLISM), ACUTE, LEFT: Primary | ICD-10-CM

## 2025-02-11 DIAGNOSIS — I82.4Y9 ACUTE DEEP VEIN THROMBOSIS (DVT) OF PROXIMAL VEIN OF LOWER EXTREMITY, UNSPECIFIED LATERALITY: Primary | ICD-10-CM

## 2025-02-11 DIAGNOSIS — Z79.01 CHRONIC ANTICOAGULATION: ICD-10-CM

## 2025-02-11 LAB
BASOPHILS # BLD AUTO: 0.07 10*3/MM3 (ref 0–0.2)
BASOPHILS NFR BLD AUTO: 1 % (ref 0–1.5)
DEPRECATED RDW RBC AUTO: 48.5 FL (ref 37–54)
EOSINOPHIL # BLD AUTO: 0.14 10*3/MM3 (ref 0–0.4)
EOSINOPHIL NFR BLD AUTO: 1.9 % (ref 0.3–6.2)
ERYTHROCYTE [DISTWIDTH] IN BLOOD BY AUTOMATED COUNT: 15.3 % (ref 12.3–15.4)
HCT VFR BLD AUTO: 40 % (ref 37.5–51)
HGB BLD-MCNC: 13.5 G/DL (ref 13–17.7)
IMM GRANULOCYTES # BLD AUTO: 0.26 10*3/MM3 (ref 0–0.05)
IMM GRANULOCYTES NFR BLD AUTO: 3.6 % (ref 0–0.5)
INR PPP: 2.1 (ref 0.9–1.1)
LYMPHOCYTES # BLD AUTO: 3.46 10*3/MM3 (ref 0.7–3.1)
LYMPHOCYTES NFR BLD AUTO: 47.3 % (ref 19.6–45.3)
MCH RBC QN AUTO: 29.3 PG (ref 26.6–33)
MCHC RBC AUTO-ENTMCNC: 33.8 G/DL (ref 31.5–35.7)
MCV RBC AUTO: 87 FL (ref 79–97)
MONOCYTES # BLD AUTO: 0.54 10*3/MM3 (ref 0.1–0.9)
MONOCYTES NFR BLD AUTO: 7.4 % (ref 5–12)
NEUTROPHILS NFR BLD AUTO: 2.84 10*3/MM3 (ref 1.7–7)
NEUTROPHILS NFR BLD AUTO: 38.8 % (ref 42.7–76)
NRBC BLD AUTO-RTO: 0 /100 WBC (ref 0–0.2)
PLATELET # BLD AUTO: 208 10*3/MM3 (ref 140–450)
PMV BLD AUTO: 11.7 FL (ref 6–12)
PROTHROMBIN TIME: 25.1 SECONDS (ref 11–13.5)
RBC # BLD AUTO: 4.6 10*6/MM3 (ref 4.14–5.8)
WBC NRBC COR # BLD AUTO: 7.31 10*3/MM3 (ref 3.4–10.8)

## 2025-02-11 PROCEDURE — 85025 COMPLETE CBC W/AUTO DIFF WBC: CPT

## 2025-02-11 PROCEDURE — 85610 PROTHROMBIN TIME: CPT

## 2025-02-11 PROCEDURE — G0463 HOSPITAL OUTPT CLINIC VISIT: HCPCS

## 2025-02-11 PROCEDURE — 36415 COLL VENOUS BLD VENIPUNCTURE: CPT

## 2025-02-11 RX ORDER — OXYBUTYNIN CHLORIDE 5 MG/1
5 TABLET, EXTENDED RELEASE ORAL DAILY
COMMUNITY
Start: 2025-01-09

## 2025-02-11 RX ORDER — MELOXICAM 15 MG/1
1 TABLET ORAL DAILY
COMMUNITY
Start: 2025-01-03

## 2025-02-11 RX ORDER — ACYCLOVIR 400 MG/1
TABLET ORAL
COMMUNITY
Start: 2025-01-22

## 2025-02-11 NOTE — PROGRESS NOTES
Anticoagulation Clinic Progress Note    Patient's visit was held in office today.    Anticoagulation Summary  As of 2025      INR goal:  2.0-3.0   TTR:  --   INR used for dosin.10 (2025)   Warfarin maintenance plan:  10 mg (10 mg x 1) every Mon, Wed, Fri; 15 mg (10 mg x 1.5) all other days   Weekly warfarin total:  90 mg   Plan last modified:  Idalia Duarte RPH (2025)   Next INR check:  2025   Target end date:  --             Anticoagulation Episode Summary       INR check location:  --    Preferred lab:  --    Send INR reminders to:   [facility] CBC ANTICOAG POOL    Comments:  --            Clinic Interview:  Patient Findings     Negatives:  Signs/symptoms of thrombosis, Signs/symptoms of bleeding,   Laboratory test error suspected, Change in health, Change in alcohol use,   Change in activity, Upcoming invasive procedure, Emergency department   visit, Upcoming dental procedure, Missed doses, Extra doses, Change in   medications, Change in diet/appetite, Hospital admission, Bruising, Other   complaints    Comments:  Patient with new DVT 25 from subtherapeutic INR. Has   history of multiple clots/stroke from noncompliance w/warfarin. Cannot use   DOAC due to drug interactions with HIV meds. He was previously followed by   our anticoagulation clinic, but switched to [facility] last year.   He was discharged from hospital on warfarin 10 mg MWF and 15 mg AOD &   Lovenox 100 mg SQ Q12H. Can stop Lovenox since INR therapeutic (confirmed   2.0-3.0 range with Dr. Salazar).       Clinical Outcomes     Negatives:  Major bleeding event, Thromboembolic event,   Anticoagulation-related hospital admission, Anticoagulation-related ED   visit, Anticoagulation-related fatality    Comments:  Patient with new DVT 25 from subtherapeutic INR. Has   history of multiple clots/stroke from noncompliance w/warfarin. Cannot use   DOAC due to drug interactions with HIV meds. He was previously followed  by   our anticoagulation clinic, but switched to [facility] last year.   He was discharged from hospital on warfarin 10 mg MWF and 15 mg AOD's &   Lovenox 100 mg SQ Q12H. Can stop Lovenox since INR therapeutic (confirmed   2.0-3.0 range with Dr. Salazar).         INR History:      Latest Ref Rng & Units 1/28/2025     6:17 PM 2/1/2025     5:12 AM 2/2/2025     7:52 AM 2/3/2025     5:08 AM 2/5/2025    12:46 PM 2/11/2025     9:56 AM 2/11/2025    10:30 AM   Anticoagulation Monitoring   INR        2.10   INR Date        2/11/2025   INR Goal        2.0-3.0   Last Week Total        90 mg   Next Week Total        90 mg   Sun        15 mg   Mon        10 mg   Tue        15 mg   Wed        10 mg   Thu        15 mg   Fri        10 mg   Sat        15 mg   Historical INR 0.90 - 1.10 1.36  1.23  1.27  1.35  1.70  2.10     Visit Report       Report Report       Plan:  1. INR is Therapeutic today- see above in Anticoagulation Summary.  Will instruct Cecilio Puckett to stop Lovenox injections since INR is therapeutic. He will continue his warfarin regimen of 10 mg on MWF and 15 mg all other days - see above in Anticoagulation Summary.  2. Follow up in 1 week  3. Patient declines warfarin refills.  4. Verbal and written information provided. Patient expresses understanding and has no further questions at this time.    Idalia Duarte, [facility]

## 2025-02-12 ENCOUNTER — READMISSION MANAGEMENT (OUTPATIENT)
Dept: CALL CENTER | Facility: HOSPITAL | Age: 55
End: 2025-02-12
Payer: MEDICARE

## 2025-02-12 NOTE — OUTREACH NOTE
General Surgery Week 1 Survey      Flowsheet Row Responses   [facility] patient discharged from? [place]   Does the patient have one of the following disease processes/diagnoses(primary or secondary)? General Surgery   Week 1 attempt successful? Yes   Call start time 1315   Unsuccessful attempts Attempt 2   Call end time 1317   Discharge diagnosis Atherosclerosis of artery of extremity with intermittent claudication,  Left leg thrombectomy with angiogram and left Superficial femoral artery stent placement.   Meds reviewed with patient/caregiver? Yes   Is the patient having any side effects they believe may be caused by any medication additions or changes? No   Does the patient have all medications related to this admission filled (includes all antibiotics, pain medications, etc.) Yes   Is the patient taking all medications as directed (includes completed medication regime)? Yes   Does the patient have a follow up appointment scheduled with their surgeon? Yes  [2/18/25]   Has the patient kept scheduled appointments due by today? Yes  [hem/onc apt on 2/11/25]   Has home health visited the patient within 72 hours of discharge? N/A   Did the patient receive a copy of their discharge instructions? Yes   Nursing interventions Reviewed instructions with patient   What is the patient's perception of their health status since discharge? Improving   Nursing interventions Nurse provided patient education   Is the patient /caregiver able to teach back basic post-op care? Take showers only when approved by MD-sponge bathe until then, No tub bath, swimming, or hot tub until instructed by MD, Keep incision areas clean,dry and protected, Lifting as instructed by MD in discharge instructions, Drive as instructed by MD in discharge instructions, Practice 'cough and deep breath'   Is the patient/caregiver able to teach back signs and symptoms of incisional infection? Increased redness, swelling or pain at the incisonal site,  Increased drainage or bleeding, Incisional warmth, Pus or odor from incision, Fever   Is the patient/caregiver able to teach back steps to recovery at home? Set small, achievable goals for return to baseline health, Rest and rebuild strength, gradually increase activity, Practice good oral hygiene, Eat a well-balance diet, Make a list of questions for surgeon's appointment   If the patient is a current smoker, are they able to teach back resources for cessation? Not a smoker   Is the patient/caregiver able to teach back the hierarchy of who to call/visit for symptoms/problems? PCP, Specialist, Home health nurse, Urgent Care, ED, 911 Yes   Week 1 call completed? Yes   Call end time 1317            Rosario H - Registered Nurse

## 2025-02-13 ENCOUNTER — NURSE TRIAGE (OUTPATIENT)
Dept: CALL CENTER | Facility: HOSPITAL | Age: 55
End: 2025-02-13
Payer: MEDICARE

## 2025-02-13 NOTE — TELEPHONE ENCOUNTER
"Reason for Disposition   Sounds like a serious complication to the triager    Additional Information   Negative: [1] Major abdominal surgical incision AND [2] wound gaping open AND [3] visible internal organs   Negative: Sounds like a life-threatening emergency to the triager   Negative: [1] Bleeding from incision AND [2] won't stop after 10 minutes of direct pressure   Negative: [1] Widespread rash AND [2] bright red, sunburn-like   Negative: Severe pain in the incision   Negative: [1] Incision gaping open AND [2] < 48 hours since wound re-opened   Negative: [1] Incision gaping open AND [2] length of opening > 2 inches (5 cm)   Negative: Patient sounds very sick or weak to the triager   Negative: Fever > 100.4 F (38.0 C)    Answer Assessment - Initial Assessment Questions  1. SYMPTOM: \"What's the main symptom you're concerned about?\" (e.g., redness, pain, drainage)      Swelling at incision site left groin  2. ONSET: \"When did this  start?\"      About 5 day ago  3. SURGERY: \"What surgery was performed?\"      Left leg thrombectomy with angiogram and left Superficial femoral artery stent placement.  4. DATE of SURGERY: \"When was surgery performed?\"       1/30  5. INCISION SITE: \"Where is the incision located?\"       Left groin  6. REDNESS: \"Is there any redness at the incision site?\" If yes, ask: \"How wide across is the redness?\" (Inches, centimeters)       no  7. PAIN: \"Is there any pain?\" If so, ask: \"How bad is it?\"  (Scale 1-10; or mild, moderate, severe)      tender  8. BLEEDING: \"Is there any bleeding?\" If so, ask: \"How much?\" and \"Where?\"      no  9. DRAINAGE: \"Is there any drainage from the incision site?\" If yes, ask: \"What color and how much?\" (e.g., red, cloudy, pus; drops, teaspoon)      no  10. FEVER: \"Do you have a fever?\" If so, ask: \"What is your temperature, how was it measured, and when did it start?\"        no  11. OTHER SYMPTOMS: \"Do you have any other symptoms?\" (e.g., shaking chills, weakness, " "rash elsewhere on body)        Soft mass at site notes to be size of an \"eggroll\"    Protocols used: Post-Op Incision Symptoms-ADULT-AH    "

## 2025-02-18 ENCOUNTER — OFFICE VISIT (OUTPATIENT)
Age: 55
End: 2025-02-18
Payer: MEDICARE

## 2025-02-18 ENCOUNTER — LAB (OUTPATIENT)
Dept: LAB | Facility: HOSPITAL | Age: 55
End: 2025-02-18
Payer: MEDICARE

## 2025-02-18 ENCOUNTER — ANTICOAGULATION VISIT (OUTPATIENT)
Dept: ONCOLOGY | Facility: HOSPITAL | Age: 55
End: 2025-02-18
Payer: MEDICARE

## 2025-02-18 VITALS
WEIGHT: 230 LBS | DIASTOLIC BLOOD PRESSURE: 77 MMHG | SYSTOLIC BLOOD PRESSURE: 111 MMHG | HEART RATE: 84 BPM | BODY MASS INDEX: 30.48 KG/M2 | HEIGHT: 73 IN

## 2025-02-18 DIAGNOSIS — Z95.828 S/P AORTOBIFEMORAL BYPASS SURGERY: ICD-10-CM

## 2025-02-18 DIAGNOSIS — I73.9 PERIPHERAL VASCULAR DISEASE, UNSPECIFIED: Primary | ICD-10-CM

## 2025-02-18 DIAGNOSIS — I82.402 DEEP VEIN THROMBOSIS (DVT) OF LEFT LOWER EXTREMITY, UNSPECIFIED CHRONICITY, UNSPECIFIED VEIN: ICD-10-CM

## 2025-02-18 DIAGNOSIS — I82.402 DEEP VEIN THROMBOSIS (DVT) OF LEFT LOWER EXTREMITY, UNSPECIFIED CHRONICITY, UNSPECIFIED VEIN: Primary | ICD-10-CM

## 2025-02-18 DIAGNOSIS — F17.210 CIGARETTE SMOKER: ICD-10-CM

## 2025-02-18 LAB
INR PPP: 2.8 (ref 0.9–1.1)
PROTHROMBIN TIME: 34.1 SECONDS (ref 11–13.5)

## 2025-02-18 PROCEDURE — 85610 PROTHROMBIN TIME: CPT

## 2025-02-18 PROCEDURE — 99024 POSTOP FOLLOW-UP VISIT: CPT | Performed by: SURGERY

## 2025-02-18 PROCEDURE — 36416 COLLJ CAPILLARY BLOOD SPEC: CPT

## 2025-02-18 PROCEDURE — G0463 HOSPITAL OUTPT CLINIC VISIT: HCPCS

## 2025-02-18 NOTE — PROGRESS NOTES
Anticoagulation Clinic Progress Note    Patient's visit was held in office today.    Anticoagulation Summary  As of 2025      INR goal:  2.0-3.0   TTR:  --   INR used for dosin.80 (2025)   Warfarin maintenance plan:  10 mg (10 mg x 1) every Mon, Wed, Fri; 15 mg (10 mg x 1.5) all other days   Weekly warfarin total:  90 mg   No change documented:  Idalia Duarte RPH   Plan last modified:  Idalia Duarte RPH (2025)   Next INR check:  3/4/2025   Target end date:  --             Anticoagulation Episode Summary       INR check location:  --    Preferred lab:  --    Send INR reminders to:   [facility] CBC ANTICOAG POOL    Comments:  --            Clinic Interview:  Patient Findings     Negatives:  Signs/symptoms of thrombosis, Signs/symptoms of bleeding,   Laboratory test error suspected, Change in health, Change in alcohol use,   Change in activity, Upcoming invasive procedure, Emergency department   visit, Upcoming dental procedure, Missed doses, Extra doses, Change in   medications, Change in diet/appetite, Hospital admission, Bruising, Other   complaints      Clinical Outcomes     Negatives:  Major bleeding event, Thromboembolic event,   Anticoagulation-related hospital admission, Anticoagulation-related ED   visit, Anticoagulation-related fatality        INR History:      Latest Ref Rng & Units 2025     7:52 AM 2/3/2025     5:08 AM 2025    12:46 PM 2025     9:56 AM 2025    10:30 AM 2025     1:00 PM 2025     1:11 PM   Anticoagulation Monitoring   INR      2.10 2.80    INR Date      2025    INR Goal      2.0-3.0 2.0-3.0    Trend       Same    Last Week Total      90 mg 90 mg    Next Week Total      90 mg 90 mg    Sun      15 mg 15 mg    Mon      10 mg 10 mg    Tue      15 mg 15 mg    Wed      10 mg 10 mg    Thu      15 mg 15 mg    Fri      10 mg 10 mg    Sat      15 mg 15 mg    Historical INR 0.90 - 1.10 1.27  1.35  1.70  2.10    2.80    Visit Report      Report Report Report Report       Plan:  1. INR is Therapeutic today- see above in Anticoagulation Summary.  Will instruct Cecilio Puckett to Continue their warfarin regimen- see above in Anticoagulation Summary.  2. Follow up in 2 weeks  3. Patient declines warfarin refills.  4. Verbal and written information provided. Patient expresses understanding and has no further questions at this time.    Idalia Duarte formerly [facility]   pt c/o right shoulder pain x 2 days unable to sleep. denies known injury, thought it was a muscle strain from the gym. PMS intact.  pain with ROM right shoulder, full ROM right elbow. holding arm in POC. PMHx asthma.

## 2025-02-18 NOTE — PATIENT INSTRUCTIONS
Nutrition and Heart Health  In this video, you'll learn why nutrition is an important part of a healthy lifestyle, especially if you have heart disease.  To view the content, go to this web address:  [email or web address]    This video will  on: 2026. If you need access to this video following this date, please reach out to the healthcare provider who assigned it to you.  This information is not intended to replace advice given to you by your health care provider. Make sure you discuss any questions you have with your health care provider.  [employer] Patient Education ©  Elsevier Inc. Smoking Cessation  You will learn methods to help you quit smoking and how quitting can help prevent a variety of health problems and improve your health.  To view the content, go to this web address:  [email or web address]    This video will  on: 2026. If you need access to this video following this date, please reach out to the healthcare provider who assigned it to you.  This information is not intended to replace advice given to you by your health care provider. Make sure you discuss any questions you have with your health care provider.  [employer] Patient Education ©  Elsevier Inc.        Steps to Quit Smoking  Smoking tobacco is the leading cause of preventable death. It can affect almost every organ in the body. Smoking puts you and people around you at risk for many serious, long-lasting (chronic) diseases. Quitting smoking can be hard, but it is one of the best things that you can do for your health. It is never too late to quit.  Do not give up if you cannot quit the first time. Some people need to try many times to quit. Do your best to stick to your quit plan, and talk with your doctor if you have any questions or concerns.  How do I get ready to quit?  Pick a date to quit. Set a date within the next 2 weeks to give you time to prepare.  Write down the reasons why you are  quitting. Keep this list in places where you will see it often.  Tell your family, friends, and co-workers that you are quitting. Their support is important.  Talk with your doctor about the choices that may help you quit.  Find out if your health insurance will pay for these treatments.  Know the people, places, things, and activities that make you want to smoke (triggers). Avoid them.  What first steps can I take to quit smoking?  Throw away all cigarettes at home, at work, and in your car.  Throw away the things that you use when you smoke, such as ashtrays and lighters.  Clean your car. Empty the ashtray.  Clean your home, including curtains and carpets.  What can I do to help me quit smoking?  Talk with your doctor about taking medicines and seeing a counselor. You are more likely to succeed when you do both.  If you are pregnant or breastfeeding:  Talk with your doctor about counseling or other ways to quit smoking.  Do not take medicine to help you quit smoking unless your doctor tells you to.  Quit right away  Quit smoking completely, instead of slowly cutting back on how much you smoke over a period of time. Stopping smoking right away may be more successful than slowly quitting.  Go to counseling. In-person is best if this is an option. You are more likely to quit if you go to counseling sessions regularly.  Take medicine  You may take medicines to help you quit. Some medicines need a prescription, and some you can buy over-the-counter. Some medicines may contain a drug called nicotine to replace the nicotine in cigarettes. Medicines may:  Help you stop having the desire to smoke (cravings).  Help to stop the problems that come when you stop smoking (withdrawal symptoms).  Your doctor may ask you to use:  Nicotine patches, gum, or lozenges.  Nicotine inhalers or sprays.  Non-nicotine medicine that you take by mouth.  Find resources  Find resources and other ways to help you quit smoking and remain  smoke-free after you quit. They include:  Online chats with a counselor.  Phone quitlines.  Printed self-help materials.  Support groups or group counseling.  Text messaging programs.  Mobile phone apps. Use apps on your mobile phone or tablet that can help you stick to your quit plan. Examples of free services include Quit Guide from the CDC and smokefree.gov    What can I do to make it easier to quit?    Talk to your family and friends. Ask them to support and encourage you.  Call a phone quitline, such as 1-800-QUIT-NOW, reach out to support groups, or work with a counselor.  Ask people who smoke to not smoke around you.  Avoid places that make you want to smoke, such as:  Bars.  Parties.  Smoke-break areas at work.  Spend time with people who do not smoke.  Lower the stress in your life. Stress can make you want to smoke. Try these things to lower stress:  Getting regular exercise.  Doing deep-breathing exercises.  Doing yoga.  Meditating.  What benefits will I see if I quit smoking?  Over time, you may have:  A better sense of smell and taste.  Less coughing and sore throat.  A slower heart rate.  Lower blood pressure.  Clearer skin.  Better breathing.  Fewer sick days.  Summary  Quitting smoking can be hard, but it is one of the best things that you can do for your health.  Do not give up if you cannot quit the first time. Some people need to try many times to quit.  When you decide to quit smoking, make a plan to help you succeed.  Quit smoking right away, not slowly over a period of time.  When you start quitting, get help and support to keep you smoke-free.    This information is not intended to replace advice given to you by your health care provider. Make sure you discuss any questions you have with your health care provider.  Document Revised: 12/09/2022 Document Reviewed: 12/09/2022  Elsevier Patient Education © 2024 Elsevier Inc.

## 2025-02-18 NOTE — PROGRESS NOTES
"Chief Complaint  Post-op Follow-up    Subjective        Cecilio Puckett presents to [facility] VASCULAR SURGERY  Post-op Follow-up      Following up from the hospital after urgent embolectomy with stenting of the left SFA.  Has some swelling in his incision.  Denies any fevers.    Objective   Vital Signs:  /77   Pulse 84   Ht 185.4 cm (72.99\")   Wt 104 kg (230 lb)   BMI 30.35 kg/m²   Estimated body mass index is 30.35 kg/m² as calculated from the following:    Height as of this encounter: 185.4 cm (72.99\").    Weight as of this encounter: 104 kg (230 lb).     BMI is >= 30 and <35. (Class 1 Obesity). The following options were offered after discussion;: weight loss educational material (shared in after visit summary)    Cecilio Puckett  reports that he has been smoking cigars and cigarettes. He started smoking about 31 years ago. He has a 31.1 pack-year smoking history. He has been exposed to tobacco smoke. He has never used smokeless tobacco. I have educated him on the risk of diseases from using tobacco products such as arterial disease.     I advised him to quit and he is not willing to quit.    I spent 3  minutes counseling the patient.         Physical Exam  Constitutional:       Appearance: He is well-developed.   Pulmonary:      Effort: Pulmonary effort is normal. No respiratory distress.   Abdominal:      General: There is no distension.      Palpations: Abdomen is soft.   Neurological:      Mental Status: He is alert and oriented to person, place, and time.     Palpable dorsalis pedis pulse.  Noninfected hematoma/seroma at left superficial femoral artery incision.    Result Review :    The following data was reviewed by: Mukesh Wharton MD on 02/18/25  CBC          2/2/2025    07:52 2/3/2025    05:07 2/11/2025    09:56   CBC   WBC 9.89  8.41  7.31    RBC 4.44  4.41  4.60    Hemoglobin 12.4  12.9  13.5    Hematocrit 40.1  38.5  40.0    MCV 90.3  87.3  87.0    MCH 27.9  29.3  29.3    MCHC " 30.9  33.5  33.8    RDW 15.0  14.8  15.3    Platelets 172  185  208       BMP          2/1/2025    05:12 2/2/2025    07:52 2/3/2025    05:08   BMP   BUN 22  20  20    Creatinine 1.00  0.96  1.13    Sodium 140  138  136    Potassium 3.7  3.7  3.5    Chloride 105  105  105    CO2 20.9  21.5  22.8    Calcium 8.7  8.6  8.9       A1C Last 3 Results          5/14/2024    18:32 1/29/2025    06:20   HGBA1C Last 3 Results   Hemoglobin A1C 10.00  9.30          Vascular Surgical History:  Remote history of aortobifemoral bypass  1/30/2025: Left femoral thrombectomy with SFA stent placement (NTS) (for acute SFA occlusion with low-grade ischemia)    Vascular Imaging History:      (Text in bold font has been individually reviewed by myself and confirmed)         Assessment and Plan     Vascular surgery following for:  Peripheral arterial disease with history of left SFA occlusion and remote history of aortobifemoral bypass.    Diagnoses and all orders for this visit:    1. Peripheral vascular disease, unspecified (Primary)  -     Doppler Ankle Brachial Index Single Level CAR; Future  -     Duplex Lower Extremity Art / Grafts - Left CAR; Future    2. S/P aortobifemoral bypass surgery    3. Cigarette smoker    All in all patient is recovering well from his urgent surgery.  His foot pain is resolved.  He does have a moderate size hematoma in his left groin.  This does not appear to be infected.  Likely will resolve on its own.  Recommended getting a pair of bike shorts to provide some low-grade compression.  He is on good medical management.  He will follow-up with me in 1 month time with ABIs and stent duplex.    Discussed need for smoking cessation.      Vascular medical management:Patient should continue aspirin 81 mg daily, warfarin daily, and Crestor 20 mg daily.  Return in about 1 month (around 3/18/2025), or if symptoms worsen or fail to improve, for Follow up with vascular ultrasound.  Patient was given instructions and  counseling regarding his condition or for health maintenance advice. Please see specific information pulled into the AVS if appropriate.

## 2025-02-21 ENCOUNTER — READMISSION MANAGEMENT (OUTPATIENT)
Dept: CALL CENTER | Facility: HOSPITAL | Age: 55
End: 2025-02-21
Payer: MEDICARE

## 2025-02-21 NOTE — OUTREACH NOTE
General Surgery Week 2 Survey      Flowsheet Row Responses   [facility] patient discharged from? [place]   Does the patient have one of the following disease processes/diagnoses(primary or secondary)? General Surgery   Week 2 attempt successful? No   Unsuccessful attempts Attempt 1   Revoke Decline to participate            Giuliana SIEGEL - Registered Nurse

## 2025-03-14 ENCOUNTER — ANTICOAGULATION VISIT (OUTPATIENT)
Dept: ONCOLOGY | Facility: HOSPITAL | Age: 55
End: 2025-03-14
Payer: MEDICARE

## 2025-03-14 ENCOUNTER — LAB (OUTPATIENT)
Dept: LAB | Facility: HOSPITAL | Age: 55
End: 2025-03-14
Payer: MEDICARE

## 2025-03-14 DIAGNOSIS — I82.402 DEEP VEIN THROMBOSIS (DVT) OF LEFT LOWER EXTREMITY, UNSPECIFIED CHRONICITY, UNSPECIFIED VEIN: ICD-10-CM

## 2025-03-14 LAB
INR PPP: 3.4 (ref 0.9–1.1)
PROTHROMBIN TIME: 41 SECONDS (ref 11–13.5)

## 2025-03-14 PROCEDURE — 85610 PROTHROMBIN TIME: CPT

## 2025-03-14 PROCEDURE — 36416 COLLJ CAPILLARY BLOOD SPEC: CPT

## 2025-03-14 PROCEDURE — G0463 HOSPITAL OUTPT CLINIC VISIT: HCPCS

## 2025-03-14 NOTE — PROGRESS NOTES
Anticoagulation Clinic Progress Note    Patient's visit was held in office today.    Anticoagulation Summary  As of 3/14/2025      INR goal:  2.0-3.0   TTR:  25.7% (3 wk)   INR used for dosing:  3.40 (3/14/2025)   Warfarin maintenance plan:  15 mg (10 mg x 1.5) every Sun, Tue, Thu; 10 mg (10 mg x 1) all other days   Weekly warfarin total:  85 mg   Plan last modified:  Idalia Duarte RPH (3/14/2025)   Next INR check:  3/28/2025   Target end date:  --             Anticoagulation Episode Summary       INR check location:  --    Preferred lab:  --    Send INR reminders to:   [facility] CBC ANTICOAG POOL    Comments:  --            Clinic Interview:  Patient Findings     Negatives:  Signs/symptoms of thrombosis, Signs/symptoms of bleeding,   Laboratory test error suspected, Change in health, Change in alcohol use,   Change in activity, Upcoming invasive procedure, Emergency department   visit, Upcoming dental procedure, Missed doses, Extra doses, Change in   medications, Change in diet/appetite, Hospital admission, Bruising, Other   complaints      Clinical Outcomes     Negatives:  Major bleeding event, Thromboembolic event,   Anticoagulation-related hospital admission, Anticoagulation-related ED   visit, Anticoagulation-related fatality        INR History:      Latest Ref Rng & Units 2/5/2025    12:46 PM 2/11/2025     9:56 AM 2/11/2025    10:30 AM 2/18/2025     1:00 PM 2/18/2025     1:11 PM 3/14/2025    11:30 AM 3/14/2025    11:34 AM   Anticoagulation Monitoring   INR    2.10 2.80  3.40    INR Date    2/11/2025 2/18/2025  3/14/2025    INR Goal    2.0-3.0 2.0-3.0  2.0-3.0    Trend     Same  Down    Last Week Total    90 mg 90 mg  90 mg    Next Week Total    90 mg 90 mg  80 mg    Sun    15 mg 15 mg  15 mg    Mon    10 mg 10 mg  10 mg    Tue    15 mg 15 mg  15 mg    Wed    10 mg 10 mg  10 mg    Thu    15 mg 15 mg  15 mg    Fri    10 mg 10 mg  5 mg (3/14); Otherwise 10 mg    Sat    15 mg 15 mg  10 mg    Historical INR 0.90 -  1.10 1.70  2.10    2.80   3.40    Visit Report   Report Report Report Report         Plan:  1. INR is Supratherapeutic today- see above in Anticoagulation Summary.  Will instruct Cecilio Puckett to take 5 mg today only, then take 15 mg on Sunday, Tuesday, and Thursday and 10 mg all other days - see above in Anticoagulation Summary.  2. Follow up in 2 weeks  3. Patient declines warfarin refills.  4. Verbal and written information provided. Patient expresses understanding and has no further questions at this time.    Idalia Duarte [facility]

## 2025-04-15 ENCOUNTER — ANTICOAGULATION VISIT (OUTPATIENT)
Dept: ONCOLOGY | Facility: HOSPITAL | Age: 55
End: 2025-04-15
Payer: MEDICARE

## 2025-04-15 ENCOUNTER — LAB (OUTPATIENT)
Dept: LAB | Facility: HOSPITAL | Age: 55
End: 2025-04-15
Payer: MEDICARE

## 2025-04-15 DIAGNOSIS — I82.402 DEEP VEIN THROMBOSIS (DVT) OF LEFT LOWER EXTREMITY, UNSPECIFIED CHRONICITY, UNSPECIFIED VEIN: ICD-10-CM

## 2025-04-15 LAB
INR PPP: 1.2 (ref 0.9–1.1)
PROTHROMBIN TIME: 14.4 SECONDS (ref 11–13.5)

## 2025-04-15 PROCEDURE — G0463 HOSPITAL OUTPT CLINIC VISIT: HCPCS

## 2025-04-15 PROCEDURE — 85610 PROTHROMBIN TIME: CPT

## 2025-04-15 PROCEDURE — 36416 COLLJ CAPILLARY BLOOD SPEC: CPT

## 2025-04-15 RX ORDER — ENOXAPARIN SODIUM 100 MG/ML
100 INJECTION SUBCUTANEOUS EVERY 12 HOURS SCHEDULED
Qty: 10 ML | Refills: 0 | Status: SHIPPED | OUTPATIENT
Start: 2025-04-15

## 2025-04-15 NOTE — PROGRESS NOTES
Anticoagulation Clinic Progress Note    Patient's visit was held in office today.    Anticoagulation Summary  As of 4/15/2025      INR goal:  2.0-3.0   TTR:  37.6% (1.8 mo)   INR used for dosin.20 (4/15/2025)   Warfarin maintenance plan:  15 mg (10 mg x 1.5) every Sun, e, Thu; 10 mg (10 mg x 1) all other days   Weekly warfarin total:  85 mg   Plan last modified:  Idalia Duarte RPH (3/14/2025)   Next INR check:  2025   Target end date:  --             Anticoagulation Episode Summary       INR check location:  --    Preferred lab:  --    Send INR reminders to:   [facility] CBC ANTICOAG POOL    Comments:  --            Clinic Interview:  Patient Findings     Positives:  Upcoming invasive procedure, Missed doses    Negatives:  Signs/symptoms of thrombosis, Signs/symptoms of bleeding,   Laboratory test error suspected, Change in health, Change in alcohol use,   Change in activity, Emergency department visit, Upcoming dental procedure,   Extra doses, Change in medications, Change in diet/appetite, Hospital   admission, Bruising, Other complaints    Comments:  Patient states that he had a colonoscopy on  and was   instructed to hold warfarin for 5 days prior to procedure. He also reports   missing several doses of warfarin since having the colonoscopy. Counseled   patient on ensuring that he informs clinic when he has upcoming procedures   and will be off warfarin. Will initiate Lovenox since he has had several   clots while INR subtherapeutic with most recent being in 2025.   Warfarin 15 mg x 3 days and bridge with Lovenox 100 mg Q12H until   therapeutic.      Clinical Outcomes     Negatives:  Major bleeding event, Thromboembolic event,   Anticoagulation-related hospital admission, Anticoagulation-related ED   visit, Anticoagulation-related fatality    Comments:  Patient states that he had a colonoscopy on  and was   instructed to hold warfarin for 5 days prior to procedure. He also reports    missing several doses of warfarin since having the colonoscopy. Counseled   patient on ensuring that he informs clinic when he has upcoming procedures   and will be off warfarin. Will initiate Lovenox since he has had several   clots while INR subtherapeutic with most recent being in January 2025.   Warfarin 15 mg x 3 days and bridge with Lovenox 100 mg Q12H until   therapeutic.        INR History:      Latest Ref Rng & Units 2/11/2025    10:30 AM 2/18/2025     1:00 PM 2/18/2025     1:11 PM 3/14/2025    11:30 AM 3/14/2025    11:34 AM 4/15/2025     3:34 PM 4/15/2025     4:00 PM   Anticoagulation Monitoring   INR  2.10 2.80  3.40   1.20   INR Date  2/11/2025 2/18/2025  3/14/2025   4/15/2025   INR Goal  2.0-3.0 2.0-3.0  2.0-3.0   2.0-3.0   Trend   Same  Down   Same   Last Week Total  90 mg 90 mg  90 mg   85 mg   Next Week Total  90 mg 90 mg  80 mg   90 mg   Sun  15 mg 15 mg  15 mg   -   Mon  10 mg 10 mg  10 mg   -   Tue  15 mg 15 mg  15 mg   15 mg   Wed  10 mg 10 mg  10 mg   15 mg (4/16)   Thu  15 mg 15 mg  15 mg   15 mg   Fri  10 mg 10 mg  5 mg (3/14); Otherwise 10 mg   -   Sat  15 mg 15 mg  10 mg   -   Historical INR 0.90 - 1.10   2.80   3.40  1.20     Visit Report  Report Report Report           Plan:  1. INR is Subtherapeutic today- see above in Anticoagulation Summary.  Will instruct Cecilio Puckett to take warfarin 15 mg x 3 days. He will also inject Lovenox 100 mg SQ Q12H until INR is therapeutic- see above in Anticoagulation Summary.  2. Follow up in 3 days  3. Patient declines warfarin refills. Refills for Lovenox was sent to patients preferred pharmacy.  4. Verbal and written information provided. Patient expresses understanding and has no further questions at this time.    Idalia Duarte, [facility]

## 2025-04-23 ENCOUNTER — TELEPHONE (OUTPATIENT)
Dept: ONCOLOGY | Facility: CLINIC | Age: 55
End: 2025-04-23

## 2025-04-23 NOTE — TELEPHONE ENCOUNTER
Caller: Linda Puckett    Relationship to patient: Mother    Best call back number:[phone]    Chief complaint: R/S     Type of visit: LAB & ANTI-COUG    Requested date: ASAP AT 11 O'CLOCK     If rescheduling, when is the original appointment: 4/18/25   ”

## 2025-04-24 NOTE — TELEPHONE ENCOUNTER
Caller: Linda Puckett    Relationship: Mother    Best call back number: [phone]     What is the best time to reach you: ASAP    Who are you requesting to speak with (clinical staff, provider,  specific staff member): SCHEDULING      What was the call regarding: THIS APPT WAS JUST MADE FOR 4/25 AT 1:00 BUT PATIENT HAS ANOTHER APPT/MEETING AT THAT TIME, PLEASE CALL TO RESCHEDULE FOR ANOTHER TIME.

## 2025-04-25 ENCOUNTER — ANTICOAGULATION VISIT (OUTPATIENT)
Dept: ONCOLOGY | Facility: HOSPITAL | Age: 55
End: 2025-04-25
Payer: MEDICARE

## 2025-04-25 ENCOUNTER — LAB (OUTPATIENT)
Dept: LAB | Facility: HOSPITAL | Age: 55
End: 2025-04-25
Payer: MEDICARE

## 2025-04-25 DIAGNOSIS — I82.402 DEEP VEIN THROMBOSIS (DVT) OF LEFT LOWER EXTREMITY, UNSPECIFIED CHRONICITY, UNSPECIFIED VEIN: ICD-10-CM

## 2025-04-25 LAB
INR PPP: 1.6 (ref 0.9–1.1)
PROTHROMBIN TIME: 18.8 SECONDS (ref 11–13.5)

## 2025-04-25 PROCEDURE — 85610 PROTHROMBIN TIME: CPT

## 2025-04-25 PROCEDURE — G0463 HOSPITAL OUTPT CLINIC VISIT: HCPCS

## 2025-04-25 PROCEDURE — 36416 COLLJ CAPILLARY BLOOD SPEC: CPT

## 2025-04-25 RX ORDER — ENOXAPARIN SODIUM 100 MG/ML
100 INJECTION SUBCUTANEOUS EVERY 12 HOURS SCHEDULED
Qty: 10 ML | Refills: 0 | Status: SHIPPED | OUTPATIENT
Start: 2025-04-25

## 2025-04-25 NOTE — PROGRESS NOTES
Anticoagulation Clinic Progress Note    Patient's visit was held in office today.    Anticoagulation Summary  As of 2025      INR goal:  2.0-3.0   TTR:  31.6% (2.1 mo)   INR used for dosin.60 (2025)   Warfarin maintenance plan:  15 mg (10 mg x 1.5) every Sun, e, Thu; 10 mg (10 mg x 1) all other days   Weekly warfarin total:  85 mg   Plan last modified:  Idalia Duarte RPH (3/14/2025)   Next INR check:  2025   Target end date:  --             Anticoagulation Episode Summary       INR check location:  --    Preferred lab:  --    Send INR reminders to:   [facility] CBC ANTICOAG POOL    Comments:  --            Clinic Interview:  Patient Findings     Negatives:  Signs/symptoms of thrombosis, Signs/symptoms of bleeding,   Laboratory test error suspected, Change in health, Change in alcohol use,   Change in activity, Upcoming invasive procedure, Emergency department   visit, Upcoming dental procedure, Missed doses, Extra doses, Change in   medications, Change in diet/appetite, Hospital admission, Bruising, Other   complaints    Comments:  Patient states that he has not missed any doses of warfarin,   but has had issues in the past with warfarin compliance. Will continue   Lovenox 100 mg Q12H until INR is therapeutic.       Clinical Outcomes     Negatives:  Major bleeding event, Thromboembolic event,   Anticoagulation-related hospital admission, Anticoagulation-related ED   visit, Anticoagulation-related fatality    Comments:  Patient states that he has not missed any doses of warfarin,   but has had issues in the past with warfarin compliance. Will continue   Lovenox 100 mg Q12H until INR is therapeutic.         INR History:      Latest Ref Rng & Units 2025     1:11 PM 3/14/2025    11:30 AM 3/14/2025    11:34 AM 4/15/2025     3:34 PM 4/15/2025     4:00 PM 2025     4:00 PM 2025     4:30 PM   Anticoagulation Monitoring   INR   3.40   1.20  1.60   INR Date   3/14/2025   4/15/2025   4/25/2025   INR Goal   2.0-3.0   2.0-3.0  2.0-3.0   Trend   Down   Same  Same   Last Week Total   90 mg   85 mg  85 mg   Next Week Total   80 mg   90 mg  90 mg   Sun   15 mg   -  15 mg   Mon   10 mg   -  10 mg   Tue   15 mg   15 mg  15 mg   Wed   10 mg   15 mg (4/16)  -   Thu   15 mg   15 mg  -   Fri   5 mg (3/14); Otherwise 10 mg   -  15 mg (4/25)   Sat   10 mg   -  10 mg   Historical INR 0.90 - 1.10 2.80   3.40  1.20   1.60     Visit Report  Report             Plan:  1. INR is Subtherapeutic today- see above in Anticoagulation Summary.  Will instruct Cecilio Puckett to take warfarin 15 mg today only, then continue their warfarin regimen. He will also continue Lovenox 100 mg SQ Q12H until INR is therapeutic - see above in Anticoagulation Summary.  2. Follow up in 5 days  3. Patient declines warfarin refills.  4. Verbal and written information provided. Patient expresses understanding and has no further questions at this time.    Idalia Duarte [facility]

## 2025-05-01 ENCOUNTER — LAB (OUTPATIENT)
Dept: LAB | Facility: HOSPITAL | Age: 55
End: 2025-05-01
Payer: MEDICARE

## 2025-05-01 ENCOUNTER — ANTICOAGULATION VISIT (OUTPATIENT)
Dept: ONCOLOGY | Facility: HOSPITAL | Age: 55
End: 2025-05-01
Payer: MEDICARE

## 2025-05-01 DIAGNOSIS — I82.402 DEEP VEIN THROMBOSIS (DVT) OF LEFT LOWER EXTREMITY, UNSPECIFIED CHRONICITY, UNSPECIFIED VEIN: ICD-10-CM

## 2025-05-01 LAB
INR PPP: 3.72 (ref 0.9–1.1)
PROTHROMBIN TIME: 37.5 SECONDS (ref 11.7–14.2)

## 2025-05-01 PROCEDURE — 36415 COLL VENOUS BLD VENIPUNCTURE: CPT

## 2025-05-01 PROCEDURE — 85610 PROTHROMBIN TIME: CPT | Performed by: INTERNAL MEDICINE

## 2025-05-01 PROCEDURE — G0463 HOSPITAL OUTPT CLINIC VISIT: HCPCS

## 2025-05-01 NOTE — PROGRESS NOTES
Anticoagulation Clinic Progress Note    Patient's visit was held in office today.  Also followed up with phone call as he required venous INR which has to be sent to hospital lab.    Anticoagulation Summary  As of 5/1/2025      INR goal:  2.0-3.0   TTR:  33.0% (2.3 mo)   INR used for dosing:  3.72 (5/1/2025)   Warfarin maintenance plan:  15 mg (10 mg x 1.5) every Sun; 10 mg (10 mg x 1) all other days   Weekly warfarin total:  75 mg   Plan last modified:  Charissa Kent RPH (5/1/2025)   Next INR check:  5/7/2025   Target end date:  --             Anticoagulation Episode Summary       INR check location:  --    Preferred lab:  --    Send INR reminders to:   [facility] CBC ANTICOAG POOL    Comments:  --            Clinic Interview:  Patient Findings     Negatives:  Signs/symptoms of thrombosis, Signs/symptoms of bleeding,   Laboratory test error suspected, Change in health, Change in alcohol use,   Change in activity, Upcoming invasive procedure, Emergency department   visit, Upcoming dental procedure, Missed doses, Extra doses, Change in   medications, Change in diet/appetite, Hospital admission, Bruising, Other   complaints    Comments:  Has been taking enoxaparin since INR was low.  No changes   noted. No bleeding or increased bruising noted.      Clinical Outcomes     Negatives:  Major bleeding event, Thromboembolic event,   Anticoagulation-related hospital admission, Anticoagulation-related ED   visit, Anticoagulation-related fatality    Comments:  Has been taking enoxaparin since INR was low.  No changes   noted. No bleeding or increased bruising noted.        INR History:      Latest Ref Rng & Units 3/14/2025    11:34 AM 4/15/2025     3:34 PM 4/15/2025     4:00 PM 4/25/2025     4:00 PM 4/25/2025     4:30 PM 5/1/2025     1:25 PM 5/1/2025     1:45 PM   Anticoagulation Monitoring   INR    1.20  1.60  3.72   INR Date    4/15/2025  4/25/2025  5/1/2025   INR Goal    2.0-3.0  2.0-3.0  2.0-3.0   Trend    Same  Same  Down    Last Week Total    85 mg  85 mg  90 mg   Next Week Total    90 mg  90 mg  65 mg   Sun    -  15 mg  15 mg   Mon    -  10 mg  10 mg   Tue    15 mg  15 mg  10 mg (5/6)   Wed    15 mg (4/16)  -  -   Thu    15 mg  -  Hold (5/1)   Fri    -  15 mg (4/25)  10 mg   Sat    -  10 mg  10 mg   Historical INR 0.90 - 1.10 3.40  1.20   1.60   3.72         Plan:  1. INR is Supratherapeutic today- see above in Anticoagulation Summary.  Will instruct Cecilio Puckett to HOLD one dose of warfarin ---today only, then take 15mg only on Sundays, 10mg on all other days.-- see above in Anticoagulation Summary.  2. Follow up in 6 days  3. He will stop enoxaparin injections today. He had last dose yesterday PM.   4. Verbal and written information provided. Patient expresses understanding and has no further questions at this time.  Called him to let him know what venous INR resulted and to give him warfarin plan above.    Charissa Kent [facility]

## 2025-07-10 ENCOUNTER — TELEPHONE (OUTPATIENT)
Dept: PHARMACY | Facility: HOSPITAL | Age: 55
End: 2025-07-10
Payer: MEDICARE

## 2025-07-15 ENCOUNTER — LAB (OUTPATIENT)
Dept: LAB | Facility: HOSPITAL | Age: 55
End: 2025-07-15
Payer: MEDICARE

## 2025-07-15 ENCOUNTER — ANTICOAGULATION VISIT (OUTPATIENT)
Dept: ONCOLOGY | Facility: HOSPITAL | Age: 55
End: 2025-07-15
Payer: MEDICARE

## 2025-07-15 DIAGNOSIS — I82.402 LEG DVT (DEEP VENOUS THROMBOEMBOLISM), ACUTE, LEFT: ICD-10-CM

## 2025-07-15 DIAGNOSIS — I82.402 DEEP VEIN THROMBOSIS (DVT) OF LEFT LOWER EXTREMITY, UNSPECIFIED CHRONICITY, UNSPECIFIED VEIN: ICD-10-CM

## 2025-07-15 LAB
BASOPHILS # BLD AUTO: 0.04 10*3/MM3 (ref 0–0.2)
BASOPHILS NFR BLD AUTO: 0.4 % (ref 0–1.5)
DEPRECATED RDW RBC AUTO: 48 FL (ref 37–54)
EOSINOPHIL # BLD AUTO: 0.05 10*3/MM3 (ref 0–0.4)
EOSINOPHIL NFR BLD AUTO: 0.5 % (ref 0.3–6.2)
ERYTHROCYTE [DISTWIDTH] IN BLOOD BY AUTOMATED COUNT: 17.4 % (ref 12.3–15.4)
HCT VFR BLD AUTO: 44.5 % (ref 37.5–51)
HGB BLD-MCNC: 14.4 G/DL (ref 13–17.7)
IMM GRANULOCYTES # BLD AUTO: 0.07 10*3/MM3 (ref 0–0.05)
IMM GRANULOCYTES NFR BLD AUTO: 0.7 % (ref 0–0.5)
INR PPP: 1 (ref 0.9–1.1)
LYMPHOCYTES # BLD AUTO: 3.5 10*3/MM3 (ref 0.7–3.1)
LYMPHOCYTES NFR BLD AUTO: 36 % (ref 19.6–45.3)
MCH RBC QN AUTO: 27.4 PG (ref 26.6–33)
MCHC RBC AUTO-ENTMCNC: 32.4 G/DL (ref 31.5–35.7)
MCV RBC AUTO: 84.6 FL (ref 79–97)
MONOCYTES # BLD AUTO: 0.51 10*3/MM3 (ref 0.1–0.9)
MONOCYTES NFR BLD AUTO: 5.2 % (ref 5–12)
NEUTROPHILS NFR BLD AUTO: 5.55 10*3/MM3 (ref 1.7–7)
NEUTROPHILS NFR BLD AUTO: 57.2 % (ref 42.7–76)
NRBC BLD AUTO-RTO: 0 /100 WBC (ref 0–0.2)
PLATELET # BLD AUTO: 159 10*3/MM3 (ref 140–450)
PMV BLD AUTO: 13 FL (ref 6–12)
PROTHROMBIN TIME: 11.9 SECONDS (ref 11–13.5)
RBC # BLD AUTO: 5.26 10*6/MM3 (ref 4.14–5.8)
WBC NRBC COR # BLD AUTO: 9.72 10*3/MM3 (ref 3.4–10.8)

## 2025-07-15 PROCEDURE — 85025 COMPLETE CBC W/AUTO DIFF WBC: CPT

## 2025-07-15 PROCEDURE — 36416 COLLJ CAPILLARY BLOOD SPEC: CPT

## 2025-07-15 PROCEDURE — G0463 HOSPITAL OUTPT CLINIC VISIT: HCPCS

## 2025-07-15 PROCEDURE — 85610 PROTHROMBIN TIME: CPT

## 2025-07-15 PROCEDURE — 36415 COLL VENOUS BLD VENIPUNCTURE: CPT

## 2025-07-15 NOTE — PROGRESS NOTES
Anticoagulation Clinic Progress Note    Patient's visit was held in office today.    Anticoagulation Summary  As of 7/15/2025      INR goal:  2.0-3.0   TTR:  34.9% (4.8 mo)   INR used for dosin.00 (7/15/2025)   Warfarin maintenance plan:  15 mg (10 mg x 1.5) every Sun; 10 mg (10 mg x 1) all other days   Weekly warfarin total:  75 mg   No change documented:  Charissa Kent RPH   Plan last modified:  Charissa Kent RPH (2025)   Next INR check:  2025   Target end date:  --             Anticoagulation Episode Summary       INR check location:  --    Preferred lab:  --    Send INR reminders to:   [facility] CBC ANTICOAG POOL    Comments:  --            Clinic Interview:  Patient Findings     Negatives:  Signs/symptoms of thrombosis, Signs/symptoms of bleeding,   Laboratory test error suspected, Change in health, Change in alcohol use,   Change in activity, Upcoming invasive procedure, Emergency department   visit, Upcoming dental procedure, Missed doses, Extra doses, Change in   medications, Change in diet/appetite, Hospital admission, Bruising, Other   complaints    Comments:  Not currently taking warfarin. Last time we did INR was in May   1, 2025.  He wants to restart his warfarin today so will start at most   recent dose from history.      Clinical Outcomes     Negatives:  Major bleeding event, Thromboembolic event,   Anticoagulation-related hospital admission, Anticoagulation-related ED   visit, Anticoagulation-related fatality    Comments:  Not currently taking warfarin. Last time we did INR was in May   1, 2025.  He wants to restart his warfarin today so will start at most   recent dose from history.        INR History:      Latest Ref Rng & Units 4/15/2025     4:00 PM 2025     4:00 PM 2025     4:30 PM 2025     1:25 PM 2025     1:45 PM 7/15/2025    12:03 PM 7/15/2025    12:30 PM   Anticoagulation Monitoring   INR  1.20  1.60  3.72  1.00   INR Date  4/15/2025  2025  2025   7/15/2025   INR Goal  2.0-3.0  2.0-3.0  2.0-3.0  2.0-3.0   Trend  Same  Same  Down  Same   Last Week Total  85 mg  85 mg  90 mg  75 mg   Next Week Total  90 mg  90 mg  65 mg  75 mg   Sun  -  15 mg  15 mg  15 mg   Mon  -  10 mg  10 mg  10 mg   Tue  15 mg  15 mg  10 mg (5/6)  10 mg   Wed  15 mg (4/16)  -  -  10 mg   Thu  15 mg  -  Hold (5/1)  10 mg   Fri  -  15 mg (4/25)  10 mg  10 mg   Sat  -  10 mg  10 mg  10 mg   Historical INR 0.90 - 1.10  1.60   3.72   1.00         Plan:  1. INR is Subtherapeutic today since he has not been taking warfarin for several weeks-- see above in Anticoagulation Summary.  Will instruct Cecilio Puckett to restart their warfarin regimen at 15mg on Sun, 10mg on all other days- see above in Anticoagulation Summary.  2. Follow up in 1 week  3. Patient declines warfarin refills.  4. Verbal and written information provided. Patient expresses understanding and has no further questions at this time.  5.  He will let us know if he decides to have his INRs done at another provider but we will follow his warfarin for now.    Charissa Kent [facility]

## 2025-08-04 ENCOUNTER — ANTICOAGULATION VISIT (OUTPATIENT)
Dept: ONCOLOGY | Facility: HOSPITAL | Age: 55
End: 2025-08-04
Payer: MEDICARE

## 2025-08-04 ENCOUNTER — LAB (OUTPATIENT)
Dept: LAB | Facility: HOSPITAL | Age: 55
End: 2025-08-04
Payer: MEDICARE

## 2025-08-04 DIAGNOSIS — I82.402 DEEP VEIN THROMBOSIS (DVT) OF LEFT LOWER EXTREMITY, UNSPECIFIED CHRONICITY, UNSPECIFIED VEIN: ICD-10-CM

## 2025-08-04 LAB
INR PPP: 2.4 (ref 0.9–1.1)
PROTHROMBIN TIME: 29.3 SECONDS (ref 11–13.5)

## 2025-08-04 PROCEDURE — G0463 HOSPITAL OUTPT CLINIC VISIT: HCPCS

## 2025-08-04 PROCEDURE — 36416 COLLJ CAPILLARY BLOOD SPEC: CPT

## 2025-08-04 PROCEDURE — 85610 PROTHROMBIN TIME: CPT

## (undated) DEVICE — UNDERGLV SURG BIOGEL INDICAT PI SZ8.5 BLU

## (undated) DEVICE — RADIFOCUS GLIDEWIRE ADVANTAGE GUIDEWIRE: Brand: GLIDEWIRE ADVANTAGE

## (undated) DEVICE — PATIENT RETURN ELECTRODE, SINGLE-USE, CONTACT QUALITY MONITORING, ADULT, WITH 9FT CORD, FOR PATIENTS WEIGING OVER 33LBS. (15KG): Brand: MEGADYNE

## (undated) DEVICE — ANTIBACTERIAL UNDYED BRAIDED (POLYGLACTIN 910), SYNTHETIC ABSORBABLE SUTURE: Brand: COATED VICRYL

## (undated) DEVICE — SYR LUERLOK 20CC BX/50

## (undated) DEVICE — SUT PROLN 6/0 BV1 D/A 30IN 8709H

## (undated) DEVICE — GW STARTER ROSEN PTFE/COAT J/TP/1.5MM 0.035IN 3X260CM

## (undated) DEVICE — VESSEL LOOPS X-RAY DETECTABLE: Brand: DEROYAL

## (undated) DEVICE — SOL NS 500ML

## (undated) DEVICE — GOWN,REINF,POLY,SIRUS,BRTH SLV,XLNG/XXL: Brand: MEDLINE

## (undated) DEVICE — 3F 80 CM LEMAITRE EMBOLECTOMY CATHETER, EIFU: Brand: LEMAITRE EMBOLECTOMY CATHETER

## (undated) DEVICE — PINNACLE INTRODUCER SHEATH: Brand: PINNACLE

## (undated) DEVICE — SUT SILK 4/0 TIES 18IN A183H

## (undated) DEVICE — SUT SILK 2/0 SH CR8 18IN CR8 C012D

## (undated) DEVICE — SUT PROLN 5/0 RB1 D/A 36IN 8556H

## (undated) DEVICE — PK AAA 40

## (undated) DEVICE — RADIFOCUS GLIDEWIRE: Brand: GLIDEWIRE

## (undated) DEVICE — PENCL SMOKE/EVAC MEGADYNE TELESCP 10FT

## (undated) DEVICE — SYS PERFUS SEP PLATLT W TIPS CUST

## (undated) DEVICE — 4F 40 CM LEMAITRE EMBOLECTOMY CATHETER, EIFU: Brand: LEMAITRE EMBOLECTOMY CATHETER

## (undated) DEVICE — 4F 80 CM LEMAITRE EMBOLECTOMY CATHETER, EIFU: Brand: LEMAITRE EMBOLECTOMY CATHETER

## (undated) DEVICE — Device

## (undated) DEVICE — GLV SURG BIOGEL LTX PF 8 1/2

## (undated) DEVICE — SYR LUERLOK 30CC

## (undated) DEVICE — ARMADA 35 PTA CATHETER 8 MM X 60 MM X 80 CM / OVER-THE-WIRE: Brand: ARMADA

## (undated) DEVICE — SYR LUERLOK 5CC

## (undated) DEVICE — SUT SILK 2/0 TIES 18IN A185H

## (undated) DEVICE — TRAP FLD MINIVAC MEGADYNE 100ML

## (undated) DEVICE — ST ACC MICROPUNCTURE STFF .018 ECHO/PLAT/TP 4F/10CM 21G/7CM

## (undated) DEVICE — 4F 80CM OVER-THE-WIRE EMBOLECTOMY CATHETER: Brand: LEMAITRE EMBOLECTOMY CATHETER

## (undated) DEVICE — 3M™ IOBAN™ 2 ANTIMICROBIAL INCISE DRAPE 6648EZ: Brand: IOBAN™ 2

## (undated) DEVICE — INFLATION DEVICE: Brand: ENCORE™ 26

## (undated) DEVICE — NAVICROSS SUPPORT CATHETER: Brand: NAVICROSS

## (undated) DEVICE — SUT SILK 3/0 TIES 18IN A184H

## (undated) DEVICE — PK ATS CUST W CARDIOTOMY RESEVOIR